# Patient Record
Sex: MALE | Race: WHITE | NOT HISPANIC OR LATINO | Employment: OTHER | URBAN - METROPOLITAN AREA
[De-identification: names, ages, dates, MRNs, and addresses within clinical notes are randomized per-mention and may not be internally consistent; named-entity substitution may affect disease eponyms.]

---

## 2017-11-04 ENCOUNTER — HOSPITAL ENCOUNTER (EMERGENCY)
Facility: HOSPITAL | Age: 47
End: 2017-11-05
Attending: EMERGENCY MEDICINE | Admitting: EMERGENCY MEDICINE
Payer: MEDICARE

## 2017-11-04 DIAGNOSIS — F32.A DEPRESSION, UNSPECIFIED DEPRESSION TYPE: Primary | ICD-10-CM

## 2017-11-04 LAB
ALBUMIN SERPL BCP-MCNC: 4.2 G/DL (ref 3.5–5)
ALP SERPL-CCNC: 229 U/L (ref 46–116)
ALT SERPL W P-5'-P-CCNC: 72 U/L (ref 12–78)
AMPHETAMINES SERPL QL SCN: NEGATIVE
ANION GAP SERPL CALCULATED.3IONS-SCNC: 11 MMOL/L (ref 4–13)
AST SERPL W P-5'-P-CCNC: 50 U/L (ref 5–45)
BARBITURATES UR QL: NEGATIVE
BASOPHILS # BLD AUTO: 0.05 THOUSANDS/ΜL (ref 0–0.1)
BASOPHILS NFR BLD AUTO: 1 % (ref 0–1)
BENZODIAZ UR QL: NEGATIVE
BILIRUB SERPL-MCNC: 0.6 MG/DL (ref 0.2–1)
BUN SERPL-MCNC: 15 MG/DL (ref 5–25)
CALCIUM SERPL-MCNC: 9 MG/DL (ref 8.3–10.1)
CHLORIDE SERPL-SCNC: 99 MMOL/L (ref 100–108)
CO2 SERPL-SCNC: 30 MMOL/L (ref 21–32)
COCAINE UR QL: NEGATIVE
CREAT SERPL-MCNC: 1.38 MG/DL (ref 0.6–1.3)
EOSINOPHIL # BLD AUTO: 0.5 THOUSAND/ΜL (ref 0–0.61)
EOSINOPHIL NFR BLD AUTO: 7 % (ref 0–6)
ERYTHROCYTE [DISTWIDTH] IN BLOOD BY AUTOMATED COUNT: 14.7 % (ref 11.6–15.1)
ETHANOL EXG-MCNC: 0 MG/DL
GFR SERPL CREATININE-BSD FRML MDRD: 60 ML/MIN/1.73SQ M
GLUCOSE SERPL-MCNC: 132 MG/DL (ref 65–140)
HCT VFR BLD AUTO: 46.5 % (ref 36.5–49.3)
HGB BLD-MCNC: 15.3 G/DL (ref 12–17)
LYMPHOCYTES # BLD AUTO: 2.89 THOUSANDS/ΜL (ref 0.6–4.47)
LYMPHOCYTES NFR BLD AUTO: 43 % (ref 14–44)
MCH RBC QN AUTO: 26.2 PG (ref 26.8–34.3)
MCHC RBC AUTO-ENTMCNC: 32.9 G/DL (ref 31.4–37.4)
MCV RBC AUTO: 80 FL (ref 82–98)
METHADONE UR QL: NEGATIVE
MONOCYTES # BLD AUTO: 0.55 THOUSAND/ΜL (ref 0.17–1.22)
MONOCYTES NFR BLD AUTO: 8 % (ref 4–12)
NEUTROPHILS # BLD AUTO: 2.79 THOUSANDS/ΜL (ref 1.85–7.62)
NEUTS SEG NFR BLD AUTO: 41 % (ref 43–75)
OPIATES UR QL SCN: NEGATIVE
PCP UR QL: NEGATIVE
PLATELET # BLD AUTO: 264 THOUSANDS/UL (ref 149–390)
PMV BLD AUTO: 9.8 FL (ref 8.9–12.7)
POTASSIUM SERPL-SCNC: 3.5 MMOL/L (ref 3.5–5.3)
PROT SERPL-MCNC: 8.7 G/DL (ref 6.4–8.2)
RBC # BLD AUTO: 5.83 MILLION/UL (ref 3.88–5.62)
SODIUM SERPL-SCNC: 140 MMOL/L (ref 136–145)
THC UR QL: NEGATIVE
WBC # BLD AUTO: 6.78 THOUSAND/UL (ref 4.31–10.16)

## 2017-11-04 PROCEDURE — 80053 COMPREHEN METABOLIC PANEL: CPT | Performed by: EMERGENCY MEDICINE

## 2017-11-04 PROCEDURE — 85025 COMPLETE CBC W/AUTO DIFF WBC: CPT | Performed by: EMERGENCY MEDICINE

## 2017-11-04 PROCEDURE — 36415 COLL VENOUS BLD VENIPUNCTURE: CPT | Performed by: EMERGENCY MEDICINE

## 2017-11-04 PROCEDURE — 82075 ASSAY OF BREATH ETHANOL: CPT | Performed by: EMERGENCY MEDICINE

## 2017-11-04 PROCEDURE — 80307 DRUG TEST PRSMV CHEM ANLYZR: CPT | Performed by: EMERGENCY MEDICINE

## 2017-11-04 RX ORDER — CLONAZEPAM 0.5 MG/1
2 TABLET ORAL ONCE
Status: COMPLETED | OUTPATIENT
Start: 2017-11-04 | End: 2017-11-04

## 2017-11-04 RX ADMIN — CLONAZEPAM 2 MG: 0.5 TABLET ORAL at 22:36

## 2017-11-05 VITALS
DIASTOLIC BLOOD PRESSURE: 71 MMHG | HEART RATE: 68 BPM | SYSTOLIC BLOOD PRESSURE: 107 MMHG | RESPIRATION RATE: 18 BRPM | BODY MASS INDEX: 27.2 KG/M2 | WEIGHT: 190 LBS | TEMPERATURE: 96.3 F | OXYGEN SATURATION: 98 % | HEIGHT: 70 IN

## 2017-11-05 PROCEDURE — 99285 EMERGENCY DEPT VISIT HI MDM: CPT

## 2017-11-05 RX ORDER — CLONAZEPAM 0.5 MG/1
2 TABLET ORAL ONCE
Status: COMPLETED | OUTPATIENT
Start: 2017-11-05 | End: 2017-11-05

## 2017-11-05 RX ORDER — FENTANYL 75 UG/H
75 PATCH TRANSDERMAL ONCE
Status: DISCONTINUED | OUTPATIENT
Start: 2017-11-05 | End: 2017-11-05 | Stop reason: HOSPADM

## 2017-11-05 RX ORDER — HYDROXYZINE HYDROCHLORIDE 25 MG/1
100 TABLET, FILM COATED ORAL ONCE
Status: COMPLETED | OUTPATIENT
Start: 2017-11-05 | End: 2017-11-05

## 2017-11-05 RX ORDER — CLONAZEPAM 0.5 MG/1
2 TABLET ORAL
Status: DISCONTINUED | OUTPATIENT
Start: 2017-11-05 | End: 2017-11-05 | Stop reason: HOSPADM

## 2017-11-05 RX ORDER — CLONAZEPAM 0.5 MG/1
1 TABLET ORAL
Status: DISCONTINUED | OUTPATIENT
Start: 2017-11-05 | End: 2017-11-05 | Stop reason: HOSPADM

## 2017-11-05 RX ORDER — ZOLPIDEM TARTRATE 5 MG/1
5 TABLET ORAL
Status: DISCONTINUED | OUTPATIENT
Start: 2017-11-05 | End: 2017-11-05 | Stop reason: HOSPADM

## 2017-11-05 RX ORDER — BUPROPION HYDROCHLORIDE 150 MG/1
150 TABLET, EXTENDED RELEASE ORAL ONCE
Status: COMPLETED | OUTPATIENT
Start: 2017-11-05 | End: 2017-11-05

## 2017-11-05 RX ORDER — BUPROPION HYDROCHLORIDE 150 MG/1
150 TABLET ORAL DAILY
Status: DISCONTINUED | OUTPATIENT
Start: 2017-11-06 | End: 2017-11-05 | Stop reason: HOSPADM

## 2017-11-05 RX ORDER — ASPIRIN 81 MG/1
81 TABLET, CHEWABLE ORAL ONCE
Status: COMPLETED | OUTPATIENT
Start: 2017-11-05 | End: 2017-11-05

## 2017-11-05 RX ADMIN — METOPROLOL TARTRATE 25 MG: 25 TABLET ORAL at 08:38

## 2017-11-05 RX ADMIN — BUPROPION HYDROCHLORIDE 150 MG: 150 TABLET, FILM COATED, EXTENDED RELEASE ORAL at 07:56

## 2017-11-05 RX ADMIN — HYDROXYZINE HYDROCHLORIDE 100 MG: 25 TABLET ORAL at 07:56

## 2017-11-05 RX ADMIN — CLONAZEPAM 2 MG: 0.5 TABLET ORAL at 07:44

## 2017-11-05 RX ADMIN — GABAPENTIN 800 MG: 300 CAPSULE ORAL at 07:43

## 2017-11-05 RX ADMIN — ASPIRIN 81 MG 81 MG: 81 TABLET ORAL at 07:45

## 2017-11-05 RX ADMIN — FENTANYL 75 MCG: 75 PATCH, EXTENDED RELEASE TRANSDERMAL at 03:52

## 2017-11-05 NOTE — ED NOTES
Pt reports he prefers 2W, but is willing to transfer to [facility] if there is another bed open there       Jamil Troy, MARCUS  11/04/17 1360

## 2017-11-05 NOTE — ED NOTES
Spoke to crisis worker Ramona Barakat who reports no current updates, will continue bed search today        Erick Sexton RN  11/05/17 7572

## 2017-11-05 NOTE — EMTALA/ACUTE CARE TRANSFER
[facility] [address]  [facility] [address] [id number]  Dept: [address] [place] [address] CONSENT    NAME Sheldon Mack                                         1970                              MRN [id number]    I have been informed of my rights regarding examination, treatment, and transfer   by Dr Loren Hooks MD    Benefits:      Risks:        Consent for Transfer:  I acknowledge that my medical condition has been evaluated and explained to me by the emergency department physician or other qualified medical person and/or my attending physician, who has recommended that I be transferred to the service of  Accepting Physician: DR Ryan Interiano at [facility] Name, [address] : [address] [place], [place]  The above potential benefits of such transfer, the potential risks associated with such transfer, and the probable risks of not being transferred have been explained to me, and I fully understand them  The doctor has explained that, in my case, the benefits of transfer outweigh the risks  I agree to be transferred  I authorize the performance of emergency medical procedures and treatments upon me in both transit and upon arrival at the receiving facility  Additionally, I authorize the release of any and all medical records to the receiving facility and request they be transported with me, if possible  I understand that the safest mode of transportation during a medical emergency is an ambulance and that the Hospital advocates the use of this mode of transport  Risks of traveling to the receiving facility by car, including absence of medical control, life sustaining equipment, such as oxygen, and medical personnel has been explained to me and I fully understand them  (ELIUD CORRECT BOX BELOW)  [  ]  I consent to the stated transfer and to be transported by ambulance/helicopter    [  ]  I consent to the stated transfer, but refuse transportation by ambulance and accept full responsibility for my transportation by car  I understand the risks of non-ambulance transfers and I exonerate the Hospital and its staff from any deterioration in my condition that results from this refusal     X___________________________________________    DATE  17  TIME________  Signature of patient or legally responsible individual signing on patient behalf           RELATIONSHIP TO PATIENT_________________________          Provider Certification    NAME Trell Mack                                         1970                              MRN [id number]    A medical screening exam was performed on the above named patient  Based on the examination:    Condition Necessitating Transfer The encounter diagnosis was Depression, unspecified depression type  Patient Condition:      Reason for Transfer:      Transfer Requirements: [place], [place]   · Space available and qualified personnel available for treatment as acknowledged by Flex Latif ([phone]  · Agreed to accept transfer and to provide appropriate medical treatment as acknowledged by       DR ALESSANDRA SCOTT  · Appropriate medical records of the examination and treatment of the patient are provided at the time of transfer   [address], [address] _______  · Transfer will be performed by qualified personnel from [facility]  and appropriate transfer equipment as required, including the use of necessary and appropriate life support measures      Provider Certification: I have examined the patient and explained the following risks and benefits of being transferred/refusing transfer to the patient/family:         Based on these reasonable risks and benefits to the patient and/or the unborn child(ruddy), and based upon the information available at the time of the patients examination, I certify that the medical benefits reasonably to be expected from the provision of appropriate medical treatments at another medical facility outweigh the increasing risks, if any, to the individuals medical condition, and in the case of labor to the unborn child, from effecting the transfer      X____________________________________________ DATE 11/05/17        TIME_______      ORIGINAL - SEND TO MEDICAL RECORDS   COPY - SEND WITH PATIENT DURING TRANSFER

## 2017-11-05 NOTE — ED NOTES
CW called [facility] and spoke with Wes (dispatch) who was able to schedule a  time for 11/05/2017@ 1400  CW notified Ally Weiss (admissions) with [address]

## 2017-11-05 NOTE — ED NOTES
Pt's Lopressor held at this time per Dr Andreea Chapman relating to pt's HR 58 and /76       Gonzalo Benavides RN  11/05/17 0727

## 2017-11-05 NOTE — ED PROVIDER NOTES
History  Chief Complaint   Patient presents with    Psychiatric Evaluation     Patient presents to ED for a psychiatric eval  Patient states he had a psychotic break 6 years ago, and he feels it coming on, states about every year and a half he gets this way  has SI w/o plan       This is a 50-year-old male complaining of depression with thoughts of hurting himself and his family but no specific plan  Symptoms started over the last few hours  He is hearing voices but he is unsure of what they are saying  He told his wife that he had these thoughts  Prior to Admission Medications   Prescriptions Last Dose Informant Patient Reported? Taking? QUEtiapine (SEROquel) 200 mg tablet   No No   Sig: Take 1 5 tablets (300 mg total) by mouth daily at bedtime Indications: Depression  aspirin (ECOTRIN LOW STRENGTH) 81 mg EC tablet   No No   Sig: Take 1 tablet (81 mg total) by mouth daily Indications: sedentary lifestyle  buPROPion (WELLBUTRIN SR) 150 mg 12 hr tablet   No No   Sig: Take 1 tablet (150 mg total) by mouth 2 (two) times a day Indications: Major Depressive Disorder  clonazePAM (KlonoPIN) 2 MG disintegrating tablet   No No   Sig: Take 1 tablet (2 mg total) by mouth 3 (three) times a day as needed for seizures  clonazePAM (KlonoPIN) 2 mg tablet   No No   Sig: Indications: anxiety  Take 1 tab(2mg) in morning, half tab(1mg) at noon, 1 tab(2mg) at night by mouth for anxiety   fentaNYL (DURAGESIC) 75 mcg/hr   No No   Sig: Indications: Chronic Pain  Place one patch on skin every 72 hours for pain   gabapentin (NEURONTIN) 800 mg tablet   No No   Sig: Take 1 tablet (800 mg total) by mouth 3 (three) times a day Indications: Anxiety  hydrOXYzine pamoate (VISTARIL) 50 mg capsule   No No   Sig: Indications: Anxiety Neurosis   Take 2 tablets (100mg) every 8 hours as needed for anxiety   metoprolol tartrate (LOPRESSOR) 25 mg tablet   No No   Sig: Take 1 tablet (25 mg total) by mouth 2 (two) times a day Indications: High Blood Pressure  zolpidem (AMBIEN) 5 mg tablet   No No   Sig: Take 1 tablet (5 mg total) by mouth daily at bedtime as needed for sleep Indications: Trouble Sleeping  Facility-Administered Medications: None       Past Medical History:   Diagnosis Date    Alcoholism ([facility] [address] )     Anxiety     Back pain     Cardiac disease     Chronic pain disorder     Depression     Hypertension     Psychiatric disorder     Psychiatric illness     Psychosis        History reviewed  No pertinent surgical history  Family History   Problem Relation Age of Onset    No Known Problems Mother     No Known Problems Father     No Known Problems Sister     No Known Problems Brother     No Known Problems Maternal Aunt     No Known Problems Paternal Aunt     No Known Problems Maternal Uncle     No Known Problems Paternal Uncle     No Known Problems Maternal Grandfather     No Known Problems Maternal Grandmother     No Known Problems Paternal Grandfather     No Known Problems Paternal Grandmother     No Known Problems Cousin     ADD / ADHD Neg Hx     Alcohol abuse Neg Hx     Anxiety disorder Neg Hx     Bipolar disorder Neg Hx     Dementia Neg Hx     Depression Neg Hx     Drug abuse Neg Hx     OCD Neg Hx     Paranoid behavior Neg Hx     Schizophrenia Neg Hx     Seizures Neg Hx     Self-Injurious Behavior  Neg Hx     Suicide Attempts Neg Hx      I have reviewed and agree with the history as documented  Social History   Substance Use Topics    Smoking status: Never Smoker    Smokeless tobacco: Never Used    Alcohol use No        Review of Systems   All other systems reviewed and are negative        Physical Exam  ED Triage Vitals [11/04/17 2139]   Temperature Pulse Respirations Blood Pressure SpO2   (!) 96 3 °F (35 7 °C) (!) 106 20 (!) 149/104 98 %      Temp Source Heart Rate Source Patient Position - Orthostatic VS BP Location FiO2 (%)   Temporal Monitor Lying Right arm --      Pain 11/5/17 0838)   buPROPion [facility] SR) 12 hr tablet 150 mg (150 mg Oral Given 11/5/17 5890)   aspirin chewable tablet 81 mg (81 mg Oral Given 11/5/17 5689)   hydrOXYzine HCL (ATARAX) tablet 100 mg (100 mg Oral Given 11/5/17 9882)       Diagnostic Studies  Results Reviewed     Procedure Component Value Units Date/Time    Comprehensive metabolic panel [[id number]]  (Abnormal) Collected:  11/04/17 2156    Lab Status:  Final result Specimen:  Blood from Arm, Right Updated:  11/04/17 2219     Sodium 140 mmol/L      Potassium 3 5 mmol/L      Chloride 99 (L) mmol/L      CO2 30 mmol/L      Anion Gap 11 mmol/L      BUN 15 mg/dL      Creatinine 1 38 (H) mg/dL      Glucose 132 mg/dL      Calcium 9 0 mg/dL      AST 50 (H) U/L      ALT 72 U/L      Alkaline Phosphatase 229 (H) U/L      Total Protein 8 7 (H) g/dL      Albumin 4 2 g/dL      Total Bilirubin 0 60 mg/dL      eGFR 60 ml/min/1 73sq m     Narrative:         National Kidney Disease Education Program recommendations are as follows:  GFR calculation is accurate only with a steady state creatinine  Chronic Kidney disease less than 60 ml/min/1 73 sq  meters  Kidney failure less than 15 ml/min/1 73 sq  meters  Rapid drug screen, urine [[id number]]  (Normal) Collected:  11/04/17 2156    Lab Status:  Final result Specimen:  Urine from Urine, Clean Catch Updated:  11/04/17 2215     Amph/Meth UR Negative     Barbiturate Ur Negative     Benzodiazepine Urine Negative     Cocaine Urine Negative     Methadone Urine Negative     Opiate Urine Negative     PCP Ur Negative     THC Urine Negative    Narrative:         FOR MEDICAL PURPOSES ONLY  IF CONFIRMATION NEEDED PLEASE CONTACT THE LAB WITHIN 5 DAYS      Drug Screen Cutoff Levels:  AMPHETAMINE/METHAMPHETAMINES  1000 ng/mL  BARBITURATES     200 ng/mL  BENZODIAZEPINES     200 ng/mL  COCAINE      300 ng/mL  METHADONE      300 ng/mL  OPIATES      300 ng/mL  PHENCYCLIDINE     25 ng/mL  THC       50 ng/mL    CBC and differential [[id number]]  (Abnormal) Collected:  11/04/17 2156    Lab Status:  Final result Specimen:  Blood from Arm, Right Updated:  11/04/17 2204     WBC 6 78 Thousand/uL      RBC 5 83 (H) Million/uL      Hemoglobin 15 3 g/dL      Hematocrit 46 5 %      MCV 80 (L) fL      MCH 26 2 (L) pg      MCHC 32 9 g/dL      RDW 14 7 %      MPV 9 8 fL      Platelets 258 Thousands/uL      Neutrophils Relative 41 (L) %      Lymphocytes Relative 43 %      Monocytes Relative 8 %      Eosinophils Relative 7 (H) %      Basophils Relative 1 %      Neutrophils Absolute 2 79 Thousands/µL      Lymphocytes Absolute 2 89 Thousands/µL      Monocytes Absolute 0 55 Thousand/µL      Eosinophils Absolute 0 50 Thousand/µL      Basophils Absolute 0 05 Thousands/µL     POCT alcohol breath test [[id number]]  (Normal) Resulted:  11/04/17 2153    Lab Status:  Final result Updated:  11/04/17 2154     EXTBreath Alcohol 0 000                 No orders to display              Procedures  Procedures       Phone Contacts  ED Phone Contact    ED Course  ED Course                                MDM  Number of Diagnoses or Management Options  Depression, unspecified depression type: new and requires workup     Amount and/or Complexity of Data Reviewed  Clinical lab tests: ordered and reviewed    Patient Progress  Patient progress: stable    CritCare Time    Disposition  Final diagnoses:   Depression, unspecified depression type     Time reflects when diagnosis was documented in both MDM as applicable and the Disposition within this note     Time User Action Codes Description Comment    11/5/2017  8:53 AM Immanuel CORDOBA Add [F32 9] Depression, unspecified depression type     11/5/2017  8:53 AM Immanuel CORDOBA Modify [F32 9] Depression, unspecified depression type     11/5/2017  8:53 AM Immanuel CORDOBA Modify [F32 9] Depression, unspecified depression type       ED Disposition     ED Disposition Condition Comment    Transfer to Another [address] [place] [address] [facility] should be transferred out to [facility]  MD Documentation    Flowsheet Row Most Recent Value   Accepting Physician  DR Julián Cerrato Name, [address] [place], [place]    (Name & Tel number)  Carolyn Manley (CRISIS NWHOEZ)[phone]   Transported by (Company and Unit #)  [employer]   Sending MD WANG      RN Documentation    [address] Shayla Angel Name, [address] [place], [place]    (Name & Tel number)  MICHELLE (CRISIS PNSIJD)[phone]   Transport Mode  Ambulance [SLETS]   Transported by [employer] and Unit #)  SLETS   Level of Care  Basic life support [SLETS]   Copies of Medical Records Sent  History and Physical, Orders, Progress note, Transfer form, Nursing note, Labs   Patient Belongings Disposition  Sent with patient      Follow-up Information    None       Discharge Medication List as of 11/5/2017  2:29 PM      CONTINUE these medications which have NOT CHANGED    Details   aspirin (ECOTRIN LOW STRENGTH) 81 mg EC tablet Take 1 tablet (81 mg total) by mouth daily Indications: sedentary lifestyle , Starting 3/24/2016, Until Discontinued, No Print      buPROPion (WELLBUTRIN SR) 150 mg 12 hr tablet Take 1 tablet (150 mg total) by mouth 2 (two) times a day Indications: Major Depressive Disorder , Starting 3/24/2016, Until Discontinued, Print      clonazePAM (KlonoPIN) 2 MG disintegrating tablet Take 1 tablet (2 mg total) by mouth 3 (three) times a day as needed for seizures  , Starting 4/1/2016, Until Discontinued, Print      clonazePAM (KlonoPIN) 2 mg tablet Indications: anxiety  Take 1 tab(2mg) in morning, half tab(1mg) at noon, 1 tab(2mg) at night by mouth for anxiety, Print      fentaNYL (DURAGESIC) 75 mcg/hr Indications: Chronic Pain   Place one patch on skin every 72 hours for pain, Print      gabapentin (NEURONTIN) 800 mg tablet Take 1 tablet (800 mg total) by mouth 3 (three) times a day Indications: Anxiety  , Starting 3/24/2016, Until Discontinued, Print      hydrOXYzine pamoate (VISTARIL) 50 mg capsule Indications: Anxiety Neurosis  Take 2 tablets (100mg) every 8 hours as needed for anxiety, Print      metoprolol tartrate (LOPRESSOR) 25 mg tablet Take 1 tablet (25 mg total) by mouth 2 (two) times a day Indications: High Blood Pressure , Starting 3/24/2016, Until Discontinued, No Print      QUEtiapine (SEROquel) 200 mg tablet Take 1 5 tablets (300 mg total) by mouth daily at bedtime Indications: Depression  , Starting 3/24/2016, Until Discontinued, Print      zolpidem (AMBIEN) 5 mg tablet Take 1 tablet (5 mg total) by mouth daily at bedtime as needed for sleep Indications: [address] , Starting 3/24/2016, Until Discontinued, Print           No discharge procedures on file      ED Provider  Electronically Signed by           Bhanu Milan MD  11/05/17 4425

## 2017-11-05 NOTE — ED NOTES
Called [facility] attempting to give report, spoke to  who states no one is available at this time therefore she will page crisis worker and have them call this nurse back        Erick Sexton RN  11/05/17 8417

## 2017-11-05 NOTE — ED NOTES
CW received a call from Milton Mcleod (admissions) with [facility] who informed me that pt has been accepted  Pt will be transferred and admitted to [facility] under the care of Dr Lisandra Schulte  Transportation has been arranged      [facility]

## 2017-11-05 NOTE — ED NOTES
[facility] made aware of pt's need for psychiatric consult        Adam Anderson RN  11/05/17 2120

## 2017-11-05 NOTE — ED NOTES
Pt states he would like to stay away from the [facility] area if possible  PC placed to [facility], message left to call back;  [facility] called, no beds; [facility] no beds; [facility] called, stated they have a bed and will review in the AM  Chart faxed at this time
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
## 2017-11-05 NOTE — ED NOTES
Spoke to Rikki King from crisis   Gave an estimate arrival time of 136 Santhosh Jalloh RN  11/04/17 8434

## 2017-11-05 NOTE — ED NOTES
Pt given warm blankets, reduced stimuli by turning down lights after medication administration       Karen Azevedo RN  11/04/17 9911

## 2017-11-05 NOTE — ED NOTES
Called about status of patient tray  [employer] said that they just saw that the order changed and that they would have to print a new ticket now  Patient original tray was order at approximant 07:30am  They are sending a tray up shortly        Ora Sierra  11/05/17 3774

## 2017-12-30 ENCOUNTER — HOSPITAL ENCOUNTER (EMERGENCY)
Facility: HOSPITAL | Age: 47
End: 2017-12-31
Attending: EMERGENCY MEDICINE | Admitting: EMERGENCY MEDICINE
Payer: MEDICARE

## 2017-12-30 DIAGNOSIS — R44.3 HALLUCINATIONS: ICD-10-CM

## 2017-12-30 DIAGNOSIS — R45.851 SUICIDAL IDEATION: Primary | ICD-10-CM

## 2017-12-30 LAB
ALBUMIN SERPL BCP-MCNC: 4.5 G/DL (ref 3.5–5)
ALP SERPL-CCNC: 79 U/L (ref 46–116)
ALT SERPL W P-5'-P-CCNC: 18 U/L (ref 12–78)
AMORPH URATE CRY URNS QL MICRO: ABNORMAL /HPF
AMPHETAMINES SERPL QL SCN: NEGATIVE
ANION GAP SERPL CALCULATED.3IONS-SCNC: 13 MMOL/L (ref 4–13)
APAP SERPL-MCNC: <2 UG/ML (ref 10–30)
APTT PPP: 32 SECONDS (ref 23–35)
AST SERPL W P-5'-P-CCNC: 31 U/L (ref 5–45)
BACTERIA UR QL AUTO: ABNORMAL /HPF
BARBITURATES UR QL: NEGATIVE
BASOPHILS # BLD AUTO: 0.01 THOUSANDS/ΜL (ref 0–0.1)
BASOPHILS NFR BLD AUTO: 0 % (ref 0–1)
BENZODIAZ UR QL: NEGATIVE
BILIRUB SERPL-MCNC: 0.8 MG/DL (ref 0.2–1)
BILIRUB UR QL STRIP: ABNORMAL
BUN SERPL-MCNC: 19 MG/DL (ref 5–25)
CALCIUM SERPL-MCNC: 9.3 MG/DL (ref 8.3–10.1)
CHLORIDE SERPL-SCNC: 100 MMOL/L (ref 100–108)
CLARITY UR: CLEAR
CO2 SERPL-SCNC: 23 MMOL/L (ref 21–32)
COCAINE UR QL: NEGATIVE
COLOR UR: YELLOW
CREAT SERPL-MCNC: 0.98 MG/DL (ref 0.6–1.3)
EOSINOPHIL # BLD AUTO: 0.01 THOUSAND/ΜL (ref 0–0.61)
EOSINOPHIL NFR BLD AUTO: 0 % (ref 0–6)
ERYTHROCYTE [DISTWIDTH] IN BLOOD BY AUTOMATED COUNT: 14.1 % (ref 11.6–15.1)
ETHANOL EXG-MCNC: 0 MG/DL
ETHANOL SERPL-MCNC: <3 MG/DL (ref 0–3)
GFR SERPL CREATININE-BSD FRML MDRD: 91 ML/MIN/1.73SQ M
GLUCOSE SERPL-MCNC: 121 MG/DL (ref 65–140)
GLUCOSE UR STRIP-MCNC: NEGATIVE MG/DL
HCT VFR BLD AUTO: 42.5 % (ref 36.5–49.3)
HGB BLD-MCNC: 14.6 G/DL (ref 12–17)
HGB UR QL STRIP.AUTO: NEGATIVE
INR PPP: 1.06 (ref 0.86–1.16)
KETONES UR STRIP-MCNC: ABNORMAL MG/DL
LEUKOCYTE ESTERASE UR QL STRIP: NEGATIVE
LYMPHOCYTES # BLD AUTO: 1.44 THOUSANDS/ΜL (ref 0.6–4.47)
LYMPHOCYTES NFR BLD AUTO: 19 % (ref 14–44)
MCH RBC QN AUTO: 26.9 PG (ref 26.8–34.3)
MCHC RBC AUTO-ENTMCNC: 34.4 G/DL (ref 31.4–37.4)
MCV RBC AUTO: 78 FL (ref 82–98)
METHADONE UR QL: NEGATIVE
MONOCYTES # BLD AUTO: 0.41 THOUSAND/ΜL (ref 0.17–1.22)
MONOCYTES NFR BLD AUTO: 6 % (ref 4–12)
MUCOUS THREADS UR QL AUTO: ABNORMAL
NEUTROPHILS # BLD AUTO: 5.54 THOUSANDS/ΜL (ref 1.85–7.62)
NEUTS SEG NFR BLD AUTO: 75 % (ref 43–75)
NITRITE UR QL STRIP: NEGATIVE
NON-SQ EPI CELLS URNS QL MICRO: ABNORMAL /HPF
OPIATES UR QL SCN: NEGATIVE
PCP UR QL: NEGATIVE
PH UR STRIP.AUTO: 5.5 [PH] (ref 4.5–8)
PLATELET # BLD AUTO: 255 THOUSANDS/UL (ref 149–390)
PMV BLD AUTO: 10.1 FL (ref 8.9–12.7)
POTASSIUM SERPL-SCNC: 5.3 MMOL/L (ref 3.5–5.3)
PROT SERPL-MCNC: 7.9 G/DL (ref 6.4–8.2)
PROT UR STRIP-MCNC: ABNORMAL MG/DL
PROTHROMBIN TIME: 13.6 SECONDS (ref 12.1–14.4)
RBC # BLD AUTO: 5.42 MILLION/UL (ref 3.88–5.62)
RBC #/AREA URNS AUTO: ABNORMAL /HPF
SALICYLATES SERPL-MCNC: <3 MG/DL (ref 3–20)
SODIUM SERPL-SCNC: 136 MMOL/L (ref 136–145)
SP GR UR STRIP.AUTO: >=1.03 (ref 1–1.03)
THC UR QL: NEGATIVE
TROPONIN I SERPL-MCNC: <0.02 NG/ML
TSH SERPL DL<=0.05 MIU/L-ACNC: 0.31 UIU/ML (ref 0.36–3.74)
UROBILINOGEN UR QL STRIP.AUTO: 0.2 E.U./DL
WBC # BLD AUTO: 7.41 THOUSAND/UL (ref 4.31–10.16)
WBC #/AREA URNS AUTO: ABNORMAL /HPF

## 2017-12-30 PROCEDURE — 85025 COMPLETE CBC W/AUTO DIFF WBC: CPT | Performed by: EMERGENCY MEDICINE

## 2017-12-30 PROCEDURE — 81001 URINALYSIS AUTO W/SCOPE: CPT | Performed by: EMERGENCY MEDICINE

## 2017-12-30 PROCEDURE — 80307 DRUG TEST PRSMV CHEM ANLYZR: CPT | Performed by: EMERGENCY MEDICINE

## 2017-12-30 PROCEDURE — 96374 THER/PROPH/DIAG INJ IV PUSH: CPT

## 2017-12-30 PROCEDURE — 84484 ASSAY OF TROPONIN QUANT: CPT | Performed by: EMERGENCY MEDICINE

## 2017-12-30 PROCEDURE — 82075 ASSAY OF BREATH ETHANOL: CPT | Performed by: EMERGENCY MEDICINE

## 2017-12-30 PROCEDURE — 80329 ANALGESICS NON-OPIOID 1 OR 2: CPT | Performed by: EMERGENCY MEDICINE

## 2017-12-30 PROCEDURE — 85610 PROTHROMBIN TIME: CPT | Performed by: EMERGENCY MEDICINE

## 2017-12-30 PROCEDURE — 93005 ELECTROCARDIOGRAM TRACING: CPT

## 2017-12-30 PROCEDURE — 80320 DRUG SCREEN QUANTALCOHOLS: CPT | Performed by: EMERGENCY MEDICINE

## 2017-12-30 PROCEDURE — 85730 THROMBOPLASTIN TIME PARTIAL: CPT | Performed by: EMERGENCY MEDICINE

## 2017-12-30 PROCEDURE — 96361 HYDRATE IV INFUSION ADD-ON: CPT

## 2017-12-30 PROCEDURE — 36415 COLL VENOUS BLD VENIPUNCTURE: CPT | Performed by: EMERGENCY MEDICINE

## 2017-12-30 PROCEDURE — 84443 ASSAY THYROID STIM HORMONE: CPT | Performed by: EMERGENCY MEDICINE

## 2017-12-30 PROCEDURE — 80053 COMPREHEN METABOLIC PANEL: CPT | Performed by: EMERGENCY MEDICINE

## 2017-12-30 RX ORDER — LORAZEPAM 2 MG/ML
1 INJECTION INTRAMUSCULAR ONCE
Status: COMPLETED | OUTPATIENT
Start: 2017-12-30 | End: 2017-12-30

## 2017-12-30 RX ORDER — LORAZEPAM 1 MG/1
1 TABLET ORAL ONCE
Status: COMPLETED | OUTPATIENT
Start: 2017-12-30 | End: 2017-12-30

## 2017-12-30 RX ADMIN — SODIUM CHLORIDE 1000 ML: 0.9 INJECTION, SOLUTION INTRAVENOUS at 17:48

## 2017-12-30 RX ADMIN — LORAZEPAM 1 MG: 2 INJECTION, SOLUTION INTRAMUSCULAR; INTRAVENOUS at 17:52

## 2017-12-30 RX ADMIN — LORAZEPAM 1 MG: 1 TABLET ORAL at 20:28

## 2017-12-30 NOTE — ED PROVIDER NOTES
History  Chief Complaint   Patient presents with    Psychiatric Evaluation     Pt c/o visual and auditory hallucinations and SI  Denies HI  This is a 69-year-old male presents here with his wife with concerns for visual and auditory hallucinations of people chasing and spying on him as well as suicidal thoughts without a specific plan  Symptoms have been for 1 week  He was discharged from a psychiatric facility for bipolar about a month ago  He admits to decreased appetite and dysuria recently  Prior to Admission Medications   Prescriptions Last Dose Informant Patient Reported? Taking? QUEtiapine (SEROquel) 200 mg tablet   No Yes   Sig: Take 1 5 tablets (300 mg total) by mouth daily at bedtime Indications: Depression  aspirin (ECOTRIN LOW STRENGTH) 81 mg EC tablet   No Yes   Sig: Take 1 tablet (81 mg total) by mouth daily Indications: sedentary lifestyle  buPROPion (WELLBUTRIN SR) 150 mg 12 hr tablet   No Yes   Sig: Take 1 tablet (150 mg total) by mouth 2 (two) times a day Indications: Major Depressive Disorder  clonazePAM (KlonoPIN) 2 MG disintegrating tablet   No Yes   Sig: Take 1 tablet (2 mg total) by mouth 3 (three) times a day as needed for seizures  clonazePAM (KlonoPIN) 2 mg tablet   No Yes   Sig: Indications: anxiety  Take 1 tab(2mg) in morning, half tab(1mg) at noon, 1 tab(2mg) at night by mouth for anxiety   fentaNYL (DURAGESIC) 75 mcg/hr   No Yes   Sig: Indications: Chronic Pain  Place one patch on skin every 72 hours for pain   gabapentin (NEURONTIN) 800 mg tablet   No Yes   Sig: Take 1 tablet (800 mg total) by mouth 3 (three) times a day Indications: Anxiety  hydrOXYzine pamoate (VISTARIL) 50 mg capsule   No Yes   Sig: Indications: Anxiety Neurosis  Take 2 tablets (100mg) every 8 hours as needed for anxiety   zolpidem (AMBIEN) 5 mg tablet   No Yes   Sig: Take 1 tablet (5 mg total) by mouth daily at bedtime as needed for sleep Indications: Trouble Sleeping  Facility-Administered Medications: None       Past Medical History:   Diagnosis Date    Alcoholism ([address] )     Anxiety     Back pain     Cardiac disease     Chronic pain disorder     Depression     Hypertension     MI (myocardial infarction)     Psychiatric disorder     Psychiatric illness     Psychosis     Renal disorder        Past Surgical History:   Procedure Laterality Date    BACK SURGERY         Family History   Problem Relation Age of Onset    No Known Problems Mother     No Known Problems Father     No Known Problems Sister     No Known Problems Brother     No Known Problems Maternal Aunt     No Known Problems Paternal Aunt     No Known Problems Maternal Uncle     No Known Problems Paternal Uncle     No Known Problems Maternal Grandfather     No Known Problems Maternal Grandmother     No Known Problems Paternal Grandfather     No Known Problems Paternal Grandmother     No Known Problems Cousin     ADD / ADHD Neg Hx     Alcohol abuse Neg Hx     Anxiety disorder Neg Hx     Bipolar disorder Neg Hx     Dementia Neg Hx     Depression Neg Hx     Drug abuse Neg Hx     OCD Neg Hx     Paranoid behavior Neg Hx     Schizophrenia Neg Hx     Seizures Neg Hx     Self-Injurious Behavior  Neg Hx     Suicide Attempts Neg Hx      I have reviewed and agree with the history as documented  Social History   Substance Use Topics    Smoking status: Former Smoker     Types: Cigars    Smokeless tobacco: Never Used    Alcohol use No        Review of Systems   All other systems reviewed and are negative        Physical Exam  ED Triage Vitals [12/30/17 1710]   Temperature Pulse Respirations Blood Pressure SpO2   97 5 °F (36 4 °C) (!) 113 18 (!) 153/106 96 %      Temp Source Heart Rate Source Patient Position - Orthostatic VS BP Location FiO2 (%)   Temporal Monitor Sitting Right arm --      Pain Score       5           Orthostatic Vital Signs  Vitals:    12/30/17 1710 12/30/17 1800   BP: (!) 153/106 128/89   Pulse: (!) 113 92   Patient Position - Orthostatic VS: Sitting Lying       Physical Exam   Constitutional: He is oriented to person, place, and time  He appears well-developed and well-nourished  HENT:   Mouth/Throat: Oropharynx is clear and moist    Eyes: Conjunctivae and EOM are normal  Pupils are equal, round, and reactive to light  Neck: Normal range of motion  Neck supple  No spinous process tenderness present  Cardiovascular: Regular rhythm, normal heart sounds and intact distal pulses  Tachycardia present  Pulmonary/Chest: Effort normal and breath sounds normal  No respiratory distress  He has no wheezes  Abdominal: Soft  Bowel sounds are normal  He exhibits no distension  There is no tenderness  Musculoskeletal: Normal range of motion  Neurological: He is alert and oriented to person, place, and time  He has normal strength  No sensory deficit  GCS eye subscore is 4  GCS verbal subscore is 5  GCS motor subscore is 6  Skin: Skin is warm and dry  No rash noted  Psychiatric: His speech is normal and behavior is normal  His mood appears anxious  He expresses suicidal ideation  He expresses no suicidal plans  Nursing note and vitals reviewed        ED Medications  Medications   sodium chloride 0 9 % bolus 1,000 mL (1,000 mL Intravenous New Bag 12/30/17 1748)   LORazepam (ATIVAN) 2 mg/mL injection 1 mg (1 mg Intravenous Given 12/30/17 1752)       Diagnostic Studies  Results Reviewed     Procedure Component Value Units Date/Time    Troponin I [[id number]]  (Normal) Collected:  12/30/17 1830    Lab Status:  Final result Specimen:  Blood from Hand, Left Updated:  12/30/17 1910     Troponin I <0 02 ng/mL     Narrative:         [employer] Chemistry analyzer 99% cutoff is > 0 04 ng/mL in network labs    o cTnI 99% cutoff is useful only when applied to patients in the clinical setting of myocardial ischemia  o cTnI 99% cutoff should be interpreted in the context of clinical history, ECG findings and possibly cardiac imaging to establish correct diagnosis  o cTnI 99% cutoff may be suggestive but clearly not indicative of a coronary event without the clinical setting of myocardial ischemia  TSH [[id number]]  (Abnormal) Collected:  12/30/17 1748    Lab Status:  Final result Specimen:  Blood from Hand, Right Updated:  12/30/17 1848     TSH 3RD GENERATON 0 311 (L) uIU/mL     Narrative:         Patients undergoing fluorescein dye angiography may retain small amounts of fluorescein in the body for 48-72 hours post procedure  Samples containing fluorescein can produce falsely depressed TSH values  If the patient had this procedure,a specimen should be resubmitted post fluorescein clearance  Rapid drug screen, urine [[id number]]  (Normal) Collected:  12/30/17 1751    Lab Status:  Final result Specimen:  Urine from Urine, Clean Catch Updated:  12/30/17 1839     Amph/Meth UR Negative     Barbiturate Ur Negative     Benzodiazepine Urine Negative     Cocaine Urine Negative     Methadone Urine Negative     Opiate Urine Negative     PCP Ur Negative     THC Urine Negative    Narrative:         FOR MEDICAL PURPOSES ONLY  IF CONFIRMATION NEEDED PLEASE CONTACT THE LAB WITHIN 5 DAYS      Drug Screen Cutoff Levels:  AMPHETAMINE/METHAMPHETAMINES  1000 ng/mL  BARBITURATES     200 ng/mL  BENZODIAZEPINES     200 ng/mL  COCAINE      300 ng/mL  METHADONE      300 ng/mL  OPIATES      300 ng/mL  PHENCYCLIDINE     25 ng/mL  THC       50 ng/mL    Urine Microscopic [[id number]]  (Abnormal) Collected:  12/30/17 1752    Lab Status:  Final result Specimen:  Urine from Urine, Clean Catch Updated:  12/30/17 1834     RBC, UA 0-1 (A) /hpf      WBC, UA 2-4 (A) /hpf      Epithelial Cells Occasional /hpf      Bacteria, UA Occasional /hpf      AMORPH URATES Occasional /hpf      MUCOUS THREADS Occasional    Comprehensive metabolic panel [[id number]] Collected:  12/30/17 1748    Lab Status:  Final result Specimen:  Blood from Hand, Right Updated:  12/30/17 1833     Sodium 136 mmol/L      Potassium 5 3 mmol/L      Chloride 100 mmol/L      CO2 23 mmol/L      Anion Gap 13 mmol/L      BUN 19 mg/dL      Creatinine 0 98 mg/dL      Glucose 121 mg/dL      Calcium 9 3 mg/dL      AST 31 U/L      ALT 18 U/L      Alkaline Phosphatase 79 U/L      Total Protein 7 9 g/dL      Albumin 4 5 g/dL      Total Bilirubin 0 80 mg/dL      eGFR 91 ml/min/1 73sq m     Narrative:         National Kidney Disease Education Program recommendations are as follows:  GFR calculation is accurate only with a steady state creatinine  Chronic Kidney disease less than 60 ml/min/1 73 sq  meters  Kidney failure less than 15 ml/min/1 73 sq  meters  Ethanol [[id number]]  (Normal) Collected:  12/30/17 1748    Lab Status:  Final result Specimen:  Blood from Hand, Right Updated:  12/30/17 1833     Ethanol Lvl <3 mg/dL     Salicylate level [[id number]]  (Abnormal) Collected:  12/30/17 1748    Lab Status:  Final result Specimen:  Blood from Hand, Right Updated:  48/71/04 1695     Salicylate Lvl <3 (L) mg/dL     Acetaminophen level [[id number]]  (Abnormal) Collected:  12/30/17 1748    Lab Status:  Final result Specimen:  Blood from Hand, Right Updated:  12/30/17 1833     Acetaminophen Level <2 (L) ug/mL     Protime-INR [[id number]]  (Normal) Collected:  12/30/17 1748    Lab Status:  Final result Specimen:  Blood from Hand, Right Updated:  12/30/17 1825     Protime 13 6 seconds      INR 1 06    APTT [[id number]]  (Normal) Collected:  12/30/17 1748    Lab Status:  Final result Specimen:  Blood from Hand, Right Updated:  12/30/17 1825     PTT 32 seconds     Narrative:          Therapeutic Heparin Range = 60-90 seconds    POCT alcohol breath test [[id number]]  (Normal) Resulted:  12/30/17 1822    Lab Status:  Final result Updated:  12/30/17 1822     EXTBreath Alcohol 0 000    UA w Reflex to Microscopic w Reflex to Culture [[id number]]  (Abnormal) Collected:  12/30/17 1752    Lab Status:  Final result Specimen: Urine from Urine, Clean Catch Updated:  12/30/17 1814     Color, UA Yellow     Clarity, UA Clear     Specific Gravity, UA >=1 030     pH, UA 5 5     Leukocytes, UA Negative     Nitrite, UA Negative     Protein, UA Trace (A) mg/dl      Glucose, UA Negative mg/dl      Ketones, UA >=80 (3+) (A) mg/dl      Urobilinogen, UA 0 2 E U /dl      Bilirubin, UA Interference- unable to analyze (A)     Blood, UA Negative    CBC and differential [[id number]]  (Abnormal) Collected:  12/30/17 1748    Lab Status:  Final result Specimen:  Blood from Hand, Right Updated:  12/30/17 1809     WBC 7 41 Thousand/uL      RBC 5 42 Million/uL      Hemoglobin 14 6 g/dL      Hematocrit 42 5 %      MCV 78 (L) fL      MCH 26 9 pg      MCHC 34 4 g/dL      RDW 14 1 %      MPV 10 1 fL      Platelets 386 Thousands/uL      Neutrophils Relative 75 %      Lymphocytes Relative 19 %      Monocytes Relative 6 %      Eosinophils Relative 0 %      Basophils Relative 0 %      Neutrophils Absolute 5 54 Thousands/µL      Lymphocytes Absolute 1 44 Thousands/µL      Monocytes Absolute 0 41 Thousand/µL      Eosinophils Absolute 0 01 Thousand/µL      Basophils Absolute 0 01 Thousands/µL                  No orders to display              Procedures  ECG 12 Lead Documentation  Date/Time: 12/30/2017 6:28 PM  Performed by: Skip So  Authorized by: Skip So     Indications / Diagnosis:  Suicidal tachycardia  ECG reviewed by me, the ED Provider: yes    Patient location:  ED  Previous ECG:     Previous ECG:  Compared to current    Comparison ECG info:  12/2015    Similarity:  No change  Interpretation:     Interpretation: abnormal    Rate:     ECG rate:  95    ECG rate assessment: normal    Rhythm:     Rhythm: sinus rhythm    Ectopy:     Ectopy: none    QRS:     QRS axis:  Normal    QRS intervals:  Normal  Conduction:     Conduction: normal    ST segments:     ST segments:  Non-specific    Depression:  V4, V5, V6, II, III and aVF  T waves:     T waves: normal ECG 12 Lead Documentation  Date/Time: 12/30/2017 6:35 PM  Performed by: Kaci Other  Authorized by: Kaci Other     Indications / Diagnosis:  Abnl ekg  ECG reviewed by me, the ED Provider: yes    Patient location:  ED  Previous ECG:     Previous ECG:  Compared to current    Comparison ECG info: Today    Similarity:  Changes noted  Interpretation:     Interpretation: non-specific    Rate:     ECG rate:  89    ECG rate assessment: normal    Rhythm:     Rhythm: sinus rhythm    Ectopy:     Ectopy: none    QRS:     QRS axis:  Normal    QRS intervals:  Normal  Conduction:     Conduction: normal    ST segments:     ST segments:  Normal  T waves:     T waves: inverted      Inverted:  III and aVF           Phone Contacts  ED Phone Contact    ED Course  ED Course      signed out to Dr Elysia Walters, crisis eval if trop negative                          MDM  Number of Diagnoses or Management Options  Hallucinations: new and requires workup  Suicidal ideation: new and requires workup     Amount and/or Complexity of Data Reviewed  Clinical lab tests: ordered and reviewed  Discuss the patient with other providers: yes    Patient Progress  Patient progress: improved    CritCare Time    Disposition  Final diagnoses:   Suicidal ideation - acute   Hallucinations     Time reflects when diagnosis was documented in both MDM as applicable and the Disposition within this note     Time User Action Codes Description Comment    12/30/2017  7:06 PM Teddy Menendez Add [Z68 064] Suicidal ideation     12/30/2017  7:06 PM Teddy Menendez Modify [Z73 672] Suicidal ideation acute    12/30/2017  7:07 PM Teddy Menendez Add [R44 3] Hallucinations       ED Disposition     None      Follow-up Information    None       Patient's Medications   Discharge Prescriptions    No medications on file     No discharge procedures on file      ED Provider  Electronically Signed by           Dania Cohn DO  12/30/17 3211

## 2017-12-31 VITALS
HEIGHT: 70 IN | DIASTOLIC BLOOD PRESSURE: 88 MMHG | SYSTOLIC BLOOD PRESSURE: 128 MMHG | OXYGEN SATURATION: 95 % | BODY MASS INDEX: 28.63 KG/M2 | HEART RATE: 88 BPM | TEMPERATURE: 97.5 F | RESPIRATION RATE: 13 BRPM | WEIGHT: 200 LBS

## 2017-12-31 LAB
ATRIAL RATE: 89 BPM
ATRIAL RATE: 95 BPM
P AXIS: 57 DEGREES
P AXIS: 65 DEGREES
PR INTERVAL: 158 MS
PR INTERVAL: 164 MS
QRS AXIS: 53 DEGREES
QRS AXIS: 59 DEGREES
QRSD INTERVAL: 86 MS
QRSD INTERVAL: 86 MS
QT INTERVAL: 358 MS
QT INTERVAL: 364 MS
QTC INTERVAL: 442 MS
QTC INTERVAL: 449 MS
T WAVE AXIS: -49 DEGREES
T WAVE AXIS: -65 DEGREES
VENTRICULAR RATE: 89 BPM
VENTRICULAR RATE: 95 BPM

## 2017-12-31 PROCEDURE — 99285 EMERGENCY DEPT VISIT HI MDM: CPT

## 2017-12-31 RX ORDER — CLONAZEPAM 0.5 MG/1
2 TABLET ORAL ONCE
Status: COMPLETED | OUTPATIENT
Start: 2017-12-31 | End: 2017-12-31

## 2017-12-31 RX ADMIN — CLONAZEPAM 2 MG: 0.5 TABLET ORAL at 01:31

## 2017-12-31 NOTE — ED NOTES
Pt reports increased depression with suicidal thoughts  Pt denies a specific plan  He reports that he has visual hallucinations of "what looks like a dog" and hears "chattering" voices that he is not able to make out  He reports increased sleeping and isolation from his family  His family member present stated that he has not been eating much and has lost "probably 75lbs in the past 6 months"  Pt has not been taking his medications as prescribed due to his over sleeping   Pt signed a 61 51 81

## 2017-12-31 NOTE — ED CARE HANDOFF
Emergency Department Sign Out Note        Sign out and transfer of care from EP  See Separate Emergency Department note  The patient, Dee Kim, was evaluated by the previous provider for   SI and hallucinations  Workup Completed:   medically cleared  Medicated  ED Course / Workup Pending (followup):    Crisis evaluation for placement                          ED Course as of Dec 31 1951   Sat Dec 30, 2017   2022  Patient is sitting up in bed  He states he is anxious and would like more Ativan  He is cooperative  We await crisis evaluation  Procedures  MDM  Number of Diagnoses or Management Options  Hallucinations:   Suicidal ideation:      Amount and/or Complexity of Data Reviewed  Clinical lab tests: reviewed  Discuss the patient with other providers: yes      CritCare Time      Disposition  Final diagnoses:   Suicidal ideation - acute   Hallucinations     Time reflects when diagnosis was documented in both MDM as applicable and the Disposition within this note     Time User Action Codes Description Comment    12/30/2017  7:06 PM Jennifer Palafox Add [P70 305] Suicidal ideation     12/30/2017  7:06 PM Rito, [address] Suicidal ideation acute    12/30/2017  7:07 PM Jennifer Palafox Add [R44 3] Hallucinations       ED Disposition     ED Disposition Condition Comment    Transfer to Another [facility] should be transferred out to DEVORAH DOWNS Documentation    [address] Most Recent Value   Patient Condition  The patient has been stabilized such that within reasonable medical probability, no material deterioration of the patient condition or the condition of the unborn child(ruddy) is likely to result from the transfer   Reason for Transfer  Third party payor request   Risks of Transfer  Potential for delay in receiving treatment   Accepting Physician  Carol Ann Sarabia Name, [address] [facility]    (Name & Tel number)  Tracey Willingham Transported by [employer] and Unit #)  [employer]   Sending MD Quyen Lyon   Provider Certification  General risk, such as traffic hazards, adverse weather conditions, rough terrain or turbulence, possible failure of equipment (including vehicle or aircraft), or consequences of actions of persons outside the control of the transport personnel      RN Documentation    [address] Shayla Angel Name, [address] [facility]    (Name & Tel number)  Shama Talamantes by [employer] and Unit #)  LETITIA      Follow-up Information    None       Discharge Medication List as of 12/31/2017  3:14 AM      CONTINUE these medications which have NOT CHANGED    Details   aspirin (ECOTRIN LOW STRENGTH) 81 mg EC tablet Take 1 tablet (81 mg total) by mouth daily Indications: sedentary lifestyle , Starting 3/24/2016, Until Discontinued, No Print      buPROPion (WELLBUTRIN SR) 150 mg 12 hr tablet Take 1 tablet (150 mg total) by mouth 2 (two) times a day Indications: Major Depressive Disorder , Starting 3/24/2016, Until Discontinued, Print      clonazePAM (KlonoPIN) 2 MG disintegrating tablet Take 1 tablet (2 mg total) by mouth 3 (three) times a day as needed for seizures  , Starting 4/1/2016, Until Discontinued, Print      clonazePAM (KlonoPIN) 2 mg tablet Indications: anxiety  Take 1 tab(2mg) in morning, half tab(1mg) at noon, 1 tab(2mg) at night by mouth for anxiety, Print      fentaNYL (DURAGESIC) 75 mcg/hr Indications: Chronic Pain  Place one patch on skin every 72 hours for pain, Print      gabapentin (NEURONTIN) 800 mg tablet Take 1 tablet (800 mg total) by mouth 3 (three) times a day Indications: Anxiety  , Starting 3/24/2016, Until Discontinued, Print      hydrOXYzine pamoate (VISTARIL) 50 mg capsule Indications: Anxiety Neurosis   Take 2 tablets (100mg) every 8 hours as needed for anxiety, Print      QUEtiapine (SEROquel) 200 mg tablet Take 1 5 tablets (300 mg total) by mouth daily at bedtime Indications: Depression  , Starting 3/24/2016, Until Discontinued, Print      zolpidem (AMBIEN) 5 mg tablet Take 1 tablet (5 mg total) by mouth daily at bedtime as needed for sleep Indications: [address] , Starting 3/24/2016, Until Discontinued, Print           No discharge procedures on file         ED Provider  Electronically Signed by

## 2017-12-31 NOTE — EMTALA/ACUTE CARE TRANSFER
[facility] [address]  [address] [place] [id number]  Dept: [address] [place] [address] CONSENT    NAME Chrystal Mack                                         1970                              MRN [id number]    I have been informed of my rights regarding examination, treatment, and transfer   by Dr Jessica Hyde DO    Benefits:      Risks: Potential for delay in receiving treatment      Consent for Transfer:  I acknowledge that my medical condition has been evaluated and explained to me by the emergency department physician or other qualified medical person and/or my attending physician, who has recommended that I be transferred to the service of  Accepting Physician: Sebastian Fleischer at [address] Name, [address] : Geneva Lott  The above potential benefits of such transfer, the potential risks associated with such transfer, and the probable risks of not being transferred have been explained to me, and I fully understand them  The doctor has explained that, in my case, the benefits of transfer outweigh the risks  I agree to be transferred  I authorize the performance of emergency medical procedures and treatments upon me in both transit and upon arrival at the receiving facility  Additionally, I authorize the release of any and all medical records to the receiving facility and request they be transported with me, if possible  I understand that the safest mode of transportation during a medical emergency is an ambulance and that the Hospital advocates the use of this mode of transport  Risks of traveling to the receiving facility by car, including absence of medical control, life sustaining equipment, such as oxygen, and medical personnel has been explained to me and I fully understand them  (ELIUD CORRECT BOX BELOW)  [X]  I consent to the stated transfer and to be transported by ambulance/helicopter    [  ]  I consent to the stated transfer, but refuse transportation by ambulance and accept full responsibility for my transportation by car  I understand the risks of non-ambulance transfers and I exonerate the Hospital and its staff from any deterioration in my condition that results from this refusal     X___________________________________________    DATE  17  TIME________  Signature of patient or legally responsible individual signing on patient behalf           RELATIONSHIP TO PATIENT_________________________          Provider Certification    NAME Connor Mack                                         1970                              MRN [id number]    A medical screening exam was performed on the above named patient  Based on the examination:    Condition Necessitating Transfer The primary encounter diagnosis was Suicidal ideation  A diagnosis of Hallucinations was also pertinent to this visit  Patient Condition: The patient has been stabilized such that within reasonable medical probability, no material deterioration of the patient condition or the condition of the unborn child(ruddy) is likely to result from the transfer    Reason for Transfer: Third party payor request    Transfer Requirements: [facility]   · Space available and qualified personnel available for treatment as acknowledged by Gama Del Angel  · Agreed to accept transfer and to provide appropriate medical treatment as acknowledged by       Neelam Knapp  · Appropriate medical records of the examination and treatment of the patient are provided at the time of transfer   [address] [facility], [address] _______  · Transfer will be performed by qualified personnel from [facility]  and appropriate transfer equipment as required, including the use of necessary and appropriate life support measures      Provider Certification: I have examined the patient and explained the following risks and benefits of being transferred/refusing transfer to the patient/family:  General risk, such as traffic hazards, adverse weather conditions, rough terrain or turbulence, possible failure of equipment (including vehicle or aircraft), or consequences of actions of persons outside the control of the transport personnel      Based on these reasonable risks and benefits to the patient and/or the unborn child(ruddy), and based upon the information available at the time of the patients examination, I certify that the medical benefits reasonably to be expected from the provision of appropriate medical treatments at another medical facility outweigh the increasing risks, if any, to the individuals medical condition, and in the case of labor to the unborn child, from effecting the transfer      X____________________________________________ DATE 12/31/17        TIME_______      ORIGINAL - SEND TO MEDICAL RECORDS   COPY - SEND WITH PATIENT DURING TRANSFER

## 2018-02-06 ENCOUNTER — HOSPITAL ENCOUNTER (EMERGENCY)
Facility: HOSPITAL | Age: 48
Discharge: HOME | End: 2018-02-06
Attending: EMERGENCY MEDICINE | Admitting: EMERGENCY MEDICINE
Payer: COMMERCIAL

## 2018-02-06 VITALS
OXYGEN SATURATION: 99 % | SYSTOLIC BLOOD PRESSURE: 132 MMHG | BODY MASS INDEX: 28.7 KG/M2 | RESPIRATION RATE: 18 BRPM | HEART RATE: 110 BPM | TEMPERATURE: 97.9 F | DIASTOLIC BLOOD PRESSURE: 85 MMHG | WEIGHT: 200 LBS

## 2018-02-06 DIAGNOSIS — L03.211 FACIAL CELLULITIS: Primary | ICD-10-CM

## 2018-02-06 PROCEDURE — 90715 TDAP VACCINE 7 YRS/> IM: CPT | Performed by: EMERGENCY MEDICINE

## 2018-02-06 PROCEDURE — 99282 EMERGENCY DEPT VISIT SF MDM: CPT

## 2018-02-06 PROCEDURE — 90471 IMMUNIZATION ADMIN: CPT

## 2018-02-06 RX ORDER — CEPHALEXIN 500 MG/1
500 CAPSULE ORAL 3 TIMES DAILY
Qty: 21 CAPSULE | Refills: 0 | Status: SHIPPED | OUTPATIENT
Start: 2018-02-06 | End: 2018-02-13

## 2018-02-06 RX ORDER — CEPHALEXIN 250 MG/1
500 CAPSULE ORAL ONCE
Status: COMPLETED | OUTPATIENT
Start: 2018-02-06 | End: 2018-02-06

## 2018-02-06 RX ORDER — DIPHENHYDRAMINE HCL 25 MG
25 TABLET ORAL ONCE
Status: COMPLETED | OUTPATIENT
Start: 2018-02-06 | End: 2018-02-06

## 2018-02-06 RX ADMIN — DIPHENHYDRAMINE HCL 25 MG: 25 TABLET ORAL at 21:24

## 2018-02-06 RX ADMIN — TETANUS TOXOID, REDUCED DIPHTHERIA TOXOID AND ACELLULAR PERTUSSIS VACCINE, ADSORBED 0.5 ML: 5; 2.5; 8; 8; 2.5 SUSPENSION INTRAMUSCULAR at 21:24

## 2018-02-06 RX ADMIN — CEPHALEXIN 500 MG: 250 CAPSULE ORAL at 21:24

## 2018-02-07 NOTE — ED PROVIDER NOTES
History  Chief Complaint   Patient presents with    Rash     Red rash on bridge of nose and small area on forehead; some swelling associated with it  Denies itching; states it is painful  This is a 54-year-old male who presents with erythema warmth and pain to the glabellar area and bridge of his nose that started this evening he denies any fevers or chills last tetanus is unknown he is not sure if he got bit by something        History provided by:  Patient and relative  Rash   Location:  Face  Facial rash location:  Face  Quality: painful, redness and swelling    Pain details:     Quality:  Aching    Severity:  Mild    Timing:  Constant    Progression:  Worsening  Severity:  Moderate  Timing:  Constant  Progression:  Worsening  Chronicity:  New  Context comment:  Unclear possible insect bite  Relieved by:  Nothing  Associated symptoms: no fever        Prior to Admission Medications   Prescriptions Last Dose Informant Patient Reported? Taking? QUEtiapine (SEROquel) 200 mg tablet   No No   Sig: Take 1 5 tablets (300 mg total) by mouth daily at bedtime Indications: Depression  aspirin (ECOTRIN LOW STRENGTH) 81 mg EC tablet   No No   Sig: Take 1 tablet (81 mg total) by mouth daily Indications: sedentary lifestyle  buPROPion (WELLBUTRIN SR) 150 mg 12 hr tablet   No No   Sig: Take 1 tablet (150 mg total) by mouth 2 (two) times a day Indications: Major Depressive Disorder  clonazePAM (KlonoPIN) 2 MG disintegrating tablet   No No   Sig: Take 1 tablet (2 mg total) by mouth 3 (three) times a day as needed for seizures  clonazePAM (KlonoPIN) 2 mg tablet   No No   Sig: Indications: anxiety  Take 1 tab(2mg) in morning, half tab(1mg) at noon, 1 tab(2mg) at night by mouth for anxiety   fentaNYL (DURAGESIC) 75 mcg/hr   No No   Sig: Indications: Chronic Pain   Place one patch on skin every 72 hours for pain   gabapentin (NEURONTIN) 800 mg tablet   No No   Sig: Take 1 tablet (800 mg total) by mouth 3 (three) times a day Indications: Anxiety  hydrOXYzine pamoate (VISTARIL) 50 mg capsule   No No   Sig: Indications: Anxiety Neurosis  Take 2 tablets (100mg) every 8 hours as needed for anxiety   zolpidem (AMBIEN) 5 mg tablet   No No   Sig: Take 1 tablet (5 mg total) by mouth daily at bedtime as needed for sleep Indications: Trouble Sleeping  Facility-Administered Medications: None       Past Medical History:   Diagnosis Date    Alcoholism ([facility] [address] )     Anxiety     Back pain     Cardiac disease     Chronic pain disorder     Depression     Hypertension     MI (myocardial infarction)     Psychiatric disorder     Psychiatric illness     Psychosis     Renal disorder        Past Surgical History:   Procedure Laterality Date    BACK SURGERY         Family History   Problem Relation Age of Onset    No Known Problems Mother     No Known Problems Father     No Known Problems Sister     No Known Problems Brother     No Known Problems Maternal Aunt     No Known Problems Paternal Aunt     No Known Problems Maternal Uncle     No Known Problems Paternal Uncle     No Known Problems Maternal Grandfather     No Known Problems Maternal Grandmother     No Known Problems Paternal Grandfather     No Known Problems Paternal Grandmother     No Known Problems Cousin     ADD / ADHD Neg Hx     Alcohol abuse Neg Hx     Anxiety disorder Neg Hx     Bipolar disorder Neg Hx     Dementia Neg Hx     Depression Neg Hx     Drug abuse Neg Hx     OCD Neg Hx     Paranoid behavior Neg Hx     Schizophrenia Neg Hx     Seizures Neg Hx     Self-Injury Neg Hx     Suicide Attempts Neg Hx      I have reviewed and agree with the history as documented  Social History   Substance Use Topics    Smoking status: Former Smoker     Types: Cigars    Smokeless tobacco: Never Used    Alcohol use No      Comment: history 10 years ago heavy drinking        Review of Systems   Constitutional: Negative for fever  Skin: Positive for rash  All other systems reviewed and are negative  Physical Exam  ED Triage Vitals   Temperature Pulse Respirations Blood Pressure SpO2   02/06/18 2055 02/06/18 2100 02/06/18 2055 02/06/18 2055 02/06/18 2055   97 9 °F (36 6 °C) (!) 115 16 134/88 98 %      Temp Source Heart Rate Source Patient Position - Orthostatic VS BP Location FiO2 (%)   02/06/18 2055 -- -- -- --   Temporal          Pain Score       02/06/18 2055       7           Orthostatic Vital Signs  Vitals:    02/06/18 2055 02/06/18 2100   BP: 134/88    Pulse:  (!) 115       Physical Exam   Constitutional: He is oriented to person, place, and time  He appears well-developed and well-nourished  No distress  HENT:   Head: Normocephalic and atraumatic  Right Ear: External ear normal    Left Ear: External ear normal    Nose: Nose normal    Mouth/Throat: Oropharynx is clear and moist    Eyes: EOM are normal  Pupils are equal, round, and reactive to light  Neck: Normal range of motion  Neck supple  No tracheal deviation present  Cardiovascular: Regular rhythm and intact distal pulses  Exam reveals no gallop and no friction rub  Pulmonary/Chest: Effort normal and breath sounds normal  No respiratory distress  He has no wheezes  Abdominal: Soft  Bowel sounds are normal  He exhibits no distension  There is no tenderness  Musculoskeletal: Normal range of motion  He exhibits no edema, tenderness or deformity  Neurological: He is alert and oriented to person, place, and time  No cranial nerve deficit  Skin: Skin is warm and dry  Rash noted  3 cm area erythema mild swelling and warmth to the glabellar area and nasal bridge without any vesicles or pustular areas   Psychiatric: He has a normal mood and affect   His behavior is normal  Thought content normal        ED Medications  Medications   tetanus-diphtheria-acellular pertussis (BOOSTRIX) IM injection 0 5 mL (not administered)   diphenhydrAMINE (BENADRYL) tablet 25 mg (not administered) cephalexin (KEFLEX) capsule 500 mg (not administered)       Diagnostic Studies  Results Reviewed     None                 No orders to display              Procedures  Procedures       Phone Contacts  ED Phone Contact    ED Course  ED Course                                MDM  Number of Diagnoses or Management Options  Diagnosis management comments:  Facial erythema warmth and swelling most consistent with localized cellulitis no obvious insect bite area no abscess or vesicle formation    CritCare Time    Disposition  Final diagnoses:   Facial cellulitis     Time reflects when diagnosis was documented in both MDM as applicable and the Disposition within this note     Time User Action Codes Description Comment    2/6/2018  9:20 PM Allencristopher Armas Add [[id number]] Facial cellulitis       ED Disposition     ED Disposition Condition Comment    Discharge  Sylvester Shculte Sohellen discharge to home/self care  Condition at discharge: Stable        Follow-up Information     Follow up With Specialties Details Why [facility], DO Internal Medicine In 2 days  for recheck [address] [place] [address]  [phone]          Patient's Medications   Discharge Prescriptions    CEPHALEXIN (KEFLEX) 500 MG CAPSULE    Take 1 capsule (500 mg total) by mouth 3 (three) times a day for 7 days       Start Date: 2/6/2018  End Date: 2/13/2018       Order Dose: 500 mg       Quantity: 21 capsule    Refills: 0     No discharge procedures on file      ED Provider  Electronically Signed by           Edy Aguilar DO  02/06/18 4050

## 2018-02-07 NOTE — DISCHARGE INSTRUCTIONS
Cellulitis   WHAT YOU NEED TO KNOW:   Cellulitis is a skin infection caused by bacteria  Cellulitis may go away on its own or you may need treatment  Your healthcare provider may draw a [place] around the outside edges of your cellulitis  If your cellulitis spreads, your healthcare provider will see it outside of the [place]  DISCHARGE INSTRUCTIONS:   Call 911 if:   · You have sudden trouble breathing or chest pain  Return to the emergency department if:   · Your wound gets larger and more painful  · You feel a crackling under your skin when you touch it  · You have purple dots or bumps on your skin, or you see bleeding under your skin  · You have new swelling and pain in your legs  · The red, warm, swollen area gets larger  · You see red streaks coming from the infected area  Contact your healthcare provider if:   · You have a fever  · Your fever or pain does not go away or gets worse  · The area does not get smaller after 2 days of antibiotics  · Your skin is flaking or peeling off  · You have questions or concerns about your condition or care  Medicines:   · Antibiotics  help treat the bacterial infection  · NSAIDs , such as ibuprofen, help decrease swelling, pain, and fever  NSAIDs can cause stomach bleeding or kidney problems in certain people  If you take blood thinner medicine, always ask if NSAIDs are safe for you  Always read the medicine label and follow directions  Do not give these medicines to children under 10months of age without direction from your child's healthcare provider  · Acetaminophen  decreases pain and fever  It is available without a doctor's order  Ask how much to take and how often to take it  Follow directions  Read the labels of all other medicines you are using to see if they also contain acetaminophen, or ask your doctor or pharmacist  Acetaminophen can cause liver damage if not taken correctly   Do not use more than 4 grams (4,000 milligrams) total of acetaminophen in one day  · Take your medicine as directed  Contact your healthcare provider if you think your medicine is not helping or if you have side effects  Tell him or her if you are allergic to any medicine  Keep a list of the medicines, vitamins, and herbs you take  Include the amounts, and when and why you take them  Bring the list or the pill bottles to follow-up visits  Carry your medicine list with you in case of an emergency  Self-care:   · Elevate the area above the level of your heart  as often as you can  This will help decrease swelling and pain  Prop the area on pillows or blankets to keep it elevated comfortably  · Clean the area daily until the wound scabs over  Gently wash the area with soap and water  Pat dry  Use dressings as directed  · Place cool or warm, wet cloths on the area as directed  Use clean cloths and clean water  Leave it on the area until the cloth is room temperature  Pat the area dry with a clean, dry cloth  The cloths may help decrease pain  Prevent cellulitis:   · Do not scratch bug bites or areas of injury  You increase your risk for cellulitis by scratching these areas  · Do not share personal items, such as towels, clothing, and razors  · Clean exercise equipment  with germ-killing  before and after you use it  · Wash your hands often  Use soap and water  Wash your hands after you use the bathroom, change a child's diapers, or sneeze  Wash your hands before you prepare or eat food  Use lotion to prevent dry, cracked skin  · Wear pressure stockings as directed  You may be told to wear the stockings if you have peripheral edema  The stockings improve blood flow and decrease swelling  · Treat athlete's foot  This can help prevent the spread of a bacterial skin infection  Follow up with your healthcare provider within 3 days, or as directed:   Your healthcare provider will check if your cellulitis is getting better  You may need different medicine  Write down your questions so you remember to ask them during your visits  © 2017 [facility] Information is for End User's use only and may not be sold, redistributed or otherwise used for commercial purposes  All illustrations and images included in CareNotes® are the copyrighted property of [employer]  or [employer]harmesh [employer]  The above information is an  only  It is not intended as medical advice for individual conditions or treatments  Talk to your doctor, nurse or pharmacist before following any medical regimen to see if it is safe and effective for you

## 2018-08-06 ENCOUNTER — HOSPITAL ENCOUNTER (INPATIENT)
Facility: HOSPITAL | Age: 48
LOS: 9 days | Discharge: HOME | DRG: 885 | End: 2018-08-15
Attending: EMERGENCY MEDICINE | Admitting: PSYCHIATRY & NEUROLOGY
Payer: COMMERCIAL

## 2018-08-06 DIAGNOSIS — G89.29 CHRONIC BACK PAIN: ICD-10-CM

## 2018-08-06 DIAGNOSIS — M54.9 CHRONIC BACK PAIN: ICD-10-CM

## 2018-08-06 DIAGNOSIS — K21.9 GERD (GASTROESOPHAGEAL REFLUX DISEASE): ICD-10-CM

## 2018-08-06 DIAGNOSIS — F41.1 GENERALIZED ANXIETY DISORDER: ICD-10-CM

## 2018-08-06 DIAGNOSIS — F31.62 BIPOLAR DISORDER, CURRENT EPISODE MIXED, MODERATE (HCC): ICD-10-CM

## 2018-08-06 DIAGNOSIS — G47.00 INSOMNIA: ICD-10-CM

## 2018-08-06 DIAGNOSIS — L98.9 SKIN LESION OF LEFT LEG: ICD-10-CM

## 2018-08-06 DIAGNOSIS — N39.0 UTI (URINARY TRACT INFECTION): ICD-10-CM

## 2018-08-06 DIAGNOSIS — R45.851 PLANNING TO COMMIT SUICIDE: Primary | ICD-10-CM

## 2018-08-06 LAB
ALBUMIN SERPL BCP-MCNC: 4 G/DL (ref 3.5–5)
ALP SERPL-CCNC: 227 U/L (ref 46–116)
ALT SERPL W P-5'-P-CCNC: 38 U/L (ref 12–78)
AMPHETAMINES SERPL QL SCN: NEGATIVE
ANION GAP SERPL CALCULATED.3IONS-SCNC: 11 MMOL/L (ref 4–13)
APTT PPP: 22 SECONDS (ref 24–36)
AST SERPL W P-5'-P-CCNC: 38 U/L (ref 5–45)
BARBITURATES UR QL: NEGATIVE
BASOPHILS # BLD AUTO: 0.03 THOUSANDS/ΜL (ref 0–0.1)
BASOPHILS NFR BLD AUTO: 1 % (ref 0–1)
BENZODIAZ UR QL: POSITIVE
BILIRUB SERPL-MCNC: 0.8 MG/DL (ref 0.2–1)
BUN SERPL-MCNC: 14 MG/DL (ref 5–25)
CALCIUM SERPL-MCNC: 9.3 MG/DL (ref 8.3–10.1)
CHLORIDE SERPL-SCNC: 99 MMOL/L (ref 100–108)
CO2 SERPL-SCNC: 24 MMOL/L (ref 21–32)
COCAINE UR QL: NEGATIVE
CREAT SERPL-MCNC: 1.2 MG/DL (ref 0.6–1.3)
EOSINOPHIL # BLD AUTO: 0.11 THOUSAND/ΜL (ref 0–0.61)
EOSINOPHIL NFR BLD AUTO: 2 % (ref 0–6)
ERYTHROCYTE [DISTWIDTH] IN BLOOD BY AUTOMATED COUNT: 13.2 % (ref 11.6–15.1)
ETHANOL EXG-MCNC: 0 MG/DL
GFR SERPL CREATININE-BSD FRML MDRD: 71 ML/MIN/1.73SQ M
GLUCOSE SERPL-MCNC: 132 MG/DL (ref 65–140)
HCT VFR BLD AUTO: 41.9 % (ref 36.5–49.3)
HGB BLD-MCNC: 13.7 G/DL (ref 12–17)
IMM GRANULOCYTES # BLD AUTO: 0.01 THOUSAND/UL (ref 0–0.2)
IMM GRANULOCYTES NFR BLD AUTO: 0 % (ref 0–2)
INR PPP: 1.04 (ref 0.86–1.17)
LYMPHOCYTES # BLD AUTO: 1.67 THOUSANDS/ΜL (ref 0.6–4.47)
LYMPHOCYTES NFR BLD AUTO: 36 % (ref 14–44)
MCH RBC QN AUTO: 27 PG (ref 26.8–34.3)
MCHC RBC AUTO-ENTMCNC: 32.7 G/DL (ref 31.4–37.4)
MCV RBC AUTO: 83 FL (ref 82–98)
METHADONE UR QL: NEGATIVE
MONOCYTES # BLD AUTO: 0.33 THOUSAND/ΜL (ref 0.17–1.22)
MONOCYTES NFR BLD AUTO: 7 % (ref 4–12)
NEUTROPHILS # BLD AUTO: 2.48 THOUSANDS/ΜL (ref 1.85–7.62)
NEUTS SEG NFR BLD AUTO: 54 % (ref 43–75)
NRBC BLD AUTO-RTO: 0 /100 WBCS
OPIATES UR QL SCN: NEGATIVE
PCP UR QL: NEGATIVE
PLATELET # BLD AUTO: 202 THOUSANDS/UL (ref 149–390)
PMV BLD AUTO: 10.9 FL (ref 8.9–12.7)
POTASSIUM SERPL-SCNC: 3.7 MMOL/L (ref 3.5–5.3)
PROT SERPL-MCNC: 8 G/DL (ref 6.4–8.2)
PROTHROMBIN TIME: 13 SECONDS (ref 11.8–14.2)
RBC # BLD AUTO: 5.07 MILLION/UL (ref 3.88–5.62)
SODIUM SERPL-SCNC: 134 MMOL/L (ref 136–145)
THC UR QL: NEGATIVE
WBC # BLD AUTO: 4.63 THOUSAND/UL (ref 4.31–10.16)

## 2018-08-06 PROCEDURE — 80053 COMPREHEN METABOLIC PANEL: CPT | Performed by: EMERGENCY MEDICINE

## 2018-08-06 PROCEDURE — 85025 COMPLETE CBC W/AUTO DIFF WBC: CPT | Performed by: EMERGENCY MEDICINE

## 2018-08-06 PROCEDURE — 82075 ASSAY OF BREATH ETHANOL: CPT | Performed by: EMERGENCY MEDICINE

## 2018-08-06 PROCEDURE — 80307 DRUG TEST PRSMV CHEM ANLYZR: CPT | Performed by: EMERGENCY MEDICINE

## 2018-08-06 PROCEDURE — 99285 EMERGENCY DEPT VISIT HI MDM: CPT

## 2018-08-06 PROCEDURE — 85610 PROTHROMBIN TIME: CPT | Performed by: EMERGENCY MEDICINE

## 2018-08-06 PROCEDURE — 85730 THROMBOPLASTIN TIME PARTIAL: CPT | Performed by: EMERGENCY MEDICINE

## 2018-08-06 PROCEDURE — 36415 COLL VENOUS BLD VENIPUNCTURE: CPT | Performed by: EMERGENCY MEDICINE

## 2018-08-06 RX ORDER — LORAZEPAM 1 MG/1
1 TABLET ORAL ONCE
Status: COMPLETED | OUTPATIENT
Start: 2018-08-06 | End: 2018-08-06

## 2018-08-06 RX ORDER — RISPERIDONE 1 MG/1
1 TABLET, ORALLY DISINTEGRATING ORAL EVERY 8 HOURS PRN
Status: DISCONTINUED | OUTPATIENT
Start: 2018-08-06 | End: 2018-08-11

## 2018-08-06 RX ORDER — FAMOTIDINE 20 MG/1
20 TABLET, FILM COATED ORAL ONCE
Status: COMPLETED | OUTPATIENT
Start: 2018-08-06 | End: 2018-08-06

## 2018-08-06 RX ORDER — PANTOPRAZOLE SODIUM 40 MG/1
40 TABLET, DELAYED RELEASE ORAL
Status: DISCONTINUED | OUTPATIENT
Start: 2018-08-07 | End: 2018-08-09 | Stop reason: SDUPTHER

## 2018-08-06 RX ORDER — ZOLPIDEM TARTRATE 5 MG/1
10 TABLET ORAL
Status: DISCONTINUED | OUTPATIENT
Start: 2018-08-06 | End: 2018-08-15 | Stop reason: HOSPADM

## 2018-08-06 RX ORDER — CLONAZEPAM 0.5 MG/1
1 TABLET ORAL EVERY 8 HOURS PRN
Status: DISCONTINUED | OUTPATIENT
Start: 2018-08-06 | End: 2018-08-10

## 2018-08-06 RX ORDER — ACETAMINOPHEN 325 MG/1
650 TABLET ORAL EVERY 4 HOURS PRN
Status: DISCONTINUED | OUTPATIENT
Start: 2018-08-06 | End: 2018-08-11

## 2018-08-06 RX ORDER — LORAZEPAM 2 MG/ML
2 INJECTION INTRAMUSCULAR EVERY 8 HOURS PRN
Status: DISCONTINUED | OUTPATIENT
Start: 2018-08-06 | End: 2018-08-15 | Stop reason: HOSPADM

## 2018-08-06 RX ORDER — FENTANYL 75 UG/H
75 PATCH TRANSDERMAL ONCE
Status: DISCONTINUED | OUTPATIENT
Start: 2018-08-06 | End: 2018-08-09

## 2018-08-06 RX ORDER — CLONAZEPAM 0.5 MG/1
2 TABLET ORAL ONCE
Status: DISCONTINUED | OUTPATIENT
Start: 2018-08-06 | End: 2018-08-06

## 2018-08-06 RX ORDER — HALOPERIDOL 5 MG/ML
5 INJECTION INTRAMUSCULAR EVERY 8 HOURS PRN
Status: DISCONTINUED | OUTPATIENT
Start: 2018-08-06 | End: 2018-08-12

## 2018-08-06 RX ORDER — BENZTROPINE MESYLATE 1 MG/ML
1 INJECTION INTRAMUSCULAR; INTRAVENOUS EVERY 6 HOURS PRN
Status: DISCONTINUED | OUTPATIENT
Start: 2018-08-06 | End: 2018-08-15 | Stop reason: HOSPADM

## 2018-08-06 RX ORDER — CLONAZEPAM 0.5 MG/1
2 TABLET ORAL ONCE
Status: COMPLETED | OUTPATIENT
Start: 2018-08-06 | End: 2018-08-06

## 2018-08-06 RX ORDER — HYDROXYZINE PAMOATE 100 MG/1
150 CAPSULE ORAL 3 TIMES DAILY PRN
COMMUNITY
End: 2018-08-15 | Stop reason: HOSPADM

## 2018-08-06 RX ORDER — ACETAMINOPHEN 325 MG/1
975 TABLET ORAL EVERY 6 HOURS PRN
Status: DISCONTINUED | OUTPATIENT
Start: 2018-08-06 | End: 2018-08-11

## 2018-08-06 RX ORDER — OMEPRAZOLE 20 MG/1
CAPSULE, DELAYED RELEASE ORAL DAILY
COMMUNITY
End: 2020-11-04

## 2018-08-06 RX ORDER — QUETIAPINE FUMARATE 200 MG/1
200 TABLET, FILM COATED ORAL
Status: DISCONTINUED | OUTPATIENT
Start: 2018-08-06 | End: 2018-08-07

## 2018-08-06 RX ORDER — MAGNESIUM HYDROXIDE/ALUMINUM HYDROXICE/SIMETHICONE 120; 1200; 1200 MG/30ML; MG/30ML; MG/30ML
30 SUSPENSION ORAL EVERY 4 HOURS PRN
Status: DISCONTINUED | OUTPATIENT
Start: 2018-08-06 | End: 2018-08-15 | Stop reason: HOSPADM

## 2018-08-06 RX ORDER — BENZTROPINE MESYLATE 1 MG/1
1 TABLET ORAL EVERY 6 HOURS PRN
Status: DISCONTINUED | OUTPATIENT
Start: 2018-08-06 | End: 2018-08-15 | Stop reason: HOSPADM

## 2018-08-06 RX ORDER — HYDROXYZINE HYDROCHLORIDE 25 MG/1
25 TABLET, FILM COATED ORAL EVERY 6 HOURS PRN
Status: DISCONTINUED | OUTPATIENT
Start: 2018-08-06 | End: 2018-08-10

## 2018-08-06 RX ORDER — HALOPERIDOL 5 MG
5 TABLET ORAL EVERY 8 HOURS PRN
Status: DISCONTINUED | OUTPATIENT
Start: 2018-08-06 | End: 2018-08-12

## 2018-08-06 RX ORDER — CLONAZEPAM 0.5 MG/1
1 TABLET ORAL 3 TIMES DAILY
Status: DISCONTINUED | OUTPATIENT
Start: 2018-08-06 | End: 2018-08-06

## 2018-08-06 RX ORDER — TRAZODONE HYDROCHLORIDE 50 MG/1
50 TABLET ORAL
Status: DISCONTINUED | OUTPATIENT
Start: 2018-08-06 | End: 2018-08-12

## 2018-08-06 RX ORDER — OLANZAPINE 10 MG/1
5 INJECTION, POWDER, LYOPHILIZED, FOR SOLUTION INTRAMUSCULAR EVERY 8 HOURS PRN
Status: DISCONTINUED | OUTPATIENT
Start: 2018-08-06 | End: 2018-08-15 | Stop reason: HOSPADM

## 2018-08-06 RX ORDER — ACETAMINOPHEN 325 MG/1
650 TABLET ORAL EVERY 6 HOURS PRN
Status: DISCONTINUED | OUTPATIENT
Start: 2018-08-06 | End: 2018-08-15 | Stop reason: HOSPADM

## 2018-08-06 RX ORDER — OLANZAPINE 5 MG/1
5 TABLET ORAL EVERY 8 HOURS PRN
Status: DISCONTINUED | OUTPATIENT
Start: 2018-08-06 | End: 2018-08-15 | Stop reason: HOSPADM

## 2018-08-06 RX ADMIN — LORAZEPAM 1 MG: 1 TABLET ORAL at 07:23

## 2018-08-06 RX ADMIN — ZOLPIDEM TARTRATE 10 MG: 5 TABLET ORAL at 21:55

## 2018-08-06 RX ADMIN — ACETAMINOPHEN 650 MG: 325 TABLET, FILM COATED ORAL at 13:03

## 2018-08-06 RX ADMIN — QUETIAPINE FUMARATE 200 MG: 200 TABLET ORAL at 21:53

## 2018-08-06 RX ADMIN — FAMOTIDINE 20 MG: 20 TABLET, FILM COATED ORAL at 09:24

## 2018-08-06 RX ADMIN — CLONAZEPAM 2 MG: 0.5 TABLET ORAL at 08:24

## 2018-08-06 RX ADMIN — CLONAZEPAM 1 MG: 0.5 TABLET ORAL at 18:07

## 2018-08-06 RX ADMIN — ACETAMINOPHEN 975 MG: 325 TABLET ORAL at 18:06

## 2018-08-06 RX ADMIN — FENTANYL 75 MCG: 75 PATCH, EXTENDED RELEASE TRANSDERMAL at 07:31

## 2018-08-06 NOTE — ED NOTES
Fentanyl patch 75 mcg noted on left arm    Patient advised Dr Jeff Michael that patch needs to be changed     Gifty Stallworth, RN  08/06/18 [address], RN  08/06/18 [id number]

## 2018-08-06 NOTE — ED NOTES
Spoke with Soraya Rocha, hospital Supervisor, aware Fentanyl patch is ordered and not in [facility], RN  08/06/18 3630

## 2018-08-06 NOTE — PLAN OF CARE
Problem: DISCHARGE PLANNING  Goal: Discharge to home or other facility with appropriate resources  INTERVENTIONS:  - Identify barriers to discharge w/patient and caregiver  - Arrange for needed discharge resources and transportation as appropriate  - Identify discharge learning needs (meds, wound care, etc )  - Arrange for interpretive services to assist at discharge as needed  - Refer to Case Management Department for coordinating discharge planning if the patient needs post-hospital services based on physician/advanced practitioner order or complex needs related to functional status, cognitive ability, or social support system  Outcome: Progressing      Problem: Ineffective Coping  Goal: Cooperates with admission process  Interventions:   - Complete admission process  Outcome: Completed Date Met: 08/06/18    Goal: Identifies ineffective coping skills  Outcome: Progressing    Goal: Identifies healthy coping skills  Outcome: Progressing    Goal: Demonstrates healthy coping skills  Outcome: Progressing    Goal: Participates in unit activities  Interventions:  - Provide therapeutic environment   - Provide required programming   - Redirect inappropriate behaviors   Outcome: Progressing    Goal: Patient/Family participate in treatment and DC plans  Interventions:  - Provide therapeutic environment  Outcome: Progressing    Goal: Patient/Family verbalizes awareness of resources  Outcome: Progressing    Goal: Understands least restrictive measures  Interventions:  - Utilize least restrictive behavior  Outcome: Progressing    Goal: Free from restraint events  - Utilize least restrictive measures   - Provide behavioral interventions   - Redirect inappropriate behaviors   Outcome: Progressing      Problem: Depression  Goal: Treatment Goal: Demonstrate behavioral control of depressive symptoms, verbalize feelings of improved mood/affect, and adopt new coping skills prior to discharge  Outcome: Progressing    Goal: Verbalize thoughts and feelings  Interventions:  - Assess and re-assess patient's level of risk   - Engage patient in 1:1 interactions, daily, for a minimum of 15 minutes   - Encourage patient to express feelings, fears, frustrations, hopes   Outcome: Progressing    Goal: Refrain from harming self  Interventions:  - Monitor patient closely, per order   - Supervise medication ingestion, monitor effects and side effects   Outcome: Progressing    Goal: Refrain from isolation  Interventions:  - Develop a trusting relationship   - Encourage socialization   Outcome: Progressing    Goal: Refrain from self-neglect  Outcome: Progressing    Goal: Attend and participate in unit activities, including therapeutic, recreational, and educational groups  Interventions:  - Provide therapeutic and educational activities daily, encourage attendance and participation, and document same in the medical record   Outcome: Progressing    Goal: Complete daily ADLs, including personal hygiene independently, as able  Interventions:  - Observe, teach, and assist patient with ADLS  -  Monitor and promote a balance of rest/activity, with adequate nutrition and elimination   Outcome: Progressing      Problem: Anxiety  Goal: Anxiety is at manageable level  Interventions:  - Assess and monitor patient's anxiety level  - Monitor for signs and symptoms of anxiety both physical and emotional (heart palpitations, chest pain, shortness of breath, headaches, nausea, feeling jumpy, restlessness, irritable, apprehensive)  - Collaborate with interdisciplinary team and initiate plan and interventions as ordered    - [place] patient to unit/surroundings  - Explain treatment plan  - Encourage participation in care  - Encourage verbalization of concerns/fears  - Identify coping mechanisms  - Assist in developing anxiety-reducing skills  - Administer/offer alternative therapies  - Limit or eliminate stimulants  Outcome: Progressing

## 2018-08-06 NOTE — ED NOTES
Resting on bedside recliner, AAOx3, no distress  Resp unlabored  Awaiting placement on Psych Unit for [address] voluntary for SI  Pleasant, declines lunch tray, declines PO fluids at present        Alba Baez RN  08/06/18 6321

## 2018-08-06 NOTE — ED PROVIDER NOTES
History  Chief Complaint   Patient presents with    Psychiatric Evaluation     Patient c/o of anxiety, stress, having bad thougths based on bills  Reports it is time to be admitted  Reports thoughts cross his mind of jumping out of car and having HI but no plan  Patient complains of suicidal thoughts of jumping out of a car  He also has thoughts of hurting people but no specific plan  He is anxious and depressed about relationship with wife  He would like to be in a psychiatric facility as he was in the past   He has mood disorder and has been taking his meds as prescribed  On ROS, patient admits to chronic back pain and requests his fentanyl patch be changed  He also has 2 new spots on the left lower left  Patient admits to hearing whispering sounds and seeing a rabbit-type animal jump in front of his face at him occasionally  Prior to Admission Medications   Prescriptions Last Dose Informant Patient Reported? Taking? QUEtiapine (SEROquel) 200 mg tablet  Self No Yes   Sig: Take 1 5 tablets (300 mg total) by mouth daily at bedtime Indications: Depression  Patient taking differently: Take 600 mg by mouth daily at bedtime     clonazePAM (KlonoPIN) 2 MG disintegrating tablet  Self No Yes   Sig: Take 1 tablet (2 mg total) by mouth 3 (three) times a day as needed for seizures  Patient taking differently: Take 1 mg by mouth daily     fentaNYL (DURAGESIC) 75 mcg/hr  Self No Yes   Sig: Indications: Chronic Pain  Place one patch on skin every 72 hours for pain   hydrOXYzine (VISTARIL) 100 MG capsule  Self Yes Yes   Sig: Take 150 mg by mouth 3 (three) times a day as needed for itching   omeprazole (PriLOSEC) 20 mg delayed release capsule   Yes Yes   Sig: Take by mouth daily Dose unknown   zolpidem (AMBIEN) 5 mg tablet  Self No Yes   Sig: Take 1 tablet (5 mg total) by mouth daily at bedtime as needed for sleep Indications: Trouble Sleeping     Patient taking differently: Take 10 mg by mouth daily at bedtime as needed for sleep        Facility-Administered Medications: None       Past Medical History:   Diagnosis Date    Alcoholism ([facility] )     Anxiety     Back pain     Cardiac disease     Chronic pain disorder     Depression     Hypertension     MI (myocardial infarction) ([facility] )     Psychiatric disorder     Psychiatric illness     Psychosis     Renal disorder        Past Surgical History:   Procedure Laterality Date    BACK SURGERY      report thoracic surgery       Family History   Problem Relation Age of Onset    No Known Problems Mother     No Known Problems Father     No Known Problems Sister     No Known Problems Brother     No Known Problems Maternal Aunt     No Known Problems Paternal Aunt     No Known Problems Maternal Uncle     No Known Problems Paternal Uncle     No Known Problems Maternal Grandfather     No Known Problems Maternal Grandmother     No Known Problems Paternal Grandfather     No Known Problems Paternal Grandmother     No Known Problems Cousin     ADD / ADHD Neg Hx     Alcohol abuse Neg Hx     Anxiety disorder Neg Hx     Bipolar disorder Neg Hx     Dementia Neg Hx     Depression Neg Hx     Drug abuse Neg Hx     OCD Neg Hx     Paranoid behavior Neg Hx     Schizophrenia Neg Hx     Seizures Neg Hx     Self-Injury Neg Hx     Suicide Attempts Neg Hx      I have reviewed and agree with the history as documented  Social History   Substance Use Topics    Smoking status: Never Smoker    Smokeless tobacco: Never Used      Comment: patient is a non - smoker    Alcohol use No      Comment: history 10 years ago heavy drinking        Review of Systems   All other systems reviewed and are negative  Physical Exam  Physical Exam   Constitutional: He appears well-developed and well-nourished  HENT:   Mouth/Throat: Oropharynx is clear and moist    Eyes: Conjunctivae and EOM are normal  Pupils are equal, round, and reactive to light     Neck: Normal range of motion  Neck supple  No spinous process tenderness present  Cardiovascular: Normal rate, regular rhythm, normal heart sounds and intact distal pulses  Pulmonary/Chest: Effort normal and breath sounds normal  No respiratory distress  He has no wheezes  Abdominal: Soft  Bowel sounds are normal  He exhibits no distension  There is no tenderness  Musculoskeletal: Normal range of motion  Neurological: He is alert  He has normal strength  No sensory deficit  GCS eye subscore is 4  GCS verbal subscore is 5  GCS motor subscore is 6  Skin: Skin is warm and dry  Rash noted  2 small irregular shaped lesions left lower leg red-brownish macules non-tender   Psychiatric: His speech is normal and behavior is normal  His mood appears anxious  He exhibits a depressed mood  He expresses homicidal and suicidal ideation  He expresses suicidal plans  He expresses no homicidal plans  Nursing note and vitals reviewed        Vital Signs  ED Triage Vitals   Temperature Pulse Respirations Blood Pressure SpO2   08/06/18 0654 08/06/18 0654 08/06/18 0654 08/06/18 0654 08/06/18 0654   (!) 97 °F (36 1 °C) (!) 116 18 108/89 100 %      Temp Source Heart Rate Source Patient Position - Orthostatic VS BP Location FiO2 (%)   08/06/18 0654 08/06/18 0654 08/06/18 1124 08/06/18 0654 --   Temporal Monitor Sitting Left arm       Pain Score       08/06/18 0654       No Pain           Vitals:    08/06/18 1124 08/06/18 1251 08/06/18 1525 08/06/18 2104   BP: 121/84 131/97 103/63 98/62   Pulse: 100 (!) 107 85 70   Patient Position - Orthostatic VS: Sitting Standing Lying Lying       Visual Acuity      ED Medications  Medications   fentaNYL (DURAGESIC) 75 mcg/hr TD 72 hr patch 75 mcg (75 mcg Transdermal Medication Applied 8/6/18 0731)   pantoprazole (PROTONIX) EC tablet 40 mg (40 mg Oral Given 8/7/18 0610)   QUEtiapine (SEROquel) tablet 200 mg (200 mg Oral Given 8/6/18 2153)   zolpidem (AMBIEN) tablet 10 mg (10 mg Oral Given 8/6/18 2155) hydrOXYzine HCL (ATARAX) tablet 25 mg (not administered)   LORazepam (ATIVAN) 2 mg/mL injection 2 mg (not administered)   traZODone (DESYREL) tablet 50 mg (not administered)   risperiDONE (RisperDAL M-TABS) dispersible tablet 1 mg (not administered)   haloperidol (HALDOL) tablet 5 mg (not administered)   haloperidol lactate (HALDOL) injection 5 mg (not administered)   OLANZapine (ZyPREXA) tablet 5 mg (not administered)   OLANZapine (ZyPREXA) IM injection 5 mg (not administered)   magnesium hydroxide (MILK OF MAGNESIA) 400 mg/5 mL oral suspension 30 mL (not administered)   aluminum-magnesium hydroxide-simethicone (MYLANTA) 200-200-20 mg/5 mL oral suspension 30 mL (30 mL Oral Given 8/7/18 0222)   benztropine (COGENTIN) tablet 1 mg (not administered)   benztropine (COGENTIN) injection 1 mg (not administered)   acetaminophen (TYLENOL) tablet 650 mg (650 mg Oral Not Given 8/6/18 1807)   acetaminophen (TYLENOL) tablet 650 mg (not administered)   acetaminophen (TYLENOL) tablet 975 mg (975 mg Oral Given 8/6/18 1806)   clonazePAM (KlonoPIN) tablet 1 mg (1 mg Oral Given 8/6/18 1807)   LORazepam (ATIVAN) tablet 1 mg (1 mg Oral Given 8/6/18 0723)   clonazePAM (KlonoPIN) tablet 2 mg (2 mg Oral Given 8/6/18 0824)   famotidine (PEPCID) tablet 20 mg (20 mg Oral Given 8/6/18 0924)       Diagnostic Studies  Results Reviewed     Procedure Component Value Units Date/Time    Lipid panel [[id number]]     Lab Status:  No result Specimen:  Blood     TSH, 3rd generation with Free T4 reflex [[id number]]     Lab Status:  No result Specimen:  Blood     Hemoglobin A1C [[id number]]     Lab Status:  No result Specimen:  Blood     RPR [[id number]]     Lab Status:  No result Specimen:  Blood     UA w Reflex to Microscopic w Reflex to Culture [[id number]]     Lab Status:  No result Specimen:  Urine from Urine, Clean Catch     Rapid drug screen, urine [[id number]]  (Abnormal) Collected:  08/06/18 0700    Lab Status:  Final result Specimen:  Urine from Urine, Clean Catch Updated:  08/06/18 8361     Amph/Meth UR Negative     Barbiturate Ur Negative     Benzodiazepine Urine Positive (A)     Cocaine Urine Negative     Methadone Urine Negative     Opiate Urine Negative     PCP Ur Negative     THC Urine Negative    Narrative:         Presumptive report  If requested, specimen will be sent to reference lab for confirmation  FOR MEDICAL PURPOSES ONLY  IF CONFIRMATION NEEDED PLEASE CONTACT THE LAB WITHIN 5 DAYS  Drug Screen Cutoff Levels:  AMPHETAMINE/METHAMPHETAMINES  1000 ng/mL  BARBITURATES     200 ng/mL  BENZODIAZEPINES     200 ng/mL  COCAINE      300 ng/mL  METHADONE      300 ng/mL  OPIATES      300 ng/mL  PHENCYCLIDINE     25 ng/mL  THC       50 ng/mL    Protime-INR [[id number]]  (Normal) Collected:  08/06/18 0716    Lab Status:  Final result Specimen:  Blood from Arm, Left Updated:  08/06/18 0819     Protime 13 0 seconds      INR 1 04    APTT [[id number]]  (Abnormal) Collected:  08/06/18 0716    Lab Status:  Final result Specimen:  Blood from Arm, Left Updated:  08/06/18 0819     PTT 22 (L) seconds     Comprehensive metabolic panel [[id number]]  (Abnormal) Collected:  08/06/18 0717    Lab Status:  Final result Specimen:  Blood from Arm, Left Updated:  08/06/18 0816     Sodium 134 (L) mmol/L      Potassium 3 7 mmol/L      Chloride 99 (L) mmol/L      CO2 24 mmol/L      Anion Gap 11 mmol/L      BUN 14 mg/dL      Creatinine 1 20 mg/dL      Glucose 132 mg/dL      Calcium 9 3 mg/dL      AST 38 U/L      ALT 38 U/L      Alkaline Phosphatase 227 (H) U/L      Total Protein 8 0 g/dL      Albumin 4 0 g/dL      Total Bilirubin 0 80 mg/dL      eGFR 71 ml/min/1 73sq m     Narrative:         National Kidney Disease Education Program recommendations are as follows:  GFR calculation is accurate only with a steady state creatinine  Chronic Kidney disease less than 60 ml/min/1 73 sq  meters  Kidney failure less than 15 ml/min/1 73 sq  meters      CBC and differential [[id number]] Collected: 08/06/18 0717    Lab Status:  Final result Specimen:  Blood from Arm, Left Updated:  08/06/18 0756     WBC 4 63 Thousand/uL      RBC 5 07 Million/uL      Hemoglobin 13 7 g/dL      Hematocrit 41 9 %      MCV 83 fL      MCH 27 0 pg      MCHC 32 7 g/dL      RDW 13 2 %      MPV 10 9 fL      Platelets 811 Thousands/uL      nRBC 0 /100 WBCs      Neutrophils Relative 54 %      Immat GRANS % 0 %      Lymphocytes Relative 36 %      Monocytes Relative 7 %      Eosinophils Relative 2 %      Basophils Relative 1 %      Neutrophils Absolute 2 48 Thousands/µL      Immature Grans Absolute 0 01 Thousand/uL      Lymphocytes Absolute 1 67 Thousands/µL      Monocytes Absolute 0 33 Thousand/µL      Eosinophils Absolute 0 11 Thousand/µL      Basophils Absolute 0 03 Thousands/µL     POCT alcohol breath test [[id number]]  (Normal) Resulted:  08/06/18 0719    Lab Status:  Final result Updated:  08/06/18 0719     EXTBreath Alcohol 0 000                 No orders to display              Procedures  Procedures       Phone Contacts  ED Phone Contact    ED Course       signed out to Dr Leonora Bhatia  probable psychiatric placement when medically cleared                      MDM  Number of Diagnoses or Management Options  Planning to commit suicide: new and requires workup  Skin lesion of left leg: new and does not require workup     Amount and/or Complexity of Data Reviewed  Clinical lab tests: ordered and reviewed  Discuss the patient with other providers: yes    Patient Progress  Patient progress: improved    CritCare Time    Disposition  Final diagnoses:   Planning to commit suicide   Skin lesion of left leg - 2     Time reflects when diagnosis was documented in both MDM as applicable and the Disposition within this note     Time User Action Codes Description Comment    8/6/2018  7:08 AM Rito, [address] to commit suicide     8/6/2018  7:09 AM Abraham Cleveland Add [L98 9] Skin lesion of left leg     8/6/2018  7:09 AM Abraham Cleveland Modify [L98 9] Skin lesion of left leg 2      ED Disposition     ED Disposition Condition Comment    Transfer to Behavioral Health   Accepted on [address] [place]      MD Documentation      Most Recent Value   Accepting Physician  Dr Kristi Ferguson Name, [place], [place]   Sending MD  Dr Kailash Chambers, Regi Caldera      RN Documentation      Most [address] Jalloh Name, [address] [address]      Follow-up Information    None         Current Discharge Medication List      CONTINUE these medications which have NOT CHANGED    Details   clonazePAM (KlonoPIN) 2 MG disintegrating tablet Take 1 tablet (2 mg total) by mouth 3 (three) times a day as needed for seizures  Qty: 10 tablet, Refills: 0      fentaNYL (DURAGESIC) 75 mcg/hr Indications: Chronic Pain  Place one patch on skin every 72 hours for pain  Qty: 5 patch, Refills: 0    Associated Diagnoses: Chronic back pain      hydrOXYzine (VISTARIL) 100 MG capsule Take 150 mg by mouth 3 (three) times a day as needed for itching      omeprazole (PriLOSEC) 20 mg delayed release capsule Take by mouth daily Dose unknown      QUEtiapine (SEROquel) 200 mg tablet Take 1 5 tablets (300 mg total) by mouth daily at bedtime Indications: Depression  Qty: 45 tablet, Refills: 0    Associated Diagnoses: Depression, unspecified depression type      zolpidem (AMBIEN) 5 mg tablet Take 1 tablet (5 mg total) by mouth daily at bedtime as needed for sleep Indications: Trouble Sleeping  Qty: 8 tablet, Refills: 0    Associated Diagnoses: Insomnia           No discharge procedures on file      ED Provider  Electronically Signed by           Courtney Walsh DO  08/07/18 5910

## 2018-08-06 NOTE — ED NOTES
Patient remains cooperative, aware he is awaiting bed availability on [facility]  [facility]  08/06/18 5646

## 2018-08-06 NOTE — ED NOTES
Crisis Worker called [address] and spoke to Bolt and gave clinical for precert and auth  Care advocate assigned is  Panchito Hernandez at [phone]    Insurance Authorization: precert/auth  Phone call placed to [place]  Phone number: [phone]  Spoke to Bolt  3 days approved  Level of care: [facility]  Review on 8/8/2018  Authorization # [id number]

## 2018-08-06 NOTE — ED CARE HANDOFF
Emergency Department Sign Out Note        Sign out and transfer of care from  Dr Jason Dillon  See Separate Emergency Department note  The patient, Keya Reyes, was evaluated by the previous provider for  Psychiatric evaluation  Workup Completed:   cleared medically    ED Course / Workup Pending (followup):   accepted on [address] [place]                             Procedures  MDM  CritCare Time      Disposition  Final diagnoses:   Planning to commit suicide   Skin lesion of left leg - 2     Time reflects when diagnosis was documented in both MDM as applicable and the Disposition within this note     Time User Action Codes Description Comment    8/6/2018  7:08 AM Roland Eddi Add [U02 938] Planning to commit suicide     8/6/2018  7:09 AM Roland Eddi Add [L98 9] Skin lesion of left leg     8/6/2018  7:09 AM Rolandivon Montague Modify [L98 9] Skin lesion of left leg 2      ED Disposition     ED Disposition Condition Comment    Transfer to [address] [place] [address]   Accepted on [address] [place]      MD Documentation      Most Recent Value   Accepting Physician  Dr Wale Aaron Name, [place], [place]   Sending MD  Dr Jessenia Winn, Vibha Segura      RN Documentation      Most [facility] Name, [address] [address]      Follow-up Information    None       Patient's Medications   Discharge Prescriptions    No medications on file     No discharge procedures on file         ED Provider  Electronically Signed by     Wilfrido Rodriguez DO  08/06/18 9378

## 2018-08-06 NOTE — TREATMENT PLAN
RN to talk with patient twice this shift to educate about prescribed medications and any concerns about the unit  Patient is also encouraged to demonstrate healthy and effective coping skills by verbalizing feelings and thoughts to staff  Will continue to monitor

## 2018-08-06 NOTE — PROGRESS NOTES
41-year-old male 12 presents in [facility] ED @[address]  with concerns of visual and auditory hallucinations of people chasing and spying on him as well as suicidal thoughts of jumping out of a car without a specific plan and homicidal thoughts of harming someone but no specific person  He states he has a hx of 6 inpatient visits in total currently denies SI,HI and A/VH  Patient said to both nurses he feels he has bed bugs but that's something that not confirmed just what he said  He washed and his clothes were washed as well  Patient also has a fentanyl patch 75 mcg on his right arm applied today  He's a regular diet, allergic to lexapro and penicillian needs CBC, CMP to be completed  Will continue to monitor  Consent (Spinal Accessory)/Introductory Paragraph: The rationale for Mohs was explained to the patient and consent was obtained. The risks, benefits and alternatives to therapy were discussed in detail. Specifically, the risks of damage to the spinal accessory nerve, infection, scarring, bleeding, prolonged wound healing, incomplete removal, allergy to anesthesia, and recurrence were addressed. Prior to the procedure, the treatment site was clearly identified and confirmed by the patient. All components of Universal Protocol/PAUSE Rule completed.

## 2018-08-06 NOTE — ED NOTES
Crisis Worker (CW) did intake and safety assessment remotely from [facility] after patient (Pt) verbally consented  Patient admits to auditory and visual hallucinations  Pt stated that he is increase in anxiety  Pt stated that he is suicidal with plan to jump out of car  Pt stated that he is having difficulties in his marriage, his son needs money in college, and health not "going good "  Pt stated that he has red dots on his leg and arm  Pt is seeking IP[facility] treatment  CW faxed [phone]) emergency department to be completed

## 2018-08-06 NOTE — ED NOTES
Chief Complaint note at 0133 incorrect and update at 6519  Unable to delete note at [phone]         Ethan Nielsen RN  08/06/18 1757

## 2018-08-06 NOTE — ED NOTES
Per Alicia Diego from crisis, patient is to go to [facility] but Jane Muñiz from [place] is to call him when the patient may go to the floor       Maria M Xiong RN  08/06/18 2885

## 2018-08-06 NOTE — ED NOTES
CW received call from [facility]o on [facility] that pt has been accepted by Dr Josr Nettles asked CW to tell ED that she will call once patient can come up to unit  CW called and spoke to ED Rn, Sharron Golden and told her Pt is accepted and she will received a call once patient can come upstairs  She stated stated that she will let patient know

## 2018-08-06 NOTE — ED NOTES
Spoke with pharmacy, aware Klonopin not showing in accudose    To re-enter order     Willian Quezada RN  08/06/18 8325

## 2018-08-06 NOTE — ED NOTES
Dr Tien Tinsley aware patient reports taking omeprazole every morning but does not know dose  Patient reports that he sometimes takes zantac but usually takes omeprazole       Germaine Smith RN  08/06/18 9937

## 2018-08-06 NOTE — ED NOTES
Consent for voluntary treatment completed by patient and provider, faxed to Jamarcus Jalloh RN  08/06/18 3310

## 2018-08-06 NOTE — ED NOTES
CW faxed [phone] to [address] for review for [facility] bed

## 2018-08-06 NOTE — ED NOTES
Courtesy call to [facility] Unit, pt is on way to Unit with  Tech and , with all belongings       Alba Baez RN  08/06/18 3647

## 2018-08-07 ENCOUNTER — APPOINTMENT (INPATIENT)
Dept: CT IMAGING | Facility: HOSPITAL | Age: 48
DRG: 885 | End: 2018-08-07
Payer: COMMERCIAL

## 2018-08-07 PROBLEM — F41.1 GENERALIZED ANXIETY DISORDER: Status: ACTIVE | Noted: 2018-08-07

## 2018-08-07 LAB
BACTERIA UR QL AUTO: ABNORMAL /HPF
BILIRUB UR QL STRIP: ABNORMAL
CHOLEST SERPL-MCNC: 220 MG/DL (ref 50–200)
CLARITY UR: CLEAR
COLOR UR: ABNORMAL
EST. AVERAGE GLUCOSE BLD GHB EST-MCNC: 100 MG/DL
GLUCOSE UR STRIP-MCNC: NEGATIVE MG/DL
HBA1C MFR BLD: 5.1 % (ref 4.2–6.3)
HDLC SERPL-MCNC: 47 MG/DL (ref 40–60)
HGB UR QL STRIP.AUTO: NEGATIVE
KETONES UR STRIP-MCNC: NEGATIVE MG/DL
LDLC SERPL CALC-MCNC: 150 MG/DL (ref 0–100)
LEUKOCYTE ESTERASE UR QL STRIP: NEGATIVE
MUCOUS THREADS UR QL AUTO: ABNORMAL
NITRITE UR QL STRIP: NEGATIVE
NON-SQ EPI CELLS URNS QL MICRO: ABNORMAL /HPF
NONHDLC SERPL-MCNC: 173 MG/DL
PH UR STRIP.AUTO: 7 [PH] (ref 4.5–8)
PROT UR STRIP-MCNC: ABNORMAL MG/DL
RBC #/AREA URNS AUTO: ABNORMAL /HPF
RPR SER QL: NORMAL
SP GR UR STRIP.AUTO: >=1.03 (ref 1–1.03)
TRIGL SERPL-MCNC: 113 MG/DL
TSH SERPL DL<=0.05 MIU/L-ACNC: 1.79 UIU/ML (ref 0.36–3.74)
UROBILINOGEN UR QL STRIP.AUTO: 0.2 E.U./DL
WBC #/AREA URNS AUTO: ABNORMAL /HPF

## 2018-08-07 PROCEDURE — 86592 SYPHILIS TEST NON-TREP QUAL: CPT | Performed by: PHYSICIAN ASSISTANT

## 2018-08-07 PROCEDURE — 81001 URINALYSIS AUTO W/SCOPE: CPT | Performed by: PHYSICIAN ASSISTANT

## 2018-08-07 PROCEDURE — 99223 1ST HOSP IP/OBS HIGH 75: CPT | Performed by: PSYCHIATRY & NEUROLOGY

## 2018-08-07 PROCEDURE — 84443 ASSAY THYROID STIM HORMONE: CPT | Performed by: PHYSICIAN ASSISTANT

## 2018-08-07 PROCEDURE — 70450 CT HEAD/BRAIN W/O DYE: CPT

## 2018-08-07 PROCEDURE — 83036 HEMOGLOBIN GLYCOSYLATED A1C: CPT | Performed by: PHYSICIAN ASSISTANT

## 2018-08-07 PROCEDURE — 99232 SBSQ HOSP IP/OBS MODERATE 35: CPT | Performed by: PHYSICIAN ASSISTANT

## 2018-08-07 PROCEDURE — 80061 LIPID PANEL: CPT | Performed by: PHYSICIAN ASSISTANT

## 2018-08-07 RX ORDER — DULOXETIN HYDROCHLORIDE 30 MG/1
30 CAPSULE, DELAYED RELEASE ORAL DAILY
Status: DISCONTINUED | OUTPATIENT
Start: 2018-08-07 | End: 2018-08-15 | Stop reason: HOSPADM

## 2018-08-07 RX ORDER — FENTANYL 75 UG/H
75 PATCH TRANSDERMAL
Status: DISCONTINUED | OUTPATIENT
Start: 2018-08-07 | End: 2018-08-15 | Stop reason: HOSPADM

## 2018-08-07 RX ORDER — GABAPENTIN 300 MG/1
600 CAPSULE ORAL 2 TIMES DAILY
Status: DISCONTINUED | OUTPATIENT
Start: 2018-08-07 | End: 2018-08-11

## 2018-08-07 RX ORDER — ONDANSETRON 4 MG/1
4 TABLET, ORALLY DISINTEGRATING ORAL EVERY 6 HOURS PRN
Status: DISCONTINUED | OUTPATIENT
Start: 2018-08-07 | End: 2018-08-15 | Stop reason: HOSPADM

## 2018-08-07 RX ADMIN — ONDANSETRON 4 MG: 4 TABLET, ORALLY DISINTEGRATING ORAL at 11:29

## 2018-08-07 RX ADMIN — FENTANYL 75 MCG: 75 PATCH, EXTENDED RELEASE TRANSDERMAL at 12:36

## 2018-08-07 RX ADMIN — QUETIAPINE FUMARATE 700 MG: 300 TABLET ORAL at 21:02

## 2018-08-07 RX ADMIN — GABAPENTIN 600 MG: 300 CAPSULE ORAL at 12:36

## 2018-08-07 RX ADMIN — DULOXETINE 30 MG: 30 CAPSULE, DELAYED RELEASE ORAL at 12:36

## 2018-08-07 RX ADMIN — CLONAZEPAM 1 MG: 0.5 TABLET ORAL at 21:04

## 2018-08-07 RX ADMIN — ACETAMINOPHEN 975 MG: 325 TABLET ORAL at 21:05

## 2018-08-07 RX ADMIN — ALUMINUM HYDROXIDE, MAGNESIUM HYDROXIDE, AND SIMETHICONE 30 ML: 200; 200; 20 SUSPENSION ORAL at 02:22

## 2018-08-07 RX ADMIN — ACETAMINOPHEN 975 MG: 325 TABLET ORAL at 11:31

## 2018-08-07 RX ADMIN — CLONAZEPAM 1 MG: 0.5 TABLET ORAL at 11:28

## 2018-08-07 RX ADMIN — ONDANSETRON 4 MG: 4 TABLET, ORALLY DISINTEGRATING ORAL at 17:44

## 2018-08-07 RX ADMIN — GABAPENTIN 600 MG: 300 CAPSULE ORAL at 17:44

## 2018-08-07 RX ADMIN — PANTOPRAZOLE SODIUM 40 MG: 40 TABLET, DELAYED RELEASE ORAL at 06:10

## 2018-08-07 NOTE — DISCHARGE INSTR - OTHER ORDERS
[facility]  24/7: [phone]  [place]  [address]    [facility] has 3 crisis centers:   [employer] at [facility] at [facility] at [facility] services are accessible by phone and through three [facility] hospital Emergency Rooms to individuals, families, and law enforcement  Psychiatric and treatment planning is available, providing for evaluation and disposition and the immediate start of medication and treatment  Substance abuse crisis issues are also addressed  Access to psychiatric consultation is limited to a few hours each weekday  These services are available 24/7 at [facility] AT [facility] at [facility] ([place] and [address], [address]; [phone]) and [employer] at [facility] [address], [facility], [address]; [phone]  The most active crisis unit is [facility] AT [facility] at [address], [address] [facility]; [phone])  Suicide Prevention Lifeline  (722) 459-TALK or (500) [place] 8805 (348) 617-GQPR    Crisis Text Line is free, 24/7 support for those in crisis  Text HOME to 098831 from anywhere in the [place] to text with a trained Crisis Counselor  Peer Recovery Warmline through [facility] AT [facility], hours of operation are 1:00 PM to 5:00 PM, Monday through Friday, except major holidays  [address] [phone] ·   [address] [place], [place], [address]  Provides free, anonymous and confidential telephone helpline services to help with severe mental health issues to financial concerns, family issues or simply a desire to be heard

## 2018-08-07 NOTE — TREATMENT PLAN
TREATMENT PLAN REVIEW - [address] [facility] R [place] 51 y o  1970 male MRN: [id number]    [address] [place] [address] Room / Bed: Mackenzie Pena [facility] [id number] Encounter: [id number]          Admit Date/Time:  8/6/2018  6:52 AM    Treatment Team: Attending Provider: David Hernandez; Patient Care Technician: Nafisa Iglesias; Registered Nurse: Venancio Rangel, MARCUS; Patient Care Technician: Reena Marcus; Registered Nurse: Manish Avila RN; Patient Care Technician: Marya Mark; Patient Care Assistant: David Jones; Registered Nurse: Juan Miguel Case, RN; Occupational Therapy Assistant: MATEO Metcalf;  Medications RN: Huong Freitas RN; : Crystal Escudero    Diagnosis: Principal Problem:    Bipolar disorder, current episode mixed, moderate ([facility] )  Active Problems:    Generalized anxiety disorder    Patient Strengths/Assets: cooperative, compliant with medication, good past treatment response, patient is on a voluntary commitment    Patient Barriers/Limitations: low self esteem    Short Term Goals: decrease in depressive symptoms, decrease in anxiety symptoms, decrease in suicidal thoughts    Long Term Goals: improvement in depression, improvement in anxiety, stabilization of mood, free of suicidal thoughts, acceptance of need for psychiatric medications, acceptance of need for psychiatric treatment, acceptance of need for psychiatric follow up after discharge    Progress Towards Goals: starting psychiatric medications as prescribed    Recommended Treatment: medication management, patient medication education, group therapy, milieu therapy, continued Behavioral Health psychiatric evaluation/assessment process    Treatment Frequency: daily medication monitoring, group and milieu therapy daily, monitoring through interdisciplinary rounds, monitoring through weekly patient care conferences    Expected Discharge Date: 5-7 days    Discharge Plan: referral for outpatient medication management with a psychiatrist, referral for outpatient psychotherapy    Treatment Plan Created/Updated By: Anay Romero

## 2018-08-07 NOTE — CASE MANAGEMENT
Pt is a 51 yr old, ,  male admitted on a 201 after presenting to ED with SI with a plan and HI- no plan/target/intent  Pt lives with his wife and 2 adult sons and is able to return after discharge  Pt sees DELANO Hunter at [facility] for medication management in the community  JOANA signed, notification made  Pt has a PCP, Dr Duran Lezama, who he does not want contacted  Pt has been in [facility], and [facility] by   Pt's wife states she is surprised that pt signed JOANA's for his wife and [facility], as pt is usually too paranoid/guarded to sign JOANA's  Wife states pt has had poor sleep and poor appetite recently and has lost a good amount of weight and his PCP has recently requested labs, however, pt has declined  Wife reports he has been increasingly paranoid lately and delusional- believing he has money that people are withholding from him and following/watching him  Pt has been attempting to purchase cars and opening accounts believing that he has billions of dollars and that he won a prize with this money and that he will be working with [employer] and [address] [place], [place] [address]  Wife states that patient is paranoid/delusional at baseline but he has gotten worse recently  She also reports he has been actively responding to internal stimuli on a consistent basis  Wife also reports that pt does not do well on Seroquel and Neurontin- she states pt initially becomes aggressive/agitated and then crashes and sleeps for weeks and does not do well on either

## 2018-08-07 NOTE — PLAN OF CARE
Problem: Ineffective Coping  Goal: Participates in unit activities  Interventions:  - Provide therapeutic environment   - Provide required programming   - Redirect inappropriate behaviors    Outcome: Not Progressing  Pt seclusive to his room  He declined to attend therapeutic groups

## 2018-08-07 NOTE — PROGRESS NOTES
Prn note: pt given klonopin prn for c/o increased anxiety at 1128 and zofran prn for c/o nausea at 1129 by previous shift  prns effective

## 2018-08-07 NOTE — CONSULTS
Consultation - Shaylee Mack 51 y o  male MRN: [id number]    Unit/Bed#: [facility] 258-02 Encounter: [id number]      Assessment/Plan     Assessment/Plan:  1  Depression with psychosis   -Medically cleared for inpatient psychiatric evaluation and treatment    2  CAD with hx MI  -without angina symptoms  -Asa 81mg    History of Present Illness   HPI: Cassie Mays is a 51y o  year old male who presents with auditory and visual hallucinations with thoughts of people chasing after him  He admits to homicidal thoughts with no specific person in mind  He reports a history of "silent heart attack" about a year ago however has never followed up with a cardiologist  He states he only takes a baby aspirin when he remembers  He also reports chronic low back pain which is treated with Fentanyl patch  He denies cp or shortness of breath that stops him from activity, cp or pressure with walking up stairs  He denies recent illness, open wounds, or other pain  In[facility]  Consult performed by: Arthur De Los Santos ordered by: Yennifer Sheldon          Review of Systems   Constitutional: Negative for activity change, appetite change, diaphoresis, fatigue and fever  HENT: Negative  Eyes: Negative  Respiratory: Negative  Cardiovascular: Negative  Negative for chest pain, palpitations and leg swelling  Gastrointestinal: Negative  Genitourinary: Negative  Musculoskeletal: Negative  Neurological: Negative  Psychiatric/Behavioral: Positive for agitation, confusion, decreased concentration, dysphoric mood and hallucinations (auditory and visual)  The patient is nervous/anxious          Historical Information   Past Medical History:   Diagnosis Date    [facility]ism ([facility] )     Anxiety     Back pain     Cardiac disease     Chronic pain disorder     Depression     Hypertension     MI (myocardial infarction) ([facility] )     Psychiatric disorder     Psychiatric illness     Psychosis     Renal disorder      Past Surgical History:   Procedure Laterality Date    BACK SURGERY      report thoracic surgery     Social History   History   Alcohol Use No     Comment: history 10 years ago heavy drinking     History   Drug Use No     History   Smoking Status    Never Smoker   Smokeless Tobacco    Never Used     Comment: patient is a non - smoker     Family History: non-contributory    Meds/Allergies   PTA meds:   Prior to Admission Medications   Prescriptions Last Dose Informant Patient Reported? Taking? QUEtiapine (SEROquel) 200 mg tablet  Self No Yes   Sig: Take 1 5 tablets (300 mg total) by mouth daily at bedtime Indications: Depression  Patient taking differently: Take 600 mg by mouth daily at bedtime     clonazePAM (KlonoPIN) 2 MG disintegrating tablet  Self No Yes   Sig: Take 1 tablet (2 mg total) by mouth 3 (three) times a day as needed for seizures  Patient taking differently: Take 1 mg by mouth daily     fentaNYL (DURAGESIC) 75 mcg/hr  Self No Yes   Sig: Indications: Chronic Pain  Place one patch on skin every 72 hours for pain   hydrOXYzine (VISTARIL) 100 MG capsule  Self Yes Yes   Sig: Take 150 mg by mouth 3 (three) times a day as needed for itching   omeprazole (PriLOSEC) 20 mg delayed release capsule   Yes Yes   Sig: Take by mouth daily Dose unknown   zolpidem (AMBIEN) 5 mg tablet  Self No Yes   Sig: Take 1 tablet (5 mg total) by mouth daily at bedtime as needed for sleep Indications: Trouble Sleeping  Patient taking differently: Take 10 mg by mouth daily at bedtime as needed for sleep        Facility-Administered Medications: None     Allergies   Allergen Reactions    Lexapro [Escitalopram Oxalate]     Penicillins        Objective   Vitals: Blood pressure 99/58, pulse 71, temperature (!) 97 3 °F (36 3 °C), temperature source Tympanic, resp  rate 16, height 5' 6" (1 676 m), weight 78 1 kg (172 lb 1 6 oz), SpO2 99 %    No intake or output data in the 24 hours ending 08/07/18 0733  Invasive Devices     Peripheral Intravenous Line            Peripheral IV 12/30/17 Right Hand 219 days                Physical Exam   Constitutional: He is oriented to person, place, and time  He appears well-developed and well-nourished  No distress  HENT:   Head: Normocephalic and atraumatic  Eyes: EOM are normal  Pupils are equal, round, and reactive to light  Neck: Normal range of motion  Cardiovascular: Normal rate and regular rhythm  Exam reveals no gallop and no friction rub  No murmur heard  No le edema   Pulmonary/Chest: Effort normal  He has no wheezes  He has no rales  Abdominal: Soft  He exhibits no distension  There is no tenderness  Neurological: He is alert and oriented to person, place, and time  Psychiatric: His speech is normal and behavior is normal  Thought content normal  His mood appears anxious  He exhibits a depressed mood  Lab Results: I have personally reviewed pertinent reports

## 2018-08-07 NOTE — PROGRESS NOTES
Pt recently returned from CT, tolerated procedure well  Seclusive to room  Reports nausea and headache  Pt appears pale, fatigued, disheveled  Pt reports some dizziness when sitting up  Encouraged increased water intake, sit on edge of bed, call for assistance when getting up if dizzy  Fall Risk bracelet applied to wrist, star on door  Pt reports pain, head and back, totaling an 8/10  Encouraged to attend group and get out of bed  Pt ate portion of meals  PRN given for nausea  Pt did ask for ensure at meals  Reports weight loss of 130lbs within 1 5 years  States, "I ate crackers and Gatorade only " Pt contracts for safety  Asking if dental procedures are able to be performed here, states he was told he would need teeth removed but would need to take antibiotics prior to procedure   Relayed pt request to tx team
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
## 2018-08-07 NOTE — PROGRESS NOTES
@0559 Patient requested Ambien for sleep  Patient reassessed @3288 he appeared to be asleep  Will continue to monitor
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
## 2018-08-07 NOTE — PROGRESS NOTES
Pt asleep, easily aroused  Pt has been in bed, seclusive, pleasant during interactions  Reports anxiety 7/10, depression 6/10  Pt denies SI/HI  Report AH-whispers, no clear messages  Contracts for safety  Reports headache and nausea during most of day and night  Offered gingerale, pt declined  Music, reading, coloring identified as effective coping skills  Pt did not attend groups today, but plans to attend tomorrow when feeling better  This writer encourages him to attend all meals and groups as scheduled  Pt was unsteady on feet when walking to [facility] room @ 2200  Pt had just woken up, escorted by this writer to [facility]

## 2018-08-08 PROCEDURE — 99231 SBSQ HOSP IP/OBS SF/LOW 25: CPT | Performed by: PSYCHIATRY & NEUROLOGY

## 2018-08-08 RX ADMIN — ALUMINUM HYDROXIDE, MAGNESIUM HYDROXIDE, AND SIMETHICONE 30 ML: 200; 200; 20 SUSPENSION ORAL at 17:18

## 2018-08-08 RX ADMIN — DULOXETINE 30 MG: 30 CAPSULE, DELAYED RELEASE ORAL at 08:59

## 2018-08-08 RX ADMIN — PANTOPRAZOLE SODIUM 40 MG: 40 TABLET, DELAYED RELEASE ORAL at 06:45

## 2018-08-08 RX ADMIN — CLONAZEPAM 1 MG: 0.5 TABLET ORAL at 23:18

## 2018-08-08 RX ADMIN — ZOLPIDEM TARTRATE 10 MG: 5 TABLET ORAL at 21:38

## 2018-08-08 RX ADMIN — ACETAMINOPHEN 975 MG: 325 TABLET ORAL at 22:11

## 2018-08-08 RX ADMIN — GABAPENTIN 600 MG: 300 CAPSULE ORAL at 08:59

## 2018-08-08 NOTE — PROGRESS NOTES
Pt pleasant and cooperative in conversation, however, not very interested in talking  Denies all sx at this time  Seclusive to room, frequently napping  Encouraging pt to get OOB and attend activities  Support and encouragement provided

## 2018-08-08 NOTE — PROGRESS NOTES
Progress Note - Linda Iyer R Sovocool 51 y o  male MRN: [id number]  Unit/Bed#: [facility] 258-02 Encounter: [id number]    Assessment/Plan   Principal Problem:    Bipolar disorder, current episode mixed, moderate (HCC)  Active Problems:    Generalized anxiety disorder    Subjective:  Patient is compliant with medication and reports early morning sedation from recent increase in Seroquel dosage from 600 to 700 mg at HS  Patient agreed with plan of reducing dosage to 650 mg at H S  Patient remained seclusive to self and continues to endorse depression with passive death wishes  Patient is consenting for safety on the unit  Agreed with slow titration of medication during this hospitalization      Current Medications:    Current Facility-Administered Medications:  acetaminophen 650 mg Oral Q6H PRN Lisset Harolan, PA-C   acetaminophen 650 mg Oral Q4H PRN Lisset Hylan, PA-C   acetaminophen 975 mg Oral Q6H PRN Lisset Harolan, PA-C   aluminum-magnesium hydroxide-simethicone 30 mL Oral Q4H PRN Lisset Harolan, PA-C   benztropine 1 mg Intramuscular Q6H PRN Lisset Hylan, PA-C   benztropine 1 mg Oral Q6H PRN Lisset Hylan, PA-C   clonazePAM 1 mg Oral Q8H PRN Lisset Harolan, PA-C   DULoxetine 30 mg Oral Daily [facility]   fentaNYL 75 mcg Transdermal Q72H [facility]   gabapentin 600 mg Oral BID [facility]   haloperidol 5 mg Oral Q8H PRN Lisset Hylan, PA-C   haloperidol lactate 5 mg Intramuscular Q8H PRN Lisset Hylan, PA-C   hydrOXYzine HCL 25 mg Oral Q6H PRN Lisset Hylan, PA-C   LORazepam 2 mg Intramuscular Q8H PRN Lisset Hylan, PA-C   magnesium hydroxide 30 mL Oral Daily PRN Lisset Hylan, PA-C   OLANZapine 5 mg Intramuscular Q8H PRN Lisset Hylan, PA-C   OLANZapine 5 mg Oral Q8H PRN Lisset Hylan, PA-C   ondansetron 4 mg Oral Q6H PRN [facility]   pantoprazole 40 mg Oral Early Morning Lisset Harolan, PA-C   QUEtiapine 650 mg Oral HS Cristopher Diop   risperiDONE 1 mg Oral Q8H PRN Emery Cerna PA-C   traZODone 50 mg Oral HS PRN Emery Cerna PA-C   zolpidem 10 mg Oral HS PRN Emery Cerna PA-C       Behavioral Health Medications: all current active meds have been reviewed  Vital signs in last 24 hours:  Temp:  [97 1 °F (36 2 °C)] 97 1 °F (36 2 °C)  HR:  [72-79] 79  Resp:  [18] 18  BP: ()/(57-80) 97/57    Laboratory results:    I have personally reviewed all pertinent laboratory/tests results    Labs in last 72 hours:   Recent Labs      08/06/18   0717  08/07/18   0832   WBC  4 63   --    RBC  5 07   --    HGB  13 7   --    HCT  41 9   --    PLT  202   --    RDW  13 2   --    NEUTROABS  2 48   --    NA  134*   --    K  3 7   --    CL  99*   --    CO2  24   --    BUN  14   --    CREATININE  1 20   --    GLUCOSE  132   --    CALCIUM  9 3   --    AST  38   --    ALT  38   --    ALKPHOS  227*   --    PROT  8 0   --    BILITOT  0 80   --    CHOL   --   220*   HDL   --   47   TRIG   --   113   LDLCALC   --   150*   [id number]   --   1 789   RPR   --   Non-Reactive     Admission Labs:   Admission on 08/06/2018   Component Date Value    WBC 08/06/2018 4 63     RBC 08/06/2018 5 07     Hemoglobin 08/06/2018 13 7     Hematocrit 08/06/2018 41 9     MCV 08/06/2018 83     MCH 08/06/2018 27 0     MCHC 08/06/2018 32 7     RDW 08/06/2018 13 2     MPV 08/06/2018 10 9     Platelets 75/05/2363 202     nRBC 08/06/2018 0     Neutrophils Relative 08/06/2018 54     Immat GRANS % 08/06/2018 0     Lymphocytes Relative 08/06/2018 36     Monocytes Relative 08/06/2018 7     Eosinophils Relative 08/06/2018 2     Basophils Relative 08/06/2018 1     Neutrophils Absolute 08/06/2018 2 48     Immature Grans Absolute 08/06/2018 0 01     Lymphocytes Absolute 08/06/2018 1 67     Monocytes Absolute 08/06/2018 0 33     Eosinophils Absolute 08/06/2018 0 11     Basophils Absolute 08/06/2018 0 03     Protime 08/06/2018 13 0     INR 08/06/2018 1 04     PTT 08/06/2018 22*    Sodium 08/06/2018 134*    Potassium 08/06/2018 3 7     Chloride 08/06/2018 99*    CO2 08/06/2018 24     Anion Gap 08/06/2018 11     BUN 08/06/2018 14     Creatinine 08/06/2018 1 20     Glucose 08/06/2018 132     Calcium 08/06/2018 9 3     AST 08/06/2018 38     ALT 08/06/2018 38     Alkaline Phosphatase 08/06/2018 227*    Total Protein 08/06/2018 8 0     Albumin 08/06/2018 4 0     Total Bilirubin 08/06/2018 0 80     eGFR 08/06/2018 71     Amph/Meth UR 08/06/2018 Negative     Barbiturate Ur 08/06/2018 Negative     Benzodiazepine Urine 08/06/2018 Positive*    Cocaine Urine 08/06/2018 Negative     Methadone Urine 08/06/2018 Negative     Opiate Urine 08/06/2018 Negative     PCP Ur 08/06/2018 Negative     THC Urine 08/06/2018 Negative     EXTBreath Alcohol 08/06/2018 0 000     Color, UA 08/07/2018 Orange     Clarity, UA 08/07/2018 Clear     Specific Gravity, UA 08/07/2018 >=1 030     pH, UA 08/07/2018 7 0     Leukocytes, UA 08/07/2018 Negative     Nitrite, UA 08/07/2018 Negative     Protein, UA 08/07/2018 Trace*    Glucose, UA 08/07/2018 Negative     Ketones, UA 08/07/2018 Negative     Urobilinogen, UA 08/07/2018 0 2     Bilirubin, UA 08/07/2018 Interference- unable to analyze*    Blood, UA 08/07/2018 Negative     Cholesterol 08/07/2018 220*    Triglycerides 08/07/2018 113     HDL, Direct 08/07/2018 47     LDL Calculated 08/07/2018 150*    Non-HDL-Chol (CHOL-HDL) 08/07/2018 173     TSH 3RD GENERATON 08/07/2018 1 789     Hemoglobin A1C 08/07/2018 5 1     EAG 08/07/2018 100     RPR 08/07/2018 Non-Reactive     RBC, UA 08/07/2018 None Seen     WBC, UA 08/07/2018 0-1*    Epithelial Cells 08/07/2018 Occasional     Bacteria, UA 08/07/2018 Occasional     MUCOUS THREADS 08/07/2018 Innumerable*       Psychiatric Review of Systems:  Behavior over the last 24 hours:  unchanged  Sleep: normal  Appetite: normal  Medication side effects: Yes sedation  ROS: sedation    Mental Status Evaluation:  Appearance:  casually dressed   Behavior:  guarded   Speech:  soft   Mood:  anxious and depressed   Affect:  constricted   Language naming objects   Thought Process:  circumstantial   Thought Content:  obsessions   Perceptual Disturbances: None   Risk Potential: Suicidal Ideations without plan, Homicidal Ideations none and Potential for Aggression No   Sensorium:  person and place   Cognition:  grossly intact   Consciousness:  awake    Attention: attention span appeared shorter than expected for age   Insight:  limited   Judgment: limited   Intellect fair   Gait/Station: In bed   Motor Activity: no abnormal movements     Memory: Short and long term memory  fair     Progress Toward Goals: slow progress    Recommended Treatment:   [facility] in [facility] time  Continue with group therapy, milieu therapy and occupational therapy      Continue following current medications:   Current Facility-Administered Medications:  acetaminophen 650 mg Oral Q6H PRN Jalyn Arellano, PA-C   acetaminophen 650 mg Oral Q4H PRN Jalyn Arellano, PA-C   acetaminophen 975 mg Oral Q6H PRN Jalyn Arellano, PA-C   aluminum-magnesium hydroxide-simethicone 30 mL Oral Q4H PRN HARRIETT Pugh-C   benztropine 1 mg Intramuscular Q6H PRN Jalyn Arellano, PA-C   benztropine 1 mg Oral Q6H PRN Jalyn Arellano, PA-C   clonazePAM 1 mg Oral Q8H PRN Jalyn Arellano, PA-C   DULoxetine 30 mg Oral Daily [facility]   fentaNYL 75 mcg Transdermal Q72H [facility]   gabapentin 600 mg Oral BID [facility]   haloperidol 5 mg Oral Q8H PRN Jalyn Arellano PA-C   haloperidol lactate 5 mg Intramuscular Q8H PRN Jalyn Arlelano, PA-C   hydrOXYzine HCL 25 mg Oral Q6H PRN Jalyn Arellano, PA-C   LORazepam 2 mg Intramuscular Q8H PRN Jalyn Arellano, PA-C   magnesium hydroxide 30 mL Oral Daily PRN Jalyn Arellano, LAILA OLANZapine 5 mg Intramuscular Q8H PRN HARRIETT Guo-GEORGES   OLANZapine 5 mg Oral Q8H PRN Lorraine Arreola PA-C   ondansetron 4 mg Oral Q6H PRN Cristopher Diop   pantoprazole 40 mg Oral Early Morning Lorraine Arreola, PA-GEORGES   QUEtiapine 650 mg Oral HS Cristopher Diop   risperiDONE 1 mg Oral Q8H PRN Lorraine Maxwell, PA-C   traZODone 50 mg Oral HS PRN HARRIETT Guo-GEORGES   zolpidem 10 mg Oral HS PRN Lorraine Arreola, PA-GEORGES       Risks, benefits and possible side effects of Medications:   Risks, benefits, and possible side effects of medications explained to patient and patient verbalizes understanding  This note has been constructed using a voice recognition system  There may be translation, syntax,  or grammatical errors  If you have any questions, please contact the dictating provider

## 2018-08-08 NOTE — PROGRESS NOTES
Pt remains seclusive to room  Did not eat lunch and declined dinner  Encouraged pt oob to dinner but pt stated "the doctor is changing my meds and he told me not to get out of bed much for a few days " Pt's dinner tray brought to his bedroom  Remains at bedside at this time and pt has not eaten  Encouraged fluids and given cup of water  Pt reports feeling tired  Denies SI/HI  Denies hallucinations or paranoia

## 2018-08-09 PROCEDURE — 99232 SBSQ HOSP IP/OBS MODERATE 35: CPT | Performed by: PSYCHIATRY & NEUROLOGY

## 2018-08-09 RX ORDER — OLANZAPINE 5 MG/1
5 TABLET ORAL
Status: DISCONTINUED | OUTPATIENT
Start: 2018-08-09 | End: 2018-08-10

## 2018-08-09 RX ORDER — OMEPRAZOLE 20 MG/1
20 CAPSULE, DELAYED RELEASE ORAL
Status: DISCONTINUED | OUTPATIENT
Start: 2018-08-09 | End: 2018-08-15 | Stop reason: HOSPADM

## 2018-08-09 RX ADMIN — PANTOPRAZOLE SODIUM 40 MG: 40 TABLET, DELAYED RELEASE ORAL at 06:36

## 2018-08-09 RX ADMIN — ZOLPIDEM TARTRATE 10 MG: 5 TABLET ORAL at 21:12

## 2018-08-09 RX ADMIN — ONDANSETRON 4 MG: 4 TABLET, ORALLY DISINTEGRATING ORAL at 09:35

## 2018-08-09 RX ADMIN — HYDROXYZINE HYDROCHLORIDE 25 MG: 25 TABLET, FILM COATED ORAL at 13:15

## 2018-08-09 RX ADMIN — GABAPENTIN 600 MG: 300 CAPSULE ORAL at 09:33

## 2018-08-09 RX ADMIN — OLANZAPINE 5 MG: 5 TABLET, FILM COATED ORAL at 21:12

## 2018-08-09 RX ADMIN — CLONAZEPAM 1 MG: 0.5 TABLET ORAL at 09:35

## 2018-08-09 RX ADMIN — CLONAZEPAM 1 MG: 0.5 TABLET ORAL at 17:41

## 2018-08-09 RX ADMIN — ACETAMINOPHEN 650 MG: 325 TABLET, FILM COATED ORAL at 09:33

## 2018-08-09 RX ADMIN — GABAPENTIN 600 MG: 300 CAPSULE ORAL at 17:41

## 2018-08-09 RX ADMIN — ACETAMINOPHEN 975 MG: 325 TABLET ORAL at 21:14

## 2018-08-09 RX ADMIN — DULOXETINE 30 MG: 30 CAPSULE, DELAYED RELEASE ORAL at 09:33

## 2018-08-09 RX ADMIN — RISPERIDONE 1 MG: 1 TABLET, ORALLY DISINTEGRATING ORAL at 21:12

## 2018-08-09 RX ADMIN — ALUMINUM HYDROXIDE, MAGNESIUM HYDROXIDE, AND SIMETHICONE 30 ML: 200; 200; 20 SUSPENSION ORAL at 17:46

## 2018-08-09 NOTE — PROGRESS NOTES
Gave PRN Ambien for sleep  Wouldn't take Seronic Sierra MD to reduce  Wanted Klonopin with Ambien 10mg  Writer wouldn't agree  Pt took Ambien 10mg  and was requested to return in an hour if he couldn't sleep for Klonopin  Returned for Klonopin at 23:18p  Ambien not effective for sleep, but Klonopin effective afterward  Pt at ease and sleeping at this time

## 2018-08-09 NOTE — PROGRESS NOTES
Progress Note - Linda Iyer R Sovocool 51 y o  male MRN: [id number]  Unit/Bed#: [facility] 258-02 Encounter: [id number]    Assessment/Plan   Principal Problem:    Bipolar disorder, current episode mixed, moderate (HCC)  Active Problems:    Generalized anxiety disorder      Subjective:  Patient is compliant with medication but reports excessive sedation from Seroquel  Patient reports mild improvement in mood but continues to express passive death wishes  He is consenting for safety on the unit but agreed that he is not stable to leave the hospital at this time  According to patient's wife patient do not respond well to Seroquel  We discussed this in detail and patient also reports understanding  He is in agreement with switching Seroquel to another medication for mood stabilization and psychosis management  We discussed the option of risperidone, Abilify, Geodon and Zyprexa in detail  Patient agreed with plan of switching Seroquel to Zyprexa with slow titration based on toleration and response  Patient again encouraged to stay out in milieu and participate in milieu therapy      Current Medications:    Current Facility-Administered Medications:  acetaminophen 650 mg Oral Q6H PRN Amy Burciaga, PA-C   acetaminophen 650 mg Oral Q4H PRN Amy Burciaga, PA-C   acetaminophen 975 mg Oral Q6H PRN Amy Grew, PA-C   aluminum-magnesium hydroxide-simethicone 30 mL Oral Q4H PRN Amy Burciaga, PA-C   benztropine 1 mg Intramuscular Q6H PRN Amy Grew, PA-C   benztropine 1 mg Oral Q6H PRN Amy Burciaga, PA-C   clonazePAM 1 mg Oral Q8H PRN Amy Burciaga, PA-C   DULoxetine 30 mg Oral Daily Cristopher Diop   fentaNYL 75 mcg Transdermal Q72H Cristopher Diop   gabapentin 600 mg Oral BID Cristopher Diop   haloperidol 5 mg Oral Q8H PRN Amy Burciaga, PA-C   haloperidol lactate 5 mg Intramuscular Q8H PRN Amy Burciaga, PA-C   hydrOXYzine HCL 25 mg Oral Q6H PRN Amy Grew, PA-C LORazepam 2 mg Intramuscular Q8H PRN Lisset Barker PA-C   magnesium hydroxide 30 mL Oral Daily PRN Lisset Barker PA-C   OLANZapine 5 mg Intramuscular Q8H PRN Lisset Barker, PA-GEORGES   OLANZapine 5 mg Oral Q8H PRN Lisset Barker, LAILA   OLANZapine 5 mg Oral HS [facility]   omeprazole 20 mg Oral Early Morning [facility]   ondansetron 4 mg Oral Q6H PRN [facility]   risperiDONE 1 mg Oral Q8H PRN Lisset Barker PA-C   traZODone 50 mg Oral HS PRN Lisset Barker PA-C   zolpidem 10 mg Oral HS PRN Lisset Barker PA-C       Behavioral Health Medications: all current active meds have been reviewed  Vital signs in last 24 hours:  Temp:  [96 7 °F (35 9 °C)-98 6 °F (37 °C)] 96 7 °F (35 9 °C)  HR:  [73-85] 73  Resp:  [16-18] 16  BP: (84-96)/(51-60) 96/56    Laboratory results:    I have personally reviewed all pertinent laboratory/tests results    Labs in last 72 hours:   Recent Labs      08/07/18   0832   CHOL  220*   HDL  47   TRIG  113   LDLCALC  150*   [id number]  1 789   RPR  Non-Reactive     Admission Labs:   Admission on 08/06/2018   Component Date Value    WBC 08/06/2018 4 63     RBC 08/06/2018 5 07     Hemoglobin 08/06/2018 13 7     Hematocrit 08/06/2018 41 9     MCV 08/06/2018 83     MCH 08/06/2018 27 0     MCHC 08/06/2018 32 7     RDW 08/06/2018 13 2     MPV 08/06/2018 10 9     Platelets 70/90/7593 202     nRBC 08/06/2018 0     Neutrophils Relative 08/06/2018 54     Immat GRANS % 08/06/2018 0     Lymphocytes Relative 08/06/2018 36     Monocytes Relative 08/06/2018 7     Eosinophils Relative 08/06/2018 2     Basophils Relative 08/06/2018 1     Neutrophils Absolute 08/06/2018 2 48     Immature Grans Absolute 08/06/2018 0 01     Lymphocytes Absolute 08/06/2018 1 67     Monocytes Absolute 08/06/2018 0 33     Eosinophils Absolute 08/06/2018 0 11     Basophils Absolute 08/06/2018 0 03     Protime 08/06/2018 13 0     INR 08/06/2018 1 04     PTT 08/06/2018 22*    Sodium 08/06/2018 134*    Potassium 08/06/2018 3 7     Chloride 08/06/2018 99*    CO2 08/06/2018 24     Anion Gap 08/06/2018 11     BUN 08/06/2018 14     Creatinine 08/06/2018 1 20     Glucose 08/06/2018 132     Calcium 08/06/2018 9 3     AST 08/06/2018 38     ALT 08/06/2018 38     Alkaline Phosphatase 08/06/2018 227*    Total Protein 08/06/2018 8 0     Albumin 08/06/2018 4 0     Total Bilirubin 08/06/2018 0 80     eGFR 08/06/2018 71     Amph/Meth UR 08/06/2018 Negative     Barbiturate Ur 08/06/2018 Negative     Benzodiazepine Urine 08/06/2018 Positive*    Cocaine Urine 08/06/2018 Negative     Methadone Urine 08/06/2018 Negative     Opiate Urine 08/06/2018 Negative     PCP Ur 08/06/2018 Negative     THC Urine 08/06/2018 Negative     EXTBreath Alcohol 08/06/2018 0 000     Color, UA 08/07/2018 Orange     Clarity, UA 08/07/2018 Clear     Specific Gravity, UA 08/07/2018 >=1 030     pH, UA 08/07/2018 7 0     Leukocytes, UA 08/07/2018 Negative     Nitrite, UA 08/07/2018 Negative     Protein, UA 08/07/2018 Trace*    Glucose, UA 08/07/2018 Negative     Ketones, UA 08/07/2018 Negative     Urobilinogen, UA 08/07/2018 0 2     Bilirubin, UA 08/07/2018 Interference- unable to analyze*    Blood, UA 08/07/2018 Negative     Cholesterol 08/07/2018 220*    Triglycerides 08/07/2018 113     HDL, Direct 08/07/2018 47     LDL Calculated 08/07/2018 150*    Non-HDL-Chol (CHOL-HDL) 08/07/2018 173     TSH 3RD GENERATON 08/07/2018 1 789     Hemoglobin A1C 08/07/2018 5 1     EAG 08/07/2018 100     RPR 08/07/2018 Non-Reactive     RBC, UA 08/07/2018 None Seen     WBC, UA 08/07/2018 0-1*    Epithelial Cells 08/07/2018 Occasional     Bacteria, UA 08/07/2018 Occasional     MUCOUS THREADS 08/07/2018 Innumerable*       Psychiatric Review of Systems:  Behavior over the last 24 hours:  unchanged  Sleep: normal  Appetite: normal  Medication side effects: No  ROS: no complaints    Mental Status Evaluation:  Appearance:  casually dressed   Behavior:  guarded   Speech:  soft   Mood:  anxious and depressed   Affect:  constricted   Language naming objects and repeating phrases   Thought Process:  circumstantial   Thought Content:  obsessions   Perceptual Disturbances: Auditory hallucinations without commands   Risk Potential: Suicidal Ideations without plan, Homicidal Ideations none and Potential for Aggression No   Sensorium:  person and place   Cognition:  grossly intact   Consciousness:  awake    Attention: attention span appeared shorter than expected for age   Insight:  limited   Judgment: limited   Intellect fair   Gait/Station: in bed   Motor Activity: no abnormal movements     Memory: Short and long term memory  fair     Progress Toward Goals: slow progress    Recommended Treatment:   Switch Seroquel to Zyprexa 5 mg at  for psychosis and mood stabilization  Continue with group therapy, milieu therapy and occupational therapy      Continue following current medications:   Current Facility-Administered Medications:  acetaminophen 650 mg Oral Q6H PRN Rosevelt Sudheer, PA-C   acetaminophen 650 mg Oral Q4H PRN Rosevelt Sudheer, PA-C   acetaminophen 975 mg Oral Q6H PRN Rosevelt Sudheer, PA-C   aluminum-magnesium hydroxide-simethicone 30 mL Oral Q4H PRN Rosevelt Sudheer, PA-C   benztropine 1 mg Intramuscular Q6H PRN Rosevelt Sudheer, PA-C   benztropine 1 mg Oral Q6H PRN Rosevelt Sudheer, PA-C   clonazePAM 1 mg Oral Q8H PRN Rosevelt Sudheer, PA-C   DULoxetine 30 mg Oral Daily [facility]   fentaNYL 75 mcg Transdermal Q72H [facility]   gabapentin 600 mg Oral BID [facility]   haloperidol 5 mg Oral Q8H PRN Rosevelt Sudheer, PA-C   haloperidol lactate 5 mg Intramuscular Q8H PRN Rosevelt Sudheer, PA-C   hydrOXYzine HCL 25 mg Oral Q6H PRN Rosevelt Sudheer, PA-C   LORazepam 2 mg Intramuscular Q8H PRN Rosevelt Sudheer, PA-C   magnesium hydroxide 30 mL Oral Daily PRN Rosevelt Sudheer, PA-C OLANZapine 5 mg Intramuscular Q8H PRN Rober Magyar, PA-C   OLANZapine 5 mg Oral Q8H PRN Rober yar, PA-C   OLANZapine 5 mg Oral HS Cristopher Diop   omeprazole 20 mg Oral Early Morning Cristopher Diop   ondansetron 4 mg Oral Q6H PRN Cristopher Diop   risperiDONE 1 mg Oral Q8H PRN Rober Magyar, PA-C   traZODone 50 mg Oral HS PRN Rober Magyar, PA-C   zolpidem 10 mg Oral HS PRN Rober Magyar, PA-C       Risks, benefits and possible side effects of Medications:   Risks, benefits, and possible side effects of medications explained to patient and patient verbalizes understanding  This note has been constructed using a voice recognition system  There may be translation, syntax,  or grammatical errors  If you have any questions, please contact the dictating provider

## 2018-08-09 NOTE — PROGRESS NOTES
Pt awake this morning for breakfast and medications  Appeared less drowsy and pt stated he was feeling "50% better than yesterday but still tired from seroquel " Pt returned to bed and observed sleeping at this time  Pt denies SI/HI  Denies hallucinations/paranoia  Calm during interaction

## 2018-08-10 PROCEDURE — 99232 SBSQ HOSP IP/OBS MODERATE 35: CPT | Performed by: PSYCHIATRY & NEUROLOGY

## 2018-08-10 RX ORDER — HYDROXYZINE 50 MG/1
50 TABLET, FILM COATED ORAL EVERY 6 HOURS PRN
Status: DISCONTINUED | OUTPATIENT
Start: 2018-08-10 | End: 2018-08-15 | Stop reason: HOSPADM

## 2018-08-10 RX ORDER — OLANZAPINE 10 MG/1
10 TABLET ORAL
Status: DISCONTINUED | OUTPATIENT
Start: 2018-08-10 | End: 2018-08-11

## 2018-08-10 RX ORDER — PANTOPRAZOLE SODIUM 40 MG/1
40 TABLET, DELAYED RELEASE ORAL
Status: DISCONTINUED | OUTPATIENT
Start: 2018-08-10 | End: 2018-08-14

## 2018-08-10 RX ADMIN — FENTANYL 75 MCG: 75 PATCH, EXTENDED RELEASE TRANSDERMAL at 12:49

## 2018-08-10 RX ADMIN — ACETAMINOPHEN 650 MG: 325 TABLET, FILM COATED ORAL at 20:56

## 2018-08-10 RX ADMIN — OLANZAPINE 10 MG: 10 TABLET, FILM COATED ORAL at 21:30

## 2018-08-10 RX ADMIN — HYDROXYZINE HYDROCHLORIDE 50 MG: 50 TABLET, FILM COATED ORAL at 22:27

## 2018-08-10 RX ADMIN — ACETAMINOPHEN 975 MG: 325 TABLET ORAL at 13:21

## 2018-08-10 RX ADMIN — HALOPERIDOL 5 MG: 5 TABLET ORAL at 22:22

## 2018-08-10 RX ADMIN — DULOXETINE 30 MG: 30 CAPSULE, DELAYED RELEASE ORAL at 11:38

## 2018-08-10 RX ADMIN — ALUMINUM HYDROXIDE, MAGNESIUM HYDROXIDE, AND SIMETHICONE 30 ML: 200; 200; 20 SUSPENSION ORAL at 05:40

## 2018-08-10 RX ADMIN — BENZTROPINE MESYLATE 1 MG: 1 TABLET ORAL at 22:26

## 2018-08-10 RX ADMIN — CLONAZEPAM 1 MG: 0.5 TABLET ORAL at 03:01

## 2018-08-10 RX ADMIN — GABAPENTIN 600 MG: 300 CAPSULE ORAL at 17:36

## 2018-08-10 RX ADMIN — ALUMINUM HYDROXIDE, MAGNESIUM HYDROXIDE, AND SIMETHICONE 30 ML: 200; 200; 20 SUSPENSION ORAL at 10:36

## 2018-08-10 RX ADMIN — GABAPENTIN 600 MG: 300 CAPSULE ORAL at 11:38

## 2018-08-10 RX ADMIN — ZOLPIDEM TARTRATE 10 MG: 5 TABLET ORAL at 20:57

## 2018-08-10 RX ADMIN — CLONAZEPAM 1 MG: 0.5 TABLET ORAL at 11:38

## 2018-08-10 RX ADMIN — PANTOPRAZOLE SODIUM 40 MG: 40 TABLET, DELAYED RELEASE ORAL at 10:36

## 2018-08-10 NOTE — PROGRESS NOTES
Pt given prn po klonopin 1mg for c/o anxiety and agitation per pt at 0301  Pt currently sleeping, medication appears effective

## 2018-08-10 NOTE — PROGRESS NOTES
Seclusive to room, lying in bed  Refused breakfast and focused on upper gastric pain and would not take AM medications until speaking with MD  Following visit with MD patient received protonix and requested to wait till medication was effective before complying with AM medications  Reports chronic heartburn since age 24 and mentioned hiatal hernia  Denies SI/HI, AH/VH or depression  Reports anxiety fluctuates  Lives at home with wife and two sons age 24 and 23  States they are scheduled to move to [place] when he is discharged  States he was promoted and his company is building a four story house for him and they actually bought out the entire zip code  States he has difficulty with medications either taking too many or too much  Encouraged purchasing a pill box

## 2018-08-10 NOTE — PROGRESS NOTES
Remains seclusive to room comes  out for med's , goes back to room limited interaction with peers  Always on time for med's  fentanyl patch intact , brighter affect

## 2018-08-11 LAB
BACTERIA UR QL AUTO: ABNORMAL /HPF
BILIRUB UR QL STRIP: ABNORMAL
CLARITY UR: CLEAR
COARSE GRAN CASTS URNS QL MICRO: ABNORMAL /LPF
COLOR UR: ABNORMAL
GLUCOSE UR STRIP-MCNC: ABNORMAL MG/DL
HGB UR QL STRIP.AUTO: NEGATIVE
KETONES UR STRIP-MCNC: ABNORMAL MG/DL
LEUKOCYTE ESTERASE UR QL STRIP: NEGATIVE
MUCOUS THREADS UR QL AUTO: ABNORMAL
NITRITE UR QL STRIP: POSITIVE
NON-SQ EPI CELLS URNS QL MICRO: ABNORMAL /HPF
OTHER STN SPEC: ABNORMAL
PH UR STRIP.AUTO: 7.5 [PH] (ref 4.5–8)
PROT UR STRIP-MCNC: ABNORMAL MG/DL
RBC #/AREA URNS AUTO: ABNORMAL /HPF
SP GR UR STRIP.AUTO: 1.02 (ref 1–1.03)
UROBILINOGEN UR QL STRIP.AUTO: 2 E.U./DL
WBC #/AREA URNS AUTO: ABNORMAL /HPF

## 2018-08-11 PROCEDURE — 99232 SBSQ HOSP IP/OBS MODERATE 35: CPT | Performed by: PSYCHIATRY & NEUROLOGY

## 2018-08-11 PROCEDURE — 81001 URINALYSIS AUTO W/SCOPE: CPT | Performed by: PSYCHIATRY & NEUROLOGY

## 2018-08-11 RX ORDER — GABAPENTIN 300 MG/1
300 CAPSULE ORAL 2 TIMES DAILY
Status: DISCONTINUED | OUTPATIENT
Start: 2018-08-11 | End: 2018-08-13

## 2018-08-11 RX ORDER — NITROFURANTOIN 25; 75 MG/1; MG/1
100 CAPSULE ORAL 2 TIMES DAILY WITH MEALS
Status: COMPLETED | OUTPATIENT
Start: 2018-08-12 | End: 2018-08-14

## 2018-08-11 RX ORDER — CLONAZEPAM 0.5 MG/1
0.5 TABLET ORAL 2 TIMES DAILY
Status: DISCONTINUED | OUTPATIENT
Start: 2018-08-11 | End: 2018-08-12

## 2018-08-11 RX ADMIN — CLONAZEPAM 0.5 MG: 0.5 TABLET ORAL at 17:34

## 2018-08-11 RX ADMIN — ACETAMINOPHEN 650 MG: 325 TABLET, FILM COATED ORAL at 18:23

## 2018-08-11 RX ADMIN — ZOLPIDEM TARTRATE 10 MG: 5 TABLET ORAL at 21:07

## 2018-08-11 RX ADMIN — DULOXETINE 30 MG: 30 CAPSULE, DELAYED RELEASE ORAL at 10:30

## 2018-08-11 RX ADMIN — PANTOPRAZOLE SODIUM 40 MG: 40 TABLET, DELAYED RELEASE ORAL at 06:43

## 2018-08-11 RX ADMIN — OLANZAPINE 15 MG: 5 TABLET, FILM COATED ORAL at 21:06

## 2018-08-11 RX ADMIN — CLONAZEPAM 0.5 MG: 0.5 TABLET ORAL at 10:30

## 2018-08-11 RX ADMIN — ACETAMINOPHEN 650 MG: 325 TABLET, FILM COATED ORAL at 10:32

## 2018-08-11 RX ADMIN — GABAPENTIN 300 MG: 300 CAPSULE ORAL at 17:34

## 2018-08-11 NOTE — PROGRESS NOTES
Pt   Given  Haldol 5 mg po  For  Racing  Thoughts at [address],  Atarax 50  Mg po  For  Agitation/anxiety  At 2227,  And  Cogentin  1 mg po  For  tremors  At   2226  Pt  Appears   To  Be asleep  At  Present

## 2018-08-11 NOTE — PROGRESS NOTES
Patient ate breakfast in dining room and ambulated back to room  Asked to meet with RN and reported pain during urination and produced red/orange tinged urine  States "when you stop my klonopin the next thing to happen is renal failure"  Pleasant during interaction

## 2018-08-11 NOTE — PROGRESS NOTES
Conversation focused on discontinuation of klonopin and how upset he is with medication changes  Initially RN entered room and asked how patient was and he began speaking about the klonopin and how he understands it can be addictive but he believes you can also be dependent and he rely's on klonopin and believes it should not be changed  When asked about sleep, "I only slept two or three hours because I did not have my klonopin"  Requested to have breakfast brought to room which staff refused and encouraged ambulation  Ambulated to phones without difficulty when wife called  "Says he has gone down hill every day since he has been here "  No paranoia, delusions noted  When asked about AH/VH, once again spoke about klonopin

## 2018-08-11 NOTE — PROGRESS NOTES
Progress Note - Linda [address] R [address] 51 y o  male MRN: [id number]  Unit/Bed#: [facility] 258-02 Encounter: [id number]    Assessment/Plan   Principal Problem:    Bipolar disorder, current episode mixed, moderate (HCC)  Active Problems:    Generalized anxiety disorder    Subjective:  Patient is compliant with medications  Patient reports worsening anxiety after discontinuation of clonazepam p r n  Lynita Ped Patient received multiple p r n  last night which resulted in hypotension today  Patient continues to report endorsing auditory hallucination and is endorsing grandiose delusions of owning a new house  Patient remained focused on having chronic kidney issues at this time  After detailed discussion patient agreed with plan of lowering gabapentin and initiating clonazepam as a standing dose  Patient also agreed with plan of increasing Zyprexa dose tonight for psychosis and mood stabilization  Patient later reports having burning with urination  Agreed with plan of getting urinalysis today      Current Medications:    Current Facility-Administered Medications:  acetaminophen 650 mg Oral Q6H PRN Gentry Molt, PA-C   aluminum-magnesium hydroxide-simethicone 30 mL Oral Q4H PRN Gentry Molt, PA-C   benztropine 1 mg Intramuscular Q6H PRN Gentry Molt, PA-C   benztropine 1 mg Oral Q6H PRN Gentry Molt, PA-C   clonazePAM 0 5 mg Oral BID [facility]   DULoxetine 30 mg Oral Daily [facility]   fentaNYL 75 mcg Transdermal Q72H [facility]   gabapentin 300 mg Oral BID [facility]   haloperidol 5 mg Oral Q8H PRN Gentry Molt, PA-C   haloperidol lactate 5 mg Intramuscular Q8H PRN Gentry Molt, PA-C   hydrOXYzine HCL 50 mg Oral Q6H PRN [facility]   LORazepam 2 mg Intramuscular Q8H PRN Gentry Molt, PA-C   magnesium hydroxide 30 mL Oral Daily PRN Gentry Molt, PA-C   OLANZapine 5 mg Intramuscular Q8H PRN Gentry Molt, PA-C   OLANZapine 10 mg Oral HS [facility] Jadon   OLANZapine 5 mg Oral Q8H PRN Lorraine Arreola PA-C   omeprazole 20 mg Oral Early Morning Cristopher Diop   ondansetron 4 mg Oral Q6H PRN Cristopher Diop   pantoprazole 40 mg Oral Early Morning Cristopher Diop   traZODone 50 mg Oral HS PRN Lorraine Arreola PA-C   zolpidem 10 mg Oral HS PRN Lorraine Arreola PA-C       Behavioral Health Medications: all current active meds have been reviewed  Vital signs in last 24 hours:  Temp:  [98 2 °F (36 8 °C)-100 4 °F (38 °C)] 98 2 °F (36 8 °C)  HR:  [] 82  Resp:  [17-18] 18  BP: ()/(52-69) 101/69    Laboratory results:    I have personally reviewed all pertinent laboratory/tests results  Labs in last 72 hours: No results for input(s): WBC, RBC, HGB, HCT, PLT, RDW, NEUTROABS, NA, K, CL, CO2, BUN, CREATININE, GLUCOSE, CALCIUM, AST, ALT, ALKPHOS, PROT, ALBUMIN, BILITOT, CHOL, HDL, TRIG, LDLCALC, VALPROICTOT, CARBAMAZEPIN, LITHIUM, AMMONIA, [id number], FREET4, T3FREE, PREGTESTUR, PREGSERUM, HCG, HCGQUANT, RPR in the last 72 hours      Invalid input(s):  RBC  Admission Labs:   Admission on 08/06/2018   Component Date Value    WBC 08/06/2018 4 63     RBC 08/06/2018 5 07     Hemoglobin 08/06/2018 13 7     Hematocrit 08/06/2018 41 9     MCV 08/06/2018 83     MCH 08/06/2018 27 0     MCHC 08/06/2018 32 7     RDW 08/06/2018 13 2     MPV 08/06/2018 10 9     Platelets 95/05/4714 202     nRBC 08/06/2018 0     Neutrophils Relative 08/06/2018 54     Immat GRANS % 08/06/2018 0     Lymphocytes Relative 08/06/2018 36     Monocytes Relative 08/06/2018 7     Eosinophils Relative 08/06/2018 2     Basophils Relative 08/06/2018 1     Neutrophils Absolute 08/06/2018 2 48     Immature Grans Absolute 08/06/2018 0 01     Lymphocytes Absolute 08/06/2018 1 67     Monocytes Absolute 08/06/2018 0 33     Eosinophils Absolute 08/06/2018 0 11     Basophils Absolute 08/06/2018 0 03     Protime 08/06/2018 13 0     INR 08/06/2018 1 04     PTT 08/06/2018 22*  Sodium 08/06/2018 134*    Potassium 08/06/2018 3 7     Chloride 08/06/2018 99*    CO2 08/06/2018 24     Anion Gap 08/06/2018 11     BUN 08/06/2018 14     Creatinine 08/06/2018 1 20     Glucose 08/06/2018 132     Calcium 08/06/2018 9 3     AST 08/06/2018 38     ALT 08/06/2018 38     Alkaline Phosphatase 08/06/2018 227*    Total Protein 08/06/2018 8 0     Albumin 08/06/2018 4 0     Total Bilirubin 08/06/2018 0 80     eGFR 08/06/2018 71     Amph/Meth UR 08/06/2018 Negative     Barbiturate Ur 08/06/2018 Negative     Benzodiazepine Urine 08/06/2018 Positive*    Cocaine Urine 08/06/2018 Negative     Methadone Urine 08/06/2018 Negative     Opiate Urine 08/06/2018 Negative     PCP Ur 08/06/2018 Negative     THC Urine 08/06/2018 Negative     EXTBreath Alcohol 08/06/2018 0 000     Color, UA 08/07/2018 Orange     Clarity, UA 08/07/2018 Clear     Specific Gravity, UA 08/07/2018 >=1 030     pH, UA 08/07/2018 7 0     Leukocytes, UA 08/07/2018 Negative     Nitrite, UA 08/07/2018 Negative     Protein, UA 08/07/2018 Trace*    Glucose, UA 08/07/2018 Negative     Ketones, UA 08/07/2018 Negative     Urobilinogen, UA 08/07/2018 0 2     Bilirubin, UA 08/07/2018 Interference- unable to analyze*    Blood, UA 08/07/2018 Negative     Cholesterol 08/07/2018 220*    Triglycerides 08/07/2018 113     HDL, Direct 08/07/2018 47     LDL Calculated 08/07/2018 150*    Non-HDL-Chol (CHOL-HDL) 08/07/2018 173     TSH 3RD GENERATON 08/07/2018 1 789     Hemoglobin A1C 08/07/2018 5 1     EAG 08/07/2018 100     RPR 08/07/2018 Non-Reactive     RBC, UA 08/07/2018 None Seen     WBC, UA 08/07/2018 0-1*    Epithelial Cells 08/07/2018 Occasional     Bacteria, UA 08/07/2018 Occasional     MUCOUS THREADS 08/07/2018 Innumerable*       Psychiatric Review of Systems:  Behavior over the last 24 hours:  unchanged  Sleep: insomnia  Appetite: normal  Medication side effects: No  ROS: burnign urination; reduced upper abdominal pain (GERD)    Mental Status Evaluation:  Appearance:  casually dressed   Behavior:  guarded   Speech:  soft   Mood:  anxious and depressed   Affect:  constricted   Language naming objects and repeating phrases   Thought Process:  circumstantial   Thought Content:  delusions  grandiose   Perceptual Disturbances: Auditory hallucinations without commands   Risk Potential: Suicidal Ideations without plan, Homicidal Ideations none and Potential for Aggression No   Sensorium:  person, place and time/date   Cognition:  grossly intact   Consciousness:  awake    Attention: attention span appeared shorter than expected for age   Insight:  limited   Judgment: limited   Intellect fair   Gait/Station: normal gait/station   Motor Activity: no abnormal movements     Memory: Short and long term memory  fair     Progress Toward Goals: progressing    Recommended Treatment:   Initiate clonazepam 0 5 mg b i d  for anxiety management  Reduce gabapentin to 300 mg b i d  for anxiety management  Increase olanzapine to 15 mg at HS for psychosis and mood stabilization  Discontinue and reduce multiple p r n  medications at this time  Urinalysis with reflex to microscopic examination-to rule out underlying urinary tract infection  Continue with group therapy, milieu therapy and occupational therapy      Continue following current medications:   Current Facility-Administered Medications:  acetaminophen 650 mg Oral Q6H PRN Rito Puna, PA-C   aluminum-magnesium hydroxide-simethicone 30 mL Oral Q4H PRN Rito Puna, PA-C   benztropine 1 mg Intramuscular Q6H PRN Rito Puna, PA-C   benztropine 1 mg Oral Q6H PRN Rito Puna, PA-C   clonazePAM 0 5 mg Oral BID Cristopher Diop   DULoxetine 30 mg Oral Daily Cristopher Diop   fentaNYL 75 mcg Transdermal Q72H Cristopher Diop   gabapentin 300 mg Oral BID Cristopher Diop   haloperidol 5 mg Oral Q8H PRN Rito Puna, PA-C   haloperidol lactate 5 mg Intramuscular Q8H PRN Lisset Barker, PA-C   hydrOXYzine HCL 50 mg Oral Q6H PRN [facility]   LORazepam 2 mg Intramuscular Q8H PRN Lisset Barker, PA-GEORGES   magnesium hydroxide 30 mL Oral Daily PRN Lisset Barker, PA-C   OLANZapine 5 mg Intramuscular Q8H PRN Lisset Barker, PA-C   OLANZapine 10 mg Oral HS [facility]   OLANZapine 5 mg Oral Q8H PRN HARRIETT Marcial-C   omeprazole 20 mg Oral Early Morning [facility]   ondansetron 4 mg Oral Q6H PRN [facility]   pantoprazole 40 mg Oral Early Morning [facility]   traZODone 50 mg Oral HS PRN BRYON MarcialC   zolpidem 10 mg Oral HS PRN Lisset Barker PA-C       Risks, benefits and possible side effects of Medications:   Risks, benefits, and possible side effects of medications explained to patient and patient verbalizes understanding  This note has been constructed using a voice recognition system  There may be translation, syntax,  or grammatical errors  If you have any questions, please contact the dictating provider

## 2018-08-11 NOTE — PROGRESS NOTES
Pt  Continues  To  Remain  In bed  In  Room  Pt   States   Has much  difficulty ambulating  Pt    Appears  Very   Weak  Denies   SI/HI,  AH/VH or   Depression  Anxiety fluctuates     Pt   Co-operative   And  Pleasant

## 2018-08-11 NOTE — PROGRESS NOTES
Denies SI, states mood is "good", relaxing in bed  States urine was dark/orange, waiting for UA result  No report of pain at this time  Fentanyl patch intact on right arm

## 2018-08-12 PROCEDURE — 99232 SBSQ HOSP IP/OBS MODERATE 35: CPT | Performed by: PSYCHIATRY & NEUROLOGY

## 2018-08-12 RX ORDER — CLONAZEPAM 1 MG/1
1 TABLET ORAL 2 TIMES DAILY
Status: DISCONTINUED | OUTPATIENT
Start: 2018-08-12 | End: 2018-08-15 | Stop reason: HOSPADM

## 2018-08-12 RX ADMIN — GABAPENTIN 300 MG: 300 CAPSULE ORAL at 17:23

## 2018-08-12 RX ADMIN — NITROFURANTOIN MONOHYDRATE AND NITROFURANTOIN MACROCRYSTALLINE 100 MG: 75; 25 CAPSULE ORAL at 17:23

## 2018-08-12 RX ADMIN — GABAPENTIN 300 MG: 300 CAPSULE ORAL at 09:10

## 2018-08-12 RX ADMIN — ZOLPIDEM TARTRATE 10 MG: 5 TABLET ORAL at 21:03

## 2018-08-12 RX ADMIN — ACETAMINOPHEN 650 MG: 325 TABLET, FILM COATED ORAL at 19:41

## 2018-08-12 RX ADMIN — HYDROXYZINE HYDROCHLORIDE 50 MG: 50 TABLET, FILM COATED ORAL at 12:18

## 2018-08-12 RX ADMIN — DULOXETINE 30 MG: 30 CAPSULE, DELAYED RELEASE ORAL at 09:10

## 2018-08-12 RX ADMIN — ACETAMINOPHEN 650 MG: 325 TABLET, FILM COATED ORAL at 10:17

## 2018-08-12 RX ADMIN — OLANZAPINE 15 MG: 5 TABLET, FILM COATED ORAL at 21:03

## 2018-08-12 RX ADMIN — NITROFURANTOIN MONOHYDRATE AND NITROFURANTOIN MACROCRYSTALLINE 100 MG: 75; 25 CAPSULE ORAL at 09:10

## 2018-08-12 RX ADMIN — CLONAZEPAM 0.5 MG: 0.5 TABLET ORAL at 09:10

## 2018-08-12 RX ADMIN — PANTOPRAZOLE SODIUM 40 MG: 40 TABLET, DELAYED RELEASE ORAL at 06:19

## 2018-08-12 RX ADMIN — CLONAZEPAM 1 MG: 1 TABLET ORAL at 17:23

## 2018-08-12 RX ADMIN — ALUMINUM HYDROXIDE, MAGNESIUM HYDROXIDE, AND SIMETHICONE 30 ML: 200; 200; 20 SUSPENSION ORAL at 19:49

## 2018-08-12 NOTE — PROGRESS NOTES
1318 Pt states Atarax was minimally effective for anxiety  He c/o that room checks were being done ever 5 minutes and this was disturbing to him  Pt was reminded that room checks are every 15 minutes  Will continue to monitor

## 2018-08-12 NOTE — PROGRESS NOTES
Pt relaxing in room, states mood is good  States he feels tired due to having possible UTI  No concerns at this time

## 2018-08-12 NOTE — PROGRESS NOTES
Was given ambien 10 mg po prn/sleep at 2107  Good effect obtained, as has been sleeping in bed since the onset of the shift & remains asleep presently

## 2018-08-12 NOTE — PROGRESS NOTES
Progress Note - Linda 10 R [address] 51 y o  male MRN: [id number]  Unit/Bed#: [facility] 258-02 Encounter: [id number]    Assessment/Plan   Principal Problem:    Bipolar disorder, current episode mixed, moderate (HCC)  Active Problems:    Generalized anxiety disorder    Subjective:  Patient is compliant with medications with no acute side effects  Patient continues to describe anxiety is high but reports slow improvement in depression and auditory hallucination  Agreed with discontinuation of p r n  medication and increasing clonazepam to 1 mg b i d   Patient is complaining of urinary burning and frequency  Patient is initiated on [address]  Urine culture is pending at this time  Patient did came out of his room frequently and reports attending group  Patient is encouraged to continue this behavior and monitor closely for slow improvement      Current Medications:    Current Facility-Administered Medications:  acetaminophen 650 mg Oral Q6H PRN Emery Cerna PA-C   aluminum-magnesium hydroxide-simethicone 30 mL Oral Q4H PRN BRYON ArandaC   benztropine 1 mg Intramuscular Q6H PRN Emery Cerna, PA-C   benztropine 1 mg Oral Q6H PRN HARRIETT Aranda-C   clonazePAM 0 5 mg Oral BID [facility]   DULoxetine 30 mg Oral Daily [facility]   fentaNYL 75 mcg Transdermal Q72H [facility]   gabapentin 300 mg Oral BID [facility]   haloperidol 5 mg Oral Q8H PRN Emery Cerna, PA-C   haloperidol lactate 5 mg Intramuscular Q8H PRN Emery Cerna, PA-C   hydrOXYzine HCL 50 mg Oral Q6H PRN [facility]   LORazepam 2 mg Intramuscular Q8H PRN Emrey Cerna, PA-C   magnesium hydroxide 30 mL Oral Daily PRN Emery Cerna, PA-C   nitrofurantoin 100 mg Oral BID [facility] Lorelei Nava MD   OLANZapine 5 mg Intramuscular Q8H PRN Emery Cerna, PA-C   OLANZapine 15 mg Oral HS Cristopher Jadon   OLANZapine 5 mg Oral Q8H PRN Emery Cerna, PA-C   omeprazole 20 mg Oral Early Morning Cristopher Diop   ondansetron 4 mg Oral Q6H PRN Cristopher Diop   pantoprazole 40 mg Oral Early Morning Cristopher Diop   traZODone 50 mg Oral HS PRN Sherrie Bowden PA-C   zolpidem 10 mg Oral HS PRN Sherrie Bowden PA-C       Behavioral Health Medications: all current active meds have been reviewed  Vital signs in last 24 hours:  Temp:  [97 2 °F (36 2 °C)-98 2 °F (36 8 °C)] 97 2 °F (36 2 °C)  HR:  [] 103  Resp:  [17-18] 17  BP: (105-110)/(64-69) 105/64    Laboratory results:    I have personally reviewed all pertinent laboratory/tests results  Labs in last 72 hours: No results for input(s): WBC, RBC, HGB, HCT, PLT, RDW, NEUTROABS, NA, K, CL, CO2, BUN, CREATININE, GLUCOSE, CALCIUM, AST, ALT, ALKPHOS, PROT, ALBUMIN, BILITOT, CHOL, HDL, TRIG, LDLCALC, VALPROICTOT, CARBAMAZEPIN, LITHIUM, AMMONIA, [id number], FREET4, T3FREE, PREGTESTUR, PREGSERUM, HCG, HCGQUANT, RPR in the last 72 hours      Invalid input(s):  RBC  Admission Labs:   Admission on 08/06/2018   Component Date Value    WBC 08/06/2018 4 63     RBC 08/06/2018 5 07     Hemoglobin 08/06/2018 13 7     Hematocrit 08/06/2018 41 9     MCV 08/06/2018 83     MCH 08/06/2018 27 0     MCHC 08/06/2018 32 7     RDW 08/06/2018 13 2     MPV 08/06/2018 10 9     Platelets 50/11/6452 202     nRBC 08/06/2018 0     Neutrophils Relative 08/06/2018 54     Immat GRANS % 08/06/2018 0     Lymphocytes Relative 08/06/2018 36     Monocytes Relative 08/06/2018 7     Eosinophils Relative 08/06/2018 2     Basophils Relative 08/06/2018 1     Neutrophils Absolute 08/06/2018 2 48     Immature Grans Absolute 08/06/2018 0 01     Lymphocytes Absolute 08/06/2018 1 67     Monocytes Absolute 08/06/2018 0 33     Eosinophils Absolute 08/06/2018 0 11     Basophils Absolute 08/06/2018 0 03     Protime 08/06/2018 13 0     INR 08/06/2018 1 04     PTT 08/06/2018 22*    Sodium 08/06/2018 134*    Potassium 08/06/2018 3 7     Chloride 08/06/2018 99*    CO2 08/06/2018 24     Anion Gap 08/06/2018 11     BUN 08/06/2018 14     Creatinine 08/06/2018 1 20     Glucose 08/06/2018 132     Calcium 08/06/2018 9 3     AST 08/06/2018 38     ALT 08/06/2018 38     Alkaline Phosphatase 08/06/2018 227*    Total Protein 08/06/2018 8 0     Albumin 08/06/2018 4 0     Total Bilirubin 08/06/2018 0 80     eGFR 08/06/2018 71     Amph/Meth UR 08/06/2018 Negative     Barbiturate Ur 08/06/2018 Negative     Benzodiazepine Urine 08/06/2018 Positive*    Cocaine Urine 08/06/2018 Negative     Methadone Urine 08/06/2018 Negative     Opiate Urine 08/06/2018 Negative     PCP Ur 08/06/2018 Negative     THC Urine 08/06/2018 Negative     EXTBreath Alcohol 08/06/2018 0 000     Color, UA 08/07/2018 Orange     Clarity, UA 08/07/2018 Clear     Specific Gravity, UA 08/07/2018 >=1 030     pH, UA 08/07/2018 7 0     Leukocytes, UA 08/07/2018 Negative     Nitrite, UA 08/07/2018 Negative     Protein, UA 08/07/2018 Trace*    Glucose, UA 08/07/2018 Negative     Ketones, UA 08/07/2018 Negative     Urobilinogen, UA 08/07/2018 0 2     Bilirubin, UA 08/07/2018 Interference- unable to analyze*    Blood, UA 08/07/2018 Negative     Cholesterol 08/07/2018 220*    Triglycerides 08/07/2018 113     HDL, Direct 08/07/2018 47     LDL Calculated 08/07/2018 150*    Non-HDL-Chol (CHOL-HDL) 08/07/2018 173     TSH 3RD GENERATON 08/07/2018 1 789     Hemoglobin A1C 08/07/2018 5 1     EAG 08/07/2018 100     RPR 08/07/2018 Non-Reactive     RBC, UA 08/07/2018 None Seen     WBC, UA 08/07/2018 0-1*    Epithelial Cells 08/07/2018 Occasional     Bacteria, UA 08/07/2018 Occasional     MUCOUS THREADS 08/07/2018 Innumerable*    Color, UA 08/11/2018 Orange     Clarity, UA 08/11/2018 Clear     Specific Gravity, UA 08/11/2018 1 025     pH, UA 08/11/2018 7 5     Leukocytes, UA 08/11/2018 Negative     Nitrite, UA 08/11/2018 Positive*    Protein, UA 08/11/2018 30 (1+)*    Glucose, UA 08/11/2018 100 (1/10%)*    Ketones, UA 08/11/2018 Trace*    Urobilinogen, UA 08/11/2018 2 0*    Bilirubin, UA 08/11/2018 Interference- unable to analyze*    Blood, UA 08/11/2018 Negative     RBC, UA 08/11/2018 2-4*    WBC, UA 08/11/2018 4-10*    Epithelial Cells 08/11/2018 Occasional     Bacteria, UA 08/11/2018 Occasional     COARSE GRANULAR CASTS 08/11/2018 3-5     OTHER OBSERVATIONS 08/11/2018 Glitter Cells  Transitional Epithelial Cells     MUCOUS THREADS 08/11/2018 Moderate*       Psychiatric Review of Systems:  Behavior over the last 24 hours:  Slow improvement  Sleep: insomnia- Ambien effective  Appetite: normal  Medication side effects: no  ROS: urine frequency and itching (UTI)    Mental Status Evaluation:  Appearance:  casually dressed   Behavior:  guarded   Speech:  soft   Mood:  anxious and depressed   Affect:  increased in range   Language naming objects and repeating phrases   Thought Process:  circumstantial   Thought Content:  obsessions   Perceptual Disturbances: Auditory hallucinations without commands   Risk Potential: Suicidal Ideations without plan, Homicidal Ideations none and Potential for Aggression No   Sensorium:  person and place   Cognition:  grossly intact   Consciousness:  awake    Attention: attention span appeared shorter than expected for age   Insight:  limited   Judgment: limited   Intellect fair   Gait/Station: normal gait/station   Motor Activity: no abnormal movements     Memory: Short and long term memory  fair     Progress Toward Goals: progressing    Recommended Treatment:   Increase clonazepam to 1 mg b i d  for anxiety management  Macrobid initiated for UTI management  Urine culture pending  Continue with group therapy, milieu therapy and occupational therapy      Continue following current medications:   Current Facility-Administered Medications:  acetaminophen 650 mg Oral Q6H PRN Vicky Alcantar PA-C   aluminum-magnesium hydroxide-simethicone 30 mL Oral Q4H PRN Amy Burciaga, PA-C   benztropine 1 mg Intramuscular Q6H PRN Amy Burciaga, PA-C   benztropine 1 mg Oral Q6H PRN Amy Burciaga, PA-C   clonazePAM 0 5 mg Oral BID [facility]   DULoxetine 30 mg Oral Daily [facility]   fentaNYL 75 mcg Transdermal Q72H [facility]   gabapentin 300 mg Oral BID [facility]   haloperidol 5 mg Oral Q8H PRN Amy Burciaga, PA-C   haloperidol lactate 5 mg Intramuscular Q8H PRN Amy Burciaga, PA-C   hydrOXYzine HCL 50 mg Oral Q6H PRN [facility]   LORazepam 2 mg Intramuscular Q8H PRN Amy Burciaga, PA-C   magnesium hydroxide 30 mL Oral Daily PRN Amy Burciaga, PA-C   nitrofurantoin 100 mg Oral BID [facility] Lorelei Sibley MD   OLANZapine 5 mg Intramuscular Q8H PRN Amy Burciaga, PA-C   OLANZapine 15 mg Oral HS [facility]   OLANZapine 5 mg Oral Q8H PRN Amy Burciaga PA-C   omeprazole 20 mg Oral Early Morning [facility]   ondansetron 4 mg Oral Q6H PRN [facility]   pantoprazole 40 mg Oral Early Morning [facility]   traZODone 50 mg Oral HS PRN HARRIETT Bartlett-C   zolpidem 10 mg Oral HS PRN Amy Burciaga, PA-C       Risks, benefits and possible side effects of Medications:   Risks, benefits, and possible side effects of medications explained to patient and patient verbalizes understanding  This note has been constructed using a voice recognition system  There may be translation, syntax,  or grammatical errors  If you have any questions, please contact the dictating provider

## 2018-08-12 NOTE — PROGRESS NOTES
Pt is anxious and depressed  Rates anxiety as an "8," and depression as a "4 " Pt denies S/H/I or AVH  He complained several times about his Klonopin being decreased and stated that "ever since I met with the Kelli Montero been going downhill " Pt c/o urinary burning and frequency  Pt is afebrile, continues on antibiotic  Pt has been compliant with meds and attends some groups  Will continue to monitor

## 2018-08-13 PROCEDURE — 99232 SBSQ HOSP IP/OBS MODERATE 35: CPT | Performed by: PSYCHIATRY & NEUROLOGY

## 2018-08-13 RX ORDER — GABAPENTIN 400 MG/1
400 CAPSULE ORAL 2 TIMES DAILY
Status: DISCONTINUED | OUTPATIENT
Start: 2018-08-13 | End: 2018-08-15 | Stop reason: HOSPADM

## 2018-08-13 RX ORDER — GABAPENTIN 300 MG/1
300 CAPSULE ORAL
Status: DISCONTINUED | OUTPATIENT
Start: 2018-08-13 | End: 2018-08-15 | Stop reason: HOSPADM

## 2018-08-13 RX ORDER — OLANZAPINE 10 MG/1
20 TABLET ORAL
Status: DISCONTINUED | OUTPATIENT
Start: 2018-08-13 | End: 2018-08-15 | Stop reason: HOSPADM

## 2018-08-13 RX ADMIN — CLONAZEPAM 1 MG: 1 TABLET ORAL at 17:04

## 2018-08-13 RX ADMIN — OLANZAPINE 20 MG: 10 TABLET, FILM COATED ORAL at 21:03

## 2018-08-13 RX ADMIN — HYDROXYZINE HYDROCHLORIDE 50 MG: 50 TABLET, FILM COATED ORAL at 19:58

## 2018-08-13 RX ADMIN — GABAPENTIN 400 MG: 400 CAPSULE ORAL at 17:04

## 2018-08-13 RX ADMIN — NITROFURANTOIN MONOHYDRATE AND NITROFURANTOIN MACROCRYSTALLINE 100 MG: 75; 25 CAPSULE ORAL at 17:04

## 2018-08-13 RX ADMIN — FENTANYL 75 MCG: 75 PATCH, EXTENDED RELEASE TRANSDERMAL at 12:46

## 2018-08-13 RX ADMIN — NITROFURANTOIN MONOHYDRATE AND NITROFURANTOIN MACROCRYSTALLINE 100 MG: 75; 25 CAPSULE ORAL at 09:14

## 2018-08-13 RX ADMIN — PANTOPRAZOLE SODIUM 40 MG: 40 TABLET, DELAYED RELEASE ORAL at 06:55

## 2018-08-13 RX ADMIN — GABAPENTIN 300 MG: 300 CAPSULE ORAL at 12:37

## 2018-08-13 RX ADMIN — ZOLPIDEM TARTRATE 10 MG: 5 TABLET ORAL at 21:05

## 2018-08-13 RX ADMIN — CLONAZEPAM 1 MG: 1 TABLET ORAL at 09:14

## 2018-08-13 RX ADMIN — GABAPENTIN 300 MG: 300 CAPSULE ORAL at 09:14

## 2018-08-13 RX ADMIN — ACETAMINOPHEN 650 MG: 325 TABLET, FILM COATED ORAL at 17:05

## 2018-08-13 RX ADMIN — DULOXETINE 30 MG: 30 CAPSULE, DELAYED RELEASE ORAL at 09:14

## 2018-08-13 RX ADMIN — HYDROXYZINE HYDROCHLORIDE 50 MG: 50 TABLET, FILM COATED ORAL at 00:40

## 2018-08-13 NOTE — PLAN OF CARE
Problem: DISCHARGE PLANNING  Goal: Discharge to home or other facility with appropriate resources  INTERVENTIONS:  - Identify barriers to discharge w/patient and caregiver  - Arrange for needed discharge resources and transportation as appropriate  - Identify discharge learning needs (meds, wound care, etc )  - Arrange for interpretive services to assist at discharge as needed  - Refer to Case Management Department for coordinating discharge planning if the patient needs post-hospital services based on physician/advanced practitioner order or complex needs related to functional status, cognitive ability, or social support system    Outcome: Progressing  Pt is scheduled for medication management at [facility] on 8/21, however, has expressed interest in locating another psychiatrist

## 2018-08-13 NOTE — CASE MANAGEMENT
CW contacted [facility] @ [phone] and confirmed that Pt was last seen on 7/27  He had an appointment scheduled for 8/1, but it was cancelled  CW was able to schedule Pt for 8/21 at 11:30 AM with DELANO Hunter  CW confirmed fax number [phone] to send discharge information

## 2018-08-13 NOTE — PROGRESS NOTES
Pt came to med room requesting tylenol and atarax  Explained to pt that RN would give tylenol but pt needed to employ coping skills before atarax could be given  Pt not agreeable to this, but RN encouraged coping skills for now and would give atarax later  Pt did not appear anxious or distressed in any way  Is frequent to the med window with new shift nurses coming on  Gave PRN tylenol with effective relief, then pt requested Maalox for heartburn  Gave PRN Maalox with effective relief of heartburn  Pt did not return for atarax but instead came up at med time for night meds with Ambien

## 2018-08-13 NOTE — CASE MANAGEMENT
CW met with Pt who was resting in bed  Pt reported he felt he may be ready for discharge tomorrow, and CW reviewed it was tentative by the end of the week  Pt reported he may like to try a new psychiatrist, as he felt he was not getting anything useful from his current provider  CW agreed to see who was in network with his insurance, and inquired about transportation to his appointments  Pt said that sometimes he drives or [facility] [employer] would [facility]  Pt reported he has been doing what he needs to do, except attend groups  Pt reported feeling better & hopeful for discharge soon

## 2018-08-13 NOTE — PROGRESS NOTES
Progress Note - Linda [address] 51 y o  male MRN: [id number]  Unit/Bed#: [facility] 258-02 Encounter: [id number]    Assessment/Plan   Principal Problem:    Bipolar disorder, current episode mixed, moderate (HCC)  Active Problems:    Generalized anxiety disorder    Subjective:  Patient is compliant with medications with no acute side effects  Patient continues to express anxiety and how reduction in clonazepam results in UTI  I discussed how there is no correlation between them  Discussed that he is initiated on Macrobid and needs to finish antibiotic course for more effectiveness  Patient later agreed with plan of increasing the dose of gabapentin for anxiety management  Patient continues to remained euphoric with rapid speech with constant need for redirection  After detailed discussion he agreed with plan of increasing olanzapine to 20 mg at HS for mood stabilization and help with insomnia  Patient again educated on importance of not taking additional p r n  medication and giving stable dose of medication time for response  He is consenting for safety on the unit and was seen more out in milieu today      Current Medications:    Current Facility-Administered Medications:  acetaminophen 650 mg Oral Q6H PRN Yojana Martha, PA-C   aluminum-magnesium hydroxide-simethicone 30 mL Oral Q4H PRN Yojana Martha, PA-C   benztropine 1 mg Intramuscular Q6H PRN Yojana Martha, PA-C   benztropine 1 mg Oral Q6H PRN Yojana Martha, PA-C   clonazePAM 1 mg Oral BID Cristopher Diop   DULoxetine 30 mg Oral Daily Cristopher Diop   fentaNYL 75 mcg Transdermal Q72H Cristopher Diop   gabapentin 300 mg Oral After Lunch Cristopher Diop   gabapentin 400 mg Oral BID Cristopher Diop   hydrOXYzine HCL 50 mg Oral Q6H PRN Cristopher Diop   LORazepam 2 mg Intramuscular Q8H PRN Yojana Martha, PA-C   magnesium hydroxide 30 mL Oral Daily PRN Yojana Martha, PA-C   nitrofurantoin 100 mg Oral BID With Meals Lorelei Nagel MD   OLANZapine 5 mg Intramuscular Q8H PRN Phil Ventura PA-C   OLANZapine 20 mg Oral HS Cristopher Diop   OLANZapine 5 mg Oral Q8H PRN Phil Ventura PA-C   omeprazole 20 mg Oral Early Morning Cristopher Diop   ondansetron 4 mg Oral Q6H PRN Cristopher Diop   pantoprazole 40 mg Oral Early Morning Cristopher Diop   zolpidem 10 mg Oral HS PRN Phil Ventura PA-C       Behavioral Health Medications: all current active meds have been reviewed  Vital signs in last 24 hours:  Temp:  [96 6 °F (35 9 °C)-98 8 °F (37 1 °C)] 96 6 °F (35 9 °C)  HR:  [76-83] 83  Resp:  [18] 18  BP: (123-131)/(82-83) 123/82    Laboratory results:    I have personally reviewed all pertinent laboratory/tests results  Labs in last 72 hours: No results for input(s): WBC, RBC, HGB, HCT, PLT, RDW, NEUTROABS, NA, K, CL, CO2, BUN, CREATININE, GLUCOSE, CALCIUM, AST, ALT, ALKPHOS, PROT, ALBUMIN, BILITOT, CHOL, HDL, TRIG, LDLCALC, VALPROICTOT, CARBAMAZEPIN, LITHIUM, AMMONIA, [id number], FREET4, T3FREE, PREGTESTUR, PREGSERUM, HCG, HCGQUANT, RPR in the last 72 hours      Invalid input(s):  RBC  Admission Labs:   Admission on 08/06/2018   Component Date Value    WBC 08/06/2018 4 63     RBC 08/06/2018 5 07     Hemoglobin 08/06/2018 13 7     Hematocrit 08/06/2018 41 9     MCV 08/06/2018 83     MCH 08/06/2018 27 0     MCHC 08/06/2018 32 7     RDW 08/06/2018 13 2     MPV 08/06/2018 10 9     Platelets 02/81/7835 202     nRBC 08/06/2018 0     Neutrophils Relative 08/06/2018 54     Immat GRANS % 08/06/2018 0     Lymphocytes Relative 08/06/2018 36     Monocytes Relative 08/06/2018 7     Eosinophils Relative 08/06/2018 2     Basophils Relative 08/06/2018 1     Neutrophils Absolute 08/06/2018 2 48     Immature Grans Absolute 08/06/2018 0 01     Lymphocytes Absolute 08/06/2018 1 67     Monocytes Absolute 08/06/2018 0 33     Eosinophils Absolute 08/06/2018 0 11     Basophils Absolute 08/06/2018 0 03     Protime 08/06/2018 13 0     INR 08/06/2018 1 04     PTT 08/06/2018 22*    Sodium 08/06/2018 134*    Potassium 08/06/2018 3 7     Chloride 08/06/2018 99*    CO2 08/06/2018 24     Anion Gap 08/06/2018 11     BUN 08/06/2018 14     Creatinine 08/06/2018 1 20     Glucose 08/06/2018 132     Calcium 08/06/2018 9 3     AST 08/06/2018 38     ALT 08/06/2018 38     Alkaline Phosphatase 08/06/2018 227*    Total Protein 08/06/2018 8 0     Albumin 08/06/2018 4 0     Total Bilirubin 08/06/2018 0 80     eGFR 08/06/2018 71     Amph/Meth UR 08/06/2018 Negative     Barbiturate Ur 08/06/2018 Negative     Benzodiazepine Urine 08/06/2018 Positive*    Cocaine Urine 08/06/2018 Negative     Methadone Urine 08/06/2018 Negative     Opiate Urine 08/06/2018 Negative     PCP Ur 08/06/2018 Negative     THC Urine 08/06/2018 Negative     EXTBreath Alcohol 08/06/2018 0 000     Color, UA 08/07/2018 Orange     Clarity, UA 08/07/2018 Clear     Specific Gravity, UA 08/07/2018 >=1 030     pH, UA 08/07/2018 7 0     Leukocytes, UA 08/07/2018 Negative     Nitrite, UA 08/07/2018 Negative     Protein, UA 08/07/2018 Trace*    Glucose, UA 08/07/2018 Negative     Ketones, UA 08/07/2018 Negative     Urobilinogen, UA 08/07/2018 0 2     Bilirubin, UA 08/07/2018 Interference- unable to analyze*    Blood, UA 08/07/2018 Negative     Cholesterol 08/07/2018 220*    Triglycerides 08/07/2018 113     HDL, Direct 08/07/2018 47     LDL Calculated 08/07/2018 150*    Non-HDL-Chol (CHOL-HDL) 08/07/2018 173     TSH 3RD GENERATON 08/07/2018 1 789     Hemoglobin A1C 08/07/2018 5 1     EAG 08/07/2018 100     RPR 08/07/2018 Non-Reactive     RBC, UA 08/07/2018 None Seen     WBC, UA 08/07/2018 0-1*    Epithelial Cells 08/07/2018 Occasional     Bacteria, UA 08/07/2018 Occasional     MUCOUS THREADS 08/07/2018 Innumerable*    Color, UA 08/11/2018 Orange     Clarity, UA 08/11/2018 Clear     Specific Gravity, UA 08/11/2018 1 025     pH, UA 08/11/2018 7 5     Leukocytes, UA 08/11/2018 Negative     Nitrite, UA 08/11/2018 Positive*    Protein, UA 08/11/2018 30 (1+)*    Glucose, UA 08/11/2018 100 (1/10%)*    Ketones, UA 08/11/2018 Trace*    Urobilinogen, UA 08/11/2018 2 0*    Bilirubin, UA 08/11/2018 Interference- unable to analyze*    Blood, UA 08/11/2018 Negative     RBC, UA 08/11/2018 2-4*    WBC, UA 08/11/2018 4-10*    Epithelial Cells 08/11/2018 Occasional     Bacteria, UA 08/11/2018 Occasional     COARSE GRANULAR CASTS 08/11/2018 3-5     OTHER OBSERVATIONS 08/11/2018 Glitter Cells  Transitional Epithelial Cells     MUCOUS THREADS 08/11/2018 Moderate*       Psychiatric Review of Systems:  Behavior over the last 24 hours:  Slow improvement  Sleep: insomnia  Appetite: normal  Medication side effects: No  ROS: no complaints    Mental Status Evaluation:  Appearance:  casually dressed   Behavior:  guarded   Speech:  loud and soft   Mood:  angry, anxious and depressed   Affect:  increased in range   Language naming objects and repeating phrases   Thought Process:  circumstantial   Thought Content:  delusions  grandiose   Perceptual Disturbances: None   Risk Potential: Suicidal Ideations without plan, Homicidal Ideations none and Potential for Aggression No   Sensorium:  person and place   Cognition:  grossly intact   Consciousness:  awake    Attention: attention span appeared shorter than expected for age   Insight:  limited   Judgment: limited   Intellect fair   Gait/Station: normal gait/station   Motor Activity: no abnormal movements     Memory: Short and long term memory  fair     Progress Toward Goals: progressing    Recommended Treatment:   Increase olanzapine to 20 mg at HS for psychosis, mood stabilization and persistence of insomnia  Increase gabapentin to 400 mg a m -300 mg afternoon and 400 mg after dinner for mood stabilization  Agreed with plan of not increasing the dose of clonazepam further    Continue with group therapy, milieu therapy and occupational therapy  Continue following current medications:   Current Facility-Administered Medications:  acetaminophen 650 mg Oral Q6H PRN Yojana Martha, PA-C   aluminum-magnesium hydroxide-simethicone 30 mL Oral Q4H PRN Yojana Martha, PA-C   benztropine 1 mg Intramuscular Q6H PRN Yojana Martha, PA-C   benztropine 1 mg Oral Q6H PRN Yojana Martha, PA-C   clonazePAM 1 mg Oral BID [facility]   DULoxetine 30 mg Oral Daily [facility]   fentaNYL 75 mcg Transdermal Q72H [facility]   gabapentin 300 mg Oral After Lunch [facility]   gabapentin 400 mg Oral BID [facility]   hydrOXYzine HCL 50 mg Oral Q6H PRN [facility]   LORazepam 2 mg Intramuscular Q8H PRN Yojana Martha, PA-C   magnesium hydroxide 30 mL Oral Daily PRN Yojana Martha, PA-C   nitrofurantoin 100 mg Oral BID With Meals Lorelei Myers MD   OLANZapine 5 mg Intramuscular Q8H PRN Yojana Martha, PA-C   OLANZapine 20 mg Oral HS [facility]   OLANZapine 5 mg Oral Q8H PRN Yojana Martha, PA-C   omeprazole 20 mg Oral Early Morning [facility]   ondansetron 4 mg Oral Q6H PRN [facility]   pantoprazole 40 mg Oral Early Morning [facility]   zolpidem 10 mg Oral HS PRN Yojana Martha, PA-C       Risks, benefits and possible side effects of Medications:   Risks, benefits, and possible side effects of medications explained to patient and patient verbalizes understanding  This note has been constructed using a voice recognition system  There may be translation, syntax,  or grammatical errors  If you have any questions, please contact the dictating provider

## 2018-08-14 PROCEDURE — 99232 SBSQ HOSP IP/OBS MODERATE 35: CPT | Performed by: PSYCHIATRY & NEUROLOGY

## 2018-08-14 RX ADMIN — GABAPENTIN 300 MG: 300 CAPSULE ORAL at 13:58

## 2018-08-14 RX ADMIN — PANTOPRAZOLE SODIUM 40 MG: 40 TABLET, DELAYED RELEASE ORAL at 06:22

## 2018-08-14 RX ADMIN — NITROFURANTOIN MONOHYDRATE AND NITROFURANTOIN MACROCRYSTALLINE 100 MG: 75; 25 CAPSULE ORAL at 17:18

## 2018-08-14 RX ADMIN — ACETAMINOPHEN 650 MG: 325 TABLET, FILM COATED ORAL at 00:32

## 2018-08-14 RX ADMIN — ACETAMINOPHEN 650 MG: 325 TABLET, FILM COATED ORAL at 13:58

## 2018-08-14 RX ADMIN — NITROFURANTOIN MONOHYDRATE AND NITROFURANTOIN MACROCRYSTALLINE 100 MG: 75; 25 CAPSULE ORAL at 09:31

## 2018-08-14 RX ADMIN — GABAPENTIN 400 MG: 400 CAPSULE ORAL at 17:18

## 2018-08-14 RX ADMIN — HYDROXYZINE HYDROCHLORIDE 50 MG: 50 TABLET, FILM COATED ORAL at 13:58

## 2018-08-14 RX ADMIN — ZOLPIDEM TARTRATE 10 MG: 5 TABLET ORAL at 21:01

## 2018-08-14 RX ADMIN — ALUMINUM HYDROXIDE, MAGNESIUM HYDROXIDE, AND SIMETHICONE 30 ML: 200; 200; 20 SUSPENSION ORAL at 22:06

## 2018-08-14 RX ADMIN — DULOXETINE 30 MG: 30 CAPSULE, DELAYED RELEASE ORAL at 09:31

## 2018-08-14 RX ADMIN — CLONAZEPAM 1 MG: 1 TABLET ORAL at 09:31

## 2018-08-14 RX ADMIN — CLONAZEPAM 1 MG: 1 TABLET ORAL at 17:18

## 2018-08-14 RX ADMIN — ACETAMINOPHEN 650 MG: 325 TABLET, FILM COATED ORAL at 21:03

## 2018-08-14 RX ADMIN — GABAPENTIN 400 MG: 400 CAPSULE ORAL at 09:31

## 2018-08-14 RX ADMIN — OLANZAPINE 20 MG: 10 TABLET, FILM COATED ORAL at 21:00

## 2018-08-14 NOTE — CASE MANAGEMENT
CW received a return call from Pt's wife, Kailash Youssef, who reported that she last spoke with him on Saturday, when she dropped some items off for him  She said that he seemed a little shaky, and was still talking about his [address]  He also said he was having some medical issues  CW reported that Pt was scheduled for discharge tomorrow and inquired about transportation  Kailash Mikael said that she could not provide transportation until after 6 PM   She then said she thought she had someone that may be able to help and she would call them & get back to CW within half an hour  CW said that she would also encourage Pt to contact Kailash Youssef, so she could provide feedback if she felt he was at baseline

## 2018-08-14 NOTE — PROGRESS NOTES
Asked to speak with this writer again  Continues to report his unhappiness with his treatment  Mentioned he can hear everything from the nurses station and that is the reason for closing his door  Continues paranoia and believes all the staff are speaking about him and plans to report the staff

## 2018-08-14 NOTE — PROGRESS NOTES
Patient resting in bed wearing an eye mask  Staff asked patient to hand over eye mask which patient refused  Loud and irritable  Stated every time he has come to this hospital he has received an eye mask  His healthy has deterated while on this unit due to his door being opened every five minutes and not allowing him to rest  This is his worst hospital stay  Explained to patient he is unable to have items with strings per unit rules but he demanded to see eye masks on the list in writing  Patient informed this writer she would be written up  Strings cut from mask and mask returned

## 2018-08-14 NOTE — CASE MANAGEMENT
CW met with Pt who was resting in bed  CW asked if Pt would be up to calling his wife, and he said that he spoke to her about 10 minutes ago  CW & Pt discussed planned discharge for tomorrow  CW reviewed Pt is scheduled at [facility] on 8/21 at 11:30 AM   Pt said that he thinks he is going to schedule himself an appointment at a private practice, "as it is about time that I did this"  CW reviewed the options she found for in-network providers & reported information on [facility] was placed in his discharge folder  Pt reported he had been hopeful to go home today, but was agreeable with tomorrow & questioned what time he would leave  CW reported she was waiting to hear back from his wife, as she was working to arrange transportation        CW received voicemail from Pt's wife, stating she would  Pt tomorrow on her lunch break around 1:30 PM

## 2018-08-14 NOTE — PROGRESS NOTES
OOB for meals and medications  Refusing to attend nursing education group  Reports improved mood  Spoke about neglecting himself for the past two years and slowly feeling an improvement  Hopeful for discharge on Wednesday

## 2018-08-14 NOTE — PROGRESS NOTES
Pt irritable the first half of night shift  C/o he cannot sleep due to roommate walking into room and "sitting at the end of his bed, shaking his leg  It distracts me and I cant sleep now " pt requested the quiet room and was offered however then pt asked for Ipad out of his locker  Explained he would not be able to use iPad overnight  Pt states "I am allowed  The Doctor gave me special privileges because I work for [employer] and pissed razor blades  There is a lot more but you get the point " Explained we encourage all pt to sleep at night so he would be unable to use iPad overnight  Pt became irritable stating "well then I guess it's a good thing I am leaving tomorrow because this is just ridiculous " Pt returned to room for short period before requesting quiet room again  Closed door  Pt was explained that door have to remain open in which pt states "Not much of a quiet room then " pt laid down for approximately 30 minutes and then approached desk asking if staff were talking about him even though staff were not talking at this time  Pt states "I hear everything but I guess maybe I heard the wrong things " pt returned to quiet room at approximately 0400 and appears to be sleeping  since this time

## 2018-08-14 NOTE — CASE MANAGEMENT
CW contacted Pt's wife, Mayco Del Toro, @ [phone], however, reached voicemail  CW left a message, seeking a return call  CW use the internet to search for providers in-network with [employer]  Within a 10 mile radius was [employer], [employer], JOSEPH Miranda

## 2018-08-14 NOTE — PLAN OF CARE
Problem: DISCHARGE PLANNING  Goal: Discharge to home or other facility with appropriate resources  INTERVENTIONS:  - Identify barriers to discharge w/patient and caregiver  - Arrange for needed discharge resources and transportation as appropriate  - Identify discharge learning needs (meds, wound care, etc )  - Arrange for interpretive services to assist at discharge as needed  - Refer to Case Management Department for coordinating discharge planning if the patient needs post-hospital services based on physician/advanced practitioner order or complex needs related to functional status, cognitive ability, or social support system    Outcome: Progressing  Pt is scheduled to follow-up at [facility], however, has stated he may schedule himself an appointment with a new psychiatrist   He is verbalizing readiness for discharge tomorrow

## 2018-08-14 NOTE — PLAN OF CARE
Problem: DISCHARGE PLANNING  Goal: Discharge to home or other facility with appropriate resources  INTERVENTIONS:  - Identify barriers to discharge w/patient and caregiver  - Arrange for needed discharge resources and transportation as appropriate  - Identify discharge learning needs (meds, wound care, etc )  - Arrange for interpretive services to assist at discharge as needed  - Refer to Case Management Department for coordinating discharge planning if the patient needs post-hospital services based on physician/advanced practitioner order or complex needs related to functional status, cognitive ability, or social support system    Outcome: Progressing      Problem: Ineffective Coping  Goal: Identifies ineffective coping skills  Outcome: Progressing    Goal: Identifies healthy coping skills  Outcome: Progressing    Goal: Demonstrates healthy coping skills  Outcome: Progressing    Goal: Participates in unit activities  Interventions:  - Provide therapeutic environment   - Provide required programming   - Redirect inappropriate behaviors    Outcome: Progressing    Goal: Patient/Family participate in treatment and DC plans  Interventions:  - Provide therapeutic environment   Outcome: Progressing    Goal: Patient/Family verbalizes awareness of resources  Outcome: Progressing      Problem: Depression  Goal: Treatment Goal: Demonstrate behavioral control of depressive symptoms, verbalize feelings of improved mood/affect, and adopt new coping skills prior to discharge  Outcome: Progressing    Goal: Verbalize thoughts and feelings  Interventions:  - Assess and re-assess patient's level of risk   - Engage patient in 1:1 interactions, daily, for a minimum of 15 minutes   - Encourage patient to express feelings, fears, frustrations, hopes    Outcome: Progressing    Goal: Refrain from harming self  Interventions:  - Monitor patient closely, per order   - Supervise medication ingestion, monitor effects and side effects Outcome: Progressing    Goal: Refrain from isolation  Interventions:  - Develop a trusting relationship   - Encourage socialization    Outcome: Progressing    Goal: Refrain from self-neglect  Outcome: Progressing    Goal: Attend and participate in unit activities, including therapeutic, recreational, and educational groups  Interventions:  - Provide therapeutic and educational activities daily, encourage attendance and participation, and document same in the medical record    Outcome: Progressing    Goal: Complete daily ADLs, including personal hygiene independently, as able  Interventions:  - Observe, teach, and assist patient with ADLS  -  Monitor and promote a balance of rest/activity, with adequate nutrition and elimination    Outcome: Progressing      Problem: Anxiety  Goal: Anxiety is at manageable level  Interventions:  - Assess and monitor patient's anxiety level  - Monitor for signs and symptoms of anxiety both physical and emotional (heart palpitations, chest pain, shortness of breath, headaches, nausea, feeling jumpy, restlessness, irritable, apprehensive)  - Collaborate with interdisciplinary team and initiate plan and interventions as ordered  - [place] patient to unit/surroundings  - Explain treatment plan  - Encourage participation in care  - Encourage verbalization of concerns/fears  - Identify coping mechanisms  - Assist in developing anxiety-reducing skills  - Administer/offer alternative therapies  - Limit or eliminate stimulants   Outcome: Progressing      Problem: SAFETY ADULT  Goal: Patient will remain free of falls  INTERVENTIONS:  - Assess patient frequently for physical needs  -  Identify cognitive and physical deficits and behaviors that affect risk of falls    -  [place] fall precautions as indicated by assessment   - Educate patient/family on patient safety including physical limitations  - Instruct patient to call for assistance with activity based on assessment  - Modify environment to reduce risk of injury  - Consider OT/PT consult to assist with strengthening/mobility   Outcome: Progressing

## 2018-08-14 NOTE — PROGRESS NOTES
Progress Note - Linda Iyer R Sovocool 51 y o  male MRN: [id number]  Unit/Bed#: [facility] 258-02 Encounter: [id number]    Assessment/Plan   Principal Problem:    Bipolar disorder, current episode mixed, moderate (HCC)  Active Problems:    Generalized anxiety disorder      Subjective:  Patient reports feeling overall improved and is hopeful for discharge  Does have obsessive thoughts and negative thinking over reduced Klonopin dosing  Reports anxiety is very high, but does not appear to be in distress  States depression is less with resolution of suicidal thoughts  Focused on chronic pain and poor beds in hospital causing poor sleep  Requires PRN Ambien  States mood will get worse if he has to stay longer  Does not appear to show signs of roderick  Was not labile or irritable with examiner  Reports psychosis is resolved and denied delusional material   Does say he has some restlessness at times, but is not open to medications for side effects  Is medication compliant  Not drowsy with recent Zyprexa dosage increase  Tolerating medications well without serious side effects        Current Medications:  Current Facility-Administered Medications   Medication Dose Route Frequency    acetaminophen (TYLENOL) tablet 650 mg  650 mg Oral Q6H PRN    aluminum-magnesium hydroxide-simethicone (MYLANTA) 200-200-20 mg/5 mL oral suspension 30 mL  30 mL Oral Q4H PRN    benztropine (COGENTIN) injection 1 mg  1 mg Intramuscular Q6H PRN    benztropine (COGENTIN) tablet 1 mg  1 mg Oral Q6H PRN    clonazePAM (KlonoPIN) tablet 1 mg  1 mg Oral BID    DULoxetine (CYMBALTA) delayed release capsule 30 mg  30 mg Oral Daily    fentaNYL (DURAGESIC) 75 mcg/hr TD 72 hr patch 75 mcg  75 mcg Transdermal Q72H    gabapentin (NEURONTIN) capsule 300 mg  300 mg Oral After Lunch    gabapentin (NEURONTIN) capsule 400 mg  400 mg Oral BID    hydrOXYzine HCL (ATARAX) tablet 50 mg  50 mg Oral Q6H PRN    LORazepam (ATIVAN) 2 mg/mL injection 2 mg  2 mg Intramuscular Q8H PRN    magnesium hydroxide (MILK OF MAGNESIA) 400 mg/5 mL oral suspension 30 mL  30 mL Oral Daily PRN    nitrofurantoin (MACROBID) extended-release capsule 100 mg  100 mg Oral BID With Meals    OLANZapine (ZyPREXA) IM injection 5 mg  5 mg Intramuscular Q8H PRN    OLANZapine (ZyPREXA) tablet 20 mg  20 mg Oral HS    OLANZapine (ZyPREXA) tablet 5 mg  5 mg Oral Q8H PRN    omeprazole (PriLOSEC) delayed release capsule 20 mg  20 mg Oral Early Morning    ondansetron (ZOFRAN-ODT) dispersible tablet 4 mg  4 mg Oral Q6H PRN    pantoprazole (PROTONIX) EC tablet 40 mg  40 mg Oral Early Morning    zolpidem (AMBIEN) tablet 10 mg  10 mg Oral HS PRN       Behavioral Health Medications: all current active meds have been reviewed and continue current psychiatric medications  Vitals:  Vitals:    08/14/18 0929   BP: 120/61   Pulse: (!) 121   Resp:    Temp:    SpO2:        Laboratory results:    I have personally reviewed all pertinent laboratory/tests results    Most Recent Labs:   Lab Results   Component Value Date    WBC 4 63 08/06/2018    RBC 5 07 08/06/2018    HGB 13 7 08/06/2018    HCT 41 9 08/06/2018     08/06/2018    RDW 13 2 08/06/2018    NEUTROABS 2 48 08/06/2018     (L) 08/06/2018    K 3 7 08/06/2018    CL 99 (L) 08/06/2018    CO2 24 08/06/2018    BUN 14 08/06/2018    CREATININE 1 20 08/06/2018    GLUCOSE 132 08/06/2018    CALCIUM 9 3 08/06/2018    AST 38 08/06/2018    ALT 38 08/06/2018    ALKPHOS 227 (H) 08/06/2018    PROT 8 0 08/06/2018    BILITOT 0 80 08/06/2018    CHOL 220 (H) 08/07/2018    HDL 47 08/07/2018    TRIG 113 08/07/2018    LDLCALC 150 (H) 08/07/2018    AMMONIA 32 07/18/2015    [id number] 1 789 08/07/2018    RPR Non-Reactive 08/07/2018       Psychiatric Review of Systems:  Behavior over the last 24 hours:  improved  Sleep: poor  Appetite: normal  Medication side effects: slight restlessness  ROS: chronic pain    Mental Status Evaluation:  Appearance:  disheveled   Behavior:  guarded   Speech:  soft   Mood:  less depressed and anxious   Affect:  mood-congruent   Language appropriate   Thought Process:  circumstantial   Thought Content:  obsessions   Perceptual Disturbances: None   Risk Potential: Denied SI/HI  Potential for aggression: no   Sensorium:  person, place and time/date   Cognition:  grossly intact   Consciousness:  awake    Recent and Remote Memory intact   Attention: attention span appeared shorter than expected for age   Insight:  partial   Judgment: fair   Gait/Station: slow   Motor Activity: no abnormal movements     Progress Toward Goals: slow progression    Recommended Treatment: Continue with group therapy, milieu therapy and occupational therapy  1   Continue current medications  2  Disposition planning with tentative discharge tomorrow    Risks, benefits and possible side effects of Medications:   Risks, benefits, and possible side effects of medications explained to patient and patient verbalizes understanding        Lindy Day PA-C

## 2018-08-15 ENCOUNTER — APPOINTMENT (EMERGENCY)
Dept: RADIOLOGY | Facility: HOSPITAL | Age: 48
DRG: 444 | End: 2018-08-15
Payer: COMMERCIAL

## 2018-08-15 ENCOUNTER — HOSPITAL ENCOUNTER (INPATIENT)
Facility: HOSPITAL | Age: 48
LOS: 8 days | Discharge: HOME | DRG: 444 | End: 2018-08-23
Attending: EMERGENCY MEDICINE | Admitting: FAMILY MEDICINE
Payer: COMMERCIAL

## 2018-08-15 VITALS
TEMPERATURE: 98 F | DIASTOLIC BLOOD PRESSURE: 77 MMHG | OXYGEN SATURATION: 99 % | RESPIRATION RATE: 15 BRPM | SYSTOLIC BLOOD PRESSURE: 136 MMHG | HEART RATE: 98 BPM | WEIGHT: 172.1 LBS | HEIGHT: 66 IN | BODY MASS INDEX: 27.66 KG/M2

## 2018-08-15 DIAGNOSIS — F11.20 OPIOID DEPENDENCE (HCC): ICD-10-CM

## 2018-08-15 DIAGNOSIS — R74.8 ELEVATED LIVER ENZYMES: Primary | ICD-10-CM

## 2018-08-15 DIAGNOSIS — R17 SERUM TOTAL BILIRUBIN ELEVATED: ICD-10-CM

## 2018-08-15 DIAGNOSIS — F31.62 BIPOLAR DISORDER, CURRENT EPISODE MIXED, MODERATE (HCC): ICD-10-CM

## 2018-08-15 DIAGNOSIS — R74.8 ELEVATED ALKALINE PHOSPHATASE LEVEL: ICD-10-CM

## 2018-08-15 DIAGNOSIS — G89.29 CHRONIC PAIN: ICD-10-CM

## 2018-08-15 DIAGNOSIS — R10.9 ABDOMINAL PAIN: ICD-10-CM

## 2018-08-15 DIAGNOSIS — G93.40 ENCEPHALOPATHY: ICD-10-CM

## 2018-08-15 PROBLEM — R11.10 VOMITING: Status: ACTIVE | Noted: 2018-08-15

## 2018-08-15 PROBLEM — G89.4 CHRONIC PAIN DISORDER: Status: ACTIVE | Noted: 2018-08-15

## 2018-08-15 PROBLEM — K21.9 GERD (GASTROESOPHAGEAL REFLUX DISEASE): Status: ACTIVE | Noted: 2018-08-15

## 2018-08-15 PROBLEM — R10.11 RIGHT UPPER QUADRANT ABDOMINAL PAIN: Status: ACTIVE | Noted: 2018-08-15

## 2018-08-15 PROBLEM — R10.84 GENERALIZED ABDOMINAL PAIN: Status: ACTIVE | Noted: 2018-08-15

## 2018-08-15 PROBLEM — R39.9 LOWER URINARY TRACT SYMPTOMS: Status: ACTIVE | Noted: 2018-08-15

## 2018-08-15 LAB
ALBUMIN SERPL BCP-MCNC: 4.1 G/DL (ref 3.5–5)
ALP SERPL-CCNC: 556 U/L (ref 46–116)
ALT SERPL W P-5'-P-CCNC: 518 U/L (ref 12–78)
ANION GAP SERPL CALCULATED.3IONS-SCNC: 9 MMOL/L (ref 4–13)
AST SERPL W P-5'-P-CCNC: 575 U/L (ref 5–45)
BASOPHILS # BLD AUTO: 0.02 THOUSANDS/ΜL (ref 0–0.1)
BASOPHILS NFR BLD AUTO: 0 % (ref 0–1)
BILIRUB DIRECT SERPL-MCNC: 5.06 MG/DL (ref 0–0.2)
BILIRUB SERPL-MCNC: 6.19 MG/DL (ref 0.2–1)
BILIRUB UR QL STRIP: ABNORMAL
BUN SERPL-MCNC: 10 MG/DL (ref 5–25)
CALCIUM SERPL-MCNC: 9.3 MG/DL (ref 8.3–10.1)
CHLORIDE SERPL-SCNC: 100 MMOL/L (ref 100–108)
CLARITY UR: CLEAR
CLARITY, POC: CLEAR
CO2 SERPL-SCNC: 27 MMOL/L (ref 21–32)
COLOR UR: YELLOW
COLOR, POC: YELLOW
CREAT SERPL-MCNC: 1.08 MG/DL (ref 0.6–1.3)
EOSINOPHIL # BLD AUTO: 0.01 THOUSAND/ΜL (ref 0–0.61)
EOSINOPHIL NFR BLD AUTO: 0 % (ref 0–6)
ERYTHROCYTE [DISTWIDTH] IN BLOOD BY AUTOMATED COUNT: 13.5 % (ref 11.6–15.1)
GFR SERPL CREATININE-BSD FRML MDRD: 80 ML/MIN/1.73SQ M
GLUCOSE SERPL-MCNC: 134 MG/DL (ref 65–140)
GLUCOSE UR STRIP-MCNC: NEGATIVE MG/DL
HCT VFR BLD AUTO: 38.6 % (ref 36.5–49.3)
HGB BLD-MCNC: 12.8 G/DL (ref 12–17)
HGB UR QL STRIP.AUTO: NEGATIVE
IMM GRANULOCYTES # BLD AUTO: 0.03 THOUSAND/UL (ref 0–0.2)
IMM GRANULOCYTES NFR BLD AUTO: 0 % (ref 0–2)
KETONES UR STRIP-MCNC: NEGATIVE MG/DL
LEUKOCYTE ESTERASE UR QL STRIP: NEGATIVE
LIPASE SERPL-CCNC: 148 U/L (ref 73–393)
LYMPHOCYTES # BLD AUTO: 1.14 THOUSANDS/ΜL (ref 0.6–4.47)
LYMPHOCYTES NFR BLD AUTO: 15 % (ref 14–44)
MCH RBC QN AUTO: 26.8 PG (ref 26.8–34.3)
MCHC RBC AUTO-ENTMCNC: 33.2 G/DL (ref 31.4–37.4)
MCV RBC AUTO: 81 FL (ref 82–98)
MONOCYTES # BLD AUTO: 0.81 THOUSAND/ΜL (ref 0.17–1.22)
MONOCYTES NFR BLD AUTO: 11 % (ref 4–12)
NEUTROPHILS # BLD AUTO: 5.69 THOUSANDS/ΜL (ref 1.85–7.62)
NEUTS SEG NFR BLD AUTO: 74 % (ref 43–75)
NITRITE UR QL STRIP: NEGATIVE
NRBC BLD AUTO-RTO: 0 /100 WBCS
PH UR STRIP.AUTO: 8.5 [PH] (ref 4.5–8)
PLATELET # BLD AUTO: 249 THOUSANDS/UL (ref 149–390)
PMV BLD AUTO: 9.9 FL (ref 8.9–12.7)
POTASSIUM SERPL-SCNC: 3.5 MMOL/L (ref 3.5–5.3)
PROT SERPL-MCNC: 8 G/DL (ref 6.4–8.2)
PROT UR STRIP-MCNC: NEGATIVE MG/DL
RBC # BLD AUTO: 4.78 MILLION/UL (ref 3.88–5.62)
SODIUM SERPL-SCNC: 136 MMOL/L (ref 136–145)
SP GR UR STRIP.AUTO: 1.01 (ref 1–1.03)
TROPONIN I SERPL-MCNC: <0.02 NG/ML
UROBILINOGEN UR QL STRIP.AUTO: 0.2 E.U./DL
WBC # BLD AUTO: 7.7 THOUSAND/UL (ref 4.31–10.16)

## 2018-08-15 PROCEDURE — 99239 HOSP IP/OBS DSCHRG MGMT >30: CPT | Performed by: PSYCHIATRY & NEUROLOGY

## 2018-08-15 PROCEDURE — 80053 COMPREHEN METABOLIC PANEL: CPT | Performed by: EMERGENCY MEDICINE

## 2018-08-15 PROCEDURE — 85025 COMPLETE CBC W/AUTO DIFF WBC: CPT | Performed by: EMERGENCY MEDICINE

## 2018-08-15 PROCEDURE — 82248 BILIRUBIN DIRECT: CPT

## 2018-08-15 PROCEDURE — 81003 URINALYSIS AUTO W/O SCOPE: CPT

## 2018-08-15 PROCEDURE — 83690 ASSAY OF LIPASE: CPT | Performed by: EMERGENCY MEDICINE

## 2018-08-15 PROCEDURE — 93005 ELECTROCARDIOGRAM TRACING: CPT

## 2018-08-15 PROCEDURE — 99222 1ST HOSP IP/OBS MODERATE 55: CPT | Performed by: NURSE PRACTITIONER

## 2018-08-15 PROCEDURE — 96360 HYDRATION IV INFUSION INIT: CPT

## 2018-08-15 PROCEDURE — 96361 HYDRATE IV INFUSION ADD-ON: CPT

## 2018-08-15 PROCEDURE — 74177 CT ABD & PELVIS W/CONTRAST: CPT

## 2018-08-15 PROCEDURE — 71045 X-RAY EXAM CHEST 1 VIEW: CPT

## 2018-08-15 PROCEDURE — 36415 COLL VENOUS BLD VENIPUNCTURE: CPT | Performed by: EMERGENCY MEDICINE

## 2018-08-15 PROCEDURE — 99285 EMERGENCY DEPT VISIT HI MDM: CPT

## 2018-08-15 PROCEDURE — 84484 ASSAY OF TROPONIN QUANT: CPT | Performed by: EMERGENCY MEDICINE

## 2018-08-15 RX ORDER — ZOLPIDEM TARTRATE 10 MG/1
10 TABLET ORAL
Qty: 7 TABLET | Refills: 0 | Status: SHIPPED | OUTPATIENT
Start: 2018-08-15 | End: 2018-09-13 | Stop reason: ALTCHOICE

## 2018-08-15 RX ORDER — GABAPENTIN 400 MG/1
400 CAPSULE ORAL 2 TIMES DAILY
Qty: 14 CAPSULE | Refills: 0 | Status: SHIPPED | OUTPATIENT
Start: 2018-08-15 | End: 2018-08-23 | Stop reason: HOSPADM

## 2018-08-15 RX ORDER — FENTANYL 75 UG/H
PATCH TRANSDERMAL
Qty: 1 PATCH | Refills: 0 | Status: SHIPPED | OUTPATIENT
Start: 2018-08-15 | End: 2018-09-13 | Stop reason: ALTCHOICE

## 2018-08-15 RX ORDER — NITROFURANTOIN 25; 75 MG/1; MG/1
100 CAPSULE ORAL 2 TIMES DAILY
Status: DISCONTINUED | OUTPATIENT
Start: 2018-08-16 | End: 2018-08-16

## 2018-08-15 RX ORDER — PANTOPRAZOLE SODIUM 20 MG/1
20 TABLET, DELAYED RELEASE ORAL
Status: DISCONTINUED | OUTPATIENT
Start: 2018-08-16 | End: 2018-08-23 | Stop reason: HOSPADM

## 2018-08-15 RX ORDER — OLANZAPINE 20 MG/1
20 TABLET ORAL
Qty: 7 TABLET | Refills: 0 | Status: SHIPPED | OUTPATIENT
Start: 2018-08-15 | End: 2018-09-13 | Stop reason: ALTCHOICE

## 2018-08-15 RX ORDER — CLONAZEPAM 1 MG/1
1 TABLET ORAL 2 TIMES DAILY
Status: DISCONTINUED | OUTPATIENT
Start: 2018-08-16 | End: 2018-08-19

## 2018-08-15 RX ORDER — OLANZAPINE 10 MG/1
20 TABLET ORAL
Status: DISCONTINUED | OUTPATIENT
Start: 2018-08-15 | End: 2018-08-23 | Stop reason: HOSPADM

## 2018-08-15 RX ORDER — NITROFURANTOIN 25; 75 MG/1; MG/1
100 CAPSULE ORAL 2 TIMES DAILY
Qty: 8 CAPSULE | Refills: 0 | Status: SHIPPED | OUTPATIENT
Start: 2018-08-15 | End: 2018-08-23 | Stop reason: HOSPADM

## 2018-08-15 RX ORDER — SUCRALFATE 1 G/1
1 TABLET ORAL 4 TIMES DAILY PRN
Qty: 20 TABLET | Refills: 0 | Status: SHIPPED | OUTPATIENT
Start: 2018-08-15 | End: 2018-09-13 | Stop reason: ALTCHOICE

## 2018-08-15 RX ORDER — HYDROXYZINE PAMOATE 50 MG/1
50 CAPSULE ORAL 3 TIMES DAILY PRN
COMMUNITY
End: 2018-09-13 | Stop reason: ALTCHOICE

## 2018-08-15 RX ORDER — SODIUM CHLORIDE 9 MG/ML
75 INJECTION, SOLUTION INTRAVENOUS CONTINUOUS
Status: DISCONTINUED | OUTPATIENT
Start: 2018-08-15 | End: 2018-08-18

## 2018-08-15 RX ORDER — GABAPENTIN 300 MG/1
300 CAPSULE ORAL
Qty: 7 CAPSULE | Refills: 0 | Status: SHIPPED | OUTPATIENT
Start: 2018-08-15 | End: 2018-08-23 | Stop reason: HOSPADM

## 2018-08-15 RX ORDER — FENTANYL 75 UG/H
1 PATCH TRANSDERMAL
Status: DISCONTINUED | OUTPATIENT
Start: 2018-08-18 | End: 2018-08-21

## 2018-08-15 RX ORDER — CLONAZEPAM 1 MG/1
1 TABLET ORAL 2 TIMES DAILY
Qty: 14 TABLET | Refills: 0 | Status: SHIPPED | OUTPATIENT
Start: 2018-08-15 | End: 2018-08-23 | Stop reason: HOSPADM

## 2018-08-15 RX ORDER — DULOXETIN HYDROCHLORIDE 30 MG/1
30 CAPSULE, DELAYED RELEASE ORAL DAILY
Qty: 7 CAPSULE | Refills: 0 | Status: SHIPPED | OUTPATIENT
Start: 2018-08-16 | End: 2018-08-23 | Stop reason: HOSPADM

## 2018-08-15 RX ORDER — ZOLPIDEM TARTRATE 5 MG/1
10 TABLET ORAL
Status: DISCONTINUED | OUTPATIENT
Start: 2018-08-15 | End: 2018-08-23 | Stop reason: HOSPADM

## 2018-08-15 RX ORDER — GABAPENTIN 300 MG/1
300 CAPSULE ORAL
Status: DISCONTINUED | OUTPATIENT
Start: 2018-08-16 | End: 2018-08-21

## 2018-08-15 RX ORDER — SUCRALFATE 1 G/1
1 TABLET ORAL 4 TIMES DAILY PRN
Status: DISCONTINUED | OUTPATIENT
Start: 2018-08-15 | End: 2018-08-23 | Stop reason: HOSPADM

## 2018-08-15 RX ORDER — ONDANSETRON 2 MG/ML
4 INJECTION INTRAMUSCULAR; INTRAVENOUS EVERY 6 HOURS PRN
Status: DISCONTINUED | OUTPATIENT
Start: 2018-08-15 | End: 2018-08-23 | Stop reason: HOSPADM

## 2018-08-15 RX ORDER — GABAPENTIN 400 MG/1
400 CAPSULE ORAL 2 TIMES DAILY
Status: DISCONTINUED | OUTPATIENT
Start: 2018-08-16 | End: 2018-08-21

## 2018-08-15 RX ADMIN — SODIUM CHLORIDE 1000 ML: 0.9 INJECTION, SOLUTION INTRAVENOUS at 16:45

## 2018-08-15 RX ADMIN — ALUMINUM HYDROXIDE, MAGNESIUM HYDROXIDE, AND SIMETHICONE 30 ML: 200; 200; 20 SUSPENSION ORAL at 05:18

## 2018-08-15 RX ADMIN — SODIUM CHLORIDE 75 ML/HR: 0.9 INJECTION, SOLUTION INTRAVENOUS at 23:45

## 2018-08-15 RX ADMIN — OMEPRAZOLE 20 MG: 20 CAPSULE, DELAYED RELEASE ORAL at 07:54

## 2018-08-15 RX ADMIN — DULOXETINE 30 MG: 30 CAPSULE, DELAYED RELEASE ORAL at 09:00

## 2018-08-15 RX ADMIN — GABAPENTIN 400 MG: 400 CAPSULE ORAL at 09:00

## 2018-08-15 RX ADMIN — OLANZAPINE 20 MG: 10 TABLET, FILM COATED ORAL at 23:37

## 2018-08-15 RX ADMIN — CLONAZEPAM 1 MG: 1 TABLET ORAL at 09:00

## 2018-08-15 RX ADMIN — ZOLPIDEM TARTRATE 10 MG: 5 TABLET ORAL at 23:37

## 2018-08-15 RX ADMIN — IOHEXOL 100 ML: 350 INJECTION, SOLUTION INTRAVENOUS at 18:10

## 2018-08-15 RX ADMIN — HYDROXYZINE HYDROCHLORIDE 50 MG: 50 TABLET, FILM COATED ORAL at 04:19

## 2018-08-15 NOTE — DISCHARGE INSTR - APPOINTMENTS
Tuesday, August 21, 2018 at 11:30 AM: medication management at [facility]  If you are unable to keep this appointment, please call [phone]

## 2018-08-15 NOTE — CASE MANAGEMENT
CW met with Pt, who was resting in bed  Pt verbalized readiness for discharge, and CW reported she had received a message from Pt's wife that their  would be providing transportation around 11 AM today for Pt  Pt nodded in agreement, but then said, "I'll have to see what happens, I asked to see a medical doctor  A few days ago I pissed red blood   it could be from thoracic surgery that I had before"  Pt also said it could be because of his UTI, but that he should probably talk to a doctor  CW asked if Pt would like an appointment scheduled with his PCP, CW could see if they had any appointments this afternoon? Pt declined & said that if he needed to see his PCP, he would be able to call himself later today  CW reviewed that Pt is scheduled for medication management on 8/21 at [facility]  Pt had no questions/concerns regarding his discharge

## 2018-08-15 NOTE — PROGRESS NOTES
Requested & received ambien 10 mg po prn at 2103  Eventual effect, as observed asleep in bed by 2245

## 2018-08-15 NOTE — PROGRESS NOTES
Has had a restless night  Demanding at times  Requested & received atarax 50 mg po prn increasing anxiety at 0419  Good effect obtained, as observed  Asleep in bed within the hr

## 2018-08-15 NOTE — PROGRESS NOTES
Awake again, asking brenda be "transferred to the [facility]," as" there's something terribly wrong with my stomach " Explained that transfers need to be ordered by MD Starr 30 mls given  Effect pending
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
## 2018-08-15 NOTE — DISCHARGE INSTR - LAB
Contact Information: If you have any questions, concerns, pended studies, tests and/or procedures, or emergencies regarding your inpatient behavioral health visit  Please contact [place] [facility] [phone] and ask to speak to a , nurse or physician  A contact is available 24 hours/ 7 days a week at this number  Summary of Procedures Performed During your Stay:  Below is a list of major procedures performed during your hospital stay and a summary of results:  - No major procedures performed  Pending Studies     None        If studies are pending at discharge, follow up with your PCP and/or referring provider
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
## 2018-08-15 NOTE — DISCHARGE INSTRUCTIONS
Bipolar Disorder   WHAT YOU NEED TO KNOW:   Bipolar disorder is a long-term chemical imbalance that causes rapid changes in mood and behavior  High moods are called roderick  Low moods are called depression  Sometimes you will feel manic and sometimes you will feel depressed  You can have alternating episodes of roderick and depression  This is called a mixed bipolar state  DISCHARGE INSTRUCTIONS:   Call 911 if:   · You think about hurting yourself or someone else  Contact your healthcare provider or psychiatrist if:   · You are having trouble managing your bipolar disorder  · You cannot sleep, or are sleeping all the time  · You cannot eat, or are eating more than usual     · You feel dizzy or your stomach is upset  · You cannot make it to your next meeting  · You have questions or concerns about your condition or care  Medicines:   · Medicines  may be given to help keep your mood stable, or to help you sleep  Changes in medicine are often needed as your bipolar disorder changes  · Take your medicine as directed  Contact your healthcare provider if you think your medicine is not helping or if you have side effects  Tell him or her if you are allergic to any medicine  Keep a list of the medicines, vitamins, and herbs you take  Include the amounts, and when and why you take them  Bring the list or the pill bottles to follow-up visits  Carry your medicine list with you in case of an emergency  Follow up with your healthcare provider or psychiatrist as directed:  Write down your questions so you remember to ask them during your visits  Manage bipolar disorder:  Watch for triggers of bipolar disorder symptoms, such as stress  Learn new ways to relax, such as deep breathing, to manage your stress  Tell someone if you feel a manic or depressive period might be coming on  Ask a friend or family member to help watch you for bipolar symptoms   Work to develop skills that will help you manage bipolar disorder  You may need to make lifestyle changes  Ask your healthcare provider or psychiatrist for resources  For support and more information:   · [address] [facility]), [address] [facility], [address] [facility], [address]  [place] [address]   Phone: [phone]  Phone: [phone]  Web Address: Cristel tn  · Depression and [address] ([employer])  [address] [place] [address], [facility] , [address]  Phone: [phone]  Web Address: [employer] no  org   © 2017 [address] Information is for End User's use only and may not be sold, redistributed or otherwise used for commercial purposes  All illustrations and images included in CareNotes® are the copyrighted property of [employer]  or Dharmesh Sterling  The above information is an  only  It is not intended as medical advice for individual conditions or treatments  Talk to your doctor, nurse or pharmacist before following any medical regimen to see if it is safe and effective for you  Generalized Anxiety Disorder   WHAT YOU NEED TO KNOW:   Generalized anxiety disorder (TELLO) is a condition that causes you to feel worried or nervous for at least 6 months  The anxiety may be much more severe than the event causing it  You may not be able to do your daily activities because of the anxiety  DISCHARGE INSTRUCTIONS:   Call 911 for any of the following:   · You feel like hurting yourself or someone else  · You have chest pain, tightness, or heaviness that may spread to your shoulders, arms, jaw, neck, or back  Seek care immediately if:   · You feel like fainting or are lightheaded or too dizzy to stand up  Contact your healthcare provider if:   · You have new symptoms since your last visit  · Your anxiety keeps you from doing your daily activities, such as self-care, family, or work      · You have problems that you think may be caused by the medicine you are taking  · Your symptoms are getting worse  · You have questions or concerns about your condition or care  Medicines:   · Medicines  may be given to relieve anxiety and depression and help you feel calm and relaxed  · Take your medicine as directed  Contact your healthcare provider if you think your medicine is not helping or if you have side effects  Tell him or her if you are allergic to any medicine  Keep a list of the medicines, vitamins, and herbs you take  Include the amounts, and when and why you take them  Bring the list or the pill bottles to follow-up visits  Carry your medicine list with you in case of an emergency  Follow up with your healthcare provider as directed:  Write down your questions so you remember to ask them during your visits  Monitor your condition:  Keep a record of the situations that cause your symptoms  Bring the record with you when you see your healthcare provider  Include the following:  · What were you doing when the symptoms started? · Had you eaten anything unusual, or taken a new medicine or herbal supplement? · Were you stressed or upset during the time leading up to the attack? · How often do you have symptoms? How long do they last?    · What were your thoughts and feelings during these situations? · What symptoms did you have? · Did anything help ease or stop the symptoms, such as a relaxation technique? Self-care:   · Limit or do not drink caffeine  Caffeine can increase your heartbeat and make your anxiety symptoms worse  · Limit or do not drink alcohol  Ask your healthcare provider if alcohol is safe for you  You may not be able to drink alcohol if you take certain anxiety or depression medicines  Alcohol can also increase depression  Limit alcohol to 1 drink per day if you are a woman  Limit alcohol to 2 drinks per day if you are a man   A drink of alcohol is 12 ounces of beer, 5 ounces of wine, or 1½ ounces of liquor  · Manage stress  Stress may increase symptoms of TELLO  Relaxation therapy teaches you how to feel less physical and emotional stress  Deep breathing, muscle relaxation, and music are some forms of relaxation therapy  · Avoid hyperventilating  Some people may hyperventilate during an anxiety attack and not even notice it  Hyperventilation means that your breaths are too fast and shallow  Breathing this way can cause numbness or tingling in your hands and lips  During an anxiety attack, focus on taking very slow, deep breaths  Your healthcare provider may show you how to breathe in and out of a paper bag when you hyperventilate  Never use a plastic bag  © 2017 [address] is for End User's use only and may not be sold, redistributed or otherwise used for commercial purposes  All illustrations and images included in CareNotes® are the copyrighted property of [employer] Inc  or Dharmesh Sterling  The above information is an  only  It is not intended as medical advice for individual conditions or treatments  Talk to your doctor, nurse or pharmacist before following any medical regimen to see if it is safe and effective for you

## 2018-08-15 NOTE — NURSING NOTE
Discharge instructions reviewed with pt  Pt verbalized understanding and does not offer any questions/concerns  Pt escorted off unit via MHT  Pt's  is driving  Pt discharged in NAD

## 2018-08-16 ENCOUNTER — APPOINTMENT (INPATIENT)
Dept: RADIOLOGY | Facility: HOSPITAL | Age: 48
DRG: 444 | End: 2018-08-16
Payer: COMMERCIAL

## 2018-08-16 PROBLEM — R74.8 ELEVATED LIVER ENZYMES: Status: ACTIVE | Noted: 2018-08-16

## 2018-08-16 PROBLEM — E87.6 HYPOKALEMIA: Status: ACTIVE | Noted: 2018-08-16

## 2018-08-16 LAB
ALBUMIN SERPL BCP-MCNC: 3.4 G/DL (ref 3.5–5)
ALP SERPL-CCNC: 524 U/L (ref 46–116)
ALT SERPL W P-5'-P-CCNC: 490 U/L (ref 12–78)
ANION GAP SERPL CALCULATED.3IONS-SCNC: 6 MMOL/L (ref 4–13)
AST SERPL W P-5'-P-CCNC: 384 U/L (ref 5–45)
ATRIAL RATE: 108 BPM
BASOPHILS # BLD AUTO: 0.03 THOUSANDS/ΜL (ref 0–0.1)
BASOPHILS NFR BLD AUTO: 1 % (ref 0–1)
BILIRUB SERPL-MCNC: 9.44 MG/DL (ref 0.2–1)
BUN SERPL-MCNC: 8 MG/DL (ref 5–25)
CALCIUM SERPL-MCNC: 8.9 MG/DL (ref 8.3–10.1)
CHLORIDE SERPL-SCNC: 103 MMOL/L (ref 100–108)
CO2 SERPL-SCNC: 29 MMOL/L (ref 21–32)
CREAT SERPL-MCNC: 0.89 MG/DL (ref 0.6–1.3)
EOSINOPHIL # BLD AUTO: 0.12 THOUSAND/ΜL (ref 0–0.61)
EOSINOPHIL NFR BLD AUTO: 3 % (ref 0–6)
ERYTHROCYTE [DISTWIDTH] IN BLOOD BY AUTOMATED COUNT: 13.5 % (ref 11.6–15.1)
FERRITIN SERPL-MCNC: 745 NG/ML (ref 8–388)
GFR SERPL CREATININE-BSD FRML MDRD: 100 ML/MIN/1.73SQ M
GLUCOSE SERPL-MCNC: 95 MG/DL (ref 65–140)
HAV IGM SER QL: NORMAL
HBV CORE IGM SER QL: NORMAL
HBV SURFACE AG SER QL: NORMAL
HCT VFR BLD AUTO: 36.8 % (ref 36.5–49.3)
HCV AB SER QL: NORMAL
HGB BLD-MCNC: 12.2 G/DL (ref 12–17)
IMM GRANULOCYTES # BLD AUTO: 0.02 THOUSAND/UL (ref 0–0.2)
IMM GRANULOCYTES NFR BLD AUTO: 1 % (ref 0–2)
IRON SATN MFR SERPL: 61 %
IRON SERPL-MCNC: 146 UG/DL (ref 65–175)
LYMPHOCYTES # BLD AUTO: 1.67 THOUSANDS/ΜL (ref 0.6–4.47)
LYMPHOCYTES NFR BLD AUTO: 38 % (ref 14–44)
MCH RBC QN AUTO: 27.1 PG (ref 26.8–34.3)
MCHC RBC AUTO-ENTMCNC: 33.2 G/DL (ref 31.4–37.4)
MCV RBC AUTO: 82 FL (ref 82–98)
MONOCYTES # BLD AUTO: 0.56 THOUSAND/ΜL (ref 0.17–1.22)
MONOCYTES NFR BLD AUTO: 13 % (ref 4–12)
NEUTROPHILS # BLD AUTO: 2.01 THOUSANDS/ΜL (ref 1.85–7.62)
NEUTS SEG NFR BLD AUTO: 44 % (ref 43–75)
NRBC BLD AUTO-RTO: 0 /100 WBCS
P AXIS: 69 DEGREES
PLATELET # BLD AUTO: 203 THOUSANDS/UL (ref 149–390)
PMV BLD AUTO: 10.5 FL (ref 8.9–12.7)
POTASSIUM SERPL-SCNC: 3.1 MMOL/L (ref 3.5–5.3)
PR INTERVAL: 140 MS
PROT SERPL-MCNC: 7.3 G/DL (ref 6.4–8.2)
QRS AXIS: 76 DEGREES
QRSD INTERVAL: 80 MS
QT INTERVAL: 400 MS
QTC INTERVAL: 536 MS
RBC # BLD AUTO: 4.5 MILLION/UL (ref 3.88–5.62)
SODIUM SERPL-SCNC: 138 MMOL/L (ref 136–145)
T WAVE AXIS: 36 DEGREES
TIBC SERPL-MCNC: 240 UG/DL (ref 250–450)
VENTRICULAR RATE: 108 BPM
WBC # BLD AUTO: 4.41 THOUSAND/UL (ref 4.31–10.16)

## 2018-08-16 PROCEDURE — 80053 COMPREHEN METABOLIC PANEL: CPT | Performed by: NURSE PRACTITIONER

## 2018-08-16 PROCEDURE — 86663 EPSTEIN-BARR ANTIBODY: CPT | Performed by: PHYSICIAN ASSISTANT

## 2018-08-16 PROCEDURE — 86645 CMV ANTIBODY IGM: CPT | Performed by: PHYSICIAN ASSISTANT

## 2018-08-16 PROCEDURE — 99222 1ST HOSP IP/OBS MODERATE 55: CPT | Performed by: INTERNAL MEDICINE

## 2018-08-16 PROCEDURE — 86038 ANTINUCLEAR ANTIBODIES: CPT | Performed by: PHYSICIAN ASSISTANT

## 2018-08-16 PROCEDURE — 86235 NUCLEAR ANTIGEN ANTIBODY: CPT | Performed by: PHYSICIAN ASSISTANT

## 2018-08-16 PROCEDURE — 76705 ECHO EXAM OF ABDOMEN: CPT

## 2018-08-16 PROCEDURE — 83550 IRON BINDING TEST: CPT | Performed by: PHYSICIAN ASSISTANT

## 2018-08-16 PROCEDURE — 82103 ALPHA-1-ANTITRYPSIN TOTAL: CPT | Performed by: PHYSICIAN ASSISTANT

## 2018-08-16 PROCEDURE — 87529 HSV DNA AMP PROBE: CPT | Performed by: PHYSICIAN ASSISTANT

## 2018-08-16 PROCEDURE — 80074 ACUTE HEPATITIS PANEL: CPT | Performed by: NURSE PRACTITIONER

## 2018-08-16 PROCEDURE — 82390 ASSAY OF CERULOPLASMIN: CPT | Performed by: PHYSICIAN ASSISTANT

## 2018-08-16 PROCEDURE — 82728 ASSAY OF FERRITIN: CPT | Performed by: PHYSICIAN ASSISTANT

## 2018-08-16 PROCEDURE — 93010 ELECTROCARDIOGRAM REPORT: CPT | Performed by: INTERNAL MEDICINE

## 2018-08-16 PROCEDURE — 83540 ASSAY OF IRON: CPT | Performed by: PHYSICIAN ASSISTANT

## 2018-08-16 PROCEDURE — 99254 IP/OBS CNSLTJ NEW/EST MOD 60: CPT | Performed by: PSYCHIATRY & NEUROLOGY

## 2018-08-16 PROCEDURE — 85025 COMPLETE CBC W/AUTO DIFF WBC: CPT | Performed by: NURSE PRACTITIONER

## 2018-08-16 PROCEDURE — 86665 EPSTEIN-BARR CAPSID VCA: CPT | Performed by: PHYSICIAN ASSISTANT

## 2018-08-16 PROCEDURE — 86664 EPSTEIN-BARR NUCLEAR ANTIGEN: CPT | Performed by: PHYSICIAN ASSISTANT

## 2018-08-16 PROCEDURE — 99233 SBSQ HOSP IP/OBS HIGH 50: CPT | Performed by: PHYSICIAN ASSISTANT

## 2018-08-16 PROCEDURE — 86644 CMV ANTIBODY: CPT | Performed by: PHYSICIAN ASSISTANT

## 2018-08-16 RX ORDER — POTASSIUM CHLORIDE 20 MEQ/1
40 TABLET, EXTENDED RELEASE ORAL ONCE
Status: DISCONTINUED | OUTPATIENT
Start: 2018-08-16 | End: 2018-08-22

## 2018-08-16 RX ORDER — IBUPROFEN 400 MG/1
400 TABLET ORAL EVERY 6 HOURS PRN
Status: DISCONTINUED | OUTPATIENT
Start: 2018-08-16 | End: 2018-08-23 | Stop reason: HOSPADM

## 2018-08-16 RX ADMIN — GABAPENTIN 400 MG: 400 CAPSULE ORAL at 18:09

## 2018-08-16 RX ADMIN — ZOLPIDEM TARTRATE 10 MG: 5 TABLET ORAL at 21:50

## 2018-08-16 RX ADMIN — CLONAZEPAM 1 MG: 1 TABLET ORAL at 18:08

## 2018-08-16 RX ADMIN — OLANZAPINE 20 MG: 10 TABLET, FILM COATED ORAL at 21:46

## 2018-08-16 RX ADMIN — SUCRALFATE 1 G: 1 TABLET ORAL at 22:29

## 2018-08-16 RX ADMIN — CLONAZEPAM 1 MG: 1 TABLET ORAL at 09:35

## 2018-08-16 RX ADMIN — GABAPENTIN 300 MG: 300 CAPSULE ORAL at 14:54

## 2018-08-16 RX ADMIN — GABAPENTIN 400 MG: 400 CAPSULE ORAL at 09:35
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
## 2018-08-16 NOTE — ASSESSMENT & PLAN NOTE
· Recently diagnosed with UTI and started on Macrobid (treated x 3 days)  · D/C Macrobid given elevated LFTs  · Repeat UA benign  · Reports hematuria, unclear accuracy   F/U with urologist

## 2018-08-16 NOTE — ASSESSMENT & PLAN NOTE
· Just discharged from inpatient psych unit and presented to the hospital a few hours after discharge  · Psych following- discussed plan of care with them  · Cymbalta and Zyprexa are new medications  · Cymbalta on hold due to elevated LFTs- may need to hold Zyprexa pending workup- will re-assess tomorrow

## 2018-08-16 NOTE — ASSESSMENT & PLAN NOTE
Patient  Admitted with generalized abdominal pain, and reported self-induced vomiting  Liver enzymes elevated   and alk-phos 556  Appreciate GI for further input  Will start IV fluids  Will hold Cymbalta for now until seen by psych as side effect can be elevated LFTs seems that patient was recently started on this  Prior labs at [facility] none noted to be stable no elevated LFTs

## 2018-08-16 NOTE — ASSESSMENT & PLAN NOTE
· Abdominal pain and transaminitis  · Possibly due to new psych meds  · Also recently started on Macrobid for UTI- will d/c this as well  · F/U RUQ ultrasound, autoimmune/viral labs  · Discussed with psych and GI  For now holding Cymbalta only and continuing Zyprexa as AST/ALT did trend down overnight  Will reassess labs/imaging tomorrow and determine at that time whether Zyprexa should be discontinued as well  · GI on board  · Start clear liquid diet   Advance if tolerated

## 2018-08-16 NOTE — ASSESSMENT & PLAN NOTE
Pt just discharged from [facility] unit today due to an admission on 8/6/18 for suicidal ideations, with plans to jump out of moving car  During this admission pt was started on some new medications   - Increased Cymbalta to 30 mg qd for depression/anxiety/pain management  -[facility] : Seroquel to 700 mg HS / then started on Zyprexa and titrated slowly to 20 mg HS    - Continued on Klonopin 1 mg BID for anxiety management  - Decreased  Gabapentin from  600 mg TID for anxiety/pain mgmt , to 400 mg QD am , 300mg qd 2pm and 400mg QD in evening due to hypotensive   affect  - he did require ambien 10mg for HS prn for insomnia  - ct head was performed there for visual hallucinations: revealed: No acute intracranial abnormality

## 2018-08-16 NOTE — ASSESSMENT & PLAN NOTE
Ct abdomen and pelvis w/contrast: Bilateral lower lobe lung groundglass and streaky density could represent atelectasis and/or pneumonia  Liver noted to be unremarkable  L5/S1 fusion hardware   Grade 1 anterolisthesis of L5 on S1   - pt is tender in the right upper quadrant reports he has had pain for anywhere from 1 - 4 days, and has made himself vomit around 8 times   - cmp reveals elevated lfts' ast 575, alt 518, abd alk phos of 556  - pts medications have been adjusted in the psych unit at [facility] suspect could be a component of med affect   - Will start IV fluid hydration repeat labs in a m

## 2018-08-16 NOTE — SOCIAL WORK
CM met with patient and explained cm role  Pt alert and oriented  Pt reports he lives in ranch style home with his wife and 2 sons  Pt denies DME, VNA, and rehab  Pt reports being independent PTA, reports good support at home and in the community, he drives and will transport home with family for d/c  Pt could not recall the name of the pharmacy he uses, and said he will ask his Gweneth Sanchez and call cm  No POA  Pt denies hx/admission for drugs/etoh, reports Bipolar Disorder, and reports last psych admission 8/2018 (last week at [facility])  Pt reports he does not see a psychiatrist in the community, however he goes to [facility], and his PCP Dr Evy Rooney, for his medication management  CM reviewed d/c planning process including the following: identifying help at home, patient preference for d/c planning needs, Discharge Lounge, [facility] Meds to Bed program, availability of treatment team to discuss questions or concerns patient and/or family may have regarding understanding medications and recognizing signs and symptoms once discharged  CM also encouraged patient to follow up with all recommended appointments after discharge  Patient advised of importance for patient and family to participate in managing patients medical well being

## 2018-08-16 NOTE — ASSESSMENT & PLAN NOTE
Unclear, self-reported however pt states that when he got home he had to make himself vomit he describes it as "fecal like" and states it also had some gatorade mixed in it   - pt also reports that he has had 20 bowel movements (not diarrhea) over the last five days, which did have bright red blood in them   - H & H is however very stable at this time   - will repeat in the am   - order occult blood   - appreciate gi

## 2018-08-16 NOTE — ED ATTENDING ATTESTATION
Kareen Padilla MD, saw and evaluated the patient  I have discussed the patient with the resident/non-physician practitioner and agree with the resident's/non-physician practitioner's findings, Plan of Care, and MDM as documented in the resident's/non-physician practitioner's note, except where noted  All available labs and Radiology studies were reviewed  At this point I agree with the current assessment done in the Emergency Department  I have conducted an independent evaluation of this patient a history and physical is as follows:    67-year-old male presents complaining of abdominal pain  History limited by psychiatric history with flight of ideas  Evidently the patient has had abdominal pain for somewhere between 1 and 4 days  He is tender in the right upper quadrant  He states that he made himself vomit 8 times over the past day  Denies fever or chills  Denies chest pain or shortness of breath  Denies alcohol use for the past 10 years, does admit to prior history of alcohol abuse  On physical exam the patient is alert and oriented, in no acute distress  He has flight of ideas and makes frequent statements about being the  of various companies  Tremor is present in the upper extremities without asterixis  There is no focal neuro deficit  Heart regular rate and rhythm, no murmurs rubs or gallops  Lungs clear to auscultation bilaterally  The abdomen is soft, distended, tender in the right upper quadrant without rebound or guarding  The patient has scleral icterus  He has jaundice  A/P:  67-year-old man with abdominal pain and jaundice, found to have elevated transaminases and bilirubin with direct hyperbilirubinemia  CT abdomen and pelvis negative  Will admit for further evaluation        Critical Care Time  CritCare Time    Procedures

## 2018-08-16 NOTE — ASSESSMENT & PLAN NOTE
Pt does report significant burning in esophageal area  - states he takes prilosec/ xantac at home   - he also reports food getting caught on its way down  - pt reports he has lost a lot of weight in the last 2 years in fact he states " I have lost 140ibs in the last 2 yrs"

## 2018-08-16 NOTE — ASSESSMENT & PLAN NOTE
Patient reports frequency, burning and bloody urine at times  Recently on prior admissions started on nitrofurantoin  b i d  for another 4 day  Urine culture

## 2018-08-16 NOTE — ASSESSMENT & PLAN NOTE
Pt just discharged from [facility] unit today due to an admission on 8/6/18 for suicidal ideations, with plans to jump out of moving car  During this admission pt was started on some new medications   - Increased Cymbalta to 30 mg qd for depression/anxiety/pain management  -[facility] [place] [facility] : Seroquel to 700 mg HS / then started on Zyprexa and titrated slowly to 20 mg HS    - Continued on Klonopin 1 mg BID for anxiety management  - Decreased  Gabapentin from  600 mg TID for anxiety/pain mgmt , to 400 mg QD am , 300mg qd 2pm and 400mg QD in evening due to hypotensive   affect  - he did require ambien 10mg for HS prn for insomnia  - ct head was performed there for visual hallucinations: revealed: No acute intracranial abnormality

## 2018-08-16 NOTE — PLAN OF CARE
CONFUSION/THOUGHT DISTURBANCE     Thought disturbances (confusion, delirium, depression, dementia or psychosis) are managed to maintain or return to baseline mental status and functional level [address] Discharge to home or other facility with appropriate resources Progressing        GASTROINTESTINAL - ADULT     Minimal or absence of nausea and/or vomiting Progressing     Maintains or returns to baseline bowel function Progressing     Maintains adequate nutritional intake Progressing        HEMATOLOGIC - ADULT     Maintains hematologic stability Progressing        Knowledge Deficit     Patient/family/caregiver demonstrates understanding of disease process, treatment plan, medications, and discharge instructions Progressing        Potential for Falls     Patient will remain free of falls Progressing        SAFETY ADULT     Maintain or return to baseline ADL function Progressing     Maintain or return mobility status to optimal level Progressing
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
## 2018-08-16 NOTE — ASSESSMENT & PLAN NOTE
See above notations   Will need to have psych see pt he is very scattered in his thoughts and unclear to me exactly if his history is authentic   Poor historian
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
## 2018-08-16 NOTE — PLAN OF CARE
CONFUSION/THOUGHT DISTURBANCE     Thought disturbances (confusion, delirium, depression, dementia or psychosis) are managed to maintain or return to baseline mental status and functional level [address] Discharge to home or other facility with appropriate resources Progressing        DISCHARGE PLANNING - CARE MANAGEMENT     Discharge to post-acute care or home with appropriate resources Progressing        GASTROINTESTINAL - ADULT     Minimal or absence of nausea and/or vomiting Progressing     Maintains or returns to baseline bowel function Progressing     Maintains adequate nutritional intake Progressing        HEMATOLOGIC - ADULT     Maintains hematologic stability Progressing        Knowledge Deficit     Patient/family/caregiver demonstrates understanding of disease process, treatment plan, medications, and discharge instructions Progressing        Potential for Falls     Patient will remain free of falls Progressing        SAFETY ADULT     Maintain or return to baseline ADL function Progressing     Maintain or return mobility status to optimal level Progressing

## 2018-08-17 LAB
A1AT SERPL-MCNC: 189 MG/DL (ref 90–200)
ALBUMIN SERPL BCP-MCNC: 3.7 G/DL (ref 3.5–5)
ALP SERPL-CCNC: 568 U/L (ref 46–116)
ALT SERPL W P-5'-P-CCNC: 486 U/L (ref 12–78)
ANION GAP SERPL CALCULATED.3IONS-SCNC: 10 MMOL/L (ref 4–13)
AST SERPL W P-5'-P-CCNC: 248 U/L (ref 5–45)
BASOPHILS # BLD AUTO: 0.05 THOUSANDS/ΜL (ref 0–0.1)
BASOPHILS NFR BLD AUTO: 1 % (ref 0–1)
BILIRUB SERPL-MCNC: 4.19 MG/DL (ref 0.2–1)
BUN SERPL-MCNC: 7 MG/DL (ref 5–25)
CALCIUM SERPL-MCNC: 9.4 MG/DL (ref 8.3–10.1)
CERULOPLASMIN SERPL-MCNC: 27.1 MG/DL (ref 16–31)
CHLORIDE SERPL-SCNC: 102 MMOL/L (ref 100–108)
CMV IGG SERPL IA-ACNC: <0.6 U/ML (ref 0–0.59)
CMV IGM SERPL IA-ACNC: <30 AU/ML (ref 0–29.9)
CO2 SERPL-SCNC: 26 MMOL/L (ref 21–32)
CREAT SERPL-MCNC: 0.92 MG/DL (ref 0.6–1.3)
EBV EA IGG SER-ACNC: <9 U/ML (ref 0–8.9)
EBV NA IGG SER IA-ACNC: 274 U/ML (ref 0–17.9)
EBV PATRN SPEC IB-IMP: ABNORMAL
EBV VCA IGG SER IA-ACNC: 264 U/ML (ref 0–17.9)
EBV VCA IGM SER IA-ACNC: <36 U/ML (ref 0–35.9)
EOSINOPHIL # BLD AUTO: 0.12 THOUSAND/ΜL (ref 0–0.61)
EOSINOPHIL NFR BLD AUTO: 2 % (ref 0–6)
ERYTHROCYTE [DISTWIDTH] IN BLOOD BY AUTOMATED COUNT: 13.8 % (ref 11.6–15.1)
GFR SERPL CREATININE-BSD FRML MDRD: 97 ML/MIN/1.73SQ M
GLUCOSE SERPL-MCNC: 108 MG/DL (ref 65–140)
HCT VFR BLD AUTO: 38.9 % (ref 36.5–49.3)
HGB BLD-MCNC: 12.4 G/DL (ref 12–17)
IMM GRANULOCYTES # BLD AUTO: 0.04 THOUSAND/UL (ref 0–0.2)
IMM GRANULOCYTES NFR BLD AUTO: 1 % (ref 0–2)
INR PPP: 1.11 (ref 0.86–1.17)
LYMPHOCYTES # BLD AUTO: 1.64 THOUSANDS/ΜL (ref 0.6–4.47)
LYMPHOCYTES NFR BLD AUTO: 29 % (ref 14–44)
MCH RBC QN AUTO: 26.6 PG (ref 26.8–34.3)
MCHC RBC AUTO-ENTMCNC: 31.9 G/DL (ref 31.4–37.4)
MCV RBC AUTO: 84 FL (ref 82–98)
MONOCYTES # BLD AUTO: 0.5 THOUSAND/ΜL (ref 0.17–1.22)
MONOCYTES NFR BLD AUTO: 9 % (ref 4–12)
NEUTROPHILS # BLD AUTO: 3.34 THOUSANDS/ΜL (ref 1.85–7.62)
NEUTS SEG NFR BLD AUTO: 58 % (ref 43–75)
NRBC BLD AUTO-RTO: 0 /100 WBCS
PLATELET # BLD AUTO: 253 THOUSANDS/UL (ref 149–390)
PMV BLD AUTO: 10.2 FL (ref 8.9–12.7)
POTASSIUM SERPL-SCNC: 2.9 MMOL/L (ref 3.5–5.3)
PROT SERPL-MCNC: 7.6 G/DL (ref 6.4–8.2)
PROTHROMBIN TIME: 14.4 SECONDS (ref 11.8–14.2)
RBC # BLD AUTO: 4.66 MILLION/UL (ref 3.88–5.62)
RYE IGE QN: POSITIVE
SODIUM SERPL-SCNC: 138 MMOL/L (ref 136–145)
WBC # BLD AUTO: 5.69 THOUSAND/UL (ref 4.31–10.16)

## 2018-08-17 PROCEDURE — 85610 PROTHROMBIN TIME: CPT | Performed by: PHYSICIAN ASSISTANT

## 2018-08-17 PROCEDURE — 80053 COMPREHEN METABOLIC PANEL: CPT | Performed by: PHYSICIAN ASSISTANT

## 2018-08-17 PROCEDURE — 99232 SBSQ HOSP IP/OBS MODERATE 35: CPT | Performed by: PSYCHIATRY & NEUROLOGY

## 2018-08-17 PROCEDURE — 99232 SBSQ HOSP IP/OBS MODERATE 35: CPT | Performed by: INTERNAL MEDICINE

## 2018-08-17 PROCEDURE — 85025 COMPLETE CBC W/AUTO DIFF WBC: CPT | Performed by: PHYSICIAN ASSISTANT

## 2018-08-17 PROCEDURE — 99232 SBSQ HOSP IP/OBS MODERATE 35: CPT | Performed by: NURSE PRACTITIONER

## 2018-08-17 RX ORDER — POTASSIUM CHLORIDE 20 MEQ/1
40 TABLET, EXTENDED RELEASE ORAL ONCE
Status: COMPLETED | OUTPATIENT
Start: 2018-08-17 | End: 2018-08-17

## 2018-08-17 RX ADMIN — PANTOPRAZOLE SODIUM 20 MG: 20 TABLET, DELAYED RELEASE ORAL at 05:25

## 2018-08-17 RX ADMIN — POTASSIUM CHLORIDE 40 MEQ: 1500 TABLET, EXTENDED RELEASE ORAL at 18:07

## 2018-08-17 RX ADMIN — POTASSIUM CHLORIDE 40 MEQ: 1500 TABLET, EXTENDED RELEASE ORAL at 12:50

## 2018-08-17 RX ADMIN — CLONAZEPAM 1 MG: 1 TABLET ORAL at 18:08

## 2018-08-17 RX ADMIN — CLONAZEPAM 1 MG: 1 TABLET ORAL at 08:25

## 2018-08-17 RX ADMIN — OLANZAPINE 20 MG: 10 TABLET, FILM COATED ORAL at 21:53

## 2018-08-17 RX ADMIN — GABAPENTIN 400 MG: 400 CAPSULE ORAL at 18:15

## 2018-08-17 NOTE — ASSESSMENT & PLAN NOTE
Ct abdomen and pelvis w/contrast: Bilateral lower lobe lung groundglass and streaky density could represent atelectasis and/or pneumonia  Liver noted to be unremarkable  L5/S1 fusion hardware   Grade 1 anterolisthesis of L5 on S1   - pt is tender in the right upper quadrant reports he has had pain for anywhere from 1 - 4 days, and has made himself vomit around 8 times   - cmp reveals elevated lfts' ast 575, alt 518, abd alk phos of 556 POA  - ULTRASOUND NOTED: There are multiple small gallstones in the gallbladder, without evidence of acute cholecystitis    Common bile duct is top normal in size at 6 mm, without definite choledocholithiasis noted  Please correlate clinically and consider MRCP  Appreciate gi plan for mri ordered still pending   - pts medications have been adjusted in the psych unit at [facility] [place] suspect could be a component of med affect   - Will start IV fluid hydration repeat labs in a m   (ast 248, alt 486, alk phos 568)

## 2018-08-17 NOTE — ASSESSMENT & PLAN NOTE
Unclear, self-reported however pt states that when he got home he had to make himself vomit he describes it as "fecal like" and states it also had some gatorade mixed in it   - pt also reports that he has had 20 bowel movements (not diarrhea) over the last five days, which did have bright red blood in them   - H & H is however very stable at this time   - will repeat in the am 12 4/38 9  - order occult blood   - appreciate gi

## 2018-08-17 NOTE — ASSESSMENT & PLAN NOTE
Pt just discharged from [facility] unit today due to an admission on 8/6/18 for suicidal ideations, with plans to jump out of moving car  During this admission pt was started on some new medications   - Increased Cymbalta to 30 mg qd for depression/anxiety/pain management  -[place] [facility] : Seroquel to 700 mg HS / then started on Zyprexa and titrated slowly to 20 mg HS    - Continued on Klonopin 1 mg BID for anxiety management  - Decreased  Gabapentin from  600 mg TID for anxiety/pain mgmt , to 400 mg QD am , 300mg qd 2pm and 400mg QD in evening due to hypotensive   affect  - he did require ambien 10mg for HS prn for insomnia  - ct head was performed there for visual hallucinations: revealed: No acute intracranial abnormality

## 2018-08-17 NOTE — ASSESSMENT & PLAN NOTE
· Just discharged from inpatient psych unit and presented to the hospital a few hours after discharge  · Psych following- discussed plan of care with them  · Cymbalta and Zyprexa are new medications  · Cymbalta on hold due to elevated LFTs- may need to hold Zyprexa pending workup- however us reveals gallstones plan for mri per gi
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
## 2018-08-17 NOTE — ASSESSMENT & PLAN NOTE
· Abdominal pain and transaminitis  · Possibly due to new psych meds  · Also recently started on Macrobid for UTI- will d/c this as well  · F/U RUQ ultrasound, autoimmune/viral labs  · Discussed with psych and GI  For now holding Cymbalta only and continuing Zyprexa as AST/ALT did trend down overnight  Will reassess labs/imaging tomorrow and determine at that time whether Zyprexa should be discontinued as well  · GI on board  · clear liquid diet   Advance if tolerated however plan for mri pending

## 2018-08-17 NOTE — PROGRESS NOTES
Progress Note - Sophie Celeste 1970, 51 y o  male MRN: [id number]    Unit/Bed#: CW3 345-01 Encounter: [id number]    Primary Care Provider: Fox Meadows DO   Date and time admitted to hospital: 8/15/2018  3:04 PM        Generalized abdominal pain   Assessment & Plan    Ct abdomen and pelvis w/contrast: Bilateral lower lobe lung groundglass and streaky density could represent atelectasis and/or pneumonia  Liver noted to be unremarkable  L5/S1 fusion hardware   Grade 1 anterolisthesis of L5 on S1   - pt is tender in the right upper quadrant reports he has had pain for anywhere from 1 - 4 days, and has made himself vomit around 8 times   - cmp reveals elevated lfts' ast 575, alt 518, abd alk phos of 556 POA  - ULTRASOUND NOTED: There are multiple small gallstones in the gallbladder, without evidence of acute cholecystitis    Common bile duct is top normal in size at 6 mm, without definite choledocholithiasis noted  Please correlate clinically and consider MRCP  Appreciate gi plan for mri ordered still pending   - pts medications have been adjusted in the psych unit at [facility] suspect could be a component of med affect   - Will start IV fluid hydration repeat labs in a m  (ast 248, alt 486, alk phos 568)         GERD (gastroesophageal reflux disease)   Assessment & Plan    Pt does report significant burning in esophageal area  - states he takes prilosec/ xantac at home   - he also reports food getting caught on its way down  - pt reports he has lost a lot of weight in the last 2 years in fact he states " I have lost 140ibs in the last 2 yrs"            Vomiting   Assessment & Plan    Unclear, self-reported however pt states that when he got home he had to make himself vomit he describes it as "fecal like" and states it also had some gatorade mixed in it   - pt also reports that he has had 20 bowel movements (not diarrhea) over the last five days, which did have bright red blood in them   - H & H is however very stable at this time   - will repeat in the am 12 4/38 9  - order occult blood   - appreciate gi           Generalized anxiety disorder   Assessment & Plan    Pt just discharged from [facility] psych unit today due to an admission on 8/6/18 for suicidal ideations, with plans to jump out of moving car  During this admission pt was started on some new medications   - Increased Cymbalta to 30 mg qd for depression/anxiety/pain management  -[place] : Seroquel to 700 mg HS / then started on Zyprexa and titrated slowly to 20 mg HS    - Continued on Klonopin 1 mg BID for anxiety management  - Decreased  Gabapentin from  600 mg TID for anxiety/pain mgmt , to 400 mg QD am , 300mg qd 2pm and 400mg QD in evening due to hypotensive   affect  - he did require ambien 10mg for HS prn for insomnia  - ct head was performed there for visual hallucinations: revealed: No acute intracranial abnormality  Bipolar disorder, current episode mixed, moderate ([facility] [address] )   Assessment & Plan    · Just discharged from inpatient psych unit and presented to the hospital a few hours after discharge  · Psych following- discussed plan of care with them  · Cymbalta and Zyprexa are new medications  · Cymbalta on hold due to elevated LFTs- may need to hold Zyprexa pending workup- however us reveals gallstones plan for mri per gi         * Elevated liver enzymes   Assessment & Plan    · Abdominal pain and transaminitis  · Possibly due to new psych meds  · Also recently started on Macrobid for UTI- will d/c this as well  · F/U RUQ ultrasound, autoimmune/viral labs  · Discussed with psych and GI  For now holding Cymbalta only and continuing Zyprexa as AST/ALT did trend down overnight  Will reassess labs/imaging tomorrow and determine at that time whether Zyprexa should be discontinued as well  · GI on board  · clear liquid diet   Advance if tolerated however plan for mri pending         Chronic pain disorder   Assessment & Plan    · On Fentanyl patch        Hypokalemia   Assessment & Plan    · Likely due to GI loss  · Replete K        Lower urinary tract symptoms   Assessment & Plan    · Recently diagnosed with UTI and started on Macrobid (treated x 3 days)  · D/C Macrobid given elevated LFTs  · Repeat UA benign  · Reports hematuria, unclear accuracy  F/U with urologist              VTE Pharmacologic Prophylaxis:   Pharmacologic: ambulatory  Mechanical VTE Prophylaxis in Place: Yes    Patient Centered Rounds: I have performed bedside rounds with nursing staff today  Discussions with Specialists or Other Care Team Provider: nursing     Education and Discussions with Family / Patient: patient     Time Spent for Care: 45 minutes  More than 50% of total time spent on counseling and coordination of care as described above  Current Length of Stay: 2 day(s)    Current Patient Status: Inpatient   Certification Statement: The patient will continue to require additional inpatient hospital stay due to pending mrcp per gi and improved lfts     Discharge Plan: home at discharge    Code Status: Level 1 - Full Code      Subjective:   Pt is very confused discussed at length with dr Nestor Jerez, unclear as to why pt is psychotic  Pt has some abdominal pain is thinking his wife is here  Objective:     Vitals:   Temp (24hrs), Av 9 °F (36 6 °C), Min:97 2 °F (36 2 °C), Max:98 5 °F (36 9 °C)    HR:  [86-93] 93  Resp:  [17-18] 18  BP: (123-138)/(74-93) 123/74  SpO2:  [95 %-98 %] 95 %  Body mass index is 25 82 kg/m²  Input and Output Summary (last 24 hours): Intake/Output Summary (Last 24 hours) at 18 1138  Last data filed at 18 0647   Gross per 24 hour   Intake              675 ml   Output              700 ml   Net              -25 ml       Physical Exam:     Physical Exam   Constitutional: He is oriented to person, place, and time  No distress  Mildly cachectic    HENT:   Head: Normocephalic and atraumatic     Mouth/Throat: No oropharyngeal exudate  Eyes: Conjunctivae are normal  Right eye exhibits no discharge  Left eye exhibits no discharge  No scleral icterus  Neck: No JVD present  No tracheal deviation present  No thyromegaly present  Cardiovascular: Normal rate and regular rhythm  Exam reveals no gallop and no friction rub  No murmur heard  Pulmonary/Chest: No stridor  No respiratory distress  He has no wheezes  He has no rales  He exhibits no tenderness  Abdominal: He exhibits no distension and no mass  There is tenderness (reports abdominal discomfort )  There is no rebound and no guarding  Musculoskeletal: He exhibits no edema, tenderness or deformity  Lymphadenopathy:     He has no cervical adenopathy  Neurological: He is oriented to person, place, and time  Skin: No rash noted  He is not diaphoretic  No erythema  No pallor  Psychiatric: He has a normal mood and affect  Additional Data:     Labs:      Results from last 7 days  Lab Units 08/17/18  0522   WBC Thousand/uL 5 69   HEMOGLOBIN g/dL 12 4   HEMATOCRIT % 38 9   PLATELETS Thousands/uL 253   NEUTROS PCT % 58   LYMPHS PCT % 29   MONOS PCT % 9   EOS PCT % 2       Results from last 7 days  Lab Units 08/17/18  0522   SODIUM mmol/L 138   POTASSIUM mmol/L 2 9*   CHLORIDE mmol/L 102   CO2 mmol/L 26   BUN mg/dL 7   CREATININE mg/dL 0 92   CALCIUM mg/dL 9 4   TOTAL PROTEIN g/dL 7 6   BILIRUBIN TOTAL mg/dL 4 19*   ALK PHOS U/L 568*   ALT U/L 486*   AST U/L 248*   GLUCOSE RANDOM mg/dL 108       Results from last 7 days  Lab Units 08/17/18  0518   INR  1 11                 * I Have Reviewed All Lab Data Listed Above  * Additional Pertinent Lab Tests Reviewed:  All Labs Within Last 24 Hours Reviewed    Imaging:    Imaging Reports Reviewed Today Include: reviewed     Recent Cultures (last 7 days):           Last 24 Hours Medication List:     Current Facility-Administered Medications:  clonazePAM 1 mg Oral BID DELANO Buenrostro    [START ON 8/18/2018] fentaNYL 1 patch Transdermal Q72H DELANO Patel    gabapentin 300 mg Oral After [employer], DELANO    gabapentin 400 mg Oral BID DELANO Patel    ibuprofen 400 mg Oral Q6H PRN Katherine Staley PA-C    OLANZapine 20 mg Oral HS DELANO Patel    ondansetron 4 mg Intravenous Q6H PRN DELANO Patel    pantoprazole 20 mg Oral Early Morning DELANO Patel    potassium chloride 40 mEq Oral Once Katherine Staley PA-C    potassium chloride 40 mEq Oral Once Jesus Ann, DELANO    potassium chloride 40 mEq Oral Once DELANO Patel    sodium chloride 75 mL/hr Intravenous Continuous DELANO Patel Last Rate: 75 mL/hr (08/15/18 5315)   sucralfate 1 g Oral 4x Daily PRN DELANO Patel    zolpidem 10 mg Oral HS PRN DELANO Patel         Today, Patient Was Seen By: DELANO Patel    ** Please Note: Dictation voice to text software may have been used in the creation of this document   **

## 2018-08-17 NOTE — PROGRESS NOTES
Found patients Fentanyl patch on bedside table  When I asked him why he removed it patient said "You can ask my doctor that "  Fentanyl patch was flushed down the toilet, witnessed by Jeannie Portillo RN

## 2018-08-17 NOTE — PROGRESS NOTES
Progress Note - Jeansong Iyer R Sovocool 51 y o  male MRN: [id number]  Unit/Bed#: CW3 345-01 Encounter: [id number]        I came to see the patient for continuation of care patient continued to be confused he still thinking that his wife is here crying because she thinks that he is dying ,  he is hearing people laughing outside in another patient's room and  ' he states that this people that are laughing were laid off by him but they have a pouful  person who brought them back to the hospital"  He also states that the nurses tampered with his fentanyl patch adding to the fentanyl patch Suboxone and oxycodone  He also continued to say that he  work for [employer] but the patient had been in disability for many years  I contacted the patient's wife with his permission and she states that he has episodes of being grandiose and paranoid, that people  want to take his medications, he also have episode about the Suboxone situation but patient never had been in Suboxone in the past   She states that 1 of the issues that he had  is that he  had problems with a compliance and when he is released home he does not take the medication as prescribed, sometimes he forgets and take extra doses the next   She also states that his medication had been changed multiple times  He has been in clonazepam for over 17 years  He also had been in multiple antipsychotics in the past, and when he was in Seroquel he he was more aggressive  She is concerned that he can stop taking his medication while  in the hospital, but according to the nurse  the patient had been taking his medication as given  The only issue had been the fentanyl patch that he believes that had been tempted with Suboxone  I contacted Dr Forrest Licea  from [facility] who stated that the patient was no psychotic when he was released from the hospital on August 15, 2018    He also states that the patient started responding when he was switched to Zyprexa and his mood was  stabilized with this medication  Behavior over the last 24 hours:  unchanged  Sleep: insomnia  Appetite: normal  Medication side effects: No  ROS: Abdominal pain, back pain    Mental Status Evaluation:  Appearance:  age appropriate and disheveled   Behavior:  cooperative    Speech:  normal pitch and normal volume   Mood:  euthymic   Affect:  mood-congruent   Language: naming objects and repeating phrases   Thought Process:  disorganized   Associations: intact associations   Thought Content:  Paranoid thoughts about the medication   Perceptual Disturbances: He hear whispers   Risk Potential: He denies any suicidal thoughts plans or intent   Sensorium:  person, place and time/date   Memory:  recent and remote memory grossly intact   Cognition:  grossly intact   Consciousness:  alert and awake    Attention: attention span and concentration were age appropriate   Intellect: normal   Fund of Knowledge: awareness of current events: Fair, past history: Fair and vocabulary: Fair   Insight:  fair   Judgment: fair   Muscle Strength and Tone: Within normal limits   Gait/Station: normal gait/station and normal balance   Motor Activity: no abnormal movements         Assessment/Plan  Jessica Shearer is a 51 y o  male with bipolar disorder, chronic pain, panic attack and anxiety presented to the hospital few hours after he was discharged from [facility] 2 days ago with increased liver function test that believe his secondary to medications and possible stones found in the ultrasound   Patient had been paranoid especially secondary to his medication Fentanyl but according to the wife this happened  in the past when he had this symptom secondary to this medication  At This moment patient is not suicidal homicidal ideation plan or intent, he does not have command hallucination  He is very cooperative, he is taking his medication as ordered     Diagnosis:  Bipolar disorder current episode mixed moderate without psychotic features  Generalized anxiety disorder  Recommended Treatment:     Continue medical management  We will continue Zyprexa 20 mg p o  HS , I discussed this with a primary team and a GI doctor  Will continue clonazepam and gabapentin as ordered  Patient need to be re-evaluated prior to discharge when medically cleared  Case discussed with primary team and gastroenterologist  Medications:   current meds:   Current Facility-Administered Medications   Medication Dose Route Frequency    clonazePAM (KlonoPIN) tablet 1 mg  1 mg Oral BID    [START ON 8/18/2018] fentaNYL (DURAGESIC) 75 mcg/hr TD 72 hr patch 1 patch  1 patch Transdermal Q72H    gabapentin (NEURONTIN) capsule 300 mg  300 mg Oral After Lunch    gabapentin (NEURONTIN) capsule 400 mg  400 mg Oral BID    ibuprofen (MOTRIN) tablet 400 mg  400 mg Oral Q6H PRN    OLANZapine (ZyPREXA) tablet 20 mg  20 mg Oral HS    ondansetron (ZOFRAN) injection 4 mg  4 mg Intravenous Q6H PRN    pantoprazole (PROTONIX) EC tablet 20 mg  20 mg Oral Early Morning    potassium chloride (K-DUR,KLOR-CON) CR tablet 40 mEq  40 mEq Oral Once    potassium chloride (K-DUR,KLOR-CON) CR tablet 40 mEq  40 mEq Oral Once    sodium chloride 0 9 % infusion  75 mL/hr Intravenous Continuous    sucralfate (CARAFATE) tablet 1 g  1 g Oral 4x Daily PRN    zolpidem (AMBIEN) tablet 10 mg  10 mg Oral HS PRN         Risks, benefits and possible side effects of Medications:     Risks, benefits, and possible side effects of medications explained to patient and patient verbalizes understanding  Labs: I have personally reviewed all pertinent laboratory results       Lab Results   Component Value Date     08/17/2018    K 2 9 (L) 08/17/2018     08/17/2018    CO2 26 08/17/2018    ANIONGAP 10 08/17/2018    BUN 7 08/17/2018    CREATININE 0 92 08/17/2018    GLUCOSE 108 08/17/2018    CALCIUM 9 4 08/17/2018     (H) 08/17/2018     (H) 08/17/2018    ALKPHOS 568 (H) 08/17/2018    PROT 7 6 08/17/2018    BILITOT 4 19 (H) 08/17/2018    EGFR 97 08/17/2018     Lab Results   Component Value Date    WBC 5 69 08/17/2018    HGB 12 4 08/17/2018    HCT 38 9 08/17/2018    MCV 84 08/17/2018     08/17/2018         Janis Heath MD

## 2018-08-18 LAB
ACTIN IGG SERPL-ACNC: 14 UNITS (ref 0–19)
ALBUMIN SERPL BCP-MCNC: 3.6 G/DL (ref 3.5–5)
ALP SERPL-CCNC: 446 U/L (ref 46–116)
ALT SERPL W P-5'-P-CCNC: 378 U/L (ref 12–78)
ANION GAP SERPL CALCULATED.3IONS-SCNC: 10 MMOL/L (ref 4–13)
AST SERPL W P-5'-P-CCNC: 150 U/L (ref 5–45)
BILIRUB SERPL-MCNC: 2.51 MG/DL (ref 0.2–1)
BUN SERPL-MCNC: 11 MG/DL (ref 5–25)
CALCIUM SERPL-MCNC: 9.2 MG/DL (ref 8.3–10.1)
CHLORIDE SERPL-SCNC: 105 MMOL/L (ref 100–108)
CO2 SERPL-SCNC: 24 MMOL/L (ref 21–32)
CREAT SERPL-MCNC: 0.95 MG/DL (ref 0.6–1.3)
GFR SERPL CREATININE-BSD FRML MDRD: 94 ML/MIN/1.73SQ M
GLUCOSE SERPL-MCNC: 100 MG/DL (ref 65–140)
INR PPP: 1.05 (ref 0.86–1.17)
POTASSIUM SERPL-SCNC: 3.6 MMOL/L (ref 3.5–5.3)
PROT SERPL-MCNC: 7.5 G/DL (ref 6.4–8.2)
PROTHROMBIN TIME: 13.8 SECONDS (ref 11.8–14.2)
SODIUM SERPL-SCNC: 139 MMOL/L (ref 136–145)

## 2018-08-18 PROCEDURE — 85610 PROTHROMBIN TIME: CPT | Performed by: PHYSICIAN ASSISTANT

## 2018-08-18 PROCEDURE — 80053 COMPREHEN METABOLIC PANEL: CPT | Performed by: NURSE PRACTITIONER

## 2018-08-18 PROCEDURE — 99232 SBSQ HOSP IP/OBS MODERATE 35: CPT | Performed by: NURSE PRACTITIONER

## 2018-08-18 RX ORDER — SODIUM CHLORIDE 9 MG/ML
100 INJECTION, SOLUTION INTRAVENOUS CONTINUOUS
Status: DISCONTINUED | OUTPATIENT
Start: 2018-08-18 | End: 2018-08-21

## 2018-08-18 RX ADMIN — PANTOPRAZOLE SODIUM 20 MG: 20 TABLET, DELAYED RELEASE ORAL at 05:24

## 2018-08-18 RX ADMIN — OLANZAPINE 20 MG: 10 TABLET, FILM COATED ORAL at 21:08

## 2018-08-18 NOTE — ASSESSMENT & PLAN NOTE
· Abdominal pain and transaminitis  · Possibly due to new psych meds  · Also recently started on Macrobid for UTI- will d/c this as well   · F/U RUQ ultrasound, autoimmune/viral labs  · Discussed with psych and GI  For now holding Cymbalta only and continuing Zyprexa as AST/ALT  Is trending down  Will reassess labs/imaging tomorrow and determine at that time whether Zyprexa should be discontinued as well  · GI on board  · clear liquid diet   Advance if tolerated however plan for mri pending

## 2018-08-18 NOTE — SOCIAL WORK
CM spoke to medical team  Psych will continue to follow   When pt is medically stable, the pt may d/c to Psych

## 2018-08-18 NOTE — ASSESSMENT & PLAN NOTE
· Recently diagnosed with UTI and started on Macrobid (treated x 3 days)  · D/C Macrobid given elevated LFTs  · Repeat UA benign  · Reports hematuria, unclear accuracy     · F/U with urologist

## 2018-08-18 NOTE — ASSESSMENT & PLAN NOTE
· Just discharged from inpatient psych unit and presented to the hospital a few hours after discharge  · Psych following- discussed plan of care with Dr Uma Méndez on Friday , concern for pts psychosis not appreciated   · Cymbalta and Zyprexa are new medications  · Cymbalta on hold due to elevated LFTs- may need to hold Zyprexa pending workup- however us reveals gallstones plan for mri per gi still not performed   · Pts mental status today seemed a little better he is labile

## 2018-08-18 NOTE — ASSESSMENT & PLAN NOTE
Ct abdomen and pelvis w/contrast: Bilateral lower lobe lung groundglass and streaky density could represent atelectasis and/or pneumonia  Liver noted to be unremarkable  L5/S1 fusion hardware   Grade 1 anterolisthesis of L5 on S1   - pt is tender in the right upper quadrant reports he has had pain for anywhere from 1 - 4 days, and has made himself vomit around 8 times   - cmp reveals elevated lfts' ast 575, alt 518, abd alk phos of 556 POA  - ULTRASOUND NOTED: There are multiple small gallstones in the gallbladder, without evidence of acute cholecystitis    Common bile duct is top normal in size at 6 mm, without definite choledocholithiasis noted      MRCP pending as of Friday   Appreciate gi    - pts medications have been adjusted in the psych unit at [facility] suspect could be a component of med affect   - stopped iv fluids(ast 150, alt 378, 446 alk phos )

## 2018-08-18 NOTE — PROGRESS NOTES
Progress Note - Keya Reyes 1970, 51 y o  male MRN: [id number]    Unit/Bed#: 3 345-01 Encounter: [id number]    Primary Care Provider: Meg Metzger DO   Date and time admitted to hospital: 8/15/2018  3:04 PM        Generalized abdominal pain   Assessment & Plan    Ct abdomen and pelvis w/contrast: Bilateral lower lobe lung groundglass and streaky density could represent atelectasis and/or pneumonia  Liver noted to be unremarkable  L5/S1 fusion hardware   Grade 1 anterolisthesis of L5 on S1   - pt is tender in the right upper quadrant reports he has had pain for anywhere from 1 - 4 days, and has made himself vomit around 8 times   - cmp reveals elevated lfts' ast 575, alt 518, abd alk phos of 556 POA  - ULTRASOUND NOTED: There are multiple small gallstones in the gallbladder, without evidence of acute cholecystitis    Common bile duct is top normal in size at 6 mm, without definite choledocholithiasis noted  MRCP pending as of Friday   Appreciate gi    - pts medications have been adjusted in [facility] suspect could be a component of med affect   - stopped iv fluids(ast 150, alt 378, 446 alk phos )         GERD (gastroesophageal reflux disease)   Assessment & Plan    Pt does report significant burning in esophageal area  - states he takes prilosec/ xantac at home   - he also reports food getting caught on its way down  - pt reports he has lost a lot of weight in the last 2 years in fact he states " I have lost 140ibs in the last 2 yrs"            Vomiting   Assessment & Plan    Unclear, self-reported however pt states that when he got home he had to make himself vomit he describes it as "fecal like" and states it also had some gatorade mixed in it   - pt also reports that he has had 20 bowel movements (not diarrhea) over the last five days, which did have bright red blood in them   - H & H is however very stable at this time   - will repeat in the am 12 4/38 9  - order occult blood   - appreciate gi           Generalized anxiety disorder   Assessment & Plan    Pt just discharged from [address] [place] [address] psych unit today due to an admission on 8/6/18 for suicidal ideations, with plans to jump out of moving car  During this admission pt was started on some new medications   - Increased Cymbalta to 30 mg qd for depression/anxiety/pain management  -[place] [facility] : Seroquel to 700 mg HS / then started on Zyprexa and titrated slowly to 20 mg HS    - Continued on Klonopin 1 mg BID for anxiety management  - Decreased  Gabapentin from  600 mg TID for anxiety/pain mgmt , to 400 mg QD am , 300mg qd 2pm and 400mg QD in evening due to hypotensive   affect  - he did require ambien 10mg for HS prn for insomnia  - ct head was performed there for visual hallucinations: revealed: No acute intracranial abnormality  Bipolar disorder, current episode mixed, moderate ([facility] [address] )   Assessment & Plan    · Just discharged from inpatient psych unit and presented to the hospital a few hours after discharge  · Psych following- discussed plan of care with Dr Dakota Miller on Friday , concern for pts psychosis not appreciated   · Cymbalta and Zyprexa are new medications  · Cymbalta on hold due to elevated LFTs- may need to hold Zyprexa pending workup- however us reveals gallstones plan for mri per gi still not performed   · Pts mental status today seemed a little better he is labile         * Elevated liver enzymes   Assessment & Plan    · Abdominal pain and transaminitis  · Possibly due to new psych meds  · Also recently started on Macrobid for UTI- will d/c this as well   · F/U RUQ ultrasound, autoimmune/viral labs  · Discussed with psych and GI  For now holding Cymbalta only and continuing Zyprexa as AST/ALT  Is trending down  Will reassess labs/imaging tomorrow and determine at that time whether Zyprexa should be discontinued as well  · GI on board  · clear liquid diet   Advance if tolerated however plan for mri pending         Chronic pain disorder   Assessment & Plan    · On Fentanyl patch        Hypokalemia   Assessment & Plan    · Likely due to GI loss  · Replete K  · Potassium 3 6        Lower urinary tract symptoms   Assessment & Plan    · Recently diagnosed with UTI and started on Macrobid (treated x 3 days)  · D/C Macrobid given elevated LFTs  · Repeat UA benign  · Reports hematuria, unclear accuracy  · F/U with urologist            VTE Pharmacologic Prophylaxis:   Pharmacologic: pt is ambulatory   Mechanical VTE Prophylaxis in Place: Yes    Patient Centered Rounds: I have performed bedside rounds with nursing staff today  Discussions with Specialists or Other Care Team Provider: nursing     Education and Discussions with Family / Patient: patient     Time Spent for Care: 45 minutes  More than 50% of total time spent on counseling and coordination of care as described above  Current Length of Stay: 3 day(s)    Current Patient Status: Inpatient   Certification Statement: The patient will continue to require additional inpatient hospital stay due to pending mrcp per gi not yet done and then ? final plan possibly psych    Discharge Plan: pending pt final mental status ? Psych will discuss with psych on Monday     Code Status: Level 1 - Full Code      Subjective:   Pt is laying in bed very pale and some jaundice  Pt seems a little less manic/psychotic today  No n/v/d     Objective:     Vitals:   Temp (24hrs), Av °F (36 7 °C), Min:97 3 °F (36 3 °C), Max:98 5 °F (36 9 °C)    HR:  [] 116  Resp:  [17-19] 18  BP: (104-125)/(69-91) 125/91  SpO2:  [96 %-99 %] 99 %  Body mass index is 25 82 kg/m²  Input and Output Summary (last 24 hours): Intake/Output Summary (Last 24 hours) at 18  Last data filed at 18 1316   Gross per 24 hour   Intake             1245 ml   Output              200 ml   Net             1045 ml       Physical Exam:     Physical Exam   Constitutional: No distress  HENT:   Head: Normocephalic and atraumatic  Mouth/Throat: No oropharyngeal exudate  Eyes: Conjunctivae are normal  Right eye exhibits no discharge  Left eye exhibits no discharge  Scleral icterus is present  Neck: No JVD present  No tracheal deviation present  No thyromegaly present  Cardiovascular: Normal rate  Exam reveals no gallop and no friction rub  No murmur heard  Pulmonary/Chest: Effort normal  No stridor  No respiratory distress  He has no wheezes  He has no rales  He exhibits no tenderness  Abdominal: Soft  He exhibits no distension and no mass  There is no tenderness  There is no rebound and no guarding  Musculoskeletal: He exhibits no edema, tenderness or deformity  Lymphadenopathy:     He has no cervical adenopathy  Neurological: He is alert  2 - 3 slightly delusional   Skin: He is not diaphoretic  There is pallor  Jaundiced    Psychiatric:   Mood slightly improved today          Additional Data:     Labs:      Results from last 7 days  Lab Units 08/17/18  0522   WBC Thousand/uL 5 69   HEMOGLOBIN g/dL 12 4   HEMATOCRIT % 38 9   PLATELETS Thousands/uL 253   NEUTROS PCT % 58   LYMPHS PCT % 29   MONOS PCT % 9   EOS PCT % 2       Results from last 7 days  Lab Units 08/18/18  0451   SODIUM mmol/L 139   POTASSIUM mmol/L 3 6   CHLORIDE mmol/L 105   CO2 mmol/L 24   BUN mg/dL 11   CREATININE mg/dL 0 95   CALCIUM mg/dL 9 2   TOTAL PROTEIN g/dL 7 5   BILIRUBIN TOTAL mg/dL 2 51*   ALK PHOS U/L 446*   ALT U/L 378*   AST U/L 150*   GLUCOSE RANDOM mg/dL 100       Results from last 7 days  Lab Units 08/18/18  0451   INR  1 05                 * I Have Reviewed All Lab Data Listed Above  * Additional Pertinent Lab Tests Reviewed:  All Labs Within Last 24 Hours Reviewed    Imaging:    Imaging Reports Reviewed Today Include: reviewed       Recent Cultures (last 7 days):           Last 24 Hours Medication List:     Current Facility-Administered Medications:  clonazePAM 1 mg Oral BID Cristina Carlos DELANO Andrade    fentaNYL 1 patch Transdermal Q72H DELANO Gay    gabapentin 300 mg Oral After [facility], DELANO    gabapentin 400 mg Oral BID DELANO Gay    ibuprofen 400 mg Oral Q6H PRN Aisha Buchanan PA-C    OLANZapine 20 mg Oral HS DELANO Gay    ondansetron 4 mg Intravenous Q6H PRN DELANO Gay    pantoprazole 20 mg Oral Early Morning DELANO Gay    potassium chloride 40 mEq Oral Once Aisha Buchanan PA-C    sodium chloride 75 mL/hr Intravenous Continuous DELANO Gay Last Rate: 75 mL/hr (08/15/18 3773)   sucralfate 1 g Oral 4x Daily PRN DELANO Gay    zolpidem 10 mg Oral HS PRN DELANO Gay         Today, Patient Was Seen By: DELANO Gay    ** Please Note: Dictation voice to text software may have been used in the creation of this document   **

## 2018-08-18 NOTE — ASSESSMENT & PLAN NOTE
Pt just discharged from [facility] unit today due to an admission on 8/6/18 for suicidal ideations, with plans to jump out of moving car  During this admission pt was started on some new medications   - Increased Cymbalta to 30 mg qd for depression/anxiety/pain management  -[place] [facility] : Seroquel to 700 mg HS / then started on Zyprexa and titrated slowly to 20 mg HS    - Continued on Klonopin 1 mg BID for anxiety management  - Decreased  Gabapentin from  600 mg TID for anxiety/pain mgmt , to 400 mg QD am , 300mg qd 2pm and 400mg QD in evening due to hypotensive   affect  - he did require ambien 10mg for HS prn for insomnia  - ct head was performed there for visual hallucinations: revealed: No acute intracranial abnormality

## 2018-08-19 LAB
AMMONIA PLAS-SCNC: 24 UMOL/L (ref 11–35)
AMPHETAMINES SERPL QL SCN: NEGATIVE
BARBITURATES UR QL: NEGATIVE
BENZODIAZ UR QL: POSITIVE
CK MB SERPL-MCNC: 20.6 NG/ML (ref 0–5)
CK MB SERPL-MCNC: <1 % (ref 0–2.5)
CK SERPL-CCNC: 2234 U/L (ref 39–308)
COCAINE UR QL: NEGATIVE
HSV1 DNA SPEC QL NAA+PROBE: NEGATIVE
HSV2 DNA SPEC QL NAA+PROBE: NEGATIVE
METHADONE UR QL: NEGATIVE
OPIATES UR QL SCN: NEGATIVE
PCP UR QL: NEGATIVE
THC UR QL: NEGATIVE

## 2018-08-19 PROCEDURE — 82140 ASSAY OF AMMONIA: CPT | Performed by: NURSE PRACTITIONER

## 2018-08-19 PROCEDURE — 99232 SBSQ HOSP IP/OBS MODERATE 35: CPT | Performed by: INTERNAL MEDICINE

## 2018-08-19 PROCEDURE — 80307 DRUG TEST PRSMV CHEM ANLYZR: CPT | Performed by: NURSE PRACTITIONER

## 2018-08-19 PROCEDURE — 82553 CREATINE MB FRACTION: CPT | Performed by: INTERNAL MEDICINE

## 2018-08-19 PROCEDURE — 82550 ASSAY OF CK (CPK): CPT | Performed by: INTERNAL MEDICINE

## 2018-08-19 PROCEDURE — 99232 SBSQ HOSP IP/OBS MODERATE 35: CPT | Performed by: PSYCHIATRY & NEUROLOGY

## 2018-08-19 RX ORDER — LORAZEPAM 2 MG/ML
INJECTION INTRAMUSCULAR
Status: DISPENSED
Start: 2018-08-19 | End: 2018-08-19

## 2018-08-19 RX ORDER — LORAZEPAM 2 MG/ML
1 INJECTION INTRAMUSCULAR EVERY 6 HOURS PRN
Status: DISCONTINUED | OUTPATIENT
Start: 2018-08-19 | End: 2018-08-23 | Stop reason: HOSPADM

## 2018-08-19 RX ORDER — CLONAZEPAM 1 MG/1
1 TABLET ORAL ONCE
Status: COMPLETED | OUTPATIENT
Start: 2018-08-19 | End: 2018-08-19

## 2018-08-19 RX ORDER — LORAZEPAM 2 MG/ML
1 INJECTION INTRAMUSCULAR ONCE
Status: DISCONTINUED | OUTPATIENT
Start: 2018-08-19 | End: 2018-08-19

## 2018-08-19 RX ORDER — LORAZEPAM 2 MG/ML
1 INJECTION INTRAMUSCULAR ONCE
Status: COMPLETED | OUTPATIENT
Start: 2018-08-19 | End: 2018-08-19

## 2018-08-19 RX ORDER — CLONAZEPAM 1 MG/1
2 TABLET ORAL 2 TIMES DAILY
Status: DISCONTINUED | OUTPATIENT
Start: 2018-08-19 | End: 2018-08-21

## 2018-08-19 RX ADMIN — GABAPENTIN 400 MG: 400 CAPSULE ORAL at 18:21

## 2018-08-19 RX ADMIN — GABAPENTIN 400 MG: 400 CAPSULE ORAL at 08:24

## 2018-08-19 RX ADMIN — PANTOPRAZOLE SODIUM 20 MG: 20 TABLET, DELAYED RELEASE ORAL at 06:44

## 2018-08-19 RX ADMIN — FENTANYL 1 PATCH: 75 PATCH TRANSDERMAL at 00:28

## 2018-08-19 RX ADMIN — CLONAZEPAM 1 MG: 1 TABLET ORAL at 08:24

## 2018-08-19 RX ADMIN — SODIUM CHLORIDE 100 ML/HR: 0.9 INJECTION, SOLUTION INTRAVENOUS at 00:29

## 2018-08-19 RX ADMIN — CLONAZEPAM 2 MG: 1 TABLET ORAL at 18:21

## 2018-08-19 RX ADMIN — CLONAZEPAM 1 MG: 1 TABLET ORAL at 09:33

## 2018-08-19 RX ADMIN — LORAZEPAM 1 MG: 2 INJECTION INTRAMUSCULAR; INTRAVENOUS at 02:00

## 2018-08-19 NOTE — PROGRESS NOTES
Progress Note - Juan Jose Blue 1970, 51 y o  male MRN: [id number]    Unit/Bed#: CW3 337-01 Encounter: [id number]    Primary Care Provider: Berta Glass DO   Date and time admitted to hospital: 8/15/2018  3:04 PM        * Elevated liver enzymes   Assessment & Plan    · Abdominal pain and transaminitis  Could there be a component of biliary colic with elevated LFTs intermittent abdominal pain nausea symptoms with associated vomiting and profound anorexia with associated weight loss? Follow up on MRI/MRCP  If we are unable to obtain MRi/MRCP imaging should be proceed to ERCP? · Suspect elevated LFTs may be multifactorial secondary to medication affects plus possible biliary component  Will need to rule out choledocholithiasis  Note ultrasound findings  · Follow up on CPK level to rule out underlying rhabdomyolysis,   If elevated will restart fluids  · Possibly due to new psych meds  · Also recently started on Macrobid for UTI- will d/c this as well   · F/U RUQ ultrasound, autoimmune/viral labs  ·  For now holding Cymbalta only and continuing Zyprexa as AST/ALT  Is trending down  Monitor labs closely  · GI on board  · clear liquid diet  Advance if tolerated however plan for mri pending         Hypokalemia   Assessment & Plan    · Likely due to GI loss  · Replete K  · Potassium 3 6        Lower urinary tract symptoms   Assessment & Plan    · Recently diagnosed with UTI and started on Macrobid (treated x 3 days)  · D/C Macrobid given elevated LFTs  · Repeat UA benign  · Reports hematuria, unclear accuracy  · F/U with urologist        GERD (gastroesophageal reflux disease)   Assessment & Plan    Pt does report significant burning in esophageal area  - states he takes prilosec/ xantac at home   - he also reports food getting caught on its way down  - pt reports he has lost a lot of weight in the last 2 years in fact he states " I have lost 140ibs in the last 2 yrs"            Chronic pain disorder   Assessment & Plan    · On Fentanyl patch        Generalized abdominal pain   Assessment & Plan    Ct abdomen and pelvis w/contrast: Bilateral lower lobe lung groundglass and streaky density could represent atelectasis and/or pneumonia  Liver noted to be unremarkable  L5/S1 fusion hardware   Grade 1 anterolisthesis of L5 on S1   - pt is tender in the right upper quadrant reports he has had pain for anywhere from 1 - 4 days, and has made himself vomit around 8 times   - cmp reveals elevated lfts' ast 575, alt 518, abd alk phos of 556 POA  - ULTRASOUND NOTED: There are multiple small gallstones in the gallbladder, without evidence of acute cholecystitis    Common bile duct is top normal in size at 6 mm, without definite choledocholithiasis noted  MRCP pending as of Friday   Appreciate gi    - pts medications have [facility] suspect could be a component of med affect           Generalized anxiety disorder   Assessment & Plan    Pt just discharged from [facility] psych unit today due to an admission on 8/6/18 for suicidal ideations, with plans to jump out of moving car  During this admission pt was started on some new medications   - Increased Cymbalta to 30 mg qd for depression/anxiety/pain management  -[place] [facility] : Seroquel to 700 mg HS / then started on Zyprexa and titrated slowly to 20 mg HS    - Continued on Klonopin 1 mg BID for anxiety management  - Decreased  Gabapentin from  600 mg TID for anxiety/pain mgmt , to 400 mg QD am , 300mg qd 2pm and 400mg QD in evening due to hypotensive   affect  - he did require ambien 10mg for HS prn for insomnia  - ct head was performed there for visual hallucinations: revealed: No acute intracranial abnormality          Bipolar disorder, current episode mixed, moderate ([address] )   Assessment & Plan    · Just discharged from inpatient psych unit and presented to the hospital a few hours after discharge  · Psych following- discussed plan of care with Dr Luna Noyola on Friday , concern for pts psychosis not appreciated   · Cymbalta and Zyprexa are new medications  · Cymbalta on hold due to elevated LFTs- may need to hold Zyprexa pending workup- however us reveals gallstones plan for mri per gi still not performed   · Pts mental status today seemed a little better he is labile             Mechanical VTE Prophylaxis in Place: No    Patient Centered Rounds: I have performed bedside rounds with nursing staff today  Time Spent for Care: 15 minutes  More than 50% of total time spent on counseling and coordination of care as described above  Current Length of Stay: 4 day(s)    Current Patient Status: Inpatient   Certification Statement: The patient will continue to require additional inpatient hospital stay due to Need to monitor symptoms        Code Status: Level 1 - Full Code      Subjective:   nad    Objective:     Vitals:   Temp (24hrs), Av 9 °F (36 6 °C), Min:97 3 °F (36 3 °C), Max:98 5 °F (36 9 °C)    HR:  [] 101  Resp:  [18] 18  BP: (110-129)/(68-91) 123/87  SpO2:  [98 %-99 %] 99 %  Body mass index is 25 82 kg/m²  Input and Output Summary (last 24 hours): Intake/Output Summary (Last 24 hours) at 18 1235  Last data filed at 18 0830   Gross per 24 hour   Intake              980 ml   Output                0 ml   Net              980 ml       Physical Exam:     Physical Exam   Constitutional: He is oriented to person, place, and time  He appears well-developed and well-nourished  HENT:   Head: Normocephalic and atraumatic  Neck: Normal range of motion  Neck supple  No thyromegaly present  Cardiovascular: Normal rate and regular rhythm  Pulmonary/Chest: Effort normal and breath sounds normal  No respiratory distress  He has no wheezes  Abdominal: Soft  Bowel sounds are normal  He exhibits no distension  Musculoskeletal: Normal range of motion  He exhibits no edema     Neurological: He is alert and oriented to person, place, and time  Additional Data:     Labs:      Results from last 7 days  Lab Units 08/17/18  0522   WBC Thousand/uL 5 69   HEMOGLOBIN g/dL 12 4   HEMATOCRIT % 38 9   PLATELETS Thousands/uL 253   NEUTROS PCT % 58   LYMPHS PCT % 29   MONOS PCT % 9   EOS PCT % 2       Results from last 7 days  Lab Units 08/18/18  0451   SODIUM mmol/L 139   POTASSIUM mmol/L 3 6   CHLORIDE mmol/L 105   CO2 mmol/L 24   BUN mg/dL 11   CREATININE mg/dL 0 95   CALCIUM mg/dL 9 2   TOTAL PROTEIN g/dL 7 5   BILIRUBIN TOTAL mg/dL 2 51*   ALK PHOS U/L 446*   ALT U/L 378*   AST U/L 150*   GLUCOSE RANDOM mg/dL 100       Results from last 7 days  Lab Units 08/18/18  0451   INR  1 05       * I Have Reviewed All Lab Data Listed Above  * Additional Pertinent Lab Tests Reviewed: All Labs Within Last 24 Hours Reviewed      Recent Cultures (last 7 days):           Last 24 Hours Medication List:     Current Facility-Administered Medications:  clonazePAM 2 mg Oral BID Jeffery Chiu DO    fentaNYL 1 patch Transdermal Q72H DELANO Cope    gabapentin 300 mg Oral After [employer], DELANO    gabapentin 400 mg Oral BID DELANO Cope    ibuprofen 400 mg Oral Q6H PRN Jyoti Goncalves PA-C    LORazepam        LORazepam 1 mg Intravenous Q6H PRN Jeffery Chiu DO    OLANZapine 20 mg Oral HS DELANO Cope    ondansetron 4 mg Intravenous Q6H PRN DELANO Cope    pantoprazole 20 mg Oral Early Morning DELANO Cope    potassium chloride 40 mEq Oral Once Jyoti Goncalves PA-C    sodium chloride 100 mL/hr Intravenous Continuous DELANO Cope Last Rate: 100 mL/hr (08/19/18 0029)   sucralfate 1 g Oral 4x Daily PRN DELANO Cope    zolpidem 10 mg Oral HS PRN DELANO Cope         Today, Patient Was Seen By: Grace Lieberman DO    ** Please Note: Dictation voice to text software may have been used in the creation of this document   **

## 2018-08-19 NOTE — ASSESSMENT & PLAN NOTE
· Just discharged from inpatient psych unit and presented to the hospital a few hours after discharge  · Psych following- discussed plan of care with Dr Sameer Holman on Friday , concern for pts psychosis not appreciated   · Cymbalta and Zyprexa are new medications  · Cymbalta on hold due to elevated LFTs- may need to hold Zyprexa pending workup- however us reveals gallstones plan for mri per gi still not performed   · Pts mental status today seemed a little better he is labile

## 2018-08-19 NOTE — CONSULTS
Psychiatric Evaluation - Behavioral Health       Assessment/Plan  Principal Problem:  F31 9 Bipolar disorder mixed moderate  F41 1   Generalized anxiety disorder  F05   Delirium not otherwise specified due to general medical condition  PLAN:   1)   Continue treating patient for underlying medical condition  2)   Continue patient on one-to-one observation  3)  As evident from previous note by Dr Kelecih Sosa patient is already signed a 201 once medically stable he can be re-evaluated for admission to inpatient psych  4)   Currently he is presenting as delirious this writer is not sure if this is his baseline  From examination it seems that patient currently is presenting  The delirium  Chief Complaint: "  I want to talk to the nurse so she can take this bout off my waist  "    History of Present Illness:   this is a 76-year-old  male with a previous history of bipolar disorder  And anxiety disorder who was previously seen by Dr Kelechi Sosa and also Dr Bruno Doshi during this hospitalization  Patient presented the 12 with recent worsening of depression, anxiety, increased irritability with poor sleep decline appetite guilt hopelessness worthlessness and suicidal ideation  As he stayed in the hospital his mental status worsened he became more delirious and combative patient is now on one on one observation he has increased liver enzymes  Psych is been following up and making recommendations as far as medication changes are concern  Dr Bruno Doshi has recommended to re-evaluate him for inpatient psych once medically stable this writer had a discussion with patient's nurse today patient is not considered medically stable yet  Patient is presenting with delirium confusion is unable to give much of the history however he was able to tell this writer that he lives in [facility] with his family    He is currently not responding to any unseen stimuli however is becoming combative and agitated to the point that he needed two point restraint restraint around his waist and at time four point restraints  PAST PSYCH HISTORY:     patient has previous history of her bipolar depression and anxiety hospitalized to inpatient psychiatric hospital before there is no previous history of suicide attempts  Previous medications include Wellbutrin, clonazepam, gabapentin, quetiapine and Ambien  Substance Abuse History:     not significant      Family Psychiatric History:     No family history of mental illness reported    Social History:  Social History     Social History    Marital status: /Civil Union     Spouse name: N/A    Number of children: N/A    Years of education: N/A     Occupational History    Not on file  Social History Main Topics    Smoking status: Never Smoker    Smokeless tobacco: Never Used      Comment: patient is a non - smoker    Alcohol use No      Comment: history 10 years ago heavy drinking    Drug use: No    Sexual activity: Not on file     Other Topics Concern    Not on file     Social History Narrative    No narrative on file       Traumatic History:   Abuse: None  Other Traumatic Events: None  Past Medical History:   Diagnosis Date    [facility]ism ([email or web address] 75 )     Anxiety     Back pain     Bipolar disorder, current episode mixed, moderate (HCC)     Cardiac disease     Chronic pain disorder     Depression     Hypertension     MI (myocardial infarction) ([email or web address] 75 )     Psychiatric disorder     Psychiatric illness     Psychosis     Renal disorder        Medical Review Of Systems:  All 12 point review of system is normal except for what is mention in medical hisotry      Scheduled Meds:  Current Facility-Administered Medications:  clonazePAM 2 mg Oral BID Jeffery Chiu, DO    fentaNYL 1 patch Transdermal Q72H Bipin Whitman, CRNP    gabapentin 300 mg Oral After [employer], CRNP    gabapentin 400 mg Oral BID Bipin Whitman, CRNP    ibuprofen 400 mg Oral Q6H PRN Anastacio Brown PA-C    LORazepam 1 mg Intravenous Q6H PRN Jeffery Chiu DO    OLANZapine 20 mg Oral HS Mariamaerasto Mijarese, CROMAR    ondansetron 4 mg Intravenous Q6H PRN Mariama Maryanae, CRNP    pantoprazole 20 mg Oral Early Morning Mariama Maryanae, CRNP    potassium chloride 40 mEq Oral Once Anastacio Brown PA-C    sodium chloride 100 mL/hr Intravenous Continuous Mairamaee Krabbe, CRNP Last Rate: 100 mL/hr (08/19/18 0029)   sucralfate 1 g Oral 4x Daily PRN Mariama Krabbe, CRNP    zolpidem 10 mg Oral HS PRN Mariama Krabbe, CRNP      Continuous Infusions:  sodium chloride 100 mL/hr Last Rate: 100 mL/hr (08/19/18 0029)     PRN Meds: ibuprofen    LORazepam    ondansetron    sucralfate    zolpidem     Allergies   Allergen Reactions    Lexapro [Escitalopram Oxalate]     Penicillins        Vitals:    08/19/18 1501   BP: 118/86   Pulse: 105   Resp: 20   Temp: 99 °F (37 2 °C)   SpO2: 98%           Mental Status Evaluation:  Appearance:    Patient appeared disheveled his pants for [facility] through he had this strained around his waist and his hands were in restraint staff reported that he is becoming combative in the tried taking of restraint but he is becoming more confused and combative from time to time  Behavior:    Patient was cooperative with this writer however he remained in restraint and appeared confused  Speech:    Soft mechanical monotonous  Is was spontaneous and he was remaining on the topic that he initiated he wanted to be out of strain he attempted to answer some of the questions but used very small sentences  Mood:    Reported mood is depressed   Affect  flat constricted   Language:  did not assess   Thought Process:   scattered   Thought Content:   confused   Perceptual Disturbances:  not appear to be responding to any unseen  To stimuli also denying hallucinations  Risk Potential:   Was suicidal at the time it but admission currently becoming combative attempting to leave     Sensorium: Oriented to person only   Cognition:   cognition having hard time recalling things  Consciousness:    Alert and awake  Attention:   Poor attention   Intellect:  unable to  assess   Fund of Knowledge:  unable to assess   Insight:   poor insight   Judgment:    Judgment   Muscle Strength and Tone: Normal    Gait/Station:  gait was not assessed    Motor Activity:  increased psychomotor activity     Lab Results: I have personally reviewed pertinent lab results  NOTE:  Total of 25 minutes were spent in talking to patient completing this medical record reviewing medical chart medical decision making    Michael Chowdhury MD

## 2018-08-19 NOTE — ASSESSMENT & PLAN NOTE
Pt just discharged from [facility] unit today due to an admission on 8/6/18 for suicidal ideations, with plans to jump out of moving car  During this admission pt was started on some new medications   - Increased Cymbalta to 30 mg qd for depression/anxiety/pain management  -[place] [facility] : Seroquel to 700 mg HS / then started on Zyprexa and titrated slowly to 20 mg HS    - Continued on Klonopin 1 mg BID for anxiety management  - Decreased  Gabapentin from  600 mg TID for anxiety/pain mgmt , to 400 mg QD am , 300mg qd 2pm and 400mg QD in evening due to hypotensive   affect  - he did require ambien 10mg for HS prn for insomnia  - ct head was performed there for visual hallucinations: revealed: No acute intracranial abnormality

## 2018-08-19 NOTE — ASSESSMENT & PLAN NOTE
· Abdominal pain and transaminitis  Could there be a component of biliary colic with elevated LFTs intermittent abdominal pain nausea symptoms with associated vomiting and profound anorexia with associated weight loss? Follow up on MRI/MRCP  If we are unable to obtain MRi/MRCP imaging should be proceed to ERCP? · Suspect elevated LFTs may be multifactorial secondary to medication affects plus possible biliary component  Will need to rule out choledocholithiasis  Note ultrasound findings  · Follow up on CPK level to rule out underlying rhabdomyolysis,   If elevated will restart fluids  · Possibly due to new psych meds  · Also recently started on Macrobid for UTI- will d/c this as well   · F/U RUQ ultrasound, autoimmune/viral labs  ·  For now holding Cymbalta only and continuing Zyprexa as AST/ALT  Is trending down  Monitor labs closely  · GI on board  · clear liquid diet   Advance if tolerated however plan for mri pending

## 2018-08-19 NOTE — ASSESSMENT & PLAN NOTE
Ct abdomen and pelvis w/contrast: Bilateral lower lobe lung groundglass and streaky density could represent atelectasis and/or pneumonia  Liver noted to be unremarkable  L5/S1 fusion hardware   Grade 1 anterolisthesis of L5 on S1   - pt is tender in the right upper quadrant reports he has had pain for anywhere from 1 - 4 days, and has made himself vomit around 8 times   - cmp reveals elevated lfts' ast 575, alt 518, abd alk phos of 556 POA  - ULTRASOUND NOTED: There are multiple small gallstones in the gallbladder, without evidence of acute cholecystitis    Common bile duct is top normal in size at 6 mm, without definite choledocholithiasis noted      MRCP pending as of Friday   Appreciate gi    - pts medications have been adjusted in the psych unit at [facility] 29 suspect could be a component of med affect

## 2018-08-19 NOTE — PROGRESS NOTES
Called by nursing  Security alert called  Pt has bitten through iv site and managed to get out of restraints  I am ok now to hold iv fluids at this time  Iv site is leaking pt combative  Will order 1mg IM ativan for now  Called and spoke to crises unable to provide me with any assistance or recommendations  I have placed reconsult for psych to see will recommend to day team please call to have them see pt sooner than later  Pt is clearly decompensating from a psychological stand point  Do not feel that pts current elevated lfts are playing a role in psychological decline

## 2018-08-19 NOTE — PROGRESS NOTES
Called to the floor pt is laying on the floor not wanting to stand up  He is reciting the bible  Pt does not remember me  Security paged to assist pt to the chair and closer room to nursing station  Pt walks then starts to drag his feet  He is carried to the chair and placed in beds with waist restraint and bl wrist restraints at this time  Wife called for clarity on pts condition  Pt wife states pt has been on klonopin for about 20 years, for panic attacks every time they try to take him off of this, she states he goes a " little crazy"   She states she cant find the bottle but believes that he has hidden his bottle of klonopin for clarity on dosing  She states that he has been this way for at least a year and it has gotten worse over the last 8 months  She reports that he went to [facility] unit  She did not feel that he improved at all while he was there she states she she was even surprised he was released home  He arrived home she did not see him and their  was there called her told her he was not feeling well needed to come to the hospital    - at this time have placed reconsult mearly to have psych see pt again as feel there is a need for some medication adjustments  Pt is very psychotic at this time  He was laying on the floor in the middle of the hallway  He believes he can hear voices  Refusing to stand up  Reluctant to give more medications as I dont want to create unclear picture for the psych physician  Do not believe that pts current medical issue has anything to do with his behavior  Pts wife does state pt has not been eating if he does he throws it back up or he states he cant swallow it  She states he has lost a lot of weight 140ibs

## 2018-08-20 ENCOUNTER — APPOINTMENT (INPATIENT)
Dept: RADIOLOGY | Facility: HOSPITAL | Age: 48
DRG: 444 | End: 2018-08-20
Payer: COMMERCIAL

## 2018-08-20 LAB
ALBUMIN SERPL BCP-MCNC: 3.8 G/DL (ref 3.5–5)
ALP SERPL-CCNC: 356 U/L (ref 46–116)
ALT SERPL W P-5'-P-CCNC: 255 U/L (ref 12–78)
AMMONIA PLAS-SCNC: 24 UMOL/L (ref 11–35)
ANION GAP SERPL CALCULATED.3IONS-SCNC: 7 MMOL/L (ref 4–13)
AST SERPL W P-5'-P-CCNC: 125 U/L (ref 5–45)
BASOPHILS # BLD AUTO: 0.04 THOUSANDS/ΜL (ref 0–0.1)
BASOPHILS NFR BLD AUTO: 0 % (ref 0–1)
BILIRUB SERPL-MCNC: 2.02 MG/DL (ref 0.2–1)
BUN SERPL-MCNC: 16 MG/DL (ref 5–25)
CALCIUM SERPL-MCNC: 9.4 MG/DL (ref 8.3–10.1)
CHLORIDE SERPL-SCNC: 103 MMOL/L (ref 100–108)
CO2 SERPL-SCNC: 25 MMOL/L (ref 21–32)
CREAT SERPL-MCNC: 1.1 MG/DL (ref 0.6–1.3)
EOSINOPHIL # BLD AUTO: 0.04 THOUSAND/ΜL (ref 0–0.61)
EOSINOPHIL NFR BLD AUTO: 0 % (ref 0–6)
ERYTHROCYTE [DISTWIDTH] IN BLOOD BY AUTOMATED COUNT: 13.6 % (ref 11.6–15.1)
GFR SERPL CREATININE-BSD FRML MDRD: 78 ML/MIN/1.73SQ M
GLUCOSE SERPL-MCNC: 106 MG/DL (ref 65–140)
HCT VFR BLD AUTO: 42 % (ref 36.5–49.3)
HGB BLD-MCNC: 12.9 G/DL (ref 12–17)
IMM GRANULOCYTES # BLD AUTO: 0.04 THOUSAND/UL (ref 0–0.2)
IMM GRANULOCYTES NFR BLD AUTO: 0 % (ref 0–2)
LYMPHOCYTES # BLD AUTO: 2.06 THOUSANDS/ΜL (ref 0.6–4.47)
LYMPHOCYTES NFR BLD AUTO: 22 % (ref 14–44)
MCH RBC QN AUTO: 26.9 PG (ref 26.8–34.3)
MCHC RBC AUTO-ENTMCNC: 30.7 G/DL (ref 31.4–37.4)
MCV RBC AUTO: 88 FL (ref 82–98)
MONOCYTES # BLD AUTO: 0.71 THOUSAND/ΜL (ref 0.17–1.22)
MONOCYTES NFR BLD AUTO: 8 % (ref 4–12)
NEUTROPHILS # BLD AUTO: 6.46 THOUSANDS/ΜL (ref 1.85–7.62)
NEUTS SEG NFR BLD AUTO: 70 % (ref 43–75)
NRBC BLD AUTO-RTO: 0 /100 WBCS
PLATELET # BLD AUTO: 267 THOUSANDS/UL (ref 149–390)
PMV BLD AUTO: 10.3 FL (ref 8.9–12.7)
POTASSIUM SERPL-SCNC: 3.6 MMOL/L (ref 3.5–5.3)
PROT SERPL-MCNC: 7.9 G/DL (ref 6.4–8.2)
RBC # BLD AUTO: 4.8 MILLION/UL (ref 3.88–5.62)
SODIUM SERPL-SCNC: 135 MMOL/L (ref 136–145)
WBC # BLD AUTO: 9.35 THOUSAND/UL (ref 4.31–10.16)

## 2018-08-20 PROCEDURE — 99232 SBSQ HOSP IP/OBS MODERATE 35: CPT | Performed by: INTERNAL MEDICINE

## 2018-08-20 PROCEDURE — 70450 CT HEAD/BRAIN W/O DYE: CPT

## 2018-08-20 PROCEDURE — 82140 ASSAY OF AMMONIA: CPT | Performed by: INTERNAL MEDICINE

## 2018-08-20 PROCEDURE — 80053 COMPREHEN METABOLIC PANEL: CPT | Performed by: INTERNAL MEDICINE

## 2018-08-20 PROCEDURE — 85025 COMPLETE CBC W/AUTO DIFF WBC: CPT | Performed by: INTERNAL MEDICINE

## 2018-08-20 RX ADMIN — CLONAZEPAM 2 MG: 1 TABLET ORAL at 17:15

## 2018-08-20 RX ADMIN — OLANZAPINE 20 MG: 10 TABLET, FILM COATED ORAL at 22:29

## 2018-08-20 NOTE — ASSESSMENT & PLAN NOTE
Pt just discharged from [facility] unit today due to an admission on 8/6/18 for suicidal ideations, with plans to jump out of moving car  During this admission pt was started on some new medications   - Increased Cymbalta to 30 mg qd for depression/anxiety/pain management  -[facility] [facility] : Seroquel to 700 mg HS / then started on Zyprexa and titrated slowly to 20 mg HS    - Continued on Klonopin 1 mg BID for anxiety management  - Decreased  Gabapentin from  600 mg TID for anxiety/pain mgmt , to 400 mg QD am , 300mg qd 2pm and 400mg QD in evening due to hypotensive   affect  - he did require ambien 10mg for HS prn for insomnia  - ct head was performed there for visual hallucinations: revealed: No acute intracranial abnormality

## 2018-08-20 NOTE — ASSESSMENT & PLAN NOTE
Ct abdomen and pelvis w/contrast: Bilateral lower lobe lung groundglass and streaky density could represent atelectasis and/or pneumonia  Liver noted to be unremarkable  L5/S1 fusion hardware   Grade 1 anterolisthesis of L5 on S1   - pt is tender in the right upper quadrant reports he has had pain for anywhere from 1 - 4 days, and has made himself vomit around 8 times   - cmp reveals elevated lfts' ast 575, alt 518, abd alk phos of 556 POA  - ULTRASOUND NOTED: There are multiple small gallstones in the gallbladder, without evidence of acute cholecystitis    Common bile duct is top normal in size at 6 mm, without definite choledocholithiasis noted      MRCP pending as of Friday   Appreciate gi    - pts medications have been adjusted in the psych unit at [facility] 29 suspect could be a component of med affect

## 2018-08-20 NOTE — PROGRESS NOTES
I came to see the patient for continuation of care but patient is very confused,  He is not answering any questions  He has not eating or drinking any food today  Patient is a one-to-one observation because he was agitated and apparently paranoia yesterday  Today he is not making sense  I discussed this case with Dr Wendy Hoffman  Who is  Seen  the patient with me and patient at this moment appearing delirium  He will order delirium workup  I will follow up the patient     Orlando Linares MD

## 2018-08-20 NOTE — PROGRESS NOTES
Progress Note - Chip Back 1970, 51 y o  male MRN: [id number]    Unit/Bed#: CW3 337-01 Encounter: [id number]    Primary Care Provider: Porfirio Smiley DO   Date and time admitted to hospital: 8/15/2018  3:04 PM        * Elevated liver enzymes   Assessment & Plan    · Abdominal pain and transaminitis  Could there be a component of biliary colic with elevated LFTs intermittent abdominal pain nausea symptoms with associated vomiting and profound anorexia with associated weight loss? Follow up on MRI/MRCP  If we are unable to obtain MRi/MRCP imaging should be proceed to ERCP? · Suspect elevated LFTs may be multifactorial secondary to medication affects plus possible biliary component  Will need to rule out choledocholithiasis  Note ultrasound findings  · Follow up on CPK level to rule out underlying rhabdomyolysis,   If elevated will restart fluids  · Possibly due to new psych meds  · Also recently started on Macrobid for UTI- will d/c this as well   · F/U RUQ ultrasound, autoimmune/viral labs  ·  For now holding Cymbalta only and continuing Zyprexa as AST/ALT  Is trending down  Monitor labs closely  · GI on board  · clear liquid diet  Advance if tolerated however plan for mri pending         Hypokalemia   Assessment & Plan    · Likely due to GI loss  · Replete K  · Potassium 3 6        Lower urinary tract symptoms   Assessment & Plan    · Recently diagnosed with UTI and started on Macrobid (treated x 3 days)  · D/C Macrobid given elevated LFTs  · Repeat UA benign  · Reports hematuria, unclear accuracy     · F/U with urologist        Vomiting   Assessment & Plan    Unclear, self-reported however pt states that when he got home he had to make himself vomit he describes it as "fecal like" and states it also had some gatorade mixed in it   - pt also reports that he has had 20 bowel movements (not diarrhea) over the last five days, which did have bright red blood in them   - H & H is however very stable at this time   - will repeat in the am 12 4/38 9  - order occult blood   - appreciate gi           Generalized abdominal pain   Assessment & Plan    Ct abdomen and pelvis w/contrast: Bilateral lower lobe lung groundglass and streaky density could represent atelectasis and/or pneumonia  Liver noted to be unremarkable  L5/S1 fusion hardware   Grade 1 anterolisthesis of L5 on S1   - pt is tender in the right upper quadrant reports he has had pain for anywhere from 1 - 4 days, and has made himself vomit around 8 times   - cmp reveals elevated lfts' ast 575, alt 518, abd alk phos of 556 POA  - ULTRASOUND NOTED: There are multiple small gallstones in the gallbladder, without evidence of acute cholecystitis    Common bile duct is top normal in size at 6 mm, without definite choledocholithiasis noted  MRCP pending as of Friday   Appreciate gi    - pts medications have been adjusted in the psych unit at [facility] suspect could be a component of med affect           Generalized anxiety disorder   Assessment & Plan    Pt just discharged from [facility] psych unit today due to an admission on 8/6/18 for suicidal ideations, with plans to jump out of moving car  During this admission pt was started on some new medications   - Increased Cymbalta to 30 mg qd for depression/anxiety/pain management  -[facility] [place] [facility] : Seroquel to 700 mg HS / then started on Zyprexa and titrated slowly to 20 mg HS    - Continued on Klonopin 1 mg BID for anxiety management  - Decreased  Gabapentin from  600 mg TID for anxiety/pain mgmt , to 400 mg QD am , 300mg qd 2pm and 400mg QD in evening due to hypotensive   affect  - he did require ambien 10mg for HS prn for insomnia  - ct head was performed there for visual hallucinations: revealed: No acute intracranial abnormality  VTE Pharmacologic Prophylaxis:   Pharmacologic: Hold pending workup for GI liver function    Mechanical VTE Prophylaxis in Place: No    Patient Centered Rounds: I have performed bedside rounds with nursing staff today  Time Spent for Care: 15 minutes  More than 50% of total time spent on counseling and coordination of care as described above  Current Length of Stay: 5 day(s)    Current Patient Status: Inpatient   Certification Statement: The patient will continue to require additional inpatient hospital stay due to Need to monitor symptoms    Discharge Plan:  Not ready for discharge at  Continue to trend LFTs  Appears to be trending downward  Will discuss further workup imaging with Gastroenterology  Given the fact the patient likely not tolerate MRI imaging may need to consider ERC P  Will discuss with GI  Code Status: Level 1 - Full Code      Subjective:   Patient still very agitated and confused  Objective:     Vitals:   Temp (24hrs), Av 1 °F (36 7 °C), Min:97 4 °F (36 3 °C), Max:99 °F (37 2 °C)    HR:  [] 105  Resp:  [18-22] 18  BP: (118-137)/(69-87) 135/69  SpO2:  [95 %-99 %] 98 %  Body mass index is 25 82 kg/m²  Input and Output Summary (last 24 hours): Intake/Output Summary (Last 24 hours) at 18 1007  Last data filed at 18 2100   Gross per 24 hour   Intake              600 ml   Output              750 ml   Net             -150 ml       Physical Exam:     Physical Exam   Constitutional: He is oriented to person, place, and time  He appears well-developed and well-nourished  HENT:   Head: Normocephalic and atraumatic  Eyes: Conjunctivae are normal  Pupils are equal, round, and reactive to light  Neck: Normal range of motion  Neck supple  No tracheal deviation present  No thyromegaly present  Cardiovascular: Normal rate and regular rhythm  No murmur heard  Pulmonary/Chest: Effort normal and breath sounds normal  No respiratory distress  Abdominal: Soft  Bowel sounds are normal  He exhibits no distension  There is no tenderness  Musculoskeletal: Normal range of motion  He exhibits no edema  Neurological: He is alert and oriented to person, place, and time  Skin: Skin is warm and dry  No erythema  Additional Data:     Labs:      Results from last 7 days  Lab Units 08/20/18  0506   WBC Thousand/uL 9 35   HEMOGLOBIN g/dL 12 9   HEMATOCRIT % 42 0   PLATELETS Thousands/uL 267   NEUTROS PCT % 70   LYMPHS PCT % 22   MONOS PCT % 8   EOS PCT % 0       Results from last 7 days  Lab Units 08/20/18  0506   SODIUM mmol/L 135*   POTASSIUM mmol/L 3 6   CHLORIDE mmol/L 103   CO2 mmol/L 25   BUN mg/dL 16   CREATININE mg/dL 1 10   CALCIUM mg/dL 9 4   TOTAL PROTEIN g/dL 7 9   BILIRUBIN TOTAL mg/dL 2 02*   ALK PHOS U/L 356*   ALT U/L 255*   AST U/L 125*   GLUCOSE RANDOM mg/dL 106       Results from last 7 days  Lab Units 08/18/18  0451   INR  1 05       * I Have Reviewed All Lab Data Listed Above  * Additional Pertinent Lab Tests Reviewed:  All Labs Within Last 24 Hours Reviewed      Recent Cultures (last 7 days):           Last 24 Hours Medication List:     Current Facility-Administered Medications:  clonazePAM 2 mg Oral BID Jeffery Chiu DO    fentaNYL 1 patch Transdermal Q72H DELANO Castillo    gabapentin 300 mg Oral After [employer], DELANO    gabapentin 400 mg Oral BID DELANO Castillo    ibuprofen 400 mg Oral Q6H PRN Wade Zavaleta PA-C    LORazepam 1 mg Intravenous Q6H PRN Jeffery Chiu DO    OLANZapine 20 mg Oral HS DELANO Castillo    ondansetron 4 mg Intravenous Q6H PRN DELANO Castillo    pantoprazole 20 mg Oral Early Morning DELANO Castillo    potassium chloride 40 mEq Oral Once Wade Zavaleta PA-C    sodium chloride 100 mL/hr Intravenous Continuous DELANO Castillo Last Rate: 100 mL/hr (08/19/18 0029)   sucralfate 1 g Oral 4x Daily PRN DELANO Castillo    zolpidem 10 mg Oral HS PRN DELANO Castillo         Today, Patient Was Seen By: Blanca Jaramillo DO    ** Please Note: Dictation voice to text software may have been used in the creation of this document   **

## 2018-08-20 NOTE — CASE MANAGEMENT
Continued Stay Review    Date:     Vital Signs: Temp (24hrs), Av 9 °F (36 6 °C), Min:97 3 °F (36 3 °C), Max:98 5 °F (36 9 °C)     HR:  [] 101  Resp:  [18] 18  BP: (110-129)/(68-91) 123/87  SpO2:  [98 %-99 %] 99 %  Body mass index is 25 82 kg/m²  Medications:   Scheduled Meds:   Current Facility-Administered Medications:  clonazePAM 2 mg Oral BID   fentaNYL 1 patch Transdermal Q72H   gabapentin 300 mg Oral After Lunch   gabapentin 400 mg Oral BID   OLANZapine 20 mg Oral HS   pantoprazole 20 mg Oral Early Morning   potassium chloride 40 mEq Oral Once   sucralfate 1 g Oral 4x Daily PRN     Continuous Infusions:   sodium chloride 100 mL/hr Last Rate: 100 mL/hr (18 0029)     PRN Meds: ibuprofen    LORazepam    ondansetron    sucralfate    zolpidem    Abnormal Labs/Diagnostic Results:    18 1322    CK-MB Index 0 0 - 2 5 % <1 0    CK-MB FRACTION 0 0 - 5 0 ng/mL 20 6        18 1322    Total CK 39 - 308 U/L 2,234         Age/Sex: 51 y o  male     Assessment/Plan:   * Elevated liver enzymes   Assessment & Plan     · Abdominal pain and transaminitis  Could there be a component of biliary colic with elevated LFTs intermittent abdominal pain nausea symptoms with associated vomiting and profound anorexia with associated weight loss? Follow up on MRI/MRCP  If we are unable to obtain MRi/MRCP imaging should be proceed to ERCP? · Suspect elevated LFTs may be multifactorial secondary to medication affects plus possible biliary component  Will need to rule out choledocholithiasis  Note ultrasound findings  · Follow up on CPK level to rule out underlying rhabdomyolysis,   If elevated will restart fluids  · Possibly due to new psych meds  · Also recently started on Macrobid for UTI- will d/c this as well   · F/U RUQ ultrasound, autoimmune/viral labs  ·  For now holding Cymbalta only and continuing Zyprexa as AST/ALT  Is trending down  Monitor labs closely    · GI on board  · clear liquid diet  Advance if tolerated however plan for mri pending           Hypokalemia   Assessment & Plan     · Likely due to GI loss  · Replete K  · Potassium 3 6          Lower urinary tract symptoms   Assessment & Plan     · Recently diagnosed with UTI and started on Macrobid (treated x 3 days)  · D/C Macrobid given elevated LFTs  · Repeat UA benign  · Reports hematuria, unclear accuracy  · F/U with urologist          GERD (gastroesophageal reflux disease)   Assessment & Plan     Pt does report significant burning in esophageal area  - states he takes prilosec/ xantac at home   - he also reports food getting caught on its way down  - pt reports he has lost a lot of weight in the last 2 years in fact he states " I have lost 140ibs in the last 2 yrs"             Chronic pain disorder   Assessment & Plan     · On Fentanyl patch          Generalized abdominal pain   Assessment & Plan     Ct abdomen and pelvis w/contrast: Bilateral lower lobe lung groundglass and streaky density could represent atelectasis and/or pneumonia  Liver noted to be unremarkable  L5/S1 fusion hardware   Grade 1 anterolisthesis of L5 on S1   - pt is tender in the right upper quadrant reports he has had pain for anywhere from 1 - 4 days, and has made himself vomit around 8 times   - cmp reveals elevated lfts' ast 575, alt 518, abd alk phos of 556 POA  - ULTRASOUND NOTED: There are multiple small gallstones in the gallbladder, without evidence of acute cholecystitis    Common bile duct is top normal in size at 6 mm, without definite choledocholithiasis noted     MRCP pending as of Friday   Appreciate gi    - pts medications have been adjusted in the psych unit at [facility] suspect could be a component of med affect              Generalized anxiety disorder   Assessment & Plan     Pt just discharged from [facility] psych unit today due to an admission on 8/6/18 for suicidal ideations, with plans to jump out of moving car     During this admission pt was started on some new medications   - Increased Cymbalta to 30 mg qd for depression/anxiety/pain management  -discontinued [place] : Seroquel to 700 mg HS / then started on Zyprexa and titrated slowly to 20 mg HS    - Continued on Klonopin 1 mg BID for anxiety management  - Decreased  Gabapentin from  600 mg TID for anxiety/pain mgmt , to 400 mg QD am , 300mg qd 2pm and 400mg QD in evening due to hypotensive   affect  - he did require ambien 10mg for HS prn for insomnia  - ct head was performed there for visual hallucinations: revealed: No acute intracranial abnormality           Bipolar disorder, current episode mixed, moderate (HCC)   Assessment & Plan     · Just discharged from inpatient psych unit and presented to the hospital a few hours after discharge  · Psych following- discussed plan of care with Dr Jef Moncada on Friday , concern for pts psychosis not appreciated   · Cymbalta and Zyprexa are new medications  · Cymbalta on hold due to elevated LFTs- may need to hold Zyprexa pending workup- however us reveals gallstones plan for mri per gi still not performed   · Pts mental status today seemed a little better he is labile               Mechanical VTE Prophylaxis in Place: No     Current Length of Stay: 4 day(s)     Current Patient Status: Inpatient   Certification Statement: The patient will continue to require additional inpatient hospital stay due to Need to monitor symptoms    -------------------------------------------------------------------------------------------------------------------    8/19 Psych Eval:  Assessment/Plan  Principal Problem:  F31 9 Bipolar disorder mixed moderate  F41 1   Generalized anxiety disorder  F05   Delirium not otherwise specified due to general medical condition      PLAN:   1)   Continue treating patient for underlying medical condition  2)   Continue patient on one-to-one observation     3)  As evident from previous note by Dr Josr Arriaga patient is already signed a 201 once medically stable he can be re-evaluated for admission to inpatient psych  4)   Currently he is presenting as delirious this writer is not sure if this is his baseline    From examination it seems that patient currently is presenting  The delirium         Discharge Plan: May d/c to Psych when medically cleared

## 2018-08-20 NOTE — PLAN OF CARE
Problem: Potential for Falls  Goal: Patient will remain free of falls  INTERVENTIONS:  - Assess patient frequently for physical needs  -  Identify cognitive and physical deficits and behaviors that affect risk of falls    -  [place] fall precautions as indicated by assessment   - Educate patient/family on patient safety including physical limitations  - Instruct patient to call for assistance with activity based on assessment  - Modify environment to reduce risk of injury  - Consider OT/PT consult to assist with strengthening/mobility   Outcome: Progressing      Problem: GASTROINTESTINAL - ADULT  Goal: Minimal or absence of nausea and/or vomiting  INTERVENTIONS:  - Administer IV fluids as ordered to ensure adequate hydration  - Maintain NPO status until nausea and vomiting are resolved  - Nasogastric tube as ordered  - Administer ordered antiemetic medications as needed  - Provide nonpharmacologic comfort measures as appropriate  - Advance diet as tolerated, if ordered  - Nutrition services referral to assist patient with adequate nutrition and appropriate food choices   Outcome: Progressing    Goal: Maintains or returns to baseline bowel function  INTERVENTIONS:  - Assess bowel function  - Encourage oral fluids to ensure adequate hydration  - Administer IV fluids as ordered to ensure adequate hydration  - Administer ordered medications as needed  - Encourage mobilization and activity  - Nutrition services referral to assist patient with appropriate food choices   Outcome: Progressing    Goal: Maintains adequate nutritional intake  INTERVENTIONS:  - Monitor percentage of each meal consumed  - Identify factors contributing to decreased intake, treat as appropriate  - Assist with meals as needed  - Monitor I&O, WT and lab values  - Obtain nutrition services referral as needed   Outcome: Progressing      Problem: HEMATOLOGIC - ADULT  Goal: Maintains hematologic stability  INTERVENTIONS  - Assess for signs and symptoms of bleeding or hemorrhage  - Monitor labs  - Administer supportive blood products/factors as ordered and appropriate   Outcome: Progressing      Problem: CONFUSION/THOUGHT DISTURBANCE  Goal: Thought disturbances (confusion, delirium, depression, dementia or psychosis) are managed to maintain or return to baseline mental status and functional level  INTERVENTIONS:  - Assess for possible contributors to  thought disturbance, including but not limited to medications, infection, impaired vision or hearing, underlying metabolic abnormalities, dehydration, respiratory compromise,  psychiatric diagnoses and notify attending PHYSICAN/AP  - Monitor and intervene to maintain adequate nutrition, hydration, elimination, sleep and activity  - Decrease environmental stimuli, including noise as appropriate  - Provide frequent contacts to provide refocusing, direction and reassurance as needed  Approach patient calmly with eye contact and at their level    - [place] high risk fall precautions, aspiration precautions and other safety measures, as indicated  - If delirium suspected, notify physician/AP of change in condition and request immediate in-person evaluation  - Pursue consults as appropriate including Geriatric (campus dependent), OT for cognitive evaluation/activity planning, psychiatric, pastoral care, etc    Outcome: Progressing      Problem: SAFETY ADULT  Goal: Maintain or return to baseline ADL function  INTERVENTIONS:  -  Assess patient's ability to carry out ADLs; assess patient's baseline for ADL function and identify physical deficits which impact ability to perform ADLs (bathing, care of mouth/teeth, toileting, grooming, dressing, etc )  - Assess/evaluate cause of self-care deficits   - Assess range of motion  - Assess patient's mobility; develop plan if impaired  - Assess patient's need for assistive devices and provide as appropriate  - Encourage maximum independence but intervene and supervise when necessary  ¯ Involve family in performance of ADLs  ¯ Assess for home care needs following discharge   ¯ Request OT consult to assist with ADL evaluation and planning for discharge  ¯ Provide patient education as appropriate   Outcome: Progressing    Goal: Maintain or return mobility status to optimal level  INTERVENTIONS:  - Assess patient's baseline mobility status (ambulation, transfers, stairs, etc )    - Identify cognitive and physical deficits and behaviors that affect mobility  - Identify mobility aids required to assist with transfers and/or ambulation (gait belt, sit-to-stand, lift, walker, cane, etc )  - [place] fall precautions as indicated by assessment  - Record patient progress and toleration of activity level on Mobility SBAR; progress patient to next Phase/Stage  - Instruct patient to call for assistance with activity based on assessment  - Request Rehabilitation consult to assist with strengthening/weightbearing, etc    Outcome: Progressing      Problem: DISCHARGE PLANNING  Goal: Discharge to home or other facility with appropriate resources  INTERVENTIONS:  - Identify barriers to discharge w/patient and caregiver  - Arrange for needed discharge resources and transportation as appropriate  - Identify discharge learning needs (meds, wound care, etc )  - Arrange for interpretive services to assist at discharge as needed  - Refer to Case Management Department for coordinating discharge planning if the patient needs post-hospital services based on physician/advanced practitioner order or complex needs related to functional status, cognitive ability, or social support system   Outcome: Progressing      Problem: Knowledge Deficit  Goal: Patient/family/caregiver demonstrates understanding of disease process, treatment plan, medications, and discharge instructions  Complete learning assessment and assess knowledge base    Interventions:  - Provide teaching at level of understanding  - Provide teaching via preferred learning methods   Outcome: Progressing      Problem: DISCHARGE PLANNING - CARE MANAGEMENT  Goal: Discharge to post-acute care or home with appropriate resources  INTERVENTIONS:  - Conduct assessment to determine patient/family and health care team treatment goals, and need for post-acute services based on payer coverage, community resources, and patient preferences, and barriers to discharge  - Address psychosocial, clinical, and financial barriers to discharge as identified in assessment in conjunction with the patient/family and health care team  - Arrange appropriate level of post-acute services according to patient's   needs and preference and payer coverage in collaboration with the physician and health care team  - Communicate with and update the patient/family, physician, and health care team regarding progress on the discharge plan  - Arrange appropriate transportation to post-acute venues  - Discharge to home when medically cleared   Outcome: Progressing      Problem: Nutrition/Hydration-ADULT  Goal: Nutrient/Hydration intake appropriate for improving, restoring or maintaining nutritional needs  Monitor and assess patient's nutrition/hydration status for malnutrition (ex- brittle hair, bruises, dry skin, pale skin and conjunctiva, muscle wasting, smooth red tongue, and disorientation)  Collaborate with interdisciplinary team and initiate plan and interventions as ordered  Monitor patient's weight and dietary intake as ordered or per policy  Utilize nutrition screening tool and intervene per policy  Determine patient's food preferences and provide high-protein, high-caloric foods as appropriate       INTERVENTIONS:  - Monitor oral intake, urinary output, labs, and treatment plans  - Assess nutrition and hydration status and recommend course of action  - Evaluate amount of meals eaten  - Assist patient with eating if necessary   - Allow adequate time for meals  - Recommend/ encourage appropriate diets, oral nutritional supplements, and vitamin/mineral supplements  - Order, calculate, and assess calorie counts as needed  - Recommend, monitor, and adjust tube feedings and TPN/PPN based on assessed needs  - Assess need for intravenous fluids  - Provide specific nutrition/hydration education as appropriate  - Include patient/family/caregiver in decisions related to nutrition  Outcome: Progressing

## 2018-08-20 NOTE — PROGRESS NOTES
Progress Note - Sheldon Solid [employer]oc[employer] 51 y o  male MRN: [id number]    Unit/Bed#: CW3 337-01 Encounter: [id number]    Subjective:     Patient seen and examined at bedside  He is minimally verbal at present time, only answering certain questions  He denies abdominal pain, nausea, and vomiting  Objective:     Vitals: Blood pressure 148/96, pulse 98, temperature 97 9 °F (36 6 °C), temperature source Axillary, resp  rate 18, height 5' 6" (1 676 m), weight 72 6 kg (160 lb), SpO2 97 %  ,Body mass index is 25 82 kg/m²  Intake/Output Summary (Last 24 hours) at 08/20/18 1307  Last data filed at 08/19/18 2100   Gross per 24 hour   Intake              480 ml   Output              750 ml   Net             -270 ml       Physical Exam:     General Appearance: Alert, appears stated age and cooperative  Lungs: Clear to auscultation bilaterally, no rales or rhonchi, no labored breathing/accessory muscle use  Heart: Regular rate and rhythm, S1, S2 normal, no murmur, click, rub or gallop  Abdomen: Non-distended  Normal bowel sounds  Soft  Mild right upper quadrant tenderness to palpation  No rebound or guarding    Extremities: No cyanosis, edema    Invasive Devices     Peripheral Intravenous Line            Peripheral IV 08/19/18 Right Forearm 1 day                Lab Results:    Results from last 7 days  Lab Units 08/20/18  0506   WBC Thousand/uL 9 35   HEMOGLOBIN g/dL 12 9   HEMATOCRIT % 42 0   PLATELETS Thousands/uL 267   NEUTROS PCT % 70   LYMPHS PCT % 22   MONOS PCT % 8   EOS PCT % 0       Results from last 7 days  Lab Units 08/20/18  0506   SODIUM mmol/L 135*   POTASSIUM mmol/L 3 6   CHLORIDE mmol/L 103   CO2 mmol/L 25   BUN mg/dL 16   CREATININE mg/dL 1 10   CALCIUM mg/dL 9 4   TOTAL PROTEIN g/dL 7 9   BILIRUBIN TOTAL mg/dL 2 02*   ALK PHOS U/L 356*   ALT U/L 255*   AST U/L 125*   GLUCOSE RANDOM mg/dL 106       Results from last 7 days  Lab Units 08/18/18  0451   INR  1 05       Results from last 7 days  Lab Units 08/15/18  1645   LIPASE u/L 148       Imaging Studies: I have personally reviewed pertinent imaging studies  Xr Chest 1 View Portable    Result Date: 8/15/2018  Impression: No acute cardiopulmonary disease  Workstation performed: [id number]     Us Right Upper Quadrant    Result Date: 8/16/2018  Impression: There are multiple small gallstones in the gallbladder, without evidence of acute cholecystitis  Common bile duct is top normal in size at 6 mm, without definite choledocholithiasis noted  Please correlate clinically and consider MRCP  Workstation performed: [id number]     Ct Abdomen Pelvis With Contrast    Result Date: 8/15/2018  Impression: Bilateral lower lobe lung groundglass and streaky density could represent atelectasis and/or pneumonia  L5/S1 fusion hardware  Grade 1 anterolisthesis of L5 on S1  Workstation performed: [id number]       Assessment and Plan:    Discussed plan with Dr Sheryle Lily with [facility]    1) Elevated liver enzymes: Likely secondary to drug-induced liver injury from psychiatric medications  Cannot rule out passed gallstone  LFTs are fortunately decreasing with , , alkaline phosphatase 356, and total bilirubin 2 02 down from 9 4 a few days ago  Cymbalta and Macrobid were discontinued  RUQ US revealed multiple stones in the gallbladder without acute cholecystitis, CBD top normal of 6 mm without definite choledocholithiasis  MRI/MRCP pending however there is concern that patient will not tolerate the MRI due to delirum  Work-up negative thus far including hepatitis panel, CMV, alpha 1 antitrypsin, ceruloplasmin, and iron panel  GIAN positive however non-specific     -Monitor LFTs  -Follow-up remaining viral and autoimmune serologies  -Continue to hold Cymbalta and Macrobid    -Cancel MRI/MRCP for now since LFTs are improving  -If liver enzymes trend upward again, would recommend EUS +/- ERCP     2) Hematochezia: Remote history of rectal bleeding at home   Hemoglobin remains stable     -Outpatient follow-up for colonoscopy

## 2018-08-21 PROBLEM — G93.40 ENCEPHALOPATHY: Status: ACTIVE | Noted: 2018-08-21

## 2018-08-21 LAB
ALBUMIN SERPL BCP-MCNC: 3.7 G/DL (ref 3.5–5)
ALP SERPL-CCNC: 284 U/L (ref 46–116)
ALT SERPL W P-5'-P-CCNC: 178 U/L (ref 12–78)
ARTERIAL PATENCY WRIST A: YES
AST SERPL W P-5'-P-CCNC: 81 U/L (ref 5–45)
BASE EXCESS BLDA CALC-SCNC: 2 MMOL/L
BILIRUB DIRECT SERPL-MCNC: 1.04 MG/DL (ref 0–0.2)
BILIRUB SERPL-MCNC: 1.55 MG/DL (ref 0.2–1)
HCO3 BLDA-SCNC: 25.3 MMOL/L (ref 22–28)
NON VENT ROOM AIR: 21 %
O2 CT BLDA-SCNC: 18.5 ML/DL (ref 16–23)
OXYHGB MFR BLDA: 97 % (ref 94–97)
PCO2 BLDA: 35.5 MM HG (ref 36–44)
PH BLDA: 7.47 [PH] (ref 7.35–7.45)
PO2 BLDA: 93.8 MM HG (ref 75–129)
PROT SERPL-MCNC: 7.5 G/DL (ref 6.4–8.2)
SPECIMEN SOURCE: ABNORMAL

## 2018-08-21 PROCEDURE — 82805 BLOOD GASES W/O2 SATURATION: CPT | Performed by: PHYSICIAN ASSISTANT

## 2018-08-21 PROCEDURE — 99232 SBSQ HOSP IP/OBS MODERATE 35: CPT | Performed by: PSYCHIATRY & NEUROLOGY

## 2018-08-21 PROCEDURE — 99232 SBSQ HOSP IP/OBS MODERATE 35: CPT | Performed by: PHYSICIAN ASSISTANT

## 2018-08-21 PROCEDURE — 36600 WITHDRAWAL OF ARTERIAL BLOOD: CPT

## 2018-08-21 PROCEDURE — 80076 HEPATIC FUNCTION PANEL: CPT | Performed by: PHYSICIAN ASSISTANT

## 2018-08-21 PROCEDURE — 99223 1ST HOSP IP/OBS HIGH 75: CPT | Performed by: PSYCHIATRY & NEUROLOGY

## 2018-08-21 RX ORDER — GABAPENTIN 300 MG/1
300 CAPSULE ORAL 2 TIMES DAILY
Status: DISCONTINUED | OUTPATIENT
Start: 2018-08-21 | End: 2018-08-23 | Stop reason: HOSPADM

## 2018-08-21 RX ORDER — POLYETHYLENE GLYCOL 3350 17 G/17G
17 POWDER, FOR SOLUTION ORAL DAILY PRN
Status: DISCONTINUED | OUTPATIENT
Start: 2018-08-21 | End: 2018-08-23 | Stop reason: HOSPADM

## 2018-08-21 RX ORDER — CLONAZEPAM 1 MG/1
1 TABLET ORAL 2 TIMES DAILY
Status: DISCONTINUED | OUTPATIENT
Start: 2018-08-21 | End: 2018-08-23 | Stop reason: HOSPADM

## 2018-08-21 RX ORDER — SENNOSIDES 8.6 MG
1 TABLET ORAL
Status: DISCONTINUED | OUTPATIENT
Start: 2018-08-21 | End: 2018-08-23 | Stop reason: HOSPADM

## 2018-08-21 RX ADMIN — GABAPENTIN 400 MG: 400 CAPSULE ORAL at 08:01

## 2018-08-21 RX ADMIN — GABAPENTIN 300 MG: 300 CAPSULE ORAL at 17:15

## 2018-08-21 RX ADMIN — PANTOPRAZOLE SODIUM 20 MG: 20 TABLET, DELAYED RELEASE ORAL at 05:41

## 2018-08-21 RX ADMIN — LORAZEPAM 1 MG: 2 INJECTION INTRAMUSCULAR; INTRAVENOUS at 04:08

## 2018-08-21 RX ADMIN — CLONAZEPAM 1 MG: 1 TABLET ORAL at 17:15

## 2018-08-21 RX ADMIN — IBUPROFEN 400 MG: 400 TABLET ORAL at 00:50

## 2018-08-21 RX ADMIN — CLONAZEPAM 2 MG: 1 TABLET ORAL at 08:00

## 2018-08-21 RX ADMIN — IBUPROFEN 400 MG: 400 TABLET ORAL at 22:44

## 2018-08-21 NOTE — PROGRESS NOTES
Sara Dubin with slim aware of increased lethargy at present will open eyes to name, vs stable at present 98% room air sat hr 70 bp 109/82 and resp rate 22, aware pt with similar episode yesterday per nursing report, will be down to assess pt

## 2018-08-21 NOTE — CONSULTS
Consultation - Neurology   Jai Mack 51 y o  male MRN: [id number]  Unit/Bed#: CW3 337-01 Encounter: [id number]      Assessment/Plan   1  Unresponsive Episodes  -Unclear etiology  Unlikely neurologic  Exam nonfocal  No clear metabolic, infectious, or medication induced cause  -CT head negative for acute intracranial abnormality  -Will order routine EEG  -Attending to see and advise  -Continue supportive care per primary team    History of Present Illness     Reason for Consult / Principal Problem: Encephalopathy    HPI: Mary Telles is a 51 y o   male with past medical history of   Anxiety and depression, bipolar disorder who initially presented to the ED on 08/15 with complaints of abdominal pain  Patient was found to have elevated liver enzymes, evaluation by GI, felt to be secondary to recently started Cymbalta and Zyprexa  Cymbalta discontinued  Per Psychiatry  He was noted on 08/18 the patient was lying on the floor refusing to get up and was residing the Bible  Early the next morning, it was noted that patient had been through his IV site and got out of his restraints  Per chart review, patient reportedly had an episode yesterday where he is minimally responsive  Reports that patient was not answering verbal stimuli / answering questioning, but did returned to baseline quickly without intervention  CT head negative for acute intracranial abnormality at that time  Today, another episode of minimally responsiveness was reported where patient only responded to sternal rub, but was otherwise lethargic and not speaking or following commands  The episode again resolved without intervention within minutes  Today, patient is alert and oriented to person, place, time  He is currently without complaints  Denies CP, SOB, headache, dizziness, vision changes, N/V, abdominal pain, weakness or numbness      Of note, patient was recently admitted to [facility] unit from 8/6-8/15 for suicidal ideation  It was noted patient had increasing auditory and visual hallucinations at that time  He is also noted to have significant depressive symptoms at that time  Inpatient consult to Neurology  Consult performed by: Ayse Gonzalez  Consult ordered by: Bell Alejo          Review of Systems  12 point ROS performed, as stated above, all others negative      Historical Information   Past Medical History:   Diagnosis Date    Alcoholism ([facility] )     Anxiety     Back pain     Bipolar disorder, current episode mixed, moderate (HCC)     Cardiac disease     Chronic pain disorder     Depression     Hypertension     MI (myocardial infarction) ([facility] )     Psychiatric disorder     Psychiatric illness     Psychosis     Renal disorder      Past Surgical History:   Procedure Laterality Date    BACK SURGERY      report thoracic surgery     Social History   History   Alcohol Use No     Comment: history 10 years ago heavy drinking     History   Drug Use No     History   Smoking Status    Never Smoker   Smokeless Tobacco    Never Used     Comment: patient is a non - smoker     Family History:   Family History   Problem Relation Age of Onset    No Known Problems Mother     No Known Problems Father     No Known Problems Sister     No Known Problems Brother     No Known Problems Maternal Aunt     No Known Problems Paternal Aunt     No Known Problems Maternal Uncle     No Known Problems Paternal Uncle     No Known Problems Maternal Grandfather     No Known Problems Maternal Grandmother     No Known Problems Paternal Grandfather     No Known Problems Paternal Grandmother     No Known Problems Cousin     ADD / ADHD Neg Hx     Alcohol abuse Neg Hx     Anxiety disorder Neg Hx     Bipolar disorder Neg Hx     Dementia Neg Hx     Depression Neg Hx     Drug abuse Neg Hx     OCD Neg Hx     Paranoid behavior Neg Hx     Schizophrenia Neg Hx     Seizures Neg Hx     Self-Injury Neg Hx     Suicide Attempts Neg Hx        Review of previous medical records was completed  Meds/Allergies   all current active meds have been reviewed and current meds:   Current Facility-Administered Medications   Medication Dose Route Frequency    clonazePAM (KlonoPIN) tablet 1 mg  1 mg Oral BID    gabapentin (NEURONTIN) capsule 300 mg  300 mg Oral BID    ibuprofen (MOTRIN) tablet 400 mg  400 mg Oral Q6H PRN    LORazepam (ATIVAN) 2 mg/mL injection 1 mg  1 mg Intravenous Q6H PRN    OLANZapine (ZyPREXA) tablet 20 mg  20 mg Oral HS    ondansetron (ZOFRAN) injection 4 mg  4 mg Intravenous Q6H PRN    pantoprazole (PROTONIX) EC tablet 20 mg  20 mg Oral Early Morning    polyethylene glycol (MIRALAX) packet 17 g  17 g Oral Daily PRN    potassium chloride (K-DUR,KLOR-CON) CR tablet 40 mEq  40 mEq Oral Once    senna (SENOKOT) tablet 8 6 mg  1 tablet Oral HS    sucralfate (CARAFATE) tablet 1 g  1 g Oral 4x Daily PRN    zolpidem (AMBIEN) tablet 10 mg  10 mg Oral HS PRN       Allergies   Allergen Reactions    Lexapro [Escitalopram Oxalate]     Penicillins        Objective   Vitals:Blood pressure 129/80, pulse 82, temperature (!) 96 5 °F (35 8 °C), temperature source Axillary, resp  rate 20, height 5' 6" (1 676 m), weight 72 6 kg (160 lb), SpO2 96 %  ,Body mass index is 25 82 kg/m²  Intake/Output Summary (Last 24 hours) at 08/21/18 1603  Last data filed at 08/21/18 1425   Gross per 24 hour   Intake              380 ml   Output              425 ml   Net              -45 ml       Invasive Devices: Invasive Devices     Peripheral Intravenous Line            Peripheral IV 08/19/18 Right Forearm 2 days                Physical Exam   Constitutional: No distress  HENT:   Head: Normocephalic and atraumatic  Neck: Normal range of motion  Neck supple  Cardiovascular: Normal rate and regular rhythm  Pulmonary/Chest: Effort normal and breath sounds normal    Abdominal: Soft   Bowel sounds are normal    Neurological: He has normal strength  He has a normal Finger-Nose-Finger Test    Reflex Scores:       Bicep reflexes are 2+ on the right side and 2+ on the left side  Brachioradialis reflexes are 2+ on the right side and 2+ on the left side  Patellar reflexes are 3+ on the right side and 3+ on the left side  Achilles reflexes are 1+ on the right side and 1+ on the left side  Skin: Skin is warm and dry  He is not diaphoretic  Psychiatric: He has a normal mood and affect  His behavior is normal  Judgment and thought content normal      Neurologic Exam     Mental Status   Patient alert and oriented to person, place, time  He is able to state current president  Speech is fluent without evidence of dysarthria or difficulty word finding  Cranial Nerves   Cranial nerves II through XII intact  Motor Exam   Right arm pronator drift: absent  Left arm pronator drift: absent    Strength   Strength 5/5 throughout  Sensory Exam   Sensation intact to light touch and temperature throughout     Gait, Coordination, and Reflexes     Coordination   Finger to nose coordination: normal    Reflexes   Right brachioradialis: 2+  Left brachioradialis: 2+  Right biceps: 2+  Left biceps: 2+  Right patellar: 3+  Left patellar: 3+  Right achilles: 1+  Left achilles: 1+  Right plantar: equivocal  Right ankle clonus: absent  Left ankle clonus: absent      Lab Results: I have personally reviewed pertinent reports       Recent Results (from the past 24 hour(s))   Hepatic function panel    Collection Time: 08/21/18 10:08 AM   Result Value Ref Range    Total Bilirubin 1 55 (H) 0 20 - 1 00 mg/dL    Bilirubin, Direct 1 04 (H) 0 00 - 0 20 mg/dL    Alkaline Phosphatase 284 (H) 46 - 116 U/L    AST 81 (H) 5 - 45 U/L     (H) 12 - 78 U/L    Total Protein 7 5 6 4 - 8 2 g/dL    Albumin 3 7 3 5 - 5 0 g/dL   Blood gas, arterial    Collection Time: 08/21/18  2:21 PM   Result Value Ref Range    pH, Arterial 7 471 (H) 7 350 - 7 450    pCO2, Arterial 35 5 (L) 36 0 - 44 0 mm Hg    pO2, Arterial 93 8 75 0 - 129 0 mm Hg    HCO3, Arterial 25 3 22 0 - 28 0 mmol/L    Base Excess, Arterial 2 0 mmol/L    O2 Content, Arterial 18 5 16 0 - 23 0 mL/dL    O2 HGB,Arterial  97 0 94 0 - 97 0 %    SOURCE Radial, Right     LIDIA TEST Yes     ROOM AIR FIO2 21 %     Imaging Studies: I have personally reviewed pertinent reports  and I have personally reviewed pertinent films in PACS  EKG, Pathology, and Other Studies: I have personally reviewed pertinent reports      VTE Prophylaxis: Ambulate

## 2018-08-21 NOTE — PROGRESS NOTES
Progress Note - Adelaida Rowland 1970, 51 y o  male MRN: [id number]    Unit/Bed#: CW3 337-01 Encounter: [id number]    Primary Care Provider: Sharon Rios DO   Date and time admitted to hospital: 8/15/2018  3:04 PM      * Elevated liver enzymes   Assessment & Plan    · Abdominal pain and transaminitis  · Suspect elevated LFTs may be multifactorial secondary to medication affects plus possible biliary component  · Possibly due to new psych meds POA  · Also recently started on Macrobid for UTI- will d/c this as well   · F/U RUQ ultrasound, autoimmune/viral labs  · For now holding Cymbalta only and continuing Zyprexa as AST/ALT  Is trending down  Monitor labs closely  · GI on board and appreciate recs  Suspect DILI given patient's LFTs are improving with d/c medications  Asymptomatic cholelithiasis  MRCP not indicated at this time  Recommend to continue to hold Cymbalta        Encephalopathy   Assessment & Plan    · Patient with waxing and waning mental status  Patient with hx of bipolar, associated with delirium   · Reportedly yesterday afternoon patient had an episode where he was minimally responsive  CT head was negative and the patient returned to his baseline status on his own without intervention  Again, today (8/21) patient had similar episode where he was minimally responsive (i e  Would respond to sternal rub) but was otherwise lethargic and not speaking/following commands  Within several minutes, however, patient started to wake up and answering questions appropriately  · Will decrease dose of Gabapentin  · Will obtain ABG  · Appreciate psych input  · Neurology consult        Generalized abdominal pain   Assessment & Plan    Ct abdomen and pelvis w/contrast: Bilateral lower lobe lung groundglass and streaky density could represent atelectasis and/or pneumonia    Liver noted to be unremarkable  L5/S1 fusion hardware   Grade 1 anterolisthesis of L5 on S1   - LFTs trending down, GI following, suspect DILI given removal of medications and improvement in LFTs  - ULTRASOUND NOTED: There are multiple small gallstones in the gallbladder, without evidence of acute cholecystitis    Common bile duct is top normal in size at 6 mm, without definite choledocholithiasis noted  -MRCP cancelled presently as LFTs improving and patient likely will not tolerate due to delirium  -Appreciate gi  Input, continue to hold Macrobid, Cymbalta (previously treated for UTI with Macrobid, repeat UA negative)  -GI recommending if liver enzymes trend up again, would recommend EUS +/- ERCP  -Hepatitis panel, CMV, alpha 1q antitrypsin, ceruloplasmin, iron panel negative  -GIAN positive, non-specific  -F/u final viral and autoimmune studies          Lower urinary tract symptoms   Assessment & Plan    · Recently diagnosed with UTI and started on Macrobid (treated x 3 days)  · D/C Macrobid given elevated LFTs  · Repeat UA benign  · Reports hematuria, unclear accuracy  · F/U with urologist        GERD (gastroesophageal reflux disease)   Assessment & Plan    -Continue PPI          Vomiting   Assessment & Plan    · H&H stable  · GI following, appreciate input, recommend outpatient colonoscopy          Chronic pain disorder   Assessment & Plan    · On Fentanyl patch         Generalized anxiety disorder   Assessment & Plan    Pt just discharged from [facility] psych unit due to an admission on 8/6/18 for suicidal ideations, with plans to jump out of moving car     During this admission pt was started on some new medications   - Increased Cymbalta to 30 mg qd for depression/anxiety/pain management  -[place] [facility] [place] Seroquel to 700 mg HS / then started on Zyprexa and titrated slowly to 20 mg HS    - Continued on Klonopin 1 mg BID for anxiety management  - Decreased Gabapentin further today due to waxing/waning mental status   - he did require ambien 10mg for HS prn for insomnia  - ct head was performed there for visual hallucinations: revealed: No acute intracranial abnormality  Bipolar disorder, current episode mixed, moderate ([address] )   Assessment & Plan    · Discharged from inpatient psych unit and presented to the hospital a few hours after discharge  · Psych following  · Cymbalta and Zyprexa are new medications  · Cymbalta on hold due to elevated LFTs- may need to hold Zyprexa pending workup- however us reveals gallstones plan for mri per gi still not performed   · Pts mental status continues to wax and wane, initially only responsive to sternal rub upon my evaluation but within several minutes patient awake and answering questions appropriately          VTE Pharmacologic Prophylaxis:   Pharmacologic: low risk  Mechanical VTE Prophylaxis in Place: No    Patient Centered Rounds: I have performed bedside rounds with nursing staff today  Discussions with Specialists or Other Care Team Provider: RN, psych, neurology    Education and Discussions with Family / Patient: patient    Time Spent for Care: 30 minutes  More than 50% of total time spent on counseling and coordination of care as described above  Current Length of Stay: 6 day(s)    Current Patient Status: Inpatient   Certification Statement: The patient will continue to require additional inpatient hospital stay due to monitor sx, delirium, neurology consult    Discharge Plan: when stable    Code Status: Level 1 - Full Code      Subjective:   Patient is minimally responsive on my examination  He does not follow commands or answer questions  Opens eyes to sternal rub then immediately closes them again  Objective:     Vitals:   Temp (24hrs), Av °F (36 7 °C), Min:97 6 °F (36 4 °C), Max:98 3 °F (36 8 °C)    HR:  [70-94] 70  Resp:  [18-22] 22  BP: (109-127)/(70-93) 109/82  SpO2:  [96 %-98 %] 98 %  Body mass index is 25 82 kg/m²  Input and Output Summary (last 24 hours):        Intake/Output Summary (Last 24 hours) at 18 2982  Last data filed at 08/21/18 1425   Gross per 24 hour   Intake              380 ml   Output              425 ml   Net              -45 ml       Physical Exam:     Physical Exam   Constitutional: He appears well-developed and well-nourished  No distress  Lethargic upon initial exam, not following commands, opened eyes to his name/sternal rub  After several minutes his mental status improved spontaneously and pt more alert   HENT:   Head: Normocephalic and atraumatic  Mouth/Throat: Oropharynx is clear and moist    Eyes: EOM are normal  Pupils are equal, round, and reactive to light  No scleral icterus  Neck: Normal range of motion  Neck supple  Cardiovascular: Normal rate, regular rhythm and normal heart sounds  No murmur heard  Pulmonary/Chest: Effort normal and breath sounds normal  No respiratory distress  He has no wheezes  He has no rales  Abdominal: Soft  Bowel sounds are normal  He exhibits no distension  Neurological: No cranial nerve deficit  Initially lethargic, sleeping, arousable to sternal rub/name but would fall back to sleep not answering questions  After several minutes pt more alert, oriented x 4 answering appropriately   Skin: Skin is warm and dry  No rash noted  He is not diaphoretic  No erythema  Psychiatric: He is withdrawn  He exhibits a depressed mood  Vitals reviewed        Additional Data:     Labs:      Results from last 7 days  Lab Units 08/20/18  0506   WBC Thousand/uL 9 35   HEMOGLOBIN g/dL 12 9   HEMATOCRIT % 42 0   PLATELETS Thousands/uL 267   NEUTROS PCT % 70   LYMPHS PCT % 22   MONOS PCT % 8   EOS PCT % 0       Results from last 7 days  Lab Units 08/21/18  1008 08/20/18  0506   SODIUM mmol/L  --  135*   POTASSIUM mmol/L  --  3 6   CHLORIDE mmol/L  --  103   CO2 mmol/L  --  25   BUN mg/dL  --  16   CREATININE mg/dL  --  1 10   CALCIUM mg/dL  --  9 4   TOTAL PROTEIN g/dL 7 5 7 9   BILIRUBIN TOTAL mg/dL 1 55* 2 02*   ALK PHOS U/L 284* 356*   ALT U/L 178* 255*   AST U/L 81* 125* GLUCOSE RANDOM mg/dL  --  106       Results from last 7 days  Lab Units 08/18/18  0451   INR  1 05                 * I Have Reviewed All Lab Data Listed Above  * Additional Pertinent Lab Tests Reviewed: All Labs Within Last 24 Hours Reviewed    Imaging:    Imaging Reports Reviewed Today Include: CT head  Imaging Personally Reviewed by Myself Includes:  CT head    Recent Cultures (last 7 days):           Last 24 Hours Medication List:     Current Facility-Administered Medications:  clonazePAM 2 mg Oral BID Hetul Chiu, DO    fentaNYL 1 patch Transdermal Q72H DELANO Willis    gabapentin 300 mg Oral BID Lexi Parada PA-C    ibuprofen 400 mg Oral Q6H PRN Yissel Pierre PA-C    LORazepam 1 mg Intravenous Q6H PRN Hetul Chiu, DO    OLANZapine 20 mg Oral HS DELANO Willis    ondansetron 4 mg Intravenous Q6H PRN DELANO Willis    pantoprazole 20 mg Oral Early Morning DELANO Willis    potassium chloride 40 mEq Oral Once Yissel Pierre PA-C    sodium chloride 100 mL/hr Intravenous Continuous DELANO Willis Last Rate: 100 mL/hr (08/19/18 0029)   sucralfate 1 g Oral 4x Daily PRN DELANO Willis    zolpidem 10 mg Oral HS PRN DELANO Willis         Today, Patient Was Seen By: Nacho Sandoval PA-C    ** Please Note: Dictation voice to text software may have been used in the creation of this document   **

## 2018-08-21 NOTE — ASSESSMENT & PLAN NOTE
Pt just discharged from [facility] unit due to an admission on 8/6/18 for suicidal ideations, with plans to jump out of moving car  During this admission pt was started on some new medications   - Increased Cymbalta to 30 mg qd for depression/anxiety/pain management  -discontinued [place] [facility] : Seroquel to 700 mg HS / then started on Zyprexa and titrated slowly to 20 mg HS    - Continued on Klonopin 1 mg BID for anxiety management  - Decreased Gabapentin further today due to waxing/waning mental status   - he did require ambien 10mg for HS prn for insomnia  - ct head was performed there for visual hallucinations: revealed: No acute intracranial abnormality

## 2018-08-21 NOTE — ASSESSMENT & PLAN NOTE
· Discharged from inpatient psych unit and presented to the hospital a few hours after discharge  · Psych following  · Cymbalta and Zyprexa are new medications  · Cymbalta on hold due to elevated LFTs- may need to hold Zyprexa pending workup- however us reveals gallstones plan for mri per gi still not performed   · Pts mental status continues to wax and wane, initially only responsive to sternal rub upon my evaluation but within several minutes patient awake and answering questions appropriately

## 2018-08-21 NOTE — PROGRESS NOTES
Progress Note - Linda Iyer R [place] 51 y o  male MRN: [id number]  Unit/Bed#: CW3 337-01 Encounter: [id number]      I came to see the patient for continuation of care  He now is awake this morning I was not able to wake him up  He states that I cannot do surgery for him  He insisted that he came to the hospital yesterday but he has been in the hospital for six day  Patent continues to whisper and continues to have his eyes closed  He states that he hears the voice of GOD to do acupuncture  He states he has not been sleeping for many days and he needs some rest             Behavior over the last 24 hours:  regressed  Sleep: insomnia  Appetite: poor  Medication side effects: No  ROS: no complaints    Mental Status Evaluation:  Appearance:  older than stated age and thin & gaunt looking   Behavior:  psychomotor retardation   Speech:  whispering    Mood:  depressed   Affect:  constricted   Language: unable to assess   Thought Process:  disorganized   Associations: perseverative   Thought Content:  normal   Perceptual Disturbances: hears the voice of GOD to have acupuncture   Risk Potential: denies suicical thoughts, plan or intent   Sensorium:  person and place   Memory:  recent and remote memory grossly intact   Cognition:  fair   Consciousness:  alert and sedated    Attention: attention span appeared shorter than expected for age   Intellect: normal   Fund of Knowledge: awareness of current events: unable to assess   Insight:  limited   Judgment: limited   Muscle Strength and Tone: within normal limits   Gait/Station: unable to assess patient in bed   Motor Activity: no abnormal movements         Assessment/Plan  Ilana Cheney is a 51 y o  male who presented to the ED with abdominal pain after being discharged from the psychiatric unit  He has transaminitits  Patient mentation has decreased in the last few days  Sometimes very difficult to arouse   At this moment I was able to talk to him but is still somnolent  He states he hears voices of GOD, is disorganized  The case has been discussed with primary team and some medications will be adjusted  Diagnosis:  Bipolar disorder current episode mixed moderated with psychotic features   Generalized anxiety disorder    Recommended Treatment:   Continue medical management  Decrease clonazepam 1mg PO BID  Decrease gabapentin 300 mg twice a day PO  Continue Zyprexa 20 mg PO HS   Case discussed with primary team   Patient will be seen by neurology  I will follow up  Medications:   current meds:   Current Facility-Administered Medications   Medication Dose Route Frequency    clonazePAM (KlonoPIN) tablet 1 mg  1 mg Oral BID    fentaNYL (DURAGESIC) 75 mcg/hr TD 72 hr patch 1 patch  1 patch Transdermal Q72H    gabapentin (NEURONTIN) capsule 300 mg  300 mg Oral BID    ibuprofen (MOTRIN) tablet 400 mg  400 mg Oral Q6H PRN    LORazepam (ATIVAN) 2 mg/mL injection 1 mg  1 mg Intravenous Q6H PRN    OLANZapine (ZyPREXA) tablet 20 mg  20 mg Oral HS    ondansetron (ZOFRAN) injection 4 mg  4 mg Intravenous Q6H PRN    pantoprazole (PROTONIX) EC tablet 20 mg  20 mg Oral Early Morning    potassium chloride (K-DUR,KLOR-CON) CR tablet 40 mEq  40 mEq Oral Once    sucralfate (CARAFATE) tablet 1 g  1 g Oral 4x Daily PRN    zolpidem (AMBIEN) tablet 10 mg  10 mg Oral HS PRN         Risks, benefits and possible side effects of Medications:     Risks, benefits, and possible side effects of medications explained to patient and patient verbalizes understanding  Labs: I have personally reviewed all pertinent laboratory results       Lab Results   Component Value Date     (L) 08/20/2018    K 3 6 08/20/2018     08/20/2018    CO2 25 08/20/2018    ANIONGAP 7 08/20/2018    BUN 16 08/20/2018    CREATININE 1 10 08/20/2018    GLUCOSE 106 08/20/2018    CALCIUM 9 4 08/20/2018    AST 81 (H) 08/21/2018     (H) 08/21/2018    ALKPHOS 284 (H) 08/21/2018    PROT 7 5 08/21/2018    BILITOT 1 55 (H) 08/21/2018    EGFR 78 08/20/2018       Lab Results   Component Value Date    WBC 9 35 08/20/2018    HGB 12 9 08/20/2018    HCT 42 0 08/20/2018    MCV 88 08/20/2018     08/20/2018           Lawanda Garg MD

## 2018-08-21 NOTE — ASSESSMENT & PLAN NOTE
· Patient with waxing and waning mental status  Patient with hx of bipolar, associated with delirium   · Reportedly yesterday afternoon patient had an episode where he was minimally responsive  CT head was negative and the patient returned to his baseline status on his own without intervention  Again, today (8/21) patient had similar episode where he was minimally responsive (i e  Would respond to sternal rub) but was otherwise lethargic and not speaking/following commands  Within several minutes, however, patient started to wake up and answering questions appropriately     · Will decrease dose of Gabapentin, Klonopin  · Will obtain ABG  · Appreciate psych input  · Neurology consult

## 2018-08-21 NOTE — ASSESSMENT & PLAN NOTE
Ct abdomen and pelvis w/contrast: Bilateral lower lobe lung groundglass and streaky density could represent atelectasis and/or pneumonia  Liver noted to be unremarkable  L5/S1 fusion hardware   Grade 1 anterolisthesis of L5 on S1   - LFTs trending down, GI following, suspect DILI given removal of medications and improvement in LFTs  - ULTRASOUND NOTED: There are multiple small gallstones in the gallbladder, without evidence of acute cholecystitis    Common bile duct is top normal in size at 6 mm, without definite choledocholithiasis noted      -MRCP cancelled presently as LFTs improving and patient likely will not tolerate due to delirium  -Appreciate gi  Input, continue to hold Macrobid, Cymbalta (previously treated for UTI with Macrobid, repeat UA negative)  -GI recommending if liver enzymes trend up again, would recommend EUS +/- ERCP  -Hepatitis panel, CMV, alpha 1q antitrypsin, ceruloplasmin, iron panel negative  -GIAN positive, non-specific  -F/u final viral and autoimmune studies

## 2018-08-21 NOTE — ASSESSMENT & PLAN NOTE
· Abdominal pain and transaminitis  · Suspect elevated LFTs may be multifactorial secondary to medication affects plus possible biliary component  · Possibly due to new psych meds POA  · Also recently started on Macrobid for UTI- will d/c this as well   · F/U RUQ ultrasound, autoimmune/viral labs  · For now holding Cymbalta only and continuing Zyprexa as AST/ALT  Is trending down  Monitor labs closely  · GI on board and appreciate recs  Suspect DILI given patient's LFTs are improving with d/c medications  Asymptomatic cholelithiasis  MRCP not indicated at this time    Recommend to continue to hold Cymbalta

## 2018-08-22 ENCOUNTER — APPOINTMENT (INPATIENT)
Dept: NEUROLOGY | Facility: AMBULATORY SURGERY CENTER | Age: 48
DRG: 444 | End: 2018-08-22
Payer: COMMERCIAL

## 2018-08-22 PROBLEM — R41.89 UNRESPONSIVE EPISODE: Status: ACTIVE | Noted: 2018-08-21

## 2018-08-22 PROBLEM — R40.4 UNRESPONSIVE EPISODE: Status: ACTIVE | Noted: 2018-08-21

## 2018-08-22 LAB
ALBUMIN SERPL BCP-MCNC: 3.6 G/DL (ref 3.5–5)
ALP SERPL-CCNC: 254 U/L (ref 46–116)
ALT SERPL W P-5'-P-CCNC: 139 U/L (ref 12–78)
ANION GAP SERPL CALCULATED.3IONS-SCNC: 11 MMOL/L (ref 4–13)
AST SERPL W P-5'-P-CCNC: 51 U/L (ref 5–45)
BASOPHILS # BLD AUTO: 0.03 THOUSANDS/ΜL (ref 0–0.1)
BASOPHILS NFR BLD AUTO: 0 % (ref 0–1)
BILIRUB SERPL-MCNC: 1.23 MG/DL (ref 0.2–1)
BUN SERPL-MCNC: 14 MG/DL (ref 5–25)
CALCIUM SERPL-MCNC: 8.8 MG/DL (ref 8.3–10.1)
CHLORIDE SERPL-SCNC: 101 MMOL/L (ref 100–108)
CO2 SERPL-SCNC: 26 MMOL/L (ref 21–32)
CREAT SERPL-MCNC: 1.02 MG/DL (ref 0.6–1.3)
EOSINOPHIL # BLD AUTO: 0.09 THOUSAND/ΜL (ref 0–0.61)
EOSINOPHIL NFR BLD AUTO: 1 % (ref 0–6)
ERYTHROCYTE [DISTWIDTH] IN BLOOD BY AUTOMATED COUNT: 13.2 % (ref 11.6–15.1)
GFR SERPL CREATININE-BSD FRML MDRD: 86 ML/MIN/1.73SQ M
GLUCOSE SERPL-MCNC: 136 MG/DL (ref 65–140)
HCT VFR BLD AUTO: 38.6 % (ref 36.5–49.3)
HGB BLD-MCNC: 12.4 G/DL (ref 12–17)
IMM GRANULOCYTES # BLD AUTO: 0.04 THOUSAND/UL (ref 0–0.2)
IMM GRANULOCYTES NFR BLD AUTO: 1 % (ref 0–2)
LYMPHOCYTES # BLD AUTO: 2.27 THOUSANDS/ΜL (ref 0.6–4.47)
LYMPHOCYTES NFR BLD AUTO: 30 % (ref 14–44)
MCH RBC QN AUTO: 27.1 PG (ref 26.8–34.3)
MCHC RBC AUTO-ENTMCNC: 32.1 G/DL (ref 31.4–37.4)
MCV RBC AUTO: 84 FL (ref 82–98)
MONOCYTES # BLD AUTO: 0.64 THOUSAND/ΜL (ref 0.17–1.22)
MONOCYTES NFR BLD AUTO: 8 % (ref 4–12)
NEUTROPHILS # BLD AUTO: 4.62 THOUSANDS/ΜL (ref 1.85–7.62)
NEUTS SEG NFR BLD AUTO: 60 % (ref 43–75)
NRBC BLD AUTO-RTO: 0 /100 WBCS
PLATELET # BLD AUTO: 286 THOUSANDS/UL (ref 149–390)
PMV BLD AUTO: 10.6 FL (ref 8.9–12.7)
POTASSIUM SERPL-SCNC: 3.4 MMOL/L (ref 3.5–5.3)
PROT SERPL-MCNC: 7.3 G/DL (ref 6.4–8.2)
RBC # BLD AUTO: 4.58 MILLION/UL (ref 3.88–5.62)
SODIUM SERPL-SCNC: 138 MMOL/L (ref 136–145)
WBC # BLD AUTO: 7.69 THOUSAND/UL (ref 4.31–10.16)

## 2018-08-22 PROCEDURE — 99231 SBSQ HOSP IP/OBS SF/LOW 25: CPT | Performed by: PHYSICIAN ASSISTANT

## 2018-08-22 PROCEDURE — 99232 SBSQ HOSP IP/OBS MODERATE 35: CPT | Performed by: PSYCHIATRY & NEUROLOGY

## 2018-08-22 PROCEDURE — 95816 EEG AWAKE AND DROWSY: CPT

## 2018-08-22 PROCEDURE — 80053 COMPREHEN METABOLIC PANEL: CPT | Performed by: PHYSICIAN ASSISTANT

## 2018-08-22 PROCEDURE — 85025 COMPLETE CBC W/AUTO DIFF WBC: CPT | Performed by: PHYSICIAN ASSISTANT

## 2018-08-22 PROCEDURE — 95816 EEG AWAKE AND DROWSY: CPT | Performed by: PSYCHIATRY & NEUROLOGY

## 2018-08-22 RX ORDER — POTASSIUM CHLORIDE 20 MEQ/1
40 TABLET, EXTENDED RELEASE ORAL ONCE
Status: COMPLETED | OUTPATIENT
Start: 2018-08-22 | End: 2018-08-22

## 2018-08-22 RX ORDER — CALCIUM CARBONATE 200(500)MG
1000 TABLET,CHEWABLE ORAL DAILY PRN
Status: DISCONTINUED | OUTPATIENT
Start: 2018-08-22 | End: 2018-08-23 | Stop reason: HOSPADM

## 2018-08-22 RX ADMIN — IBUPROFEN 400 MG: 400 TABLET ORAL at 16:50

## 2018-08-22 RX ADMIN — GABAPENTIN 300 MG: 300 CAPSULE ORAL at 18:03

## 2018-08-22 RX ADMIN — GABAPENTIN 300 MG: 300 CAPSULE ORAL at 08:31

## 2018-08-22 RX ADMIN — SUCRALFATE 1 G: 1 TABLET ORAL at 13:23

## 2018-08-22 RX ADMIN — IBUPROFEN 400 MG: 400 TABLET ORAL at 08:31

## 2018-08-22 RX ADMIN — POTASSIUM CHLORIDE 40 MEQ: 1500 TABLET, EXTENDED RELEASE ORAL at 08:31

## 2018-08-22 RX ADMIN — CLONAZEPAM 1 MG: 1 TABLET ORAL at 08:31

## 2018-08-22 RX ADMIN — ZOLPIDEM TARTRATE 10 MG: 5 TABLET ORAL at 22:31

## 2018-08-22 RX ADMIN — Medication 1000 MG: at 11:06

## 2018-08-22 RX ADMIN — PANTOPRAZOLE SODIUM 20 MG: 20 TABLET, DELAYED RELEASE ORAL at 05:14

## 2018-08-22 RX ADMIN — OLANZAPINE 20 MG: 10 TABLET, FILM COATED ORAL at 21:13

## 2018-08-22 RX ADMIN — CLONAZEPAM 1 MG: 1 TABLET ORAL at 18:03

## 2018-08-22 NOTE — PROGRESS NOTES
Progress Note - Linda Iyer R Sovocool 51 y o  male MRN: [id number]  Unit/Bed#: CW3 337-01 Encounter: [id number]        I came to see the patient for continuation of care, today patient is  Awake, talking, walking around, he states that he feels better and continued to states that he still had withdrawal symptoms from stopping all his pain medication, he does not want to have any pain medication  He states that Zyprexa is helping him  He still talkative, but does not have any hallucinations today and he is not paranoid  Behavior over the last 24 hours:  improved  Sleep: normal  Appetite:  Improved  Medication side effects: No  ROS: no complaints    Mental Status Evaluation:  Appearance:  age appropriate   Behavior:  normal   Speech:  normal pitch and normal volume   Mood:  euthymic   Affect:  mood-congruent   Language: naming objects and repeating phrases   Thought Process:  goal directed   Associations: intact associations   Thought Content:  normal   Perceptual Disturbances: None   Risk Potential: He denies any suicidal thoughts plans or intent   Sensorium:  person, place, time/date and situation   Memory:  recent and remote memory grossly intact   Cognition:  grossly intact   Consciousness:  alert and awake    Attention: attention span and concentration were age appropriate   Intellect: within normal limits   Fund of Knowledge: awareness of current events: Fair, past history: Fair and vocabulary: Fair   Insight:  fair   Judgment: good   Muscle Strength and Tone: Within normal limits   Gait/Station: normal gait/station and normal balance   Motor Activity: no abnormal movements         Assessment/Plan  Jaylin Clark is a 51 y o  male with bipolar disorder, panic disorder and anxiety presented to the hospital with abdominal pain after release from the psychiatric unit, patient had transaminitis of unknown etiology, patient is doing better from the medical point of view    He was very confused and agitated for the last few days, he was not eating or drinking and after few days of rest today he is doing better, his mood is more stable, at this moment he does not have psychotic symptoms  Diagnosis:  Bipolar disorder current episode mixed moderate without psychotic features  Generalized anxiety disorder    Recommended Treatment:     Continue medical management  Continue current psychotropic medication  Discussed with primary team  I will follow up      Medications:   current meds:   Current Facility-Administered Medications   Medication Dose Route Frequency    calcium carbonate (TUMS) chewable tablet 1,000 mg  1,000 mg Oral Daily PRN    clonazePAM (KlonoPIN) tablet 1 mg  1 mg Oral BID    gabapentin (NEURONTIN) capsule 300 mg  300 mg Oral BID    ibuprofen (MOTRIN) tablet 400 mg  400 mg Oral Q6H PRN    LORazepam (ATIVAN) 2 mg/mL injection 1 mg  1 mg Intravenous Q6H PRN    OLANZapine (ZyPREXA) tablet 20 mg  20 mg Oral HS    ondansetron (ZOFRAN) injection 4 mg  4 mg Intravenous Q6H PRN    pantoprazole (PROTONIX) EC tablet 20 mg  20 mg Oral Early Morning    polyethylene glycol (MIRALAX) packet 17 g  17 g Oral Daily PRN    senna (SENOKOT) tablet 8 6 mg  1 tablet Oral HS    sucralfate (CARAFATE) tablet 1 g  1 g Oral 4x Daily PRN    zolpidem (AMBIEN) tablet 10 mg  10 mg Oral HS PRN         Risks, benefits and possible side effects of Medications:     Risks, benefits, and possible side effects of medications explained to patient and patient verbalizes understanding  Labs: I have personally reviewed all pertinent laboratory results     Lab Results   Component Value Date     08/22/2018    K 3 4 (L) 08/22/2018     08/22/2018    CO2 26 08/22/2018    ANIONGAP 11 08/22/2018    BUN 14 08/22/2018    CREATININE 1 02 08/22/2018    GLUCOSE 136 08/22/2018    CALCIUM 8 8 08/22/2018    AST 51 (H) 08/22/2018     (H) 08/22/2018    ALKPHOS 254 (H) 08/22/2018    PROT 7 3 08/22/2018 BILITOT 1 23 (H) 08/22/2018    EGFR 86 08/22/2018     Lab Results   Component Value Date    WBC 7 69 08/22/2018    HGB 12 4 08/22/2018    HCT 38 6 08/22/2018    MCV 84 08/22/2018     08/22/2018         Joey Araujo MD

## 2018-08-22 NOTE — ASSESSMENT & PLAN NOTE
Pt just discharged from [facility] unit due to an admission on 8/6/18 for suicidal ideations, with plans to jump out of moving car  During this admission pt was started on some new medications   - Increased Cymbalta to 30 mg qd for depression/anxiety/pain management  -discontinued [facility] : Seroquel to 700 mg HS / then started on Zyprexa and titrated slowly to 20 mg HS    - Continued on Klonopin 1 mg BID for anxiety management  - Decreased Gabapentin further today due to waxing/waning mental status   - he did require ambien 10mg for HS prn for insomnia  - ct head was performed there for visual hallucinations: revealed: No acute intracranial abnormality

## 2018-08-22 NOTE — ASSESSMENT & PLAN NOTE
· Abdominal pain and transaminitis  · Suspect elevated LFTs may be multifactorial secondary to medication affects plus possible biliary component  · Possibly due to new psych meds POA  · Also recently started on Macrobid for UTI- will d/c this as well   · RUQ US shows multiple small gallstones in gallbladder, without evidence of acute cholecystitis  · For now holding Cymbalta only and continuing Zyprexa as AST/ALT  Is trending down  Monitor labs closely  · LFTs today 8/22 - AST 81-->51, --> 139, Alk Phos 284 --> 254, T bili 1 55-->1 23  · Hepatitis panel, CMV, alapha 1 antitrypsin, ceruloplasmin, iron panel negative  · GIAN positive though non-specific  · GI on board and appreciate recs  Suspect DILI given patient's LFTs are improving with d/c medications  Asymptomatic cholelithiasis  MRCP not indicated at this time  Recommend to continue to hold Cymbalta  If LFTs start trending up again would recommend EUS +/- ERCP  Recommend outpt colonoscopy due to previous history of rectal bleeding at home

## 2018-08-22 NOTE — ASSESSMENT & PLAN NOTE
· Patient with waxing and waning mental status  Patient with hx of bipolar, associated with delirium   · Reportedly afternoon of 8/20 patient had an episode where he was minimally responsive  CT head was negative and the patient returned to his baseline status on his own without intervention  Again, yesterday (8/21) patient had similar episode where he was minimally responsive (i e  Would respond to sternal rub) but was otherwise lethargic and not speaking/following commands  Within several minutes, however, patient started to wake up and answering questions appropriately  · On today's exam patient is alert, awake, cooperative, oriented, and following commands  Denies hallucinations  · Will decrease dose of Gabapentin, Klonopin  · ABG grossly normal  · Neurology consulted, appreciate recs  Unclear etiology  Routine EEG pending  If negative, no further neurologic workup recommended    · Appreciate psych input

## 2018-08-22 NOTE — ASSESSMENT & PLAN NOTE
· Discharged from inpatient psych unit and presented to the hospital a few hours after discharge  · Psych following and appreciate input  · Cymbalta and Zyprexa are new medications  · Cymbalta on hold due to elevated LFTs  · Pt continues to wax and wane with his psychosis    Today he is alert and cooperative

## 2018-08-22 NOTE — ASSESSMENT & PLAN NOTE
Ct abdomen and pelvis w/contrast: Bilateral lower lobe lung groundglass and streaky density could represent atelectasis and/or pneumonia  Liver noted to be unremarkable  L5/S1 fusion hardware   Grade 1 anterolisthesis of L5 on S1   - LFTs trending down, GI following, suspect DILI given removal of medications and improvement in LFTs  - ULTRASOUND NOTED: There are multiple small gallstones in the gallbladder, without evidence of acute cholecystitis    Common bile duct is top normal in size at 6 mm, without definite choledocholithiasis noted      -MRCP cancelled presently as LFTs improving and patient likely will not tolerate due to delirium  -Appreciate GI input, continue to hold Macrobid, Cymbalta (previously treated for UTI with Macrobid, repeat UA negative)  -GI recommending if liver enzymes trend up again, would recommend EUS +/- ERCP  -Hepatitis panel, CMV, alpha 1 antitrypsin, ceruloplasmin, iron panel negative  -GIAN positive, non-specific   -F/u final viral and autoimmune studies

## 2018-08-22 NOTE — CASE MANAGEMENT
Continued Stay Review    Date: 08/22/2018    Vital Signs: /77 (BP Location: Right arm)   Pulse 96   Temp 98 8 °F (37 1 °C) (Oral)   Resp 20   Ht 5' 6" (1 676 m)   Wt 72 6 kg (160 lb)   SpO2 98%   BMI 25 82 kg/m²     Medications:   Scheduled Meds:   Current Facility-Administered Medications:  clonazePAM 1 mg Oral BID   gabapentin 300 mg Oral BID   OLANZapine 20 mg Oral HS   pantoprazole 20 mg Oral Early Morning   senna 1 tablet Oral HS     PRN Meds:   calcium carbonate    ibuprofen    LORazepam    ondansetron    polyethylene glycol    sucralfate    zolpidem    Abnormal Labs/Diagnostic Results:     Lab Units 08/22/18  0540   POTASSIUM mmol/L 3 4*   BILIRUBIN TOTAL mg/dL 1 23*   ALK PHOS U/L 254*   ALT U/L 139*   AST U/L 51*         Age/Sex: 51 y o  male     Assessment/Plan:     * Elevated liver enzymes   Assessment & Plan     · Abdominal pain and transaminitis  · Suspect elevated LFTs may be multifactorial secondary to medication affects plus possible biliary component  · Possibly due to new psych meds POA  · Also recently started on Macrobid for UTI- will d/c this as well   · RUQ US shows multiple small gallstones in gallbladder, without evidence of acute cholecystitis  · For now holding Cymbalta only and continuing Zyprexa as AST/ALT  Is trending down  Monitor labs closely  · LFTs today 8/22 - AST 81-->51, --> 139, Alk Phos 284 --> 254, T bili 1 55-->1 23  · Hepatitis panel, CMV, alapha 1 antitrypsin, ceruloplasmin, iron panel negative  · GIAN positive though non-specific  · GI on board and appreciate recs  Suspect DILI given patient's LFTs are improving with d/c medications  Asymptomatic cholelithiasis  MRCP not indicated at this time  Recommend to continue to hold Cymbalta  If LFTs start trending up again would recommend EUS +/- ERCP    Recommend outpt colonoscopy due to previous history of rectal bleeding at home           Unresponsive episode   Assessment & Plan     · Patient with waxing and waning mental status  Patient with hx of bipolar, associated with delirium   · Reportedly afternoon of 8/20 patient had an episode where he was minimally responsive  CT head was negative and the patient returned to his baseline status on his own without intervention  Again, yesterday (8/21) patient had similar episode where he was minimally responsive (i e  Would respond to sternal rub) but was otherwise lethargic and not speaking/following commands  Within several minutes, however, patient started to wake up and answering questions appropriately  · On today's exam patient is alert, awake, cooperative, oriented, and following commands  Denies hallucinations  · Will decrease dose of Gabapentin, Klonopin  · ABG grossly normal  · Neurology consulted, appreciate recs  Unclear etiology  Routine EEG pending  If negative, no further neurologic workup recommended  · Appreciate psych input          Generalized abdominal pain   Assessment & Plan     Ct abdomen and pelvis w/contrast: Bilateral lower lobe lung groundglass and streaky density could represent atelectasis and/or pneumonia    Liver noted to be unremarkable  L5/S1 fusion hardware   Grade 1 anterolisthesis of L5 on S1   - LFTs trending down, GI following, suspect DILI given removal of medications and improvement in LFTs  - ULTRASOUND NOTED: There are multiple small gallstones in the gallbladder, without evidence of acute cholecystitis    Common bile duct is top normal in size at 6 mm, without definite choledocholithiasis noted     -MRCP cancelled presently as LFTs improving and patient likely will not tolerate due to delirium  -Appreciate GI input, continue to hold Macrobid, Cymbalta (previously treated for UTI with Macrobid, repeat UA negative)  -GI recommending if liver enzymes trend up again, would recommend EUS +/- ERCP  -Hepatitis panel, CMV, alpha 1 antitrypsin, ceruloplasmin, iron panel negative  -GIAN positive, non-specific   -F/u final viral and autoimmune studies             Hypokalemia   Assessment & Plan     · Will replete  · Recheck in AM          GERD (gastroesophageal reflux disease)   Assessment & Plan     -Continue PPI             Chronic pain disorder   Assessment & Plan     · Fentanyl patch discontinued for now as patient was reportedly removing this  · Monitor           Generalized anxiety disorder   Assessment & Plan     Pt just discharged from [facility] psych unit due to an admission on 8/6/18 for suicidal ideations, with plans to jump out of moving car  During this admission pt was started on some new medications   - Increased Cymbalta to 30 mg qd for depression/anxiety/pain management  -discontinued [place] [facility] : Seroquel to 700 mg HS / then started on Zyprexa and titrated slowly to 20 mg HS    - Continued on Klonopin 1 mg BID for anxiety management  - Decreased Gabapentin further today due to waxing/waning mental status   - he did require ambien 10mg for HS prn for insomnia  - ct head was performed there for visual hallucinations: revealed: No acute intracranial abnormality           Bipolar disorder, current episode mixed, moderate (HCC)   Assessment & Plan     · Discharged from inpatient psych unit and presented to the hospital a few hours after discharge  · Psych following and appreciate input  · Cymbalta and Zyprexa are new medications  · Cymbalta on hold due to elevated LFTs  · Pt continues to wax and wane with his psychosis    Today he is alert and cooperative                VTE Pharmacologic Prophylaxis:   Pharmacologic: Enoxaparin (Lovenox)  Mechanical VTE Prophylaxis in Place: No     Current Length of Stay: 7 day(s)     Current Patient Status: Inpatient   Certification Statement: The patient will continue to require additional inpatient hospital stay due to monitor LFTs, psychiatric symptoms, further evaluation     Discharge Plan: when stable and cleared by psych, GI

## 2018-08-22 NOTE — PROGRESS NOTES
Progress Note - Karen Philippe 1970, 51 y o  male MRN: [id number]    Unit/Bed#: CW3 337-01 Encounter: [id number]    Primary Care Provider: Jill Stone DO   Date and time admitted to hospital: 8/15/2018  3:04 PM      * Elevated liver enzymes   Assessment & Plan    · Abdominal pain and transaminitis  · Suspect elevated LFTs may be multifactorial secondary to medication affects plus possible biliary component  · Possibly due to new psych meds POA  · Also recently started on Macrobid for UTI- will d/c this as well   · RUQ US shows multiple small gallstones in gallbladder, without evidence of acute cholecystitis  · For now holding Cymbalta only and continuing Zyprexa as AST/ALT  Is trending down  Monitor labs closely  · LFTs today 8/22 - AST 81-->51, --> 139, Alk Phos 284 --> 254, T bili 1 55-->1 23  · Hepatitis panel, CMV, alapha 1 antitrypsin, ceruloplasmin, iron panel negative  · GIAN positive though non-specific  · GI on board and appreciate recs  Suspect DILI given patient's LFTs are improving with d/c medications  Asymptomatic cholelithiasis  MRCP not indicated at this time  Recommend to continue to hold Cymbalta  If LFTs start trending up again would recommend EUS +/- ERCP  Recommend outpt colonoscopy due to previous history of rectal bleeding at home  Unresponsive episode   Assessment & Plan    · Patient with waxing and waning mental status  Patient with hx of bipolar, associated with delirium   · Reportedly afternoon of 8/20 patient had an episode where he was minimally responsive  CT head was negative and the patient returned to his baseline status on his own without intervention  Again, yesterday (8/21) patient had similar episode where he was minimally responsive (i e  Would respond to sternal rub) but was otherwise lethargic and not speaking/following commands  Within several minutes, however, patient started to wake up and answering questions appropriately  · On today's exam patient is alert, awake, cooperative, oriented, and following commands  Denies hallucinations  · Will decrease dose of Gabapentin, Klonopin  · ABG grossly normal  · Neurology consulted, appreciate recs  Unclear etiology  Routine EEG pending  If negative, no further neurologic workup recommended  · Appreciate psych input        Generalized abdominal pain   Assessment & Plan    Ct abdomen and pelvis w/contrast: Bilateral lower lobe lung groundglass and streaky density could represent atelectasis and/or pneumonia  Liver noted to be unremarkable  L5/S1 fusion hardware   Grade 1 anterolisthesis of L5 on S1   - LFTs trending down, GI following, suspect DILI given removal of medications and improvement in LFTs  - ULTRASOUND NOTED: There are multiple small gallstones in the gallbladder, without evidence of acute cholecystitis    Common bile duct is top normal in size at 6 mm, without definite choledocholithiasis noted  -MRCP cancelled presently as LFTs improving and patient likely will not tolerate due to delirium  -Appreciate GI input, continue to hold Macrobid, Cymbalta (previously treated for UTI with Macrobid, repeat UA negative)  -GI recommending if liver enzymes trend up again, would recommend EUS +/- ERCP  -Hepatitis panel, CMV, alpha 1 antitrypsin, ceruloplasmin, iron panel negative  -GIAN positive, non-specific   -F/u final viral and autoimmune studies          Hypokalemia   Assessment & Plan    · Will replete  · Recheck in AM        GERD (gastroesophageal reflux disease)   Assessment & Plan    -Continue PPI          Chronic pain disorder   Assessment & Plan    · Fentanyl patch discontinued for now as patient was reportedly removing this  · Monitor  Generalized anxiety disorder   Assessment & Plan    Pt just discharged from [facility] psych unit due to an admission on 8/6/18 for suicidal ideations, with plans to jump out of moving car     During this admission pt was started on some new medications   - Increased Cymbalta to 30 mg qd for depression/anxiety/pain management  -discontinued [place] : Seroquel to 700 mg HS / then started on Zyprexa and titrated slowly to 20 mg HS    - Continued on Klonopin 1 mg BID for anxiety management  - Decreased Gabapentin further today due to waxing/waning mental status   - he did require ambien 10mg for HS prn for insomnia  - ct head was performed there for visual hallucinations: revealed: No acute intracranial abnormality  Bipolar disorder, current episode mixed, moderate ([facility] [address] )   Assessment & Plan    · Discharged from inpatient psych unit and presented to the hospital a few hours after discharge  · Psych following and appreciate input  · Cymbalta and Zyprexa are new medications  · Cymbalta on hold due to elevated LFTs  · Pt continues to wax and wane with his psychosis  Today he is alert and cooperative            VTE Pharmacologic Prophylaxis:   Pharmacologic: Enoxaparin (Lovenox)  Mechanical VTE Prophylaxis in Place: No    Patient Centered Rounds: I have performed bedside rounds with nursing staff today  Discussions with Specialists or Other Care Team Provider:     Education and Discussions with Family / Patient: patient    Time Spent for Care: 30 minutes  More than 50% of total time spent on counseling and coordination of care as described above  Current Length of Stay: 7 day(s)    Current Patient Status: Inpatient   Certification Statement: The patient will continue to require additional inpatient hospital stay due to monitor LFTs, psychiatric symptoms, further evaluation    Discharge Plan: when stable and cleared by psych, GI    Code Status: Level 1 - Full Code      Subjective:   Patient is awake and alert today  Reports he slept great last night  Reports to generalized pain all over, no one spot is worse than the other  Otherwise he feels okay  States that god gets him through his troubles in life    Denies hallucinations today, fevers, N/V, cp, sob  Objective:     Vitals:   Temp (24hrs), Av 7 °F (36 5 °C), Min:96 5 °F (35 8 °C), Max:98 2 °F (36 8 °C)    HR:  [70-99] 92  Resp:  [18-22] 20  BP: (109-129)/(76-93) 122/84  SpO2:  [96 %-98 %] 97 %  Body mass index is 25 82 kg/m²  Input and Output Summary (last 24 hours): Intake/Output Summary (Last 24 hours) at 18 0848  Last data filed at 18 7773   Gross per 24 hour   Intake              300 ml   Output              250 ml   Net               50 ml       Physical Exam:     Physical Exam   Constitutional: He is oriented to person, place, and time  He appears well-developed and well-nourished  No distress  HENT:   Head: Normocephalic and atraumatic  Mouth/Throat: Oropharynx is clear and moist    Eyes: EOM are normal  Pupils are equal, round, and reactive to light  No scleral icterus  Neck: Normal range of motion  Neck supple  No thyromegaly present  Cardiovascular: Normal rate, regular rhythm and normal heart sounds  No murmur heard  Pulmonary/Chest: Effort normal and breath sounds normal  No respiratory distress  He has no wheezes  He has no rales  He exhibits no tenderness  Abdominal: Soft  Bowel sounds are normal  He exhibits no distension  There is no tenderness  Musculoskeletal: Normal range of motion  He exhibits no edema or tenderness  Neurological: He is alert and oriented to person, place, and time  No cranial nerve deficit  Skin: Skin is warm and dry  No rash noted  He is not diaphoretic  No erythema  Psychiatric: He has a normal mood and affect  His behavior is normal    Vitals reviewed        Additional Data:     Labs:      Results from last 7 days  Lab Units 18  0540   WBC Thousand/uL 7 69   HEMOGLOBIN g/dL 12 4   HEMATOCRIT % 38 6   PLATELETS Thousands/uL 286   NEUTROS PCT % 60   LYMPHS PCT % 30   MONOS PCT % 8   EOS PCT % 1       Results from last 7 days  Lab Units 18  0540   SODIUM mmol/L 138   POTASSIUM mmol/L 3 4* CHLORIDE mmol/L 101   CO2 mmol/L 26   BUN mg/dL 14   CREATININE mg/dL 1 02   CALCIUM mg/dL 8 8   TOTAL PROTEIN g/dL 7 3   BILIRUBIN TOTAL mg/dL 1 23*   ALK PHOS U/L 254*   ALT U/L 139*   AST U/L 51*   GLUCOSE RANDOM mg/dL 136       Results from last 7 days  Lab Units 08/18/18  0451   INR  1 05                 * I Have Reviewed All Lab Data Listed Above  * Additional Pertinent Lab Tests Reviewed: All Labs Within Last 24 Hours Reviewed    Imaging:    Imaging Reports Reviewed Today Include: None  Imaging Personally Reviewed by Myself Includes:  None    Recent Cultures (last 7 days):           Last 24 Hours Medication List:     Current Facility-Administered Medications:  clonazePAM 1 mg Oral BID Lexi Parada PA-C   gabapentin 300 mg Oral BID Lexi Parada PA-C   ibuprofen 400 mg Oral Q6H PRN Batsheva PresserLAILA   LORazepam 1 mg Intravenous Q6H PRN Hetul Chiu, DO   OLANZapine 20 mg Oral HS Buckner Duane, DELANO   ondansetron 4 mg Intravenous Q6H PRN Buckner Duane, DELANO   pantoprazole 20 mg Oral Early Morning Buckner Duane, MIGUELANGELNP   polyethylene glycol 17 g Oral Daily PRN Lexi LAILA Parada   senna 1 tablet Oral HS Lexibrianna Parada PA-C   sucralfate 1 g Oral 4x Daily PRN Buckner Duane, DELANO   zolpidem 10 mg Oral HS PRN Buckner Duane, CRNP        Today, Patient Was Seen By: Tasneem Manley PA-C    ** Please Note: Dictation voice to text software may have been used in the creation of this document   **

## 2018-08-23 VITALS
TEMPERATURE: 97.9 F | OXYGEN SATURATION: 97 % | HEIGHT: 66 IN | BODY MASS INDEX: 25.71 KG/M2 | RESPIRATION RATE: 18 BRPM | DIASTOLIC BLOOD PRESSURE: 85 MMHG | SYSTOLIC BLOOD PRESSURE: 114 MMHG | HEART RATE: 100 BPM | WEIGHT: 160 LBS

## 2018-08-23 LAB
ALBUMIN SERPL BCP-MCNC: 3.4 G/DL (ref 3.5–5)
ALP SERPL-CCNC: 223 U/L (ref 46–116)
ALT SERPL W P-5'-P-CCNC: 109 U/L (ref 12–78)
ANION GAP SERPL CALCULATED.3IONS-SCNC: 9 MMOL/L (ref 4–13)
AST SERPL W P-5'-P-CCNC: 42 U/L (ref 5–45)
BILIRUB SERPL-MCNC: 1.24 MG/DL (ref 0.2–1)
BUN SERPL-MCNC: 13 MG/DL (ref 5–25)
CALCIUM SERPL-MCNC: 8.9 MG/DL (ref 8.3–10.1)
CHLORIDE SERPL-SCNC: 103 MMOL/L (ref 100–108)
CO2 SERPL-SCNC: 23 MMOL/L (ref 21–32)
CREAT SERPL-MCNC: 1.01 MG/DL (ref 0.6–1.3)
GFR SERPL CREATININE-BSD FRML MDRD: 87 ML/MIN/1.73SQ M
GLUCOSE SERPL-MCNC: 134 MG/DL (ref 65–140)
POTASSIUM SERPL-SCNC: 3.5 MMOL/L (ref 3.5–5.3)
PROT SERPL-MCNC: 7.4 G/DL (ref 6.4–8.2)
SODIUM SERPL-SCNC: 135 MMOL/L (ref 136–145)

## 2018-08-23 PROCEDURE — 80053 COMPREHEN METABOLIC PANEL: CPT | Performed by: PHYSICIAN ASSISTANT

## 2018-08-23 PROCEDURE — 99232 SBSQ HOSP IP/OBS MODERATE 35: CPT | Performed by: PSYCHIATRY & NEUROLOGY

## 2018-08-23 PROCEDURE — 99239 HOSP IP/OBS DSCHRG MGMT >30: CPT | Performed by: PHYSICIAN ASSISTANT

## 2018-08-23 RX ORDER — GABAPENTIN 300 MG/1
300 CAPSULE ORAL 2 TIMES DAILY
Qty: 20 CAPSULE | Refills: 0 | Status: SHIPPED | OUTPATIENT
Start: 2018-08-24 | End: 2020-11-18 | Stop reason: HOSPADM

## 2018-08-23 RX ORDER — CLONAZEPAM 1 MG/1
1 TABLET ORAL 2 TIMES DAILY
Qty: 20 TABLET | Refills: 0 | Status: SHIPPED | OUTPATIENT
Start: 2018-08-24 | End: 2018-09-13 | Stop reason: ALTCHOICE

## 2018-08-23 RX ADMIN — GABAPENTIN 300 MG: 300 CAPSULE ORAL at 08:13

## 2018-08-23 RX ADMIN — LORAZEPAM 1 MG: 2 INJECTION INTRAMUSCULAR; INTRAVENOUS at 01:19

## 2018-08-23 RX ADMIN — CLONAZEPAM 1 MG: 1 TABLET ORAL at 08:13

## 2018-08-23 RX ADMIN — IBUPROFEN 400 MG: 400 TABLET ORAL at 10:27

## 2018-08-23 RX ADMIN — CLONAZEPAM 1 MG: 1 TABLET ORAL at 17:23

## 2018-08-23 RX ADMIN — PANTOPRAZOLE SODIUM 20 MG: 20 TABLET, DELAYED RELEASE ORAL at 08:13

## 2018-08-23 RX ADMIN — GABAPENTIN 300 MG: 300 CAPSULE ORAL at 17:23

## 2018-08-23 NOTE — PROGRESS NOTES
Progress Note - Linda REY Sovocool 51 y o  male MRN: [id number]  Unit/Bed#: CW3 337-01 Encounter: [id number]      Patient was seen for continuation of care  Patient is feeling well today  Patient denies suicidal ideation and psychotic symptoms  He has been eating and drinking,  he is walking around, not issues overnight  Behavior over the last 24 hours:  improved  Sleep: normal  Appetite: normal  Medication side effects: No  ROS: no complaints    Mental Status Evaluation:  Appearance:  older than stated age   Behavior:  cooperative   Speech:  normal pitch and normal volume   Mood:  euthymic   Affect:  mood-congruent   Language: naming objects and repeating phrases   Thought Process:  normal   Associations: intact associations   Thought Content:  delusions  states GOD whispers "good things" to him   Perceptual Disturbances: None   Risk Potential: denies suicidal and homicidal ideations   Sensorium:  person, place and time/date   Memory:  recent and remote memory grossly intact   Cognition:  grossly intact   Consciousness:  alert and awake    Attention: attention span and concentration were age appropriate   Intellect: within normal limits   Fund of Knowledge: awareness of current events: Fair, past history: Fair and vocabulary: Fair   Insight:  fair   Judgment: fair   Muscle Strength and Tone: Within normal limits   Gait/Station: normal gait/station and normal balance   Motor Activity: no abnormal movements         Assessment/Plan  Aubrey Fajardo is a 51 y o  male with bipolar disorder, panic disorder and anxiety presented to the hospital with abdominal pain patient has transaminitis and had been having changes in mental status  Patient have improved in yesterday  today he is  Awake, he states that he feels better and he is want to go home  He states that the Zyprexa is helping him  Patient have a psychiatrist outside for outpatient care    At this moment patient is baseline  Diagnosis:  Bipolar disorder current episode mixed moderate without psychotic features  Generalized anxiety disorder  Recommended Treatment:   Continue medical management  Zyprexa 20 mg p o  HS  Gabapentin 300 mg twice a day p o  Clonazepam 1 mg twice a day p o  He will follow up with Dr Karla Oliveira at [facility]    Discussed with primary team  Primary thing stent that she has episode of confusion last night and the plan to keep him for 1 more day  I will follow up tomorrow     Medications: all current active meds have been reviewed      Risks, benefits and possible side effects of Medications:     Risks, benefits, and possible side effects of medications explained to patient and patient verbalizes understanding  Labs: I have personally reviewed all pertinent laboratory results       Lab Results   Component Value Date     (L) 08/23/2018    K 3 5 08/23/2018     08/23/2018    CO2 23 08/23/2018    ANIONGAP 9 08/23/2018    BUN 13 08/23/2018    CREATININE 1 01 08/23/2018    GLUCOSE 134 08/23/2018    CALCIUM 8 9 08/23/2018    AST 42 08/23/2018     (H) 08/23/2018    ALKPHOS 223 (H) 08/23/2018    PROT 7 4 08/23/2018    BILITOT 1 24 (H) 08/23/2018    EGFR 87 08/23/2018         Lattie Hatchet, MD

## 2018-08-23 NOTE — DISCHARGE INSTRUCTIONS
Abdominal Pain, Ambulatory Care   GENERAL INFORMATION:   Abdominal pain  can be dull, achy, or sharp  You may have pain in one area of your abdomen, or in your entire abdomen  Your pain may be caused by constipation, food sensitivity or poisoning, infection, or a blockage  Abdominal pain can also be caused by a hernia, appendicitis, or an ulcer  The cause of your abdominal pain may be unknown  Seek immediate care for the following symptoms:   · New chest pain or shortness of breath    · Pulsing pain in your upper abdomen or lower back that suddenly becomes constant    · Pain in the right lower abdominal area that worsens with movement    · Fever over 100 4°F (38°C) or shaking chills    · Vomiting and you cannot keep food or fluids down    · Pain does not improve or gets worse over the next 8 to 12 hours    · Blood in your vomit or bowel movements, or they look black and tarry    · Skin or the whites of your eyes turn yellow    · Large amount of vaginal bleeding that is not your monthly period  Treatment for abdominal pain  may include medicine to calm your stomach, prevent vomiting, or decrease pain  Follow up with your healthcare provider as directed:  Write down your questions so you remember to ask them during your visits  CARE AGREEMENT:   You have the right to help plan your care  Learn about your health condition and how it may be treated  Discuss treatment options with your caregivers to decide what care you want to receive  You always have the right to refuse treatment  The above information is an  only  It is not intended as medical advice for individual conditions or treatments  Talk to your doctor, nurse or pharmacist before following any medical regimen to see if it is safe and effective for you  © 2014 [address] is for End User's use only and may not be sold, redistributed or otherwise used for commercial purposes   All illustrations and images included in [facility] are the copyrighted property of A D A M , Inc  or Dharmesh Sterling  Abdominal Pain, Ambulatory Care   Antoinette CHRIS & Yimi S: Abdominal Pain  In: Conor Nolasco RM, [facility], et al, eds  [address] Emergency Medicine Consult, 4th ed  [address] [facility] [facility] [place], [place], 2011  Teresa Baugh, Tate HENDERSON, & Sukh F: Approach to Abdominal Pain  In: Iris Jasso, Sheree Tyson Mulling  The [address] [facility],[address], [facility], 2nd ed  [address] , [place], [place], 2006 © 2014 [address] is for End User's use only and may not be sold, redistributed or otherwise used for commercial purposes  All illustrations and images included in CareNotes® are the copyrighted property of A D A M , Inc  or Dharmesh Sterling  The above information is an  only  It is not intended as medical advice for individual conditions or treatments  Talk to your doctor, nurse or pharmacist before following any medical regimen to see if it is safe and effective for you  Anxiety, Ambulatory Care   GENERAL INFORMATION:   Anxiety  is a condition that causes you to feel excessive worry, uneasiness, or fear  Family or work stress, smoking, caffeine, and alcohol can increase your risk for anxiety  Certain medicines or health conditions can also increase your risk  Anxiety may begin gradually and can become a long-term condition if it is not managed or treated    Common symptoms include the following:   · Fatigue or muscle tightness     · Shaking, restlessness, or irritability     · Problems focusing     · Trouble sleeping     · Feeling jumpy, easily startled, or dizzy     · Rapid heartbeat or shortness of breath  Seek immediate care for the following symptoms:   · Chest pain, tightness, or heaviness that may spread to your shoulders, arms, jaw, neck, or back    · Feeling like hurting yourself or someone else    · Dizziness or feeling lightheaded or faint  Treatment for anxiety  may include medicines to help you feel calm and relaxed, and decrease your symptoms  Healthcare providers will treat any medical conditions that may be causing your symptoms  Manage anxiety:   · Go to counseling as directed  Cognitive behavioral therapy can help you understand and change how you react to events that trigger your symptoms  · Find ways to manage your symptoms  Activities such as exercise, meditation, or listening to music can help you relax  · Practice deep breathing  Breathing can change how your body reacts to stress  Focus on taking slow, deep breaths several times a day, or during an anxiety attack  Breathe in through your nose, and out through your mouth  · Avoid caffeine  Caffeine can make your symptoms worse  Avoid foods or drinks that are meant to increase your energy level  · Limit or avoid alcohol  Ask your healthcare provider if alcohol is safe for you  You may not be able to drink alcohol if you take certain anxiety or depression medicines  Limit alcohol to 1 drink per day if you are a woman  Limit alcohol to 2 drinks per day if you are a man  A drink of alcohol is 12 ounces of beer, 5 ounces of wine, or 1½ ounces of liquor  Follow up with your healthcare provider as directed:  Write down your questions so you remember to ask them during your visits  CARE AGREEMENT:   You have the right to help plan your care  Learn about your health condition and how it may be treated  Discuss treatment options with your caregivers to decide what care you want to receive  You always have the right to refuse treatment  The above information is an  only  It is not intended as medical advice for individual conditions or treatments  Talk to your doctor, nurse or pharmacist before following any medical regimen to see if it is safe and effective for you    © 2014 [address] is for End User's use only and may not be sold, redistributed or otherwise used for commercial purposes  All illustrations and images included in CareNotes® are the copyrighted property of A D A M , Inc  or Dharmesh Sterling  Bipolar Disorder   AMBULATORY CARE:   Bipolar disorder  is a long-term chemical imbalance that causes rapid changes in mood and behavior  High moods are called roderick  Low moods are called depression  Sometimes you will feel manic and sometimes you will feel depressed  You can have alternating episodes of roderick and depression  This is called a mixed bipolar state  Call 911 if:   · You think about hurting yourself or someone else  Contact your healthcare provider or psychiatrist if:   · You are having trouble managing your bipolar disorder  · You cannot sleep, or are sleeping all the time  · You cannot eat, or are eating more than usual     · You feel dizzy or your stomach is upset  · You cannot make it to your next meeting  · You have questions or concerns about your condition or care    Common signs and symptoms of roderick:   · Being easily distracted or agitated, or focusing all your attention on a goal    · Insomnia (trouble sleeping) or not needing as much sleep as usual    · Inflated self-esteem or belief in abilities    · Racing thoughts that may not make sense or be understood by others    · Speech that is faster than usual, or you talk more than usual    · Increased thoughts about sex    · Happy and care free, with a sudden change to anger or irritability    · Hallucinations that cause you to see and hear things that are not really there  Common signs and symptoms of depression:   · Anger, worry, anxiousness, or irritability    · Lack of energy    · Sadness or emptiness    · Crying for long periods    · Low self-esteem or sense of worthlessness    · Negative thoughts or not caring about anything    · Too much or too little sleep  Treatment  for bipolar disorder may include medicines to control your mood swings  You may need to see a therapist or psychiatrist regularly for counseling  You may need to go into the hospital for tests and treatment  Follow up with your healthcare provider or psychiatrist as directed:  Write down your questions so you remember to ask them during your visits  Manage bipolar disorder:  Watch for triggers of bipolar disorder symptoms, such as stress  Learn new ways to relax, such as deep breathing, to manage your stress  Tell someone if you feel a manic or depressive period might be coming on  Ask a friend or family member to help watch you for bipolar symptoms  Work to develop skills that will help you manage bipolar disorder  You may need to make lifestyle changes  Ask your healthcare provider or psychiatrist for resources  © 2017 [address] [facility] Information is for End User's use only and may not be sold, redistributed or otherwise used for commercial purposes  All illustrations and images included in CareNotes® are the copyrighted property of [employer] M , Inc  or Dharmesh Sterling  The above information is an  only  It is not intended as medical advice for individual conditions or treatments  Talk to your doctor, nurse or pharmacist before following any medical regimen to see if it is safe and effective for you

## 2018-08-24 ENCOUNTER — HOSPITAL ENCOUNTER (EMERGENCY)
Facility: HOSPITAL | Age: 48
Discharge: HOME | End: 2018-08-24
Attending: EMERGENCY MEDICINE | Admitting: EMERGENCY MEDICINE
Payer: COMMERCIAL

## 2018-08-24 VITALS
BODY MASS INDEX: 25.11 KG/M2 | HEIGHT: 67 IN | HEART RATE: 89 BPM | SYSTOLIC BLOOD PRESSURE: 113 MMHG | RESPIRATION RATE: 19 BRPM | OXYGEN SATURATION: 100 % | WEIGHT: 160 LBS | DIASTOLIC BLOOD PRESSURE: 75 MMHG

## 2018-08-24 DIAGNOSIS — R53.83 FATIGUE: Primary | ICD-10-CM

## 2018-08-24 LAB
ALBUMIN SERPL BCP-MCNC: 4 G/DL (ref 3.5–5)
ALP SERPL-CCNC: 231 U/L (ref 46–116)
ALT SERPL W P-5'-P-CCNC: 108 U/L (ref 12–78)
AMMONIA PLAS-SCNC: 21 UMOL/L (ref 11–35)
ANION GAP SERPL CALCULATED.3IONS-SCNC: 11 MMOL/L (ref 4–13)
APAP SERPL-MCNC: <3 UG/ML (ref 10–30)
APTT PPP: 27 SECONDS (ref 24–36)
AST SERPL W P-5'-P-CCNC: 33 U/L (ref 5–45)
BASOPHILS # BLD AUTO: 0.02 THOUSANDS/ΜL (ref 0–0.1)
BASOPHILS NFR BLD AUTO: 0 % (ref 0–1)
BILIRUB DIRECT SERPL-MCNC: 0.83 MG/DL (ref 0–0.2)
BILIRUB SERPL-MCNC: 1.2 MG/DL (ref 0.2–1)
BUN SERPL-MCNC: 12 MG/DL (ref 5–25)
CALCIUM SERPL-MCNC: 9.4 MG/DL (ref 8.3–10.1)
CHLORIDE SERPL-SCNC: 103 MMOL/L (ref 100–108)
CO2 SERPL-SCNC: 26 MMOL/L (ref 21–32)
CREAT SERPL-MCNC: 1.04 MG/DL (ref 0.6–1.3)
EOSINOPHIL # BLD AUTO: 0.05 THOUSAND/ΜL (ref 0–0.61)
EOSINOPHIL NFR BLD AUTO: 1 % (ref 0–6)
ERYTHROCYTE [DISTWIDTH] IN BLOOD BY AUTOMATED COUNT: 13.3 % (ref 11.6–15.1)
ETHANOL SERPL-MCNC: <3 MG/DL (ref 0–3)
GFR SERPL CREATININE-BSD FRML MDRD: 84 ML/MIN/1.73SQ M
GLUCOSE SERPL-MCNC: 99 MG/DL (ref 65–140)
HCT VFR BLD AUTO: 40.8 % (ref 36.5–49.3)
HGB BLD-MCNC: 13.2 G/DL (ref 12–17)
IMM GRANULOCYTES # BLD AUTO: 0.04 THOUSAND/UL (ref 0–0.2)
IMM GRANULOCYTES NFR BLD AUTO: 1 % (ref 0–2)
INR PPP: 1.01 (ref 0.86–1.17)
LYMPHOCYTES # BLD AUTO: 1.4 THOUSANDS/ΜL (ref 0.6–4.47)
LYMPHOCYTES NFR BLD AUTO: 19 % (ref 14–44)
MCH RBC QN AUTO: 27.4 PG (ref 26.8–34.3)
MCHC RBC AUTO-ENTMCNC: 32.4 G/DL (ref 31.4–37.4)
MCV RBC AUTO: 85 FL (ref 82–98)
MONOCYTES # BLD AUTO: 0.61 THOUSAND/ΜL (ref 0.17–1.22)
MONOCYTES NFR BLD AUTO: 8 % (ref 4–12)
NEUTROPHILS # BLD AUTO: 5.35 THOUSANDS/ΜL (ref 1.85–7.62)
NEUTS SEG NFR BLD AUTO: 71 % (ref 43–75)
NRBC BLD AUTO-RTO: 0 /100 WBCS
PLATELET # BLD AUTO: 260 THOUSANDS/UL (ref 149–390)
PMV BLD AUTO: 10.8 FL (ref 8.9–12.7)
POTASSIUM SERPL-SCNC: 3.6 MMOL/L (ref 3.5–5.3)
PROT SERPL-MCNC: 7.7 G/DL (ref 6.4–8.2)
PROTHROMBIN TIME: 12.7 SECONDS (ref 11.8–14.2)
RBC # BLD AUTO: 4.81 MILLION/UL (ref 3.88–5.62)
SALICYLATES SERPL-MCNC: <3 MG/DL (ref 3–20)
SODIUM SERPL-SCNC: 140 MMOL/L (ref 136–145)
WBC # BLD AUTO: 7.47 THOUSAND/UL (ref 4.31–10.16)

## 2018-08-24 PROCEDURE — 85610 PROTHROMBIN TIME: CPT | Performed by: EMERGENCY MEDICINE

## 2018-08-24 PROCEDURE — 82140 ASSAY OF AMMONIA: CPT | Performed by: EMERGENCY MEDICINE

## 2018-08-24 PROCEDURE — 80076 HEPATIC FUNCTION PANEL: CPT | Performed by: EMERGENCY MEDICINE

## 2018-08-24 PROCEDURE — 85025 COMPLETE CBC W/AUTO DIFF WBC: CPT | Performed by: EMERGENCY MEDICINE

## 2018-08-24 PROCEDURE — 80329 ANALGESICS NON-OPIOID 1 OR 2: CPT | Performed by: EMERGENCY MEDICINE

## 2018-08-24 PROCEDURE — 80048 BASIC METABOLIC PNL TOTAL CA: CPT | Performed by: EMERGENCY MEDICINE

## 2018-08-24 PROCEDURE — 36415 COLL VENOUS BLD VENIPUNCTURE: CPT | Performed by: EMERGENCY MEDICINE

## 2018-08-24 PROCEDURE — 80320 DRUG SCREEN QUANTALCOHOLS: CPT | Performed by: EMERGENCY MEDICINE

## 2018-08-24 PROCEDURE — 85730 THROMBOPLASTIN TIME PARTIAL: CPT | Performed by: EMERGENCY MEDICINE

## 2018-08-24 PROCEDURE — 99285 EMERGENCY DEPT VISIT HI MDM: CPT

## 2018-08-24 NOTE — ASSESSMENT & PLAN NOTE
· Patient with waxing and waning mental status  Patient with hx of bipolar, associated with delirium   · Reportedly afternoon of 8/20 patient had an episode where he was minimally responsive  CT head was negative and the patient returned to his baseline status on his own without intervention  Again, yesterday (8/21) patient had similar episode where he was minimally responsive (i e  Would respond to sternal rub) but was otherwise lethargic and not speaking/following commands  Within several minutes, however, patient started to wake up and answering questions appropriately  · On today's exam patient is alert, awake, cooperative, oriented, and following commands  Denies hallucinations  · Will decrease dose of Gabapentin, Klonopin  · ABG grossly normal  · Neurology consulted, appreciate recs  Unclear etiology  Routine EEG negative  No further neurologic workup recommended  · Appreciate psych input - no further changes at this time, he is to follow closely with his psychiatrist outpatient

## 2018-08-24 NOTE — ASSESSMENT & PLAN NOTE
· Abdominal pain and transaminitis  · Suspect elevated LFTs may be multifactorial secondary to medication affects plus possible biliary component  · Possibly due to new psych meds POA  · Also recently started on Macrobid for UTI- will d/c this as well   · RUQ US shows multiple small gallstones in gallbladder, without evidence of acute cholecystitis  · For now holding Cymbalta only and continuing Zyprexa as AST/ALT  Is trending down  Monitor labs closely  · LFTs today 8/23 - AST 51-->42, --> 109, Alk Phos 254 --> 223, T bili 1 24  · Hepatitis panel, CMV, alapha 1 antitrypsin, ceruloplasmin, iron panel negative  · GIAN positive though non-specific  · GI on board and appreciate recs  Suspect DILI given patient's LFTs are improving with d/c medications  Asymptomatic cholelithiasis  MRCP not indicated at this time  Recommend to continue to hold Cymbalta  If LFTs start trending up again would recommend EUS +/- ERCP  Recommend outpt colonoscopy due to previous history of rectal bleeding at home  · Can follow-up with GI outpatient

## 2018-08-24 NOTE — DISCHARGE SUMMARY
Discharge- Richard Morris [place]ocool 1970, 51 y o  male MRN: [id number]    Unit/Bed#: CW3 337-01 Encounter: [id number]    Primary Care Provider: Jolynn Reyes DO   Date and time admitted to hospital: 8/15/2018  3:04 PM      * Elevated liver enzymes   Assessment & Plan    · Abdominal pain and transaminitis  · Suspect elevated LFTs may be multifactorial secondary to medication affects plus possible biliary component  · Possibly due to new psych meds POA  · Also recently started on Macrobid for UTI- will d/c this as well   · RUQ US shows multiple small gallstones in gallbladder, without evidence of acute cholecystitis  · For now holding Cymbalta only and continuing Zyprexa as AST/ALT  Is trending down  Monitor labs closely  · LFTs today 8/23 - AST 51-->42, --> 109, Alk Phos 254 --> 223, T bili 1 24  · Hepatitis panel, CMV, alapha 1 antitrypsin, ceruloplasmin, iron panel negative  · GIAN positive though non-specific  · GI on board and appreciate recs  Suspect DILI given patient's LFTs are improving with d/c medications  Asymptomatic cholelithiasis  MRCP not indicated at this time  Recommend to continue to hold Cymbalta  If LFTs start trending up again would recommend EUS +/- ERCP  Recommend outpt colonoscopy due to previous history of rectal bleeding at home  · Can follow-up with GI outpatient  Unresponsive episode   Assessment & Plan    · Patient with waxing and waning mental status  Patient with hx of bipolar, associated with delirium   · Reportedly afternoon of 8/20 patient had an episode where he was minimally responsive  CT head was negative and the patient returned to his baseline status on his own without intervention  Again, yesterday (8/21) patient had similar episode where he was minimally responsive (i e  Would respond to sternal rub) but was otherwise lethargic and not speaking/following commands    Within several minutes, however, patient started to wake up and answering questions appropriately  · On today's exam patient is alert, awake, cooperative, oriented, and following commands  Denies hallucinations  · Will decrease dose of Gabapentin, Klonopin  · ABG grossly normal  · Neurology consulted, appreciate recs  Unclear etiology  Routine EEG negative  No further neurologic workup recommended  · Appreciate psych input - no further changes at this time, he is to follow closely with his psychiatrist outpatient  Generalized abdominal pain   Assessment & Plan    Ct abdomen and pelvis w/contrast: Bilateral lower lobe lung groundglass and streaky density could represent atelectasis and/or pneumonia  Liver noted to be unremarkable  L5/S1 fusion hardware   Grade 1 anterolisthesis of L5 on S1   - LFTs trending down, GI following, suspect DILI given removal of medications and improvement in LFTs  - ULTRASOUND NOTED: There are multiple small gallstones in the gallbladder, without evidence of acute cholecystitis    Common bile duct is top normal in size at 6 mm, without definite choledocholithiasis noted  -MRCP cancelled presently as LFTs improving and patient likely will not tolerate due to delirium  -Appreciate GI input, continue to hold Macrobid, Cymbalta (previously treated for UTI with Macrobid, repeat UA negative)  -GI recommending if liver enzymes trend up again, would recommend EUS +/- ERCP  -Hepatitis panel, CMV, alpha 1 antitrypsin, ceruloplasmin, iron panel negative  -GIAN positive, non-specific   -F/u final viral and autoimmune studies  -Abdominal pain resolved, no further c/o        Hypokalemia   Assessment & Plan    · resolved        Lower urinary tract symptoms   Assessment & Plan    · Recently diagnosed with UTI and started on Macrobid (treated x 3 days)  · D/C Macrobid given elevated LFTs  · Repeat UA benign  · Reports hematuria, unclear accuracy     · F/U with urologist        GERD (gastroesophageal reflux disease)   Assessment & Plan    -Continue PPI Vomiting   Assessment & Plan    · H&H stable  · GI following, appreciate input, recommend outpatient colonoscopy          Chronic pain disorder   Assessment & Plan    · Fentanyl patch         Generalized anxiety disorder   Assessment & Plan    Pt just discharged from [facility] psych unit due to an admission on 8/6/18 for suicidal ideations, with plans to jump out of moving car  During this admission pt was started on some new medications   - Increased Cymbalta to 30 mg qd for depression/anxiety/pain management  -discontinued at [facility] : Seroquel to 700 mg HS / then started on Zyprexa and titrated slowly to 20 mg HS    - Continued on Klonopin 1 mg BID for anxiety management  - Decreased Gabapentin further due to waxing/waning mental status   - Patient's mental status improved with decrease in Klonopin, gabapentin  - he did require ambien 10mg for HS prn for insomnia  - ct head was performed there for visual hallucinations: revealed: No acute intracranial abnormality   - No further IP psych recs, close follow-up with psychiatry outpatient  Bipolar disorder, current episode mixed, moderate ([facility] [address] )   Assessment & Plan    · Discharged from inpatient psych unit and presented to the hospital a few hours after discharge  · Psych following and appreciate input  · Cymbalta and Zyprexa are new medications  · Cymbalta on hold due to elevated LFTs - continue to hold  · Pt continues to wax and wane with his psychosis  The last two days he has been alert, oriented, and cooperative  He continues to have hallucinations  · No further adjustments to medication regimen indicated at this time  He is to have close follow-up with psychiatrist outpatient              Discharging Physician / Practitioner: Sven Castillo PA-C  PCP: Orvil Barthel, DO  Admission Date:   Admission Orders     Ordered        08/15/18 1930  Inpatient Admission (expected length of stay for this patient is greater than two midnights)  Once Discharge Date: 08/23/18    Resolved Problems  Date Reviewed: 8/23/2018    None          Consultations During Hospital Stay:  · Neurology  · [address]  · Gastroenterology    Procedures Performed:     · None    Significant Findings / Test Results:     · Elevated LFTs - have been steadily trending down following cessation of Cymbalta, Macrobid  · Hepatitis panel, CMV, alpha 1 antitrypsin, ceruloplasmin, iron panel negative  · GIAN positive  · Routine EEG negative  · CT head negative  · RUQ US showed multiple small gallstones in gallbladder, without evidence of acute cholecystitis  · Repeat UA unremarkable    Incidental Findings:   · None     Test Results Pending at Discharge (will require follow up): · None     Outpatient Tests Requested:  · None    Complications:  None    Reason for Admission: abdominal pain    Hospital Course:     Sophie Celeste is a 51 y o  male patient who originally presented to the hospital on 8/15/2018 due to abdominal pain  On day of admission his abdominal pain started that morning  He was discharged from [facility] psychiatric unit the morning he presented to [facility]  He was admitted there for SI  He reported to diffuse abdominal pain, however it was noted he was a very poor historian  He reported to discomfort that would move from his stomach into epigastric area and cause severe burning  He reported that to relieve the pain, he self induced vomiting which produced "fecal like" material   He reported to having more than 20 normal consistency BMs with bright red blood over 5 days  He was found to have elevated LFTs on admission  He was seen by GI  Concern was for medication induced transaminitis due to his multiple psychiatric medications  He was recently started on Cymbalta and Zyprexa, as well as Macrobid for UTI (repeat UA negative - so Macrobid was held)  Cymbalta was held as well    RUQ US showed multiple small gallstones in gallbladder without evidence of acute cholecystitis  His LFTs improved with holding these medications, so decision was made not to pursue any further work-up at this time  Reported history of bright red bowel movements with stable hemoglobin could be 2/2 hemorrhoids and he can f/u with GI outpatient for o/p colonoscopy  AI work-up/viral serologies unremarkable (see above for details)  Abdominal pain resolved  He was seen by behavioral health, who agreed with holding Cymbalta  Since his LFTs improved it was decided not to discontinue any other medications  The patient had several episodes of unresponsiveness where he was lethargic and would open his eye to vigorous sternal rub but would otherwise fall back to sleep  These episodes resolved without any intervention  CT head was negative  Neurology evaluated patient, no s/sx on exam for neurologic etiology and routine EEG negative  On the last two days of his stay, his mental status stabilized without further episodes of unresponsiveness  He continues with psychosis and hallucinations, but no further changes were recommended for his medications at this time  He is stable for discharge  He should follow-up with GI, his PCP, and psychiatrist outpatient  Please see above list of diagnoses and related plan for additional information  Condition at Discharge: good     Discharge Day Visit / Exam:     Subjective:  Patient states he did not sleep well last night because the bed was uncomfortable  Otherwise states he gets his support from God  He wants to go home  No other complaints and denies abdominal pain    Vitals: Blood Pressure: 114/85 (08/23/18 1500)  Pulse: 100 (08/23/18 1500)  Temperature: 97 9 °F (36 6 °C) (08/23/18 1500)  Temp Source: Oral (08/23/18 1500)  Respirations: 18 (08/23/18 1500)  Height: 5' 6" (167 6 cm) (08/15/18 1515)  Weight - Scale: 72 6 kg (160 lb) (08/15/18 1515)  SpO2: 97 % (08/23/18 1500)  Exam:   Physical Exam   Constitutional: He is oriented to person, place, and time  He appears well-developed and well-nourished  No distress  HENT:   Head: Normocephalic and atraumatic  Mouth/Throat: Oropharynx is clear and moist    Eyes: EOM are normal  Pupils are equal, round, and reactive to light  No scleral icterus  Neck: Normal range of motion  Neck supple  Cardiovascular: Normal rate, regular rhythm and normal heart sounds  No murmur heard  Pulmonary/Chest: Effort normal and breath sounds normal  No respiratory distress  He has no wheezes  He has no rales  He exhibits no tenderness  Abdominal: Soft  Bowel sounds are normal  He exhibits no distension  There is no tenderness  Musculoskeletal: Normal range of motion  He exhibits no edema or tenderness  Neurological: He is alert and oriented to person, place, and time  No cranial nerve deficit  Skin: Skin is warm and dry  No rash noted  He is not diaphoretic  No erythema  No pallor  Psychiatric: He has a normal mood and affect  His behavior is normal  Judgment and thought content normal    Vitals reviewed  Discussion with Family: wife    Discharge instructions/Information to patient and family:   See after visit summary for information provided to patient and family  Provisions for Follow-Up Care:  See after visit summary for information related to follow-up care and any pertinent home health orders  Disposition:     Home    For Discharges to [facility] SNF:   · Not Applicable to this Patient - Not Applicable to this Patient    Planned Readmission: no     Discharge Statement:  I spent 35 minutes discharging the patient  This time was spent on the day of discharge  I had direct contact with the patient on the day of discharge  Greater than 50% of the total time was spent examining patient, answering all patient questions, arranging and discussing plan of care with patient as well as directly providing post-discharge instructions    Additional time then spent on discharge activities  Discharge Medications:  See after visit summary for reconciled discharge medications provided to patient and family        ** Please Note: This note has been constructed using a voice recognition system **

## 2018-08-24 NOTE — ASSESSMENT & PLAN NOTE
· Discharged from inpatient psych unit and presented to the hospital a few hours after discharge  · Psych following and appreciate input  · Cymbalta and Zyprexa are new medications  · Cymbalta on hold due to elevated LFTs - continue to hold  · Pt continues to wax and wane with his psychosis  The last two days he has been alert, oriented, and cooperative  He continues to have hallucinations  · No further adjustments to medication regimen indicated at this time  He is to have close follow-up with psychiatrist outpatient

## 2018-08-24 NOTE — ED NOTES
Pt brought to ER via EMS d/t pt sleeping in parking lot of [employer]  Upon arrival pt states he is just really tired and weak since he began detoxing  Pt denies SI/HI  States " I prayed for help and help came" Pt is very thin and pale        Mylene Murrieta RN  08/24/18 1949

## 2018-08-24 NOTE — ED NOTES
Spoke to pt's friend Elsy Saleh  Someone will be here to pick pt up        Nita Ritchie, MARCUS  08/24/18 0943

## 2018-08-24 NOTE — ED PROVIDER NOTES
History  Chief Complaint   Patient presents with    Weakness - Generalized     pt presents to ED via EMS  Report from EMS states pt was found in parking lot of [employer] trying to sleep  Pt states he is weak and tired  52y M reportedly found sleeping in [employer] parking lot this am   Was discharged from [facility] last night after being admitted for abnormal LFTs and abd pain  Per discharge summary felt to be due to psych meds that were started early August related to an inpatient psych visit at [facility]  Had meds readjusted and LFTs trended down  Did have some labs still pending for workup, but was to f/u w/ GI as an outpt for that  Pt states he had breakfast and started to walk home, but was so tired he laid down to take a nap  Someone called police/ambulance and pt brought in for evaluation  Only c/o at this time is fatigue  Has mild ha, denies f/c/s, no cp/abd pain, no n/v/d, no melena, no urinary complaints  History provided by:  Patient and EMS personnel   used: No    Fatigue   Severity:  Moderate  Onset quality:  Gradual  Timing:  Constant  Progression:  Worsening  Chronicity:  Chronic  Context: change in medication    Context: not dehydration, not recent infection, not stress and not urinary tract infection    Relieved by:  Nothing  Worsened by:  Nothing  Ineffective treatments:  None tried  Associated symptoms: headaches    Associated symptoms: no abdominal pain, no anorexia, no ataxia, no chest pain, no difficulty walking, no dysuria, no fever, no frequency, no nausea, no shortness of breath, no syncope and no vomiting        Prior to Admission Medications   Prescriptions Last Dose Informant Patient Reported? Taking?    OLANZapine (ZyPREXA) 20 MG tablet   No No   Sig: Take 1 tablet (20 mg total) by mouth daily at bedtime   clonazePAM (KlonoPIN) 1 mg tablet   No No   Sig: Take 1 tablet (1 mg total) by mouth 2 (two) times a day for 10 days   fentaNYL (DURAGESIC) 75 mcg/hr   No No   Sig: Place one patch on skin every 72 hours for pain   Next patch due 8/16/18   gabapentin (NEURONTIN) 300 mg capsule   No No   Sig: Take 1 capsule (300 mg total) by mouth 2 (two) times a day   hydrOXYzine pamoate (VISTARIL) 50 mg capsule   Yes No   Sig: Take 50 mg by mouth 3 (three) times a day as needed for itching or anxiety   omeprazole (PriLOSEC) 20 mg delayed release capsule   Yes No   Sig: Take by mouth daily Dose unknown   sucralfate (CARAFATE) 1 g tablet   No No   Sig: Take 1 tablet (1 g total) by mouth 4 (four) times a day as needed (GERD)   zolpidem (AMBIEN) 10 mg tablet   No No   Sig: Take 1 tablet (10 mg total) by mouth daily at bedtime as needed for sleep      Facility-Administered Medications: None       Past Medical History:   Diagnosis Date    Alcoholism ([place] [address] )     Anxiety     Back pain     Bipolar disorder, current episode mixed, moderate (HCC)     Cardiac disease     Chronic pain disorder     Depression     Hypertension     MI (myocardial infarction) ([place] [address] )     Psychiatric disorder     Psychiatric illness     Psychosis     Renal disorder        Past Surgical History:   Procedure Laterality Date    BACK SURGERY      report thoracic surgery       Family History   Problem Relation Age of Onset    No Known Problems Mother     No Known Problems Father     No Known Problems Sister     No Known Problems Brother     No Known Problems Maternal Aunt     No Known Problems Paternal Aunt     No Known Problems Maternal Uncle     No Known Problems Paternal Uncle     No Known Problems Maternal Grandfather     No Known Problems Maternal Grandmother     No Known Problems Paternal Grandfather     No Known Problems Paternal Grandmother     No Known Problems Cousin     ADD / ADHD Neg Hx     Alcohol abuse Neg Hx     Anxiety disorder Neg Hx     Bipolar disorder Neg Hx     Dementia Neg Hx     Depression Neg Hx     Drug abuse Neg Hx     OCD Neg Hx     Paranoid behavior Neg Hx     Schizophrenia Neg Hx     Seizures Neg Hx     Self-Injury Neg Hx     Suicide Attempts Neg Hx      I have reviewed and agree with the history as documented  Social History   Substance Use Topics    Smoking status: Never Smoker    Smokeless tobacco: Never Used      Comment: patient is a non - smoker    Alcohol use No      Comment: history 10 years ago heavy drinking        Review of Systems   Constitutional: Positive for fatigue  Negative for fever  Respiratory: Negative for shortness of breath  Cardiovascular: Negative for chest pain and syncope  Gastrointestinal: Negative for abdominal pain, anorexia, nausea and vomiting  Genitourinary: Negative for dysuria and frequency  Neurological: Positive for headaches  All other systems reviewed and are negative  Physical Exam  Physical Exam   Constitutional: He appears well-developed and well-nourished  HENT:   Nose: Nose normal    Mouth/Throat: Oropharynx is clear and moist    Eyes: Conjunctivae are normal    Neck: Neck supple  Cardiovascular: Normal rate and regular rhythm  Pulmonary/Chest: Effort normal and breath sounds normal    Abdominal: Soft  He exhibits no distension  There is no tenderness  Musculoskeletal: He exhibits no deformity  Neurological: He is alert  Skin: There is pallor  Psychiatric: He has a normal mood and affect  Nursing note and vitals reviewed        Vital Signs  ED Triage Vitals [08/24/18 0930]   Temp Pulse Respirations Blood Pressure SpO2   -- 98 18 115/74 98 %      Temp src Heart Rate Source Patient Position - Orthostatic VS BP Location FiO2 (%)   -- Monitor Lying Right arm --      Pain Score       --           Vitals:    08/24/18 1015 08/24/18 1130 08/24/18 1145 08/24/18 1200   BP: 118/84 130/84 119/79 113/75   Pulse: 89 88 89 89   Patient Position - Orthostatic VS:           Visual Acuity      ED Medications  Medications - No data to display    Diagnostic Studies  Results Reviewed     Procedure Component Value Units Date/Time    Ethanol [[id number]]  (Normal) Collected:  08/24/18 1057    Lab Status:  Final result Specimen:  Blood Updated:  08/24/18 1125     Ethanol Lvl <3 mg/dL     Salicylate level [[id number]]  (Abnormal) Collected:  08/24/18 1057    Lab Status:  Final result Specimen:  Blood Updated:  90/70/86 5327     Salicylate Lvl <3 (L) mg/dL     Acetaminophen level [[id number]]  (Abnormal) Collected:  08/24/18 1057    Lab Status:  Final result Specimen:  Blood Updated:  08/24/18 1124     Acetaminophen Level <3 (L) ug/mL     Basic metabolic panel [[id number]] Collected:  08/24/18 1057    Lab Status:  Final result Specimen:  Blood Updated:  08/24/18 1123     Sodium 140 mmol/L      Potassium 3 6 mmol/L      Chloride 103 mmol/L      CO2 26 mmol/L      Anion Gap 11 mmol/L      BUN 12 mg/dL      Creatinine 1 04 mg/dL      Glucose 99 mg/dL      Calcium 9 4 mg/dL      eGFR 84 ml/min/1 73sq m     Narrative:         National Kidney Disease Education Program recommendations are as follows:  GFR calculation is accurate only with a steady state creatinine  Chronic Kidney disease less than 60 ml/min/1 73 sq  meters  Kidney failure less than 15 ml/min/1 73 sq  meters      Hepatic function panel [[id number]]  (Abnormal) Collected:  08/24/18 1057    Lab Status:  Final result Specimen:  Blood Updated:  08/24/18 1123     Total Bilirubin 1 20 (H) mg/dL      Bilirubin, Direct 0 83 (H) mg/dL      Alkaline Phosphatase 231 (H) U/L      AST 33 U/L       (H) U/L      Total Protein 7 7 g/dL      Albumin 4 0 g/dL     Ammonia [[id number]]  (Normal) Collected:  08/24/18 1058    Lab Status:  Final result Specimen:  Blood Updated:  08/24/18 1120     Ammonia 21 umol/L     Protime-INR [[id number]]  (Normal) Collected:  08/24/18 1057    Lab Status:  Final result Specimen:  Blood Updated:  08/24/18 1113     Protime 12 7 seconds      INR 1 01    APTT [[id number]]  (Normal) Collected:  08/24/18 1057    Lab Status:  Final result Specimen:  Blood Updated:  08/24/18 1113     PTT 27 seconds     CBC and differential [[id number]] Collected:  08/24/18 1057    Lab Status:  Final result Specimen:  Blood Updated:  08/24/18 1102     WBC 7 47 Thousand/uL      RBC 4 81 Million/uL      Hemoglobin 13 2 g/dL      Hematocrit 40 8 %      MCV 85 fL      MCH 27 4 pg      MCHC 32 4 g/dL      RDW 13 3 %      MPV 10 8 fL      Platelets 063 Thousands/uL      nRBC 0 /100 WBCs      Neutrophils Relative 71 %      Immat GRANS % 1 %      Lymphocytes Relative 19 %      Monocytes Relative 8 %      Eosinophils Relative 1 %      Basophils Relative 0 %      Neutrophils Absolute 5 35 Thousands/µL      Immature Grans Absolute 0 04 Thousand/uL      Lymphocytes Absolute 1 40 Thousands/µL      Monocytes Absolute 0 61 Thousand/µL      Eosinophils Absolute 0 05 Thousand/µL      Basophils Absolute 0 02 Thousands/µL                  No orders to display              Procedures  Procedures       Phone Contacts  ED Phone Contact    ED Course           labs stable  MDM  Number of Diagnoses or Management Options  Fatigue: new and requires workup     Amount and/or Complexity of Data Reviewed  Clinical lab tests: ordered and reviewed  Decide to obtain previous medical records or to obtain history from someone other than the patient: yes  Obtain history from someone other than the patient: yes      CritCare Time    Disposition  Final diagnoses:   Fatigue     Time reflects when diagnosis was documented in both MDM as applicable and the Disposition within this note     Time User Action Codes Description Comment    8/24/2018 12:22 PM Jose Shepard [[id number]] Fatigue       ED Disposition     ED Disposition Condition Comment    Discharge  Andrei Bearcokamille discharge to home/self care      Condition at discharge: Good        Follow-up Information     Follow up With Specialties Details Why [address] [facility], DO Internal Medicine In 2 days If symptoms worsen [facility] [address] [address]  [phone]            Discharge Medication List as of 8/24/2018 12:23 PM      CONTINUE these medications which have NOT CHANGED    Details   fentaNYL (DURAGESIC) 75 mcg/hr Place one patch on skin every 72 hours for pain  Next patch due 8/16/18, Print      hydrOXYzine pamoate (VISTARIL) 50 mg capsule Take 50 mg by mouth 3 (three) times a day as needed for itching or anxiety, Historical Med      OLANZapine (ZyPREXA) 20 MG tablet Take 1 tablet (20 mg total) by mouth daily at bedtime, Starting Wed 8/15/2018, Print      omeprazole (PriLOSEC) 20 mg delayed release capsule Take by mouth daily Dose unknown, Historical Med      sucralfate (CARAFATE) 1 g tablet Take 1 tablet (1 g total) by mouth 4 (four) times a day as needed (GERD), Starting Wed 8/15/2018, Print      zolpidem (AMBIEN) 10 mg tablet Take 1 tablet (10 mg total) by mouth daily at bedtime as needed for sleep, Starting Wed 8/15/2018, Print      clonazePAM (KlonoPIN) 1 mg tablet Take 1 tablet (1 mg total) by mouth 2 (two) times a day for 10 days, Starting Fri 8/24/2018, Until Mon 9/3/2018, Print      gabapentin (NEURONTIN) 300 mg capsule Take 1 capsule (300 mg total) by mouth 2 (two) times a day, Starting Fri 8/24/2018, Print           No discharge procedures on file      ED Provider  Electronically Signed by           Diana Molina DO  08/24/18 6165

## 2018-08-24 NOTE — ASSESSMENT & PLAN NOTE
Pt just discharged from [facility] unit due to an admission on 8/6/18 for suicidal ideations, with plans to jump out of moving car  During this admission pt was started on some new medications   - Increased Cymbalta to 30 mg qd for depression/anxiety/pain management  -discontinued [place] : Seroquel to 700 mg HS / then started on Zyprexa and titrated slowly to 20 mg HS    - Continued on Klonopin 1 mg BID for anxiety management  - Decreased Gabapentin further due to waxing/waning mental status   - Patient's mental status improved with decrease in Klonopin, gabapentin  - he did require ambien 10mg for HS prn for insomnia  - ct head was performed there for visual hallucinations: revealed: No acute intracranial abnormality   - No further IP psych recs, close follow-up with psychiatry outpatient

## 2018-08-24 NOTE — ASSESSMENT & PLAN NOTE
Ct abdomen and pelvis w/contrast: Bilateral lower lobe lung groundglass and streaky density could represent atelectasis and/or pneumonia  Liver noted to be unremarkable  L5/S1 fusion hardware   Grade 1 anterolisthesis of L5 on S1   - LFTs trending down, GI following, suspect DILI given removal of medications and improvement in LFTs  - ULTRASOUND NOTED: There are multiple small gallstones in the gallbladder, without evidence of acute cholecystitis    Common bile duct is top normal in size at 6 mm, without definite choledocholithiasis noted      -MRCP cancelled presently as LFTs improving and patient likely will not tolerate due to delirium  -Appreciate GI input, continue to hold Macrobid, Cymbalta (previously treated for UTI with Macrobid, repeat UA negative)  -GI recommending if liver enzymes trend up again, would recommend EUS +/- ERCP  -Hepatitis panel, CMV, alpha 1 antitrypsin, ceruloplasmin, iron panel negative  -GIAN positive, non-specific   -F/u final viral and autoimmune studies  -Abdominal pain resolved, no further c/o

## 2018-08-24 NOTE — ED NOTES
Pt is resting in bed, respirations is visible  Will continue to monitor       Lidia Sweeney RN  08/24/18 1753

## 2018-08-24 NOTE — ED NOTES
Pt states he does not want blood work  "I would really prefer not to get stuck again, I got stuck up until 5pm last night "  Told pt I would speak to provider  Provider notified       Reji Tierney RN  08/24/18 5893

## 2018-08-24 NOTE — DISCHARGE INSTRUCTIONS
Fatigue   WHAT YOU NEED TO KNOW:   Fatigue is mental and physical exhaustion that does not get better with rest  Fatigue may make daily activities difficult or cause extreme sleepiness  It is normal to feel tired sometimes, but long-term fatigue may be a sign of serious illness  DISCHARGE INSTRUCTIONS:   Return to the emergency department if:   · You have chest pain  · You have difficulty breathing  Contact your healthcare provider if:   · You have a cough that gets worse, or does not go away  · You see blood in your urine or bowel movement  · You have numbness or tingling around your mouth or in an arm or leg  · You faint, feel dizzy, or have vision changes  · You have swelling in your lymph nodes  · You are a woman and have vaginal bleeding that is not normal for you, or is not expected  · You lose weight without trying, or you have trouble eating  · You feel weak or have muscle pain  · You have pain or swelling in your joints  · You have questions or concerns about your condition or care  Follow up with your healthcare provider as directed: You may need more tests  Your healthcare provider may also refer you to a specialist  Write down your questions so you remember to ask them during your visits  Manage fatigue:   · Keep a fatigue diary  Include anything that makes you feel more tired or less tired  Bring the diary with you to follow-up visits with your provider  · Exercise as directed  Exercise can help you feel more alert  Exercise can also help you manage stress or relieve depression  Try to get at least 30 minutes of exercise most days of the week  · Keep a regular sleep schedule  Go to bed and wake up at the same times every day  Limit naps to 1 hour each day  A nap can improve fatigue, but a long nap may make it harder to go to sleep at night  · Plan and limit your activities    Limit the number of activities such as shopping and cleaning you do each day  If possible, try to spread out your trips throughout the week  Plan ahead so you are not rushing to get something done  Only do activities that you have the energy to complete  Take breaks between activities  Ask for help if you need it  Another person may be able to drive you or help with daily activities  · Eat a variety of healthy foods  Healthy foods include fruits, vegetables, whole-grain breads, low-fat dairy products, beans, lean meats, and fish  Good nutrition can help manage fatigue  · Limit caffeine and alcohol  These can make it difficult to fall or stay asleep  Women should limit alcohol to 1 drink a day  Men should limit alcohol to 2 drinks a day  A drink of alcohol is 12 ounces of beer, 5 ounces of wine, or 1½ ounces of liquor  Ask our healthcare provider how much caffeine is safe for you  · Do not smoke  Nicotine and other chemicals in cigarettes and cigars can cause lung damage and increase fatigue  Ask your healthcare provider for information if you currently smoke and need help to quit  E-cigarettes or smokeless tobacco still contain nicotine  Talk to your healthcare provider before you use these products  © 2017 [employer] [facility] Information is for End User's use only and may not be sold, redistributed or otherwise used for commercial purposes  All illustrations and images included in CareNotes® are the copyrighted property of [employer] , [employer]  or Dharmesh Sterling  The above information is an  only  It is not intended as medical advice for individual conditions or treatments  Talk to your doctor, nurse or pharmacist before following any medical regimen to see if it is safe and effective for you

## 2018-08-29 ENCOUNTER — TELEPHONE (OUTPATIENT)
Dept: GASTROENTEROLOGY | Facility: CLINIC | Age: 48
End: 2018-08-29

## 2018-08-29 DIAGNOSIS — R74.8 ELEVATED LIVER ENZYMES: Primary | ICD-10-CM

## 2018-09-13 ENCOUNTER — HOSPITAL ENCOUNTER (INPATIENT)
Facility: HOSPITAL | Age: 48
LOS: 1 days | Discharge: HOME | DRG: 309 | End: 2018-09-14
Attending: EMERGENCY MEDICINE | Admitting: INTERNAL MEDICINE
Payer: COMMERCIAL

## 2018-09-13 DIAGNOSIS — F22 DELUSIONAL DISORDER (HCC): ICD-10-CM

## 2018-09-13 DIAGNOSIS — R94.31 PROLONGED Q-T INTERVAL ON ECG: Primary | ICD-10-CM

## 2018-09-13 DIAGNOSIS — I10 ESSENTIAL HYPERTENSION: ICD-10-CM

## 2018-09-13 DIAGNOSIS — G93.40 ENCEPHALOPATHY: ICD-10-CM

## 2018-09-13 DIAGNOSIS — F31.62 BIPOLAR DISORDER, CURRENT EPISODE MIXED, MODERATE (HCC): ICD-10-CM

## 2018-09-13 DIAGNOSIS — E87.6 HYPOKALEMIA: ICD-10-CM

## 2018-09-13 PROBLEM — R41.89 UNRESPONSIVE EPISODE: Status: RESOLVED | Noted: 2018-08-21 | Resolved: 2018-09-13

## 2018-09-13 PROBLEM — R41.82 ALTERED MENTAL STATUS: Status: RESOLVED | Noted: 2018-09-13 | Resolved: 2018-09-13

## 2018-09-13 PROBLEM — R10.84 GENERALIZED ABDOMINAL PAIN: Status: RESOLVED | Noted: 2018-08-15 | Resolved: 2018-09-13

## 2018-09-13 PROBLEM — F19.20 DRUG ABUSE AND DEPENDENCE (HCC): Status: ACTIVE | Noted: 2018-09-13

## 2018-09-13 PROBLEM — R41.82 ALTERED MENTAL STATUS: Status: ACTIVE | Noted: 2018-09-13

## 2018-09-13 PROBLEM — R39.9 LOWER URINARY TRACT SYMPTOMS: Status: RESOLVED | Noted: 2018-08-15 | Resolved: 2018-09-13

## 2018-09-13 PROBLEM — R40.4 UNRESPONSIVE EPISODE: Status: RESOLVED | Noted: 2018-08-21 | Resolved: 2018-09-13

## 2018-09-13 PROBLEM — R90.89 ABNORMAL BRAIN MRI: Status: ACTIVE | Noted: 2018-09-13

## 2018-09-13 PROBLEM — R74.8 ELEVATED LIVER ENZYMES: Status: RESOLVED | Noted: 2018-08-16 | Resolved: 2018-09-13

## 2018-09-13 LAB
ALBUMIN SERPL BCP-MCNC: 4.4 G/DL (ref 3.5–5)
ALP SERPL-CCNC: 134 U/L (ref 46–116)
ALT SERPL W P-5'-P-CCNC: 31 U/L (ref 12–78)
AMMONIA PLAS-SCNC: 10 UMOL/L (ref 11–35)
AMPHETAMINES SERPL QL SCN: NEGATIVE
ANION GAP SERPL CALCULATED.3IONS-SCNC: 15 MMOL/L (ref 4–13)
APAP SERPL-MCNC: <2 UG/ML (ref 10–30)
APTT PPP: 28 SECONDS (ref 24–36)
AST SERPL W P-5'-P-CCNC: 16 U/L (ref 5–45)
BARBITURATES UR QL: POSITIVE
BASOPHILS # BLD AUTO: 0.04 THOUSANDS/ΜL (ref 0–0.1)
BASOPHILS NFR BLD AUTO: 0 % (ref 0–1)
BENZODIAZ UR QL: NEGATIVE
BILIRUB SERPL-MCNC: 0.7 MG/DL (ref 0.2–1)
BUN SERPL-MCNC: 7 MG/DL (ref 5–25)
CALCIUM SERPL-MCNC: 9.8 MG/DL (ref 8.3–10.1)
CHLORIDE SERPL-SCNC: 100 MMOL/L (ref 100–108)
CLARITY, POC: NORMAL
CO2 SERPL-SCNC: 26 MMOL/L (ref 21–32)
COCAINE UR QL: NEGATIVE
COLOR, POC: YELLOW
CREAT SERPL-MCNC: 1.18 MG/DL (ref 0.6–1.3)
EOSINOPHIL # BLD AUTO: 0.06 THOUSAND/ΜL (ref 0–0.61)
EOSINOPHIL NFR BLD AUTO: 1 % (ref 0–6)
ERYTHROCYTE [DISTWIDTH] IN BLOOD BY AUTOMATED COUNT: 13.5 % (ref 11.6–15.1)
ETHANOL SERPL-MCNC: <3 MG/DL (ref 0–3)
EXT BILIRUBIN, UA: NEGATIVE
EXT BLOOD URINE: NEGATIVE
EXT GLUCOSE, UA: NEGATIVE
EXT KETONES: NEGATIVE
EXT NITRITE, UA: NEGATIVE
EXT PH, UA: 7
EXT PROTEIN, UA: NORMAL
EXT SPECIFIC GRAVITY, UA: 1.01
EXT UROBILINOGEN: 0.2
GFR SERPL CREATININE-BSD FRML MDRD: 72 ML/MIN/1.73SQ M
GLUCOSE SERPL-MCNC: 121 MG/DL (ref 65–140)
HCT VFR BLD AUTO: 48.8 % (ref 36.5–49.3)
HGB BLD-MCNC: 16.6 G/DL (ref 12–17)
IMM GRANULOCYTES # BLD AUTO: 0.04 THOUSAND/UL (ref 0–0.2)
IMM GRANULOCYTES NFR BLD AUTO: 0 % (ref 0–2)
INR PPP: 1 (ref 0.86–1.17)
LYMPHOCYTES # BLD AUTO: 2.68 THOUSANDS/ΜL (ref 0.6–4.47)
LYMPHOCYTES NFR BLD AUTO: 28 % (ref 14–44)
MAGNESIUM SERPL-MCNC: 1.8 MG/DL (ref 1.6–2.6)
MCH RBC QN AUTO: 28.5 PG (ref 26.8–34.3)
MCHC RBC AUTO-ENTMCNC: 34 G/DL (ref 31.4–37.4)
MCV RBC AUTO: 84 FL (ref 82–98)
METHADONE UR QL: NEGATIVE
MONOCYTES # BLD AUTO: 0.81 THOUSAND/ΜL (ref 0.17–1.22)
MONOCYTES NFR BLD AUTO: 8 % (ref 4–12)
NEUTROPHILS # BLD AUTO: 6.13 THOUSANDS/ΜL (ref 1.85–7.62)
NEUTS SEG NFR BLD AUTO: 63 % (ref 43–75)
NRBC BLD AUTO-RTO: 0 /100 WBCS
OPIATES UR QL SCN: NEGATIVE
PCP UR QL: NEGATIVE
PLATELET # BLD AUTO: 284 THOUSANDS/UL (ref 149–390)
PMV BLD AUTO: 9.9 FL (ref 8.9–12.7)
POTASSIUM SERPL-SCNC: 2.9 MMOL/L (ref 3.5–5.3)
PROT SERPL-MCNC: 8.7 G/DL (ref 6.4–8.2)
PROTHROMBIN TIME: 12.6 SECONDS (ref 11.8–14.2)
RBC # BLD AUTO: 5.83 MILLION/UL (ref 3.88–5.62)
SALICYLATES SERPL-MCNC: 3.8 MG/DL (ref 3–20)
SODIUM SERPL-SCNC: 141 MMOL/L (ref 136–145)
THC UR QL: NEGATIVE
TROPONIN I SERPL-MCNC: <0.02 NG/ML
WBC # BLD AUTO: 9.76 THOUSAND/UL (ref 4.31–10.16)
WBC # BLD EST: 1 10*3/UL

## 2018-09-13 PROCEDURE — 84484 ASSAY OF TROPONIN QUANT: CPT | Performed by: EMERGENCY MEDICINE

## 2018-09-13 PROCEDURE — 96361 HYDRATE IV INFUSION ADD-ON: CPT

## 2018-09-13 PROCEDURE — 85610 PROTHROMBIN TIME: CPT | Performed by: EMERGENCY MEDICINE

## 2018-09-13 PROCEDURE — 96375 TX/PRO/DX INJ NEW DRUG ADDON: CPT

## 2018-09-13 PROCEDURE — 85025 COMPLETE CBC W/AUTO DIFF WBC: CPT | Performed by: EMERGENCY MEDICINE

## 2018-09-13 PROCEDURE — 93005 ELECTROCARDIOGRAM TRACING: CPT

## 2018-09-13 PROCEDURE — 99285 EMERGENCY DEPT VISIT HI MDM: CPT

## 2018-09-13 PROCEDURE — 81002 URINALYSIS NONAUTO W/O SCOPE: CPT | Performed by: EMERGENCY MEDICINE

## 2018-09-13 PROCEDURE — 80329 ANALGESICS NON-OPIOID 1 OR 2: CPT | Performed by: EMERGENCY MEDICINE

## 2018-09-13 PROCEDURE — 83735 ASSAY OF MAGNESIUM: CPT | Performed by: NURSE PRACTITIONER

## 2018-09-13 PROCEDURE — 82140 ASSAY OF AMMONIA: CPT | Performed by: EMERGENCY MEDICINE

## 2018-09-13 PROCEDURE — 80307 DRUG TEST PRSMV CHEM ANLYZR: CPT | Performed by: EMERGENCY MEDICINE

## 2018-09-13 PROCEDURE — 96365 THER/PROPH/DIAG IV INF INIT: CPT

## 2018-09-13 PROCEDURE — 99223 1ST HOSP IP/OBS HIGH 75: CPT | Performed by: NURSE PRACTITIONER

## 2018-09-13 PROCEDURE — 80053 COMPREHEN METABOLIC PANEL: CPT | Performed by: EMERGENCY MEDICINE

## 2018-09-13 PROCEDURE — 80320 DRUG SCREEN QUANTALCOHOLS: CPT | Performed by: EMERGENCY MEDICINE

## 2018-09-13 PROCEDURE — 85730 THROMBOPLASTIN TIME PARTIAL: CPT | Performed by: EMERGENCY MEDICINE

## 2018-09-13 PROCEDURE — 36415 COLL VENOUS BLD VENIPUNCTURE: CPT | Performed by: EMERGENCY MEDICINE

## 2018-09-13 RX ORDER — HALOPERIDOL 5 MG
5 TABLET ORAL 2 TIMES DAILY
COMMUNITY
End: 2018-09-14 | Stop reason: HOSPADM

## 2018-09-13 RX ORDER — ONDANSETRON 2 MG/ML
4 INJECTION INTRAMUSCULAR; INTRAVENOUS ONCE
Status: COMPLETED | OUTPATIENT
Start: 2018-09-13 | End: 2018-09-13

## 2018-09-13 RX ORDER — HALOPERIDOL 10 MG/1
10 TABLET ORAL 2 TIMES DAILY
COMMUNITY
End: 2018-09-14 | Stop reason: HOSPADM

## 2018-09-13 RX ORDER — BENZTROPINE MESYLATE 1 MG/1
1 TABLET ORAL 2 TIMES DAILY
Status: DISCONTINUED | OUTPATIENT
Start: 2018-09-14 | End: 2018-09-14 | Stop reason: HOSPADM

## 2018-09-13 RX ORDER — CLONAZEPAM 1 MG/1
1 TABLET ORAL 2 TIMES DAILY PRN
Status: DISCONTINUED | OUTPATIENT
Start: 2018-09-13 | End: 2018-09-14 | Stop reason: HOSPADM

## 2018-09-13 RX ORDER — GABAPENTIN 400 MG/1
400 CAPSULE ORAL 2 TIMES DAILY
Status: DISCONTINUED | OUTPATIENT
Start: 2018-09-14 | End: 2018-09-14 | Stop reason: HOSPADM

## 2018-09-13 RX ORDER — HALOPERIDOL 5 MG
5 TABLET ORAL 2 TIMES DAILY
Status: DISCONTINUED | OUTPATIENT
Start: 2018-09-14 | End: 2018-09-14

## 2018-09-13 RX ORDER — SODIUM CHLORIDE 9 MG/ML
75 INJECTION, SOLUTION INTRAVENOUS CONTINUOUS
Status: DISCONTINUED | OUTPATIENT
Start: 2018-09-13 | End: 2018-09-14

## 2018-09-13 RX ORDER — POTASSIUM CHLORIDE 20 MEQ/1
40 TABLET, EXTENDED RELEASE ORAL ONCE
Status: COMPLETED | OUTPATIENT
Start: 2018-09-13 | End: 2018-09-13

## 2018-09-13 RX ORDER — LANOLIN ALCOHOL/MO/W.PET/CERES
3 CREAM (GRAM) TOPICAL
Status: DISCONTINUED | OUTPATIENT
Start: 2018-09-13 | End: 2018-09-14 | Stop reason: HOSPADM

## 2018-09-13 RX ORDER — ACETAMINOPHEN 325 MG/1
650 TABLET ORAL EVERY 6 HOURS PRN
Status: DISCONTINUED | OUTPATIENT
Start: 2018-09-13 | End: 2018-09-14 | Stop reason: HOSPADM

## 2018-09-13 RX ORDER — MAGNESIUM SULFATE HEPTAHYDRATE 40 MG/ML
2 INJECTION, SOLUTION INTRAVENOUS ONCE
Status: COMPLETED | OUTPATIENT
Start: 2018-09-13 | End: 2018-09-13

## 2018-09-13 RX ORDER — FAMOTIDINE 20 MG/1
20 TABLET, FILM COATED ORAL 2 TIMES DAILY
Status: DISCONTINUED | OUTPATIENT
Start: 2018-09-14 | End: 2018-09-14 | Stop reason: HOSPADM

## 2018-09-13 RX ORDER — RANITIDINE 150 MG/1
150 TABLET ORAL 2 TIMES DAILY
COMMUNITY
End: 2020-10-19

## 2018-09-13 RX ORDER — PANTOPRAZOLE SODIUM 40 MG/1
40 TABLET, DELAYED RELEASE ORAL
Status: DISCONTINUED | OUTPATIENT
Start: 2018-09-14 | End: 2018-09-14 | Stop reason: HOSPADM

## 2018-09-13 RX ORDER — ONDANSETRON 2 MG/ML
4 INJECTION INTRAMUSCULAR; INTRAVENOUS EVERY 6 HOURS PRN
Status: DISCONTINUED | OUTPATIENT
Start: 2018-09-13 | End: 2018-09-14

## 2018-09-13 RX ORDER — BENZTROPINE MESYLATE 1 MG/1
1 TABLET ORAL 2 TIMES DAILY
COMMUNITY
End: 2020-10-19

## 2018-09-13 RX ORDER — LANOLIN ALCOHOL/MO/W.PET/CERES
3 CREAM (GRAM) TOPICAL
COMMUNITY
End: 2020-10-19

## 2018-09-13 RX ORDER — ZOLPIDEM TARTRATE 5 MG/1
10 TABLET ORAL
Status: DISCONTINUED | OUTPATIENT
Start: 2018-09-13 | End: 2018-09-14 | Stop reason: HOSPADM

## 2018-09-13 RX ADMIN — ONDANSETRON 4 MG: 2 INJECTION, SOLUTION INTRAMUSCULAR; INTRAVENOUS at 19:53

## 2018-09-13 RX ADMIN — MAGNESIUM SULFATE HEPTAHYDRATE 2 G: 40 INJECTION, SOLUTION INTRAVENOUS at 20:42

## 2018-09-13 RX ADMIN — POTASSIUM CHLORIDE 40 MEQ: 1500 TABLET, EXTENDED RELEASE ORAL at 21:25

## 2018-09-13 RX ADMIN — SODIUM CHLORIDE 1000 ML: 0.9 INJECTION, SOLUTION INTRAVENOUS at 19:52

## 2018-09-13 RX ADMIN — SODIUM CHLORIDE 75 ML/HR: 0.9 INJECTION, SOLUTION INTRAVENOUS at 22:56

## 2018-09-13 NOTE — ED PROVIDER NOTES
History  Chief Complaint   Patient presents with    Withdrawal - Drug     Presents with C/O withdrawal symptoms since drug rehab for Fentanyl and Klonopin at [address] 3 weeks ago  C/O shaking, nausea, muscle discomfort, requests meds for nausea, "muscle relaxant, and "Ativan"  51year old male presents for evaluation of nausea, vomiting and generalized tremulousness for the past 3 weeks  Patient reports roughly 2 episodes of nonbloody, nonbilious emesisPatient's wife states that he had been admitted to the behavioral health unit at [facility] until 1 week ago  She states that he had begun having these symptoms while at [facility], but that since returning home, they have significantly worsened  Patient states that he is withdrawing from Klonopin and Fentanyl which he last took 2 weeks ago  He is delusional stating that he owns two of the [employer]  His wife states that this behavior has been present for more than a year  Patient denies suicidal or homicidal ideation  Patient's wife states that he often seems to interact with people that are not there and is concerned that he has not been sleeping well  Patient had been admitted medically from 8/15-8/23/18 after an unresponsive episode  During that visit, he was found to have elevated LFTs thought to be secondary to medications  His medications were adjusted during that admission  Patient had also been found to have gallstones, but no signs of cholecystitis of choledocolithiasis on an ultrasound of the RUQ on 8/16  History provided by:  Patient, spouse and medical records  Psychiatric Evaluation   Presenting symptoms: delusional    Patient accompanied by:  Family member  Degree of incapacity (severity):  Severe  Onset quality:  Gradual  Duration:  3 weeks  Timing:  Constant  Progression:  Worsening  Chronicity:  New  Treatment compliance:   All of the time  Relieved by:  Nothing  Worsened by:  Lack of sleep  Ineffective treatments:  Antipsychotics and benzodiazepines  Associated symptoms: appetite change    Associated symptoms: no abdominal pain, no chest pain, no fatigue and no headaches    Risk factors: hx of mental illness and recent psychiatric admission        Prior to Admission Medications   Prescriptions Last Dose Informant Patient Reported?  Taking?   benztropine (COGENTIN) 1 mg tablet   Yes Yes   Sig: Take 1 mg by mouth 2 (two) times a day   gabapentin (NEURONTIN) 300 mg capsule   No No   Sig: Take 1 capsule (300 mg total) by mouth 2 (two) times a day   Patient taking differently: Take 400 mg by mouth 2 (two) times a day     haloperidol (HALDOL) 10 mg tablet   Yes Yes   Sig: Take 10 mg by mouth 2 (two) times a day   haloperidol (HALDOL) 5 mg tablet   Yes Yes   Sig: Take 5 mg by mouth 2 (two) times a day   melatonin 3 mg   Yes Yes   Sig: Take 3 mg by mouth daily at bedtime   omeprazole (PriLOSEC) 20 mg delayed release capsule   Yes No   Sig: Take by mouth daily Dose unknown   ranitidine (ZANTAC) 150 mg tablet   Yes Yes   Sig: Take 150 mg by mouth 2 (two) times a day      Facility-Administered Medications: None       Past Medical History:   Diagnosis Date    Alcoholism ([facility] )     Anxiety     Back pain     Bipolar disorder, current episode mixed, moderate (HCC)     Cardiac disease     Chronic pain disorder     Depression     GERD (gastroesophageal reflux disease)     Hypertension     MI (myocardial infarction) ([facility] )     Psychiatric disorder     Psychiatric illness     Psychosis     Renal disorder        Past Surgical History:   Procedure Laterality Date    BACK SURGERY      report thoracic surgery       Family History   Problem Relation Age of Onset    No Known Problems Mother     No Known Problems Father     No Known Problems Sister     No Known Problems Brother     No Known Problems Maternal Aunt     No Known Problems Paternal Aunt     No Known Problems Maternal Uncle     No Known Problems Paternal Uncle     No Known Problems Maternal Grandfather     No Known Problems Maternal Grandmother     No Known Problems Paternal Grandfather     No Known Problems Paternal Grandmother     No Known Problems Cousin     ADD / ADHD Neg Hx     Alcohol abuse Neg Hx     Anxiety disorder Neg Hx     Bipolar disorder Neg Hx     Dementia Neg Hx     Depression Neg Hx     Drug abuse Neg Hx     OCD Neg Hx     Paranoid behavior Neg Hx     Schizophrenia Neg Hx     Seizures Neg Hx     Self-Injury Neg Hx     Suicide Attempts Neg Hx      I have reviewed and agree with the history as documented  Social History   Substance Use Topics    Smoking status: Never Smoker    Smokeless tobacco: Never Used      Comment: patient is a non - smoker    Alcohol use No      Comment: history 10 years ago heavy drinking        Review of Systems   Constitutional: Positive for appetite change  Negative for diaphoresis, fatigue and fever  HENT: Negative for congestion, rhinorrhea and sore throat  Respiratory: Negative for cough, chest tightness and shortness of breath  Cardiovascular: Negative for chest pain, palpitations and leg swelling  Gastrointestinal: Positive for nausea and vomiting  Negative for abdominal pain, constipation and diarrhea  Genitourinary: Negative for difficulty urinating, dysuria, frequency and hematuria  Musculoskeletal: Negative for myalgias, neck pain and neck stiffness  Skin: Negative for pallor  Neurological: Positive for tremors  Negative for syncope, weakness and headaches  Psychiatric/Behavioral: Positive for sleep disturbance  All other systems reviewed and are negative  Physical Exam  Physical Exam   Constitutional: He appears well-developed and well-nourished  Non-toxic appearance  No distress  HENT:   Head: Normocephalic and atraumatic  Eyes: EOM are normal  Pupils are equal, round, and reactive to light  Neck: Normal range of motion   No tracheal deviation present  No thyromegaly present  Cardiovascular: Normal rate, regular rhythm, normal heart sounds and intact distal pulses  Pulmonary/Chest: Effort normal and breath sounds normal    Abdominal: Soft  Bowel sounds are normal  He exhibits no distension  There is no tenderness  Musculoskeletal: He exhibits no edema  Lymphadenopathy:     He has no cervical adenopathy  Neurological: He is alert  Skin: Skin is warm and dry  He is not diaphoretic  Psychiatric: His affect is blunt  Thought content is delusional    Nursing note and vitals reviewed        Vital Signs  ED Triage Vitals [09/13/18 1930]   Temperature Pulse Respirations Blood Pressure SpO2   97 8 °F (36 6 °C) (!) 120 20 (!) 155/103 97 %      Temp Source Heart Rate Source Patient Position - Orthostatic VS BP Location FiO2 (%)   Temporal Monitor Sitting Right arm --      Pain Score       6           Vitals:    09/13/18 2044 09/13/18 2045 09/13/18 2100 09/13/18 2115   BP: 137/93  136/97    Pulse:  92 93 90   Patient Position - Orthostatic VS: Lying  Lying        Visual Acuity      ED Medications  Medications   magnesium sulfate 2 g/50 mL IVPB (premix) 2 g (2 g Intravenous New Bag 9/13/18 2042)   sodium chloride 0 9 % bolus 1,000 mL (0 mL Intravenous Stopped 9/13/18 2041)   ondansetron (ZOFRAN) injection 4 mg (4 mg Intravenous Given 9/13/18 1953)   potassium chloride (K-DUR,KLOR-CON) CR tablet 40 mEq (40 mEq Oral Given 9/13/18 2125)       Diagnostic Studies  Results Reviewed     Procedure Component Value Units Date/Time    Acetaminophen level [[id number]]  (Abnormal) Collected:  09/13/18 1948    Lab Status:  Final result Specimen:  Blood from Line, Venous Updated:  09/13/18 2042     Acetaminophen Level <2 (L) ug/mL     Ammonia [[id number]]  (Abnormal) Collected:  09/13/18 2010    Lab Status:  Final result Specimen:  Blood from Line, Venous Updated:  09/13/18 2041     Ammonia 10 (L) umol/L     Ethanol [[id number]]  (Normal) Collected:  09/13/18 1948 Lab Status:  Final result Specimen:  Blood from Line, Venous Updated:  09/13/18 2041     Ethanol Lvl <3 mg/dL     Comprehensive metabolic panel [[id number]]  (Abnormal) Collected:  09/13/18 1948    Lab Status:  Final result Specimen:  Blood from Line, Venous Updated:  09/13/18 2031     Sodium 141 mmol/L      Potassium 2 9 (L) mmol/L      Chloride 100 mmol/L      CO2 26 mmol/L      ANION GAP 15 (H) mmol/L      BUN 7 mg/dL      Creatinine 1 18 mg/dL      Glucose 121 mg/dL      Calcium 9 8 mg/dL      AST 16 U/L      ALT 31 U/L      Alkaline Phosphatase 134 (H) U/L      Total Protein 8 7 (H) g/dL      Albumin 4 4 g/dL      Total Bilirubin 0 70 mg/dL      eGFR 72 ml/min/1 73sq m     Narrative:         National Kidney Disease Education Program recommendations are as follows:  GFR calculation is accurate only with a steady state creatinine  Chronic Kidney disease less than 60 ml/min/1 73 sq  meters  Kidney failure less than 15 ml/min/1 73 sq  meters      Troponin I [[id number]]  (Normal) Collected:  09/13/18 1948    Lab Status:  Final result Specimen:  Blood from Line, Venous Updated:  09/13/18 2031     Troponin I <0 76 ng/mL     Salicylate level [[id number]]  (Normal) Collected:  09/13/18 1948    Lab Status:  Final result Specimen:  Blood from Line, Venous Updated:  19/66/42 8708     Salicylate Lvl 3 8 mg/dL     Protime-INR [[id number]]  (Normal) Collected:  09/13/18 1948    Lab Status:  Final result Specimen:  Blood from Line, Venous Updated:  09/13/18 2024     Protime 12 6 seconds      INR 1 00    APTT [[id number]]  (Normal) Collected:  09/13/18 1948    Lab Status:  Final result Specimen:  Blood from Line, Venous Updated:  09/13/18 2024     PTT 28 seconds     Rapid drug screen, urine [[id number]]  (Abnormal) Collected:  09/13/18 1845    Lab Status:  Final result Specimen:  Urine from Urine, Clean Catch Updated:  09/13/18 2021     Amph/Meth UR Negative     Barbiturate Ur Positive (A)     Benzodiazepine Urine Negative     Cocaine Urine Negative     Methadone Urine Negative     Opiate Urine Negative     PCP Ur Negative     THC Urine Negative    Narrative:         Presumptive report  If requested, specimen will be sent to reference lab for confirmation  FOR MEDICAL PURPOSES ONLY  IF CONFIRMATION NEEDED PLEASE CONTACT THE LAB WITHIN 5 DAYS      Drug Screen Cutoff Levels:  AMPHETAMINE/METHAMPHETAMINES  1000 ng/mL  BARBITURATES     200 ng/mL  BENZODIAZEPINES     200 ng/mL  COCAINE      300 ng/mL  METHADONE      300 ng/mL  OPIATES      300 ng/mL  PHENCYCLIDINE     25 ng/mL  THC       50 ng/mL    CBC and differential [[id number]]  (Abnormal) Collected:  09/13/18 1948    Lab Status:  Final result Specimen:  Blood from Line, Venous Updated:  09/13/18 2012     WBC 9 76 Thousand/uL      RBC 5 83 (H) Million/uL      Hemoglobin 16 6 g/dL      Hematocrit 48 8 %      MCV 84 fL      MCH 28 5 pg      MCHC 34 0 g/dL      RDW 13 5 %      MPV 9 9 fL      Platelets 100 Thousands/uL      nRBC 0 /100 WBCs      Neutrophils Relative 63 %      Immat GRANS % 0 %      Lymphocytes Relative 28 %      Monocytes Relative 8 %      Eosinophils Relative 1 %      Basophils Relative 0 %      Neutrophils Absolute 6 13 Thousands/µL      Immature Grans Absolute 0 04 Thousand/uL      Lymphocytes Absolute 2 68 Thousands/µL      Monocytes Absolute 0 81 Thousand/µL      Eosinophils Absolute 0 06 Thousand/µL      Basophils Absolute 0 04 Thousands/µL     POCT urinalysis dipstick [[id number]]  (Normal) Resulted:  09/13/18 1950    Lab Status:  Final result Specimen:  Urine Updated:  09/13/18 2000     Color, UA yellow     Clarity, UA turbid     EXT Glucose, UA (Ref: Negative) Negative     EXT Bilirubin, UA (Ref: Negative) Negative     Ketones, UA (Ref: Negative) Negative     EXT Spec Grav, UA (Ref:1 003-1 030) 1 015     Blood, UA (Ref: Negative) Negative     EXT pH, UA (Ref: 4 5-8 0) 7     EXT Protein, UA (Ref: Negative) Trace     Urobilinogen, UA (Ref: 0 2- 1 0) 0 2      Leukocytes, UA (Ref: Negative) 1     Nitrite, UA (Ref: Negative) Negative                 No orders to display              Procedures  ECG 12 Lead Documentation  Date/Time: 9/13/2018 8:03 PM  Performed by: Claribel Aponte  Authorized by: Claribel Aponte     Indications / Diagnosis:  Nausea, tremulousness  Patient location:  ED  Previous ECG:     Previous ECG:  Compared to current    Comparison ECG info:  8/15/2018 sinus tachycardia with prolonged QTc and st depressions in inferior leads    Similarity:  No change  Interpretation:     Interpretation: abnormal    Rate:     ECG rate:  107    ECG rate assessment: tachycardic    Rhythm:     Rhythm: sinus tachycardia    Ectopy:     Ectopy: none    QRS:     QRS axis:  Normal    QRS intervals:  Normal  Conduction:     Conduction: normal    ST segments:     ST segments:  Non-specific    Elevation:  II, aVF, III, V5 and V6  T waves:     T waves: inverted      Inverted:  V2  Other findings:     Other findings: prolonged qTc interval               Phone Contacts  ED Phone Contact    ED Course                               MDM  Number of Diagnoses or Management Options  Delusional disorder ([facility] ): established and worsening  Hypokalemia: new and requires workup  Prolonged Q-T interval on ECG: new and requires workup  Diagnosis management comments: 51year old male presents for evaluation of nausea, vomiting and tremulousness  Patient tachycardic on exam  Zofran given for nausea  EKG shows prolonged QTc greater than 600  No further antiemetics  Hold all QTc prolongating medications  2 g magnesium given  Hypokalemia on labs  40 mEq Kdur given  Patient admitted for cardiac monitoring for high risk of arrhythmia and for adjustment in medications         Amount and/or Complexity of Data Reviewed  Clinical lab tests: ordered and reviewed    Patient Progress  Patient progress: stable    CritCare Time    Disposition  Final diagnoses:   Prolonged Q-T interval on ECG   Hypokalemia   Delusional disorder [facility])     Time reflects when diagnosis was documented in both MDM as applicable and the Disposition within this note     Time User Action Codes Description Comment    9/13/2018  8:03 PM Javier RUIZ Add [R94 31] Prolonged Q-T interval on ECG     9/13/2018  9:42 PM Rahat Shepard [E87 6] Hypokalemia     9/13/2018  9:42 PM Rahat Shepard [F22] Delusional disorder [facility])       ED Disposition     ED Disposition Condition Comment    Admit  Case was discussed with BALDOMERO and the patient's admission status was agreed to be Admission Status: inpatient status to the service of Dr Anu Mittal   Follow-up Information    None         Patient's Medications   Discharge Prescriptions    No medications on file     No discharge procedures on file      ED Provider  Electronically Signed by           Mili Jordan MD  09/13/18 5807

## 2018-09-14 ENCOUNTER — APPOINTMENT (INPATIENT)
Dept: MRI IMAGING | Facility: HOSPITAL | Age: 48
DRG: 309 | End: 2018-09-14
Payer: COMMERCIAL

## 2018-09-14 VITALS
HEART RATE: 80 BPM | BODY MASS INDEX: 25.95 KG/M2 | OXYGEN SATURATION: 97 % | TEMPERATURE: 97.7 F | RESPIRATION RATE: 19 BRPM | SYSTOLIC BLOOD PRESSURE: 119 MMHG | WEIGHT: 165.34 LBS | DIASTOLIC BLOOD PRESSURE: 75 MMHG | HEIGHT: 67 IN

## 2018-09-14 PROBLEM — G93.41 ACUTE METABOLIC ENCEPHALOPATHY: Status: ACTIVE | Noted: 2018-09-14

## 2018-09-14 LAB
ANION GAP SERPL CALCULATED.3IONS-SCNC: 6 MMOL/L (ref 4–13)
ATRIAL RATE: 107 BPM
ATRIAL RATE: 108 BPM
ATRIAL RATE: 74 BPM
ATRIAL RATE: 79 BPM
BASOPHILS # BLD AUTO: 0.03 THOUSANDS/ΜL (ref 0–0.1)
BASOPHILS NFR BLD AUTO: 1 % (ref 0–1)
BUN SERPL-MCNC: 7 MG/DL (ref 5–25)
CALCIUM SERPL-MCNC: 8.6 MG/DL (ref 8.3–10.1)
CHLORIDE SERPL-SCNC: 105 MMOL/L (ref 100–108)
CO2 SERPL-SCNC: 30 MMOL/L (ref 21–32)
CREAT SERPL-MCNC: 1.01 MG/DL (ref 0.6–1.3)
EOSINOPHIL # BLD AUTO: 0.06 THOUSAND/ΜL (ref 0–0.61)
EOSINOPHIL NFR BLD AUTO: 1 % (ref 0–6)
ERYTHROCYTE [DISTWIDTH] IN BLOOD BY AUTOMATED COUNT: 13.6 % (ref 11.6–15.1)
GFR SERPL CREATININE-BSD FRML MDRD: 87 ML/MIN/1.73SQ M
GLUCOSE SERPL-MCNC: 111 MG/DL (ref 65–140)
HCT VFR BLD AUTO: 40.6 % (ref 36.5–49.3)
HGB BLD-MCNC: 13.8 G/DL (ref 12–17)
IMM GRANULOCYTES # BLD AUTO: 0.02 THOUSAND/UL (ref 0–0.2)
IMM GRANULOCYTES NFR BLD AUTO: 0 % (ref 0–2)
LYMPHOCYTES # BLD AUTO: 1.81 THOUSANDS/ΜL (ref 0.6–4.47)
LYMPHOCYTES NFR BLD AUTO: 30 % (ref 14–44)
MAGNESIUM SERPL-MCNC: 2.2 MG/DL (ref 1.6–2.6)
MCH RBC QN AUTO: 29.1 PG (ref 26.8–34.3)
MCHC RBC AUTO-ENTMCNC: 34 G/DL (ref 31.4–37.4)
MCV RBC AUTO: 86 FL (ref 82–98)
MONOCYTES # BLD AUTO: 0.55 THOUSAND/ΜL (ref 0.17–1.22)
MONOCYTES NFR BLD AUTO: 9 % (ref 4–12)
NEUTROPHILS # BLD AUTO: 3.54 THOUSANDS/ΜL (ref 1.85–7.62)
NEUTS SEG NFR BLD AUTO: 59 % (ref 43–75)
NRBC BLD AUTO-RTO: 0 /100 WBCS
P AXIS: 53 DEGREES
P AXIS: 55 DEGREES
P AXIS: 63 DEGREES
PLATELET # BLD AUTO: 193 THOUSANDS/UL (ref 149–390)
PMV BLD AUTO: 10.5 FL (ref 8.9–12.7)
POTASSIUM SERPL-SCNC: 3.5 MMOL/L (ref 3.5–5.3)
PR INTERVAL: 132 MS
PR INTERVAL: 144 MS
PR INTERVAL: 146 MS
QRS AXIS: 49 DEGREES
QRS AXIS: 53 DEGREES
QRS AXIS: 56 DEGREES
QRS AXIS: 59 DEGREES
QRSD INTERVAL: 78 MS
QRSD INTERVAL: 80 MS
QRSD INTERVAL: 80 MS
QRSD INTERVAL: 86 MS
QT INTERVAL: 380 MS
QT INTERVAL: 386 MS
QT INTERVAL: 394 MS
QT INTERVAL: 490 MS
QTC INTERVAL: 428 MS
QTC INTERVAL: 435 MS
QTC INTERVAL: 527 MS
QTC INTERVAL: 654 MS
RBC # BLD AUTO: 4.75 MILLION/UL (ref 3.88–5.62)
SODIUM SERPL-SCNC: 141 MMOL/L (ref 136–145)
T WAVE AXIS: 109 DEGREES
T WAVE AXIS: 29 DEGREES
T WAVE AXIS: 34 DEGREES
T WAVE AXIS: 62 DEGREES
TROPONIN I SERPL-MCNC: <0.02 NG/ML
TROPONIN I SERPL-MCNC: <0.02 NG/ML
VENTRICULAR RATE: 107 BPM
VENTRICULAR RATE: 108 BPM
VENTRICULAR RATE: 74 BPM
VENTRICULAR RATE: 79 BPM
WBC # BLD AUTO: 6.01 THOUSAND/UL (ref 4.31–10.16)

## 2018-09-14 PROCEDURE — 93005 ELECTROCARDIOGRAM TRACING: CPT

## 2018-09-14 PROCEDURE — 99222 1ST HOSP IP/OBS MODERATE 55: CPT | Performed by: PSYCHIATRY & NEUROLOGY

## 2018-09-14 PROCEDURE — A9585 GADOBUTROL INJECTION: HCPCS | Performed by: NURSE PRACTITIONER

## 2018-09-14 PROCEDURE — 85025 COMPLETE CBC W/AUTO DIFF WBC: CPT | Performed by: NURSE PRACTITIONER

## 2018-09-14 PROCEDURE — 99239 HOSP IP/OBS DSCHRG MGMT >30: CPT | Performed by: INTERNAL MEDICINE

## 2018-09-14 PROCEDURE — 93010 ELECTROCARDIOGRAM REPORT: CPT | Performed by: INTERNAL MEDICINE

## 2018-09-14 PROCEDURE — 80048 BASIC METABOLIC PNL TOTAL CA: CPT | Performed by: NURSE PRACTITIONER

## 2018-09-14 PROCEDURE — 83735 ASSAY OF MAGNESIUM: CPT | Performed by: NURSE PRACTITIONER

## 2018-09-14 PROCEDURE — 70553 MRI BRAIN STEM W/O & W/DYE: CPT

## 2018-09-14 PROCEDURE — 99232 SBSQ HOSP IP/OBS MODERATE 35: CPT | Performed by: INTERNAL MEDICINE

## 2018-09-14 PROCEDURE — 84484 ASSAY OF TROPONIN QUANT: CPT | Performed by: NURSE PRACTITIONER

## 2018-09-14 PROCEDURE — 99253 IP/OBS CNSLTJ NEW/EST LOW 45: CPT | Performed by: PSYCHIATRY & NEUROLOGY

## 2018-09-14 RX ORDER — CLONAZEPAM 0.5 MG/1
0.5 TABLET ORAL 2 TIMES DAILY
Qty: 6 TABLET | Refills: 0 | Status: SHIPPED | OUTPATIENT
Start: 2018-09-14 | End: 2018-09-17

## 2018-09-14 RX ORDER — CALCIUM CARBONATE 200(500)MG
500 TABLET,CHEWABLE ORAL DAILY PRN
Status: DISCONTINUED | OUTPATIENT
Start: 2018-09-14 | End: 2018-09-14 | Stop reason: HOSPADM

## 2018-09-14 RX ORDER — CLONIDINE HYDROCHLORIDE 0.1 MG/1
0.1 TABLET ORAL EVERY 8 HOURS PRN
Status: DISCONTINUED | OUTPATIENT
Start: 2018-09-14 | End: 2018-09-14 | Stop reason: HOSPADM

## 2018-09-14 RX ORDER — FENTANYL 75 UG/H
75 PATCH TRANSDERMAL
Status: DISCONTINUED | OUTPATIENT
Start: 2018-09-14 | End: 2018-09-14

## 2018-09-14 RX ORDER — OLANZAPINE 5 MG/1
10 TABLET ORAL
Status: DISCONTINUED | OUTPATIENT
Start: 2018-09-14 | End: 2018-09-14 | Stop reason: HOSPADM

## 2018-09-14 RX ORDER — OLANZAPINE 15 MG/1
15 TABLET ORAL
Qty: 7 TABLET | Refills: 0 | Status: SHIPPED | OUTPATIENT
Start: 2018-09-14 | End: 2020-11-04

## 2018-09-14 RX ORDER — CLONAZEPAM 0.5 MG/1
0.5 TABLET ORAL 2 TIMES DAILY
Status: DISCONTINUED | OUTPATIENT
Start: 2018-09-14 | End: 2018-09-14 | Stop reason: HOSPADM

## 2018-09-14 RX ORDER — OXYCODONE HYDROCHLORIDE 5 MG/1
5 TABLET ORAL EVERY 6 HOURS PRN
Status: DISCONTINUED | OUTPATIENT
Start: 2018-09-14 | End: 2018-09-14 | Stop reason: HOSPADM

## 2018-09-14 RX ORDER — FENTANYL 25 UG/H
25 PATCH TRANSDERMAL
Status: DISCONTINUED | OUTPATIENT
Start: 2018-09-17 | End: 2018-09-14

## 2018-09-14 RX ADMIN — ACETAMINOPHEN 650 MG: 325 TABLET, FILM COATED ORAL at 16:48

## 2018-09-14 RX ADMIN — GADOBUTROL 7 ML: 604.72 INJECTION INTRAVENOUS at 11:07

## 2018-09-14 RX ADMIN — GABAPENTIN 400 MG: 400 CAPSULE ORAL at 09:30

## 2018-09-14 RX ADMIN — FAMOTIDINE 20 MG: 20 TABLET ORAL at 09:30

## 2018-09-14 RX ADMIN — OXYCODONE HYDROCHLORIDE 5 MG: 5 TABLET ORAL at 06:24

## 2018-09-14 RX ADMIN — HALOPERIDOL 5 MG: 5 TABLET ORAL at 09:33

## 2018-09-14 RX ADMIN — PANTOPRAZOLE SODIUM 40 MG: 40 TABLET, DELAYED RELEASE ORAL at 05:15

## 2018-09-14 RX ADMIN — ACETAMINOPHEN 650 MG: 325 TABLET, FILM COATED ORAL at 09:30

## 2018-09-14 RX ADMIN — CLONAZEPAM 1 MG: 1 TABLET ORAL at 00:11

## 2018-09-14 RX ADMIN — ZOLPIDEM TARTRATE 10 MG: 5 TABLET ORAL at 00:11

## 2018-09-14 RX ADMIN — BENZTROPINE MESYLATE 1 MG: 1 TABLET ORAL at 09:30

## 2018-09-14 RX ADMIN — OXYCODONE HYDROCHLORIDE 5 MG: 5 TABLET ORAL at 16:49

## 2018-09-14 RX ADMIN — CLONAZEPAM 1 MG: 1 TABLET ORAL at 09:30

## 2018-09-14 RX ADMIN — ACETAMINOPHEN 650 MG: 325 TABLET, FILM COATED ORAL at 00:12

## 2018-09-14 NOTE — ED NOTES
Pt requesting anxiety medication, discussed with Dr Allen Fruits       Chente Lora, MARCUS  09/13/18 2130       Chente Lora, MARCUS  09/13/18 2132

## 2018-09-14 NOTE — ASSESSMENT & PLAN NOTE
Patient's encephalopathy as evidenced by hearing voices and talking to people thinking that he owns to [facility] is most likely due to withdrawal from benzodiazepines and fentanyl   his nausea and vomiting was also most likely due to that  Patient had breakfast and lunch without any nausea vomiting or abdominal pain he denies any visual or auditory hallucinations at present  Will continue gabapentin for probable narcotic withdrawal, will discuss with Dr Nidhi Hazel whether Catapres is indicated  and whether we should resume Klonopin  I am reluctant to resume fentanyl patch

## 2018-09-14 NOTE — ASSESSMENT & PLAN NOTE
Patient taken off of Klonopin and Fentanyl patch at [facility]  Pt states that he was not taking meds as prescribed

## 2018-09-14 NOTE — ASSESSMENT & PLAN NOTE
Wife states that pt talks to people that are not there and believes he is the owner of [address] [facility] facilities  Will monitor neurochecks Q4H  Inpatient consult to neurology
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
## 2018-09-14 NOTE — ASSESSMENT & PLAN NOTE
Will give 1 more dose of potassium chloride to patient to raise potassium further and will monitor levels

## 2018-09-14 NOTE — SOCIAL WORK
Met with patient to discuss role of Care Management  Patient resides with his wife and 2 sons (21 and 23) in a ranch style home  He is independent of ADL's and uses no assistive device  He has never been followed by [facility] in the past   He has recent [facility] stays at [facility] and on [facility]W  He plans to return home and is followed by Brian G[facility]mez at [facility]  He will continue to see her and was encouraged to seek additional psych counseling there  Remain available

## 2018-09-14 NOTE — ASSESSMENT & PLAN NOTE
Patient was admitted with prolonged QTC which was most likely due to hypokalemia and haloperidol  Patient's help pedal was discontinued and his hypokalemia was corrected with p o  Potassium  Patient is QTC interval returned to normal on day of discharge      His lungs were clear to auscultation bilaterally heart was regular rate and rhythm on physical examination discharge

## 2018-09-14 NOTE — CASE MANAGEMENT
Initial Clinical Review    Admission: Date/Time/Statement: 9/13/18 @ 2143     Orders Placed This Encounter   Procedures    Inpatient Admission (expected length of stay for this patient is greater than two midnights)     Standing Status:   Standing     Number of Occurrences:   1     Order Specific Question:   Admitting Physician     Answer:   Nura Daniels [[id number]]     Order Specific Question:   Level of Care     Answer:   Med Surg [[id number]]     Order Specific Question:   Estimated length of stay     Answer:   More than 2 Midnights     Order Specific Question:   Certification     Answer:   I certify that inpatient services are medically necessary for this patient for a duration of greater than two midnights  See H&P and MD Progress Notes for additional information about the patient's course of treatment  ED: Date/Time/Mode of Arrival:   ED Arrival Information     Expected Arrival Acuity Means of Arrival Escorted By Service Admission Type    - 9/13/2018 19:15 Urgent [address] Urgent    Arrival Complaint    DRUG REHAB          Chief Complaint:   Chief Complaint   Patient presents with    Withdrawal - Drug     Presents with C/O withdrawal symptoms since drug rehab for Fentanyl and Klonopin at [address] 3 weeks ago  C/O shaking, nausea, muscle discomfort, requests meds for nausea, "muscle relaxant, and "Ativan"  History of Illness: 51year old male presents for evaluation of nausea, vomiting and generalized tremulousness for the past 3 weeks   Patient reports roughly 2 episodes of nonbloody, nonbilious emesis     ED Vital Signs:   ED Triage Vitals [09/13/18 1930]   Temperature Pulse Respirations Blood Pressure SpO2   97 8 °F (36 6 °C) (!) 120 20 (!) 155/103 97 %      Temp Source Heart Rate Source Patient Position - Orthostatic VS BP Location FiO2 (%)   Temporal Monitor Sitting Right arm --      Pain Score       6        Wt Readings from Last 1 Encounters:   09/14/18 75 kg (165 lb 5 5 oz)       Vital Signs (abnormal):   Date and Time Temp Pulse SpO2 Resp BP   09/13/18 2200 -- 100 99 %  25 128/95   09/13/18 2145 --  113 98 %  25 --   09/13/18 2130 -- 91 98 %  24  148/101   09/13/18 2100 -- 93 98 % 14 136/97   09/13/18 2045 -- 92 98 % 18 --   09/13/18 2044 -- -- -- -- 137/93   09/13/18 2000 --  106 -- 19 --       Abnormal Labs/Diagnostic Test Results: K 2 9, Alk Phos 134, Total Protein 8 7, Urine Drug Screen Positive for Barbiturate    EKG: Sinus Tachycardia, Prolonged qTc interval    ED Treatment:   Medication Administration from 09/13/2018 1915 to 09/13/2018 2231       Date/Time Order Dose Route Action Action by     09/13/2018 1952 sodium chloride 0 9 % bolus 1,000 mL 1,000 mL Intravenous New Bag Ellyn Jones RN     09/13/2018 1953 ondansetron (ZOFRAN) injection 4 mg 4 mg Intravenous Given Ellyn Jones RN     09/13/2018 2042 magnesium sulfate 2 g/50 mL IVPB (premix) 2 g 2 g Intravenous New Bag Ellyn Jones RN     09/13/2018 2125 potassium chloride (K-DUR,KLOR-CON) CR tablet 40 mEq 40 mEq Oral Given Ellyn Jones RN          Past Medical/Surgical History:   Diagnosis    Alcoholism ([place] [address] )    Anxiety    Back pain    Bipolar disorder, current episode mixed, moderate ([facility] )    Cardiac disease    Chronic pain disorder    Depression    GERD (gastroesophageal reflux disease)    Hypertension    MI (myocardial infarction) ([place] [address] )    Psychiatric disorder    Psychiatric illness    Psychosis    Renal disorder       Admitting Diagnosis: Hypokalemia [E87 6]  Essential hypertension [I10]  Prolonged Q-T interval on ECG [R94 31]  Encephalopathy [G93 40]  Drug abuse and dependence ([facility] ) [F19 20]  Bipolar disorder, current episode mixed, moderate (HCC) [F31 62]  Delusional disorder ([facility] ) [F22]    Age/Sex: 51 y o  male    Assessment/Plan:     * Prolonged Q-T interval on ECG   Assessment & Plan     QTC interval in the  on EKG  This is new from EKG on 12/30/2017    May be medication related as patient was recently started on Haldol 15 mg p o  b i d     Will decrease Haldol to 5 mg p o  b i d  in order to taper off of medication  Continue to monitor on telemetry  Inpatient consult Cardiology           Hypokalemia   Assessment & Plan     Potassium 2 9 in the ED  Repleted with 40 mEq of p o  potassium  Will recheck BMP in a m  and replete as needed           Encephalopathy   Assessment & Plan     Wife states that pt talks to people that are not there and believes he is the owner of [address] [place] [address] [employer]  Will monitor neurochecks Q4H  Inpatient consult to neurology            Vomiting   Assessment & Plan     Patient complains of nausea and vomiting x2 days  He had 2 episodes of non bloody emesis  Patient is afebrile  White count within normal limits  Will continue to monitor  Administer Tigan 200mg IM Q6H PRN          Chronic pain disorder   Assessment & Plan     Fentanyl patch recently discontinued  Will continue Neurontin 400 mg p o  b i d             Bipolar disorder, current episode mixed, moderate (HCC)   Assessment & Plan     Will decrease Haldol from 15 mg twice a day to 5 mg twice a day in order to taper off as patient had QT interval of 654  Will place patient back on Zyprexa 20 mg at bedtime  Inpatient consult to Psychiatry           Drug abuse and dependence [facility])   Assessment & Plan     Patient taken off of Klonopin and Fentanyl patch at [facility]  Pt states that he was not taking meds as prescribed            Abnormal brain MRI   Assessment & Plan     Pt had brain MRI March 31, 2012 which showed 3 nonspecific foci of high signal and right middle cerebellar peduncle lesion  Radiologist recommended follow-up MRI with contrast in 6 weeks  Patient has not had follow-up MRI since    Will obtain MRI with contrast   Appreciate Neurology input              VTE Prophylaxis: Patient is low risk for VTE  / sequential compression device   Code Status: Full code  POLST: There is no POLST form on file for this patient (pre-hospital)  Discussion with family:  Spoke with wife Elizabeth Molina     Anticipated Length of Stay:  Patient will be admitted on an Inpatient basis with an anticipated length of stay of  > 2 midnights     Justification for Hospital Stay:  Close cardiac monitor      Admission Orders:  Inpatient/Tele  Continuous Cardiac Monitoring  Serial Cardiac Enzymes q3h x 3  Consult Cardiology r/e prolonged Q-T interval  Consult Neuro r/e encephalopathy  Consult Psych r/e bipolar disorder   Bilateral Sequential Compression Device  MRI of Brain  Neuro Checks q4h  NSS @ 75    Scheduled Meds:   Current Facility-Administered Medications:  benztropine 1 mg Oral BID   famotidine 20 mg Oral BID   [START ON 9/17/2018] fentaNYL 25 mcg Transdermal Q72H   gabapentin 400 mg Oral BID   haloperidol 5 mg Oral BID   melatonin 3 mg Oral HS   pantoprazole 40 mg Oral Early Morning     Klonopin 1 mg po x 2 thus far  Percocet po x 1 thus far

## 2018-09-14 NOTE — ASSESSMENT & PLAN NOTE
Patient's prolonged QTC interval on on EKG is most likely due to hypokalemia and haloperidol  Potassium is higher at 3 5  Patient's Haldol was discontinued    Patient was switched to Zyprexa

## 2018-09-14 NOTE — ASSESSMENT & PLAN NOTE
Patient complains of nausea and vomiting x2 days  He had 2 episodes of non bloody emesis  Patient is afebrile  White count within normal limits  Will continue to monitor  Administer Tigan 200mg IM Q6H PRN

## 2018-09-14 NOTE — ASSESSMENT & PLAN NOTE
Patient was seen by Psychiatry who felt that patient does not need inpatient psychiatric treatment at this time  I discussed the case with Dr María Joe on the phone in detail on day of discharge!   I tried to reach out to patient's wife as well left message on her answering machine to call me back

## 2018-09-14 NOTE — ASSESSMENT & PLAN NOTE
Pt had brain MRI March 31, 2012 which showed 3 nonspecific foci of high signal and right middle cerebellar peduncle lesion  Radiologist recommended follow-up MRI with contrast in 6 weeks  Patient has not had follow-up MRI since  Will obtain MRI with contrast   Appreciate Neurology input

## 2018-09-14 NOTE — H&P
H&P- Luann Gaucher Sovocool 1970, 51 y o  male MRN: [id number]    Unit/Bed#: [address] [place] [address] Encounter: [id number]    Primary Care Provider: Gab Chadwick DO   Date and time admitted to hospital: 9/13/2018  7:23 PM        * Prolonged Q-T interval on ECG   Assessment & Plan    QTC interval in the  on EKG  This is new from EKG on 12/30/2017  May be medication related as patient was recently started on Haldol 15 mg p o  b i d     Will decrease Haldol to 5 mg p o  b i d  in order to taper off of medication  Continue to monitor on telemetry  Inpatient consult Cardiology  Hypokalemia   Assessment & Plan    Potassium 2 9 in the ED  Repleted with 40 mEq of p o  potassium  Will recheck BMP in a m  and replete as needed  Encephalopathy   Assessment & Plan    Wife states that pt talks to people that are not there and believes he is the owner of [address] [place] [address] facilities  Will monitor neurochecks Q4H  Inpatient consult to neurology  Vomiting   Assessment & Plan    Patient complains of nausea and vomiting x2 days  He had 2 episodes of non bloody emesis  Patient is afebrile  White count within normal limits  Will continue to monitor  Administer Tigan 200mg IM Q6H PRN  Chronic pain disorder   Assessment & Plan    Fentanyl patch recently discontinued  Will continue Neurontin 400 mg p o  b i d  Bipolar disorder, current episode mixed, moderate (HCC)   Assessment & Plan    Will decrease Haldol from 15 mg twice a day to 5 mg twice a day in order to taper off as patient had QT interval of 654  Will place patient back on Zyprexa 20 mg at bedtime  Inpatient consult to Psychiatry  Drug abuse and dependence [facility])   Assessment & Plan    Patient taken off of Klonopin and Fentanyl patch at [facility]  Pt states that he was not taking meds as prescribed           Abnormal brain MRI   Assessment & Plan    Pt had brain MRI March 31, 2012 which showed 3 nonspecific foci of high signal and right middle cerebellar peduncle lesion  Radiologist recommended follow-up MRI with contrast in 6 weeks  Patient has not had follow-up MRI since  Will obtain MRI with contrast   Appreciate Neurology input  VTE Prophylaxis: Patient is low risk for VTE  / sequential compression device   Code Status:  Full code  POLST: There is no POLST form on file for this patient (pre-hospital)  Discussion with family:  Spoke with wife Guevara Jin    Anticipated Length of Stay:  Patient will be admitted on an Inpatient basis with an anticipated length of stay of  > 2 midnights  Justification for Hospital Stay:  Close cardiac monitor    Total Time for Visit, including Counseling / Coordination of Care: 30 minutes  Greater than 50% of this total time spent on direct patient counseling and coordination of care  Chief Complaint:   Nausea and vomiting    History of Present Illness:    Kranthi Townsend is a 51 y o  male with history of bipolar disorder, chronic pain disorder, anxiety, and depression who presents with complaints of nausea, vomiting, chills, diaphoresis, tingling in feet, and tremors  Patient states that approximately 3-4 days ago he developed generalized tremors and then approximately 2 days ago he became nauseous and vomited 2 times  Vomitus was nonbloody nonbilious  Patient was recently discharged from [facility] for which he states he went voluntarily to stop taking fentanyl and Klonopin  Medications were changed and patient was placed on Haldol 15 mg p o  b i d   Patient has been on Klonopin for 28 years for anxiety and fentanyl for 15 years for lower back pain after surgery  He does not want to stop taking the medications but is requesting help to take them as prescribed  Fentanyl and Klonopin were last taken 2 weeks ago  According to wife patient has been hallucinating and talking to people that are not there, not eating well, or sleeping well    He is also delusional and believes that he owns [address] facilities  When asked patient denies visual or auditory hallucinations  In the ED patient had alk-phos of 134, urine drug screen was positive for barbiturates, potassium 2 9  On the EKG shows sinus tachycardia with prolonged QTc interval of 654  QT has been progressively lengthening 1st noted on EKG August 15, 2018  MRI of brain from 05/31/2012 shows 3 nonspecific foci of high signal with right middle cerebellar peduncle lesion  Radiology recommended a follow-up with a contrast MRI of brain in 6 weeks  Patient has not had follow-up MRI since  Review of Systems:    Review of Systems   Constitutional: Positive for activity change, appetite change, chills and diaphoresis  Negative for fever  Respiratory: Negative for apnea, cough and shortness of breath  Cardiovascular: Negative for chest pain, palpitations and leg swelling  Gastrointestinal: Positive for nausea and vomiting  Negative for abdominal distention, abdominal pain, constipation and diarrhea  Genitourinary: Negative for dysuria  Musculoskeletal: Positive for back pain (Chronic lower back pain)  Neurological: Positive for tremors  Negative for dizziness, seizures, facial asymmetry, weakness, light-headedness, numbness and headaches  Tingling in both feet   Psychiatric/Behavioral: Positive for sleep disturbance  Negative for agitation and confusion  The patient is nervous/anxious  All other systems reviewed and are negative        Past Medical and Surgical History:     Past Medical History:   Diagnosis Date    [facility]ism ([facility] )     Anxiety     Back pain     Bipolar disorder, current episode mixed, moderate (HCC)     Cardiac disease     Chronic pain disorder     Depression     GERD (gastroesophageal reflux disease)     Hypertension     MI (myocardial infarction) ([facility] )     Psychiatric disorder     Psychiatric illness     Psychosis     Renal disorder        Past Surgical History:   Procedure Laterality Date    BACK SURGERY      report thoracic surgery       Meds/Allergies:    Prior to Admission medications    Medication Sig Start Date End Date Taking? Authorizing Provider   benztropine (COGENTIN) 1 mg tablet Take 1 mg by mouth 2 (two) times a day   Yes Historical Provider, MD   haloperidol (HALDOL) 10 mg tablet Take 10 mg by mouth 2 (two) times a day   Yes Historical Provider, MD   haloperidol (HALDOL) 5 mg tablet Take 5 mg by mouth 2 (two) times a day   Yes Historical Provider, MD   melatonin 3 mg Take 3 mg by mouth daily at bedtime   Yes Historical Provider, MD   ranitidine (ZANTAC) 150 mg tablet Take 150 mg by mouth 2 (two) times a day   Yes Historical Provider, MD   gabapentin (NEURONTIN) 300 mg capsule Take 1 capsule (300 mg total) by mouth 2 (two) times a day  Patient taking differently: Take by mouth 2 (two) times a day   8/24/18   Lexi Parada PA-C   omeprazole (PriLOSEC) 20 mg delayed release capsule Take by mouth daily Dose unknown    Historical Provider, MD     I have reviewed home medications with patient personally  Allergies:    Allergies   Allergen Reactions    Lexapro [Escitalopram Oxalate] Hives    Penicillins        Social History:     Marital Status: /Civil Union   Occupation: Disabled  Patient Pre-hospital Living Situation: Lives with wife  Patient Pre-hospital Level of Mobility: Independent  Patient Pre-hospital Diet Restrictions: Soft  Substance Use History:   History   Alcohol Use No     Comment: history 10 years ago heavy drinking     History   Smoking Status    Never Smoker   Smokeless Tobacco    Never Used     Comment: patient is a non - smoker     History   Drug Use No       Family History:    non-contributory    Physical Exam:     Vitals:   Blood Pressure: (!) 147/105 (09/13/18 2234)  Pulse: 88 (09/13/18 2234)  Temperature: 98 °F (36 7 °C) (09/13/18 2234)  Temp Source: Oral (09/13/18 2234)  Respirations: (!) 26 (09/13/18 2234)  Height: 5' 7" (170 2 cm) (09/13/18 1930)  Weight - Scale: 76 6 kg (168 lb 14 oz) (09/13/18 2234)  SpO2: 99 % (09/13/18 2234)    Physical Exam   Constitutional: He is oriented to person, place, and time  He appears well-developed and well-nourished  No distress  HENT:   Head: Normocephalic and atraumatic  Eyes: EOM are normal  Pupils are equal, round, and reactive to light  Neck: Normal range of motion  Neck supple  Cardiovascular: Regular rhythm, normal heart sounds and intact distal pulses  Tachycardia present  Exam reveals no gallop and no friction rub  No murmur heard  QTC prolonged   Pulmonary/Chest: Effort normal and breath sounds normal  No respiratory distress  He has no wheezes  He has no rales  Abdominal: Soft  Bowel sounds are normal  He exhibits no distension  There is no tenderness  Musculoskeletal: Normal range of motion  He exhibits no edema  Neurological: He is alert and oriented to person, place, and time  Skin: Skin is warm and dry  There is pallor  Psychiatric: His speech is normal and behavior is normal  Judgment and thought content normal  His mood appears anxious  Cognition and memory are normal    Nursing note and vitals reviewed  Additional Data:     Lab Results: I have personally reviewed pertinent reports          Results from last 7 days  Lab Units 09/13/18 1948   WBC Thousand/uL 9 76   HEMOGLOBIN g/dL 16 6   HEMATOCRIT % 48 8   PLATELETS Thousands/uL 284   NEUTROS PCT % 63   LYMPHS PCT % 28   MONOS PCT % 8   EOS PCT % 1       Results from last 7 days  Lab Units 09/13/18 1948   SODIUM mmol/L 141   POTASSIUM mmol/L 2 9*   CHLORIDE mmol/L 100   CO2 mmol/L 26   BUN mg/dL 7   CREATININE mg/dL 1 18   CALCIUM mg/dL 9 8   ALK PHOS U/L 134*   ALT U/L 31   AST U/L 16       Results from last 7 days  Lab Units 09/13/18 1948   INR  1 00               Imaging: No images noted from this admission    MRI inpatient order    (Results Pending)       EKG, Pathology, and Other Studies Reviewed on Admission:   · EKG:  Sinus tachycardia with QTC interval 654    Allscripts / Epic Records Reviewed: Yes     ** Please Note: This note has been constructed using a voice recognition system   **

## 2018-09-14 NOTE — ED NOTES
Admitted to [facility] with ER Tech and all belongings       Hina Sr, MARCUS  09/13/18 6879

## 2018-09-14 NOTE — PROGRESS NOTES
Progress Note - Fabrice Tran 1970, 51 y o  male MRN: [id number]    Unit/Bed#: [address] [place] [address] Encounter: [id number]    Primary Care Provider: Lexi Barr DO   Date and time admitted to hospital: 9/13/2018  7:23 PM        * Prolonged Q-T interval on ECG   Assessment & Plan    Patient's prolonged QTC interval on on EKG is most likely due to hypokalemia and haloperidol  Potassium is higher at 3 5  Patient's Haldol was discontinued  Patient was switched to Zyprexa        Hypokalemia   Assessment & Plan    Will give 1 more dose of potassium chloride to patient to raise potassium further and will monitor levels        Bipolar disorder, current episode mixed, moderate (HCC)   Assessment & Plan    Psychiatry change Zyprexa dose to 10 mg daily and help read was discontinued  I will discuss with Dr Natanael Anderson whether patient should be on Klonopin        Acute metabolic encephalopathy   Assessment & Plan    Patient's encephalopathy as evidenced by hearing voices and talking to people thinking that he owns to [facility] is most likely due to withdrawal from benzodiazepines and fentanyl   his nausea and vomiting was also most likely due to that  Patient had breakfast and lunch without any nausea vomiting or abdominal pain he denies any visual or auditory hallucinations at present  Will continue gabapentin for probable narcotic withdrawal, will discuss with Dr Natanael Anderson whether Catapres is indicated  and whether we should resume Klonopin  I am reluctant to resume fentanyl patch              VTE Prophylaxis: in place    Patient Centered Rounds: I rounded with patient's nurse    Current Length of Stay: 1 day(s)    Current Patient Status: Inpatient    Certification Statement: Pt requires additional inpatient hospital stay due to: see assessment and plan        Subjective:   Patient denies any headache, change in vision, chest pain, shortness of breath, palpitations, nausea, abdominal pain, diarrhea, dysuria, hallucinations visual or auditory, anxiety  He had breakfast and lunch and he tolerated everything without any vomiting    All other ROS are negative    Objective:     Vitals:   Temp (24hrs), Av 9 °F (36 6 °C), Min:97 8 °F (36 6 °C), Max:98 °F (36 7 °C)    HR:  [] 85  Resp:  [14-26] 18  BP: (127-155)/() 127/79  SpO2:  [97 %-99 %] 97 %  Body mass index is 25 9 kg/m²  Input and Output Summary (last 24 hours): Intake/Output Summary (Last 24 hours) at 18 1318  Last data filed at 18 2041   Gross per 24 hour   Intake             1000 ml   Output                0 ml   Net             1000 ml       Physical Exam:     Physical Exam   Constitutional: He is oriented to person, place, and time  He appears well-developed  No distress  HENT:   Head: Normocephalic  Mouth/Throat: Oropharynx is clear and moist    Eyes: Conjunctivae are normal    Neck: Neck supple  Cardiovascular: Normal rate and regular rhythm  Pulmonary/Chest: Effort normal  No respiratory distress  He has no wheezes  He has no rales  Abdominal: Soft  Bowel sounds are normal  He exhibits no distension  There is no tenderness  Musculoskeletal: He exhibits no tenderness  Lymphadenopathy:     He has no cervical adenopathy  Neurological: He is alert and oriented to person, place, and time  No cranial nerve deficit  Patient has minimal resting tremor of the outstretched hands   Skin: Skin is warm and dry  No rash noted  Psychiatric: He has a normal mood and affect  Vitals reviewed  I personally reviewed labs and imaging reports for today        Last 24 Hours Medication List:     Current Facility-Administered Medications:  acetaminophen 650 mg Oral Q6H PRN Leigh Ann Brenna, CRNP   benztropine 1 mg Oral BID Raheem Patel, CRNP   calcium carbonate 500 mg Oral Daily PRN Raheem Patel, CRNP   clonazePAM 1 mg Oral BID PRN Leigh Ann Brenna, CRNP   famotidine 20 mg Oral BID Leigh Ann Brenna, CRNP   gabapentin 400 mg Oral BID DELANO Douglass   melatonin 3 mg Oral HS DELANO Dawkins   OLANZapine 10 mg Oral HS Cristopher Diop   oxyCODONE 5 mg Oral Q6H PRN DELANO Lin   pantoprazole 40 mg Oral Early Morning DELANO Dawkins   trimethobenzamide 200 mg Intramuscular Q6H PRN Rai Santiago, DELANO   zolpidem 10 mg Oral HS PRN DELANO Douglass          Today, Patient Was Seen By: Livier Andres MD    ** Please Note: Dictation voice to text software may have been used in the creation of this document   **

## 2018-09-14 NOTE — ASSESSMENT & PLAN NOTE
Potassium 2 9 in the ED  Repleted with 40 mEq of p o  potassium  Will recheck BMP in a m  and replete as needed

## 2018-09-14 NOTE — ASSESSMENT & PLAN NOTE
Will decrease Haldol from 15 mg twice a day to 5 mg twice a day in order to taper off as patient had QT interval of 654  Will place patient back on Zyprexa 20 mg at bedtime  Inpatient consult to Psychiatry

## 2018-09-14 NOTE — ASSESSMENT & PLAN NOTE
Psychiatry change Zyprexa dose to 10 mg daily and help read was discontinued    I will discuss with Dr Josue Ramsey whether patient should be on Klonopin

## 2018-09-14 NOTE — CONSULTS
Consultation - Neurology   Charles Mack 51 y o  male MRN: [id number]  Unit/Bed#: [address] [place] [address] [id number] Encounter: [id number]      Assessment/Plan   Assessment/Plan:  58-year-old man with confusion/altered mental status secondary to underlying psychiatric disorder  Currently patient has no clinical signs of delirium or encephalopathy  - no clinical signs to suggest underlying acute neurologic pathology  - no focal complaints or deficits on exam  - may defer any additional neurologic imaging at this time  - no clinical signs of CNS infection/meningoencephalitis, no indication for LP  - no history of seizures or recent description of any seizure-like activity, no indication for EEG  - no additional neurologic recommendations at this time  - neurology will sign off  - no clear need for outpatient neurologic follow-up  - please call with questions or concerns  History of Present Illness     Reason for Consult / Principal Problem:  Altered mental status  Hx and PE limited by:  Not applicable  (as per H&P verbatim) HPI: Priti Arteaga is a 51 y o  right handed male with a past medical history of bipolar disorder, alcohol abuse, anxiety/depression, hypertension, CKD, MI  who presents with with complaints of nausea, vomiting, chills, diaphoresis, tingling in feet, and tremors  Patient states that approximately 3-4 days ago he developed generalized tremors and then approximately 2 days ago he became nauseous and vomited 2 times  Vomitus was nonbloody nonbilious  Patient was recently discharged from [facility] for which he states he went voluntarily to stop taking fentanyl and Klonopin  Medications were changed and patient was placed on Haldol 15 mg p o  b i d   Patient has been on Klonopin for 28 years for anxiety and fentanyl for 15 years for lower back pain after surgery  He does not want to stop taking the medications but is requesting help to take them as prescribed    Fentanyl and Klonopin were last taken 2 weeks ago  According to wife patient has been hallucinating and talking to people that are not there, not eating well, or sleeping well  He is also delusional and believes that he owns [address] facilities  When asked patient denies visual or auditory hallucinations  In the ED patient had alk-phos of 134, urine drug screen was positive for barbiturates, potassium 2 9  On the EKG shows sinus tachycardia with prolonged QTc interval of 654  QT has been progressively lengthening 1st noted on EKG August 15, 2018  MRI of brain from 05/31/2012 shows 3 nonspecific foci of high signal with right middle cerebellar peduncle lesion  Radiology recommended a follow-up with a contrast MRI of brain in 6 weeks  Patient has not had follow-up MRI since  Inpatient consult to Neurology  Consult performed by: Lucretia Ferraro  Consult ordered by: Jaden Lucio          Review of Systems   Constitutional: Negative  HENT: Negative for hearing loss  Eyes: Negative for photophobia and visual disturbance  Respiratory: Negative for wheezing  Cardiovascular: Negative for chest pain and palpitations  Genitourinary: Negative for dysuria and urgency  Neurological: Negative for dizziness, weakness, light-headedness, numbness and headaches  All other systems reviewed and are negative        Historical Information   Past Medical History:   Diagnosis Date    [facility]     Anxiety     Back pain     Bipolar disorder, current episode mixed, moderate (HCC)     Cardiac disease     Chronic pain disorder     Depression     GERD (gastroesophageal reflux disease)     Hypertension     [facility]     Psychiatric disorder     Psychiatric illness     Psychosis     Renal disorder      Past Surgical History:   Procedure Laterality Date    BACK SURGERY      report thoracic surgery     Social History   History   Alcohol Use No     Comment: history 10 years ago heavy drinking     History   Drug Use No     History   Smoking Status    Never Smoker   Smokeless Tobacco    Never Used     Comment: patient is a non - smoker     Family History:   Family History   Problem Relation Age of Onset    No Known Problems Mother     No Known Problems Father     No Known Problems Sister     No Known Problems Brother     No Known Problems Maternal Aunt     No Known Problems Paternal Aunt     No Known Problems Maternal Uncle     No Known Problems Paternal Uncle     No Known Problems Maternal Grandfather     No Known Problems Maternal Grandmother     No Known Problems Paternal Grandfather     No Known Problems Paternal Grandmother     No Known Problems Cousin     ADD / ADHD Neg Hx     Alcohol abuse Neg Hx     Anxiety disorder Neg Hx     Bipolar disorder Neg Hx     Dementia Neg Hx     Depression Neg Hx     Drug abuse Neg Hx     OCD Neg Hx     Paranoid behavior Neg Hx     Schizophrenia Neg Hx     Seizures Neg Hx     Self-Injury Neg Hx     Suicide Attempts Neg Hx          Meds/Allergies   current meds:   Current Facility-Administered Medications   Medication Dose Route Frequency    acetaminophen (TYLENOL) tablet 650 mg  650 mg Oral Q6H PRN    benztropine (COGENTIN) tablet 1 mg  1 mg Oral BID    calcium carbonate (TUMS) chewable tablet 500 mg  500 mg Oral Daily PRN    clonazePAM (KlonoPIN) tablet 1 mg  1 mg Oral BID PRN    famotidine (PEPCID) tablet 20 mg  20 mg Oral BID    gabapentin (NEURONTIN) capsule 400 mg  400 mg Oral BID    melatonin tablet 3 mg  3 mg Oral HS    OLANZapine (ZyPREXA) tablet 10 mg  10 mg Oral HS    oxyCODONE (ROXICODONE) IR tablet 5 mg  5 mg Oral Q6H PRN    pantoprazole (PROTONIX) EC tablet 40 mg  40 mg Oral Early Morning    trimethobenzamide (TIGAN) IM injection 200 mg  200 mg Intramuscular Q6H PRN    zolpidem (AMBIEN) tablet 10 mg  10 mg Oral HS PRN       Allergies   Allergen Reactions    Lexapro [Escitalopram Oxalate] Hives    Penicillins        Objective   Vitals:Blood pressure 127/79, pulse 85, temperature 97 9 °F (36 6 °C), temperature source Oral, resp  rate 18, height 5' 7" (1 702 m), weight 75 kg (165 lb 5 5 oz), SpO2 97 %  ,Body mass index is 25 9 kg/m²  Intake/Output Summary (Last 24 hours) at 09/14/18 1407  Last data filed at 09/13/18 2041   Gross per 24 hour   Intake             1000 ml   Output                0 ml   Net             1000 ml       Invasive Devices: Invasive Devices     Peripheral Intravenous Line            Peripheral IV 09/13/18 Right Antecubital less than 1 day                Physical Exam   Constitutional: Vital signs are normal  He appears well-developed and well-nourished  Afebrile  HENT:   Head: Normocephalic and atraumatic  Cardiovascular: Normal rate, regular rhythm and normal heart sounds  No murmur heard  Pulmonary/Chest: Effort normal and breath sounds normal      Neurologic Exam     Mental Status   - Oriented to person, place, and date  - level of consciousness alert  - follows commands appropriately  - Attention normal  - Fund of knowledge appropriate    - No evidence of Aphasia  Cranial Nerves   -Visual Fields full, PERRLA, EOMI  -Fundoscopic exam attempted, unable to visualize disks  -Face is symmetric with respect to motor and sensory   -Audition intact and symmetric    -Tongue, palate, uvula midline   -Shoulder shrug intact and symmetric  Motor Exam - 5/5 strength in all four extremities  - normal bulk throughout  - normal tone throughout     Sensory Exam   Sensation intact to touch, pin, temp, vib, in all four extremities  Gait, Coordination, and Reflexes - DTR's 2+ in the bilateral upper extremities and patella, absent at the ankles bilaterally  - Toes down-going bilaterally  - No Gross ataxia per finger to nose    - gait defered  Lab Results: I have personally reviewed pertinent reports      Imaging Studies: I have personally reviewed pertinent films in PACS  EKG, Pathology, and Other Studies: I have personally reviewed pertinent reports      VTE Prophylaxis: Sequential compression device (Venodyne)

## 2018-09-14 NOTE — ASSESSMENT & PLAN NOTE
QTC interval in the  on EKG  This is new from EKG on 12/30/2017  May be medication related as patient was recently started on Haldol 15 mg p o  b i d     Will decrease Haldol to 5 mg p o  b i d  in order to taper off of medication  Continue to monitor on telemetry  Inpatient consult Cardiology

## 2018-09-14 NOTE — ASSESSMENT & PLAN NOTE
Patient's encephalopathy as evidenced by hearing voices and talking to people thinking that he owns to [facility] was most likely due to withdrawal from benzodiazepines and fentanyl   his nausea and vomiting was also most likely due to that  Patient was seen in consultation by Psychiatry who resumed clonazepam 0 5 mg p o  b i d , discontinued help pedal due to QTC prolongation and started patient on Zyprexa  Patient should be taking Zyprexa 15 mg at bedtime  I did give him 7 days supply this medication and following psychiatry's recommendations I urged patient to follow up with his psychiatrist within 1 week after discharge! Patient had breakfast and lunch without any nausea vomiting or abdominal pain on day of discharge and he no longer had any visual or auditory hallucinations    His mild resting tremor of the outstretched hands also resolved after institution of clonazepam     Since patient's encephalopathy resolved, hypokalemia and QTC prolongation resolved rapidly and unexpectedly he was stable for discharge today

## 2018-09-17 NOTE — PLAN OF CARE

## 2020-09-05 ENCOUNTER — APPOINTMENT (EMERGENCY)
Dept: RADIOLOGY | Facility: HOSPITAL | Age: 50
DRG: 964 | End: 2020-09-05
Payer: COMMERCIAL

## 2020-09-05 ENCOUNTER — APPOINTMENT (INPATIENT)
Dept: RADIOLOGY | Facility: HOSPITAL | Age: 50
DRG: 964 | End: 2020-09-05
Payer: COMMERCIAL

## 2020-09-05 ENCOUNTER — HOSPITAL ENCOUNTER (INPATIENT)
Facility: HOSPITAL | Age: 50
LOS: 7 days | DRG: 964 | End: 2020-09-12
Attending: SURGERY | Admitting: SURGERY
Payer: COMMERCIAL

## 2020-09-05 DIAGNOSIS — S42.102A CLOSED FRACTURE OF LEFT SCAPULA, UNSPECIFIED PART OF SCAPULA, INITIAL ENCOUNTER: Primary | ICD-10-CM

## 2020-09-05 DIAGNOSIS — S06.5X9A SUBDURAL HEMATOMA (HCC): ICD-10-CM

## 2020-09-05 DIAGNOSIS — F41.9 ANXIETY: ICD-10-CM

## 2020-09-05 DIAGNOSIS — S22.42XA CLOSED FRACTURE OF MULTIPLE RIBS OF LEFT SIDE: ICD-10-CM
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
## 2020-09-05 LAB
ABO GROUP BLD: NORMAL
ALBUMIN SERPL BCP-MCNC: 4 G/DL (ref 3.5–5)
ALP SERPL-CCNC: 88 U/L (ref 46–116)
ALT SERPL W P-5'-P-CCNC: 34 U/L (ref 12–78)
ANION GAP SERPL CALCULATED.3IONS-SCNC: 4 MMOL/L (ref 4–13)
ANION GAP SERPL CALCULATED.3IONS-SCNC: 5 MMOL/L (ref 4–13)
AST SERPL W P-5'-P-CCNC: 50 U/L (ref 5–45)
BASE EXCESS BLDA CALC-SCNC: -2 MMOL/L (ref -2–3)
BASOPHILS # BLD AUTO: 0.03 THOUSANDS/ΜL (ref 0–0.1)
BASOPHILS # BLD AUTO: 0.05 THOUSANDS/ΜL (ref 0–0.1)
BASOPHILS NFR BLD AUTO: 0 % (ref 0–1)
BASOPHILS NFR BLD AUTO: 1 % (ref 0–1)
BILIRUB SERPL-MCNC: 0.44 MG/DL (ref 0.2–1)
BLD GP AB SCN SERPL QL: NEGATIVE
BUN SERPL-MCNC: 20 MG/DL (ref 5–25)
BUN SERPL-MCNC: 21 MG/DL (ref 5–25)
CA-I BLD-SCNC: 1.2 MMOL/L (ref 1.12–1.32)
CALCIUM SERPL-MCNC: 8.3 MG/DL (ref 8.3–10.1)
CALCIUM SERPL-MCNC: 8.4 MG/DL (ref 8.3–10.1)
CHLORIDE SERPL-SCNC: 106 MMOL/L (ref 100–108)
CHLORIDE SERPL-SCNC: 107 MMOL/L (ref 100–108)
CK MB SERPL-MCNC: 8.8 NG/ML (ref 0–5)
CK MB SERPL-MCNC: <1 % (ref 0–2.5)
CK SERPL-CCNC: 1028 U/L (ref 39–308)
CO2 SERPL-SCNC: 26 MMOL/L (ref 21–32)
CO2 SERPL-SCNC: 28 MMOL/L (ref 21–32)
CREAT SERPL-MCNC: 1.61 MG/DL (ref 0.6–1.3)
CREAT SERPL-MCNC: 1.74 MG/DL (ref 0.6–1.3)
EOSINOPHIL # BLD AUTO: 0.01 THOUSAND/ΜL (ref 0–0.61)
EOSINOPHIL # BLD AUTO: 0.13 THOUSAND/ΜL (ref 0–0.61)
EOSINOPHIL NFR BLD AUTO: 0 % (ref 0–6)
EOSINOPHIL NFR BLD AUTO: 1 % (ref 0–6)
ERYTHROCYTE [DISTWIDTH] IN BLOOD BY AUTOMATED COUNT: 13.3 % (ref 11.6–15.1)
ERYTHROCYTE [DISTWIDTH] IN BLOOD BY AUTOMATED COUNT: 13.4 % (ref 11.6–15.1)
GFR SERPL CREATININE-BSD FRML MDRD: 44 ML/MIN/1.73SQ M
GFR SERPL CREATININE-BSD FRML MDRD: 49 ML/MIN/1.73SQ M
GLUCOSE SERPL-MCNC: 137 MG/DL (ref 65–140)
GLUCOSE SERPL-MCNC: 139 MG/DL (ref 65–140)
GLUCOSE SERPL-MCNC: 145 MG/DL (ref 65–140)
HCO3 BLDA-SCNC: 24.3 MMOL/L (ref 24–30)
HCT VFR BLD AUTO: 40.6 % (ref 36.5–49.3)
HCT VFR BLD AUTO: 42 % (ref 36.5–49.3)
HCT VFR BLD CALC: 42 % (ref 36.5–49.3)
HGB BLD-MCNC: 13.9 G/DL (ref 12–17)
HGB BLD-MCNC: 14.3 G/DL (ref 12–17)
HGB BLDA-MCNC: 14.3 G/DL (ref 12–17)
IMM GRANULOCYTES # BLD AUTO: 0.11 THOUSAND/UL (ref 0–0.2)
IMM GRANULOCYTES # BLD AUTO: 0.13 THOUSAND/UL (ref 0–0.2)
IMM GRANULOCYTES NFR BLD AUTO: 1 % (ref 0–2)
IMM GRANULOCYTES NFR BLD AUTO: 1 % (ref 0–2)
INR PPP: 0.96 (ref 0.84–1.19)
LYMPHOCYTES # BLD AUTO: 0.7 THOUSANDS/ΜL (ref 0.6–4.47)
LYMPHOCYTES # BLD AUTO: 1.2 THOUSANDS/ΜL (ref 0.6–4.47)
LYMPHOCYTES NFR BLD AUTO: 12 % (ref 14–44)
LYMPHOCYTES NFR BLD AUTO: 5 % (ref 14–44)
MCH RBC QN AUTO: 29.7 PG (ref 26.8–34.3)
MCH RBC QN AUTO: 30 PG (ref 26.8–34.3)
MCHC RBC AUTO-ENTMCNC: 34 G/DL (ref 31.4–37.4)
MCHC RBC AUTO-ENTMCNC: 34.2 G/DL (ref 31.4–37.4)
MCV RBC AUTO: 87 FL (ref 82–98)
MCV RBC AUTO: 88 FL (ref 82–98)
MONOCYTES # BLD AUTO: 0.67 THOUSAND/ΜL (ref 0.17–1.22)
MONOCYTES # BLD AUTO: 0.96 THOUSAND/ΜL (ref 0.17–1.22)
MONOCYTES NFR BLD AUTO: 7 % (ref 4–12)
MONOCYTES NFR BLD AUTO: 7 % (ref 4–12)
NEUTROPHILS # BLD AUTO: 12.09 THOUSANDS/ΜL (ref 1.85–7.62)
NEUTROPHILS # BLD AUTO: 8.18 THOUSANDS/ΜL (ref 1.85–7.62)
NEUTS SEG NFR BLD AUTO: 78 % (ref 43–75)
NEUTS SEG NFR BLD AUTO: 87 % (ref 43–75)
NRBC BLD AUTO-RTO: 0 /100 WBCS
NRBC BLD AUTO-RTO: 0 /100 WBCS
PCO2 BLD: 26 MMOL/L (ref 21–32)
PCO2 BLD: 44.4 MM HG (ref 42–50)
PH BLD: 7.35 [PH] (ref 7.3–7.4)
PLATELET # BLD AUTO: 228 THOUSANDS/UL (ref 149–390)
PLATELET # BLD AUTO: 251 THOUSANDS/UL (ref 149–390)
PMV BLD AUTO: 10.5 FL (ref 8.9–12.7)
PMV BLD AUTO: 10.8 FL (ref 8.9–12.7)
PO2 BLD: 51 MM HG (ref 35–45)
POTASSIUM BLD-SCNC: 4 MMOL/L (ref 3.5–5.3)
POTASSIUM SERPL-SCNC: 4.1 MMOL/L (ref 3.5–5.3)
POTASSIUM SERPL-SCNC: 5.2 MMOL/L (ref 3.5–5.3)
PROT SERPL-MCNC: 7.5 G/DL (ref 6.4–8.2)
PROTHROMBIN TIME: 12.7 SECONDS (ref 11.6–14.5)
RBC # BLD AUTO: 4.64 MILLION/UL (ref 3.88–5.62)
RBC # BLD AUTO: 4.82 MILLION/UL (ref 3.88–5.62)
RH BLD: POSITIVE
SAO2 % BLD FROM PO2: 84 % (ref 60–85)
SODIUM BLD-SCNC: 141 MMOL/L (ref 136–145)
SODIUM SERPL-SCNC: 138 MMOL/L (ref 136–145)
SODIUM SERPL-SCNC: 138 MMOL/L (ref 136–145)
SPECIMEN EXPIRATION DATE: NORMAL
SPECIMEN SOURCE: ABNORMAL
TROPONIN I SERPL-MCNC: <0.02 NG/ML
WBC # BLD AUTO: 10.36 THOUSAND/UL (ref 4.31–10.16)
WBC # BLD AUTO: 13.9 THOUSAND/UL (ref 4.31–10.16)

## 2020-09-05 PROCEDURE — 82553 CREATINE MB FRACTION: CPT | Performed by: NURSE PRACTITIONER

## 2020-09-05 PROCEDURE — G0390 TRAUMA RESPONS W/HOSP CRITI: HCPCS

## 2020-09-05 PROCEDURE — NC001 PR NO CHARGE: Performed by: EMERGENCY MEDICINE

## 2020-09-05 PROCEDURE — 84295 ASSAY OF SERUM SODIUM: CPT

## 2020-09-05 PROCEDURE — 73060 X-RAY EXAM OF HUMERUS: CPT

## 2020-09-05 PROCEDURE — 90471 IMMUNIZATION ADMIN: CPT

## 2020-09-05 PROCEDURE — 73130 X-RAY EXAM OF HAND: CPT

## 2020-09-05 PROCEDURE — 36415 COLL VENOUS BLD VENIPUNCTURE: CPT | Performed by: SURGERY

## 2020-09-05 PROCEDURE — 99291 CRITICAL CARE FIRST HOUR: CPT

## 2020-09-05 PROCEDURE — 86901 BLOOD TYPING SEROLOGIC RH(D): CPT | Performed by: SURGERY

## 2020-09-05 PROCEDURE — 85025 COMPLETE CBC W/AUTO DIFF WBC: CPT | Performed by: NURSE PRACTITIONER

## 2020-09-05 PROCEDURE — 99291 CRITICAL CARE FIRST HOUR: CPT | Performed by: SURGERY

## 2020-09-05 PROCEDURE — 86900 BLOOD TYPING SEROLOGIC ABO: CPT | Performed by: SURGERY

## 2020-09-05 PROCEDURE — 90715 TDAP VACCINE 7 YRS/> IM: CPT | Performed by: SURGERY

## 2020-09-05 PROCEDURE — G1004 CDSM NDSC: HCPCS

## 2020-09-05 PROCEDURE — 94664 DEMO&/EVAL PT USE INHALER: CPT

## 2020-09-05 PROCEDURE — 84132 ASSAY OF SERUM POTASSIUM: CPT

## 2020-09-05 PROCEDURE — 82803 BLOOD GASES ANY COMBINATION: CPT

## 2020-09-05 PROCEDURE — 70450 CT HEAD/BRAIN W/O DYE: CPT

## 2020-09-05 PROCEDURE — 82550 ASSAY OF CK (CPK): CPT | Performed by: NURSE PRACTITIONER

## 2020-09-05 PROCEDURE — 74177 CT ABD & PELVIS W/CONTRAST: CPT

## 2020-09-05 PROCEDURE — 80053 COMPREHEN METABOLIC PANEL: CPT | Performed by: SURGERY

## 2020-09-05 PROCEDURE — 82947 ASSAY GLUCOSE BLOOD QUANT: CPT

## 2020-09-05 PROCEDURE — 72125 CT NECK SPINE W/O DYE: CPT

## 2020-09-05 PROCEDURE — 85014 HEMATOCRIT: CPT

## 2020-09-05 PROCEDURE — 73030 X-RAY EXAM OF SHOULDER: CPT

## 2020-09-05 PROCEDURE — 86850 RBC ANTIBODY SCREEN: CPT | Performed by: SURGERY

## 2020-09-05 PROCEDURE — 71260 CT THORAX DX C+: CPT

## 2020-09-05 PROCEDURE — 85610 PROTHROMBIN TIME: CPT | Performed by: SURGERY

## 2020-09-05 PROCEDURE — 94668 MNPJ CHEST WALL SBSQ: CPT

## 2020-09-05 PROCEDURE — 80048 BASIC METABOLIC PNL TOTAL CA: CPT | Performed by: NURSE PRACTITIONER

## 2020-09-05 PROCEDURE — 85025 COMPLETE CBC W/AUTO DIFF WBC: CPT | Performed by: SURGERY

## 2020-09-05 PROCEDURE — 71046 X-RAY EXAM CHEST 2 VIEWS: CPT

## 2020-09-05 PROCEDURE — 73110 X-RAY EXAM OF WRIST: CPT

## 2020-09-05 PROCEDURE — 84484 ASSAY OF TROPONIN QUANT: CPT | Performed by: NURSE PRACTITIONER

## 2020-09-05 PROCEDURE — NC001 PR NO CHARGE: Performed by: NURSE PRACTITIONER

## 2020-09-05 PROCEDURE — 82330 ASSAY OF CALCIUM: CPT

## 2020-09-05 RX ORDER — METHOCARBAMOL 750 MG/1
750 TABLET, FILM COATED ORAL EVERY 6 HOURS SCHEDULED
Status: DISCONTINUED | OUTPATIENT
Start: 2020-09-05 | End: 2020-09-12 | Stop reason: HOSPADM

## 2020-09-05 RX ORDER — LANOLIN ALCOHOL/MO/W.PET/CERES
6 CREAM (GRAM) TOPICAL
Status: DISCONTINUED | OUTPATIENT
Start: 2020-09-05 | End: 2020-09-12 | Stop reason: HOSPADM

## 2020-09-05 RX ORDER — PANTOPRAZOLE SODIUM 20 MG/1
20 TABLET, DELAYED RELEASE ORAL
Status: DISCONTINUED | OUTPATIENT
Start: 2020-09-06 | End: 2020-09-06

## 2020-09-05 RX ORDER — CLONAZEPAM 1 MG/1
0.5 TABLET ORAL 2 TIMES DAILY
Status: DISCONTINUED | OUTPATIENT
Start: 2020-09-05 | End: 2020-09-06

## 2020-09-05 RX ORDER — GABAPENTIN 100 MG/1
100 CAPSULE ORAL 3 TIMES DAILY
Status: DISCONTINUED | OUTPATIENT
Start: 2020-09-05 | End: 2020-09-06

## 2020-09-05 RX ORDER — OLANZAPINE 10 MG/1
10 TABLET, ORALLY DISINTEGRATING ORAL
Status: DISCONTINUED | OUTPATIENT
Start: 2020-09-05 | End: 2020-09-06

## 2020-09-05 RX ORDER — OXYCODONE HYDROCHLORIDE 5 MG/1
5 TABLET ORAL EVERY 4 HOURS PRN
Status: DISCONTINUED | OUTPATIENT
Start: 2020-09-05 | End: 2020-09-08

## 2020-09-05 RX ORDER — ACETAMINOPHEN 325 MG/1
975 TABLET ORAL EVERY 8 HOURS SCHEDULED
Status: DISCONTINUED | OUTPATIENT
Start: 2020-09-05 | End: 2020-09-12 | Stop reason: HOSPADM

## 2020-09-05 RX ORDER — ONDANSETRON 2 MG/ML
4 INJECTION INTRAMUSCULAR; INTRAVENOUS EVERY 6 HOURS PRN
Status: DISCONTINUED | OUTPATIENT
Start: 2020-09-05 | End: 2020-09-12 | Stop reason: HOSPADM

## 2020-09-05 RX ORDER — OXYCODONE HYDROCHLORIDE 10 MG/1
10 TABLET ORAL EVERY 4 HOURS PRN
Status: DISCONTINUED | OUTPATIENT
Start: 2020-09-05 | End: 2020-09-08

## 2020-09-05 RX ORDER — HYDROMORPHONE HCL/PF 1 MG/ML
0.5 SYRINGE (ML) INJECTION
Status: DISCONTINUED | OUTPATIENT
Start: 2020-09-05 | End: 2020-09-08

## 2020-09-05 RX ORDER — LIDOCAINE 50 MG/G
2 PATCH TOPICAL DAILY
Status: DISCONTINUED | OUTPATIENT
Start: 2020-09-06 | End: 2020-09-12 | Stop reason: HOSPADM

## 2020-09-05 RX ORDER — SODIUM CHLORIDE, SODIUM GLUCONATE, SODIUM ACETATE, POTASSIUM CHLORIDE, MAGNESIUM CHLORIDE, SODIUM PHOSPHATE, DIBASIC, AND POTASSIUM PHOSPHATE .53; .5; .37; .037; .03; .012; .00082 G/100ML; G/100ML; G/100ML; G/100ML; G/100ML; G/100ML; G/100ML
50 INJECTION, SOLUTION INTRAVENOUS CONTINUOUS
Status: DISCONTINUED | OUTPATIENT
Start: 2020-09-05 | End: 2020-09-08

## 2020-09-05 RX ORDER — POLYETHYLENE GLYCOL 3350 17 G/17G
17 POWDER, FOR SOLUTION ORAL DAILY PRN
Status: DISCONTINUED | OUTPATIENT
Start: 2020-09-05 | End: 2020-09-12 | Stop reason: HOSPADM

## 2020-09-05 RX ADMIN — CLONAZEPAM 0.5 MG: 1 TABLET ORAL at 21:50

## 2020-09-05 RX ADMIN — SODIUM CHLORIDE, SODIUM GLUCONATE, SODIUM ACETATE, POTASSIUM CHLORIDE AND MAGNESIUM CHLORIDE 125 ML/HR: 526; 502; 368; 37; 30 INJECTION, SOLUTION INTRAVENOUS at 18:05

## 2020-09-05 RX ADMIN — METHOCARBAMOL TABLETS 750 MG: 750 TABLET, COATED ORAL at 18:09

## 2020-09-05 RX ADMIN — GABAPENTIN 100 MG: 100 CAPSULE ORAL at 18:05

## 2020-09-05 RX ADMIN — IOHEXOL 100 ML: 350 INJECTION, SOLUTION INTRAVENOUS at 15:45

## 2020-09-05 RX ADMIN — Medication 6 MG: at 21:50

## 2020-09-05 RX ADMIN — GABAPENTIN 100 MG: 100 CAPSULE ORAL at 21:50

## 2020-09-05 RX ADMIN — TETANUS TOXOID, REDUCED DIPHTHERIA TOXOID AND ACELLULAR PERTUSSIS VACCINE, ADSORBED 0.5 ML: 5; 2.5; 8; 8; 2.5 SUSPENSION INTRAMUSCULAR at 15:37

## 2020-09-05 RX ADMIN — OLANZAPINE 10 MG: 10 TABLET, ORALLY DISINTEGRATING ORAL at 21:50

## 2020-09-05 RX ADMIN — ACETAMINOPHEN 975 MG: 325 TABLET, FILM COATED ORAL at 21:50

## 2020-09-05 RX ADMIN — LEVETIRACETAM 500 MG: 100 INJECTION, SOLUTION INTRAVENOUS at 18:04
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
## 2020-09-05 NOTE — PLAN OF CARE
Problem: Prexisting or High Potential for Compromised Skin Integrity  Goal: Skin integrity is maintained or improved  Description: INTERVENTIONS:  - Identify patients at risk for skin breakdown  - Assess and monitor skin integrity  - Assess and monitor nutrition and hydration status  - Monitor labs   - Assess for incontinence   - Turn and reposition patient  - Assist with mobility/ambulation  - Relieve pressure over bony prominences  - Avoid friction and shearing  - Provide appropriate hygiene as needed including keeping skin clean and dry  - Evaluate need for skin moisturizer/barrier cream  - Collaborate with interdisciplinary team   - Patient/family teaching  - Consider wound care consult   Outcome: Progressing     Problem: Potential for Falls  Goal: Patient will remain free of falls  Description: INTERVENTIONS:  - Assess patient frequently for physical needs  -  Identify cognitive and physical deficits and behaviors that affect risk of falls    -  [place] fall precautions as indicated by assessment   - Educate patient/family on patient safety including physical limitations  - Instruct patient to call for assistance with activity based on assessment  - Modify environment to reduce risk of injury  - Consider OT/PT consult to assist with strengthening/mobility  Outcome: Progressing     Problem: PAIN - ADULT  Goal: Verbalizes/displays adequate comfort level or baseline comfort level  Description: Interventions:  - Encourage patient to monitor pain and request assistance  - Assess pain using appropriate pain scale  - Administer analgesics based on type and severity of pain and evaluate response  - Implement non-pharmacological measures as appropriate and evaluate response  - Consider cultural and social influences on pain and pain management  - Notify physician/advanced practitioner if interventions unsuccessful or patient reports new pain  Outcome: Progressing     Problem: INFECTION - ADULT  Goal: Absence or prevention of progression during hospitalization  Description: INTERVENTIONS:  - Assess and monitor for signs and symptoms of infection  - Monitor lab/diagnostic results  - Monitor all insertion sites, i e  indwelling lines, tubes, and drains  - Monitor endotracheal if appropriate and nasal secretions for changes in amount and color  - [place] appropriate cooling/warming therapies per order  - Administer medications as ordered  - Instruct and encourage patient and family to use good hand hygiene technique  - Identify and instruct in appropriate isolation precautions for identified infection/condition  Outcome: Progressing  Goal: Absence of fever/infection during neutropenic period  Description: INTERVENTIONS:  - Monitor WBC    Outcome: Progressing     Problem: SAFETY ADULT  Goal: Patient will remain free of falls  Description: INTERVENTIONS:  - Assess patient frequently for physical needs  -  Identify cognitive and physical deficits and behaviors that affect risk of falls    -  [place] fall precautions as indicated by assessment   - Educate patient/family on patient safety including physical limitations  - Instruct patient to call for assistance with activity based on assessment  - Modify environment to reduce risk of injury  - Consider OT/PT consult to assist with strengthening/mobility  Outcome: Progressing  Goal: Maintain or return to baseline ADL function  Description: INTERVENTIONS:  -  Assess patient's ability to carry out ADLs; assess patient's baseline for ADL function and identify physical deficits which impact ability to perform ADLs (bathing, care of mouth/teeth, toileting, grooming, dressing, etc )  - Assess/evaluate cause of self-care deficits   - Assess range of motion  - Assess patient's mobility; develop plan if impaired  - Assess patient's need for assistive devices and provide as appropriate  - Encourage maximum independence but intervene and supervise when necessary  - Involve family in performance of ADLs  - Assess for home care needs following discharge   - Consider OT consult to assist with ADL evaluation and planning for discharge  - Provide patient education as appropriate  Outcome: Progressing  Goal: Maintain or return mobility status to optimal level  Description: INTERVENTIONS:  - Assess patient's baseline mobility status (ambulation, transfers, stairs, etc )    - Identify cognitive and physical deficits and behaviors that affect mobility  - Identify mobility aids required to assist with transfers and/or ambulation (gait belt, sit-to-stand, lift, walker, cane, etc )  - [place] fall precautions as indicated by assessment  - Record patient progress and toleration of activity level on Mobility SBAR; progress patient to next Phase/Stage  - Instruct patient to call for assistance with activity based on assessment  - Consider rehabilitation consult to assist with strengthening/weightbearing, etc   Outcome: Progressing     Problem: DISCHARGE PLANNING  Goal: Discharge to home or other facility with appropriate resources  Description: INTERVENTIONS:  - Identify barriers to discharge w/patient and caregiver  - Arrange for needed discharge resources and transportation as appropriate  - Identify discharge learning needs (meds, wound care, etc )  - Arrange for interpretive services to assist at discharge as needed  - Refer to Case Management Department for coordinating discharge planning if the patient needs post-hospital services based on physician/advanced practitioner order or complex needs related to functional status, cognitive ability, or social support system  Outcome: Progressing     Problem: Knowledge Deficit  Goal: Patient/family/caregiver demonstrates understanding of disease process, treatment plan, medications, and discharge instructions  Description: Complete learning assessment and assess knowledge base    Interventions:  - Provide teaching at level of understanding  - Provide teaching via preferred learning methods  Outcome: Progressing     Problem: Nutrition/Hydration-ADULT  Goal: Nutrient/Hydration intake appropriate for improving, restoring or maintaining nutritional needs  Description: Monitor and assess patient's nutrition/hydration status for malnutrition  Collaborate with interdisciplinary team and initiate plan and interventions as ordered  Monitor patient's weight and dietary intake as ordered or per policy  Utilize nutrition screening tool and intervene as necessary  Determine patient's food preferences and provide high-protein, high-caloric foods as appropriate       INTERVENTIONS:  - Monitor oral intake, urinary output, labs, and treatment plans  - Assess nutrition and hydration status and recommend course of action  - Evaluate amount of meals eaten  - Assist patient with eating if necessary   - Allow adequate time for meals  - Recommend/ encourage appropriate diets, oral nutritional supplements, and vitamin/mineral supplements  - Order, calculate, and assess calorie counts as needed  - Recommend, monitor, and adjust tube feedings and TPN/PPN based on assessed needs  - Assess need for intravenous fluids  - Provide specific nutrition/hydration education as appropriate  - Include patient/family/caregiver in decisions related to nutrition  Outcome: Progressing
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
## 2020-09-05 NOTE — PROGRESS NOTES
Cervical Collar Clearance: The patient had a CT scan of the cervical spine demonstrating no acute injury  On exam, the patient had no midline point tenderness or paresthesias/numbness/weakness in the extremities  The patient had full range of motion (was then able to flex, extend, and rotate head laterally) without pain  There were no distracting injuries and the patient was not intoxicated  The patient's cervical spine was cleared radiologically and clinically  Cervical collar removed at this time       DELANO Fabian  9/5/2020 7:12 PM

## 2020-09-06 ENCOUNTER — APPOINTMENT (INPATIENT)
Dept: RADIOLOGY | Facility: HOSPITAL | Age: 50
DRG: 964 | End: 2020-09-06
Payer: COMMERCIAL

## 2020-09-06 PROBLEM — S52.92XA FRACTURE OF LEFT RADIUS: Status: ACTIVE | Noted: 2020-09-06

## 2020-09-06 PROBLEM — S42.152A CLOSED FRACTURE OF GLENOID CAVITY AND NECK OF LEFT SCAPULA: Status: ACTIVE | Noted: 2020-09-06

## 2020-09-06 PROBLEM — F41.9 ANXIETY: Status: ACTIVE | Noted: 2020-09-06

## 2020-09-06 PROBLEM — S06.5XAA SDH (SUBDURAL HEMATOMA): Status: ACTIVE | Noted: 2020-09-06

## 2020-09-06 PROBLEM — I60.9 SAH (SUBARACHNOID HEMORRHAGE) (HCC): Status: ACTIVE | Noted: 2020-09-06

## 2020-09-06 PROBLEM — S22.42XA CLOSED FRACTURE OF MULTIPLE RIBS OF LEFT SIDE: Status: ACTIVE | Noted: 2020-09-06

## 2020-09-06 PROBLEM — S42.142A CLOSED FRACTURE OF GLENOID CAVITY AND NECK OF LEFT SCAPULA: Status: ACTIVE | Noted: 2020-09-06

## 2020-09-06 PROBLEM — S06.5X9A SDH (SUBDURAL HEMATOMA) (HCC): Status: ACTIVE | Noted: 2020-09-06

## 2020-09-06 LAB
ANION GAP SERPL CALCULATED.3IONS-SCNC: 5 MMOL/L (ref 4–13)
ANION GAP SERPL CALCULATED.3IONS-SCNC: 7 MMOL/L (ref 4–13)
BASOPHILS # BLD AUTO: 0.03 THOUSANDS/ΜL (ref 0–0.1)
BASOPHILS NFR BLD AUTO: 0 % (ref 0–1)
BUN SERPL-MCNC: 11 MG/DL (ref 5–25)
BUN SERPL-MCNC: 16 MG/DL (ref 5–25)
CALCIUM SERPL-MCNC: 8.1 MG/DL (ref 8.3–10.1)
CALCIUM SERPL-MCNC: 8.2 MG/DL (ref 8.3–10.1)
CHLORIDE SERPL-SCNC: 102 MMOL/L (ref 100–108)
CHLORIDE SERPL-SCNC: 105 MMOL/L (ref 100–108)
CK MB SERPL-MCNC: 6 NG/ML (ref 0–5)
CK MB SERPL-MCNC: <1 % (ref 0–2.5)
CK SERPL-CCNC: 1246 U/L (ref 39–308)
CO2 SERPL-SCNC: 26 MMOL/L (ref 21–32)
CO2 SERPL-SCNC: 29 MMOL/L (ref 21–32)
CREAT SERPL-MCNC: 1.25 MG/DL (ref 0.6–1.3)
CREAT SERPL-MCNC: 1.47 MG/DL (ref 0.6–1.3)
EOSINOPHIL # BLD AUTO: 0.04 THOUSAND/ΜL (ref 0–0.61)
EOSINOPHIL NFR BLD AUTO: 0 % (ref 0–6)
ERYTHROCYTE [DISTWIDTH] IN BLOOD BY AUTOMATED COUNT: 13.3 % (ref 11.6–15.1)
ERYTHROCYTE [DISTWIDTH] IN BLOOD BY AUTOMATED COUNT: 13.4 % (ref 11.6–15.1)
GFR SERPL CREATININE-BSD FRML MDRD: 54 ML/MIN/1.73SQ M
GFR SERPL CREATININE-BSD FRML MDRD: 66 ML/MIN/1.73SQ M
GLUCOSE SERPL-MCNC: 137 MG/DL (ref 65–140)
GLUCOSE SERPL-MCNC: 174 MG/DL (ref 65–140)
HCT VFR BLD AUTO: 34.6 % (ref 36.5–49.3)
HCT VFR BLD AUTO: 34.8 % (ref 36.5–49.3)
HCT VFR BLD AUTO: 35.1 % (ref 36.5–49.3)
HGB BLD-MCNC: 11.5 G/DL (ref 12–17)
HGB BLD-MCNC: 11.7 G/DL (ref 12–17)
HGB BLD-MCNC: 11.7 G/DL (ref 12–17)
IMM GRANULOCYTES # BLD AUTO: 0.08 THOUSAND/UL (ref 0–0.2)
IMM GRANULOCYTES NFR BLD AUTO: 1 % (ref 0–2)
LYMPHOCYTES # BLD AUTO: 0.98 THOUSANDS/ΜL (ref 0.6–4.47)
LYMPHOCYTES NFR BLD AUTO: 9 % (ref 14–44)
MCH RBC QN AUTO: 29.5 PG (ref 26.8–34.3)
MCH RBC QN AUTO: 29.6 PG (ref 26.8–34.3)
MCHC RBC AUTO-ENTMCNC: 33.3 G/DL (ref 31.4–37.4)
MCHC RBC AUTO-ENTMCNC: 33.6 G/DL (ref 31.4–37.4)
MCV RBC AUTO: 88 FL (ref 82–98)
MCV RBC AUTO: 89 FL (ref 82–98)
MONOCYTES # BLD AUTO: 1.07 THOUSAND/ΜL (ref 0.17–1.22)
MONOCYTES NFR BLD AUTO: 10 % (ref 4–12)
NEUTROPHILS # BLD AUTO: 8.27 THOUSANDS/ΜL (ref 1.85–7.62)
NEUTS SEG NFR BLD AUTO: 80 % (ref 43–75)
NRBC BLD AUTO-RTO: 0 /100 WBCS
PLATELET # BLD AUTO: 202 THOUSANDS/UL (ref 149–390)
PLATELET # BLD AUTO: 205 THOUSANDS/UL (ref 149–390)
PMV BLD AUTO: 10.5 FL (ref 8.9–12.7)
PMV BLD AUTO: 10.8 FL (ref 8.9–12.7)
POTASSIUM SERPL-SCNC: 3.8 MMOL/L (ref 3.5–5.3)
POTASSIUM SERPL-SCNC: 4.1 MMOL/L (ref 3.5–5.3)
RBC # BLD AUTO: 3.95 MILLION/UL (ref 3.88–5.62)
RBC # BLD AUTO: 3.96 MILLION/UL (ref 3.88–5.62)
SODIUM SERPL-SCNC: 136 MMOL/L (ref 136–145)
SODIUM SERPL-SCNC: 138 MMOL/L (ref 136–145)
WBC # BLD AUTO: 10.47 THOUSAND/UL (ref 4.31–10.16)
WBC # BLD AUTO: 10.66 THOUSAND/UL (ref 4.31–10.16)

## 2020-09-06 PROCEDURE — 70450 CT HEAD/BRAIN W/O DYE: CPT

## 2020-09-06 PROCEDURE — 71045 X-RAY EXAM CHEST 1 VIEW: CPT

## 2020-09-06 PROCEDURE — 94760 N-INVAS EAR/PLS OXIMETRY 1: CPT

## 2020-09-06 PROCEDURE — 80048 BASIC METABOLIC PNL TOTAL CA: CPT | Performed by: NURSE PRACTITIONER

## 2020-09-06 PROCEDURE — 82550 ASSAY OF CK (CPK): CPT | Performed by: NURSE PRACTITIONER

## 2020-09-06 PROCEDURE — 25600 CLTX DST RDL FX/EPHYS SEP WO: CPT | Performed by: ORTHOPAEDIC SURGERY

## 2020-09-06 PROCEDURE — 94664 DEMO&/EVAL PT USE INHALER: CPT

## 2020-09-06 PROCEDURE — 23570 CLTX SCAPULAR FX W/O MNPJ: CPT | Performed by: ORTHOPAEDIC SURGERY

## 2020-09-06 PROCEDURE — 99221 1ST HOSP IP/OBS SF/LOW 40: CPT | Performed by: NEUROLOGICAL SURGERY

## 2020-09-06 PROCEDURE — NC001 PR NO CHARGE: Performed by: ORTHOPAEDIC SURGERY

## 2020-09-06 PROCEDURE — 82553 CREATINE MB FRACTION: CPT | Performed by: NURSE PRACTITIONER

## 2020-09-06 PROCEDURE — 85025 COMPLETE CBC W/AUTO DIFF WBC: CPT | Performed by: SURGERY

## 2020-09-06 PROCEDURE — 85018 HEMOGLOBIN: CPT | Performed by: NURSE PRACTITIONER

## 2020-09-06 PROCEDURE — 80048 BASIC METABOLIC PNL TOTAL CA: CPT | Performed by: SURGERY

## 2020-09-06 PROCEDURE — G1004 CDSM NDSC: HCPCS

## 2020-09-06 PROCEDURE — 85014 HEMATOCRIT: CPT | Performed by: NURSE PRACTITIONER

## 2020-09-06 PROCEDURE — 85027 COMPLETE CBC AUTOMATED: CPT | Performed by: NURSE PRACTITIONER

## 2020-09-06 PROCEDURE — 99233 SBSQ HOSP IP/OBS HIGH 50: CPT | Performed by: EMERGENCY MEDICINE

## 2020-09-06 PROCEDURE — NC001 PR NO CHARGE: Performed by: NURSE PRACTITIONER

## 2020-09-06 PROCEDURE — 94668 MNPJ CHEST WALL SBSQ: CPT

## 2020-09-06 PROCEDURE — 99223 1ST HOSP IP/OBS HIGH 75: CPT | Performed by: ORTHOPAEDIC SURGERY

## 2020-09-06 RX ORDER — GABAPENTIN 300 MG/1
300 CAPSULE ORAL 3 TIMES DAILY
Status: DISCONTINUED | OUTPATIENT
Start: 2020-09-06 | End: 2020-09-12 | Stop reason: HOSPADM

## 2020-09-06 RX ORDER — HEPARIN SODIUM 5000 [USP'U]/ML
7500 INJECTION, SOLUTION INTRAVENOUS; SUBCUTANEOUS EVERY 8 HOURS SCHEDULED
Status: DISCONTINUED | OUTPATIENT
Start: 2020-09-06 | End: 2020-09-07

## 2020-09-06 RX ORDER — CLONAZEPAM 1 MG/1
1 TABLET ORAL 3 TIMES DAILY
Status: DISCONTINUED | OUTPATIENT
Start: 2020-09-06 | End: 2020-09-12 | Stop reason: HOSPADM

## 2020-09-06 RX ORDER — HEPARIN SODIUM 5000 [USP'U]/ML
5000 INJECTION, SOLUTION INTRAVENOUS; SUBCUTANEOUS EVERY 8 HOURS SCHEDULED
Status: DISCONTINUED | OUTPATIENT
Start: 2020-09-06 | End: 2020-09-06

## 2020-09-06 RX ORDER — PANTOPRAZOLE SODIUM 40 MG/1
40 TABLET, DELAYED RELEASE ORAL
Status: DISCONTINUED | OUTPATIENT
Start: 2020-09-07 | End: 2020-09-12 | Stop reason: HOSPADM

## 2020-09-06 RX ORDER — QUETIAPINE 300 MG/1
300 TABLET, FILM COATED, EXTENDED RELEASE ORAL
Status: DISCONTINUED | OUTPATIENT
Start: 2020-09-06 | End: 2020-09-12 | Stop reason: HOSPADM

## 2020-09-06 RX ADMIN — GABAPENTIN 300 MG: 300 CAPSULE ORAL at 18:04

## 2020-09-06 RX ADMIN — LEVETIRACETAM 500 MG: 100 INJECTION, SOLUTION INTRAVENOUS at 08:23

## 2020-09-06 RX ADMIN — METHOCARBAMOL TABLETS 750 MG: 750 TABLET, COATED ORAL at 23:54

## 2020-09-06 RX ADMIN — PANTOPRAZOLE SODIUM 20 MG: 20 TABLET, DELAYED RELEASE ORAL at 06:13

## 2020-09-06 RX ADMIN — CLONAZEPAM 1 MG: 1 TABLET ORAL at 13:21

## 2020-09-06 RX ADMIN — HEPARIN SODIUM 7500 UNITS: 5000 INJECTION INTRAVENOUS; SUBCUTANEOUS at 13:20

## 2020-09-06 RX ADMIN — METHOCARBAMOL TABLETS 750 MG: 750 TABLET, COATED ORAL at 06:13

## 2020-09-06 RX ADMIN — ACETAMINOPHEN 975 MG: 325 TABLET, FILM COATED ORAL at 13:20

## 2020-09-06 RX ADMIN — ACETAMINOPHEN 975 MG: 325 TABLET, FILM COATED ORAL at 21:29

## 2020-09-06 RX ADMIN — HEPARIN SODIUM 7500 UNITS: 5000 INJECTION INTRAVENOUS; SUBCUTANEOUS at 21:29

## 2020-09-06 RX ADMIN — LIDOCAINE 5% 2 PATCH: 700 PATCH TOPICAL at 08:29

## 2020-09-06 RX ADMIN — LEVETIRACETAM 500 MG: 100 INJECTION, SOLUTION INTRAVENOUS at 19:47

## 2020-09-06 RX ADMIN — CLONAZEPAM 1 MG: 1 TABLET ORAL at 21:29

## 2020-09-06 RX ADMIN — SODIUM CHLORIDE, SODIUM GLUCONATE, SODIUM ACETATE, POTASSIUM CHLORIDE AND MAGNESIUM CHLORIDE 50 ML/HR: 526; 502; 368; 37; 30 INJECTION, SOLUTION INTRAVENOUS at 07:38

## 2020-09-06 RX ADMIN — GABAPENTIN 100 MG: 100 CAPSULE ORAL at 08:23

## 2020-09-06 RX ADMIN — CLONAZEPAM 0.5 MG: 1 TABLET ORAL at 08:22

## 2020-09-06 RX ADMIN — OXYCODONE HYDROCHLORIDE 5 MG: 5 TABLET ORAL at 18:54

## 2020-09-06 RX ADMIN — METHOCARBAMOL TABLETS 750 MG: 750 TABLET, COATED ORAL at 18:05

## 2020-09-06 RX ADMIN — Medication 6 MG: at 21:29

## 2020-09-06 RX ADMIN — OXYCODONE HYDROCHLORIDE 5 MG: 5 TABLET ORAL at 08:30

## 2020-09-06 RX ADMIN — ACETAMINOPHEN 975 MG: 325 TABLET, FILM COATED ORAL at 06:13

## 2020-09-06 RX ADMIN — QUETIAPINE FUMARATE 300 MG: 300 TABLET, EXTENDED RELEASE ORAL at 21:29

## 2020-09-06 RX ADMIN — GABAPENTIN 300 MG: 300 CAPSULE ORAL at 21:29

## 2020-09-06 RX ADMIN — METHOCARBAMOL TABLETS 750 MG: 750 TABLET, COATED ORAL at 11:13
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
## 2020-09-06 NOTE — ASSESSMENT & PLAN NOTE
- pt reports anxiety/bipolar maintained on medications    medications confirmed with [place] pharmacy: protonix 40 qd, klonopin 1 mg TID, seroquel 300 q HS, ambien 10 q HS, gabapentin 800 mg TID   -will consult Psychiatry as patient states he has been "self weaning" his medications and may need adjustment in setting of recent death of his son
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
## 2020-09-06 NOTE — ASSESSMENT & PLAN NOTE
- rib fracture protocol  - IS > 1500 on room air  - PT/OT   - PO analgesia with IV dilaudid for breakthrough
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
## 2020-09-06 NOTE — PLAN OF CARE
Problem: Prexisting or High Potential for Compromised Skin Integrity  Goal: Skin integrity is maintained or improved  Description: INTERVENTIONS:  - Identify patients at risk for skin breakdown  - Assess and monitor skin integrity  - Assess and monitor nutrition and hydration status  - Monitor labs   - Assess for incontinence   - Turn and reposition patient  - Assist with mobility/ambulation  - Relieve pressure over bony prominences  - Avoid friction and shearing  - Provide appropriate hygiene as needed including keeping skin clean and dry  - Evaluate need for skin moisturizer/barrier cream  - Collaborate with interdisciplinary team   - Patient/family teaching  - Consider wound care consult   Outcome: Progressing     Problem: Potential for Falls  Goal: Patient will remain free of falls  Description: INTERVENTIONS:  - Assess patient frequently for physical needs  -  Identify cognitive and physical deficits and behaviors that affect risk of falls    -  [place] fall precautions as indicated by assessment   - Educate patient/family on patient safety including physical limitations  - Instruct patient to call for assistance with activity based on assessment  - Modify environment to reduce risk of injury  - Consider OT/PT consult to assist with strengthening/mobility  Outcome: Progressing     Problem: PAIN - ADULT  Goal: Verbalizes/displays adequate comfort level or baseline comfort level  Description: Interventions:  - Encourage patient to monitor pain and request assistance  - Assess pain using appropriate pain scale  - Administer analgesics based on type and severity of pain and evaluate response  - Implement non-pharmacological measures as appropriate and evaluate response  - Consider cultural and social influences on pain and pain management  - Notify physician/advanced practitioner if interventions unsuccessful or patient reports new pain  Outcome: Progressing     Problem: INFECTION - ADULT  Goal: Absence or prevention of progression during hospitalization  Description: INTERVENTIONS:  - Assess and monitor for signs and symptoms of infection  - Monitor lab/diagnostic results  - Monitor all insertion sites, i e  indwelling lines, tubes, and drains  - Monitor endotracheal if appropriate and nasal secretions for changes in amount and color  - [place] appropriate cooling/warming therapies per order  - Administer medications as ordered  - Instruct and encourage patient and family to use good hand hygiene technique  - Identify and instruct in appropriate isolation precautions for identified infection/condition  Outcome: Progressing  Goal: Absence of fever/infection during neutropenic period  Description: INTERVENTIONS:  - Monitor WBC    Outcome: Progressing     Problem: SAFETY ADULT  Goal: Patient will remain free of falls  Description: INTERVENTIONS:  - Assess patient frequently for physical needs  -  Identify cognitive and physical deficits and behaviors that affect risk of falls    -  [place] fall precautions as indicated by assessment   - Educate patient/family on patient safety including physical limitations  - Instruct patient to call for assistance with activity based on assessment  - Modify environment to reduce risk of injury  - Consider OT/PT consult to assist with strengthening/mobility  Outcome: Progressing  Goal: Maintain or return to baseline ADL function  Description: INTERVENTIONS:  -  Assess patient's ability to carry out ADLs; assess patient's baseline for ADL function and identify physical deficits which impact ability to perform ADLs (bathing, care of mouth/teeth, toileting, grooming, dressing, etc )  - Assess/evaluate cause of self-care deficits   - Assess range of motion  - Assess patient's mobility; develop plan if impaired  - Assess patient's need for assistive devices and provide as appropriate  - Encourage maximum independence but intervene and supervise when necessary  - Involve family in performance of ADLs  - Assess for home care needs following discharge   - Consider OT consult to assist with ADL evaluation and planning for discharge  - Provide patient education as appropriate  Outcome: Progressing  Goal: Maintain or return mobility status to optimal level  Description: INTERVENTIONS:  - Assess patient's baseline mobility status (ambulation, transfers, stairs, etc )    - Identify cognitive and physical deficits and behaviors that affect mobility  - Identify mobility aids required to assist with transfers and/or ambulation (gait belt, sit-to-stand, lift, walker, cane, etc )  - [place] fall precautions as indicated by assessment  - Record patient progress and toleration of activity level on Mobility SBAR; progress patient to next Phase/Stage  - Instruct patient to call for assistance with activity based on assessment  - Consider rehabilitation consult to assist with strengthening/weightbearing, etc   Outcome: Progressing     Problem: DISCHARGE PLANNING  Goal: Discharge to home or other facility with appropriate resources  Description: INTERVENTIONS:  - Identify barriers to discharge w/patient and caregiver  - Arrange for needed discharge resources and transportation as appropriate  - Identify discharge learning needs (meds, wound care, etc )  - Arrange for interpretive services to assist at discharge as needed  - Refer to Case Management Department for coordinating discharge planning if the patient needs post-hospital services based on physician/advanced practitioner order or complex needs related to functional status, cognitive ability, or social support system  Outcome: Progressing     Problem: Knowledge Deficit  Goal: Patient/family/caregiver demonstrates understanding of disease process, treatment plan, medications, and discharge instructions  Description: Complete learning assessment and assess knowledge base    Interventions:  - Provide teaching at level of understanding  - Provide teaching via preferred learning methods  Outcome: Progressing     Problem: Nutrition/Hydration-ADULT  Goal: Nutrient/Hydration intake appropriate for improving, restoring or maintaining nutritional needs  Description: Monitor and assess patient's nutrition/hydration status for malnutrition  Collaborate with interdisciplinary team and initiate plan and interventions as ordered  Monitor patient's weight and dietary intake as ordered or per policy  Utilize nutrition screening tool and intervene as necessary  Determine patient's food preferences and provide high-protein, high-caloric foods as appropriate       INTERVENTIONS:  - Monitor oral intake, urinary output, labs, and treatment plans  - Assess nutrition and hydration status and recommend course of action  - Evaluate amount of meals eaten  - Assist patient with eating if necessary   - Allow adequate time for meals  - Recommend/ encourage appropriate diets, oral nutritional supplements, and vitamin/mineral supplements  - Order, calculate, and assess calorie counts as needed  - Recommend, monitor, and adjust tube feedings and TPN/PPN based on assessed needs  - Assess need for intravenous fluids  - Provide specific nutrition/hydration education as appropriate  - Include patient/family/caregiver in decisions related to nutrition  Outcome: Progressing
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
## 2020-09-06 NOTE — PROGRESS NOTES
Subjective: No acute events overnight  No acute distress  Objective:  A 10 point ROS was performed; negative except as noted above  Lab Results   Component Value Date/Time    WBC 10 66 (H) 09/06/2020 06:13 AM    HGB 11 7 (L) 09/06/2020 06:13 AM       Vitals:    09/06/20 0600   BP: 107/79   Pulse: 82   Resp: 14   Temp:    SpO2: 94%     Left upper extremity  Sling and splint in place  NV intact distally   TTP over shoulder    Assessment: 45 y o  male with Left glenoid fracture and cortical fracture of left distal radius    Plan:  NWB LUE in sling; dc splint and transition to removal wrist brace today  Pain control  Ok to remove sling for hygiene  PT/OT  Dispo:  Follow up as OP in 2 weeks

## 2020-09-06 NOTE — ASSESSMENT & PLAN NOTE
- -stable/resolved on most recent CT head  - neurosurgery consult appreciated   - GCS 14 (confused to date) stable, non-lateralizing exam  - Keppra for 7 days prophylaxis

## 2020-09-06 NOTE — ASSESSMENT & PLAN NOTE
- pt reports anxiety/bipolar maintained on medications    medications confirmed with [facility] pharmacy:

## 2020-09-06 NOTE — NURSING NOTE
Report called to p6 anfd pt transferred  Clothes with pt  Security tag with pt for locked uop belongings

## 2020-09-06 NOTE — DISCHARGE INSTRUCTIONS
Discharge Instructions - Orthopedics  Dafne Vallejo 45 y o  male MRN: [id number]  Unit/Bed#: ICU 14-01    Weight Bearing Status:                                           Non weight bearing left upper extremity in a sling and removable wrist brace    Appt Instructions: If you do not have your appointment, please call the clinic at [phone] to follow up in 2 weeks with Dr Estela Madera  Otherwise followup as scheduled     Contact the office sooner if you experience any increased numbness/tingling in the extremities  Neurosurgery discharge instructions following traumatic head bleed:      Do not take any blood thinning medications (ie  No Advil  No motrin  No ibuprofen  No Aleve  No Aspirin  No fishoil  No heparin  No antiplatelet / no anticoagulation medication)   Refrain from activity that increases chance of trauma to head or falls  Recommend you take fall precaution   No strenuous activity or sports   Return to hospital Emergency Room if you experience worsening / new headache, nausea/vomiting, speech/vision change, seizure, confusion / mental status change, weakness, or other neurological changes  Follow-up as scheduled with a repeat CT head without contrast to be completed 2-3 days prior to visit  Prescription has been entered electronically  Please call  to schedule  Seek medical attn if you develop worsening chest pain or shortness of breath, dizziness/lightheadness, fevers/chills or sweats  No heavy lifting, pushing or pulling >10 pounds  No strenuous physical activity  No work or driving while taking narcotic pain medications and until cleared by trauma

## 2020-09-07 ENCOUNTER — APPOINTMENT (INPATIENT)
Dept: RADIOLOGY | Facility: HOSPITAL | Age: 50
DRG: 964 | End: 2020-09-07
Payer: COMMERCIAL

## 2020-09-07 LAB
ALBUMIN SERPL BCP-MCNC: 3.5 G/DL (ref 3.5–5)
ALP SERPL-CCNC: 83 U/L (ref 46–116)
ALT SERPL W P-5'-P-CCNC: 27 U/L (ref 12–78)
ANION GAP SERPL CALCULATED.3IONS-SCNC: 5 MMOL/L (ref 4–13)
AST SERPL W P-5'-P-CCNC: 41 U/L (ref 5–45)
BASOPHILS # BLD AUTO: 0.02 THOUSANDS/ΜL (ref 0–0.1)
BASOPHILS NFR BLD AUTO: 0 % (ref 0–1)
BILIRUB DIRECT SERPL-MCNC: 0.15 MG/DL (ref 0–0.2)
BILIRUB SERPL-MCNC: 0.63 MG/DL (ref 0.2–1)
BUN SERPL-MCNC: 10 MG/DL (ref 5–25)
CALCIUM SERPL-MCNC: 8.2 MG/DL (ref 8.3–10.1)
CHLORIDE SERPL-SCNC: 104 MMOL/L (ref 100–108)
CK MB SERPL-MCNC: 1.5 NG/ML (ref 0–5)
CK MB SERPL-MCNC: <1 % (ref 0–2.5)
CK SERPL-CCNC: 1538 U/L (ref 39–308)
CO2 SERPL-SCNC: 29 MMOL/L (ref 21–32)
CREAT SERPL-MCNC: 1.21 MG/DL (ref 0.6–1.3)
EOSINOPHIL # BLD AUTO: 0.09 THOUSAND/ΜL (ref 0–0.61)
EOSINOPHIL NFR BLD AUTO: 1 % (ref 0–6)
ERYTHROCYTE [DISTWIDTH] IN BLOOD BY AUTOMATED COUNT: 13.2 % (ref 11.6–15.1)
GFR SERPL CREATININE-BSD FRML MDRD: 69 ML/MIN/1.73SQ M
GLUCOSE SERPL-MCNC: 145 MG/DL (ref 65–140)
HCT VFR BLD AUTO: 32 % (ref 36.5–49.3)
HGB BLD-MCNC: 10.9 G/DL (ref 12–17)
IMM GRANULOCYTES # BLD AUTO: 0.04 THOUSAND/UL (ref 0–0.2)
IMM GRANULOCYTES NFR BLD AUTO: 1 % (ref 0–2)
LYMPHOCYTES # BLD AUTO: 1.08 THOUSANDS/ΜL (ref 0.6–4.47)
LYMPHOCYTES NFR BLD AUTO: 14 % (ref 14–44)
MCH RBC QN AUTO: 29.6 PG (ref 26.8–34.3)
MCHC RBC AUTO-ENTMCNC: 34.1 G/DL (ref 31.4–37.4)
MCV RBC AUTO: 87 FL (ref 82–98)
MONOCYTES # BLD AUTO: 0.82 THOUSAND/ΜL (ref 0.17–1.22)
MONOCYTES NFR BLD AUTO: 10 % (ref 4–12)
NEUTROPHILS # BLD AUTO: 5.88 THOUSANDS/ΜL (ref 1.85–7.62)
NEUTS SEG NFR BLD AUTO: 74 % (ref 43–75)
NRBC BLD AUTO-RTO: 0 /100 WBCS
PLATELET # BLD AUTO: 166 THOUSANDS/UL (ref 149–390)
PMV BLD AUTO: 10.5 FL (ref 8.9–12.7)
POTASSIUM SERPL-SCNC: 3.6 MMOL/L (ref 3.5–5.3)
PROT SERPL-MCNC: 7 G/DL (ref 6.4–8.2)
RBC # BLD AUTO: 3.68 MILLION/UL (ref 3.88–5.62)
SODIUM SERPL-SCNC: 138 MMOL/L (ref 136–145)
WBC # BLD AUTO: 7.93 THOUSAND/UL (ref 4.31–10.16)

## 2020-09-07 PROCEDURE — 80048 BASIC METABOLIC PNL TOTAL CA: CPT | Performed by: NURSE PRACTITIONER

## 2020-09-07 PROCEDURE — G1004 CDSM NDSC: HCPCS

## 2020-09-07 PROCEDURE — 70450 CT HEAD/BRAIN W/O DYE: CPT

## 2020-09-07 PROCEDURE — 82553 CREATINE MB FRACTION: CPT | Performed by: NURSE PRACTITIONER

## 2020-09-07 PROCEDURE — 99233 SBSQ HOSP IP/OBS HIGH 50: CPT | Performed by: EMERGENCY MEDICINE

## 2020-09-07 PROCEDURE — 85025 COMPLETE CBC W/AUTO DIFF WBC: CPT | Performed by: NURSE PRACTITIONER

## 2020-09-07 PROCEDURE — 82550 ASSAY OF CK (CPK): CPT | Performed by: NURSE PRACTITIONER

## 2020-09-07 PROCEDURE — 80076 HEPATIC FUNCTION PANEL: CPT | Performed by: NURSE PRACTITIONER

## 2020-09-07 RX ORDER — HEPARIN SODIUM 5000 [USP'U]/ML
5000 INJECTION, SOLUTION INTRAVENOUS; SUBCUTANEOUS EVERY 8 HOURS SCHEDULED
Status: DISCONTINUED | OUTPATIENT
Start: 2020-09-07 | End: 2020-09-08

## 2020-09-07 RX ORDER — POTASSIUM CHLORIDE 20 MEQ/1
40 TABLET, EXTENDED RELEASE ORAL ONCE
Status: COMPLETED | OUTPATIENT
Start: 2020-09-07 | End: 2020-09-07

## 2020-09-07 RX ADMIN — GABAPENTIN 300 MG: 300 CAPSULE ORAL at 16:25

## 2020-09-07 RX ADMIN — GABAPENTIN 300 MG: 300 CAPSULE ORAL at 08:55

## 2020-09-07 RX ADMIN — QUETIAPINE FUMARATE 300 MG: 300 TABLET, EXTENDED RELEASE ORAL at 22:30

## 2020-09-07 RX ADMIN — CLONAZEPAM 1 MG: 1 TABLET ORAL at 08:54

## 2020-09-07 RX ADMIN — ACETAMINOPHEN 975 MG: 325 TABLET, FILM COATED ORAL at 21:01

## 2020-09-07 RX ADMIN — LIDOCAINE 5% 2 PATCH: 700 PATCH TOPICAL at 10:32

## 2020-09-07 RX ADMIN — POTASSIUM CHLORIDE 40 MEQ: 1500 TABLET, EXTENDED RELEASE ORAL at 13:14

## 2020-09-07 RX ADMIN — CLONAZEPAM 1 MG: 1 TABLET ORAL at 16:26

## 2020-09-07 RX ADMIN — HEPARIN SODIUM 5000 UNITS: 5000 INJECTION INTRAVENOUS; SUBCUTANEOUS at 13:42

## 2020-09-07 RX ADMIN — METHOCARBAMOL TABLETS 750 MG: 750 TABLET, COATED ORAL at 23:47

## 2020-09-07 RX ADMIN — LEVETIRACETAM 500 MG: 100 INJECTION, SOLUTION INTRAVENOUS at 08:41

## 2020-09-07 RX ADMIN — LEVETIRACETAM 500 MG: 100 INJECTION, SOLUTION INTRAVENOUS at 18:05

## 2020-09-07 RX ADMIN — ACETAMINOPHEN 975 MG: 325 TABLET, FILM COATED ORAL at 13:23

## 2020-09-07 RX ADMIN — HEPARIN SODIUM 5000 UNITS: 5000 INJECTION INTRAVENOUS; SUBCUTANEOUS at 21:01

## 2020-09-07 RX ADMIN — SODIUM CHLORIDE, SODIUM GLUCONATE, SODIUM ACETATE, POTASSIUM CHLORIDE AND MAGNESIUM CHLORIDE 50 ML/HR: 526; 502; 368; 37; 30 INJECTION, SOLUTION INTRAVENOUS at 10:36

## 2020-09-07 RX ADMIN — METHOCARBAMOL TABLETS 750 MG: 750 TABLET, COATED ORAL at 13:07

## 2020-09-07 RX ADMIN — GABAPENTIN 300 MG: 300 CAPSULE ORAL at 20:59

## 2020-09-07 RX ADMIN — CLONAZEPAM 1 MG: 1 TABLET ORAL at 20:59
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
## 2020-09-07 NOTE — PROGRESS NOTES
Pastoral Care Progress Note    2020  Patient: Peggy Quiroga : 1970  Admission Date & Time: 2020 1527  MRN: [id number] [facility]: [id number]                     Chaplaincy Interventions Utilized:   Empowerment: Clarified, confirmed, or reviewed information from treatment team  and Encouraged focus on present    Exploration: Explored hope and Explored emotional needs & resources    Collaboration: Advocated for patient/family and Consulted with interdisciplinary team    Relationship Building: Cultivated a relationship of care and support and Listened empathically    Ritual: Provided prayer    Prayed for Pt & son who  in [facility]        Chaplaincy Outcomes Achieved:  Distress reduced      Spiritual Coping Strategies Utilized:   Spiritual comfort

## 2020-09-07 NOTE — MALNUTRITION/BMI
This medical record reflects one or more clinical indicators suggestive of  morbid obesity  BMI Findings:  BMI Classifications: Morbid Obesity 40-44 9     Body mass index is 42 95 kg/m²  See Nutrition note dated 9/7/20 for additional details  Completed nutrition assessment is viewable in the nutrition documentation

## 2020-09-07 NOTE — PROGRESS NOTES
Acceptance Note - Surgical Critical Care   [place] 45 [place]  [place] MRN: [id number]  [facility] En[facility]: [id number]        ----------------------------------------------------------------------------------------  HPI/24hr events: Patient was found to be altered with decreased GCS from baseline  Underwent repeat STAT CT head, which showed stable findings  ---------------------------------------------------------------------------------------  SUBJECTIVE  C/o lower back pain, but denies any other complaints  Oriented only to name upon exam this morning      Review of Systems  Review of systems was reviewed and negative unless stated above in HPI/24-hour events   ---------------------------------------------------------------------------------------  Assessment and Plan:    Neuro:    Diagnosis: SDH, SAH  o Plan:   - Repeat CT head performed this morning was stable  - Neurosurgery following  - Q1hr neuro checks  - Keppra x7 days for seizure PPx  - [facility]   Delirium Precautions  o Plan: CAM ICU, regulate sleep-wake cycle, avoid deliriogenic medications      CV:    No active issues   o Plan:   - Continue to monitor on telemetry  - Maintain MAP >65      Pulm:   Diagnosis: L rib fractures with flail segments, chest wall hematoma, small pneumomediastinum likely r/t L PTX  o Plan:   - Repeat CXR stable, with residual trace PTX  - Remains stable on RA, pulling >1500 mL on IS  - PT/OT  - Continue multimodal analgesic regimen  - Troponins and ECG unremarkable  - No evidence of blunt cardiac injury      GI:    No active issues    GI PPx  o Plan: protonix 40 PO daily (home Rx)      :    Diagnosis: CATHRYN  o Plan:   - Creatinine improvin 25 from 1 47, 1 75  - Continue gentle IVF  - Continue to monitor renal function and UOP      F/E/N:    F: isolyte @ 50   E: monitor and replete electrolytes PRN   N: dysphagia 3, thin liquids       Heme/Onc:  Diagnosis: acute blood loss anemia  o Plan:   - Hemoglobin 10 9 from 11 7  - Recheck hemoglobin at noon  - No evidence of active bleeding  - Hgb had been stable on SQH   DVT PPx  o Plan: SCDs  - Pharmacologic DVT PPx held pending repeat neurosurgery evaluation      Endo:    No active issues   o Plan:   - Continue to monitor BS with goal 140-180  - -145 over past 24 hours      ID:    No active issues       MSK/Skin:    Diagnosis: T1 transverse process Fx  o Plan:   - Analgesia  - PT/OT   Diagnosis: L glenoid Fx, cortical Fx of L distal radius  o Plan:   - Ortho following  - LUE sling  - NWB LUE  - PT/OT      Psych:    Diagnosis: anxiety, bipolar disorder  o Plan:   - Continue klonopin, zyprexa  - Holding cogentin as patient stated he is no longer taking      Disposition: Transfer to Critical Care   Code Status: Level 1 - Full Code  ---------------------------------------------------------------------------------------  ICU CORE MEASURES    Prophylaxis   VTE Pharmacologic Prophylaxis: Pharmacologic VTE Prophylaxis contraindicated due to SAH/SDH  VTE Mechanical Prophylaxis: sequential compression device  Stress Ulcer Prophylaxis: Pantoprazole PO    ABCDE Protocol (if indicated)  Plan to perform spontaneous awakening trial today? Not applicable  Plan to perform spontaneous breathing trial today? Not applicable  Obvious barriers to extubation? Not applicable  CAM-ICU: Negative    Invasive Devices Review  Invasive Devices     Peripheral Intravenous Line            Peripheral IV 09/07/20 Right Hand less than 1 day              Can any invasive devices be discontinued today?  No  ---------------------------------------------------------------------------------------  OBJECTIVE    Vitals   Vitals:    09/07/20 0015 09/07/20 0018 09/07/20 0333 09/07/20 0516   BP: 132/83 132/83 129/83 139/91   Pulse: 105 104 102 96   Resp: 20  18 16   Temp: 99 °F (37 2 °C) 99 °F (37 2 °C) 99 5 °F (37 5 °C)    TempSrc: SpO2: (!) 89% 91% 91% 92%   Weight:       Height:         Temp (24hrs), Av 8 °F (37 1 °C), Min:97 °F (36 1 °C), Max:100 °F (37 8 °C)  Current: Temperature: 99 5 °F (37 5 °C)  HR: 98  BP: 120/83  RR: 20  SpO2: 96% on RA    Physical Exam  Gen:  NAD  HENT: MMM, EOMI, scattered abrasions  CV:  RRR, WWP  Lungs: Normal WOB on RA  Abd:  Soft, NT/ND  : Deferred  Ext:  No C/C/E   LUE splint in place, L wrist splint in place  Skin:  Warm/dry, numerous scattered abrasions  Neuro: A&Ox1 (person), follows commands appropriately, strength 5/5 in all 4 extemities    Respiratory:  SpO2: SpO2: 92 %, SpO2 Device: O2 Device: None (Room air)       Invasive/non-invasive ventilation settings   Respiratory    Lab Data (Last 4 hours)    None         O2/Vent Data (Last 4 hours)    None                Laboratory and Diagnostics:  Results from last 7 days   Lab Units 20  0450 20  1118 20  0613 20  1539 20  1537   WBC Thousand/uL 7 93 10 47*  --  10 66* 13 90* 10 36*  --    HEMOGLOBIN g/dL 10 9* 11 7* 11 5* 11 7* 13 9 14 3  --    I STAT HEMOGLOBIN g/dl  --   --   --   --   --   --  14 3   HEMATOCRIT % 32 0* 34 8* 34 6* 35 1* 40 6 42 0  --    HEMATOCRIT, ISTAT %  --   --   --   --   --   --  42   PLATELETS Thousands/uL 166 205  --  202 228 251  --    NEUTROS PCT % 74 80*  --   --  87* 78*  --    MONOS PCT % 10 10  --   --  7 7  --      Results from last 7 days   Lab Units 20  0613 20  1539 20  1537   SODIUM mmol/L 136 138 138 138  --    POTASSIUM mmol/L 3 8 4 1 4 1 5 2  --    CHLORIDE mmol/L 102 105 107 106  --    CO2 mmol/L 29 26 26 28  --    CO2, I-STAT mmol/L  --   --   --   --  26   ANION GAP mmol/L 5 7 5 4  --    BUN mg/dL 11 16 20 21  --    CREATININE mg/dL 1 25 1 47* 1 61* 1 74*  --    CALCIUM mg/dL 8 2* 8 1* 8 3 8 4  --    GLUCOSE RANDOM mg/dL 174* 137 139 137  --    ALT U/L  --   --   --  34  --    AST U/L  --   --   -- 50*  --    ALK PHOS U/L  --   --   --  88  --    ALBUMIN g/dL  --   --   --  4 0  --    TOTAL BILIRUBIN mg/dL  --   --   --  0 44  --           Results from last 7 days   Lab Units 09/05/20  1539   INR  0 96      Results from last 7 days   Lab Units 09/05/20  2040   TROPONIN I ng/mL <0 02         ABG:    VBG:          Micro        EKG: I have personally reviewed pertinent reports  Imaging: I have personally reviewed pertinent reports  Procedure: Xr Chest Portable  Result Date: 9/6/2020  Impression: Question trace residual left apical pneumothorax  No significant change in extrapleural left apical hematoma  Procedure: Xr Chest Pa & Lateral (24 Hours After Admission)  Result Date: 9/6/2020  Impression: Barely conspicuous trace left apical pneumothorax with persistent left apical extrapleural hematoma  Trace left effusion and mild left base atelectasis  Acute left rib fractures  Procedure: Xr Shoulder 2+ Vw Left  Result Date: 9/5/2020  Impression: Fracture glenoid identified  Procedure: Xr Humerus Left  Result Date: 9/5/2020  Impression: Glenoid fracture again appreciated  High density material in the antecubital fossa may represent contrast extravasation  Correlate with history  Procedure: Xr Wrist 3+ Vw Left  Result Date: 9/5/2020  Impression: No acute osseous abnormality  Procedure: Xr Hand 3+ Vw Left  Result Date: 9/5/2020  Impression: No acute osseous abnormality  Procedure: Xr Hand 3+ Vw Right  Result Date: 9/5/2020  Impression: No acute osseous abnormality  Procedure: Ct Head Wo Contrast  Result Date: 9/7/2020  Impression: Small amount of intracranial hemorrhage and other findings as described but overall there has been no significant interval change  Clinical follow-up recommended  Procedure: Ct Head Wo Contrast  Result Date: 9/6/2020  Impression: Left parieto-occipital scalp hematoma has intervally decreased in size; no calvarial fracture   Small amount of intracranial hemorrhage redemonstrated dependently along the falx and in the posterior horn of the left lateral ventricle, as before  The intracranial structures overall are unchanged in appearance  Procedure: Ct Head Wo Contrast  Result Date: 9/5/2020  Impression: Left parietal scalp hematoma  Small volume intraventricular hemorrhage in the occipital horn of left lateral ventricle unchanged  Dural falx subdural hemorrhage not well-seen on this exam      Procedure: Trauma - Ct Head Wo Contrast  Result Date: 9/5/2020  Impression: Trace amount of acute subarachnoid hemorrhage in the dependent portion of the left occipital horn  Small focus of acute subdural hemorrhage the posterior interhemispheric falx  Left parietal scalp hematoma  Procedure: Trauma - Ct Spine Cervical Wo Contrast  Result Date: 9/5/2020  Impression: No  cervical fracture or traumatic malalignment Comminuted left upper thoracic rib fractures with extrapleural hematoma  Please see separate CT chest, abdomen and pelvis study report for additional detail  Procedure: Trauma - Ct Chest Abdomen Pelvis W Contrast  Result Date: 9/5/2020  Impression: Multiple left thoracic rib fractures with a small left anterior and lateral pneumothorax and punctate focus of pneumomediastinum  Extrapleural hematoma between the left 1st and 2nd rib  No contrast pooling or extravasation appreciated at this time  Hypoventilatory changes in the dependent regions of both lower lobes  Comminuted fracture of the left scapula extending to the glenoid surface with periscapular hematoma  No abdominal visceral organ injury  Postsurgical changes at L5-S1 with intact hardware and chronic L5-S1 spondylolisthesis  Procedure: Xr Trauma Multiple  Result Date: 9/5/2020  Impression: Rib fractures as well as scapular fracture and evidence of hematoma at the left lung apex  The trachea is deviated to the right but may be related to positioning   Please see subsequent CT result Intake and Output  I/O       09/05 0701 - 09/06 0700 09/06 0701 - 09/07 0700    P  O   1080    I V  (mL/kg) 1780 4 (14 4) 400 (3 2)    IV Piggyback 100 100    Total Intake(mL/kg) 1880 4 (15 2) 1580 (12 7)    Urine (mL/kg/hr) 650 1500 (0 5)    Total Output 650 1500    Net +1230 4 +80              UOP: 1855 mL / 24 hr     Height and Weights   Height: 5' 7" (170 2 cm)  IBW: 66 1 kg  Body mass index is 42 95 kg/m²  Weight (last 2 days)     Date/Time   Weight    09/05/20 1830   124 (274 25)    09/05/20 15:35:53   118 (259 26)                Nutrition       Diet Orders   (From admission, onward)             Start     Ordered    09/06/20 0713  Diet Regular; Regular House; Dysphagia 3-Dental Soft; Thin Liquid  Diet effective now     Question Answer Comment   Diet Type Regular    Regular Regular House    Other Restriction(s): Dysphagia 3-Dental Soft    Liquid Modifier Thin Liquid    RD to adjust diet per protocol?  Yes        09/06/20 0713                  Active Medications  Scheduled Meds:  Current Facility-Administered Medications   Medication Dose Route Frequency Provider Last Rate    acetaminophen  975 mg Oral Q8H [address] DELANO Siu      clonazePAM  1 mg Oral TID DELANO Siu      gabapentin  300 mg Oral TID DELANO Amanda      heparin (porcine)  7,500 Units Subcutaneous [facility] DELANO Amanda      HYDROmorphone  0 5 mg Intravenous Q1H PRN DELANO Amanda      levETIRAcetam  500 mg Intravenous BID DELANO Amanda 500 mg (09/06/20 1947)    lidocaine  2 patch Topical Daily DELANO Siu      melatonin  6 mg Oral HS DELANO Amanda      methocarbamol  750 mg Oral Q6H [address] DELANO Amanda      multi-electrolyte  50 mL/hr Intravenous Continuous DELANO Amanda 50 mL/hr (09/06/20 0738)    ondansetron  4 mg Intravenous Q6H PRN DELANO Amanda      oxyCODONE  10 mg Oral Q4H PRN Jame Chavira, CRNP      oxyCODONE  5 mg Oral Q4H PRN Jame Chavira, DELANO      pantoprazole  40 mg Oral Early Morning DELANO Siu      polyethylene glycol  17 g Oral Daily PRN DELANO White      QUEtiapine  300 mg Oral HS DELANO Siu       Continuous Infusions:  multi-electrolyte, 50 mL/hr, Last Rate: 50 mL/hr (09/06/20 0738)      PRN Meds:   HYDROmorphone, 0 5 mg, Q1H PRN  ondansetron, 4 mg, Q6H PRN  oxyCODONE, 10 mg, Q4H PRN  oxyCODONE, 5 mg, Q4H PRN  polyethylene glycol, 17 g, Daily PRN        Allergies   No Known Allergies  ---------------------------------------------------------------------------------------  Advance Directive and Living Will:      Power of :    POLST:    ---------------------------------------------------------------------------------------      Nesha Ruggiero MD      Portions of the record may have been created with voice recognition software  Occasional wrong word or "sound a like" substitutions may have occurred due to the inherent limitations of voice recognition software    Read the chart carefully and recognize, using context, where substitutions have occurred

## 2020-09-07 NOTE — PLAN OF CARE
Problem: Prexisting or High Potential for Compromised Skin Integrity  Goal: Skin integrity is maintained or improved  Description: INTERVENTIONS:  - Identify patients at risk for skin breakdown  - Assess and monitor skin integrity  - Assess and monitor nutrition and hydration status  - Monitor labs   - Assess for incontinence   - Turn and reposition patient  - Assist with mobility/ambulation  - Relieve pressure over bony prominences  - Avoid friction and shearing  - Provide appropriate hygiene as needed including keeping skin clean and dry  - Evaluate need for skin moisturizer/barrier cream  - Collaborate with interdisciplinary team   - Patient/family teaching  - Consider wound care consult   Outcome: Progressing     Problem: Potential for Falls  Goal: Patient will remain free of falls  Description: INTERVENTIONS:  - Assess patient frequently for physical needs  -  Identify cognitive and physical deficits and behaviors that affect risk of falls    -  [place] fall precautions as indicated by assessment   - Educate patient/family on patient safety including physical limitations  - Instruct patient to call for assistance with activity based on assessment  - Modify environment to reduce risk of injury  - Consider OT/PT consult to assist with strengthening/mobility  Outcome: Progressing     Problem: PAIN - ADULT  Goal: Verbalizes/displays adequate comfort level or baseline comfort level  Description: Interventions:  - Encourage patient to monitor pain and request assistance  - Assess pain using appropriate pain scale  - Administer analgesics based on type and severity of pain and evaluate response  - Implement non-pharmacological measures as appropriate and evaluate response  - Consider cultural and social influences on pain and pain management  - Notify physician/advanced practitioner if interventions unsuccessful or patient reports new pain  Outcome: Progressing     Problem: INFECTION - ADULT  Goal: Absence or prevention of progression during hospitalization  Description: INTERVENTIONS:  - Assess and monitor for signs and symptoms of infection  - Monitor lab/diagnostic results  - Monitor all insertion sites, i e  indwelling lines, tubes, and drains  - Monitor endotracheal if appropriate and nasal secretions for changes in amount and color  - [place] appropriate cooling/warming therapies per order  - Administer medications as ordered  - Instruct and encourage patient and family to use good hand hygiene technique  - Identify and instruct in appropriate isolation precautions for identified infection/condition  Outcome: Progressing     Problem: SAFETY ADULT  Goal: Patient will remain free of falls  Description: INTERVENTIONS:  - Assess patient frequently for physical needs  -  Identify cognitive and physical deficits and behaviors that affect risk of falls    -  [place] fall precautions as indicated by assessment   - Educate patient/family on patient safety including physical limitations  - Instruct patient to call for assistance with activity based on assessment  - Modify environment to reduce risk of injury  - Consider OT/PT consult to assist with strengthening/mobility  Outcome: Progressing  Goal: Maintain or return to baseline ADL function  Description: INTERVENTIONS:  -  Assess patient's ability to carry out ADLs; assess patient's baseline for ADL function and identify physical deficits which impact ability to perform ADLs (bathing, care of mouth/teeth, toileting, grooming, dressing, etc )  - Assess/evaluate cause of self-care deficits   - Assess range of motion  - Assess patient's mobility; develop plan if impaired  - Assess patient's need for assistive devices and provide as appropriate  - Encourage maximum independence but intervene and supervise when necessary  - Involve family in performance of ADLs  - Assess for home care needs following discharge   - Consider OT consult to assist with ADL evaluation and planning for discharge  - Provide patient education as appropriate  Outcome: Progressing  Goal: Maintain or return mobility status to optimal level  Description: INTERVENTIONS:  - Assess patient's baseline mobility status (ambulation, transfers, stairs, etc )    - Identify cognitive and physical deficits and behaviors that affect mobility  - Identify mobility aids required to assist with transfers and/or ambulation (gait belt, sit-to-stand, lift, walker, cane, etc )  - [place] fall precautions as indicated by assessment  - Record patient progress and toleration of activity level on Mobility SBAR; progress patient to next Phase/Stage  - Instruct patient to call for assistance with activity based on assessment  - Consider rehabilitation consult to assist with strengthening/weightbearing, etc   Outcome: Progressing     Problem: DISCHARGE PLANNING  Goal: Discharge to home or other facility with appropriate resources  Description: INTERVENTIONS:  - Identify barriers to discharge w/patient and caregiver  - Arrange for needed discharge resources and transportation as appropriate  - Identify discharge learning needs (meds, wound care, etc )  - Arrange for interpretive services to assist at discharge as needed  - Refer to Case Management Department for coordinating discharge planning if the patient needs post-hospital services based on physician/advanced practitioner order or complex needs related to functional status, cognitive ability, or social support system  Outcome: Progressing     Problem: Knowledge Deficit  Goal: Patient/family/caregiver demonstrates understanding of disease process, treatment plan, medications, and discharge instructions  Description: Complete learning assessment and assess knowledge base    Interventions:  - Provide teaching at level of understanding  - Provide teaching via preferred learning methods  Outcome: Progressing     Problem: Nutrition/Hydration-ADULT  Goal: Nutrient/Hydration intake appropriate for improving, restoring or maintaining nutritional needs  Description: Monitor and assess patient's nutrition/hydration status for malnutrition  Collaborate with interdisciplinary team and initiate plan and interventions as ordered  Monitor patient's weight and dietary intake as ordered or per policy  Utilize nutrition screening tool and intervene as necessary  Determine patient's food preferences and provide high-protein, high-caloric foods as appropriate       INTERVENTIONS:  - Monitor oral intake, urinary output, labs, and treatment plans  - Assess nutrition and hydration status and recommend course of action  - Evaluate amount of meals eaten  - Assist patient with eating if necessary   - Allow adequate time for meals  - Recommend/ encourage appropriate diets, oral nutritional supplements, and vitamin/mineral supplements  - Order, calculate, and assess calorie counts as needed  - Recommend, monitor, and adjust tube feedings and TPN/PPN based on assessed needs  - Assess need for intravenous fluids  - Provide specific nutrition/hydration education as appropriate  - Include patient/family/caregiver in decisions related to nutrition  Outcome: Progressing     Problem: NEUROSENSORY - ADULT  Goal: Achieves stable or improved neurological status  Description: INTERVENTIONS  - Monitor and report changes in neurological status  - Monitor vital signs such as temperature, blood pressure, glucose, and any other labs ordered   - Initiate measures to prevent increased intracranial pressure  - Monitor for seizure activity and implement precautions if appropriate      Outcome: Progressing  Goal: Remains free of injury related to seizures activity  Description: INTERVENTIONS  - Maintain airway, patient safety  and administer oxygen as ordered  - Monitor patient for seizure activity, document and report duration and description of seizure to physician/advanced practitioner  - If seizure occurs,  ensure patient safety during seizure  - Reorient patient post seizure  - Seizure pads on all 4 side rails  - Instruct patient/family to notify RN of any seizure activity including if an aura is experienced  - Instruct patient/family to call for assistance with activity based on nursing assessment  - Administer anti-seizure medications if ordered    Outcome: Progressing  Goal: Achieves maximal functionality and self care  Description: INTERVENTIONS  - Monitor swallowing and airway patency with patient fatigue and changes in neurological status  - Encourage and assist patient to increase activity and self care     - Encourage visually impaired, hearing impaired and aphasic patients to use assistive/communication devices  Outcome: Progressing     Problem: CARDIOVASCULAR - ADULT  Goal: Maintains optimal cardiac output and hemodynamic stability  Description: INTERVENTIONS:  - Monitor I/O, vital signs and rhythm  - Monitor for S/S and trends of decreased cardiac output  - Administer and titrate ordered vasoactive medications to optimize hemodynamic stability  - Assess quality of pulses, skin color and temperature  - Assess for signs of decreased coronary artery perfusion  - Instruct patient to report change in severity of symptoms  Outcome: Progressing  Goal: Absence of cardiac dysrhythmias or at baseline rhythm  Description: INTERVENTIONS:  - Continuous cardiac monitoring, vital signs, obtain 12 lead EKG if ordered  - Administer antiarrhythmic and heart rate control medications as ordered  - Monitor electrolytes and administer replacement therapy as ordered  Outcome: Progressing     Problem: RESPIRATORY - ADULT  Goal: Achieves optimal ventilation and oxygenation  Description: INTERVENTIONS:  - Assess for changes in respiratory status  - Assess for changes in mentation and behavior  - Position to facilitate oxygenation and minimize respiratory effort  - Oxygen administered by appropriate delivery if ordered  - Initiate smoking cessation education as indicated  - Encourage broncho-pulmonary hygiene including cough, deep breathe, Incentive Spirometry  - Assess the need for suctioning and aspirate as needed  - Assess and instruct to report SOB or any respiratory difficulty  - Respiratory Therapy support as indicated  Outcome: Progressing     Problem: GASTROINTESTINAL - ADULT  Goal: Maintains or returns to baseline bowel function  Description: INTERVENTIONS:  - Assess bowel function  - Encourage oral fluids to ensure adequate hydration  - Administer IV fluids if ordered to ensure adequate hydration  - Administer ordered medications as needed  - Encourage mobilization and activity  - Consider nutritional services referral to assist patient with adequate nutrition and appropriate food choices  Outcome: Progressing  Goal: Maintains adequate nutritional intake  Description: INTERVENTIONS:  - Monitor percentage of each meal consumed  - Identify factors contributing to decreased intake, treat as appropriate  - Assist with meals as needed  - Monitor I&O, weight, and lab values if indicated  - Obtain nutrition services referral as needed  Outcome: Progressing     Problem: GENITOURINARY - ADULT  Goal: Maintains or returns to baseline urinary function  Description: INTERVENTIONS:  - Assess urinary function  - Encourage oral fluids to ensure adequate hydration if ordered  - Administer IV fluids as ordered to ensure adequate hydration  - Administer ordered medications as needed  - Offer frequent toileting  - Follow urinary retention protocol if ordered  Outcome: Progressing     Problem: METABOLIC, FLUID AND ELECTROLYTES - ADULT  Goal: Electrolytes maintained within normal limits  Description: INTERVENTIONS:  - Monitor labs and assess patient for signs and symptoms of electrolyte imbalances  - Administer electrolyte replacement as ordered  - Monitor response to electrolyte replacements, including repeat lab results as appropriate  - Instruct patient on fluid and nutrition as appropriate  Outcome: Progressing  Goal: Fluid balance maintained  Description: INTERVENTIONS:  - Monitor labs   - Monitor I/O and WT  - Instruct patient on fluid and nutrition as appropriate  - Assess for signs & symptoms of volume excess or deficit  Outcome: Progressing     Problem: SKIN/TISSUE INTEGRITY - ADULT  Goal: Skin integrity remains intact  Description: INTERVENTIONS  - Identify patients at risk for skin breakdown  - Assess and monitor skin integrity  - Assess and monitor nutrition and hydration status  - Monitor labs (i e  albumin)  - Assess for incontinence   - Turn and reposition patient  - Assist with mobility/ambulation  - Relieve pressure over bony prominences  - Avoid friction and shearing  - Provide appropriate hygiene as needed including keeping skin clean and dry  - Evaluate need for skin moisturizer/barrier cream  - Collaborate with interdisciplinary team (i e  Nutrition, Rehabilitation, etc )   - Patient/family teaching  Outcome: Progressing  Goal: Incision(s), wounds(s) or drain site(s) healing without S/S of infection  Description: INTERVENTIONS  - Assess and document risk factors for skin impairment   - Assess and document dressing, incision, wound bed, drain sites and surrounding tissue  - Consider nutrition services referral as needed  - Oral mucous membranes remain intact  - Provide patient/ family education  Outcome: Progressing  Goal: Oral mucous membranes remain intact  Description: INTERVENTIONS  - Assess oral mucosa and hygiene practices  - Implement preventative oral hygiene regimen  - Implement oral medicated treatments as ordered  - Initiate Nutrition services referral as needed  Outcome: Progressing     Problem: HEMATOLOGIC - ADULT  Goal: Maintains hematologic stability  Description: INTERVENTIONS  - Assess for signs and symptoms of bleeding or hemorrhage  - Monitor labs  - Administer supportive blood products/factors as ordered and appropriate  Outcome: Progressing     Problem: MUSCULOSKELETAL - ADULT  Goal: Maintain or return mobility to safest level of function  Description: INTERVENTIONS:  - Assess patient's ability to carry out ADLs; assess patient's baseline for ADL function and identify physical deficits which impact ability to perform ADLs (bathing, care of mouth/teeth, toileting, grooming, dressing, etc )  - Assess/evaluate cause of self-care deficits   - Assess range of motion  - Assess patient's mobility  - Assess patient's need for assistive devices and provide as appropriate  - Encourage maximum independence but intervene and supervise when necessary  - Involve family in performance of ADLs  - Assess for home care needs following discharge   - Consider OT consult to assist with ADL evaluation and planning for discharge  - Provide patient education as appropriate  Outcome: Progressing  Goal: Maintain proper alignment of affected body part  Description: INTERVENTIONS:  - Support, maintain and protect limb and body alignment  - Provide patient/ family with appropriate education  Outcome: Progressing
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
## 2020-09-08 ENCOUNTER — TELEPHONE (OUTPATIENT)
Dept: NEUROSURGERY | Facility: CLINIC | Age: 50
End: 2020-09-08

## 2020-09-08 LAB
ANION GAP SERPL CALCULATED.3IONS-SCNC: 4 MMOL/L (ref 4–13)
BUN SERPL-MCNC: 12 MG/DL (ref 5–25)
CALCIUM SERPL-MCNC: 8.2 MG/DL (ref 8.3–10.1)
CHLORIDE SERPL-SCNC: 106 MMOL/L (ref 100–108)
CK MB SERPL-MCNC: <1 % (ref 0–2.5)
CK MB SERPL-MCNC: <1 NG/ML (ref 0–5)
CK SERPL-CCNC: 949 U/L (ref 39–308)
CO2 SERPL-SCNC: 28 MMOL/L (ref 21–32)
CREAT SERPL-MCNC: 1.05 MG/DL (ref 0.6–1.3)
ERYTHROCYTE [DISTWIDTH] IN BLOOD BY AUTOMATED COUNT: 13.2 % (ref 11.6–15.1)
GFR SERPL CREATININE-BSD FRML MDRD: 82 ML/MIN/1.73SQ M
GLUCOSE SERPL-MCNC: 122 MG/DL (ref 65–140)
HCT VFR BLD AUTO: 31.3 % (ref 36.5–49.3)
HGB BLD-MCNC: 10.6 G/DL (ref 12–17)
INR PPP: 1.05 (ref 0.84–1.19)
MAGNESIUM SERPL-MCNC: 2.7 MG/DL (ref 1.6–2.6)
MCH RBC QN AUTO: 29.8 PG (ref 26.8–34.3)
MCHC RBC AUTO-ENTMCNC: 33.9 G/DL (ref 31.4–37.4)
MCV RBC AUTO: 88 FL (ref 82–98)
PHOSPHATE SERPL-MCNC: 1.4 MG/DL (ref 2.7–4.5)
PLATELET # BLD AUTO: 189 THOUSANDS/UL (ref 149–390)
PMV BLD AUTO: 10.1 FL (ref 8.9–12.7)
POTASSIUM SERPL-SCNC: 4 MMOL/L (ref 3.5–5.3)
PROTHROMBIN TIME: 13.7 SECONDS (ref 11.6–14.5)
RBC # BLD AUTO: 3.56 MILLION/UL (ref 3.88–5.62)
SODIUM SERPL-SCNC: 138 MMOL/L (ref 136–145)
WBC # BLD AUTO: 7.32 THOUSAND/UL (ref 4.31–10.16)

## 2020-09-08 PROCEDURE — 82550 ASSAY OF CK (CPK): CPT | Performed by: NURSE PRACTITIONER

## 2020-09-08 PROCEDURE — 97163 PT EVAL HIGH COMPLEX 45 MIN: CPT

## 2020-09-08 PROCEDURE — 97167 OT EVAL HIGH COMPLEX 60 MIN: CPT

## 2020-09-08 PROCEDURE — 82553 CREATINE MB FRACTION: CPT | Performed by: NURSE PRACTITIONER

## 2020-09-08 PROCEDURE — 99223 1ST HOSP IP/OBS HIGH 75: CPT | Performed by: ANESTHESIOLOGY

## 2020-09-08 PROCEDURE — 85027 COMPLETE CBC AUTOMATED: CPT | Performed by: SURGERY

## 2020-09-08 PROCEDURE — 85610 PROTHROMBIN TIME: CPT | Performed by: SURGERY

## 2020-09-08 PROCEDURE — 99233 SBSQ HOSP IP/OBS HIGH 50: CPT | Performed by: SURGERY

## 2020-09-08 PROCEDURE — 84100 ASSAY OF PHOSPHORUS: CPT | Performed by: SURGERY

## 2020-09-08 PROCEDURE — NC001 PR NO CHARGE: Performed by: PHYSICIAN ASSISTANT

## 2020-09-08 PROCEDURE — 99223 1ST HOSP IP/OBS HIGH 75: CPT | Performed by: PHYSICIAN ASSISTANT

## 2020-09-08 PROCEDURE — 83735 ASSAY OF MAGNESIUM: CPT | Performed by: SURGERY

## 2020-09-08 PROCEDURE — 80048 BASIC METABOLIC PNL TOTAL CA: CPT | Performed by: SURGERY

## 2020-09-08 RX ORDER — OXYCODONE HYDROCHLORIDE 5 MG/1
5 TABLET ORAL EVERY 4 HOURS PRN
Status: DISCONTINUED | OUTPATIENT
Start: 2020-09-08 | End: 2020-09-12 | Stop reason: HOSPADM

## 2020-09-08 RX ORDER — HEPARIN SODIUM 5000 [USP'U]/ML
7500 INJECTION, SOLUTION INTRAVENOUS; SUBCUTANEOUS EVERY 8 HOURS SCHEDULED
Status: DISCONTINUED | OUTPATIENT
Start: 2020-09-08 | End: 2020-09-12 | Stop reason: HOSPADM

## 2020-09-08 RX ORDER — OXYCODONE HYDROCHLORIDE 5 MG/1
2.5 TABLET ORAL EVERY 4 HOURS PRN
Status: DISCONTINUED | OUTPATIENT
Start: 2020-09-08 | End: 2020-09-12 | Stop reason: HOSPADM

## 2020-09-08 RX ORDER — HYDROMORPHONE HCL/PF 1 MG/ML
0.2 SYRINGE (ML) INJECTION EVERY 4 HOURS PRN
Status: DISCONTINUED | OUTPATIENT
Start: 2020-09-08 | End: 2020-09-11

## 2020-09-08 RX ORDER — LEVETIRACETAM 500 MG/1
500 TABLET ORAL EVERY 12 HOURS SCHEDULED
Status: DISCONTINUED | OUTPATIENT
Start: 2020-09-08 | End: 2020-09-12 | Stop reason: HOSPADM

## 2020-09-08 RX ORDER — AMOXICILLIN 250 MG
1 CAPSULE ORAL
Status: DISCONTINUED | OUTPATIENT
Start: 2020-09-08 | End: 2020-09-12 | Stop reason: HOSPADM

## 2020-09-08 RX ADMIN — OXYCODONE HYDROCHLORIDE 5 MG: 5 TABLET ORAL at 21:54

## 2020-09-08 RX ADMIN — GABAPENTIN 300 MG: 300 CAPSULE ORAL at 15:18

## 2020-09-08 RX ADMIN — ACETAMINOPHEN 975 MG: 325 TABLET, FILM COATED ORAL at 05:56

## 2020-09-08 RX ADMIN — Medication 6 MG: at 21:42

## 2020-09-08 RX ADMIN — LEVETIRACETAM 500 MG: 500 TABLET, FILM COATED ORAL at 21:42

## 2020-09-08 RX ADMIN — ACETAMINOPHEN 975 MG: 325 TABLET, FILM COATED ORAL at 13:37

## 2020-09-08 RX ADMIN — HYDROMORPHONE HYDROCHLORIDE 0.2 MG: 1 INJECTION, SOLUTION INTRAMUSCULAR; INTRAVENOUS; SUBCUTANEOUS at 15:18

## 2020-09-08 RX ADMIN — LIDOCAINE 5% 2 PATCH: 700 PATCH TOPICAL at 10:14

## 2020-09-08 RX ADMIN — METHOCARBAMOL TABLETS 750 MG: 750 TABLET, COATED ORAL at 05:55

## 2020-09-08 RX ADMIN — LEVETIRACETAM 500 MG: 500 TABLET, FILM COATED ORAL at 10:16

## 2020-09-08 RX ADMIN — GABAPENTIN 300 MG: 300 CAPSULE ORAL at 21:42

## 2020-09-08 RX ADMIN — HEPARIN SODIUM 5000 UNITS: 5000 INJECTION INTRAVENOUS; SUBCUTANEOUS at 05:56

## 2020-09-08 RX ADMIN — METHOCARBAMOL TABLETS 750 MG: 750 TABLET, COATED ORAL at 18:17

## 2020-09-08 RX ADMIN — PANTOPRAZOLE SODIUM 40 MG: 40 TABLET, DELAYED RELEASE ORAL at 05:55

## 2020-09-08 RX ADMIN — ACETAMINOPHEN 975 MG: 325 TABLET, FILM COATED ORAL at 21:42

## 2020-09-08 RX ADMIN — CLONAZEPAM 1 MG: 1 TABLET ORAL at 10:16

## 2020-09-08 RX ADMIN — GABAPENTIN 300 MG: 300 CAPSULE ORAL at 10:16

## 2020-09-08 RX ADMIN — QUETIAPINE FUMARATE 300 MG: 300 TABLET, EXTENDED RELEASE ORAL at 21:44

## 2020-09-08 RX ADMIN — CLONAZEPAM 1 MG: 1 TABLET ORAL at 21:42

## 2020-09-08 RX ADMIN — OXYCODONE HYDROCHLORIDE 5 MG: 5 TABLET ORAL at 12:18

## 2020-09-08 RX ADMIN — POTASSIUM PHOSPHATE, MONOBASIC POTASSIUM PHOSPHATE, DIBASIC 30 MMOL: 224; 236 INJECTION, SOLUTION, CONCENTRATE INTRAVENOUS at 10:45

## 2020-09-08 RX ADMIN — HEPARIN SODIUM 7500 UNITS: 5000 INJECTION INTRAVENOUS; SUBCUTANEOUS at 13:38

## 2020-09-08 RX ADMIN — DOCUSATE SODIUM AND SENNOSIDES 1 TABLET: 8.6; 5 TABLET ORAL at 21:42

## 2020-09-08 RX ADMIN — METHOCARBAMOL TABLETS 750 MG: 750 TABLET, COATED ORAL at 12:07

## 2020-09-08 RX ADMIN — HEPARIN SODIUM 7500 UNITS: 5000 INJECTION INTRAVENOUS; SUBCUTANEOUS at 21:43

## 2020-09-08 RX ADMIN — CLONAZEPAM 1 MG: 1 TABLET ORAL at 15:18
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
## 2020-09-08 NOTE — PLAN OF CARE
Problem: Prexisting or High Potential for Compromised Skin Integrity  Goal: Skin integrity is maintained or improved  Description: INTERVENTIONS:  - Identify patients at risk for skin breakdown  - Assess and monitor skin integrity  - Assess and monitor nutrition and hydration status  - Monitor labs   - Assess for incontinence   - Turn and reposition patient  - Assist with mobility/ambulation  - Relieve pressure over bony prominences  - Avoid friction and shearing  - Provide appropriate hygiene as needed including keeping skin clean and dry  - Evaluate need for skin moisturizer/barrier cream  - Collaborate with interdisciplinary team   - Patient/family teaching  - Consider wound care consult   Outcome: Progressing     Problem: Potential for Falls  Goal: Patient will remain free of falls  Description: INTERVENTIONS:  - Assess patient frequently for physical needs  -  Identify cognitive and physical deficits and behaviors that affect risk of falls    -  [place] fall precautions as indicated by assessment   - Educate patient/family on patient safety including physical limitations  - Instruct patient to call for assistance with activity based on assessment  - Modify environment to reduce risk of injury  - Consider OT/PT consult to assist with strengthening/mobility  Outcome: Progressing     Problem: PAIN - ADULT  Goal: Verbalizes/displays adequate comfort level or baseline comfort level  Description: Interventions:  - Encourage patient to monitor pain and request assistance  - Assess pain using appropriate pain scale  - Administer analgesics based on type and severity of pain and evaluate response  - Implement non-pharmacological measures as appropriate and evaluate response  - Consider cultural and social influences on pain and pain management  - Notify physician/advanced practitioner if interventions unsuccessful or patient reports new pain  Outcome: Progressing     Problem: INFECTION - ADULT  Goal: Absence or prevention of progression during hospitalization  Description: INTERVENTIONS:  - Assess and monitor for signs and symptoms of infection  - Monitor lab/diagnostic results  - Monitor all insertion sites, i e  indwelling lines, tubes, and drains  - Monitor endotracheal if appropriate and nasal secretions for changes in amount and color  - [place] appropriate cooling/warming therapies per order  - Administer medications as ordered  - Instruct and encourage patient and family to use good hand hygiene technique  - Identify and instruct in appropriate isolation precautions for identified infection/condition  Outcome: Progressing     Problem: SAFETY ADULT  Goal: Patient will remain free of falls  Description: INTERVENTIONS:  - Assess patient frequently for physical needs  -  Identify cognitive and physical deficits and behaviors that affect risk of falls    -  [place] fall precautions as indicated by assessment   - Educate patient/family on patient safety including physical limitations  - Instruct patient to call for assistance with activity based on assessment  - Modify environment to reduce risk of injury  - Consider OT/PT consult to assist with strengthening/mobility  Outcome: Progressing  Goal: Maintain or return to baseline ADL function  Description: INTERVENTIONS:  -  Assess patient's ability to carry out ADLs; assess patient's baseline for ADL function and identify physical deficits which impact ability to perform ADLs (bathing, care of mouth/teeth, toileting, grooming, dressing, etc )  - Assess/evaluate cause of self-care deficits   - Assess range of motion  - Assess patient's mobility; develop plan if impaired  - Assess patient's need for assistive devices and provide as appropriate  - Encourage maximum independence but intervene and supervise when necessary  - Involve family in performance of ADLs  - Assess for home care needs following discharge   - Consider OT consult to assist with ADL evaluation and planning for discharge  - Provide patient education as appropriate  Outcome: Progressing  Goal: Maintain or return mobility status to optimal level  Description: INTERVENTIONS:  - Assess patient's baseline mobility status (ambulation, transfers, stairs, etc )    - Identify cognitive and physical deficits and behaviors that affect mobility  - Identify mobility aids required to assist with transfers and/or ambulation (gait belt, sit-to-stand, lift, walker, cane, etc )  - [place] fall precautions as indicated by assessment  - Record patient progress and toleration of activity level on Mobility SBAR; progress patient to next Phase/Stage  - Instruct patient to call for assistance with activity based on assessment  - Consider rehabilitation consult to assist with strengthening/weightbearing, etc   Outcome: Progressing     Problem: DISCHARGE PLANNING  Goal: Discharge to home or other facility with appropriate resources  Description: INTERVENTIONS:  - Identify barriers to discharge w/patient and caregiver  - Arrange for needed discharge resources and transportation as appropriate  - Identify discharge learning needs (meds, wound care, etc )  - Arrange for interpretive services to assist at discharge as needed  - Refer to Case Management Department for coordinating discharge planning if the patient needs post-hospital services based on physician/advanced practitioner order or complex needs related to functional status, cognitive ability, or social support system  Outcome: Progressing     Problem: Knowledge Deficit  Goal: Patient/family/caregiver demonstrates understanding of disease process, treatment plan, medications, and discharge instructions  Description: Complete learning assessment and assess knowledge base    Interventions:  - Provide teaching at level of understanding  - Provide teaching via preferred learning methods  Outcome: Progressing     Problem: Nutrition/Hydration-ADULT  Goal: Nutrient/Hydration intake appropriate for improving, restoring or maintaining nutritional needs  Description: Monitor and assess patient's nutrition/hydration status for malnutrition  Collaborate with interdisciplinary team and initiate plan and interventions as ordered  Monitor patient's weight and dietary intake as ordered or per policy  Utilize nutrition screening tool and intervene as necessary  Determine patient's food preferences and provide high-protein, high-caloric foods as appropriate       INTERVENTIONS:  - Monitor oral intake, urinary output, labs, and treatment plans  - Assess nutrition and hydration status and recommend course of action  - Evaluate amount of meals eaten  - Assist patient with eating if necessary   - Allow adequate time for meals  - Recommend/ encourage appropriate diets, oral nutritional supplements, and vitamin/mineral supplements  - Order, calculate, and assess calorie counts as needed  - Recommend, monitor, and adjust tube feedings and TPN/PPN based on assessed needs  - Assess need for intravenous fluids  - Provide specific nutrition/hydration education as appropriate  - Include patient/family/caregiver in decisions related to nutrition  Outcome: Progressing     Problem: NEUROSENSORY - ADULT  Goal: Achieves stable or improved neurological status  Description: INTERVENTIONS  - Monitor and report changes in neurological status  - Monitor vital signs such as temperature, blood pressure, glucose, and any other labs ordered   - Initiate measures to prevent increased intracranial pressure  - Monitor for seizure activity and implement precautions if appropriate      Outcome: Progressing  Goal: Remains free of injury related to seizures activity  Description: INTERVENTIONS  - Maintain airway, patient safety  and administer oxygen as ordered  - Monitor patient for seizure activity, document and report duration and description of seizure to physician/advanced practitioner  - If seizure occurs,  ensure patient safety during seizure  - Reorient patient post seizure  - Seizure pads on all 4 side rails  - Instruct patient/family to notify RN of any seizure activity including if an aura is experienced  - Instruct patient/family to call for assistance with activity based on nursing assessment  - Administer anti-seizure medications if ordered    Outcome: Progressing  Goal: Achieves maximal functionality and self care  Description: INTERVENTIONS  - Monitor swallowing and airway patency with patient fatigue and changes in neurological status  - Encourage and assist patient to increase activity and self care     - Encourage visually impaired, hearing impaired and aphasic patients to use assistive/communication devices  Outcome: Progressing     Problem: CARDIOVASCULAR - ADULT  Goal: Maintains optimal cardiac output and hemodynamic stability  Description: INTERVENTIONS:  - Monitor I/O, vital signs and rhythm  - Monitor for S/S and trends of decreased cardiac output  - Administer and titrate ordered vasoactive medications to optimize hemodynamic stability  - Assess quality of pulses, skin color and temperature  - Assess for signs of decreased coronary artery perfusion  - Instruct patient to report change in severity of symptoms  Outcome: Progressing  Goal: Absence of cardiac dysrhythmias or at baseline rhythm  Description: INTERVENTIONS:  - Continuous cardiac monitoring, vital signs, obtain 12 lead EKG if ordered  - Administer antiarrhythmic and heart rate control medications as ordered  - Monitor electrolytes and administer replacement therapy as ordered  Outcome: Progressing     Problem: RESPIRATORY - ADULT  Goal: Achieves optimal ventilation and oxygenation  Description: INTERVENTIONS:  - Assess for changes in respiratory status  - Assess for changes in mentation and behavior  - Position to facilitate oxygenation and minimize respiratory effort  - Oxygen administered by appropriate delivery if ordered  - Initiate smoking cessation education as indicated  - Encourage broncho-pulmonary hygiene including cough, deep breathe, Incentive Spirometry  - Assess the need for suctioning and aspirate as needed  - Assess and instruct to report SOB or any respiratory difficulty  - Respiratory Therapy support as indicated  Outcome: Progressing     Problem: GASTROINTESTINAL - ADULT  Goal: Maintains or returns to baseline bowel function  Description: INTERVENTIONS:  - Assess bowel function  - Encourage oral fluids to ensure adequate hydration  - Administer IV fluids if ordered to ensure adequate hydration  - Administer ordered medications as needed  - Encourage mobilization and activity  - Consider nutritional services referral to assist patient with adequate nutrition and appropriate food choices  Outcome: Progressing  Goal: Maintains adequate nutritional intake  Description: INTERVENTIONS:  - Monitor percentage of each meal consumed  - Identify factors contributing to decreased intake, treat as appropriate  - Assist with meals as needed  - Monitor I&O, weight, and lab values if indicated  - Obtain nutrition services referral as needed  Outcome: Progressing     Problem: GENITOURINARY - ADULT  Goal: Maintains or returns to baseline urinary function  Description: INTERVENTIONS:  - Assess urinary function  - Encourage oral fluids to ensure adequate hydration if ordered  - Administer IV fluids as ordered to ensure adequate hydration  - Administer ordered medications as needed  - Offer frequent toileting  - Follow urinary retention protocol if ordered  Outcome: Progressing     Problem: METABOLIC, FLUID AND ELECTROLYTES - ADULT  Goal: Electrolytes maintained within normal limits  Description: INTERVENTIONS:  - Monitor labs and assess patient for signs and symptoms of electrolyte imbalances  - Administer electrolyte replacement as ordered  - Monitor response to electrolyte replacements, including repeat lab results as appropriate  - Instruct patient on fluid and nutrition as appropriate  Outcome: Progressing  Goal: Fluid balance maintained  Description: INTERVENTIONS:  - Monitor labs   - Monitor I/O and WT  - Instruct patient on fluid and nutrition as appropriate  - Assess for signs & symptoms of volume excess or deficit  Outcome: Progressing     Problem: SKIN/TISSUE INTEGRITY - ADULT  Goal: Skin integrity remains intact  Description: INTERVENTIONS  - Identify patients at risk for skin breakdown  - Assess and monitor skin integrity  - Assess and monitor nutrition and hydration status  - Monitor labs (i e  albumin)  - Assess for incontinence   - Turn and reposition patient  - Assist with mobility/ambulation  - Relieve pressure over bony prominences  - Avoid friction and shearing  - Provide appropriate hygiene as needed including keeping skin clean and dry  - Evaluate need for skin moisturizer/barrier cream  - Collaborate with interdisciplinary team (i e  Nutrition, Rehabilitation, etc )   - Patient/family teaching  Outcome: Progressing  Goal: Incision(s), wounds(s) or drain site(s) healing without S/S of infection  Description: INTERVENTIONS  - Assess and document risk factors for skin impairment   - Assess and document dressing, incision, wound bed, drain sites and surrounding tissue  - Consider nutrition services referral as needed  - Oral mucous membranes remain intact  - Provide patient/ family education  Outcome: Progressing  Goal: Oral mucous membranes remain intact  Description: INTERVENTIONS  - Assess oral mucosa and hygiene practices  - Implement preventative oral hygiene regimen  - Implement oral medicated treatments as ordered  - Initiate Nutrition services referral as needed  Outcome: Progressing     Problem: HEMATOLOGIC - ADULT  Goal: Maintains hematologic stability  Description: INTERVENTIONS  - Assess for signs and symptoms of bleeding or hemorrhage  - Monitor labs  - Administer supportive blood products/factors as ordered and appropriate  Outcome: Progressing     Problem: MUSCULOSKELETAL - ADULT  Goal: Maintain or return mobility to safest level of function  Description: INTERVENTIONS:  - Assess patient's ability to carry out ADLs; assess patient's baseline for ADL function and identify physical deficits which impact ability to perform ADLs (bathing, care of mouth/teeth, toileting, grooming, dressing, etc )  - Assess/evaluate cause of self-care deficits   - Assess range of motion  - Assess patient's mobility  - Assess patient's need for assistive devices and provide as appropriate  - Encourage maximum independence but intervene and supervise when necessary  - Involve family in performance of ADLs  - Assess for home care needs following discharge   - Consider OT consult to assist with ADL evaluation and planning for discharge  - Provide patient education as appropriate  Outcome: Progressing  Goal: Maintain proper alignment of affected body part  Description: INTERVENTIONS:  - Support, maintain and protect limb and body alignment  - Provide patient/ family with appropriate education  Outcome: Progressing
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
## 2020-09-08 NOTE — CASE MANAGEMENT
CM met with pt at bedside and introduced self/role with dcp  Pt reports he resides with his wife, Yelena Regalado, in a 2 story home with 4-5 LOLI  Bedroom/bathroom on 2nd floor  Half bathroom available on first  Pt reports independent with ADLs and ambulation PTA  Pt drives  Semi-retired  Pt denies hx of VNA, STR, or MH  Pt reports hx of drug/alcohol abuse with hx of treatment - pt reports sober 10 years  Pharmacy is [place]  Pt reports he doesn't know if he has a POA or LW  Pt reports his wife, Yelena Regalado, is his emergency contact  CM to follow for dcp  CM reviewed d/c planning process including the following: identifying help at home, patient preference for d/c planning needs, Discharge Lounge, [facility] Meds to Bed program, availability of treatment team to discuss questions or concerns patient and/or family may have regarding understanding medications and recognizing signs and symptoms once discharged  CM also encouraged patient to follow up with all recommended appointments after discharge  Patient advised of importance for patient and family to participate in managing patients medical well being
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
## 2020-09-08 NOTE — ASSESSMENT & PLAN NOTE
- rib fracture protocol  - IS > 750-1000 on 2L  - PT/OT /PMR  - Multimodal pain regimen  -APS following, appreciate recommendations
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
## 2020-09-08 NOTE — PROGRESS NOTES
Patient report has been called to Makenzie Almodovar RN from the [address] [place] [address] 6 Nursing unit  The patient will be transferred to Room 601 as a Medical-Surgical status patient
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
## 2020-09-08 NOTE — TELEPHONE ENCOUNTER
2020-PT Michael 25    09/10/2020-PT STILL IN HOSPITAL    2020-PT STILL IN HOSPITAL    2020-PT STILL IN HOSPITAL  2020 APT W/CT HEAD     ----- Message from Giancarlo Gibbs PA-C sent at 2020  9:10 AM EDT -----  Regardin week follow up  Hospital follow up  2 week CT head with PA

## 2020-09-09 LAB
ANION GAP SERPL CALCULATED.3IONS-SCNC: 6 MMOL/L (ref 4–13)
BASOPHILS # BLD AUTO: 0.04 THOUSANDS/ΜL (ref 0–0.1)
BASOPHILS NFR BLD AUTO: 1 % (ref 0–1)
BUN SERPL-MCNC: 11 MG/DL (ref 5–25)
CALCIUM SERPL-MCNC: 8.4 MG/DL (ref 8.3–10.1)
CHLORIDE SERPL-SCNC: 103 MMOL/L (ref 100–108)
CO2 SERPL-SCNC: 28 MMOL/L (ref 21–32)
CREAT SERPL-MCNC: 1.09 MG/DL (ref 0.6–1.3)
EOSINOPHIL # BLD AUTO: 0.37 THOUSAND/ΜL (ref 0–0.61)
EOSINOPHIL NFR BLD AUTO: 5 % (ref 0–6)
ERYTHROCYTE [DISTWIDTH] IN BLOOD BY AUTOMATED COUNT: 13.4 % (ref 11.6–15.1)
GFR SERPL CREATININE-BSD FRML MDRD: 78 ML/MIN/1.73SQ M
GLUCOSE SERPL-MCNC: 113 MG/DL (ref 65–140)
HCT VFR BLD AUTO: 33 % (ref 36.5–49.3)
HGB BLD-MCNC: 11.2 G/DL (ref 12–17)
IMM GRANULOCYTES # BLD AUTO: 0.08 THOUSAND/UL (ref 0–0.2)
IMM GRANULOCYTES NFR BLD AUTO: 1 % (ref 0–2)
LYMPHOCYTES # BLD AUTO: 1.8 THOUSANDS/ΜL (ref 0.6–4.47)
LYMPHOCYTES NFR BLD AUTO: 24 % (ref 14–44)
MAGNESIUM SERPL-MCNC: 2.6 MG/DL (ref 1.6–2.6)
MCH RBC QN AUTO: 30.1 PG (ref 26.8–34.3)
MCHC RBC AUTO-ENTMCNC: 33.9 G/DL (ref 31.4–37.4)
MCV RBC AUTO: 89 FL (ref 82–98)
MONOCYTES # BLD AUTO: 0.68 THOUSAND/ΜL (ref 0.17–1.22)
MONOCYTES NFR BLD AUTO: 9 % (ref 4–12)
NEUTROPHILS # BLD AUTO: 4.59 THOUSANDS/ΜL (ref 1.85–7.62)
NEUTS SEG NFR BLD AUTO: 60 % (ref 43–75)
NRBC BLD AUTO-RTO: 0 /100 WBCS
PHOSPHATE SERPL-MCNC: 2.6 MG/DL (ref 2.7–4.5)
PLATELET # BLD AUTO: 209 THOUSANDS/UL (ref 149–390)
PMV BLD AUTO: 10.5 FL (ref 8.9–12.7)
POTASSIUM SERPL-SCNC: 3.6 MMOL/L (ref 3.5–5.3)
RBC # BLD AUTO: 3.72 MILLION/UL (ref 3.88–5.62)
SODIUM SERPL-SCNC: 137 MMOL/L (ref 136–145)
WBC # BLD AUTO: 7.56 THOUSAND/UL (ref 4.31–10.16)

## 2020-09-09 PROCEDURE — 83735 ASSAY OF MAGNESIUM: CPT | Performed by: PHYSICIAN ASSISTANT

## 2020-09-09 PROCEDURE — 84100 ASSAY OF PHOSPHORUS: CPT | Performed by: PHYSICIAN ASSISTANT

## 2020-09-09 PROCEDURE — 85025 COMPLETE CBC W/AUTO DIFF WBC: CPT | Performed by: PHYSICIAN ASSISTANT

## 2020-09-09 PROCEDURE — 99232 SBSQ HOSP IP/OBS MODERATE 35: CPT | Performed by: ANESTHESIOLOGY

## 2020-09-09 PROCEDURE — 80048 BASIC METABOLIC PNL TOTAL CA: CPT | Performed by: PHYSICIAN ASSISTANT

## 2020-09-09 PROCEDURE — 99233 SBSQ HOSP IP/OBS HIGH 50: CPT | Performed by: SURGERY

## 2020-09-09 RX ADMIN — Medication 6 MG: at 21:20

## 2020-09-09 RX ADMIN — OXYCODONE HYDROCHLORIDE 5 MG: 5 TABLET ORAL at 21:19

## 2020-09-09 RX ADMIN — ACETAMINOPHEN 975 MG: 325 TABLET, FILM COATED ORAL at 16:04

## 2020-09-09 RX ADMIN — HEPARIN SODIUM 7500 UNITS: 5000 INJECTION INTRAVENOUS; SUBCUTANEOUS at 21:20

## 2020-09-09 RX ADMIN — QUETIAPINE FUMARATE 300 MG: 300 TABLET, EXTENDED RELEASE ORAL at 21:20

## 2020-09-09 RX ADMIN — LIDOCAINE 5% 2 PATCH: 700 PATCH TOPICAL at 09:24

## 2020-09-09 RX ADMIN — GABAPENTIN 300 MG: 300 CAPSULE ORAL at 09:23

## 2020-09-09 RX ADMIN — METHOCARBAMOL TABLETS 750 MG: 750 TABLET, COATED ORAL at 00:04

## 2020-09-09 RX ADMIN — METHOCARBAMOL TABLETS 750 MG: 750 TABLET, COATED ORAL at 17:27

## 2020-09-09 RX ADMIN — LEVETIRACETAM 500 MG: 500 TABLET, FILM COATED ORAL at 21:20

## 2020-09-09 RX ADMIN — CLONAZEPAM 1 MG: 1 TABLET ORAL at 21:20

## 2020-09-09 RX ADMIN — LEVETIRACETAM 500 MG: 500 TABLET, FILM COATED ORAL at 09:23

## 2020-09-09 RX ADMIN — ACETAMINOPHEN 975 MG: 325 TABLET, FILM COATED ORAL at 21:30

## 2020-09-09 RX ADMIN — HYDROMORPHONE HYDROCHLORIDE 0.2 MG: 1 INJECTION, SOLUTION INTRAMUSCULAR; INTRAVENOUS; SUBCUTANEOUS at 00:09

## 2020-09-09 RX ADMIN — DOCUSATE SODIUM AND SENNOSIDES 1 TABLET: 8.6; 5 TABLET ORAL at 21:20

## 2020-09-09 RX ADMIN — OXYCODONE HYDROCHLORIDE 5 MG: 5 TABLET ORAL at 16:04

## 2020-09-09 RX ADMIN — CLONAZEPAM 1 MG: 1 TABLET ORAL at 16:04

## 2020-09-09 RX ADMIN — ACETAMINOPHEN 975 MG: 325 TABLET, FILM COATED ORAL at 09:30

## 2020-09-09 RX ADMIN — HEPARIN SODIUM 7500 UNITS: 5000 INJECTION INTRAVENOUS; SUBCUTANEOUS at 13:25

## 2020-09-09 RX ADMIN — CLONAZEPAM 1 MG: 1 TABLET ORAL at 09:24

## 2020-09-09 RX ADMIN — GABAPENTIN 300 MG: 300 CAPSULE ORAL at 16:04

## 2020-09-09 RX ADMIN — GABAPENTIN 300 MG: 300 CAPSULE ORAL at 21:20

## 2020-09-09 NOTE — ASSESSMENT & PLAN NOTE
- rib fracture protocol  - PT/OT /PMR  - Multimodal pain regimen  - APS following, appreciate recommendations
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
## 2020-09-09 NOTE — PLAN OF CARE
Problem: Prexisting or High Potential for Compromised Skin Integrity  Goal: Skin integrity is maintained or improved  Description: INTERVENTIONS:  - Identify patients at risk for skin breakdown  - Assess and monitor skin integrity  - Assess and monitor nutrition and hydration status  - Monitor labs   - Assess for incontinence   - Turn and reposition patient  - Assist with mobility/ambulation  - Relieve pressure over bony prominences  - Avoid friction and shearing  - Provide appropriate hygiene as needed including keeping skin clean and dry  - Evaluate need for skin moisturizer/barrier cream  - Collaborate with interdisciplinary team   - Patient/family teaching  - Consider wound care consult   Outcome: Progressing     Problem: Potential for Falls  Goal: Patient will remain free of falls  Description: INTERVENTIONS:  - Assess patient frequently for physical needs  -  Identify cognitive and physical deficits and behaviors that affect risk of falls    -  [place] fall precautions as indicated by assessment   - Educate patient/family on patient safety including physical limitations  - Instruct patient to call for assistance with activity based on assessment  - Modify environment to reduce risk of injury  - Consider OT/PT consult to assist with strengthening/mobility  Outcome: Progressing     Problem: PAIN - ADULT  Goal: Verbalizes/displays adequate comfort level or baseline comfort level  Description: Interventions:  - Encourage patient to monitor pain and request assistance  - Assess pain using appropriate pain scale  - Administer analgesics based on type and severity of pain and evaluate response  - Implement non-pharmacological measures as appropriate and evaluate response  - Consider cultural and social influences on pain and pain management  - Notify physician/advanced practitioner if interventions unsuccessful or patient reports new pain  Outcome: Progressing     Problem: INFECTION - ADULT  Goal: Absence or prevention of progression during hospitalization  Description: INTERVENTIONS:  - Assess and monitor for signs and symptoms of infection  - Monitor lab/diagnostic results  - Monitor all insertion sites, i e  indwelling lines, tubes, and drains  - Monitor endotracheal if appropriate and nasal secretions for changes in amount and color  - [place] appropriate cooling/warming therapies per order  - Administer medications as ordered  - Instruct and encourage patient and family to use good hand hygiene technique  - Identify and instruct in appropriate isolation precautions for identified infection/condition  Outcome: Progressing     Problem: SAFETY ADULT  Goal: Patient will remain free of falls  Description: INTERVENTIONS:  - Assess patient frequently for physical needs  -  Identify cognitive and physical deficits and behaviors that affect risk of falls    -  [place] fall precautions as indicated by assessment   - Educate patient/family on patient safety including physical limitations  - Instruct patient to call for assistance with activity based on assessment  - Modify environment to reduce risk of injury  - Consider OT/PT consult to assist with strengthening/mobility  Outcome: Progressing  Goal: Maintain or return to baseline ADL function  Description: INTERVENTIONS:  -  Assess patient's ability to carry out ADLs; assess patient's baseline for ADL function and identify physical deficits which impact ability to perform ADLs (bathing, care of mouth/teeth, toileting, grooming, dressing, etc )  - Assess/evaluate cause of self-care deficits   - Assess range of motion  - Assess patient's mobility; develop plan if impaired  - Assess patient's need for assistive devices and provide as appropriate  - Encourage maximum independence but intervene and supervise when necessary  - Involve family in performance of ADLs  - Assess for home care needs following discharge   - Consider OT consult to assist with ADL evaluation and planning for discharge  - Provide patient education as appropriate  Outcome: Progressing  Goal: Maintain or return mobility status to optimal level  Description: INTERVENTIONS:  - Assess patient's baseline mobility status (ambulation, transfers, stairs, etc )    - Identify cognitive and physical deficits and behaviors that affect mobility  - Identify mobility aids required to assist with transfers and/or ambulation (gait belt, sit-to-stand, lift, walker, cane, etc )  - [place] fall precautions as indicated by assessment  - Record patient progress and toleration of activity level on Mobility SBAR; progress patient to next Phase/Stage  - Instruct patient to call for assistance with activity based on assessment  - Consider rehabilitation consult to assist with strengthening/weightbearing, etc   Outcome: Progressing     Problem: DISCHARGE PLANNING  Goal: Discharge to home or other facility with appropriate resources  Description: INTERVENTIONS:  - Identify barriers to discharge w/patient and caregiver  - Arrange for needed discharge resources and transportation as appropriate  - Identify discharge learning needs (meds, wound care, etc )  - Arrange for interpretive services to assist at discharge as needed  - Refer to Case Management Department for coordinating discharge planning if the patient needs post-hospital services based on physician/advanced practitioner order or complex needs related to functional status, cognitive ability, or social support system  Outcome: Progressing     Problem: Knowledge Deficit  Goal: Patient/family/caregiver demonstrates understanding of disease process, treatment plan, medications, and discharge instructions  Description: Complete learning assessment and assess knowledge base    Interventions:  - Provide teaching at level of understanding  - Provide teaching via preferred learning methods  Outcome: Progressing     Problem: Nutrition/Hydration-ADULT  Goal: Nutrient/Hydration intake appropriate for improving, restoring or maintaining nutritional needs  Description: Monitor and assess patient's nutrition/hydration status for malnutrition  Collaborate with interdisciplinary team and initiate plan and interventions as ordered  Monitor patient's weight and dietary intake as ordered or per policy  Utilize nutrition screening tool and intervene as necessary  Determine patient's food preferences and provide high-protein, high-caloric foods as appropriate       INTERVENTIONS:  - Monitor oral intake, urinary output, labs, and treatment plans  - Assess nutrition and hydration status and recommend course of action  - Evaluate amount of meals eaten  - Assist patient with eating if necessary   - Allow adequate time for meals  - Recommend/ encourage appropriate diets, oral nutritional supplements, and vitamin/mineral supplements  - Order, calculate, and assess calorie counts as needed  - Recommend, monitor, and adjust tube feedings and TPN/PPN based on assessed needs  - Assess need for intravenous fluids  - Provide specific nutrition/hydration education as appropriate  - Include patient/family/caregiver in decisions related to nutrition  Outcome: Progressing     Problem: NEUROSENSORY - ADULT  Goal: Achieves stable or improved neurological status  Description: INTERVENTIONS  - Monitor and report changes in neurological status  - Monitor vital signs such as temperature, blood pressure, glucose, and any other labs ordered   - Initiate measures to prevent increased intracranial pressure  - Monitor for seizure activity and implement precautions if appropriate      Outcome: Progressing  Goal: Remains free of injury related to seizures activity  Description: INTERVENTIONS  - Maintain airway, patient safety  and administer oxygen as ordered  - Monitor patient for seizure activity, document and report duration and description of seizure to physician/advanced practitioner  - If seizure occurs,  ensure patient safety during seizure  - Reorient patient post seizure  - Seizure pads on all 4 side rails  - Instruct patient/family to notify RN of any seizure activity including if an aura is experienced  - Instruct patient/family to call for assistance with activity based on nursing assessment  - Administer anti-seizure medications if ordered    Outcome: Progressing  Goal: Achieves maximal functionality and self care  Description: INTERVENTIONS  - Monitor swallowing and airway patency with patient fatigue and changes in neurological status  - Encourage and assist patient to increase activity and self care     - Encourage visually impaired, hearing impaired and aphasic patients to use assistive/communication devices  Outcome: Progressing     Problem: CARDIOVASCULAR - ADULT  Goal: Maintains optimal cardiac output and hemodynamic stability  Description: INTERVENTIONS:  - Monitor I/O, vital signs and rhythm  - Monitor for S/S and trends of decreased cardiac output  - Administer and titrate ordered vasoactive medications to optimize hemodynamic stability  - Assess quality of pulses, skin color and temperature  - Assess for signs of decreased coronary artery perfusion  - Instruct patient to report change in severity of symptoms  Outcome: Progressing  Goal: Absence of cardiac dysrhythmias or at baseline rhythm  Description: INTERVENTIONS:  - Continuous cardiac monitoring, vital signs, obtain 12 lead EKG if ordered  - Administer antiarrhythmic and heart rate control medications as ordered  - Monitor electrolytes and administer replacement therapy as ordered  Outcome: Progressing     Problem: RESPIRATORY - ADULT  Goal: Achieves optimal ventilation and oxygenation  Description: INTERVENTIONS:  - Assess for changes in respiratory status  - Assess for changes in mentation and behavior  - Position to facilitate oxygenation and minimize respiratory effort  - Oxygen administered by appropriate delivery if ordered  - Initiate smoking cessation education as indicated  - Encourage broncho-pulmonary hygiene including cough, deep breathe, Incentive Spirometry  - Assess the need for suctioning and aspirate as needed  - Assess and instruct to report SOB or any respiratory difficulty  - Respiratory Therapy support as indicated  Outcome: Progressing     Problem: GASTROINTESTINAL - ADULT  Goal: Maintains or returns to baseline bowel function  Description: INTERVENTIONS:  - Assess bowel function  - Encourage oral fluids to ensure adequate hydration  - Administer IV fluids if ordered to ensure adequate hydration  - Administer ordered medications as needed  - Encourage mobilization and activity  - Consider nutritional services referral to assist patient with adequate nutrition and appropriate food choices  Outcome: Progressing  Goal: Maintains adequate nutritional intake  Description: INTERVENTIONS:  - Monitor percentage of each meal consumed  - Identify factors contributing to decreased intake, treat as appropriate  - Assist with meals as needed  - Monitor I&O, weight, and lab values if indicated  - Obtain nutrition services referral as needed  Outcome: Progressing     Problem: GENITOURINARY - ADULT  Goal: Maintains or returns to baseline urinary function  Description: INTERVENTIONS:  - Assess urinary function  - Encourage oral fluids to ensure adequate hydration if ordered  - Administer IV fluids as ordered to ensure adequate hydration  - Administer ordered medications as needed  - Offer frequent toileting  - Follow urinary retention protocol if ordered  Outcome: Progressing     Problem: METABOLIC, FLUID AND ELECTROLYTES - ADULT  Goal: Electrolytes maintained within normal limits  Description: INTERVENTIONS:  - Monitor labs and assess patient for signs and symptoms of electrolyte imbalances  - Administer electrolyte replacement as ordered  - Monitor response to electrolyte replacements, including repeat lab results as appropriate  - Instruct patient on fluid and nutrition as appropriate  Outcome: Progressing  Goal: Fluid balance maintained  Description: INTERVENTIONS:  - Monitor labs   - Monitor I/O and WT  - Instruct patient on fluid and nutrition as appropriate  - Assess for signs & symptoms of volume excess or deficit  Outcome: Progressing     Problem: SKIN/TISSUE INTEGRITY - ADULT  Goal: Skin integrity remains intact  Description: INTERVENTIONS  - Identify patients at risk for skin breakdown  - Assess and monitor skin integrity  - Assess and monitor nutrition and hydration status  - Monitor labs (i e  albumin)  - Assess for incontinence   - Turn and reposition patient  - Assist with mobility/ambulation  - Relieve pressure over bony prominences  - Avoid friction and shearing  - Provide appropriate hygiene as needed including keeping skin clean and dry  - Evaluate need for skin moisturizer/barrier cream  - Collaborate with interdisciplinary team (i e  Nutrition, Rehabilitation, etc )   - Patient/family teaching  Outcome: Progressing  Goal: Incision(s), wounds(s) or drain site(s) healing without S/S of infection  Description: INTERVENTIONS  - Assess and document risk factors for skin impairment   - Assess and document dressing, incision, wound bed, drain sites and surrounding tissue  - Consider nutrition services referral as needed  - Oral mucous membranes remain intact  - Provide patient/ family education  Outcome: Progressing  Goal: Oral mucous membranes remain intact  Description: INTERVENTIONS  - Assess oral mucosa and hygiene practices  - Implement preventative oral hygiene regimen  - Implement oral medicated treatments as ordered  - Initiate Nutrition services referral as needed  Outcome: Progressing     Problem: HEMATOLOGIC - ADULT  Goal: Maintains hematologic stability  Description: INTERVENTIONS  - Assess for signs and symptoms of bleeding or hemorrhage  - Monitor labs  - Administer supportive blood products/factors as ordered and appropriate  Outcome: Progressing     Problem: MUSCULOSKELETAL - ADULT  Goal: Maintain or return mobility to safest level of function  Description: INTERVENTIONS:  - Assess patient's ability to carry out ADLs; assess patient's baseline for ADL function and identify physical deficits which impact ability to perform ADLs (bathing, care of mouth/teeth, toileting, grooming, dressing, etc )  - Assess/evaluate cause of self-care deficits   - Assess range of motion  - Assess patient's mobility  - Assess patient's need for assistive devices and provide as appropriate  - Encourage maximum independence but intervene and supervise when necessary  - Involve family in performance of ADLs  - Assess for home care needs following discharge   - Consider OT consult to assist with ADL evaluation and planning for discharge  - Provide patient education as appropriate  Outcome: Progressing  Goal: Maintain proper alignment of affected body part  Description: INTERVENTIONS:  - Support, maintain and protect limb and body alignment  - Provide patient/ family with appropriate education  Outcome: Progressing
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
## 2020-09-09 NOTE — ASSESSMENT & PLAN NOTE
- pt reports anxiety/bipolar maintained on medications  - medications confirmed with [place] pharmacy: protonix 40 qd, klonopin 1 mg TID, seroquel 300 q HS, ambien 10 q HS, gabapentin 800 mg TID   -will consult Psychiatry as patient states he has been "self weaning" his medications and may need adjustment in setting of recent death of his son

## 2020-09-09 NOTE — SOCIAL WORK
CM spoke to pt's wife to discuss d/c plan  CM educated wife on the difference between acute and SNF  Pt's wife prefers acute rehab (her choices are [facility], [facility], and [facility], but no preference)  As back ups she would like [facility] and [facility] for SNF

## 2020-09-10 PROCEDURE — 97530 THERAPEUTIC ACTIVITIES: CPT

## 2020-09-10 PROCEDURE — 99221 1ST HOSP IP/OBS SF/LOW 40: CPT | Performed by: PSYCHIATRY & NEUROLOGY

## 2020-09-10 PROCEDURE — 97116 GAIT TRAINING THERAPY: CPT

## 2020-09-10 PROCEDURE — 97535 SELF CARE MNGMENT TRAINING: CPT

## 2020-09-10 PROCEDURE — 99232 SBSQ HOSP IP/OBS MODERATE 35: CPT | Performed by: SURGERY

## 2020-09-10 PROCEDURE — 99233 SBSQ HOSP IP/OBS HIGH 50: CPT | Performed by: INTERNAL MEDICINE

## 2020-09-10 RX ADMIN — OXYCODONE HYDROCHLORIDE 5 MG: 5 TABLET ORAL at 11:37

## 2020-09-10 RX ADMIN — HEPARIN SODIUM 7500 UNITS: 5000 INJECTION INTRAVENOUS; SUBCUTANEOUS at 06:23

## 2020-09-10 RX ADMIN — METHOCARBAMOL TABLETS 750 MG: 750 TABLET, COATED ORAL at 06:23

## 2020-09-10 RX ADMIN — OXYCODONE HYDROCHLORIDE 5 MG: 5 TABLET ORAL at 22:29

## 2020-09-10 RX ADMIN — HYDROMORPHONE HYDROCHLORIDE 0.2 MG: 1 INJECTION, SOLUTION INTRAMUSCULAR; INTRAVENOUS; SUBCUTANEOUS at 19:50

## 2020-09-10 RX ADMIN — LIDOCAINE 5% 2 PATCH: 700 PATCH TOPICAL at 08:23

## 2020-09-10 RX ADMIN — GABAPENTIN 300 MG: 300 CAPSULE ORAL at 08:22

## 2020-09-10 RX ADMIN — GABAPENTIN 300 MG: 300 CAPSULE ORAL at 22:28

## 2020-09-10 RX ADMIN — ACETAMINOPHEN 975 MG: 325 TABLET, FILM COATED ORAL at 06:25

## 2020-09-10 RX ADMIN — METHOCARBAMOL TABLETS 750 MG: 750 TABLET, COATED ORAL at 18:01

## 2020-09-10 RX ADMIN — HEPARIN SODIUM 7500 UNITS: 5000 INJECTION INTRAVENOUS; SUBCUTANEOUS at 13:47

## 2020-09-10 RX ADMIN — Medication 6 MG: at 22:29

## 2020-09-10 RX ADMIN — METHOCARBAMOL TABLETS 750 MG: 750 TABLET, COATED ORAL at 00:09

## 2020-09-10 RX ADMIN — HEPARIN SODIUM 7500 UNITS: 5000 INJECTION INTRAVENOUS; SUBCUTANEOUS at 22:29

## 2020-09-10 RX ADMIN — METHOCARBAMOL TABLETS 750 MG: 750 TABLET, COATED ORAL at 11:37

## 2020-09-10 RX ADMIN — DOCUSATE SODIUM AND SENNOSIDES 1 TABLET: 8.6; 5 TABLET ORAL at 22:28

## 2020-09-10 RX ADMIN — LEVETIRACETAM 500 MG: 500 TABLET, FILM COATED ORAL at 22:28

## 2020-09-10 RX ADMIN — ACETAMINOPHEN 975 MG: 325 TABLET, FILM COATED ORAL at 13:47

## 2020-09-10 RX ADMIN — PANTOPRAZOLE SODIUM 40 MG: 40 TABLET, DELAYED RELEASE ORAL at 06:25

## 2020-09-10 RX ADMIN — OXYCODONE HYDROCHLORIDE 5 MG: 5 TABLET ORAL at 01:32

## 2020-09-10 RX ADMIN — OXYCODONE HYDROCHLORIDE 5 MG: 5 TABLET ORAL at 06:25

## 2020-09-10 RX ADMIN — CLONAZEPAM 1 MG: 1 TABLET ORAL at 22:29

## 2020-09-10 RX ADMIN — OXYCODONE HYDROCHLORIDE 5 MG: 5 TABLET ORAL at 16:54

## 2020-09-10 RX ADMIN — LEVETIRACETAM 500 MG: 500 TABLET, FILM COATED ORAL at 08:22

## 2020-09-10 RX ADMIN — GABAPENTIN 300 MG: 300 CAPSULE ORAL at 16:54

## 2020-09-10 RX ADMIN — QUETIAPINE FUMARATE 300 MG: 300 TABLET, EXTENDED RELEASE ORAL at 22:30

## 2020-09-10 RX ADMIN — CLONAZEPAM 1 MG: 1 TABLET ORAL at 08:22

## 2020-09-10 RX ADMIN — ACETAMINOPHEN 975 MG: 325 TABLET, FILM COATED ORAL at 22:29

## 2020-09-10 RX ADMIN — CLONAZEPAM 1 MG: 1 TABLET ORAL at 16:54

## 2020-09-10 NOTE — PLAN OF CARE
Problem: Prexisting or High Potential for Compromised Skin Integrity  Goal: Skin integrity is maintained or improved  Description: INTERVENTIONS:  - Identify patients at risk for skin breakdown  - Assess and monitor skin integrity  - Assess and monitor nutrition and hydration status  - Monitor labs   - Assess for incontinence   - Turn and reposition patient  - Assist with mobility/ambulation  - Relieve pressure over bony prominences  - Avoid friction and shearing  - Provide appropriate hygiene as needed including keeping skin clean and dry  - Evaluate need for skin moisturizer/barrier cream  - Collaborate with interdisciplinary team   - Patient/family teaching  - Consider wound care consult   Outcome: Progressing     Problem: Potential for Falls  Goal: Patient will remain free of falls  Description: INTERVENTIONS:  - Assess patient frequently for physical needs  -  Identify cognitive and physical deficits and behaviors that affect risk of falls    -  [place] fall precautions as indicated by assessment   - Educate patient/family on patient safety including physical limitations  - Instruct patient to call for assistance with activity based on assessment  - Modify environment to reduce risk of injury  - Consider OT/PT consult to assist with strengthening/mobility  Outcome: Progressing     Problem: PAIN - ADULT  Goal: Verbalizes/displays adequate comfort level or baseline comfort level  Description: Interventions:  - Encourage patient to monitor pain and request assistance  - Assess pain using appropriate pain scale  - Administer analgesics based on type and severity of pain and evaluate response  - Implement non-pharmacological measures as appropriate and evaluate response  - Consider cultural and social influences on pain and pain management  - Notify physician/advanced practitioner if interventions unsuccessful or patient reports new pain  Outcome: Progressing     Problem: INFECTION - ADULT  Goal: Absence or prevention of progression during hospitalization  Description: INTERVENTIONS:  - Assess and monitor for signs and symptoms of infection  - Monitor lab/diagnostic results  - Monitor all insertion sites, i e  indwelling lines, tubes, and drains  - Monitor endotracheal if appropriate and nasal secretions for changes in amount and color  - [place] appropriate cooling/warming therapies per order  - Administer medications as ordered  - Instruct and encourage patient and family to use good hand hygiene technique  - Identify and instruct in appropriate isolation precautions for identified infection/condition  Outcome: Progressing     Problem: SAFETY ADULT  Goal: Patient will remain free of falls  Description: INTERVENTIONS:  - Assess patient frequently for physical needs  -  Identify cognitive and physical deficits and behaviors that affect risk of falls    -  [place] fall precautions as indicated by assessment   - Educate patient/family on patient safety including physical limitations  - Instruct patient to call for assistance with activity based on assessment  - Modify environment to reduce risk of injury  - Consider OT/PT consult to assist with strengthening/mobility  Outcome: Progressing  Goal: Maintain or return to baseline ADL function  Description: INTERVENTIONS:  -  Assess patient's ability to carry out ADLs; assess patient's baseline for ADL function and identify physical deficits which impact ability to perform ADLs (bathing, care of mouth/teeth, toileting, grooming, dressing, etc )  - Assess/evaluate cause of self-care deficits   - Assess range of motion  - Assess patient's mobility; develop plan if impaired  - Assess patient's need for assistive devices and provide as appropriate  - Encourage maximum independence but intervene and supervise when necessary  - Involve family in performance of ADLs  - Assess for home care needs following discharge   - Consider OT consult to assist with ADL evaluation and planning for discharge  - Provide patient education as appropriate  Outcome: Progressing  Goal: Maintain or return mobility status to optimal level  Description: INTERVENTIONS:  - Assess patient's baseline mobility status (ambulation, transfers, stairs, etc )    - Identify cognitive and physical deficits and behaviors that affect mobility  - Identify mobility aids required to assist with transfers and/or ambulation (gait belt, sit-to-stand, lift, walker, cane, etc )  - [place] fall precautions as indicated by assessment  - Record patient progress and toleration of activity level on Mobility SBAR; progress patient to next Phase/Stage  - Instruct patient to call for assistance with activity based on assessment  - Consider rehabilitation consult to assist with strengthening/weightbearing, etc   Outcome: Progressing     Problem: DISCHARGE PLANNING  Goal: Discharge to home or other facility with appropriate resources  Description: INTERVENTIONS:  - Identify barriers to discharge w/patient and caregiver  - Arrange for needed discharge resources and transportation as appropriate  - Identify discharge learning needs (meds, wound care, etc )  - Arrange for interpretive services to assist at discharge as needed  - Refer to Case Management Department for coordinating discharge planning if the patient needs post-hospital services based on physician/advanced practitioner order or complex needs related to functional status, cognitive ability, or social support system  Outcome: Progressing     Problem: Knowledge Deficit  Goal: Patient/family/caregiver demonstrates understanding of disease process, treatment plan, medications, and discharge instructions  Description: Complete learning assessment and assess knowledge base    Interventions:  - Provide teaching at level of understanding  - Provide teaching via preferred learning methods  Outcome: Progressing     Problem: Nutrition/Hydration-ADULT  Goal: Nutrient/Hydration intake appropriate for improving, restoring or maintaining nutritional needs  Description: Monitor and assess patient's nutrition/hydration status for malnutrition  Collaborate with interdisciplinary team and initiate plan and interventions as ordered  Monitor patient's weight and dietary intake as ordered or per policy  Utilize nutrition screening tool and intervene as necessary  Determine patient's food preferences and provide high-protein, high-caloric foods as appropriate       INTERVENTIONS:  - Monitor oral intake, urinary output, labs, and treatment plans  - Assess nutrition and hydration status and recommend course of action  - Evaluate amount of meals eaten  - Assist patient with eating if necessary   - Allow adequate time for meals  - Recommend/ encourage appropriate diets, oral nutritional supplements, and vitamin/mineral supplements  - Order, calculate, and assess calorie counts as needed  - Recommend, monitor, and adjust tube feedings and TPN/PPN based on assessed needs  - Assess need for intravenous fluids  - Provide specific nutrition/hydration education as appropriate  - Include patient/family/caregiver in decisions related to nutrition  Outcome: Progressing     Problem: NEUROSENSORY - ADULT  Goal: Achieves stable or improved neurological status  Description: INTERVENTIONS  - Monitor and report changes in neurological status  - Monitor vital signs such as temperature, blood pressure, glucose, and any other labs ordered   - Initiate measures to prevent increased intracranial pressure  - Monitor for seizure activity and implement precautions if appropriate      Outcome: Progressing  Goal: Remains free of injury related to seizures activity  Description: INTERVENTIONS  - Maintain airway, patient safety  and administer oxygen as ordered  - Monitor patient for seizure activity, document and report duration and description of seizure to physician/advanced practitioner  - If seizure occurs,  ensure patient safety during seizure  - Reorient patient post seizure  - Seizure pads on all 4 side rails  - Instruct patient/family to notify RN of any seizure activity including if an aura is experienced  - Instruct patient/family to call for assistance with activity based on nursing assessment  - Administer anti-seizure medications if ordered    Outcome: Progressing  Goal: Achieves maximal functionality and self care  Description: INTERVENTIONS  - Monitor swallowing and airway patency with patient fatigue and changes in neurological status  - Encourage and assist patient to increase activity and self care     - Encourage visually impaired, hearing impaired and aphasic patients to use assistive/communication devices  Outcome: Progressing     Problem: CARDIOVASCULAR - ADULT  Goal: Maintains optimal cardiac output and hemodynamic stability  Description: INTERVENTIONS:  - Monitor I/O, vital signs and rhythm  - Monitor for S/S and trends of decreased cardiac output  - Administer and titrate ordered vasoactive medications to optimize hemodynamic stability  - Assess quality of pulses, skin color and temperature  - Assess for signs of decreased coronary artery perfusion  - Instruct patient to report change in severity of symptoms  Outcome: Progressing  Goal: Absence of cardiac dysrhythmias or at baseline rhythm  Description: INTERVENTIONS:  - Continuous cardiac monitoring, vital signs, obtain 12 lead EKG if ordered  - Administer antiarrhythmic and heart rate control medications as ordered  - Monitor electrolytes and administer replacement therapy as ordered  Outcome: Progressing     Problem: RESPIRATORY - ADULT  Goal: Achieves optimal ventilation and oxygenation  Description: INTERVENTIONS:  - Assess for changes in respiratory status  - Assess for changes in mentation and behavior  - Position to facilitate oxygenation and minimize respiratory effort  - Oxygen administered by appropriate delivery if ordered  - Initiate smoking cessation education as indicated  - Encourage broncho-pulmonary hygiene including cough, deep breathe, Incentive Spirometry  - Assess the need for suctioning and aspirate as needed  - Assess and instruct to report SOB or any respiratory difficulty  - Respiratory Therapy support as indicated  Outcome: Progressing     Problem: GASTROINTESTINAL - ADULT  Goal: Maintains or returns to baseline bowel function  Description: INTERVENTIONS:  - Assess bowel function  - Encourage oral fluids to ensure adequate hydration  - Administer IV fluids if ordered to ensure adequate hydration  - Administer ordered medications as needed  - Encourage mobilization and activity  - Consider nutritional services referral to assist patient with adequate nutrition and appropriate food choices  Outcome: Progressing  Goal: Maintains adequate nutritional intake  Description: INTERVENTIONS:  - Monitor percentage of each meal consumed  - Identify factors contributing to decreased intake, treat as appropriate  - Assist with meals as needed  - Monitor I&O, weight, and lab values if indicated  - Obtain nutrition services referral as needed  Outcome: Progressing     Problem: GENITOURINARY - ADULT  Goal: Maintains or returns to baseline urinary function  Description: INTERVENTIONS:  - Assess urinary function  - Encourage oral fluids to ensure adequate hydration if ordered  - Administer IV fluids as ordered to ensure adequate hydration  - Administer ordered medications as needed  - Offer frequent toileting  - Follow urinary retention protocol if ordered  Outcome: Progressing     Problem: METABOLIC, FLUID AND ELECTROLYTES - ADULT  Goal: Electrolytes maintained within normal limits  Description: INTERVENTIONS:  - Monitor labs and assess patient for signs and symptoms of electrolyte imbalances  - Administer electrolyte replacement as ordered  - Monitor response to electrolyte replacements, including repeat lab results as appropriate  - Instruct patient on fluid and nutrition as appropriate  Outcome: Progressing  Goal: Fluid balance maintained  Description: INTERVENTIONS:  - Monitor labs   - Monitor I/O and WT  - Instruct patient on fluid and nutrition as appropriate  - Assess for signs & symptoms of volume excess or deficit  Outcome: Progressing     Problem: SKIN/TISSUE INTEGRITY - ADULT  Goal: Skin integrity remains intact  Description: INTERVENTIONS  - Identify patients at risk for skin breakdown  - Assess and monitor skin integrity  - Assess and monitor nutrition and hydration status  - Monitor labs (i e  albumin)  - Assess for incontinence   - Turn and reposition patient  - Assist with mobility/ambulation  - Relieve pressure over bony prominences  - Avoid friction and shearing  - Provide appropriate hygiene as needed including keeping skin clean and dry  - Evaluate need for skin moisturizer/barrier cream  - Collaborate with interdisciplinary team (i e  Nutrition, Rehabilitation, etc )   - Patient/family teaching  Outcome: Progressing  Goal: Incision(s), wounds(s) or drain site(s) healing without S/S of infection  Description: INTERVENTIONS  - Assess and document risk factors for skin impairment   - Assess and document dressing, incision, wound bed, drain sites and surrounding tissue  - Consider nutrition services referral as needed  - Oral mucous membranes remain intact  - Provide patient/ family education  Outcome: Progressing  Goal: Oral mucous membranes remain intact  Description: INTERVENTIONS  - Assess oral mucosa and hygiene practices  - Implement preventative oral hygiene regimen  - Implement oral medicated treatments as ordered  - Initiate Nutrition services referral as needed  Outcome: Progressing     Problem: HEMATOLOGIC - ADULT  Goal: Maintains hematologic stability  Description: INTERVENTIONS  - Assess for signs and symptoms of bleeding or hemorrhage  - Monitor labs  - Administer supportive blood products/factors as ordered and appropriate  Outcome: Progressing     Problem: MUSCULOSKELETAL - ADULT  Goal: Maintain or return mobility to safest level of function  Description: INTERVENTIONS:  - Assess patient's ability to carry out ADLs; assess patient's baseline for ADL function and identify physical deficits which impact ability to perform ADLs (bathing, care of mouth/teeth, toileting, grooming, dressing, etc )  - Assess/evaluate cause of self-care deficits   - Assess range of motion  - Assess patient's mobility  - Assess patient's need for assistive devices and provide as appropriate  - Encourage maximum independence but intervene and supervise when necessary  - Involve family in performance of ADLs  - Assess for home care needs following discharge   - Consider OT consult to assist with ADL evaluation and planning for discharge  - Provide patient education as appropriate  Outcome: Progressing  Goal: Maintain proper alignment of affected body part  Description: INTERVENTIONS:  - Support, maintain and protect limb and body alignment  - Provide patient/ family with appropriate education  Outcome: Progressing

## 2020-09-10 NOTE — OCCUPATIONAL THERAPY NOTE
Occupational Therapy Treatment Note:       09/10/20 0790   Restrictions/Precautions   LUE Weight Bearing Per Order NWB   Braces or Orthoses Sling   Other Precautions Cognitive; Chair Alarm; Fall Risk   Pain Assessment   Pain Assessment Tool 0-10   Pain Score 4  (head)   ADL   Where Assessed Chair   Grooming Assistance 5  Supervision/Setup   Grooming Comments seated in bedside chair   Toileting Assistance  2  Maximal Assistance   Toileting Comments clothing management   Functional Standing Tolerance   Time p+ balance for functional mobility and transfers   Bed Mobility   Supine to Sit 3  Moderate assistance   Additional items Assist x 1  (hob slightly elevated)   Transfers   Sit to Stand 3  Moderate assistance   Additional items Assist x 2   Stand to Sit 4  Minimal assistance   Additional items Assist x 2   Functional Mobility   Functional Mobility 3  Moderate assistance   Additional items   (walked to door c hha x 2)   Cognition   Overall Cognitive Status Impaired   Arousal/Participation Alert  (flat affect)   Attention Attends with cues to redirect   Orientation Level Disoriented to time   Memory Decreased short term memory;Decreased recall of recent events;Decreased recall of precautions   Following Commands Follows one step commands without difficulty   Comments   (pt was cooperative and motivated for oob, increased x needed)   Activity Tolerance   Activity Tolerance Patient tolerated treatment well   Assessment   Assessment pt participated in pm ot session and was seen focusing on  grooming, bed mobility, functional trnasfers orientation and functional mobility trials  pt required asst x 1-2 for transfers and mobility  pt is motivated and cooperative this session  poor carryover of gracie orientation after being educated on same 5 min prior    pt was sba for grooming tasks and max asst for clothing management and sling management / adjustment    will follow focusing on goals from ie  chair alarm engaged post session Plan   Treatment Interventions ADL retraining;Functional transfer training; Endurance training;Cognitive reorientation;Equipment evaluation/education; Activityengagement   Goal Expiration Date 09/22/20   OT Treatment Day 1   OT Frequency 3-5x/wk   Recommendation   OT Discharge Recommendation Post-Acute Rehabilitation Services   OT - OK to Discharge Yes   Vianney Argueta

## 2020-09-10 NOTE — SOCIAL WORK
Pt accepted by [facility]  CM submitted for insurance auth but will likely need updated therapy notes  Therapy team aware

## 2020-09-10 NOTE — CASE MANAGEMENT
[facility] [place] [facility] are out of network with pt's insurance  [facility]NC[facility] is interested but has no beds until next week  [facility] is interested and would like to work up the case  CM informed pt's wife, who is amenable to [facility]  They will review and if they accept, will submit for auth
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
## 2020-09-10 NOTE — PLAN OF CARE
Problem: PHYSICAL THERAPY ADULT  Goal: Performs mobility at highest level of function for planned discharge setting  See evaluation for individualized goals  Description: Treatment/Interventions: Functional transfer training, LE strengthening/ROM, Therapeutic exercise, Endurance training, Gait training, Bed mobility, Equipment eval/education          See flowsheet documentation for full assessment, interventions and recommendations  Outcome: Progressing  Note: Prognosis: Good  Problem List: Decreased strength, Decreased range of motion, Decreased endurance, Impaired balance, Decreased mobility, Decreased cognition, Decreased safety awareness, Pain, Orthopedic restrictions  Assessment: Pt presents with decreased mobility, strength, balance, and activity tolerance  Pt demonstrated ability to perform bed mobility at [address] [place] [address] and STS functional transfers at [place] [address]  Pt ambulated 15ft x2 with HHA at [place] [address]  Pt demonstrates improvement in functional mobility this session being able to perform transfers and ambulation w/ less assistance and being able to ambulate further distances  Pt continues to present w/ balance deficits w/ ambulation requiring Min Ax2 for safety  Pt very pleasant and motivated to work w/ therapy  Pt continues to benefit from skilled therapy to maximize functional independence  Recommendation at this time is rehab  Pt would benefit from continued ambulation, functional transfers, strength, balance, and activity tolerance  Barriers to Discharge: Inaccessible home environment     PT Discharge Recommendation: [address]     PT - OK to Discharge: Yes(when medically cleared to rehab)    See flowsheet documentation for full assessment
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
## 2020-09-10 NOTE — ASSESSMENT & PLAN NOTE
- pt reports anxiety/bipolar maintained on medications  - medications confirmed with [facility] pharmacy: protonix 40 qd, klonopin 1 mg TID, seroquel 300 q HS, ambien 10 q HS, gabapentin 800 mg TID   -will consult Psychiatry as patient states he has been "self weaning" his medications and may need adjustment in setting of recent death of his son

## 2020-09-10 NOTE — ASSESSMENT & PLAN NOTE
- -stable/resolved on most recent CT head  - neurosurgery consult appreciated   - Keppra for 7 days prophylaxis

## 2020-09-10 NOTE — PHYSICAL THERAPY NOTE
PHYSICAL THERAPY NOTE  Patient Name: Beka Joseph  OVPFW'[id number] Date: 9/10/2020     09/10/20 1523   PT Last Visit   PT Visit Date 09/10/20   Pain Assessment   Pain Assessment Tool 0-10   Pain Score 4   Pain Location/Orientation Orientation: Left; Location: Shoulder   Hospital Pain Intervention(s) Repositioned; Ambulation/increased activity; Emotional support   Restrictions/Precautions   Weight Bearing Precautions Per Order Yes   LUE Weight Bearing Per Order NWB   Braces or Orthoses Sling   Other Precautions Cognitive; Chair Alarm; Bed Alarm;WBS;Multiple lines; Fall Risk;Pain   General   Chart Reviewed Yes   Response to Previous Treatment Patient with no complaints from previous session  Family/Caregiver Present No   Cognition   Overall Cognitive Status Impaired   Arousal/Participation Alert; Responsive; Cooperative   Attention Attends with cues to redirect   Orientation Level Oriented to person;Oriented to time;Oriented to place  (grossly to place- reports hospital but unable to name)   Memory Decreased recall of precautions   Following Commands Follows one step commands without difficulty   Comments Pt is very pleasant and cooperative to work w/ therapy  Subjective   Subjective Pt lying supine and agreeable to work w/ therapy   Bed Mobility   Supine to Sit 3  Moderate assistance   Additional items Assist x 1;HOB elevated; Increased time required;Verbal cues   Sit to Supine Unable to assess   Additional Comments Pt lying supine upon PT arrival  Pt returned seated OOB at end of session w/ all needs within reach   Transfers   Sit to Stand 4  Minimal assistance   Additional items Assist x 2; Increased time required;Verbal cues   Stand to Sit 4  Minimal assistance   Additional items Assist x 2; Increased time required;Verbal cues   Additional Comments Transfers w/ HHA   Ambulation/Elevation   Gait pattern Excessively slow; Short stride; Inconsistent didier; Improper Weight shift   Gait Assistance 4  Minimal assist   Additional items Assist x 2;Verbal cues   Assistive Device Other (Comment)  (HHA)   Distance 15ft x2   Balance   Static Sitting Fair   Dynamic Sitting Fair -   Static Standing Poor +   Dynamic Standing Poor +   Ambulatory Poor +   Endurance Deficit   Endurance Deficit Yes   Endurance Deficit Description weakness, fatigue, pain   Activity Tolerance   Activity Tolerance Patient limited by fatigue;Patient limited by pain   Medical Staff Made Aware GALINDO   Nurse Made Aware yes   Assessment   Prognosis Good   Problem List Decreased strength;Decreased range of motion;Decreased endurance; Impaired balance;Decreased mobility; Decreased cognition;Decreased safety awareness;Pain;Orthopedic restrictions   Assessment Pt presents with decreased mobility, strength, balance, and activity tolerance  Pt demonstrated ability to perform bed mobility at [address] [place] [address] and STS functional transfers at [place] [address]  Pt ambulated 15ft x2 with HHA at [place] [address]  Pt demonstrates improvement in functional mobility this session being able to perform transfers and ambulation w/ less assistance and being able to ambulate further distances  Pt continues to present w/ balance deficits w/ ambulation requiring Min Ax2 for safety  Pt very pleasant and motivated to work w/ therapy  Pt continues to benefit from skilled therapy to maximize functional independence  Recommendation at this time is rehab  Pt would benefit from continued ambulation, functional transfers, strength, balance, and activity tolerance   Goals   Patient Goals to have less pain   STG Expiration Date 09/22/20   PT Treatment Day 1   Plan   Treatment/Interventions Functional transfer training;LE strengthening/ROM; Endurance training;Patient/family training;Equipment eval/education; Bed mobility;Gait training;Spoke to nursing   Progress Progressing toward goals   PT Frequency   (3-5x/week)   Recommendation   PT Discharge Recommendation Post-Acute Rehabilitation Services   Equipment Recommended   (TBD)   PT - OK to Discharge Yes  (when medically cleared to rehab)     Nikki Vance, PT, DPT
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
## 2020-09-11 LAB — SARS-COV-2 RNA RESP QL NAA+PROBE: NEGATIVE

## 2020-09-11 PROCEDURE — 99232 SBSQ HOSP IP/OBS MODERATE 35: CPT | Performed by: SURGERY

## 2020-09-11 PROCEDURE — U0003 INFECTIOUS AGENT DETECTION BY NUCLEIC ACID (DNA OR RNA); SEVERE ACUTE RESPIRATORY SYNDROME CORONAVIRUS 2 (SARS-COV-2) (CORONAVIRUS DISEASE [COVID-19]), AMPLIFIED PROBE TECHNIQUE, MAKING USE OF HIGH THROUGHPUT TECHNOLOGIES AS DESCRIBED BY CMS-2020-01-R: HCPCS | Performed by: PHYSICIAN ASSISTANT

## 2020-09-11 RX ADMIN — OXYCODONE HYDROCHLORIDE 5 MG: 5 TABLET ORAL at 05:41

## 2020-09-11 RX ADMIN — GABAPENTIN 300 MG: 300 CAPSULE ORAL at 17:08

## 2020-09-11 RX ADMIN — Medication 6 MG: at 22:12

## 2020-09-11 RX ADMIN — ACETAMINOPHEN 975 MG: 325 TABLET, FILM COATED ORAL at 22:12

## 2020-09-11 RX ADMIN — GABAPENTIN 300 MG: 300 CAPSULE ORAL at 09:11

## 2020-09-11 RX ADMIN — HEPARIN SODIUM 7500 UNITS: 5000 INJECTION INTRAVENOUS; SUBCUTANEOUS at 13:09

## 2020-09-11 RX ADMIN — QUETIAPINE FUMARATE 300 MG: 300 TABLET, EXTENDED RELEASE ORAL at 22:12

## 2020-09-11 RX ADMIN — OXYCODONE HYDROCHLORIDE 5 MG: 5 TABLET ORAL at 19:46

## 2020-09-11 RX ADMIN — GABAPENTIN 300 MG: 300 CAPSULE ORAL at 22:12

## 2020-09-11 RX ADMIN — METHOCARBAMOL TABLETS 750 MG: 750 TABLET, COATED ORAL at 00:15

## 2020-09-11 RX ADMIN — PANTOPRAZOLE SODIUM 40 MG: 40 TABLET, DELAYED RELEASE ORAL at 05:35

## 2020-09-11 RX ADMIN — DOCUSATE SODIUM AND SENNOSIDES 1 TABLET: 8.6; 5 TABLET ORAL at 22:12

## 2020-09-11 RX ADMIN — CLONAZEPAM 1 MG: 1 TABLET ORAL at 17:08

## 2020-09-11 RX ADMIN — LEVETIRACETAM 500 MG: 500 TABLET, FILM COATED ORAL at 09:11

## 2020-09-11 RX ADMIN — HEPARIN SODIUM 7500 UNITS: 5000 INJECTION INTRAVENOUS; SUBCUTANEOUS at 05:34

## 2020-09-11 RX ADMIN — METHOCARBAMOL TABLETS 750 MG: 750 TABLET, COATED ORAL at 17:08

## 2020-09-11 RX ADMIN — ACETAMINOPHEN 975 MG: 325 TABLET, FILM COATED ORAL at 05:35

## 2020-09-11 RX ADMIN — CLONAZEPAM 1 MG: 1 TABLET ORAL at 22:12

## 2020-09-11 RX ADMIN — CLONAZEPAM 1 MG: 1 TABLET ORAL at 09:11

## 2020-09-11 RX ADMIN — METHOCARBAMOL TABLETS 750 MG: 750 TABLET, COATED ORAL at 05:35

## 2020-09-11 RX ADMIN — LEVETIRACETAM 500 MG: 500 TABLET, FILM COATED ORAL at 22:12

## 2020-09-11 RX ADMIN — HEPARIN SODIUM 7500 UNITS: 5000 INJECTION INTRAVENOUS; SUBCUTANEOUS at 22:13

## 2020-09-11 RX ADMIN — HYDROMORPHONE HYDROCHLORIDE 0.2 MG: 1 INJECTION, SOLUTION INTRAMUSCULAR; INTRAVENOUS; SUBCUTANEOUS at 00:15

## 2020-09-11 RX ADMIN — LIDOCAINE 5% 2 PATCH: 700 PATCH TOPICAL at 09:12

## 2020-09-11 NOTE — ASSESSMENT & PLAN NOTE
- pt reports anxiety/bipolar maintained on medications  - psychiatry has evaluated the patient and recommend continuing current regimen and signed off, recommending outpatient f/u     - will continue klonopin 1 mg TID, Gabapentin 300 mg TID, seroquel 300 mg qhs
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
## 2020-09-11 NOTE — CASE MANAGEMENT
Still waiting on auth from [address] [facility] wanted BRUCE to get a tentative transport tomorrow in the event that Sapna Ramos is obtained later today  CM has a 1030 via [address] [place] [address] tomorrow morning

## 2020-09-11 NOTE — SOCIAL WORK
CM options pt at the request of Yolanda Weiss, as they feel the pt will need long term care after their rehab

## 2020-09-11 NOTE — ASSESSMENT & PLAN NOTE
- stable/resolved on most recent CT head  - neurosurgery consult appreciated   - Keppra for 7 days prophylaxis (9/11 is day #7/7)  - SQH is ordered and had been cleared by neurosurgery  - outpatient follow up with neurosurgery in 2 weeks
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
## 2020-09-11 NOTE — SOCIAL WORK
Auth obtained    Pt will d/c to [facility] tomorrow @7121 via [facility]  Pt's wife notified and in agreement

## 2020-09-11 NOTE — ASSESSMENT & PLAN NOTE
- ortho following  - non-op management  - non-weight bear LUE in sling  - f/u with orthopedics as outpatient

## 2020-09-11 NOTE — PLAN OF CARE
Problem: Prexisting or High Potential for Compromised Skin Integrity  Goal: Skin integrity is maintained or improved  Description: INTERVENTIONS:  - Identify patients at risk for skin breakdown  - Assess and monitor skin integrity  - Assess and monitor nutrition and hydration status  - Monitor labs   - Assess for incontinence   - Turn and reposition patient  - Assist with mobility/ambulation  - Relieve pressure over bony prominences  - Avoid friction and shearing  - Provide appropriate hygiene as needed including keeping skin clean and dry  - Evaluate need for skin moisturizer/barrier cream  - Collaborate with interdisciplinary team   - Patient/family teaching  - Consider wound care consult   Outcome: Progressing     Problem: Potential for Falls  Goal: Patient will remain free of falls  Description: INTERVENTIONS:  - Assess patient frequently for physical needs  -  Identify cognitive and physical deficits and behaviors that affect risk of falls    -  [place] fall precautions as indicated by assessment   - Educate patient/family on patient safety including physical limitations  - Instruct patient to call for assistance with activity based on assessment  - Modify environment to reduce risk of injury  - Consider OT/PT consult to assist with strengthening/mobility  Outcome: Progressing     Problem: PAIN - ADULT  Goal: Verbalizes/displays adequate comfort level or baseline comfort level  Description: Interventions:  - Encourage patient to monitor pain and request assistance  - Assess pain using appropriate pain scale  - Administer analgesics based on type and severity of pain and evaluate response  - Implement non-pharmacological measures as appropriate and evaluate response  - Consider cultural and social influences on pain and pain management  - Notify physician/advanced practitioner if interventions unsuccessful or patient reports new pain  Outcome: Progressing     Problem: INFECTION - ADULT  Goal: Absence or prevention of progression during hospitalization  Description: INTERVENTIONS:  - Assess and monitor for signs and symptoms of infection  - Monitor lab/diagnostic results  - Monitor all insertion sites, i e  indwelling lines, tubes, and drains  - Monitor endotracheal if appropriate and nasal secretions for changes in amount and color  - [place] appropriate cooling/warming therapies per order  - Administer medications as ordered  - Instruct and encourage patient and family to use good hand hygiene technique  - Identify and instruct in appropriate isolation precautions for identified infection/condition  Outcome: Progressing     Problem: SAFETY ADULT  Goal: Patient will remain free of falls  Description: INTERVENTIONS:  - Assess patient frequently for physical needs  -  Identify cognitive and physical deficits and behaviors that affect risk of falls    -  [place] fall precautions as indicated by assessment   - Educate patient/family on patient safety including physical limitations  - Instruct patient to call for assistance with activity based on assessment  - Modify environment to reduce risk of injury  - Consider OT/PT consult to assist with strengthening/mobility  Outcome: Progressing  Goal: Maintain or return to baseline ADL function  Description: INTERVENTIONS:  -  Assess patient's ability to carry out ADLs; assess patient's baseline for ADL function and identify physical deficits which impact ability to perform ADLs (bathing, care of mouth/teeth, toileting, grooming, dressing, etc )  - Assess/evaluate cause of self-care deficits   - Assess range of motion  - Assess patient's mobility; develop plan if impaired  - Assess patient's need for assistive devices and provide as appropriate  - Encourage maximum independence but intervene and supervise when necessary  - Involve family in performance of ADLs  - Assess for home care needs following discharge   - Consider OT consult to assist with ADL evaluation and planning for discharge  - Provide patient education as appropriate  Outcome: Progressing  Goal: Maintain or return mobility status to optimal level  Description: INTERVENTIONS:  - Assess patient's baseline mobility status (ambulation, transfers, stairs, etc )    - Identify cognitive and physical deficits and behaviors that affect mobility  - Identify mobility aids required to assist with transfers and/or ambulation (gait belt, sit-to-stand, lift, walker, cane, etc )  - [place] fall precautions as indicated by assessment  - Record patient progress and toleration of activity level on Mobility SBAR; progress patient to next Phase/Stage  - Instruct patient to call for assistance with activity based on assessment  - Consider rehabilitation consult to assist with strengthening/weightbearing, etc   Outcome: Progressing     Problem: DISCHARGE PLANNING  Goal: Discharge to home or other facility with appropriate resources  Description: INTERVENTIONS:  - Identify barriers to discharge w/patient and caregiver  - Arrange for needed discharge resources and transportation as appropriate  - Identify discharge learning needs (meds, wound care, etc )  - Arrange for interpretive services to assist at discharge as needed  - Refer to Case Management Department for coordinating discharge planning if the patient needs post-hospital services based on physician/advanced practitioner order or complex needs related to functional status, cognitive ability, or social support system  Outcome: Progressing     Problem: Knowledge Deficit  Goal: Patient/family/caregiver demonstrates understanding of disease process, treatment plan, medications, and discharge instructions  Description: Complete learning assessment and assess knowledge base    Interventions:  - Provide teaching at level of understanding  - Provide teaching via preferred learning methods  Outcome: Progressing     Problem: Nutrition/Hydration-ADULT  Goal: Nutrient/Hydration intake appropriate for improving, restoring or maintaining nutritional needs  Description: Monitor and assess patient's nutrition/hydration status for malnutrition  Collaborate with interdisciplinary team and initiate plan and interventions as ordered  Monitor patient's weight and dietary intake as ordered or per policy  Utilize nutrition screening tool and intervene as necessary  Determine patient's food preferences and provide high-protein, high-caloric foods as appropriate       INTERVENTIONS:  - Monitor oral intake, urinary output, labs, and treatment plans  - Assess nutrition and hydration status and recommend course of action  - Evaluate amount of meals eaten  - Assist patient with eating if necessary   - Allow adequate time for meals  - Recommend/ encourage appropriate diets, oral nutritional supplements, and vitamin/mineral supplements  - Order, calculate, and assess calorie counts as needed  - Recommend, monitor, and adjust tube feedings and TPN/PPN based on assessed needs  - Assess need for intravenous fluids  - Provide specific nutrition/hydration education as appropriate  - Include patient/family/caregiver in decisions related to nutrition  Outcome: Progressing     Problem: NEUROSENSORY - ADULT  Goal: Achieves stable or improved neurological status  Description: INTERVENTIONS  - Monitor and report changes in neurological status  - Monitor vital signs such as temperature, blood pressure, glucose, and any other labs ordered   - Initiate measures to prevent increased intracranial pressure  - Monitor for seizure activity and implement precautions if appropriate      Outcome: Progressing  Goal: Remains free of injury related to seizures activity  Description: INTERVENTIONS  - Maintain airway, patient safety  and administer oxygen as ordered  - Monitor patient for seizure activity, document and report duration and description of seizure to physician/advanced practitioner  - If seizure occurs,  ensure patient safety during seizure  - Reorient patient post seizure  - Seizure pads on all 4 side rails  - Instruct patient/family to notify RN of any seizure activity including if an aura is experienced  - Instruct patient/family to call for assistance with activity based on nursing assessment  - Administer anti-seizure medications if ordered    Outcome: Progressing  Goal: Achieves maximal functionality and self care  Description: INTERVENTIONS  - Monitor swallowing and airway patency with patient fatigue and changes in neurological status  - Encourage and assist patient to increase activity and self care     - Encourage visually impaired, hearing impaired and aphasic patients to use assistive/communication devices  Outcome: Progressing     Problem: CARDIOVASCULAR - ADULT  Goal: Maintains optimal cardiac output and hemodynamic stability  Description: INTERVENTIONS:  - Monitor I/O, vital signs and rhythm  - Monitor for S/S and trends of decreased cardiac output  - Administer and titrate ordered vasoactive medications to optimize hemodynamic stability  - Assess quality of pulses, skin color and temperature  - Assess for signs of decreased coronary artery perfusion  - Instruct patient to report change in severity of symptoms  Outcome: Progressing  Goal: Absence of cardiac dysrhythmias or at baseline rhythm  Description: INTERVENTIONS:  - Continuous cardiac monitoring, vital signs, obtain 12 lead EKG if ordered  - Administer antiarrhythmic and heart rate control medications as ordered  - Monitor electrolytes and administer replacement therapy as ordered  Outcome: Progressing     Problem: RESPIRATORY - ADULT  Goal: Achieves optimal ventilation and oxygenation  Description: INTERVENTIONS:  - Assess for changes in respiratory status  - Assess for changes in mentation and behavior  - Position to facilitate oxygenation and minimize respiratory effort  - Oxygen administered by appropriate delivery if ordered  - Initiate smoking cessation education as indicated  - Encourage broncho-pulmonary hygiene including cough, deep breathe, Incentive Spirometry  - Assess the need for suctioning and aspirate as needed  - Assess and instruct to report SOB or any respiratory difficulty  - Respiratory Therapy support as indicated  Outcome: Progressing     Problem: GASTROINTESTINAL - ADULT  Goal: Maintains or returns to baseline bowel function  Description: INTERVENTIONS:  - Assess bowel function  - Encourage oral fluids to ensure adequate hydration  - Administer IV fluids if ordered to ensure adequate hydration  - Administer ordered medications as needed  - Encourage mobilization and activity  - Consider nutritional services referral to assist patient with adequate nutrition and appropriate food choices  Outcome: Progressing  Goal: Maintains adequate nutritional intake  Description: INTERVENTIONS:  - Monitor percentage of each meal consumed  - Identify factors contributing to decreased intake, treat as appropriate  - Assist with meals as needed  - Monitor I&O, weight, and lab values if indicated  - Obtain nutrition services referral as needed  Outcome: Progressing     Problem: GENITOURINARY - ADULT  Goal: Maintains or returns to baseline urinary function  Description: INTERVENTIONS:  - Assess urinary function  - Encourage oral fluids to ensure adequate hydration if ordered  - Administer IV fluids as ordered to ensure adequate hydration  - Administer ordered medications as needed  - Offer frequent toileting  - Follow urinary retention protocol if ordered  Outcome: Progressing     Problem: METABOLIC, FLUID AND ELECTROLYTES - ADULT  Goal: Electrolytes maintained within normal limits  Description: INTERVENTIONS:  - Monitor labs and assess patient for signs and symptoms of electrolyte imbalances  - Administer electrolyte replacement as ordered  - Monitor response to electrolyte replacements, including repeat lab results as appropriate  - Instruct patient on fluid and nutrition as appropriate  Outcome: Progressing  Goal: Fluid balance maintained  Description: INTERVENTIONS:  - Monitor labs   - Monitor I/O and WT  - Instruct patient on fluid and nutrition as appropriate  - Assess for signs & symptoms of volume excess or deficit  Outcome: Progressing     Problem: SKIN/TISSUE INTEGRITY - ADULT  Goal: Skin integrity remains intact  Description: INTERVENTIONS  - Identify patients at risk for skin breakdown  - Assess and monitor skin integrity  - Assess and monitor nutrition and hydration status  - Monitor labs (i e  albumin)  - Assess for incontinence   - Turn and reposition patient  - Assist with mobility/ambulation  - Relieve pressure over bony prominences  - Avoid friction and shearing  - Provide appropriate hygiene as needed including keeping skin clean and dry  - Evaluate need for skin moisturizer/barrier cream  - Collaborate with interdisciplinary team (i e  Nutrition, Rehabilitation, etc )   - Patient/family teaching  Outcome: Progressing  Goal: Incision(s), wounds(s) or drain site(s) healing without S/S of infection  Description: INTERVENTIONS  - Assess and document risk factors for skin impairment   - Assess and document dressing, incision, wound bed, drain sites and surrounding tissue  - Consider nutrition services referral as needed  - Oral mucous membranes remain intact  - Provide patient/ family education  Outcome: Progressing  Goal: Oral mucous membranes remain intact  Description: INTERVENTIONS  - Assess oral mucosa and hygiene practices  - Implement preventative oral hygiene regimen  - Implement oral medicated treatments as ordered  - Initiate Nutrition services referral as needed  Outcome: Progressing     Problem: HEMATOLOGIC - ADULT  Goal: Maintains hematologic stability  Description: INTERVENTIONS  - Assess for signs and symptoms of bleeding or hemorrhage  - Monitor labs  - Administer supportive blood products/factors as ordered and appropriate  Outcome: Progressing     Problem: MUSCULOSKELETAL - ADULT  Goal: Maintain or return mobility to safest level of function  Description: INTERVENTIONS:  - Assess patient's ability to carry out ADLs; assess patient's baseline for ADL function and identify physical deficits which impact ability to perform ADLs (bathing, care of mouth/teeth, toileting, grooming, dressing, etc )  - Assess/evaluate cause of self-care deficits   - Assess range of motion  - Assess patient's mobility  - Assess patient's need for assistive devices and provide as appropriate  - Encourage maximum independence but intervene and supervise when necessary  - Involve family in performance of ADLs  - Assess for home care needs following discharge   - Consider OT consult to assist with ADL evaluation and planning for discharge  - Provide patient education as appropriate  Outcome: Progressing  Goal: Maintain proper alignment of affected body part  Description: INTERVENTIONS:  - Support, maintain and protect limb and body alignment  - Provide patient/ family with appropriate education  Outcome: Progressing

## 2020-09-11 NOTE — ASSESSMENT & PLAN NOTE
- ortho following, non-op  - removable wrist brace applied  - JOSE MARIA SUGGS   - f/u with orthopedics as outpatient

## 2020-09-12 VITALS
HEIGHT: 67 IN | RESPIRATION RATE: 16 BRPM | HEART RATE: 85 BPM | TEMPERATURE: 97.7 F | SYSTOLIC BLOOD PRESSURE: 150 MMHG | DIASTOLIC BLOOD PRESSURE: 96 MMHG | WEIGHT: 274.25 LBS | OXYGEN SATURATION: 90 % | BODY MASS INDEX: 43.04 KG/M2

## 2020-09-12 PROCEDURE — 99238 HOSP IP/OBS DSCHRG MGMT 30/<: CPT | Performed by: SURGERY

## 2020-09-12 RX ORDER — AMOXICILLIN 250 MG
1 CAPSULE ORAL
Refills: 0
Start: 2020-09-12 | End: 2020-11-18 | Stop reason: HOSPADM

## 2020-09-12 RX ORDER — GABAPENTIN 300 MG/1
300 CAPSULE ORAL 3 TIMES DAILY
Refills: 0
Start: 2020-09-12 | End: 2020-10-19

## 2020-09-12 RX ORDER — METHOCARBAMOL 750 MG/1
750 TABLET, FILM COATED ORAL EVERY 6 HOURS SCHEDULED
Refills: 0
Start: 2020-09-12 | End: 2020-11-18 | Stop reason: HOSPADM

## 2020-09-12 RX ORDER — OXYCODONE HYDROCHLORIDE 5 MG/1
TABLET ORAL
Qty: 30 TABLET | Refills: 0 | Status: SHIPPED | OUTPATIENT
Start: 2020-09-12 | End: 2020-11-18 | Stop reason: HOSPADM

## 2020-09-12 RX ORDER — CLONAZEPAM 1 MG/1
1 TABLET ORAL 3 TIMES DAILY
Qty: 30 TABLET | Refills: 0 | Status: SHIPPED | OUTPATIENT
Start: 2020-09-12 | End: 2020-11-18 | Stop reason: HOSPADM

## 2020-09-12 RX ORDER — QUETIAPINE 300 MG/1
300 TABLET, FILM COATED, EXTENDED RELEASE ORAL
Qty: 10 TABLET | Refills: 0 | Status: SHIPPED | OUTPATIENT
Start: 2020-09-12 | End: 2020-11-18 | Stop reason: HOSPADM

## 2020-09-12 RX ORDER — LIDOCAINE 50 MG/G
2 PATCH TOPICAL DAILY
Refills: 0
Start: 2020-09-13 | End: 2020-10-19

## 2020-09-12 RX ORDER — PANTOPRAZOLE SODIUM 40 MG/1
40 TABLET, DELAYED RELEASE ORAL
Refills: 0
Start: 2020-09-13 | End: 2021-05-20

## 2020-09-12 RX ORDER — POLYETHYLENE GLYCOL 3350 17 G/17G
17 POWDER, FOR SOLUTION ORAL DAILY PRN
Qty: 14 EACH | Refills: 0 | Status: SHIPPED | OUTPATIENT
Start: 2020-09-12 | End: 2020-11-18 | Stop reason: HOSPADM

## 2020-09-12 RX ORDER — ACETAMINOPHEN 325 MG/1
975 TABLET ORAL EVERY 8 HOURS SCHEDULED
Qty: 30 TABLET | Refills: 0 | Status: SHIPPED | OUTPATIENT
Start: 2020-09-12 | End: 2020-11-18 | Stop reason: HOSPADM

## 2020-09-12 RX ORDER — LANOLIN ALCOHOL/MO/W.PET/CERES
6 CREAM (GRAM) TOPICAL
Qty: 10 TABLET | Refills: 0 | Status: SHIPPED | OUTPATIENT
Start: 2020-09-12 | End: 2020-11-18 | Stop reason: HOSPADM

## 2020-09-12 RX ADMIN — LIDOCAINE 5% 2 PATCH: 700 PATCH TOPICAL at 08:28

## 2020-09-12 RX ADMIN — METHOCARBAMOL TABLETS 750 MG: 750 TABLET, COATED ORAL at 05:26

## 2020-09-12 RX ADMIN — CLONAZEPAM 1 MG: 1 TABLET ORAL at 08:27

## 2020-09-12 RX ADMIN — PANTOPRAZOLE SODIUM 40 MG: 40 TABLET, DELAYED RELEASE ORAL at 05:26

## 2020-09-12 RX ADMIN — ACETAMINOPHEN 975 MG: 325 TABLET, FILM COATED ORAL at 05:26

## 2020-09-12 RX ADMIN — GABAPENTIN 300 MG: 300 CAPSULE ORAL at 08:27

## 2020-09-12 RX ADMIN — LEVETIRACETAM 500 MG: 500 TABLET, FILM COATED ORAL at 08:27

## 2020-09-12 RX ADMIN — HEPARIN SODIUM 7500 UNITS: 5000 INJECTION INTRAVENOUS; SUBCUTANEOUS at 05:26

## 2020-09-12 NOTE — ASSESSMENT & PLAN NOTE
- rib fracture protocol  - PT/OT /PMR  - Multimodal pain regimen  - IS/pulm krishan  - APS following, appreciate recommendations  - f/u with trauma in 2 weeks
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
## 2020-09-12 NOTE — PLAN OF CARE
Problem: Prexisting or High Potential for Compromised Skin Integrity  Goal: Skin integrity is maintained or improved  Description: INTERVENTIONS:  - Identify patients at risk for skin breakdown  - Assess and monitor skin integrity  - Assess and monitor nutrition and hydration status  - Monitor labs   - Assess for incontinence   - Turn and reposition patient  - Assist with mobility/ambulation  - Relieve pressure over bony prominences  - Avoid friction and shearing  - Provide appropriate hygiene as needed including keeping skin clean and dry  - Evaluate need for skin moisturizer/barrier cream  - Collaborate with interdisciplinary team   - Patient/family teaching  - Consider wound care consult   Outcome: Progressing     Problem: Potential for Falls  Goal: Patient will remain free of falls  Description: INTERVENTIONS:  - Assess patient frequently for physical needs  -  Identify cognitive and physical deficits and behaviors that affect risk of falls    -  [place] fall precautions as indicated by assessment   - Educate patient/family on patient safety including physical limitations  - Instruct patient to call for assistance with activity based on assessment  - Modify environment to reduce risk of injury  - Consider OT/PT consult to assist with strengthening/mobility  Outcome: Progressing     Problem: PAIN - ADULT  Goal: Verbalizes/displays adequate comfort level or baseline comfort level  Description: Interventions:  - Encourage patient to monitor pain and request assistance  - Assess pain using appropriate pain scale  - Administer analgesics based on type and severity of pain and evaluate response  - Implement non-pharmacological measures as appropriate and evaluate response  - Consider cultural and social influences on pain and pain management  - Notify physician/advanced practitioner if interventions unsuccessful or patient reports new pain  Outcome: Progressing     Problem: INFECTION - ADULT  Goal: Absence or prevention of progression during hospitalization  Description: INTERVENTIONS:  - Assess and monitor for signs and symptoms of infection  - Monitor lab/diagnostic results  - Monitor all insertion sites, i e  indwelling lines, tubes, and drains  - Monitor endotracheal if appropriate and nasal secretions for changes in amount and color  - [place] appropriate cooling/warming therapies per order  - Administer medications as ordered  - Instruct and encourage patient and family to use good hand hygiene technique  - Identify and instruct in appropriate isolation precautions for identified infection/condition  Outcome: Progressing     Problem: SAFETY ADULT  Goal: Patient will remain free of falls  Description: INTERVENTIONS:  - Assess patient frequently for physical needs  -  Identify cognitive and physical deficits and behaviors that affect risk of falls    -  [place] fall precautions as indicated by assessment   - Educate patient/family on patient safety including physical limitations  - Instruct patient to call for assistance with activity based on assessment  - Modify environment to reduce risk of injury  - Consider OT/PT consult to assist with strengthening/mobility  Outcome: Progressing  Goal: Maintain or return to baseline ADL function  Description: INTERVENTIONS:  -  Assess patient's ability to carry out ADLs; assess patient's baseline for ADL function and identify physical deficits which impact ability to perform ADLs (bathing, care of mouth/teeth, toileting, grooming, dressing, etc )  - Assess/evaluate cause of self-care deficits   - Assess range of motion  - Assess patient's mobility; develop plan if impaired  - Assess patient's need for assistive devices and provide as appropriate  - Encourage maximum independence but intervene and supervise when necessary  - Involve family in performance of ADLs  - Assess for home care needs following discharge   - Consider OT consult to assist with ADL evaluation and planning for discharge  - Provide patient education as appropriate  Outcome: Progressing  Goal: Maintain or return mobility status to optimal level  Description: INTERVENTIONS:  - Assess patient's baseline mobility status (ambulation, transfers, stairs, etc )    - Identify cognitive and physical deficits and behaviors that affect mobility  - Identify mobility aids required to assist with transfers and/or ambulation (gait belt, sit-to-stand, lift, walker, cane, etc )  - [place] fall precautions as indicated by assessment  - Record patient progress and toleration of activity level on Mobility SBAR; progress patient to next Phase/Stage  - Instruct patient to call for assistance with activity based on assessment  - Consider rehabilitation consult to assist with strengthening/weightbearing, etc   Outcome: Progressing     Problem: DISCHARGE PLANNING  Goal: Discharge to home or other facility with appropriate resources  Description: INTERVENTIONS:  - Identify barriers to discharge w/patient and caregiver  - Arrange for needed discharge resources and transportation as appropriate  - Identify discharge learning needs (meds, wound care, etc )  - Arrange for interpretive services to assist at discharge as needed  - Refer to Case Management Department for coordinating discharge planning if the patient needs post-hospital services based on physician/advanced practitioner order or complex needs related to functional status, cognitive ability, or social support system  Outcome: Progressing     Problem: Knowledge Deficit  Goal: Patient/family/caregiver demonstrates understanding of disease process, treatment plan, medications, and discharge instructions  Description: Complete learning assessment and assess knowledge base    Interventions:  - Provide teaching at level of understanding  - Provide teaching via preferred learning methods  Outcome: Progressing     Problem: Nutrition/Hydration-ADULT  Goal: Nutrient/Hydration intake appropriate for improving, restoring or maintaining nutritional needs  Description: Monitor and assess patient's nutrition/hydration status for malnutrition  Collaborate with interdisciplinary team and initiate plan and interventions as ordered  Monitor patient's weight and dietary intake as ordered or per policy  Utilize nutrition screening tool and intervene as necessary  Determine patient's food preferences and provide high-protein, high-caloric foods as appropriate       INTERVENTIONS:  - Monitor oral intake, urinary output, labs, and treatment plans  - Assess nutrition and hydration status and recommend course of action  - Evaluate amount of meals eaten  - Assist patient with eating if necessary   - Allow adequate time for meals  - Recommend/ encourage appropriate diets, oral nutritional supplements, and vitamin/mineral supplements  - Order, calculate, and assess calorie counts as needed  - Recommend, monitor, and adjust tube feedings and TPN/PPN based on assessed needs  - Assess need for intravenous fluids  - Provide specific nutrition/hydration education as appropriate  - Include patient/family/caregiver in decisions related to nutrition  Outcome: Progressing     Problem: NEUROSENSORY - ADULT  Goal: Achieves stable or improved neurological status  Description: INTERVENTIONS  - Monitor and report changes in neurological status  - Monitor vital signs such as temperature, blood pressure, glucose, and any other labs ordered   - Initiate measures to prevent increased intracranial pressure  - Monitor for seizure activity and implement precautions if appropriate      Outcome: Progressing  Goal: Remains free of injury related to seizures activity  Description: INTERVENTIONS  - Maintain airway, patient safety  and administer oxygen as ordered  - Monitor patient for seizure activity, document and report duration and description of seizure to physician/advanced practitioner  - If seizure occurs,  ensure patient safety during seizure  - Reorient patient post seizure  - Seizure pads on all 4 side rails  - Instruct patient/family to notify RN of any seizure activity including if an aura is experienced  - Instruct patient/family to call for assistance with activity based on nursing assessment  - Administer anti-seizure medications if ordered    Outcome: Progressing  Goal: Achieves maximal functionality and self care  Description: INTERVENTIONS  - Monitor swallowing and airway patency with patient fatigue and changes in neurological status  - Encourage and assist patient to increase activity and self care     - Encourage visually impaired, hearing impaired and aphasic patients to use assistive/communication devices  Outcome: Progressing     Problem: CARDIOVASCULAR - ADULT  Goal: Maintains optimal cardiac output and hemodynamic stability  Description: INTERVENTIONS:  - Monitor I/O, vital signs and rhythm  - Monitor for S/S and trends of decreased cardiac output  - Administer and titrate ordered vasoactive medications to optimize hemodynamic stability  - Assess quality of pulses, skin color and temperature  - Assess for signs of decreased coronary artery perfusion  - Instruct patient to report change in severity of symptoms  Outcome: Progressing  Goal: Absence of cardiac dysrhythmias or at baseline rhythm  Description: INTERVENTIONS:  - Continuous cardiac monitoring, vital signs, obtain 12 lead EKG if ordered  - Administer antiarrhythmic and heart rate control medications as ordered  - Monitor electrolytes and administer replacement therapy as ordered  Outcome: Progressing     Problem: RESPIRATORY - ADULT  Goal: Achieves optimal ventilation and oxygenation  Description: INTERVENTIONS:  - Assess for changes in respiratory status  - Assess for changes in mentation and behavior  - Position to facilitate oxygenation and minimize respiratory effort  - Oxygen administered by appropriate delivery if ordered  - Initiate smoking cessation education as indicated  - Encourage broncho-pulmonary hygiene including cough, deep breathe, Incentive Spirometry  - Assess the need for suctioning and aspirate as needed  - Assess and instruct to report SOB or any respiratory difficulty  - Respiratory Therapy support as indicated  Outcome: Progressing     Problem: GASTROINTESTINAL - ADULT  Goal: Maintains or returns to baseline bowel function  Description: INTERVENTIONS:  - Assess bowel function  - Encourage oral fluids to ensure adequate hydration  - Administer IV fluids if ordered to ensure adequate hydration  - Administer ordered medications as needed  - Encourage mobilization and activity  - Consider nutritional services referral to assist patient with adequate nutrition and appropriate food choices  Outcome: Progressing  Goal: Maintains adequate nutritional intake  Description: INTERVENTIONS:  - Monitor percentage of each meal consumed  - Identify factors contributing to decreased intake, treat as appropriate  - Assist with meals as needed  - Monitor I&O, weight, and lab values if indicated  - Obtain nutrition services referral as needed  Outcome: Progressing     Problem: GENITOURINARY - ADULT  Goal: Maintains or returns to baseline urinary function  Description: INTERVENTIONS:  - Assess urinary function  - Encourage oral fluids to ensure adequate hydration if ordered  - Administer IV fluids as ordered to ensure adequate hydration  - Administer ordered medications as needed  - Offer frequent toileting  - Follow urinary retention protocol if ordered  Outcome: Progressing     Problem: METABOLIC, FLUID AND ELECTROLYTES - ADULT  Goal: Electrolytes maintained within normal limits  Description: INTERVENTIONS:  - Monitor labs and assess patient for signs and symptoms of electrolyte imbalances  - Administer electrolyte replacement as ordered  - Monitor response to electrolyte replacements, including repeat lab results as appropriate  - Instruct patient on fluid and nutrition as appropriate  Outcome: Progressing  Goal: Fluid balance maintained  Description: INTERVENTIONS:  - Monitor labs   - Monitor I/O and WT  - Instruct patient on fluid and nutrition as appropriate  - Assess for signs & symptoms of volume excess or deficit  Outcome: Progressing     Problem: SKIN/TISSUE INTEGRITY - ADULT  Goal: Skin integrity remains intact  Description: INTERVENTIONS  - Identify patients at risk for skin breakdown  - Assess and monitor skin integrity  - Assess and monitor nutrition and hydration status  - Monitor labs (i e  albumin)  - Assess for incontinence   - Turn and reposition patient  - Assist with mobility/ambulation  - Relieve pressure over bony prominences  - Avoid friction and shearing  - Provide appropriate hygiene as needed including keeping skin clean and dry  - Evaluate need for skin moisturizer/barrier cream  - Collaborate with interdisciplinary team (i e  Nutrition, Rehabilitation, etc )   - Patient/family teaching  Outcome: Progressing  Goal: Incision(s), wounds(s) or drain site(s) healing without S/S of infection  Description: INTERVENTIONS  - Assess and document risk factors for skin impairment   - Assess and document dressing, incision, wound bed, drain sites and surrounding tissue  - Consider nutrition services referral as needed  - Oral mucous membranes remain intact  - Provide patient/ family education  Outcome: Progressing  Goal: Oral mucous membranes remain intact  Description: INTERVENTIONS  - Assess oral mucosa and hygiene practices  - Implement preventative oral hygiene regimen  - Implement oral medicated treatments as ordered  - Initiate Nutrition services referral as needed  Outcome: Progressing     Problem: HEMATOLOGIC - ADULT  Goal: Maintains hematologic stability  Description: INTERVENTIONS  - Assess for signs and symptoms of bleeding or hemorrhage  - Monitor labs  - Administer supportive blood products/factors as ordered and appropriate  Outcome: Progressing     Problem: MUSCULOSKELETAL - ADULT  Goal: Maintain or return mobility to safest level of function  Description: INTERVENTIONS:  - Assess patient's ability to carry out ADLs; assess patient's baseline for ADL function and identify physical deficits which impact ability to perform ADLs (bathing, care of mouth/teeth, toileting, grooming, dressing, etc )  - Assess/evaluate cause of self-care deficits   - Assess range of motion  - Assess patient's mobility  - Assess patient's need for assistive devices and provide as appropriate  - Encourage maximum independence but intervene and supervise when necessary  - Involve family in performance of ADLs  - Assess for home care needs following discharge   - Consider OT consult to assist with ADL evaluation and planning for discharge  - Provide patient education as appropriate  Outcome: Progressing  Goal: Maintain proper alignment of affected body part  Description: INTERVENTIONS:  - Support, maintain and protect limb and body alignment  - Provide patient/ family with appropriate education  Outcome: Progressing

## 2020-09-12 NOTE — ASSESSMENT & PLAN NOTE
- stable/resolved on most recent CT head  - neurosurgery consult appreciated   - Keppra for 7 days prophylaxis completed on 9/11  - SQH is ordered and had been cleared by neurosurgery  - outpatient follow up with neurosurgery in 2 weeks

## 2020-09-14 ENCOUNTER — TELEPHONE (OUTPATIENT)
Dept: OBGYN CLINIC | Facility: MEDICAL CENTER | Age: 50
End: 2020-09-14

## 2020-09-14 NOTE — TELEPHONE ENCOUNTER
9/14/20 MARSHA FROM [facility] [phone] CALLED, CONFIRMED APPT AND ADDRESS  ADVISED THEY NEED CT HEAD PRIOR, IF NOT SL, THEN BRING DISC TO APPT

## 2020-09-14 NOTE — TELEPHONE ENCOUNTER
Appt scheduled for 9/30 with Dr Pola Dhillon for [facility] for 3:45pm  Unable to get thru to [facility] due to busy signal   Will try later

## 2020-09-14 NOTE — TELEPHONE ENCOUNTER
Patient's chart reviewed    Consult says follow-up in 3 weeks in the office from the weekend of September 5th      There should be availability to see this patient and follow-up 3 weeks from the 5th    Thank you,    LIZETH

## 2020-09-14 NOTE — UTILIZATION REVIEW
Notification of Discharge  This is a Notification of Discharge from our facility [address]  Please be advised that this patient has been discharge from our facility  Below you will find the admission and discharge date and time including the patients disposition  PRESENTATION DATE: 9/5/2020  3:27 PM  OBS ADMISSION DATE:   IP ADMISSION DATE: 9/5/20 1643   DISCHARGE DATE: 9/12/2020 11:00 AM  DISPOSITION: Non [facility]   Admission Orders listed below:  Admission Orders (From admission, onward)     Ordered        09/05/20 1649  Inpatient Admission  Once                   Please contact the UR Department if additional information is required to close this patient's authorization/case  [id number] Utilization Review Department  Main: [phone] x carefully listen to the prompts  All voicemails are confidential   [email or web address]  Send all requests for admission clinical reviews, approved or denied determinations and any other requests to dedicated fax number below belonging to the campus where the patient is receiving treatment   List of dedicated fax numbers:  [address] DENIALS (Administrative/Medical Necessity) [phone]   [address] (Maternity/NICU/Pediatrics) [phone]   [facility] [phone]   [facility] [phone]   [facility] [phone]   [facility] [address] [place] [address] [phone]   [facility]  [phone]   [address] [facility] [address]  [facility] [address] [facility] [phone]
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
## 2020-09-16 ENCOUNTER — TELEPHONE (OUTPATIENT)
Dept: OBGYN CLINIC | Facility: MEDICAL CENTER | Age: 50
End: 2020-09-16
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
## 2020-09-16 NOTE — TELEPHONE ENCOUNTER
Patient will be seeing Dr Henny Chambers at Occup[facility] at [facility] asking for the protocol Left arm fracture and list the range of motion if Dr feels appropriate before he sees the patient      Please fax to [phone]

## 2020-09-17 NOTE — TELEPHONE ENCOUNTER
09/17/2020-[facility]ED [facility], SPOKE TO AIDA, SHE STATES PT WILL HAVE CT ON 09/22 AT [facility] AND PT WILL BRING [address] TO APT

## 2020-09-24 ENCOUNTER — OFFICE VISIT (OUTPATIENT)
Dept: NEUROSURGERY | Facility: CLINIC | Age: 50
End: 2020-09-24
Payer: COMMERCIAL

## 2020-09-24 VITALS
BODY MASS INDEX: 42.95 KG/M2 | DIASTOLIC BLOOD PRESSURE: 80 MMHG | SYSTOLIC BLOOD PRESSURE: 110 MMHG | TEMPERATURE: 97.8 F | HEIGHT: 67 IN

## 2020-09-24 DIAGNOSIS — M48.02 CERVICAL STENOSIS OF SPINAL CANAL: ICD-10-CM

## 2020-09-24 DIAGNOSIS — M54.12 RADICULOPATHY, CERVICAL: ICD-10-CM

## 2020-09-24 DIAGNOSIS — Z09 FRACTURE FOLLOW-UP: Primary | ICD-10-CM

## 2020-09-24 DIAGNOSIS — I60.9 SAH (SUBARACHNOID HEMORRHAGE) (HCC): ICD-10-CM

## 2020-09-24 DIAGNOSIS — S06.5X9A SDH (SUBDURAL HEMATOMA) (HCC): Primary | ICD-10-CM

## 2020-09-24 PROCEDURE — 99213 OFFICE O/P EST LOW 20 MIN: CPT | Performed by: PHYSICIAN ASSISTANT

## 2020-09-24 RX ORDER — CHLORHEXIDINE GLUCONATE 0.12 MG/ML
15 RINSE ORAL ONCE
Status: CANCELLED | OUTPATIENT
Start: 2020-09-24 | End: 2020-09-24

## 2020-09-24 RX ORDER — VANCOMYCIN HYDROCHLORIDE 1 G/200ML
1000 INJECTION, SOLUTION INTRAVENOUS ONCE
Status: CANCELLED | OUTPATIENT
Start: 2020-09-24 | End: 2020-09-24

## 2020-09-24 RX ORDER — LEVETIRACETAM 500 MG/1
500 TABLET ORAL EVERY 12 HOURS SCHEDULED
COMMUNITY
End: 2020-10-19

## 2020-09-24 RX ORDER — ONDANSETRON 4 MG/1
4 TABLET, FILM COATED ORAL EVERY 8 HOURS PRN
COMMUNITY
End: 2020-10-19
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
## 2020-09-24 NOTE — PROGRESS NOTES
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  reconstruction and automated exposure control  IMAGE QUALITY:  Diagnostic  FINDINGS: ALIGNMENT:  Normal alignment of the cervical spine  No subluxation  VERTEBRAL BODIES:  No fracture  DEGENERATIVE CHANGES:  No significant cervical degenerative changes are noted  PREVERTEBRAL AND PARASPINAL SOFT TISSUES:  Unremarkable  THORACIC INLET:  Comminuted left 1st 2nd through 3rd rib fractures with extrapleural hematoma  Please see the separate CT chest report for additional detail  Impression: No  cervical fracture or traumatic malalignment Comminuted left upper thoracic rib fractures with extrapleural hematoma  Please see separate CT chest, abdomen and pelvis study report for additional detail  I personally discussed this study with Charles Hood on 9/5/2020 at 4:27 PM   Workstation performed: [id number]     Xr Chest 1 View    Result Date: 9/9/2020  Narrative: TRAUMA SERIES INDICATION:  TRAUMA  COMPARISON:  None VIEWS:  XR TRAUMA MULTIPLE  FINDINGS: CHEST: Supine frontal view of the chest is obtained  Cardiomediastinal silhouette is within normal limits accounting for technique and patient positioning  Hypoventilation is noted with accentuation of the pulmonary vascular markings  No focal infiltrate is identified  No pneumothorax is seen on this supine film  Upright images are more sensitive to detect anterior pneumothoraces if relevant  There is left apical subpleural density consistent with hematoma  The trachea projects to the right of midline which could be related to positioning although hematoma is possible  There is evidence of fracture involving the transverse process of T1 as well as the 1st and 2nd rib   3rd and 4th rib fractures are also identified  Fracture of the glenoid of the scapula is also noted which is intra-articular  1 view reviewed  1 view reviewed  Impression: Rib fractures as well as scapular fracture and evidence of hematoma at the left lung apex  The trachea is deviated to the right but may be related to positioning  Please see subsequent CT result Workstation performed: [id number]     Trauma - Ct Chest Abdomen Pelvis W Contrast    Result Date: 9/5/2020  Narrative: CT CHEST, ABDOMEN AND PELVIS WITH IV CONTRAST INDICATION:   Trauma  COMPARISON:  None  TECHNIQUE: CT examination of the chest, abdomen and pelvis was performed  Axial, sagittal, and coronal 2D reformatted images were created from the source data and submitted for interpretation  Radiation dose length product (DLP) for this visit:  1872 86 mGy-cm   This examination, like all CT scans performed in the [facility], was performed utilizing techniques to minimize radiation dose exposure, including the use of iterative reconstruction and automated exposure control  IV Contrast:  100 mL of iohexol (OMNIPAQUE) Enteric Contrast: Enteric contrast was not administered  FINDINGS: CHEST LUNGS:  Small anterior and lateral pneumothorax and hypoventilatory changes in the dependent portions of both lower lobes  PLEURA:  Extrapleural hematoma at the left lung apex between the left 1st and 2nd thoracic rib fractures  HEART/GREAT VESSELS:  No pericardial effusion  Punctate focus of gas within the anterior mediastinum/epicardial space on image 27 of series 9  Normal heart size  Unremarkable appearance of the aorta and pulmonary trunk  Small hiatal hernia  MEDIASTINUM AND DESTINEY:  No mediastinal hematoma  CHEST WALL AND LOWER NECK:   Intramuscular hematoma of the left shoulder girdle associated with the comminuted left scapular fracture which demonstrates extension to the glenoid articular surface  No shoulder dislocation injury  Clavicle and sternoclavicular joints appear to be intact  Multiple left-sided thoracic rib fractures of nearly every rib in the left hemithorax  No right thoracic rib cage fracture or chest wall injury  ABDOMEN LIVER/BILIARY TREE:  Unremarkable  GALLBLADDER:  Layering hyperdensity within the gallbladder may represent gallstones or sludge (image 54 of series 9  No findings to suggest cholecystitis  Unremarkable CBD  SPLEEN:  Unremarkable  PANCREAS:  Unremarkable  ADRENAL GLANDS:  Unremarkable  KIDNEYS/URETERS:  Unremarkable  No hydronephrosis  STOMACH AND BOWEL:  Unremarkable  APPENDIX:  No findings to suggest appendicitis  ABDOMINOPELVIC CAVITY:  No ascites  No pneumoperitoneum  No lymphadenopathy  VESSELS:  Unremarkable for patient's age  PELVIS REPRODUCTIVE ORGANS:  Unremarkable for patient's age  URINARY BLADDER:  Unremarkable  ABDOMINAL WALL/INGUINAL REGIONS:  Unremarkable  OSSEOUS STRUCTURES:  L5-S1 fusion with grade 2 anterolisthesis  There is fusion across the L5-S1 disc space  Degenerative changes at L3-L4  No acute vertebral body fracture  Impression: Multiple left thoracic rib fractures with a small left anterior and lateral pneumothorax and punctate focus of pneumomediastinum  Extrapleural hematoma between the left 1st and 2nd rib  No contrast pooling or extravasation appreciated at this time  Hypoventilatory changes in the dependent regions of both lower lobes   Comminuted fracture of the left scapula extending to the glenoid surface with periscapular hematoma  No abdominal visceral organ injury  Postsurgical changes at L5-S1 with intact hardware and chronic L5-S1 spondylolisthesis  I personally discussed this study with Domingo Crystal on 9/5/2020 at 4:25 PM  Workstation performed: [id number]     Xr Trauma Multiple    Result Date: 9/5/2020  Narrative: TRAUMA SERIES INDICATION:  TRAUMA  COMPARISON:  None VIEWS:  XR TRAUMA MULTIPLE  FINDINGS: CHEST: Supine frontal view of the chest is obtained  Cardiomediastinal silhouette is within normal limits accounting for technique and patient positioning  Hypoventilation is noted with accentuation of the pulmonary vascular markings  No focal infiltrate is identified  No pneumothorax is seen on this supine film  Upright images are more sensitive to detect anterior pneumothoraces if relevant  There is left apical subpleural density consistent with hematoma  The trachea projects to the right of midline which could be related to positioning although hematoma is possible  There is evidence of fracture involving the transverse process of T1 as well as the 1st and 2nd rib   3rd and 4th rib fractures are also identified  Fracture of the glenoid of the scapula is also noted which is intra-articular  1 view reviewed  1 view reviewed  Impression: Rib fractures as well as scapular fracture and evidence of hematoma at the left lung apex  The trachea is deviated to the right but may be related to positioning  Please see subsequent CT result Workstation performed: [id number]       I have personally reviewed pertinent reports     and I have personally reviewed pertinent films in PACS

## 2020-09-24 NOTE — PATIENT INSTRUCTIONS
No evidence of further SDH/SAH    Cleared to resume baby aspirin    Follow up as needed, no further imaging required

## 2020-09-30 ENCOUNTER — HOSPITAL ENCOUNTER (OUTPATIENT)
Dept: RADIOLOGY | Facility: HOSPITAL | Age: 50
Discharge: HOME | End: 2020-09-30
Attending: ORTHOPAEDIC SURGERY
Payer: COMMERCIAL

## 2020-09-30 ENCOUNTER — OFFICE VISIT (OUTPATIENT)
Dept: OBGYN CLINIC | Facility: HOSPITAL | Age: 50
End: 2020-09-30

## 2020-09-30 VITALS
HEIGHT: 67 IN | DIASTOLIC BLOOD PRESSURE: 80 MMHG | HEART RATE: 90 BPM | SYSTOLIC BLOOD PRESSURE: 115 MMHG | BODY MASS INDEX: 42.95 KG/M2

## 2020-09-30 DIAGNOSIS — S52.502A CLOSED FRACTURE OF DISTAL ENDS OF LEFT RADIUS AND ULNA, INITIAL ENCOUNTER: ICD-10-CM

## 2020-09-30 DIAGNOSIS — S52.602A CLOSED FRACTURE OF DISTAL ENDS OF LEFT RADIUS AND ULNA, INITIAL ENCOUNTER: ICD-10-CM

## 2020-09-30 DIAGNOSIS — Z09 FRACTURE FOLLOW-UP: ICD-10-CM

## 2020-09-30 DIAGNOSIS — S42.102A CLOSED FRACTURE OF LEFT SCAPULA, UNSPECIFIED PART OF SCAPULA, INITIAL ENCOUNTER: Primary | ICD-10-CM

## 2020-09-30 PROCEDURE — 73030 X-RAY EXAM OF SHOULDER: CPT

## 2020-09-30 PROCEDURE — 99024 POSTOP FOLLOW-UP VISIT: CPT | Performed by: ORTHOPAEDIC SURGERY

## 2020-09-30 PROCEDURE — 73100 X-RAY EXAM OF WRIST: CPT
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
## 2020-10-02 ENCOUNTER — TELEPHONE (OUTPATIENT)
Dept: INTERNAL MEDICINE CLINIC | Facility: CLINIC | Age: 50
End: 2020-10-02

## 2020-10-06 ENCOUNTER — TELEPHONE (OUTPATIENT)
Dept: OBGYN CLINIC | Facility: HOSPITAL | Age: 50
End: 2020-10-06

## 2020-10-07 ENCOUNTER — TELEPHONE (OUTPATIENT)
Dept: OBGYN CLINIC | Facility: HOSPITAL | Age: 50
End: 2020-10-07

## 2020-10-09 ENCOUNTER — TRANSITIONAL CARE MANAGEMENT (OUTPATIENT)
Dept: INTERNAL MEDICINE CLINIC | Facility: CLINIC | Age: 50
End: 2020-10-09

## 2020-10-12 ENCOUNTER — TELEPHONE (OUTPATIENT)
Dept: INTERNAL MEDICINE CLINIC | Facility: CLINIC | Age: 50
End: 2020-10-12

## 2020-10-15 ENCOUNTER — TELEPHONE (OUTPATIENT)
Dept: INTERNAL MEDICINE CLINIC | Facility: CLINIC | Age: 50
End: 2020-10-15

## 2020-10-15 DIAGNOSIS — Z09 FRACTURE FOLLOW-UP: Primary | ICD-10-CM

## 2020-10-19 PROBLEM — S22.32XA LEFT RIB FRACTURE: Status: ACTIVE | Noted: 2020-09-06

## 2020-10-20 ENCOUNTER — OFFICE VISIT (OUTPATIENT)
Dept: INTERNAL MEDICINE CLINIC | Facility: CLINIC | Age: 50
End: 2020-10-20
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
## 2020-10-20 DIAGNOSIS — F31.9 BIPOLAR 1 DISORDER (HCC): ICD-10-CM

## 2020-10-20 DIAGNOSIS — S22.42XA CLOSED FRACTURE OF MULTIPLE RIBS OF LEFT SIDE, INITIAL ENCOUNTER: ICD-10-CM

## 2020-10-20 DIAGNOSIS — S42.152A CLOSED DISPLACED FRACTURE OF NECK OF LEFT SCAPULA, INITIAL ENCOUNTER: ICD-10-CM

## 2020-10-20 DIAGNOSIS — G89.4 CHRONIC PAIN DISORDER: ICD-10-CM

## 2020-10-20 PROCEDURE — 99214 OFFICE O/P EST MOD 30 MIN: CPT | Performed by: INTERNAL MEDICINE

## 2020-10-22 ENCOUNTER — TELEPHONE (OUTPATIENT)
Dept: INTERNAL MEDICINE CLINIC | Facility: CLINIC | Age: 50
End: 2020-10-22

## 2020-10-30 ENCOUNTER — TELEPHONE (OUTPATIENT)
Dept: INTERNAL MEDICINE CLINIC | Facility: CLINIC | Age: 50
End: 2020-10-30

## 2020-10-30 DIAGNOSIS — S42.152A CLOSED DISPLACED FRACTURE OF NECK OF LEFT SCAPULA, INITIAL ENCOUNTER: Primary | ICD-10-CM

## 2020-10-30 RX ORDER — LIDOCAINE 50 MG/G
1 PATCH TOPICAL DAILY
Qty: 14 PATCH | Refills: 5 | Status: SHIPPED | OUTPATIENT
Start: 2020-10-30 | End: 2020-11-18 | Stop reason: HOSPADM

## 2020-10-30 RX ORDER — LIDOCAINE HYDROCHLORIDE 20 MG/ML
JELLY TOPICAL AS NEEDED
Qty: 1 TUBE | Refills: 2 | Status: SHIPPED | OUTPATIENT
Start: 2020-10-30 | End: 2020-11-18 | Stop reason: HOSPADM

## 2020-11-04 ENCOUNTER — HOSPITAL ENCOUNTER (OUTPATIENT)
Facility: HOSPITAL | Age: 50
Setting detail: OBSERVATION
DRG: 885 | End: 2020-11-05
Attending: EMERGENCY MEDICINE | Admitting: INTERNAL MEDICINE
Payer: COMMERCIAL

## 2020-11-04 ENCOUNTER — HOSPITAL ENCOUNTER (EMERGENCY)
Facility: HOSPITAL | Age: 50
Discharge: HOME | DRG: 885 | End: 2020-11-04
Attending: EMERGENCY MEDICINE
Payer: COMMERCIAL

## 2020-11-04 VITALS
OXYGEN SATURATION: 100 % | HEART RATE: 78 BPM | WEIGHT: 265 LBS | RESPIRATION RATE: 18 BRPM | BODY MASS INDEX: 41.5 KG/M2 | SYSTOLIC BLOOD PRESSURE: 142 MMHG | TEMPERATURE: 97 F | DIASTOLIC BLOOD PRESSURE: 101 MMHG

## 2020-11-04 DIAGNOSIS — E83.51 HYPOCALCEMIA: ICD-10-CM

## 2020-11-04 DIAGNOSIS — E87.6 HYPOKALEMIA: ICD-10-CM

## 2020-11-04 DIAGNOSIS — F31.9 BIPOLAR DISORDER (HCC): Primary | ICD-10-CM

## 2020-11-04 DIAGNOSIS — Z00.8 ENCOUNTER FOR PSYCHOLOGICAL EVALUATION: Primary | ICD-10-CM

## 2020-11-04 DIAGNOSIS — R44.3 HALLUCINATIONS: ICD-10-CM

## 2020-11-04 LAB
ALBUMIN SERPL BCP-MCNC: 2 G/DL (ref 3.5–5)
ALP SERPL-CCNC: 95 U/L (ref 46–116)
ALT SERPL W P-5'-P-CCNC: 12 U/L (ref 12–78)
AMPHETAMINES SERPL QL SCN: NEGATIVE
ANION GAP SERPL CALCULATED.3IONS-SCNC: 10 MMOL/L (ref 4–13)
ANION GAP SERPL CALCULATED.3IONS-SCNC: 14 MMOL/L (ref 4–13)
AST SERPL W P-5'-P-CCNC: 17 U/L (ref 5–45)
ATRIAL RATE: 92 BPM
BARBITURATES UR QL: NEGATIVE
BASOPHILS # BLD AUTO: 0.04 THOUSANDS/ΜL (ref 0–0.1)
BASOPHILS NFR BLD AUTO: 0 % (ref 0–1)
BENZODIAZ UR QL: NEGATIVE
BILIRUB SERPL-MCNC: 0.2 MG/DL (ref 0.2–1)
BUN SERPL-MCNC: 13 MG/DL (ref 5–25)
BUN SERPL-MCNC: 23 MG/DL (ref 5–25)
CA-I BLD-SCNC: 0.79 MMOL/L (ref 1.12–1.32)
CALCIUM ALBUM COR SERPL-MCNC: 6.2 MG/DL (ref 8.3–10.1)
CALCIUM SERPL-MCNC: 4.6 MG/DL (ref 8.3–10.1)
CALCIUM SERPL-MCNC: 9.2 MG/DL (ref 8.3–10.1)
CHLORIDE SERPL-SCNC: 101 MMOL/L (ref 100–108)
CHLORIDE SERPL-SCNC: 119 MMOL/L (ref 100–108)
CO2 SERPL-SCNC: 16 MMOL/L (ref 21–32)
CO2 SERPL-SCNC: 20 MMOL/L (ref 21–32)
COCAINE UR QL: NEGATIVE
CREAT SERPL-MCNC: 0.59 MG/DL (ref 0.6–1.3)
CREAT SERPL-MCNC: 0.99 MG/DL (ref 0.6–1.3)
EOSINOPHIL # BLD AUTO: 0.08 THOUSAND/ΜL (ref 0–0.61)
EOSINOPHIL NFR BLD AUTO: 1 % (ref 0–6)
ERYTHROCYTE [DISTWIDTH] IN BLOOD BY AUTOMATED COUNT: 13 % (ref 11.6–15.1)
ETHANOL EXG-MCNC: 0 MG/DL
GFR SERPL CREATININE-BSD FRML MDRD: 117 ML/MIN/1.73SQ M
GFR SERPL CREATININE-BSD FRML MDRD: 88 ML/MIN/1.73SQ M
GLUCOSE P FAST SERPL-MCNC: 113 MG/DL (ref 65–99)
GLUCOSE SERPL-MCNC: 113 MG/DL (ref 65–140)
GLUCOSE SERPL-MCNC: 76 MG/DL (ref 65–140)
HCT VFR BLD AUTO: 47.9 % (ref 36.5–49.3)
HGB BLD-MCNC: 16.6 G/DL (ref 12–17)
IMM GRANULOCYTES # BLD AUTO: 0.05 THOUSAND/UL (ref 0–0.2)
IMM GRANULOCYTES NFR BLD AUTO: 0 % (ref 0–2)
LYMPHOCYTES # BLD AUTO: 1.47 THOUSANDS/ΜL (ref 0.6–4.47)
LYMPHOCYTES NFR BLD AUTO: 12 % (ref 14–44)
MAGNESIUM SERPL-MCNC: 1 MG/DL (ref 1.6–2.6)
MCH RBC QN AUTO: 29.9 PG (ref 26.8–34.3)
MCHC RBC AUTO-ENTMCNC: 34.7 G/DL (ref 31.4–37.4)
MCV RBC AUTO: 86 FL (ref 82–98)
METHADONE UR QL: NEGATIVE
MONOCYTES # BLD AUTO: 0.91 THOUSAND/ΜL (ref 0.17–1.22)
MONOCYTES NFR BLD AUTO: 8 % (ref 4–12)
NEUTROPHILS # BLD AUTO: 9.39 THOUSANDS/ΜL (ref 1.85–7.62)
NEUTS SEG NFR BLD AUTO: 79 % (ref 43–75)
NRBC BLD AUTO-RTO: 0 /100 WBCS
OPIATES UR QL SCN: NEGATIVE
OXYCODONE+OXYMORPHONE UR QL SCN: NEGATIVE
P AXIS: 57 DEGREES
PCP UR QL: NEGATIVE
PHOSPHATE SERPL-MCNC: 2.5 MG/DL (ref 2.7–4.5)
PLATELET # BLD AUTO: 308 THOUSANDS/UL (ref 149–390)
PMV BLD AUTO: 9.7 FL (ref 8.9–12.7)
POTASSIUM SERPL-SCNC: 2 MMOL/L (ref 3.5–5.3)
POTASSIUM SERPL-SCNC: 4.2 MMOL/L (ref 3.5–5.3)
PR INTERVAL: 150 MS
PROT SERPL-MCNC: 3.9 G/DL (ref 6.4–8.2)
QRS AXIS: 43 DEGREES
QRSD INTERVAL: 88 MS
QT INTERVAL: 360 MS
QTC INTERVAL: 445 MS
RBC # BLD AUTO: 5.55 MILLION/UL (ref 3.88–5.62)
SARS-COV-2 RNA RESP QL NAA+PROBE: NEGATIVE
SODIUM SERPL-SCNC: 135 MMOL/L (ref 136–145)
SODIUM SERPL-SCNC: 145 MMOL/L (ref 136–145)
T WAVE AXIS: 18 DEGREES
THC UR QL: NEGATIVE
TSH SERPL DL<=0.05 MIU/L-ACNC: 0.39 UIU/ML (ref 0.36–3.74)
VENTRICULAR RATE: 92 BPM
WBC # BLD AUTO: 11.94 THOUSAND/UL (ref 4.31–10.16)

## 2020-11-04 PROCEDURE — 82306 VITAMIN D 25 HYDROXY: CPT | Performed by: EMERGENCY MEDICINE

## 2020-11-04 PROCEDURE — 99282 EMERGENCY DEPT VISIT SF MDM: CPT | Performed by: EMERGENCY MEDICINE

## 2020-11-04 PROCEDURE — 99204 OFFICE O/P NEW MOD 45 MIN: CPT | Performed by: PHYSICIAN ASSISTANT

## 2020-11-04 PROCEDURE — 87635 SARS-COV-2 COVID-19 AMP PRB: CPT | Performed by: EMERGENCY MEDICINE

## 2020-11-04 PROCEDURE — 99285 EMERGENCY DEPT VISIT HI MDM: CPT | Performed by: EMERGENCY MEDICINE

## 2020-11-04 PROCEDURE — 96365 THER/PROPH/DIAG IV INF INIT: CPT

## 2020-11-04 PROCEDURE — 85025 COMPLETE CBC W/AUTO DIFF WBC: CPT | Performed by: EMERGENCY MEDICINE

## 2020-11-04 PROCEDURE — 93010 ELECTROCARDIOGRAM REPORT: CPT | Performed by: INTERNAL MEDICINE

## 2020-11-04 PROCEDURE — 80048 BASIC METABOLIC PNL TOTAL CA: CPT | Performed by: INTERNAL MEDICINE

## 2020-11-04 PROCEDURE — 99219 PR INITIAL OBSERVATION CARE/DAY 50 MINUTES: CPT | Performed by: INTERNAL MEDICINE

## 2020-11-04 PROCEDURE — 84443 ASSAY THYROID STIM HORMONE: CPT | Performed by: EMERGENCY MEDICINE

## 2020-11-04 PROCEDURE — 80053 COMPREHEN METABOLIC PANEL: CPT | Performed by: EMERGENCY MEDICINE

## 2020-11-04 PROCEDURE — 84100 ASSAY OF PHOSPHORUS: CPT | Performed by: EMERGENCY MEDICINE

## 2020-11-04 PROCEDURE — 99283 EMERGENCY DEPT VISIT LOW MDM: CPT

## 2020-11-04 PROCEDURE — 82075 ASSAY OF BREATH ETHANOL: CPT | Performed by: EMERGENCY MEDICINE

## 2020-11-04 PROCEDURE — 80307 DRUG TEST PRSMV CHEM ANLYZR: CPT | Performed by: EMERGENCY MEDICINE

## 2020-11-04 PROCEDURE — 99285 EMERGENCY DEPT VISIT HI MDM: CPT

## 2020-11-04 PROCEDURE — 36415 COLL VENOUS BLD VENIPUNCTURE: CPT | Performed by: EMERGENCY MEDICINE

## 2020-11-04 PROCEDURE — 93005 ELECTROCARDIOGRAM TRACING: CPT

## 2020-11-04 PROCEDURE — 83735 ASSAY OF MAGNESIUM: CPT | Performed by: EMERGENCY MEDICINE

## 2020-11-04 PROCEDURE — 82330 ASSAY OF CALCIUM: CPT | Performed by: EMERGENCY MEDICINE

## 2020-11-04 RX ORDER — CLONAZEPAM 1 MG/1
1 TABLET ORAL 2 TIMES DAILY
Status: DISCONTINUED | OUTPATIENT
Start: 2020-11-04 | End: 2020-11-05 | Stop reason: HOSPADM

## 2020-11-04 RX ORDER — CLONAZEPAM 1 MG/1
1 TABLET ORAL 2 TIMES DAILY
Status: DISCONTINUED | OUTPATIENT
Start: 2020-11-04 | End: 2020-11-04

## 2020-11-04 RX ORDER — POTASSIUM CHLORIDE 20 MEQ/1
40 TABLET, EXTENDED RELEASE ORAL ONCE
Status: COMPLETED | OUTPATIENT
Start: 2020-11-04 | End: 2020-11-04

## 2020-11-04 RX ORDER — MAGNESIUM SULFATE HEPTAHYDRATE 40 MG/ML
2 INJECTION, SOLUTION INTRAVENOUS ONCE
Status: COMPLETED | OUTPATIENT
Start: 2020-11-04 | End: 2020-11-04

## 2020-11-04 RX ORDER — POTASSIUM CHLORIDE 14.9 MG/ML
20 INJECTION INTRAVENOUS ONCE
Status: COMPLETED | OUTPATIENT
Start: 2020-11-04 | End: 2020-11-04

## 2020-11-04 RX ORDER — METHOCARBAMOL 500 MG/1
750 TABLET, FILM COATED ORAL EVERY 6 HOURS SCHEDULED
Status: DISCONTINUED | OUTPATIENT
Start: 2020-11-04 | End: 2020-11-05 | Stop reason: HOSPADM

## 2020-11-04 RX ORDER — LITHIUM CARBONATE 300 MG
80 TABLET ORAL DAILY
COMMUNITY
End: 2020-11-18 | Stop reason: HOSPADM

## 2020-11-04 RX ORDER — AMOXICILLIN 250 MG
1 CAPSULE ORAL
Status: DISCONTINUED | OUTPATIENT
Start: 2020-11-04 | End: 2020-11-05 | Stop reason: HOSPADM

## 2020-11-04 RX ORDER — ACETAMINOPHEN 325 MG/1
975 TABLET ORAL EVERY 8 HOURS SCHEDULED
Status: DISCONTINUED | OUTPATIENT
Start: 2020-11-04 | End: 2020-11-05 | Stop reason: HOSPADM

## 2020-11-04 RX ORDER — PANTOPRAZOLE SODIUM 40 MG/1
40 TABLET, DELAYED RELEASE ORAL
Status: DISCONTINUED | OUTPATIENT
Start: 2020-11-05 | End: 2020-11-05 | Stop reason: HOSPADM

## 2020-11-04 RX ORDER — LEVETIRACETAM 100 MG/ML
500 SOLUTION ORAL 2 TIMES DAILY
Status: DISCONTINUED | OUTPATIENT
Start: 2020-11-04 | End: 2020-11-04

## 2020-11-04 RX ORDER — LIDOCAINE 50 MG/G
1 PATCH TOPICAL DAILY
Status: DISCONTINUED | OUTPATIENT
Start: 2020-11-05 | End: 2020-11-05 | Stop reason: HOSPADM

## 2020-11-04 RX ORDER — POLYETHYLENE GLYCOL 3350 17 G/17G
17 POWDER, FOR SOLUTION ORAL DAILY PRN
Status: DISCONTINUED | OUTPATIENT
Start: 2020-11-04 | End: 2020-11-05 | Stop reason: HOSPADM

## 2020-11-04 RX ORDER — CALCIUM GLUCONATE 20 MG/ML
1 INJECTION, SOLUTION INTRAVENOUS ONCE
Status: DISCONTINUED | OUTPATIENT
Start: 2020-11-04 | End: 2020-11-05 | Stop reason: HOSPADM

## 2020-11-04 RX ORDER — CALCIUM CARBONATE 500(1250)
1 TABLET ORAL ONCE
Status: COMPLETED | OUTPATIENT
Start: 2020-11-04 | End: 2020-11-04

## 2020-11-04 RX ORDER — LANOLIN ALCOHOL/MO/W.PET/CERES
6 CREAM (GRAM) TOPICAL
Status: DISCONTINUED | OUTPATIENT
Start: 2020-11-04 | End: 2020-11-05 | Stop reason: HOSPADM

## 2020-11-04 RX ORDER — QUETIAPINE 200 MG/1
200 TABLET, FILM COATED, EXTENDED RELEASE ORAL
Status: DISCONTINUED | OUTPATIENT
Start: 2020-11-04 | End: 2020-11-04

## 2020-11-04 RX ORDER — QUETIAPINE 200 MG/1
200 TABLET, FILM COATED, EXTENDED RELEASE ORAL
Status: DISCONTINUED | OUTPATIENT
Start: 2020-11-04 | End: 2020-11-05 | Stop reason: HOSPADM

## 2020-11-04 RX ORDER — POTASSIUM CHLORIDE 20 MEQ/1
40 TABLET, EXTENDED RELEASE ORAL 2 TIMES DAILY
Status: DISCONTINUED | OUTPATIENT
Start: 2020-11-04 | End: 2020-11-05

## 2020-11-04 RX ORDER — LEVETIRACETAM 100 MG/ML
SOLUTION ORAL 2 TIMES DAILY
COMMUNITY
End: 2020-11-18 | Stop reason: HOSPADM

## 2020-11-04 RX ORDER — ZOLPIDEM TARTRATE 5 MG/1
10 TABLET ORAL
Status: DISCONTINUED | OUTPATIENT
Start: 2020-11-04 | End: 2020-11-05 | Stop reason: HOSPADM

## 2020-11-04 RX ADMIN — POTASSIUM CHLORIDE 20 MEQ: 14.9 INJECTION, SOLUTION INTRAVENOUS at 11:12

## 2020-11-04 RX ADMIN — SODIUM CHLORIDE 1000 ML: 0.9 INJECTION, SOLUTION INTRAVENOUS at 11:04

## 2020-11-04 RX ADMIN — MAGNESIUM SULFATE HEPTAHYDRATE 2 G: 40 INJECTION, SOLUTION INTRAVENOUS at 18:33

## 2020-11-04 RX ADMIN — CALCIUM 1 TABLET: 500 TABLET ORAL at 11:01

## 2020-11-04 RX ADMIN — POTASSIUM CHLORIDE 40 MEQ: 1500 TABLET, EXTENDED RELEASE ORAL at 11:01

## 2020-11-05 ENCOUNTER — HOSPITAL ENCOUNTER (INPATIENT)
Facility: HOSPITAL | Age: 50
LOS: 13 days | Discharge: HOME | DRG: 885 | End: 2020-11-18
Attending: STUDENT IN AN ORGANIZED HEALTH CARE EDUCATION/TRAINING PROGRAM | Admitting: STUDENT IN AN ORGANIZED HEALTH CARE EDUCATION/TRAINING PROGRAM
Payer: COMMERCIAL

## 2020-11-05 VITALS
RESPIRATION RATE: 18 BRPM | SYSTOLIC BLOOD PRESSURE: 112 MMHG | OXYGEN SATURATION: 97 % | DIASTOLIC BLOOD PRESSURE: 80 MMHG | HEART RATE: 76 BPM | TEMPERATURE: 98.2 F

## 2020-11-05 DIAGNOSIS — F31.62 BIPOLAR DISORDER, CURRENT EPISODE MIXED, MODERATE (HCC): Primary | ICD-10-CM

## 2020-11-05 DIAGNOSIS — F31.9 BIPOLAR DISORDER (HCC): ICD-10-CM

## 2020-11-05 DIAGNOSIS — I10 ESSENTIAL HYPERTENSION: ICD-10-CM

## 2020-11-05 DIAGNOSIS — G89.4 CHRONIC PAIN DISORDER: ICD-10-CM

## 2020-11-05 DIAGNOSIS — S22.42XA CLOSED FRACTURE OF MULTIPLE RIBS OF LEFT SIDE: ICD-10-CM

## 2020-11-05 LAB
ANION GAP SERPL CALCULATED.3IONS-SCNC: 14 MMOL/L (ref 4–13)
ANION GAP SERPL CALCULATED.3IONS-SCNC: 15 MMOL/L (ref 4–13)
ATRIAL RATE: 122 BPM
BUN SERPL-MCNC: 25 MG/DL (ref 5–25)
BUN SERPL-MCNC: 26 MG/DL (ref 5–25)
CALCIUM SERPL-MCNC: 9.6 MG/DL (ref 8.3–10.1)
CALCIUM SERPL-MCNC: 9.8 MG/DL (ref 8.3–10.1)
CHLORIDE SERPL-SCNC: 101 MMOL/L (ref 100–108)
CHLORIDE SERPL-SCNC: 101 MMOL/L (ref 100–108)
CO2 SERPL-SCNC: 21 MMOL/L (ref 21–32)
CO2 SERPL-SCNC: 22 MMOL/L (ref 21–32)
CREAT SERPL-MCNC: 1.04 MG/DL (ref 0.6–1.3)
CREAT SERPL-MCNC: 1.19 MG/DL (ref 0.6–1.3)
GFR SERPL CREATININE-BSD FRML MDRD: 70 ML/MIN/1.73SQ M
GFR SERPL CREATININE-BSD FRML MDRD: 83 ML/MIN/1.73SQ M
GLUCOSE P FAST SERPL-MCNC: 105 MG/DL (ref 65–99)
GLUCOSE SERPL-MCNC: 105 MG/DL (ref 65–140)
GLUCOSE SERPL-MCNC: 115 MG/DL (ref 65–140)
P AXIS: 64 DEGREES
POTASSIUM SERPL-SCNC: 3.7 MMOL/L (ref 3.5–5.3)
POTASSIUM SERPL-SCNC: 3.9 MMOL/L (ref 3.5–5.3)
PR INTERVAL: 134 MS
QRS AXIS: 51 DEGREES
QRSD INTERVAL: 86 MS
QT INTERVAL: 304 MS
QTC INTERVAL: 433 MS
SODIUM SERPL-SCNC: 137 MMOL/L (ref 136–145)
SODIUM SERPL-SCNC: 137 MMOL/L (ref 136–145)
T WAVE AXIS: 79 DEGREES
VENTRICULAR RATE: 122 BPM

## 2020-11-05 PROCEDURE — 93010 ELECTROCARDIOGRAM REPORT: CPT | Performed by: INTERNAL MEDICINE

## 2020-11-05 PROCEDURE — 99217 PR OBSERVATION CARE DISCHARGE MANAGEMENT: CPT | Performed by: INTERNAL MEDICINE

## 2020-11-05 PROCEDURE — 93005 ELECTROCARDIOGRAM TRACING: CPT

## 2020-11-05 PROCEDURE — 80048 BASIC METABOLIC PNL TOTAL CA: CPT | Performed by: INTERNAL MEDICINE

## 2020-11-05 RX ORDER — ACETAMINOPHEN 325 MG/1
975 TABLET ORAL EVERY 8 HOURS SCHEDULED
Status: CANCELLED | OUTPATIENT
Start: 2020-11-05

## 2020-11-05 RX ORDER — IBUPROFEN 600 MG/1
600 TABLET ORAL EVERY 6 HOURS PRN
Status: CANCELLED | OUTPATIENT
Start: 2020-11-05

## 2020-11-05 RX ORDER — OLANZAPINE 10 MG/1
10 TABLET ORAL EVERY 8 HOURS PRN
Status: DISCONTINUED | OUTPATIENT
Start: 2020-11-05 | End: 2020-11-18 | Stop reason: HOSPADM

## 2020-11-05 RX ORDER — AMOXICILLIN 250 MG
1 CAPSULE ORAL
Status: CANCELLED | OUTPATIENT
Start: 2020-11-05

## 2020-11-05 RX ORDER — MAGNESIUM HYDROXIDE/ALUMINUM HYDROXICE/SIMETHICONE 120; 1200; 1200 MG/30ML; MG/30ML; MG/30ML
30 SUSPENSION ORAL EVERY 4 HOURS PRN
Status: DISCONTINUED | OUTPATIENT
Start: 2020-11-05 | End: 2020-11-18 | Stop reason: HOSPADM

## 2020-11-05 RX ORDER — LANOLIN ALCOHOL/MO/W.PET/CERES
6 CREAM (GRAM) TOPICAL
Status: CANCELLED | OUTPATIENT
Start: 2020-11-05

## 2020-11-05 RX ORDER — OLANZAPINE 10 MG/1
10 TABLET ORAL EVERY 8 HOURS PRN
Status: CANCELLED | OUTPATIENT
Start: 2020-11-05

## 2020-11-05 RX ORDER — RISPERIDONE 1 MG/1
1 TABLET, ORALLY DISINTEGRATING ORAL
Status: CANCELLED | OUTPATIENT
Start: 2020-11-05

## 2020-11-05 RX ORDER — BENZTROPINE MESYLATE 1 MG/1
1 TABLET ORAL EVERY 6 HOURS PRN
Status: DISCONTINUED | OUTPATIENT
Start: 2020-11-05 | End: 2020-11-18 | Stop reason: HOSPADM

## 2020-11-05 RX ORDER — HYDROXYZINE HYDROCHLORIDE 25 MG/1
50 TABLET, FILM COATED ORAL EVERY 6 HOURS PRN
Status: CANCELLED | OUTPATIENT
Start: 2020-11-05

## 2020-11-05 RX ORDER — BENZTROPINE MESYLATE 1 MG/1
1 TABLET ORAL EVERY 6 HOURS PRN
Status: CANCELLED | OUTPATIENT
Start: 2020-11-05

## 2020-11-05 RX ORDER — TRAZODONE HYDROCHLORIDE 50 MG/1
50 TABLET ORAL
Status: DISCONTINUED | OUTPATIENT
Start: 2020-11-05 | End: 2020-11-18 | Stop reason: HOSPADM

## 2020-11-05 RX ORDER — PANTOPRAZOLE SODIUM 40 MG/1
40 TABLET, DELAYED RELEASE ORAL
Status: CANCELLED | OUTPATIENT
Start: 2020-11-06

## 2020-11-05 RX ORDER — ACETAMINOPHEN 325 MG/1
325 TABLET ORAL EVERY 6 HOURS PRN
Status: DISCONTINUED | OUTPATIENT
Start: 2020-11-05 | End: 2020-11-18 | Stop reason: HOSPADM

## 2020-11-05 RX ORDER — HYDROXYZINE 50 MG/1
50 TABLET, FILM COATED ORAL EVERY 6 HOURS PRN
Status: DISCONTINUED | OUTPATIENT
Start: 2020-11-05 | End: 2020-11-18 | Stop reason: HOSPADM

## 2020-11-05 RX ORDER — LORAZEPAM 2 MG/ML
2 INJECTION INTRAMUSCULAR EVERY 6 HOURS PRN
Status: DISCONTINUED | OUTPATIENT
Start: 2020-11-05 | End: 2020-11-18 | Stop reason: HOSPADM

## 2020-11-05 RX ORDER — METHOCARBAMOL 500 MG/1
750 TABLET, FILM COATED ORAL EVERY 6 HOURS PRN
Status: CANCELLED | OUTPATIENT
Start: 2020-11-05

## 2020-11-05 RX ORDER — ZOLPIDEM TARTRATE 5 MG/1
10 TABLET ORAL
Status: CANCELLED | OUTPATIENT
Start: 2020-11-05

## 2020-11-05 RX ORDER — RISPERIDONE 1 MG/1
1 TABLET, ORALLY DISINTEGRATING ORAL
Status: DISCONTINUED | OUTPATIENT
Start: 2020-11-05 | End: 2020-11-18 | Stop reason: HOSPADM

## 2020-11-05 RX ORDER — OLANZAPINE 10 MG/1
10 INJECTION, POWDER, LYOPHILIZED, FOR SOLUTION INTRAMUSCULAR EVERY 8 HOURS PRN
Status: DISCONTINUED | OUTPATIENT
Start: 2020-11-05 | End: 2020-11-18 | Stop reason: HOSPADM

## 2020-11-05 RX ORDER — HYDROXYZINE HYDROCHLORIDE 25 MG/1
25 TABLET, FILM COATED ORAL EVERY 6 HOURS PRN
Status: DISCONTINUED | OUTPATIENT
Start: 2020-11-05 | End: 2020-11-18 | Stop reason: HOSPADM

## 2020-11-05 RX ORDER — POLYETHYLENE GLYCOL 3350 17 G/17G
17 POWDER, FOR SOLUTION ORAL DAILY PRN
Status: CANCELLED | OUTPATIENT
Start: 2020-11-05

## 2020-11-05 RX ORDER — LORAZEPAM 1 MG/1
1 TABLET ORAL EVERY 6 HOURS PRN
Status: CANCELLED | OUTPATIENT
Start: 2020-11-05

## 2020-11-05 RX ORDER — QUETIAPINE 200 MG/1
200 TABLET, FILM COATED, EXTENDED RELEASE ORAL
Status: CANCELLED | OUTPATIENT
Start: 2020-11-05

## 2020-11-05 RX ORDER — BENZTROPINE MESYLATE 1 MG/ML
1 INJECTION INTRAMUSCULAR; INTRAVENOUS EVERY 6 HOURS PRN
Status: CANCELLED | OUTPATIENT
Start: 2020-11-05

## 2020-11-05 RX ORDER — IBUPROFEN 600 MG/1
600 TABLET ORAL EVERY 6 HOURS PRN
Status: DISCONTINUED | OUTPATIENT
Start: 2020-11-05 | End: 2020-11-18 | Stop reason: HOSPADM

## 2020-11-05 RX ORDER — ZOLPIDEM TARTRATE 5 MG/1
10 TABLET ORAL
Status: DISCONTINUED | OUTPATIENT
Start: 2020-11-05 | End: 2020-11-18 | Stop reason: HOSPADM

## 2020-11-05 RX ORDER — TRAZODONE HYDROCHLORIDE 50 MG/1
50 TABLET ORAL
Status: CANCELLED | OUTPATIENT
Start: 2020-11-05

## 2020-11-05 RX ORDER — BENZTROPINE MESYLATE 1 MG/ML
1 INJECTION INTRAMUSCULAR; INTRAVENOUS EVERY 6 HOURS PRN
Status: DISCONTINUED | OUTPATIENT
Start: 2020-11-05 | End: 2020-11-18 | Stop reason: HOSPADM

## 2020-11-05 RX ORDER — CLONAZEPAM 1 MG/1
1 TABLET ORAL 2 TIMES DAILY
Status: CANCELLED | OUTPATIENT
Start: 2020-11-05

## 2020-11-05 RX ORDER — LORAZEPAM 2 MG/ML
2 INJECTION INTRAMUSCULAR EVERY 6 HOURS PRN
Status: CANCELLED | OUTPATIENT
Start: 2020-11-05

## 2020-11-05 RX ORDER — OLANZAPINE 10 MG/1
10 INJECTION, POWDER, LYOPHILIZED, FOR SOLUTION INTRAMUSCULAR EVERY 8 HOURS PRN
Status: CANCELLED | OUTPATIENT
Start: 2020-11-05

## 2020-11-05 RX ORDER — LORAZEPAM 1 MG/1
1 TABLET ORAL EVERY 6 HOURS PRN
Status: DISCONTINUED | OUTPATIENT
Start: 2020-11-05 | End: 2020-11-18 | Stop reason: HOSPADM

## 2020-11-05 RX ORDER — POTASSIUM CHLORIDE 20 MEQ/1
40 TABLET, EXTENDED RELEASE ORAL 2 TIMES DAILY
Status: CANCELLED | OUTPATIENT
Start: 2020-11-05 | End: 2020-11-06

## 2020-11-05 RX ORDER — MAGNESIUM HYDROXIDE/ALUMINUM HYDROXICE/SIMETHICONE 120; 1200; 1200 MG/30ML; MG/30ML; MG/30ML
30 SUSPENSION ORAL EVERY 4 HOURS PRN
Status: CANCELLED | OUTPATIENT
Start: 2020-11-05

## 2020-11-05 RX ORDER — METHOCARBAMOL 500 MG/1
750 TABLET, FILM COATED ORAL EVERY 6 HOURS PRN
Status: DISCONTINUED | OUTPATIENT
Start: 2020-11-05 | End: 2020-11-18 | Stop reason: HOSPADM

## 2020-11-05 RX ORDER — ACETAMINOPHEN 325 MG/1
325 TABLET ORAL EVERY 6 HOURS PRN
Status: CANCELLED | OUTPATIENT
Start: 2020-11-05

## 2020-11-05 RX ORDER — ACETAMINOPHEN 325 MG/1
650 TABLET ORAL EVERY 4 HOURS PRN
Status: DISCONTINUED | OUTPATIENT
Start: 2020-11-05 | End: 2020-11-18 | Stop reason: HOSPADM

## 2020-11-05 RX ORDER — LIDOCAINE 50 MG/G
1 PATCH TOPICAL DAILY
Status: CANCELLED | OUTPATIENT
Start: 2020-11-06

## 2020-11-05 RX ORDER — HYDROXYZINE HYDROCHLORIDE 25 MG/1
25 TABLET, FILM COATED ORAL EVERY 6 HOURS PRN
Status: CANCELLED | OUTPATIENT
Start: 2020-11-05

## 2020-11-05 RX ORDER — ACETAMINOPHEN 325 MG/1
650 TABLET ORAL EVERY 4 HOURS PRN
Status: CANCELLED | OUTPATIENT
Start: 2020-11-05

## 2020-11-05 RX ADMIN — PANTOPRAZOLE SODIUM 40 MG: 40 TABLET, DELAYED RELEASE ORAL at 05:02

## 2020-11-05 RX ADMIN — CLONAZEPAM 1 MG: 1 TABLET ORAL at 09:16

## 2020-11-05 RX ADMIN — POTASSIUM CHLORIDE 40 MEQ: 1500 TABLET, EXTENDED RELEASE ORAL at 09:15

## 2020-11-05 RX ADMIN — ACETAMINOPHEN 975 MG: 325 TABLET ORAL at 05:01

## 2020-11-05 RX ADMIN — SENNOSIDES AND DOCUSATE SODIUM 1 TABLET: 8.6; 5 TABLET ORAL at 21:45

## 2020-11-05 RX ADMIN — METOPROLOL TARTRATE 25 MG: 25 TABLET, FILM COATED ORAL at 11:51

## 2020-11-05 RX ADMIN — LIDOCAINE 1 PATCH: 50 PATCH TOPICAL at 09:16

## 2020-11-05 RX ADMIN — METHOCARBAMOL 750 MG: 500 TABLET ORAL at 11:51

## 2020-11-05 RX ADMIN — METHOCARBAMOL 750 MG: 500 TABLET ORAL at 05:01

## 2020-11-05 RX ADMIN — CLONAZEPAM 1 MG: 1 TABLET ORAL at 17:56

## 2020-11-05 RX ADMIN — ENOXAPARIN SODIUM 40 MG: 40 INJECTION SUBCUTANEOUS at 09:16

## 2020-11-05 RX ADMIN — ACETAMINOPHEN 975 MG: 325 TABLET ORAL at 13:04

## 2020-11-05 RX ADMIN — ZOLPIDEM TARTRATE 10 MG: 5 TABLET, COATED ORAL at 21:45

## 2020-11-05 RX ADMIN — ACETAMINOPHEN 975 MG: 325 TABLET ORAL at 21:45

## 2020-11-05 RX ADMIN — METOPROLOL TARTRATE 25 MG: 25 TABLET, FILM COATED ORAL at 21:45

## 2020-11-05 RX ADMIN — MELATONIN 6 MG: 3 TAB ORAL at 21:45

## 2020-11-05 RX ADMIN — METHOCARBAMOL 750 MG: 500 TABLET ORAL at 17:56

## 2020-11-05 RX ADMIN — QUETIAPINE FUMARATE 200 MG: 200 TABLET, EXTENDED RELEASE ORAL at 21:48

## 2020-11-06 PROBLEM — E78.5 HLD (HYPERLIPIDEMIA): Status: ACTIVE | Noted: 2020-11-06

## 2020-11-06 PROBLEM — Z00.8 MEDICAL CLEARANCE FOR PSYCHIATRIC ADMISSION: Status: ACTIVE | Noted: 2020-11-06

## 2020-11-06 LAB
BACTERIA UR QL AUTO: NORMAL /HPF
BILIRUB UR QL STRIP: NEGATIVE
CHOLEST SERPL-MCNC: 217 MG/DL (ref 50–200)
CLARITY UR: CLEAR
COLOR UR: ABNORMAL
EST. AVERAGE GLUCOSE BLD GHB EST-MCNC: 117 MG/DL
GLUCOSE UR STRIP-MCNC: NEGATIVE MG/DL
HBA1C MFR BLD: 5.7 %
HDLC SERPL-MCNC: 36 MG/DL
HGB UR QL STRIP.AUTO: NEGATIVE
HYALINE CASTS #/AREA URNS LPF: NORMAL /LPF
KETONES UR STRIP-MCNC: NEGATIVE MG/DL
LDLC SERPL CALC-MCNC: 149 MG/DL (ref 0–100)
LEUKOCYTE ESTERASE UR QL STRIP: NEGATIVE
NITRITE UR QL STRIP: NEGATIVE
NON-SQ EPI CELLS URNS QL MICRO: NORMAL /HPF
NONHDLC SERPL-MCNC: 181 MG/DL
PH UR STRIP.AUTO: 6 [PH]
PROT UR STRIP-MCNC: ABNORMAL MG/DL
RBC #/AREA URNS AUTO: NORMAL /HPF
RPR SER QL: NORMAL
SP GR UR STRIP.AUTO: 1.03 (ref 1–1.03)
TRIGL SERPL-MCNC: 162 MG/DL
TSH SERPL DL<=0.05 MIU/L-ACNC: 1.29 UIU/ML (ref 0.36–3.74)
UROBILINOGEN UR QL STRIP.AUTO: 0.2 E.U./DL
WBC #/AREA URNS AUTO: NORMAL /HPF

## 2020-11-06 PROCEDURE — 81001 URINALYSIS AUTO W/SCOPE: CPT | Performed by: PHYSICIAN ASSISTANT

## 2020-11-06 PROCEDURE — 99222 1ST HOSP IP/OBS MODERATE 55: CPT | Performed by: STUDENT IN AN ORGANIZED HEALTH CARE EDUCATION/TRAINING PROGRAM

## 2020-11-06 PROCEDURE — 84443 ASSAY THYROID STIM HORMONE: CPT | Performed by: PHYSICIAN ASSISTANT

## 2020-11-06 PROCEDURE — 83036 HEMOGLOBIN GLYCOSYLATED A1C: CPT | Performed by: PHYSICIAN ASSISTANT

## 2020-11-06 PROCEDURE — 80061 LIPID PANEL: CPT | Performed by: PHYSICIAN ASSISTANT

## 2020-11-06 PROCEDURE — 99252 IP/OBS CONSLTJ NEW/EST SF 35: CPT | Performed by: PHYSICIAN ASSISTANT

## 2020-11-06 PROCEDURE — 86592 SYPHILIS TEST NON-TREP QUAL: CPT | Performed by: PHYSICIAN ASSISTANT

## 2020-11-06 RX ORDER — DULOXETIN HYDROCHLORIDE 20 MG/1
20 CAPSULE, DELAYED RELEASE ORAL DAILY
Status: DISCONTINUED | OUTPATIENT
Start: 2020-11-07 | End: 2020-11-10

## 2020-11-06 RX ORDER — PALIPERIDONE 3 MG/1
6 TABLET, EXTENDED RELEASE ORAL DAILY
Status: DISCONTINUED | OUTPATIENT
Start: 2020-11-06 | End: 2020-11-16

## 2020-11-06 RX ORDER — PANTOPRAZOLE SODIUM 40 MG/1
40 TABLET, DELAYED RELEASE ORAL
Status: DISCONTINUED | OUTPATIENT
Start: 2020-11-07 | End: 2020-11-18 | Stop reason: HOSPADM

## 2020-11-06 RX ADMIN — PALIPERIDONE 6 MG: 3 TABLET, EXTENDED RELEASE ORAL at 16:36

## 2020-11-06 RX ADMIN — METOPROLOL TARTRATE 25 MG: 25 TABLET, FILM COATED ORAL at 21:42

## 2020-11-06 RX ADMIN — IBUPROFEN 600 MG: 600 TABLET, FILM COATED ORAL at 21:51

## 2020-11-06 RX ADMIN — ZOLPIDEM TARTRATE 10 MG: 5 TABLET, COATED ORAL at 21:45

## 2020-11-06 RX ADMIN — METHOCARBAMOL TABLETS 750 MG: 500 TABLET, COATED ORAL at 21:47

## 2020-11-07 PROCEDURE — 99232 SBSQ HOSP IP/OBS MODERATE 35: CPT | Performed by: STUDENT IN AN ORGANIZED HEALTH CARE EDUCATION/TRAINING PROGRAM

## 2020-11-07 RX ORDER — CALCIUM CARBONATE 200(500)MG
500 TABLET,CHEWABLE ORAL DAILY PRN
Status: DISCONTINUED | OUTPATIENT
Start: 2020-11-07 | End: 2020-11-18 | Stop reason: HOSPADM

## 2020-11-07 RX ADMIN — DULOXETINE HYDROCHLORIDE 20 MG: 20 CAPSULE, DELAYED RELEASE ORAL at 09:33

## 2020-11-07 RX ADMIN — ZOLPIDEM TARTRATE 10 MG: 5 TABLET, COATED ORAL at 21:45

## 2020-11-07 RX ADMIN — IBUPROFEN 600 MG: 600 TABLET, FILM COATED ORAL at 11:17

## 2020-11-07 RX ADMIN — PALIPERIDONE 6 MG: 3 TABLET, EXTENDED RELEASE ORAL at 09:33

## 2020-11-07 RX ADMIN — METOPROLOL TARTRATE 25 MG: 25 TABLET, FILM COATED ORAL at 21:45

## 2020-11-07 RX ADMIN — HYDROXYZINE HYDROCHLORIDE 25 MG: 25 TABLET, FILM COATED ORAL at 23:25

## 2020-11-07 RX ADMIN — METHOCARBAMOL TABLETS 750 MG: 500 TABLET, COATED ORAL at 21:45

## 2020-11-07 RX ADMIN — METHOCARBAMOL TABLETS 750 MG: 500 TABLET, COATED ORAL at 09:37

## 2020-11-07 RX ADMIN — ACETAMINOPHEN 650 MG: 325 TABLET ORAL at 21:45

## 2020-11-07 RX ADMIN — PANTOPRAZOLE SODIUM 40 MG: 40 TABLET, DELAYED RELEASE ORAL at 06:21

## 2020-11-07 RX ADMIN — METOPROLOL TARTRATE 25 MG: 25 TABLET, FILM COATED ORAL at 09:33

## 2020-11-08 LAB
25(OH)D2 SERPL-MCNC: <1 NG/ML
25(OH)D3 SERPL-MCNC: 31 NG/ML
25(OH)D3+25(OH)D2 SERPL-MCNC: 31 NG/ML

## 2020-11-08 PROCEDURE — 99232 SBSQ HOSP IP/OBS MODERATE 35: CPT | Performed by: STUDENT IN AN ORGANIZED HEALTH CARE EDUCATION/TRAINING PROGRAM

## 2020-11-08 RX ADMIN — ACETAMINOPHEN 650 MG: 325 TABLET ORAL at 17:32

## 2020-11-08 RX ADMIN — METOPROLOL TARTRATE 25 MG: 25 TABLET, FILM COATED ORAL at 08:39

## 2020-11-08 RX ADMIN — PANTOPRAZOLE SODIUM 40 MG: 40 TABLET, DELAYED RELEASE ORAL at 06:54

## 2020-11-08 RX ADMIN — TRAZODONE HYDROCHLORIDE 50 MG: 50 TABLET ORAL at 00:50

## 2020-11-08 RX ADMIN — ACETAMINOPHEN 650 MG: 325 TABLET ORAL at 21:40

## 2020-11-08 RX ADMIN — PALIPERIDONE 6 MG: 3 TABLET, EXTENDED RELEASE ORAL at 08:39

## 2020-11-08 RX ADMIN — METHOCARBAMOL TABLETS 750 MG: 500 TABLET, COATED ORAL at 17:32

## 2020-11-08 RX ADMIN — CALCIUM CARBONATE (ANTACID) CHEW TAB 500 MG 500 MG: 500 CHEW TAB at 12:13

## 2020-11-08 RX ADMIN — METOPROLOL TARTRATE 25 MG: 25 TABLET, FILM COATED ORAL at 21:38

## 2020-11-08 RX ADMIN — DULOXETINE HYDROCHLORIDE 20 MG: 20 CAPSULE, DELAYED RELEASE ORAL at 08:39

## 2020-11-08 RX ADMIN — LORAZEPAM 1 MG: 1 TABLET ORAL at 11:05

## 2020-11-08 RX ADMIN — IBUPROFEN 600 MG: 600 TABLET, FILM COATED ORAL at 00:50

## 2020-11-08 RX ADMIN — ZOLPIDEM TARTRATE 10 MG: 5 TABLET, COATED ORAL at 21:40

## 2020-11-09 PROCEDURE — 99232 SBSQ HOSP IP/OBS MODERATE 35: CPT | Performed by: PHYSICIAN ASSISTANT

## 2020-11-09 RX ADMIN — METOPROLOL TARTRATE 25 MG: 25 TABLET, FILM COATED ORAL at 08:45

## 2020-11-09 RX ADMIN — METOPROLOL TARTRATE 25 MG: 25 TABLET, FILM COATED ORAL at 21:35

## 2020-11-09 RX ADMIN — PANTOPRAZOLE SODIUM 40 MG: 40 TABLET, DELAYED RELEASE ORAL at 06:44

## 2020-11-09 RX ADMIN — LORAZEPAM 1 MG: 1 TABLET ORAL at 13:07

## 2020-11-09 RX ADMIN — DULOXETINE HYDROCHLORIDE 20 MG: 20 CAPSULE, DELAYED RELEASE ORAL at 08:46

## 2020-11-09 RX ADMIN — PALIPERIDONE 6 MG: 3 TABLET, EXTENDED RELEASE ORAL at 08:46

## 2020-11-09 RX ADMIN — IBUPROFEN 600 MG: 600 TABLET, FILM COATED ORAL at 23:03

## 2020-11-09 RX ADMIN — IBUPROFEN 600 MG: 600 TABLET, FILM COATED ORAL at 15:50

## 2020-11-09 RX ADMIN — ZOLPIDEM TARTRATE 10 MG: 5 TABLET, COATED ORAL at 23:04

## 2020-11-09 RX ADMIN — METHOCARBAMOL TABLETS 750 MG: 500 TABLET, COATED ORAL at 08:47

## 2020-11-09 RX ADMIN — METHOCARBAMOL TABLETS 750 MG: 500 TABLET, COATED ORAL at 15:50

## 2020-11-09 RX ADMIN — PALIPERIDONE PALMITATE 234 MG: 234 INJECTION INTRAMUSCULAR at 13:44

## 2020-11-10 PROCEDURE — 99232 SBSQ HOSP IP/OBS MODERATE 35: CPT | Performed by: PHYSICIAN ASSISTANT

## 2020-11-10 RX ORDER — DULOXETIN HYDROCHLORIDE 30 MG/1
30 CAPSULE, DELAYED RELEASE ORAL DAILY
Status: DISCONTINUED | OUTPATIENT
Start: 2020-11-11 | End: 2020-11-18 | Stop reason: HOSPADM

## 2020-11-10 RX ADMIN — LORAZEPAM 1 MG: 1 TABLET ORAL at 16:42

## 2020-11-10 RX ADMIN — METOPROLOL TARTRATE 25 MG: 25 TABLET, FILM COATED ORAL at 21:21

## 2020-11-10 RX ADMIN — RISPERIDONE 1 MG: 1 TABLET, ORALLY DISINTEGRATING ORAL at 05:55

## 2020-11-10 RX ADMIN — DULOXETINE HYDROCHLORIDE 20 MG: 20 CAPSULE, DELAYED RELEASE ORAL at 09:49

## 2020-11-10 RX ADMIN — METOPROLOL TARTRATE 25 MG: 25 TABLET, FILM COATED ORAL at 09:49

## 2020-11-10 RX ADMIN — ZOLPIDEM TARTRATE 10 MG: 5 TABLET, COATED ORAL at 21:22

## 2020-11-10 RX ADMIN — PALIPERIDONE 6 MG: 3 TABLET, EXTENDED RELEASE ORAL at 09:49

## 2020-11-10 RX ADMIN — PANTOPRAZOLE SODIUM 40 MG: 40 TABLET, DELAYED RELEASE ORAL at 05:54

## 2020-11-10 RX ADMIN — METHOCARBAMOL TABLETS 750 MG: 500 TABLET, COATED ORAL at 21:24

## 2020-11-11 PROCEDURE — 99232 SBSQ HOSP IP/OBS MODERATE 35: CPT | Performed by: PHYSICIAN ASSISTANT

## 2020-11-11 RX ADMIN — DULOXETINE 30 MG: 30 CAPSULE, DELAYED RELEASE ORAL at 09:52

## 2020-11-11 RX ADMIN — METOPROLOL TARTRATE 25 MG: 25 TABLET, FILM COATED ORAL at 09:52

## 2020-11-11 RX ADMIN — METOPROLOL TARTRATE 25 MG: 25 TABLET, FILM COATED ORAL at 22:06

## 2020-11-11 RX ADMIN — RISPERIDONE 1 MG: 1 TABLET, ORALLY DISINTEGRATING ORAL at 19:25

## 2020-11-11 RX ADMIN — PALIPERIDONE 6 MG: 3 TABLET, EXTENDED RELEASE ORAL at 09:52

## 2020-11-11 RX ADMIN — PANTOPRAZOLE SODIUM 40 MG: 40 TABLET, DELAYED RELEASE ORAL at 06:46

## 2020-11-12 LAB
ALBUMIN SERPL BCP-MCNC: 3.9 G/DL (ref 3.5–5)
ALP SERPL-CCNC: 169 U/L (ref 46–116)
ALT SERPL W P-5'-P-CCNC: 28 U/L (ref 12–78)
AMMONIA PLAS-SCNC: 15 UMOL/L (ref 11–35)
ANION GAP SERPL CALCULATED.3IONS-SCNC: 11 MMOL/L (ref 4–13)
AST SERPL W P-5'-P-CCNC: 14 U/L (ref 5–45)
BASOPHILS # BLD AUTO: 0.04 THOUSANDS/ΜL (ref 0–0.1)
BASOPHILS NFR BLD AUTO: 1 % (ref 0–1)
BILIRUB SERPL-MCNC: 0.5 MG/DL (ref 0.2–1)
BUN SERPL-MCNC: 21 MG/DL (ref 5–25)
CALCIUM SERPL-MCNC: 8.9 MG/DL (ref 8.3–10.1)
CHLORIDE SERPL-SCNC: 102 MMOL/L (ref 100–108)
CK SERPL-CCNC: 108 U/L (ref 39–308)
CO2 SERPL-SCNC: 24 MMOL/L (ref 21–32)
CREAT SERPL-MCNC: 1.22 MG/DL (ref 0.6–1.3)
EOSINOPHIL # BLD AUTO: 0.13 THOUSAND/ΜL (ref 0–0.61)
EOSINOPHIL NFR BLD AUTO: 2 % (ref 0–6)
ERYTHROCYTE [DISTWIDTH] IN BLOOD BY AUTOMATED COUNT: 12.6 % (ref 11.6–15.1)
GFR SERPL CREATININE-BSD FRML MDRD: 68 ML/MIN/1.73SQ M
GLUCOSE SERPL-MCNC: 184 MG/DL (ref 65–140)
HCT VFR BLD AUTO: 42.5 % (ref 36.5–49.3)
HGB BLD-MCNC: 14.4 G/DL (ref 12–17)
IMM GRANULOCYTES # BLD AUTO: 0.03 THOUSAND/UL (ref 0–0.2)
IMM GRANULOCYTES NFR BLD AUTO: 0 % (ref 0–2)
LYMPHOCYTES # BLD AUTO: 1.19 THOUSANDS/ΜL (ref 0.6–4.47)
LYMPHOCYTES NFR BLD AUTO: 17 % (ref 14–44)
MCH RBC QN AUTO: 30.1 PG (ref 26.8–34.3)
MCHC RBC AUTO-ENTMCNC: 33.9 G/DL (ref 31.4–37.4)
MCV RBC AUTO: 89 FL (ref 82–98)
MONOCYTES # BLD AUTO: 0.71 THOUSAND/ΜL (ref 0.17–1.22)
MONOCYTES NFR BLD AUTO: 10 % (ref 4–12)
NEUTROPHILS # BLD AUTO: 4.87 THOUSANDS/ΜL (ref 1.85–7.62)
NEUTS SEG NFR BLD AUTO: 70 % (ref 43–75)
NRBC BLD AUTO-RTO: 0 /100 WBCS
PLATELET # BLD AUTO: 239 THOUSANDS/UL (ref 149–390)
PMV BLD AUTO: 11.3 FL (ref 8.9–12.7)
POTASSIUM SERPL-SCNC: 3.3 MMOL/L (ref 3.5–5.3)
PROT SERPL-MCNC: 7.4 G/DL (ref 6.4–8.2)
RBC # BLD AUTO: 4.79 MILLION/UL (ref 3.88–5.62)
SODIUM SERPL-SCNC: 137 MMOL/L (ref 136–145)
WBC # BLD AUTO: 6.97 THOUSAND/UL (ref 4.31–10.16)

## 2020-11-12 PROCEDURE — 80053 COMPREHEN METABOLIC PANEL: CPT | Performed by: PHYSICIAN ASSISTANT

## 2020-11-12 PROCEDURE — 99232 SBSQ HOSP IP/OBS MODERATE 35: CPT | Performed by: STUDENT IN AN ORGANIZED HEALTH CARE EDUCATION/TRAINING PROGRAM

## 2020-11-12 PROCEDURE — 85025 COMPLETE CBC W/AUTO DIFF WBC: CPT | Performed by: PHYSICIAN ASSISTANT

## 2020-11-12 PROCEDURE — 82550 ASSAY OF CK (CPK): CPT | Performed by: PHYSICIAN ASSISTANT

## 2020-11-12 PROCEDURE — 82140 ASSAY OF AMMONIA: CPT | Performed by: PHYSICIAN ASSISTANT

## 2020-11-12 RX ADMIN — METOPROLOL TARTRATE 25 MG: 25 TABLET, FILM COATED ORAL at 20:18

## 2020-11-12 RX ADMIN — RISPERIDONE 1 MG: 1 TABLET, ORALLY DISINTEGRATING ORAL at 21:56

## 2020-11-12 RX ADMIN — PALIPERIDONE 6 MG: 3 TABLET, EXTENDED RELEASE ORAL at 10:24

## 2020-11-12 RX ADMIN — DULOXETINE 30 MG: 30 CAPSULE, DELAYED RELEASE ORAL at 10:24

## 2020-11-12 RX ADMIN — PANTOPRAZOLE SODIUM 40 MG: 40 TABLET, DELAYED RELEASE ORAL at 06:39

## 2020-11-12 RX ADMIN — METOPROLOL TARTRATE 25 MG: 25 TABLET, FILM COATED ORAL at 10:24

## 2020-11-13 PROCEDURE — 99232 SBSQ HOSP IP/OBS MODERATE 35: CPT | Performed by: STUDENT IN AN ORGANIZED HEALTH CARE EDUCATION/TRAINING PROGRAM

## 2020-11-13 RX ADMIN — METOPROLOL TARTRATE 25 MG: 25 TABLET, FILM COATED ORAL at 09:31

## 2020-11-13 RX ADMIN — METOPROLOL TARTRATE 25 MG: 25 TABLET, FILM COATED ORAL at 21:46

## 2020-11-13 RX ADMIN — DULOXETINE 30 MG: 30 CAPSULE, DELAYED RELEASE ORAL at 09:29

## 2020-11-13 RX ADMIN — PALIPERIDONE 6 MG: 3 TABLET, EXTENDED RELEASE ORAL at 09:31

## 2020-11-13 RX ADMIN — PANTOPRAZOLE SODIUM 40 MG: 40 TABLET, DELAYED RELEASE ORAL at 06:15

## 2020-11-14 PROCEDURE — 99232 SBSQ HOSP IP/OBS MODERATE 35: CPT | Performed by: PSYCHIATRY & NEUROLOGY

## 2020-11-14 RX ADMIN — DULOXETINE 30 MG: 30 CAPSULE, DELAYED RELEASE ORAL at 09:35

## 2020-11-14 RX ADMIN — ZOLPIDEM TARTRATE 10 MG: 5 TABLET, COATED ORAL at 21:41

## 2020-11-14 RX ADMIN — PALIPERIDONE 6 MG: 3 TABLET, EXTENDED RELEASE ORAL at 09:36

## 2020-11-14 RX ADMIN — METOPROLOL TARTRATE 25 MG: 25 TABLET, FILM COATED ORAL at 21:39

## 2020-11-14 RX ADMIN — METOPROLOL TARTRATE 25 MG: 25 TABLET, FILM COATED ORAL at 09:35

## 2020-11-14 RX ADMIN — PANTOPRAZOLE SODIUM 40 MG: 40 TABLET, DELAYED RELEASE ORAL at 06:40

## 2020-11-14 RX ADMIN — METHOCARBAMOL TABLETS 750 MG: 500 TABLET, COATED ORAL at 21:42

## 2020-11-15 PROCEDURE — 81001 URINALYSIS AUTO W/SCOPE: CPT | Performed by: PSYCHIATRY & NEUROLOGY

## 2020-11-15 PROCEDURE — 99232 SBSQ HOSP IP/OBS MODERATE 35: CPT | Performed by: PSYCHIATRY & NEUROLOGY

## 2020-11-15 RX ADMIN — METOPROLOL TARTRATE 25 MG: 25 TABLET, FILM COATED ORAL at 09:30

## 2020-11-15 RX ADMIN — PALIPERIDONE 6 MG: 3 TABLET, EXTENDED RELEASE ORAL at 09:30

## 2020-11-15 RX ADMIN — IBUPROFEN 600 MG: 600 TABLET, FILM COATED ORAL at 01:27

## 2020-11-15 RX ADMIN — METOPROLOL TARTRATE 25 MG: 25 TABLET, FILM COATED ORAL at 22:05

## 2020-11-15 RX ADMIN — DULOXETINE 30 MG: 30 CAPSULE, DELAYED RELEASE ORAL at 09:30

## 2020-11-15 RX ADMIN — HYDROXYZINE HYDROCHLORIDE 50 MG: 50 TABLET, FILM COATED ORAL at 23:52

## 2020-11-15 RX ADMIN — PANTOPRAZOLE SODIUM 40 MG: 40 TABLET, DELAYED RELEASE ORAL at 06:15

## 2020-11-16 LAB
AMORPH URATE CRY URNS QL MICRO: ABNORMAL /HPF
BACTERIA UR QL AUTO: ABNORMAL /HPF
BILIRUB UR QL STRIP: NEGATIVE
CAOX CRY URNS QL MICRO: ABNORMAL /HPF
CLARITY UR: ABNORMAL
COLOR UR: YELLOW
GLUCOSE UR STRIP-MCNC: NEGATIVE MG/DL
HGB UR QL STRIP.AUTO: NEGATIVE
KETONES UR STRIP-MCNC: ABNORMAL MG/DL
LEUKOCYTE ESTERASE UR QL STRIP: NEGATIVE
MUCOUS THREADS UR QL AUTO: ABNORMAL
NITRITE UR QL STRIP: NEGATIVE
NON-SQ EPI CELLS URNS QL MICRO: ABNORMAL /HPF
PH UR STRIP.AUTO: 6.5 [PH]
PROT UR STRIP-MCNC: ABNORMAL MG/DL
RBC #/AREA URNS AUTO: ABNORMAL /HPF
SP GR UR STRIP.AUTO: 1.02 (ref 1–1.03)
UROBILINOGEN UR QL STRIP.AUTO: 0.2 E.U./DL
WBC #/AREA URNS AUTO: ABNORMAL /HPF

## 2020-11-16 PROCEDURE — 99232 SBSQ HOSP IP/OBS MODERATE 35: CPT | Performed by: PHYSICIAN ASSISTANT

## 2020-11-16 RX ORDER — DULOXETIN HYDROCHLORIDE 30 MG/1
30 CAPSULE, DELAYED RELEASE ORAL DAILY
Qty: 15 CAPSULE | Refills: 0 | Status: SHIPPED | OUTPATIENT
Start: 2020-11-16

## 2020-11-16 RX ADMIN — PALIPERIDONE PALMITATE 156 MG: 156 INJECTION INTRAMUSCULAR at 10:33

## 2020-11-16 RX ADMIN — DULOXETINE 30 MG: 30 CAPSULE, DELAYED RELEASE ORAL at 10:29

## 2020-11-16 RX ADMIN — METOPROLOL TARTRATE 25 MG: 25 TABLET, FILM COATED ORAL at 21:40

## 2020-11-16 RX ADMIN — PALIPERIDONE 6 MG: 3 TABLET, EXTENDED RELEASE ORAL at 10:29

## 2020-11-16 RX ADMIN — METOPROLOL TARTRATE 25 MG: 25 TABLET, FILM COATED ORAL at 10:29

## 2020-11-17 PROCEDURE — 99232 SBSQ HOSP IP/OBS MODERATE 35: CPT | Performed by: PHYSICIAN ASSISTANT

## 2020-11-17 RX ADMIN — METOPROLOL TARTRATE 25 MG: 25 TABLET, FILM COATED ORAL at 09:42

## 2020-11-17 RX ADMIN — METOPROLOL TARTRATE 25 MG: 25 TABLET, FILM COATED ORAL at 22:00

## 2020-11-17 RX ADMIN — DULOXETINE 30 MG: 30 CAPSULE, DELAYED RELEASE ORAL at 09:42

## 2020-11-17 RX ADMIN — PANTOPRAZOLE SODIUM 40 MG: 40 TABLET, DELAYED RELEASE ORAL at 06:49

## 2020-11-18 VITALS
HEART RATE: 99 BPM | TEMPERATURE: 98.6 F | HEIGHT: 67 IN | DIASTOLIC BLOOD PRESSURE: 87 MMHG | BODY MASS INDEX: 37.29 KG/M2 | WEIGHT: 237.6 LBS | RESPIRATION RATE: 16 BRPM | OXYGEN SATURATION: 96 % | SYSTOLIC BLOOD PRESSURE: 136 MMHG

## 2020-11-18 PROCEDURE — 99239 HOSP IP/OBS DSCHRG MGMT >30: CPT | Performed by: STUDENT IN AN ORGANIZED HEALTH CARE EDUCATION/TRAINING PROGRAM

## 2020-11-18 RX ORDER — METHOCARBAMOL 750 MG/1
750 TABLET, FILM COATED ORAL EVERY 6 HOURS PRN
Refills: 0
Start: 2020-11-18 | End: 2021-02-19

## 2020-11-18 RX ADMIN — TRAZODONE HYDROCHLORIDE 50 MG: 50 TABLET ORAL at 02:30

## 2020-11-18 RX ADMIN — PANTOPRAZOLE SODIUM 40 MG: 40 TABLET, DELAYED RELEASE ORAL at 06:59

## 2020-11-18 RX ADMIN — METHOCARBAMOL TABLETS 750 MG: 500 TABLET, COATED ORAL at 02:30

## 2020-11-18 RX ADMIN — DULOXETINE 30 MG: 30 CAPSULE, DELAYED RELEASE ORAL at 09:26

## 2020-11-18 RX ADMIN — METOPROLOL TARTRATE 25 MG: 25 TABLET, FILM COATED ORAL at 09:26

## 2020-11-19 ENCOUNTER — TRANSITIONAL CARE MANAGEMENT (OUTPATIENT)
Dept: INTERNAL MEDICINE CLINIC | Facility: CLINIC | Age: 50
End: 2020-11-19

## 2020-11-19 ENCOUNTER — TELEPHONE (OUTPATIENT)
Dept: INTERNAL MEDICINE CLINIC | Facility: CLINIC | Age: 50
End: 2020-11-19

## 2020-11-19 ENCOUNTER — TRANSCRIBE ORDERS (OUTPATIENT)
Dept: INTERNAL MEDICINE CLINIC | Facility: CLINIC | Age: 50
End: 2020-11-19

## 2020-11-20 ENCOUNTER — OFFICE VISIT (OUTPATIENT)
Dept: INTERNAL MEDICINE CLINIC | Facility: CLINIC | Age: 50
End: 2020-11-20
Payer: COMMERCIAL

## 2020-11-20 VITALS
TEMPERATURE: 97.3 F | HEART RATE: 90 BPM | SYSTOLIC BLOOD PRESSURE: 120 MMHG | HEIGHT: 65 IN | BODY MASS INDEX: 39.99 KG/M2 | DIASTOLIC BLOOD PRESSURE: 70 MMHG | WEIGHT: 240 LBS

## 2020-11-20 DIAGNOSIS — S06.5X9A SUBDURAL HEMATOMA (HCC): ICD-10-CM

## 2020-11-20 DIAGNOSIS — F20.9 SCHIZOPHRENIA, UNSPECIFIED TYPE (HCC): ICD-10-CM

## 2020-11-20 DIAGNOSIS — I10 ESSENTIAL HYPERTENSION: Primary | ICD-10-CM

## 2020-11-20 PROCEDURE — 99496 TRANSJ CARE MGMT HIGH F2F 7D: CPT | Performed by: INTERNAL MEDICINE

## 2020-12-23 ENCOUNTER — TELEMEDICINE (OUTPATIENT)
Dept: INTERNAL MEDICINE CLINIC | Facility: CLINIC | Age: 50
End: 2020-12-23
Payer: COMMERCIAL

## 2020-12-23 DIAGNOSIS — I10 ESSENTIAL HYPERTENSION: Primary | ICD-10-CM

## 2020-12-23 DIAGNOSIS — M48.061 SPINAL STENOSIS OF LUMBAR REGION, UNSPECIFIED WHETHER NEUROGENIC CLAUDICATION PRESENT: ICD-10-CM

## 2020-12-23 PROCEDURE — 99213 OFFICE O/P EST LOW 20 MIN: CPT | Performed by: INTERNAL MEDICINE

## 2020-12-23 PROCEDURE — 1036F TOBACCO NON-USER: CPT | Performed by: INTERNAL MEDICINE

## 2020-12-23 RX ORDER — GABAPENTIN 300 MG/1
300 CAPSULE ORAL 3 TIMES DAILY
Qty: 90 CAPSULE | Refills: 0 | Status: SHIPPED | OUTPATIENT
Start: 2020-12-23 | End: 2021-01-04

## 2021-01-04 ENCOUNTER — TELEPHONE (OUTPATIENT)
Dept: INTERNAL MEDICINE CLINIC | Facility: CLINIC | Age: 51
End: 2021-01-04

## 2021-01-04 DIAGNOSIS — M48.061 SPINAL STENOSIS OF LUMBAR REGION, UNSPECIFIED WHETHER NEUROGENIC CLAUDICATION PRESENT: Primary | ICD-10-CM

## 2021-01-04 RX ORDER — GABAPENTIN 600 MG/1
600 TABLET ORAL 3 TIMES DAILY
Qty: 90 TABLET | Refills: 3 | Status: SHIPPED | OUTPATIENT
Start: 2021-01-04 | End: 2021-03-19 | Stop reason: DRUGHIGH

## 2021-01-04 NOTE — TELEPHONE ENCOUNTER
He called to say you wanted to know if the gabapentin needed to be increase and he said it does, he takes now 300mg tid

## 2021-02-19 ENCOUNTER — OFFICE VISIT (OUTPATIENT)
Dept: INTERNAL MEDICINE CLINIC | Facility: CLINIC | Age: 51
End: 2021-02-19
Payer: COMMERCIAL

## 2021-02-19 VITALS
SYSTOLIC BLOOD PRESSURE: 120 MMHG | HEIGHT: 67 IN | TEMPERATURE: 97.8 F | BODY MASS INDEX: 41.12 KG/M2 | WEIGHT: 262 LBS | HEART RATE: 70 BPM | DIASTOLIC BLOOD PRESSURE: 70 MMHG

## 2021-02-19 DIAGNOSIS — E11.9 TYPE 2 DIABETES MELLITUS WITHOUT COMPLICATION, WITHOUT LONG-TERM CURRENT USE OF INSULIN (HCC): Primary | ICD-10-CM

## 2021-02-19 DIAGNOSIS — F20.9 SCHIZOPHRENIA, UNSPECIFIED TYPE (HCC): ICD-10-CM

## 2021-02-19 DIAGNOSIS — F31.62 BIPOLAR DISORDER, CURRENT EPISODE MIXED, MODERATE (HCC): ICD-10-CM

## 2021-02-19 DIAGNOSIS — I10 ESSENTIAL HYPERTENSION: ICD-10-CM

## 2021-02-19 DIAGNOSIS — S06.5X9A SUBDURAL HEMATOMA (HCC): ICD-10-CM

## 2021-02-19 DIAGNOSIS — F19.20 DRUG ABUSE AND DEPENDENCE (HCC): ICD-10-CM

## 2021-02-19 DIAGNOSIS — E83.51 HYPOCALCEMIA: ICD-10-CM

## 2021-02-19 PROCEDURE — 99214 OFFICE O/P EST MOD 30 MIN: CPT | Performed by: INTERNAL MEDICINE

## 2021-02-19 PROCEDURE — 3078F DIAST BP <80 MM HG: CPT | Performed by: INTERNAL MEDICINE

## 2021-02-19 PROCEDURE — 3074F SYST BP LT 130 MM HG: CPT | Performed by: INTERNAL MEDICINE

## 2021-02-19 PROCEDURE — 1036F TOBACCO NON-USER: CPT | Performed by: INTERNAL MEDICINE

## 2021-02-19 PROCEDURE — 3008F BODY MASS INDEX DOCD: CPT | Performed by: INTERNAL MEDICINE

## 2021-02-19 RX ORDER — QUETIAPINE FUMARATE 300 MG/1
300 TABLET, FILM COATED ORAL
COMMUNITY
Start: 2020-11-24 | End: 2021-02-19

## 2021-02-19 RX ORDER — PALIPERIDONE 6 MG/1
TABLET, EXTENDED RELEASE ORAL
COMMUNITY
Start: 2021-01-23 | End: 2022-05-03

## 2021-02-19 RX ORDER — ZOLPIDEM TARTRATE 12.5 MG/1
TABLET, FILM COATED, EXTENDED RELEASE ORAL
COMMUNITY
Start: 2021-01-31

## 2021-02-19 RX ORDER — CLONAZEPAM 1 MG/1
TABLET ORAL
COMMUNITY
Start: 2021-01-31

## 2021-02-19 NOTE — PROGRESS NOTES
Assessment/Plan:    No problem-specific Assessment & Plan notes found for this encounter  Diagnoses and all orders for this visit:    Type 2 diabetes mellitus without complication, without long-term current use of insulin (HCC)  -     Hemoglobin A1C; Future  -     Comprehensive metabolic panel; Future  -     Lipid panel; Future  -     Comprehensive metabolic panel  -     Lipid panel    Essential hypertension  -     CBC and differential; Future  -     CBC and differential    Bipolar disorder, current episode mixed, moderate (HCC)  -     TSH, 3rd generation with Free T4 reflex; Future  -     TSH, 3rd generation with Free T4 reflex    Drug abuse and dependence (HCC)    [facility]    Hypocalcemia    Body mass index (BMI)40 0-44 9, adult (HCC)    Subdural hematoma (HCC)    Other orders  -     clonazePAM (KlonoPIN) 1 mg tablet; Take 1 Tablet By Oral Route 2 times per day  -     paliperidone (INVEGA) 6 MG 24 hr tablet; Take 1 Tablet By Oral Route 1 at bedtime  -     Discontinue: QUEtiapine (SEROquel) 300 mg tablet; Take 300 mg by mouth daily at bedtime  -     zolpidem (AMBIEN CR) 12 5 MG CR tablet; Take 1 Tablet By Oral Route 1 at bedtime          Subjective:      Patient ID: Kimberly Maharaj is a 45 y o  male  Med changes  Through [employer]      The following portions of the patient's history were reviewed and updated as appropriate: allergies, current medications, past family history, past medical history, past social history, past surgical history, and problem list     Review of Systems   Constitutional: Negative for activity change and fatigue  HENT: Negative for ear discharge, ear pain, rhinorrhea and sore throat  Eyes: Negative for pain and visual disturbance  Respiratory: Negative for cough and shortness of breath  Cardiovascular: Negative for chest pain and leg swelling  Gastrointestinal: Negative for abdominal pain, constipation and diarrhea     Endocrine: Negative for cold intolerance and polyuria  Genitourinary: Negative for flank pain and hematuria  Musculoskeletal: Negative for back pain and joint swelling  Skin: Negative for pallor and wound  Neurological: Negative for dizziness, seizures and speech difficulty  Psychiatric/Behavioral: Negative for confusion and hallucinations  Objective:      Ht 5' 7" (1 702 m)   Wt 119 kg (262 lb)   BMI 41 04 kg/m²          Physical Exam  Vitals signs and nursing note reviewed  Constitutional:       General: He is not in acute distress  Appearance: Normal appearance  He is not ill-appearing  HENT:      Head: Normocephalic  Right Ear: External ear normal  There is no impacted cerumen  Left Ear: External ear normal  There is no impacted cerumen  Nose: No congestion or rhinorrhea  Mouth/Throat:      Pharynx: No posterior oropharyngeal erythema  Eyes:      General: No scleral icterus  Right eye: No discharge  Left eye: No discharge  Neck:      Musculoskeletal: No neck rigidity  Vascular: No carotid bruit  Cardiovascular:      Rate and Rhythm: Normal rate and regular rhythm  Heart sounds: Normal heart sounds  No murmur  No friction rub  No gallop  Pulmonary:      Breath sounds: No wheezing or rhonchi  Abdominal:      General: There is no distension  Tenderness: There is no abdominal tenderness  There is no guarding  Musculoskeletal:         General: No swelling  Right lower leg: No edema  Left lower leg: No edema  Lymphadenopathy:      Cervical: No cervical adenopathy  Skin:     Coloration: Skin is not jaundiced  Neurological:      Mental Status: He is alert  Cranial Nerves: No cranial nerve deficit  Motor: No weakness        Coordination: Coordination normal    Psychiatric:         Mood and Affect: Mood normal

## 2021-03-19 ENCOUNTER — TELEPHONE (OUTPATIENT)
Dept: INTERNAL MEDICINE CLINIC | Facility: CLINIC | Age: 51
End: 2021-03-19

## 2021-03-19 DIAGNOSIS — M48.061 SPINAL STENOSIS OF LUMBAR REGION, UNSPECIFIED WHETHER NEUROGENIC CLAUDICATION PRESENT: Primary | ICD-10-CM

## 2021-03-19 RX ORDER — GABAPENTIN 300 MG/1
600 CAPSULE ORAL 3 TIMES DAILY
Qty: 180 CAPSULE | Refills: 3 | Status: SHIPPED | OUTPATIENT
Start: 2021-03-19 | End: 2021-04-18

## 2021-03-19 NOTE — TELEPHONE ENCOUNTER
Patient called, he can not swallow the 600 mg tablets of gabapentin, can you call in the 300 mg capsules, take 2 tablets, three times a day, #180 to [facility]

## 2021-04-15 DIAGNOSIS — I10 ESSENTIAL HYPERTENSION: ICD-10-CM

## 2021-04-15 NOTE — ASSESSMENT & PLAN NOTE
- -stable/resolved on most recent CT head  - neurosurgery consult appreciated   - Keppra for 7 days prophylaxis Patient now with second IV infiltration. LUE elevated and warm pack applied. Patient's mediport de-accessed earlier this morning. After discussing with charge nurse, it was decided not to re-access port and attempt another ultrasound guided IV insertion. Will relay information to next shift RN.

## 2021-05-03 ENCOUNTER — IMMUNIZATIONS (OUTPATIENT)
Dept: FAMILY MEDICINE CLINIC | Facility: HOSPITAL | Age: 51
End: 2021-05-03

## 2021-05-03 DIAGNOSIS — Z23 ENCOUNTER FOR IMMUNIZATION: Primary | ICD-10-CM

## 2021-05-03 PROCEDURE — 91300 SARS-COV-2 / COVID-19 MRNA VACCINE (PFIZER-BIONTECH) 30 MCG: CPT

## 2021-05-03 PROCEDURE — 0001A SARS-COV-2 / COVID-19 MRNA VACCINE (PFIZER-BIONTECH) 30 MCG: CPT

## 2021-05-20 DIAGNOSIS — S22.42XA CLOSED FRACTURE OF MULTIPLE RIBS OF LEFT SIDE: ICD-10-CM

## 2021-05-21 RX ORDER — PANTOPRAZOLE SODIUM 40 MG/1
TABLET, DELAYED RELEASE ORAL
Qty: 30 TABLET | Refills: 0 | Status: SHIPPED | OUTPATIENT
Start: 2021-05-21 | End: 2021-07-03 | Stop reason: SDUPTHER

## 2021-05-28 ENCOUNTER — IMMUNIZATIONS (OUTPATIENT)
Dept: FAMILY MEDICINE CLINIC | Facility: HOSPITAL | Age: 51
End: 2021-05-28

## 2021-05-28 DIAGNOSIS — Z23 ENCOUNTER FOR IMMUNIZATION: Primary | ICD-10-CM

## 2021-05-28 PROCEDURE — 91300 SARS-COV-2 / COVID-19 MRNA VACCINE (PFIZER-BIONTECH) 30 MCG: CPT

## 2021-05-28 PROCEDURE — 0002A SARS-COV-2 / COVID-19 MRNA VACCINE (PFIZER-BIONTECH) 30 MCG: CPT

## 2021-06-24 ENCOUNTER — TELEPHONE (OUTPATIENT)
Dept: INTERNAL MEDICINE CLINIC | Facility: CLINIC | Age: 51
End: 2021-06-24

## 2021-06-25 ENCOUNTER — TELEPHONE (OUTPATIENT)
Dept: INTERNAL MEDICINE CLINIC | Facility: CLINIC | Age: 51
End: 2021-06-25

## 2021-06-25 DIAGNOSIS — S22.42XA CLOSED FRACTURE OF MULTIPLE RIBS OF LEFT SIDE: ICD-10-CM

## 2021-07-03 RX ORDER — PANTOPRAZOLE SODIUM 40 MG/1
40 TABLET, DELAYED RELEASE ORAL DAILY
Qty: 30 TABLET | Refills: 5 | Status: SHIPPED | OUTPATIENT
Start: 2021-07-03 | End: 2021-11-22

## 2021-07-11 DIAGNOSIS — I10 ESSENTIAL HYPERTENSION: ICD-10-CM

## 2021-07-11 DIAGNOSIS — G56.21 LESION OF ULNAR NERVE, RIGHT UPPER LIMB: ICD-10-CM

## 2021-07-12 RX ORDER — GABAPENTIN 400 MG/1
CAPSULE ORAL
Qty: 180 CAPSULE | Refills: 4 | Status: SHIPPED | OUTPATIENT
Start: 2021-07-12 | End: 2021-11-23

## 2021-07-19 ENCOUNTER — TELEPHONE (OUTPATIENT)
Dept: INTERNAL MEDICINE CLINIC | Facility: CLINIC | Age: 51
End: 2021-07-19

## 2021-07-19 DIAGNOSIS — N52.9 ERECTILE DYSFUNCTION, UNSPECIFIED ERECTILE DYSFUNCTION TYPE: Primary | ICD-10-CM

## 2021-07-19 RX ORDER — SILDENAFIL 50 MG/1
50 TABLET, FILM COATED ORAL DAILY PRN
Qty: 10 TABLET | Refills: 0 | Status: SHIPPED | OUTPATIENT
Start: 2021-07-19 | End: 2022-05-03 | Stop reason: SDUPTHER

## 2021-08-03 NOTE — PROGRESS NOTES
Pt is anxious and depressed; focused on medications  He rates his low back as a 10  Received Fentanyl patch as ordered, 2 RN's witnessed administration  Pt denies S/H/I or AVH  He was eating snack in room prior to breakfast, he shared that he has lost 130 lbs in the past 1 1/2 years eating crackers and drinking Gatorade  Pt stated he was not hungry at lunch time, and had some snacks if was to get hungry  Pt does not attend groups despite encouragement  Will continue to monitor, provide support  Spironolactone Pregnancy And Lactation Text: This medication can cause feminization of the male fetus and should be avoided during pregnancy. The active metabolite is also found in breast milk. Topical Retinoid counseling:  Patient advised to apply a pea-sized amount only at bedtime and wait 30 minutes after washing their face before applying.  If too drying, patient may add a non-comedogenic moisturizer. The patient verbalized understanding of the proper use and possible adverse effects of retinoids.  All of the patient's questions and concerns were addressed. Isotretinoin Counseling: Patient should get monthly blood tests, not donate blood, not drive at night if vision affected, not share medication, and not undergo elective surgery for 6 months after tx completed. Side effects reviewed, pt to contact office should one occur. Bactrim Pregnancy And Lactation Text: This medication is Pregnancy Category D and is known to cause fetal risk.  It is also excreted in breast milk. Minocycline Pregnancy And Lactation Text: This medication is Pregnancy Category D and not consider safe during pregnancy. It is also excreted in breast milk. Bactrim Counseling:  I discussed with the patient the risks of sulfa antibiotics including but not limited to GI upset, allergic reaction, drug rash, diarrhea, dizziness, photosensitivity, and yeast infections.  Rarely, more serious reactions can occur including but not limited to aplastic anemia, agranulocytosis, methemoglobinemia, blood dyscrasias, liver or kidney failure, lung infiltrates or desquamative/blistering drug rashes. Minocycline Counseling: Patient advised regarding possible photosensitivity and discoloration of the teeth, skin, lips, tongue and gums.  Patient instructed to avoid sunlight, if possible.  When exposed to sunlight, patients should wear protective clothing, sunglasses, and sunscreen.  The patient was instructed to call the office immediately if the following severe adverse effects occur:  hearing changes, easy bruising/bleeding, severe headache, or vision changes.  The patient verbalized understanding of the proper use and possible adverse effects of minocycline.  All of the patient's questions and concerns were addressed. Dapsone Pregnancy And Lactation Text: This medication is Pregnancy Category C and is not considered safe during pregnancy or breast feeding. Detail Level: Zone Topical Clindamycin Counseling: Patient counseled that this medication may cause skin irritation or allergic reactions.  In the event of skin irritation, the patient was advised to reduce the amount of the drug applied or use it less frequently.   The patient verbalized understanding of the proper use and possible adverse effects of clindamycin.  All of the patient's questions and concerns were addressed. Isotretinoin Pregnancy And Lactation Text: This medication is Pregnancy Category X and is considered extremely dangerous during pregnancy. It is unknown if it is excreted in breast milk. Topical Retinoid Pregnancy And Lactation Text: This medication is Pregnancy Category C. It is unknown if this medication is excreted in breast milk. Birth Control Pills Counseling: Birth Control Pill Counseling: I discussed with the patient the potential side effects of OCPs including but not limited to increased risk of stroke, heart attack, thrombophlebitis, deep venous thrombosis, hepatic adenomas, breast changes, GI upset, headaches, and depression.  The patient verbalized understanding of the proper use and possible adverse effects of OCPs. All of the patient's questions and concerns were addressed. Sarecycline Counseling: Patient advised regarding possible photosensitivity and discoloration of the teeth, skin, lips, tongue and gums.  Patient instructed to avoid sunlight, if possible.  When exposed to sunlight, patients should wear protective clothing, sunglasses, and sunscreen.  The patient was instructed to call the office immediately if the following severe adverse effects occur:  hearing changes, easy bruising/bleeding, severe headache, or vision changes.  The patient verbalized understanding of the proper use and possible adverse effects of sarecycline.  All of the patient's questions and concerns were addressed. Doxycycline Pregnancy And Lactation Text: This medication is Pregnancy Category D and not consider safe during pregnancy. It is also excreted in breast milk but is considered safe for shorter treatment courses. Azithromycin Counseling:  I discussed with the patient the risks of azithromycin including but not limited to GI upset, allergic reaction, drug rash, diarrhea, and yeast infections. Topical Clindamycin Pregnancy And Lactation Text: This medication is Pregnancy Category B and is considered safe during pregnancy. It is unknown if it is excreted in breast milk. Doxycycline Counseling:  Patient counseled regarding possible photosensitivity and increased risk for sunburn.  Patient instructed to avoid sunlight, if possible.  When exposed to sunlight, patients should wear protective clothing, sunglasses, and sunscreen.  The patient was instructed to call the office immediately if the following severe adverse effects occur:  hearing changes, easy bruising/bleeding, severe headache, or vision changes.  The patient verbalized understanding of the proper use and possible adverse effects of doxycycline.  All of the patient's questions and concerns were addressed. Use Enhanced Medication Counseling?: No Tetracycline Counseling: Patient counseled regarding possible photosensitivity and increased risk for sunburn.  Patient instructed to avoid sunlight, if possible.  When exposed to sunlight, patients should wear protective clothing, sunglasses, and sunscreen.  The patient was instructed to call the office immediately if the following severe adverse effects occur:  hearing changes, easy bruising/bleeding, severe headache, or vision changes.  The patient verbalized understanding of the proper use and possible adverse effects of tetracycline.  All of the patient's questions and concerns were addressed. Patient understands to avoid pregnancy while on therapy due to potential birth defects. Birth Control Pills Pregnancy And Lactation Text: This medication should be avoided if pregnant and for the first 30 days post-partum. Tazorac Counseling:  Patient advised that medication is irritating and drying.  Patient may need to apply sparingly and wash off after an hour before eventually leaving it on overnight.  The patient verbalized understanding of the proper use and possible adverse effects of tazorac.  All of the patient's questions and concerns were addressed. Topical Sulfur Applications Pregnancy And Lactation Text: This medication is Pregnancy Category C and has an unknown safety profile during pregnancy. It is unknown if this topical medication is excreted in breast milk. Topical Sulfur Applications Counseling: Topical Sulfur Counseling: Patient counseled that this medication may cause skin irritation or allergic reactions.  In the event of skin irritation, the patient was advised to reduce the amount of the drug applied or use it less frequently.   The patient verbalized understanding of the proper use and possible adverse effects of topical sulfur application.  All of the patient's questions and concerns were addressed. High Dose Vitamin A Counseling: Side effects reviewed, pt to contact office should one occur. Spironolactone Counseling: Patient advised regarding risks of diarrhea, abdominal pain, hyperkalemia, birth defects (for female patients), liver toxicity and renal toxicity. The patient may need blood work to monitor liver and kidney function and potassium levels while on therapy. The patient verbalized understanding of the proper use and possible adverse effects of spironolactone.  All of the patient's questions and concerns were addressed. Tazorac Pregnancy And Lactation Text: This medication is not safe during pregnancy. It is unknown if this medication is excreted in breast milk. Erythromycin Pregnancy And Lactation Text: This medication is Pregnancy Category B and is considered safe during pregnancy. It is also excreted in breast milk. Benzoyl Peroxide Pregnancy And Lactation Text: This medication is Pregnancy Category C. It is unknown if benzoyl peroxide is excreted in breast milk. Erythromycin Counseling:  I discussed with the patient the risks of erythromycin including but not limited to GI upset, allergic reaction, drug rash, diarrhea, increase in liver enzymes, and yeast infections. Benzoyl Peroxide Counseling: Patient counseled that medicine may cause skin irritation and bleach clothing.  In the event of skin irritation, the patient was advised to reduce the amount of the drug applied or use it less frequently.   The patient verbalized understanding of the proper use and possible adverse effects of benzoyl peroxide.  All of the patient's questions and concerns were addressed. Azithromycin Pregnancy And Lactation Text: This medication is considered safe during pregnancy and is also secreted in breast milk. High Dose Vitamin A Pregnancy And Lactation Text: High dose vitamin A therapy is contraindicated during pregnancy and breast feeding. Dapsone Counseling: I discussed with the patient the risks of dapsone including but not limited to hemolytic anemia, agranulocytosis, rashes, methemoglobinemia, kidney failure, peripheral neuropathy, headaches, GI upset, and liver toxicity.  Patients who start dapsone require monitoring including baseline LFTs and weekly CBCs for the first month, then every month thereafter.  The patient verbalized understanding of the proper use and possible adverse effects of dapsone.  All of the patient's questions and concerns were addressed.

## 2021-08-24 ENCOUNTER — TELEPHONE (OUTPATIENT)
Dept: INTERNAL MEDICINE CLINIC | Facility: CLINIC | Age: 51
End: 2021-08-24

## 2021-08-24 DIAGNOSIS — Z12.5 SCREENING FOR PROSTATE CANCER: Primary | ICD-10-CM

## 2021-08-24 NOTE — TELEPHONE ENCOUNTER
Patient called, wants yearly bw done and psa, I told him he will have to come in for office visit when the results are in

## 2021-10-25 DIAGNOSIS — I10 ESSENTIAL HYPERTENSION: ICD-10-CM

## 2021-11-21 DIAGNOSIS — S22.42XA CLOSED FRACTURE OF MULTIPLE RIBS OF LEFT SIDE: ICD-10-CM

## 2021-11-22 RX ORDER — PANTOPRAZOLE SODIUM 40 MG/1
TABLET, DELAYED RELEASE ORAL
Qty: 30 TABLET | Refills: 0 | Status: SHIPPED | OUTPATIENT
Start: 2021-11-22 | End: 2021-12-21

## 2021-11-23 DIAGNOSIS — G56.21 LESION OF ULNAR NERVE, RIGHT UPPER LIMB: ICD-10-CM

## 2021-11-23 RX ORDER — GABAPENTIN 400 MG/1
CAPSULE ORAL
Qty: 180 CAPSULE | Refills: 4 | Status: SHIPPED | OUTPATIENT
Start: 2021-11-23 | End: 2022-06-11 | Stop reason: SDUPTHER

## 2021-12-20 DIAGNOSIS — S22.42XA CLOSED FRACTURE OF MULTIPLE RIBS OF LEFT SIDE: ICD-10-CM

## 2021-12-21 RX ORDER — PANTOPRAZOLE SODIUM 40 MG/1
TABLET, DELAYED RELEASE ORAL
Qty: 30 TABLET | Refills: 1 | Status: SHIPPED | OUTPATIENT
Start: 2021-12-21 | End: 2022-02-14

## 2022-02-14 DIAGNOSIS — S22.42XA CLOSED FRACTURE OF MULTIPLE RIBS OF LEFT SIDE: ICD-10-CM

## 2022-02-14 RX ORDER — PANTOPRAZOLE SODIUM 40 MG/1
TABLET, DELAYED RELEASE ORAL
Qty: 30 TABLET | Refills: 1 | Status: SHIPPED | OUTPATIENT
Start: 2022-02-14 | End: 2022-05-03 | Stop reason: SDUPTHER

## 2022-02-14 NOTE — TELEPHONE ENCOUNTER
Requested medication(s) are due for refill today: Yes  Patient has already received a courtesy refill: No  Other reason request has been forwarded to provider: need review

## 2022-04-08 DIAGNOSIS — I10 ESSENTIAL HYPERTENSION: ICD-10-CM

## 2022-05-03 ENCOUNTER — OFFICE VISIT (OUTPATIENT)
Dept: INTERNAL MEDICINE CLINIC | Facility: CLINIC | Age: 52
End: 2022-05-03
Payer: COMMERCIAL

## 2022-05-03 VITALS
HEIGHT: 67 IN | OXYGEN SATURATION: 97 % | SYSTOLIC BLOOD PRESSURE: 120 MMHG | TEMPERATURE: 97 F | WEIGHT: 287 LBS | DIASTOLIC BLOOD PRESSURE: 70 MMHG | HEART RATE: 74 BPM | BODY MASS INDEX: 45.04 KG/M2 | RESPIRATION RATE: 12 BRPM

## 2022-05-03 DIAGNOSIS — E78.2 MIXED HYPERLIPIDEMIA: ICD-10-CM

## 2022-05-03 DIAGNOSIS — R73.03 PREDIABETES: ICD-10-CM

## 2022-05-03 DIAGNOSIS — Z12.11 SCREENING FOR COLON CANCER: ICD-10-CM

## 2022-05-03 DIAGNOSIS — F41.1 GENERALIZED ANXIETY DISORDER: ICD-10-CM

## 2022-05-03 DIAGNOSIS — I10 PRIMARY HYPERTENSION: ICD-10-CM

## 2022-05-03 DIAGNOSIS — Z13.6 SCREENING FOR CARDIOVASCULAR CONDITION: ICD-10-CM

## 2022-05-03 DIAGNOSIS — S06.5X9A SUBDURAL HEMATOMA (HCC): ICD-10-CM

## 2022-05-03 DIAGNOSIS — F20.9 SCHIZOPHRENIA, UNSPECIFIED TYPE (HCC): ICD-10-CM

## 2022-05-03 DIAGNOSIS — F31.9 BIPOLAR 1 DISORDER (HCC): ICD-10-CM

## 2022-05-03 DIAGNOSIS — S22.42XA CLOSED FRACTURE OF MULTIPLE RIBS OF LEFT SIDE: ICD-10-CM

## 2022-05-03 DIAGNOSIS — I10 ESSENTIAL HYPERTENSION: ICD-10-CM

## 2022-05-03 DIAGNOSIS — N52.9 ERECTILE DYSFUNCTION, UNSPECIFIED ERECTILE DYSFUNCTION TYPE: ICD-10-CM

## 2022-05-03 DIAGNOSIS — G89.4 CHRONIC PAIN DISORDER: ICD-10-CM

## 2022-05-03 DIAGNOSIS — R94.31 PROLONGED Q-T INTERVAL ON ECG: ICD-10-CM

## 2022-05-03 DIAGNOSIS — Z12.5 SCREENING FOR PROSTATE CANCER: ICD-10-CM

## 2022-05-03 DIAGNOSIS — F19.20 DRUG ABUSE AND DEPENDENCE (HCC): ICD-10-CM

## 2022-05-03 DIAGNOSIS — E11.9 TYPE 2 DIABETES MELLITUS WITHOUT COMPLICATION, WITHOUT LONG-TERM CURRENT USE OF INSULIN (HCC): ICD-10-CM

## 2022-05-03 DIAGNOSIS — M48.061 SPINAL STENOSIS OF LUMBAR REGION, UNSPECIFIED WHETHER NEUROGENIC CLAUDICATION PRESENT: Primary | ICD-10-CM

## 2022-05-03 PROCEDURE — 99214 OFFICE O/P EST MOD 30 MIN: CPT | Performed by: INTERNAL MEDICINE

## 2022-05-03 PROCEDURE — 1036F TOBACCO NON-USER: CPT | Performed by: INTERNAL MEDICINE

## 2022-05-03 PROCEDURE — 3008F BODY MASS INDEX DOCD: CPT | Performed by: INTERNAL MEDICINE

## 2022-05-03 RX ORDER — SILDENAFIL 50 MG/1
50 TABLET, FILM COATED ORAL DAILY PRN
Qty: 10 TABLET | Refills: 0 | Status: SHIPPED | OUTPATIENT
Start: 2022-05-03

## 2022-05-03 RX ORDER — TRAZODONE HYDROCHLORIDE 50 MG/1
50 TABLET ORAL
COMMUNITY
Start: 2022-01-31

## 2022-05-03 RX ORDER — LITHIUM CARBONATE 300 MG/1
300 TABLET, FILM COATED, EXTENDED RELEASE ORAL
COMMUNITY
Start: 2022-01-31 | End: 2022-05-03

## 2022-05-03 RX ORDER — HYDROXYZINE HYDROCHLORIDE 25 MG/1
TABLET, FILM COATED ORAL
COMMUNITY
Start: 2022-04-13

## 2022-05-03 RX ORDER — PANTOPRAZOLE SODIUM 40 MG/1
40 TABLET, DELAYED RELEASE ORAL DAILY
Qty: 30 TABLET | Refills: 1 | Status: SHIPPED | OUTPATIENT
Start: 2022-05-03 | End: 2022-06-01

## 2022-05-03 RX ORDER — PALIPERIDONE 1.5 MG/1
1.5 TABLET, EXTENDED RELEASE ORAL
COMMUNITY
Start: 2022-01-31 | End: 2022-05-03

## 2022-05-03 NOTE — PROGRESS NOTES
BMI Counseling: Body mass index is 44 95 kg/m²  The BMI is above normal  Nutrition recommendations include decreasing portion sizes  Exercise recommendations include exercising 3-5 times per week  Patient referred to PCP  Rationale for BMI follow-up plan is due to patient being overweight or obese  Depression Screening and Follow-up Plan: Clincally patient does not have depression  No treatment is required  Assessment/Plan:    No problem-specific Assessment & Plan notes found for this encounter  Diagnoses and all orders for this visit:    Spinal stenosis of lumbar region, unspecified whether neurogenic claudication present    [facility]  [facility]  [facility]  Orders:  -     CBC and differential; Future  -     CBC and differential    Mixed hyperlipidemia  -     Lipid panel; Future  -     Comprehensive metabolic panel; Future  -     Lipid panel  -     Comprehensive metabolic panel    Bipolar 1 disorder (HCC)    Prediabetes  -     Microalbumin / creatinine urine ratio  -     Hemoglobin A1C; Future  -     Microalbumin,Urine    Prolonged Q-T interval on ECG    Drug abuse and dependence (HCC)    Chronic pain disorder    Generalized anxiety disorder  -     TSH, 3rd generation with Free T4 reflex; Future  -     TSH, 3rd generation with Free T4 reflex    Primary hypertension    Screening for cardiovascular condition    Screening for prostate cancer  -     PSA, Total Screen; Future  -     PSA, Total Screen    Type 2 diabetes mellitus without complication, without long-term current use of insulin (HCC)    Subdural hematoma (HCC)    Screening for colon cancer  -     Cologidris    Other orders  -     Discontinue: lithium carbonate (LITHOBID) 300 mg CR tablet; Take 300 mg by mouth daily at bedtime  -     traZODone (DESYREL) 50 mg tablet; Take 50 mg by mouth daily at bedtime  -     Discontinue: paliperidone (INVEGA) 1 5 MG 24 hr tablet;  Take 1 5 mg by mouth daily at bedtime  -     hydrOXYzine HCL (ATARAX) 25 mg tablet; TAKE 1 OR 2 TABLETS BY MOUTH DAILY          Subjective:      Patient ID: Arya Echevarria is a 47 y o  male  Last ov 2/2021--never got labs or did 3 mo follow up  Oldest son   32  Youngest son passed -heaven  Wt [employer]? The following portions of the patient's history were reviewed and updated as appropriate: allergies, current medications, past family history, past medical history, past social history, past surgical history, and problem list     Review of Systems   Constitutional: Negative for activity change and fatigue  HENT: Negative for ear discharge, ear pain, rhinorrhea and sore throat  Eyes: Negative for pain and visual disturbance  Respiratory: Negative for cough and shortness of breath  Cardiovascular: Negative for chest pain and leg swelling  Gastrointestinal: Negative for abdominal pain, constipation and diarrhea  Endocrine: Negative for cold intolerance and polyuria  Genitourinary: Negative for flank pain and hematuria  Musculoskeletal: Negative for back pain and joint swelling  Skin: Negative for pallor and wound  Neurological: Negative for dizziness, seizures and speech difficulty  Psychiatric/Behavioral: Negative for confusion and hallucinations  Objective:      /70   Pulse 74   Temp (!) 97 °F (36 1 °C)   Resp 12   Ht 5' 7" (1 702 m)   Wt 130 kg (287 lb)   SpO2 97%   BMI 44 95 kg/m²          Physical Exam  Vitals and nursing note reviewed  Constitutional:       General: He is not in acute distress  Appearance: Normal appearance  He is not ill-appearing  HENT:      Head: Normocephalic  Right Ear: External ear normal  There is no impacted cerumen  Left Ear: External ear normal  There is no impacted cerumen  Nose: No congestion or rhinorrhea  Mouth/Throat:      Pharynx: No posterior oropharyngeal erythema  Eyes:      General: No scleral icterus          Right eye: No discharge  Left eye: No discharge  Neck:      Vascular: No carotid bruit  Cardiovascular:      Rate and Rhythm: Normal rate and regular rhythm  Pulses: no weak pulses          Dorsalis pedis pulses are 2+ on the right side and 2+ on the left side  Heart sounds: Normal heart sounds  No murmur heard  No friction rub  No gallop  Pulmonary:      Breath sounds: No wheezing or rhonchi  Abdominal:      General: There is no distension  Tenderness: There is no abdominal tenderness  There is no guarding  Musculoskeletal:         General: No swelling  Cervical back: No rigidity  Right lower leg: No edema  Left lower leg: No edema  Feet:      Right foot:      Skin integrity: No ulcer, skin breakdown, erythema, warmth, callus or dry skin  Left foot:      Skin integrity: No ulcer, skin breakdown, erythema, warmth, callus or dry skin  Lymphadenopathy:      Cervical: No cervical adenopathy  Skin:     Coloration: Skin is not jaundiced  Neurological:      Mental Status: He is alert  Cranial Nerves: No cranial nerve deficit  Motor: No weakness  Coordination: Coordination normal    Psychiatric:         Mood and Affect: Mood normal        Diabetic Foot Exam    Patient's shoes and socks removed  Right Foot/Ankle   Right Foot Inspection  Skin Exam: skin normal and skin intact  No dry skin, no warmth, no callus, no erythema, no maceration, no abnormal color, no pre-ulcer, no ulcer and no callus  Toe Exam: ROM and strength within normal limits  Sensory   Monofilament testing: intact    Vascular  The right DP pulse is 2+  Left Foot/Ankle  Left Foot Inspection  Skin Exam: skin normal and skin intact  No dry skin, no warmth, no erythema, no maceration, normal color, no pre-ulcer, no ulcer and no callus  Toe Exam: ROM and strength within normal limits  Sensory   Monofilament testing: intact    Vascular  The left DP pulse is 2+  Assign Risk Category  No deformity present  No loss of protective sensation  No weak pulses  Risk: 0

## 2022-05-16 LAB — COLOGUARD RESULT REPORTABLE: NORMAL

## 2022-06-01 DIAGNOSIS — S22.42XA CLOSED FRACTURE OF MULTIPLE RIBS OF LEFT SIDE: ICD-10-CM

## 2022-06-01 DIAGNOSIS — I10 ESSENTIAL HYPERTENSION: ICD-10-CM

## 2022-06-01 RX ORDER — PANTOPRAZOLE SODIUM 40 MG/1
40 TABLET, DELAYED RELEASE ORAL DAILY
Qty: 30 TABLET | Refills: 1 | Status: SHIPPED | OUTPATIENT
Start: 2022-06-01

## 2022-06-11 DIAGNOSIS — G56.21 LESION OF ULNAR NERVE, RIGHT UPPER LIMB: ICD-10-CM

## 2022-06-11 RX ORDER — GABAPENTIN 400 MG/1
800 CAPSULE ORAL EVERY 8 HOURS
Qty: 180 CAPSULE | Refills: 0 | Status: SHIPPED | OUTPATIENT
Start: 2022-06-11

## 2022-06-11 NOTE — TELEPHONE ENCOUNTER
Medication Refill Request     Name gabapentin (NEURONTIN) 400 mg capsule  Dose/Frequency TAKE TWO CAPSULES BY MOUTH EVERY 8 HOURS  Quantity 180 ca[si;es  Verified pharmacy   [x]  Verified ordering Provider   [x]  Verified enough for 3 days  []  Will be out of medication on 6/13/2022

## 2022-06-24 DIAGNOSIS — I10 ESSENTIAL HYPERTENSION: ICD-10-CM

## 2022-07-12 DIAGNOSIS — I10 ESSENTIAL HYPERTENSION: ICD-10-CM

## 2022-07-12 RX ORDER — LITHIUM CARBONATE 300 MG/1
600 TABLET, FILM COATED, EXTENDED RELEASE ORAL
COMMUNITY
Start: 2022-06-10

## 2022-10-11 PROBLEM — Z12.11 SCREENING FOR COLON CANCER: Status: RESOLVED | Noted: 2020-11-06 | Resolved: 2022-10-11

## 2022-10-12 DIAGNOSIS — G56.21 LESION OF ULNAR NERVE, RIGHT UPPER LIMB: ICD-10-CM

## 2022-10-12 RX ORDER — GABAPENTIN 400 MG/1
800 CAPSULE ORAL EVERY 8 HOURS
Qty: 180 CAPSULE | Refills: 0 | Status: SHIPPED | OUTPATIENT
Start: 2022-10-12

## 2022-10-13 ENCOUNTER — TELEPHONE (OUTPATIENT)
Dept: INTERNAL MEDICINE CLINIC | Facility: CLINIC | Age: 52
End: 2022-10-13

## 2022-10-13 DIAGNOSIS — N52.9 ERECTILE DYSFUNCTION, UNSPECIFIED ERECTILE DYSFUNCTION TYPE: ICD-10-CM

## 2022-10-13 RX ORDER — SILDENAFIL 50 MG/1
50 TABLET, FILM COATED ORAL DAILY PRN
Qty: 20 TABLET | Refills: 0 | Status: SHIPPED | OUTPATIENT
Start: 2022-10-13 | End: 2022-10-28

## 2022-10-21 NOTE — ASSESSMENT & PLAN NOTE
- rib fracture protocol  - PT/OT /PMR  - Multimodal pain regimen  - APS following, appreciate recommendations Medication teaching and assessment/Rehabilitation services/Teaching and training

## 2022-10-28 ENCOUNTER — TELEPHONE (OUTPATIENT)
Dept: INTERNAL MEDICINE CLINIC | Facility: CLINIC | Age: 52
End: 2022-10-28

## 2022-10-28 DIAGNOSIS — N52.9 ERECTILE DYSFUNCTION, UNSPECIFIED ERECTILE DYSFUNCTION TYPE: Primary | ICD-10-CM

## 2022-10-28 RX ORDER — SILDENAFIL 100 MG/1
100 TABLET, FILM COATED ORAL DAILY PRN
Qty: 10 TABLET | Refills: 4 | Status: SHIPPED | OUTPATIENT
Start: 2022-10-28

## 2022-10-28 NOTE — TELEPHONE ENCOUNTER
Patient called asking for refill of viagra, he wants 100mg instead of 50mg, said the 50mg he has to take an extra tab most times

## 2022-11-30 DIAGNOSIS — I10 ESSENTIAL HYPERTENSION: ICD-10-CM

## 2022-12-02 DIAGNOSIS — G56.21 LESION OF ULNAR NERVE, RIGHT UPPER LIMB: ICD-10-CM

## 2022-12-02 RX ORDER — GABAPENTIN 400 MG/1
CAPSULE ORAL
Qty: 180 CAPSULE | Refills: 0 | Status: SHIPPED | OUTPATIENT
Start: 2022-12-02

## 2022-12-16 DIAGNOSIS — N52.9 ERECTILE DYSFUNCTION, UNSPECIFIED ERECTILE DYSFUNCTION TYPE: ICD-10-CM

## 2022-12-16 RX ORDER — SILDENAFIL 100 MG/1
100 TABLET, FILM COATED ORAL DAILY PRN
Qty: 10 TABLET | Refills: 0 | Status: SHIPPED | OUTPATIENT
Start: 2022-12-16

## 2022-12-16 NOTE — TELEPHONE ENCOUNTER
Medication Refill Request     Name Sildenafil   Dose/Frequency 100mg daily PRN  Quantity 10  Verified pharmacy   [x]  Verified ordering Provider   [x]  Does patient have enough for the next 3 days?  Yes [] No [x]

## 2022-12-29 ENCOUNTER — OFFICE VISIT (OUTPATIENT)
Dept: INTERNAL MEDICINE CLINIC | Facility: CLINIC | Age: 52
End: 2022-12-29

## 2022-12-29 VITALS
RESPIRATION RATE: 12 BRPM | BODY MASS INDEX: 40.49 KG/M2 | SYSTOLIC BLOOD PRESSURE: 130 MMHG | WEIGHT: 258 LBS | HEIGHT: 67 IN | OXYGEN SATURATION: 98 % | DIASTOLIC BLOOD PRESSURE: 70 MMHG | HEART RATE: 74 BPM | TEMPERATURE: 97.8 F

## 2022-12-29 DIAGNOSIS — F19.20 DRUG ABUSE AND DEPENDENCE (HCC): ICD-10-CM

## 2022-12-29 DIAGNOSIS — G89.4 CHRONIC PAIN DISORDER: ICD-10-CM

## 2022-12-29 DIAGNOSIS — N52.9 ERECTILE DYSFUNCTION, UNSPECIFIED ERECTILE DYSFUNCTION TYPE: ICD-10-CM

## 2022-12-29 DIAGNOSIS — Z12.11 SCREENING FOR COLORECTAL CANCER: ICD-10-CM

## 2022-12-29 DIAGNOSIS — Z12.12 SCREENING FOR COLORECTAL CANCER: ICD-10-CM

## 2022-12-29 DIAGNOSIS — Z00.00 MEDICARE ANNUAL WELLNESS VISIT, SUBSEQUENT: Primary | ICD-10-CM

## 2022-12-29 DIAGNOSIS — F80.81 STUTTER: ICD-10-CM

## 2022-12-29 DIAGNOSIS — F31.9 BIPOLAR 1 DISORDER (HCC): ICD-10-CM

## 2022-12-29 DIAGNOSIS — E11.9 TYPE 2 DIABETES MELLITUS WITHOUT COMPLICATION, WITHOUT LONG-TERM CURRENT USE OF INSULIN (HCC): ICD-10-CM

## 2022-12-29 DIAGNOSIS — R90.82 WHITE MATTER ABNORMALITY ON MRI OF BRAIN: ICD-10-CM

## 2022-12-29 DIAGNOSIS — Z12.5 SCREENING FOR PROSTATE CANCER: ICD-10-CM

## 2022-12-29 DIAGNOSIS — S06.5X9A TRAUMATIC SUBDURAL HEMORRHAGE WITH LOSS OF CONSCIOUSNESS OF UNSPECIFIED DURATION, INITIAL ENCOUNTER (HCC): ICD-10-CM

## 2022-12-29 DIAGNOSIS — I10 PRIMARY HYPERTENSION: ICD-10-CM

## 2022-12-29 DIAGNOSIS — F20.9 SCHIZOPHRENIA, UNSPECIFIED TYPE (HCC): ICD-10-CM

## 2022-12-29 DIAGNOSIS — F41.1 GENERALIZED ANXIETY DISORDER: ICD-10-CM

## 2022-12-29 DIAGNOSIS — M48.061 SPINAL STENOSIS OF LUMBAR REGION, UNSPECIFIED WHETHER NEUROGENIC CLAUDICATION PRESENT: ICD-10-CM

## 2022-12-29 DIAGNOSIS — F31.62 BIPOLAR DISORDER, CURRENT EPISODE MIXED, MODERATE (HCC): ICD-10-CM

## 2022-12-29 LAB — SL AMB POCT HEMOGLOBIN AIC: 6.8 (ref ?–6.5)

## 2022-12-29 RX ORDER — SILDENAFIL 100 MG/1
100 TABLET, FILM COATED ORAL DAILY PRN
Qty: 10 TABLET | Refills: 0 | Status: SHIPPED | OUTPATIENT
Start: 2022-12-29

## 2022-12-29 NOTE — PATIENT INSTRUCTIONS
Medicare Preventive Visit Patient Instructions  Thank you for completing your Welcome to Medicare Visit or Medicare Annual Wellness Visit today  Your next wellness visit will be due in one year (12/30/2023)  The screening/preventive services that you may require over the next 5-10 years are detailed below  Some tests may not apply to you based off risk factors and/or age  Screening tests ordered at today's visit but not completed yet may show as past due  Also, please note that scanned in results may not display below  Preventive Screenings:  Service Recommendations Previous Testing/Comments   Colorectal Cancer Screening  · Colonoscopy    · Fecal Occult Blood Test (FOBT)/Fecal Immunochemical Test (FIT)  · Fecal DNA/Cologuard Test  · Flexible Sigmoidoscopy Age: 39-70 years old   Colonoscopy: every 10 years (May be performed more frequently if at higher risk)  OR  FOBT/FIT: every 1 year  OR  Cologuard: every 3 years  OR  Sigmoidoscopy: every 5 years  Screening may be recommended earlier than age 39 if at higher risk for colorectal cancer  Also, an individualized decision between you and your healthcare provider will decide whether screening between the ages of 74-80 would be appropriate   Colonoscopy: Not on file  FOBT/FIT: Not on file  Cologuard: 05/16/2022  Sigmoidoscopy: Not on file          Prostate Cancer Screening Individualized decision between patient and health care provider in men between ages of 53-78   Medicare will cover every 12 months beginning on the day after your 50th birthday PSA: No results in last 5 years           Hepatitis C Screening Once for adults born between 1945 and 1965  More frequently in patients at high risk for Hepatitis C Hep C Antibody: 08/16/2018        Diabetes Screening 1-2 times per year if you're at risk for diabetes or have pre-diabetes Fasting glucose: 105 mg/dL (11/5/2020)  A1C: 5 7 % (11/6/2020)      Cholesterol Screening Once every 5 years if you don't have a lipid disorder  May order more often based on risk factors  Lipid panel: 11/06/2020         Other Preventive Screenings Covered by Medicare:  1  Abdominal Aortic Aneurysm (AAA) Screening: covered once if your at risk  You're considered to be at risk if you have a family history of AAA or a male between the age of 73-68 who smoking at least 100 cigarettes in your lifetime  2  Lung Cancer Screening: covers low dose CT scan once per year if you meet all of the following conditions: (1) Age 50-69; (2) No signs or symptoms of lung cancer; (3) Current smoker or have quit smoking within the last 15 years; (4) You have a tobacco smoking history of at least 20 pack years (packs per day x number of years you smoked); (5) You get a written order from a healthcare provider  3  Glaucoma Screening: covered annually if you're considered high risk: (1) You have diabetes OR (2) Family history of glaucoma OR (3)  aged 48 and older OR (3)  American aged 72 and older  3  Osteoporosis Screening: covered every 2 years if you meet one of the following conditions: (1) Have a vertebral abnormality; (2) On glucocorticoid therapy for more than 3 months; (3) Have primary hyperparathyroidism; (4) On osteoporosis medications and need to assess response to drug therapy  5  HIV Screening: covered annually if you're between the age of 12-76  Also covered annually if you are younger than 13 and older than 72 with risk factors for HIV infection  For pregnant patients, it is covered up to 3 times per pregnancy      Immunizations:  Immunization Recommendations   Influenza Vaccine Annual influenza vaccination during flu season is recommended for all persons aged >= 6 months who do not have contraindications   Pneumococcal Vaccine   * Pneumococcal conjugate vaccine = PCV13 (Prevnar 13), PCV15 (Vaxneuvance), PCV20 (Prevnar 20)  * Pneumococcal polysaccharide vaccine = PPSV23 (Pneumovax) Adults 25-60 years old: 1-3 doses may be recommended based on certain risk factors  Adults 72 years old: 1-2 doses may be recommended based off what pneumonia vaccine you previously received   Hepatitis B Vaccine 3 dose series if at intermediate or high risk (ex: diabetes, end stage renal disease, liver disease)   Tetanus (Td) Vaccine - COST NOT COVERED BY MEDICARE PART B Following completion of primary series, a booster dose should be given every 10 years to maintain immunity against tetanus  Td may also be given as tetanus wound prophylaxis  Tdap Vaccine - COST NOT COVERED BY MEDICARE PART B Recommended at least once for all adults  For pregnant patients, recommended with each pregnancy  Shingles Vaccine (Shingrix) - COST NOT COVERED BY MEDICARE PART B  2 shot series recommended in those aged 48 and above     Health Maintenance Due:      Topic Date Due   • HIV Screening  05/03/2024 (Originally 2/20/1985)   • Colorectal Cancer Screening  05/16/2025   • Hepatitis C Screening  Completed     Immunizations Due:      Topic Date Due   • Hepatitis A Vaccine (1 of 2 - Risk 2-dose series) Never done   • Pneumococcal Vaccine: Pediatrics (0 to 5 Years) and At-Risk Patients (6 to 59 Years) (1 - PCV) Never done   • COVID-19 Vaccine (3 - Booster for Pfizer series) 07/23/2021   • Influenza Vaccine (1) Never done     Advance Directives   What are advance directives? Advance directives are legal documents that state your wishes and plans for medical care  These plans are made ahead of time in case you lose your ability to make decisions for yourself  Advance directives can apply to any medical decision, such as the treatments you want, and if you want to donate organs  What are the types of advance directives? There are many types of advance directives, and each state has rules about how to use them  You may choose a combination of any of the following:  · Living will: This is a written record of the treatment you want   You can also choose which treatments you do not want, which to limit, and which to stop at a certain time  This includes surgery, medicine, IV fluid, and tube feedings  · Durable power of  for healthcare [facility]): This is a written record that states who you want to make healthcare choices for you when you are unable to make them for yourself  This person, called a proxy, is usually a family member or a friend  You may choose more than 1 proxy  · Do not resuscitate (DNR) order:  A DNR order is used in case your heart stops beating or you stop breathing  It is a request not to have certain forms of treatment, such as CPR  A DNR order may be included in other types of advance directives  · Medical directive: This covers the care that you want if you are in a coma, near death, or unable to make decisions for yourself  You can list the treatments you want for each condition  Treatment may include pain medicine, surgery, blood transfusions, dialysis, IV or tube feedings, and a ventilator (breathing machine)  · Values history: This document has questions about your views, beliefs, and how you feel and think about life  This information can help others choose the care that you would choose  Why are advance directives important? An advance directive helps you control your care  Although spoken wishes may be used, it is better to have your wishes written down  Spoken wishes can be misunderstood, or not followed  Treatments may be given even if you do not want them  An advance directive may make it easier for your family to make difficult choices about your care  Weight Management   Why it is important to manage your weight:  Being overweight increases your risk of health conditions such as heart disease, high blood pressure, type 2 diabetes, and certain types of cancer  It can also increase your risk for osteoarthritis, sleep apnea, and other respiratory problems  Aim for a slow, steady weight loss   Even a small amount of weight loss can lower your risk of health problems  How to lose weight safely:  A safe and healthy way to lose weight is to eat fewer calories and get regular exercise  You can lose up about 1 pound a week by decreasing the number of calories you eat by 500 calories each day  Healthy meal plan for weight management:  A healthy meal plan includes a variety of foods, contains fewer calories, and helps you stay healthy  A healthy meal plan includes the following:  · Eat whole-grain foods more often  A healthy meal plan should contain fiber  Fiber is the part of grains, fruits, and vegetables that is not broken down by your body  Whole-grain foods are healthy and provide extra fiber in your diet  Some examples of whole-grain foods are whole-wheat breads and pastas, oatmeal, brown rice, and bulgur  · Eat a variety of vegetables every day  Include dark, leafy greens such as spinach, kale, kobe greens, and mustard greens  Eat yellow and orange vegetables such as carrots, sweet potatoes, and winter squash  · Eat a variety of fruits every day  Choose fresh or canned fruit (canned in its own juice or light syrup) instead of juice  Fruit juice has very little or no fiber  · Eat low-fat dairy foods  Drink fat-free (skim) milk or 1% milk  Eat fat-free yogurt and low-fat cottage cheese  Try low-fat cheeses such as mozzarella and other reduced-fat cheeses  · Choose meat and other protein foods that are low in fat  Choose beans or other legumes such as split peas or lentils  Choose fish, skinless poultry (chicken or turkey), or lean cuts of red meat (beef or pork)  Before you cook meat or poultry, cut off any visible fat  · Use less fat and oil  Try baking foods instead of frying them  Add less fat, such as margarine, sour cream, regular salad dressing and mayonnaise to foods  Eat fewer high-fat foods  Some examples of high-fat foods include french fries, doughnuts, ice cream, and cakes  · Eat fewer sweets    Limit foods and drinks that are high in sugar  This includes candy, cookies, regular soda, and sweetened drinks  Exercise:  Exercise at least 30 minutes per day on most days of the week  Some examples of exercise include walking, biking, dancing, and swimming  You can also fit in more physical activity by taking the stairs instead of the elevator or parking farther away from stores  Ask your healthcare provider about the best exercise plan for you  © Copyright [employer] 2018 Information is for End User's use only and may not be sold, redistributed or otherwise used for commercial purposes   All illustrations and images included in CareNotes® are the copyrighted property of [employer]  or [address] [place] [address]
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
## 2023-01-03 ENCOUNTER — TELEPHONE (OUTPATIENT)
Dept: INTERNAL MEDICINE CLINIC | Facility: CLINIC | Age: 53
End: 2023-01-03

## 2023-01-03 DIAGNOSIS — K21.9 GASTROESOPHAGEAL REFLUX DISEASE WITHOUT ESOPHAGITIS: Primary | ICD-10-CM

## 2023-01-03 DIAGNOSIS — G56.21 LESION OF ULNAR NERVE, RIGHT UPPER LIMB: ICD-10-CM

## 2023-01-03 RX ORDER — GABAPENTIN 400 MG/1
800 CAPSULE ORAL EVERY 8 HOURS
Qty: 180 CAPSULE | Refills: 2 | Status: SHIPPED | OUTPATIENT
Start: 2023-01-03

## 2023-01-03 RX ORDER — PANTOPRAZOLE SODIUM 40 MG/1
40 TABLET, DELAYED RELEASE ORAL
Qty: 30 TABLET | Refills: 5 | Status: SHIPPED | OUTPATIENT
Start: 2023-01-03

## 2023-01-09 ENCOUNTER — TELEPHONE (OUTPATIENT)
Dept: INTERNAL MEDICINE CLINIC | Facility: CLINIC | Age: 53
End: 2023-01-09

## 2023-01-09 RX ORDER — TRAZODONE HYDROCHLORIDE 50 MG/1
100 TABLET ORAL
COMMUNITY
Start: 2023-01-02 | End: 2023-07-06

## 2023-01-09 RX ORDER — DULOXETIN HYDROCHLORIDE 30 MG/1
30 CAPSULE, DELAYED RELEASE ORAL DAILY
COMMUNITY
Start: 2023-01-02

## 2023-01-09 NOTE — TELEPHONE ENCOUNTER
Patient called, his neurontin is not working, he wants to increase to 600 mg , (HE WANTS TABLETS) 2 tablets every 8 hours #180

## 2023-01-19 ENCOUNTER — TELEPHONE (OUTPATIENT)
Dept: NEUROLOGY | Facility: CLINIC | Age: 53
End: 2023-01-19

## 2023-01-19 NOTE — TELEPHONE ENCOUNTER
Reminder appt call     Patient is scheduled on 01/30/2023 @ 8 am with an arrival time  745 am in the [facility] location with Dr David Mcallister  Left message on voicemail

## 2023-01-30 ENCOUNTER — TELEPHONE (OUTPATIENT)
Dept: NEUROLOGY | Facility: CLINIC | Age: 53
End: 2023-01-30

## 2023-01-30 NOTE — TELEPHONE ENCOUNTER
Patient is a No Show for his visit with Neurology today  Please schedule visit with [facility] if patient calls back

## 2023-02-27 PROBLEM — Z12.5 SCREENING FOR PROSTATE CANCER: Status: RESOLVED | Noted: 2020-11-06 | Resolved: 2023-02-27

## 2023-03-03 DIAGNOSIS — G56.21 LESION OF ULNAR NERVE, RIGHT UPPER LIMB: ICD-10-CM

## 2023-03-04 RX ORDER — GABAPENTIN 400 MG/1
CAPSULE ORAL
Qty: 180 CAPSULE | Refills: 2 | Status: SHIPPED | OUTPATIENT
Start: 2023-03-04

## 2023-03-16 DIAGNOSIS — N52.9 ERECTILE DYSFUNCTION, UNSPECIFIED ERECTILE DYSFUNCTION TYPE: ICD-10-CM

## 2023-03-16 RX ORDER — SILDENAFIL 100 MG/1
100 TABLET, FILM COATED ORAL DAILY PRN
Qty: 15 TABLET | Refills: 0 | Status: SHIPPED | OUTPATIENT
Start: 2023-03-16

## 2023-04-01 DIAGNOSIS — N52.9 ERECTILE DYSFUNCTION, UNSPECIFIED ERECTILE DYSFUNCTION TYPE: ICD-10-CM

## 2023-04-01 RX ORDER — SILDENAFIL 100 MG/1
100 TABLET, FILM COATED ORAL DAILY PRN
Qty: 15 TABLET | Refills: 0 | Status: SHIPPED | OUTPATIENT
Start: 2023-04-01

## 2023-04-01 NOTE — TELEPHONE ENCOUNTER
Medication Refill Request     Name sildenafil (VIAGRA) 100 mg tablet  Dose/Frequency Take 1 tablet (100 mg total) by mouth daily as needed for erectile dysfunction  Quantity 15  Verified pharmacy   [x]  Verified ordering Provider   [x]  Does patient have enough for the next 3 days?  Yes [] No [x]

## 2023-04-29 ENCOUNTER — HOSPITAL ENCOUNTER (INPATIENT)
Facility: HOSPITAL | Age: 53
LOS: 4 days | End: 2023-05-03
Attending: EMERGENCY MEDICINE | Admitting: HOSPITALIST

## 2023-04-29 DIAGNOSIS — F29 PSYCHOSIS (HCC): ICD-10-CM

## 2023-04-29 DIAGNOSIS — F31.62 BIPOLAR DISORDER, CURRENT EPISODE MIXED, MODERATE (HCC): ICD-10-CM

## 2023-04-29 DIAGNOSIS — E87.6 HYPOKALEMIA: ICD-10-CM

## 2023-04-29 DIAGNOSIS — N17.9 AKI (ACUTE KIDNEY INJURY) (HCC): Primary | ICD-10-CM

## 2023-04-29 PROBLEM — R79.89 ELEVATED SERUM CREATININE: Status: ACTIVE | Noted: 2023-04-29

## 2023-04-29 LAB
ALBUMIN SERPL BCP-MCNC: 4.9 G/DL (ref 3.5–5)
ALP SERPL-CCNC: 111 U/L (ref 34–104)
ALT SERPL W P-5'-P-CCNC: 16 U/L (ref 7–52)
AMPHETAMINES SERPL QL SCN: NEGATIVE
ANION GAP SERPL CALCULATED.3IONS-SCNC: 12 MMOL/L (ref 4–13)
ANION GAP SERPL CALCULATED.3IONS-SCNC: 7 MMOL/L (ref 4–13)
AST SERPL W P-5'-P-CCNC: 38 U/L (ref 13–39)
BARBITURATES UR QL: NEGATIVE
BASOPHILS # BLD AUTO: 0.07 THOUSANDS/ÂΜL (ref 0–0.1)
BASOPHILS NFR BLD AUTO: 1 % (ref 0–1)
BENZODIAZ UR QL: NEGATIVE
BILIRUB SERPL-MCNC: 0.62 MG/DL (ref 0.2–1)
BILIRUB UR QL STRIP: NEGATIVE
BUN SERPL-MCNC: 16 MG/DL (ref 5–25)
BUN SERPL-MCNC: 17 MG/DL (ref 5–25)
CALCIUM SERPL-MCNC: 8.6 MG/DL (ref 8.4–10.2)
CALCIUM SERPL-MCNC: 9.9 MG/DL (ref 8.4–10.2)
CHLORIDE SERPL-SCNC: 100 MMOL/L (ref 96–108)
CHLORIDE SERPL-SCNC: 104 MMOL/L (ref 96–108)
CLARITY UR: CLEAR
CO2 SERPL-SCNC: 22 MMOL/L (ref 21–32)
CO2 SERPL-SCNC: 24 MMOL/L (ref 21–32)
COCAINE UR QL: NEGATIVE
COLOR UR: YELLOW
CREAT SERPL-MCNC: 1.76 MG/DL (ref 0.6–1.3)
CREAT SERPL-MCNC: 1.81 MG/DL (ref 0.6–1.3)
EOSINOPHIL # BLD AUTO: 0.33 THOUSAND/ÂΜL (ref 0–0.61)
EOSINOPHIL NFR BLD AUTO: 3 % (ref 0–6)
ERYTHROCYTE [DISTWIDTH] IN BLOOD BY AUTOMATED COUNT: 12.4 % (ref 11.6–15.1)
ETHANOL EXG-MCNC: 0 MG/DL
GFR SERPL CREATININE-BSD FRML MDRD: 41 ML/MIN/1.73SQ M
GFR SERPL CREATININE-BSD FRML MDRD: 42 ML/MIN/1.73SQ M
GLUCOSE SERPL-MCNC: 127 MG/DL (ref 65–140)
GLUCOSE SERPL-MCNC: 165 MG/DL (ref 65–140)
GLUCOSE UR STRIP-MCNC: NEGATIVE MG/DL
HCT VFR BLD AUTO: 46.1 % (ref 36.5–49.3)
HGB BLD-MCNC: 15.6 G/DL (ref 12–17)
HGB UR QL STRIP.AUTO: NEGATIVE
IMM GRANULOCYTES # BLD AUTO: 0.12 THOUSAND/UL (ref 0–0.2)
IMM GRANULOCYTES NFR BLD AUTO: 1 % (ref 0–2)
KETONES UR STRIP-MCNC: ABNORMAL MG/DL
LEUKOCYTE ESTERASE UR QL STRIP: NEGATIVE
LITHIUM SERPL-SCNC: 0.9 MMOL/L (ref 0.6–1.2)
LYMPHOCYTES # BLD AUTO: 2.35 THOUSANDS/ÂΜL (ref 0.6–4.47)
LYMPHOCYTES NFR BLD AUTO: 20 % (ref 14–44)
MCH RBC QN AUTO: 29.1 PG (ref 26.8–34.3)
MCHC RBC AUTO-ENTMCNC: 33.8 G/DL (ref 31.4–37.4)
MCV RBC AUTO: 86 FL (ref 82–98)
METHADONE UR QL: NEGATIVE
MONOCYTES # BLD AUTO: 0.8 THOUSAND/ÂΜL (ref 0.17–1.22)
MONOCYTES NFR BLD AUTO: 7 % (ref 4–12)
NEUTROPHILS # BLD AUTO: 7.98 THOUSANDS/ÂΜL (ref 1.85–7.62)
NEUTS SEG NFR BLD AUTO: 68 % (ref 43–75)
NITRITE UR QL STRIP: NEGATIVE
NRBC BLD AUTO-RTO: 0 /100 WBCS
OPIATES UR QL SCN: NEGATIVE
OXYCODONE+OXYMORPHONE UR QL SCN: NEGATIVE
PCP UR QL: NEGATIVE
PH UR STRIP.AUTO: 6 [PH]
PLATELET # BLD AUTO: 366 THOUSANDS/UL (ref 149–390)
PMV BLD AUTO: 10 FL (ref 8.9–12.7)
POTASSIUM SERPL-SCNC: 3.5 MMOL/L (ref 3.5–5.3)
POTASSIUM SERPL-SCNC: 3.8 MMOL/L (ref 3.5–5.3)
PROT SERPL-MCNC: 8.7 G/DL (ref 6.4–8.4)
PROT UR STRIP-MCNC: NEGATIVE MG/DL
RBC # BLD AUTO: 5.37 MILLION/UL (ref 3.88–5.62)
SODIUM SERPL-SCNC: 134 MMOL/L (ref 135–147)
SODIUM SERPL-SCNC: 135 MMOL/L (ref 135–147)
SP GR UR STRIP.AUTO: 1.02 (ref 1–1.03)
THC UR QL: POSITIVE
TSH SERPL DL<=0.05 MIU/L-ACNC: 2.89 UIU/ML (ref 0.45–4.5)
UROBILINOGEN UR STRIP-ACNC: <2 MG/DL
WBC # BLD AUTO: 11.65 THOUSAND/UL (ref 4.31–10.16)

## 2023-04-29 RX ORDER — SODIUM CHLORIDE 9 MG/ML
75 INJECTION, SOLUTION INTRAVENOUS CONTINUOUS
Status: DISCONTINUED | OUTPATIENT
Start: 2023-04-29 | End: 2023-05-01

## 2023-04-29 RX ADMIN — SODIUM CHLORIDE 1000 ML: 0.9 INJECTION, SOLUTION INTRAVENOUS at 20:39

## 2023-04-29 NOTE — Clinical Note
Randy Crownpoint should be transferred out to [address] [place] [address] and has been medically cleared

## 2023-04-30 PROBLEM — F19.11 HISTORY OF SUBSTANCE ABUSE (HCC): Status: ACTIVE | Noted: 2023-04-30

## 2023-04-30 LAB
ANION GAP SERPL CALCULATED.3IONS-SCNC: 10 MMOL/L (ref 4–13)
ATRIAL RATE: 85 BPM
BASOPHILS # BLD AUTO: 0.08 THOUSANDS/ÂΜL (ref 0–0.1)
BASOPHILS NFR BLD AUTO: 1 % (ref 0–1)
BUN SERPL-MCNC: 15 MG/DL (ref 5–25)
CALCIUM SERPL-MCNC: 9.3 MG/DL (ref 8.4–10.2)
CHLORIDE SERPL-SCNC: 102 MMOL/L (ref 96–108)
CO2 SERPL-SCNC: 23 MMOL/L (ref 21–32)
CREAT SERPL-MCNC: 1.67 MG/DL (ref 0.6–1.3)
EOSINOPHIL # BLD AUTO: 0.41 THOUSAND/ÂΜL (ref 0–0.61)
EOSINOPHIL NFR BLD AUTO: 4 % (ref 0–6)
ERYTHROCYTE [DISTWIDTH] IN BLOOD BY AUTOMATED COUNT: 12.5 % (ref 11.6–15.1)
GFR SERPL CREATININE-BSD FRML MDRD: 45 ML/MIN/1.73SQ M
GLUCOSE SERPL-MCNC: 142 MG/DL (ref 65–140)
HCT VFR BLD AUTO: 42.5 % (ref 36.5–49.3)
HGB BLD-MCNC: 14.4 G/DL (ref 12–17)
IMM GRANULOCYTES # BLD AUTO: 0.08 THOUSAND/UL (ref 0–0.2)
IMM GRANULOCYTES NFR BLD AUTO: 1 % (ref 0–2)
LYMPHOCYTES # BLD AUTO: 2.38 THOUSANDS/ÂΜL (ref 0.6–4.47)
LYMPHOCYTES NFR BLD AUTO: 21 % (ref 14–44)
MCH RBC QN AUTO: 29 PG (ref 26.8–34.3)
MCHC RBC AUTO-ENTMCNC: 33.9 G/DL (ref 31.4–37.4)
MCV RBC AUTO: 86 FL (ref 82–98)
MONOCYTES # BLD AUTO: 0.83 THOUSAND/ÂΜL (ref 0.17–1.22)
MONOCYTES NFR BLD AUTO: 8 % (ref 4–12)
NEUTROPHILS # BLD AUTO: 7.35 THOUSANDS/ÂΜL (ref 1.85–7.62)
NEUTS SEG NFR BLD AUTO: 65 % (ref 43–75)
NRBC BLD AUTO-RTO: 0 /100 WBCS
P AXIS: 66 DEGREES
PLATELET # BLD AUTO: 306 THOUSANDS/UL (ref 149–390)
PLATELET # BLD AUTO: 306 THOUSANDS/UL (ref 149–390)
PMV BLD AUTO: 10.4 FL (ref 8.9–12.7)
PMV BLD AUTO: 10.4 FL (ref 8.9–12.7)
POTASSIUM SERPL-SCNC: 3.2 MMOL/L (ref 3.5–5.3)
PR INTERVAL: 158 MS
QRS AXIS: 70 DEGREES
QRSD INTERVAL: 98 MS
QT INTERVAL: 396 MS
QTC INTERVAL: 471 MS
RBC # BLD AUTO: 4.97 MILLION/UL (ref 3.88–5.62)
SODIUM SERPL-SCNC: 135 MMOL/L (ref 135–147)
T WAVE AXIS: -52 DEGREES
VENTRICULAR RATE: 85 BPM
WBC # BLD AUTO: 11.13 THOUSAND/UL (ref 4.31–10.16)

## 2023-04-30 RX ORDER — POTASSIUM CHLORIDE 20 MEQ/1
40 TABLET, EXTENDED RELEASE ORAL ONCE
Status: COMPLETED | OUTPATIENT
Start: 2023-04-30 | End: 2023-04-30

## 2023-04-30 RX ORDER — ZOLPIDEM TARTRATE 5 MG/1
10 TABLET ORAL
Status: DISCONTINUED | OUTPATIENT
Start: 2023-04-30 | End: 2023-05-03 | Stop reason: HOSPADM

## 2023-04-30 RX ORDER — QUETIAPINE FUMARATE 50 MG/1
50 TABLET, EXTENDED RELEASE ORAL
Status: DISCONTINUED | OUTPATIENT
Start: 2023-04-30 | End: 2023-05-03 | Stop reason: HOSPADM

## 2023-04-30 RX ORDER — LITHIUM CARBONATE 300 MG/1
600 TABLET, FILM COATED, EXTENDED RELEASE ORAL
Status: DISCONTINUED | OUTPATIENT
Start: 2023-04-30 | End: 2023-05-03 | Stop reason: HOSPADM

## 2023-04-30 RX ORDER — CLONAZEPAM 1 MG/1
1 TABLET ORAL ONCE
Status: COMPLETED | OUTPATIENT
Start: 2023-04-30 | End: 2023-04-30

## 2023-04-30 RX ORDER — ACETAMINOPHEN 325 MG/1
650 TABLET ORAL EVERY 6 HOURS PRN
Status: DISCONTINUED | OUTPATIENT
Start: 2023-04-30 | End: 2023-05-03 | Stop reason: HOSPADM

## 2023-04-30 RX ORDER — GABAPENTIN 300 MG/1
600 CAPSULE ORAL EVERY 8 HOURS
Status: DISCONTINUED | OUTPATIENT
Start: 2023-04-30 | End: 2023-05-03 | Stop reason: HOSPADM

## 2023-04-30 RX ORDER — DULOXETIN HYDROCHLORIDE 30 MG/1
30 CAPSULE, DELAYED RELEASE ORAL DAILY
Status: DISCONTINUED | OUTPATIENT
Start: 2023-04-30 | End: 2023-05-03 | Stop reason: HOSPADM

## 2023-04-30 RX ORDER — HEPARIN SODIUM 5000 [USP'U]/ML
5000 INJECTION, SOLUTION INTRAVENOUS; SUBCUTANEOUS EVERY 8 HOURS SCHEDULED
Status: DISCONTINUED | OUTPATIENT
Start: 2023-04-30 | End: 2023-05-03 | Stop reason: HOSPADM

## 2023-04-30 RX ORDER — PANTOPRAZOLE SODIUM 40 MG/1
40 TABLET, DELAYED RELEASE ORAL
Status: DISCONTINUED | OUTPATIENT
Start: 2023-04-30 | End: 2023-05-03 | Stop reason: HOSPADM

## 2023-04-30 RX ORDER — CLONAZEPAM 1 MG/1
1 TABLET ORAL 3 TIMES DAILY
Status: DISCONTINUED | OUTPATIENT
Start: 2023-04-30 | End: 2023-05-03 | Stop reason: HOSPADM

## 2023-04-30 RX ADMIN — CLONAZEPAM 1 MG: 1 TABLET ORAL at 16:02

## 2023-04-30 RX ADMIN — CLONAZEPAM 1 MG: 1 TABLET ORAL at 00:42

## 2023-04-30 RX ADMIN — LITHIUM CARBONATE 600 MG: 300 TABLET, EXTENDED RELEASE ORAL at 21:58

## 2023-04-30 RX ADMIN — PANTOPRAZOLE SODIUM 40 MG: 40 TABLET, DELAYED RELEASE ORAL at 05:42

## 2023-04-30 RX ADMIN — LITHIUM CARBONATE 600 MG: 300 TABLET, EXTENDED RELEASE ORAL at 00:32

## 2023-04-30 RX ADMIN — SODIUM CHLORIDE 75 ML/HR: 0.9 INJECTION, SOLUTION INTRAVENOUS at 00:31

## 2023-04-30 RX ADMIN — SODIUM CHLORIDE 75 ML/HR: 0.9 INJECTION, SOLUTION INTRAVENOUS at 13:12

## 2023-04-30 RX ADMIN — ZOLPIDEM TARTRATE 10 MG: 5 TABLET ORAL at 21:58

## 2023-04-30 RX ADMIN — POTASSIUM CHLORIDE 40 MEQ: 1500 TABLET, EXTENDED RELEASE ORAL at 05:43

## 2023-04-30 RX ADMIN — CLONAZEPAM 1 MG: 1 TABLET ORAL at 08:29

## 2023-04-30 RX ADMIN — METOPROLOL TARTRATE 25 MG: 25 TABLET, FILM COATED ORAL at 00:32

## 2023-04-30 RX ADMIN — GABAPENTIN 600 MG: 300 CAPSULE ORAL at 00:32

## 2023-04-30 RX ADMIN — GABAPENTIN 600 MG: 300 CAPSULE ORAL at 16:02

## 2023-04-30 RX ADMIN — METOPROLOL TARTRATE 25 MG: 25 TABLET, FILM COATED ORAL at 13:15

## 2023-04-30 RX ADMIN — ZOLPIDEM TARTRATE 10 MG: 5 TABLET ORAL at 00:32

## 2023-04-30 RX ADMIN — HEPARIN SODIUM 5000 UNITS: 5000 INJECTION INTRAVENOUS; SUBCUTANEOUS at 21:58

## 2023-04-30 RX ADMIN — HEPARIN SODIUM 5000 UNITS: 5000 INJECTION INTRAVENOUS; SUBCUTANEOUS at 13:14

## 2023-04-30 RX ADMIN — QUETIAPINE FUMARATE 50 MG: 50 TABLET, EXTENDED RELEASE ORAL at 21:58

## 2023-04-30 RX ADMIN — CLONAZEPAM 1 MG: 1 TABLET ORAL at 21:58

## 2023-04-30 RX ADMIN — GABAPENTIN 600 MG: 300 CAPSULE ORAL at 08:29

## 2023-04-30 RX ADMIN — HEPARIN SODIUM 5000 UNITS: 5000 INJECTION INTRAVENOUS; SUBCUTANEOUS at 05:42

## 2023-04-30 RX ADMIN — DULOXETINE 30 MG: 30 CAPSULE, DELAYED RELEASE ORAL at 08:29

## 2023-04-30 RX ADMIN — ACETAMINOPHEN 325MG 650 MG: 325 TABLET ORAL at 01:53

## 2023-04-30 NOTE — ED PROVIDER NOTES
"History  Chief Complaint   Patient presents with    Psychiatric Evaluation     Pt to er via police after wife placed a 302  Patient apparently broke into an RV store, has not been taking care of himself or showering  PATIENT PENDING INVOLUNTARY PSYCHIATRIC ADMISSION  THIS MUST BE ADDRESSED PRIOR TO DISCHARGE    Patient is a 78-year-old male with a history of schizoaffective disorder the presents for evaluation of 302   302 alleges the patient has been unable to care for himself  He apparently has not been bathing or grooming himself  He also is alleged to have said that he was considering breaking his wife's knee caps or putting something in her food  He did break into the neighbors RV earlier today  He does confirm that \"Agnes\", visual hallucination of his, let him into the RV and they were planning to go somewhere  He says she went to go to the bathroom and then disappeared  [id number] alleges that he is having auditory hallucinations but he denies this  Currently not suicidal homicidal ideations  Patient with persistent delusions on exam   He continues to claim that he is neuro [employer] and that he is being offered half $1 billion  He is claiming that he is going to [facility] [place]  Prior to Admission Medications   Prescriptions Last Dose Informant Patient Reported? Taking?    DULoxetine (CYMBALTA) 30 mg delayed release capsule   Yes No   Sig: Take 30 mg by mouth daily   clonazePAM (KlonoPIN) 1 mg tablet   Yes No   Sig: Take 1 Tablet By Oral Route 2 times per day   gabapentin (NEURONTIN) 400 mg capsule   No No   Sig: TAKE TWO CAPSULES BY MOUTH EVERY 8 HOURS   hydrOXYzine HCL (ATARAX) 25 mg tablet   Yes No   Sig: TAKE 1 OR 2 TABLETS BY MOUTH DAILY   lithium carbonate (LITHOBID) 300 mg CR tablet   Yes No   Sig: Take 600 mg by mouth daily at bedtime   metoprolol tartrate (LOPRESSOR) 25 mg tablet   No No   Sig: TAKE ONE TABLET BY MOUTH EVERY TWELVE HOURS   pantoprazole (PROTONIX) 40 mg tablet " No No   Sig: Take 1 tablet (40 mg total) by mouth daily before breakfast   sildenafil (VIAGRA) 100 mg tablet   No No   Sig: Take 1 tablet (100 mg total) by mouth daily as needed for erectile dysfunction   traZODone (DESYREL) 50 mg tablet   Yes No   Sig: Take 100 mg by mouth daily at bedtime   zolpidem (AMBIEN CR) 12 5 MG CR tablet   Yes No   Sig: Take 1 Tablet By Oral Route 1 at bedtime      Facility-Administered Medications: None       Past Medical History:   Diagnosis Date    Alcohol withdrawal ([facility])     Alcoholism ([place] [address] )     Anxiety     Back pain     Back pain, chronic     Bipolar 1 disorder ([facility])     Bipolar disorder, current episode mixed, moderate ([facility])     BPH (benign prostatic hyperplasia)     Cardiac disease     CHF (congestive heart failure) ([facility])     Chronic pain disorder     Chronic renal failure     Demyelinating disease ([place] [address] )     Dental disorder     Depression     Diabetes mellitus ([place] [address] )     Drug abuse ([place] [address] )     Drug withdrawal ([place] [address] )     ED (erectile dysfunction)     Edema     Encephalopathy     Encephalopathy     Fatigue     GERD (gastroesophageal reflux disease)     Heart rate fast     Hepatitis     unspecified     Hyperlipidemia     Hypertension     Hypokalemia     Knee buckling     MI (myocardial infarction) ([place] [address] )     NIDDY (non-insulin dependent diabetes mellitus in young) ([place] [address] )     Obesity     Opiate dependence ([facility])     Opiate withdrawal ([facility])     Osteoarthritis     Pleural effusion     Pneumonia     Prolonged Q-T interval on ECG     Psychiatric disorder     Psychiatric illness     Psychosis ([place] [address] )     Renal disorder     Spinal stenosis, lumbar     Traumatic subdural hemorrhage with loss of consciousness of unspecified duration, initial encounter ([place] [address] ) 12/29/2022    Ulcer of calf ([facility])     Ulnar neuritis, right     Ulnar neuropathy at elbow     Weight loss        Past Surgical History:   Procedure Laterality Date    BACK SURGERY      report thoracic surgery    LUMBAR FUSION  05/2006    L5/S1 Francisco Javier        Family History   Problem Relation Age of Onset    No Known Problems Mother     No Known Problems Father     No Known Problems Sister     No Known Problems Brother     No Known Problems Maternal Aunt     No Known Problems Paternal Aunt     No Known Problems Maternal Uncle     No Known Problems Paternal Uncle     No Known Problems Maternal Grandfather     No Known Problems Maternal Grandmother     No Known Problems Paternal Grandfather     No Known Problems Paternal Grandmother     No Known Problems Cousin     ADD / ADHD Neg Hx     Alcohol abuse Neg Hx     Anxiety disorder Neg Hx     Bipolar disorder Neg Hx     Dementia Neg Hx     Depression Neg Hx     Drug abuse Neg Hx     OCD Neg Hx     Paranoid behavior Neg Hx     Schizophrenia Neg Hx     Seizures Neg Hx     Self-Injury Neg Hx     Suicide Attempts Neg Hx      I have reviewed and agree with the history as documented  E-Cigarette/Vaping    E-Cigarette Use Never User      E-Cigarette/Vaping Substances    Nicotine No     THC No     CBD No     Flavoring No     Other No     Unknown No      Social History     Tobacco Use    Smoking status: Former    Smokeless tobacco: Never    Tobacco comments:     patient is a non - smoker   Vaping Use    Vaping Use: Never used   Substance Use Topics    Alcohol use: Not Currently     Comment: history 10 years ago heavy drinking    Drug use: Not Currently       Review of Systems   Constitutional: Negative for fever  HENT: Negative for sore throat  Eyes: Negative for photophobia  Respiratory: Negative for shortness of breath  Cardiovascular: Negative for chest pain  Gastrointestinal: Negative for abdominal pain  Genitourinary: Negative for dysuria  Musculoskeletal: Negative for back pain  Skin: Negative for rash  Neurological: Negative for light-headedness  Hematological: Negative for adenopathy  Psychiatric/Behavioral: Positive for hallucinations  Negative for agitation  The patient is nervous/anxious  All other systems reviewed and are negative  Physical Exam  Physical Exam  Vitals reviewed  Constitutional:       General: He is not in acute distress  Appearance: He is well-developed  HENT:      Head: Normocephalic  Eyes:      Pupils: Pupils are equal, round, and reactive to light  Cardiovascular:      Rate and Rhythm: Normal rate and regular rhythm  Heart sounds: Normal heart sounds  No murmur heard  No friction rub  No gallop  Pulmonary:      Effort: Pulmonary effort is normal       Breath sounds: Normal breath sounds  Abdominal:      General: Bowel sounds are normal  There is no distension  Palpations: Abdomen is soft  Tenderness: There is no abdominal tenderness  There is no guarding  Musculoskeletal:         General: Normal range of motion  Cervical back: Normal range of motion and neck supple  Skin:     Capillary Refill: Capillary refill takes less than 2 seconds  Neurological:      Mental Status: He is alert and oriented to person, place, and time  Cranial Nerves: No cranial nerve deficit  Sensory: No sensory deficit  Motor: No abnormal muscle tone  Psychiatric:      Comments: Somewhat pressured speech    Bizarre statements         Vital Signs  ED Triage Vitals   Temperature Pulse Respirations Blood Pressure SpO2   04/29/23 1859 04/29/23 1859 04/29/23 1859 04/29/23 1859 04/29/23 1859   98 2 °F (36 8 °C) 79 18 126/89 98 %      Temp Source Heart Rate Source Patient Position - Orthostatic VS BP Location FiO2 (%)   04/29/23 1859 04/29/23 1859 04/29/23 1859 04/29/23 1859 --   Temporal Monitor Sitting Right arm       Pain Score       04/29/23 2039       No Pain           Vitals:    04/29/23 1859 04/29/23 2039   BP: 126/89 139/82   Pulse: 79 76   Patient Position - Orthostatic VS: Sitting Lying         Visual Acuity      ED Medications  Medications   sodium chloride 0 9 % bolus 1,000 mL (1,000 mL Intravenous New Bag 4/29/23 2039)       Diagnostic Studies  Results Reviewed     Procedure Component Value Units Date/Time    Basic metabolic panel [[id number]]  (Abnormal) Collected: 04/29/23 2125    Lab Status: Final result Specimen: Blood from Arm, Left Updated: 04/29/23 2143     Sodium 135 mmol/L      Potassium 3 5 mmol/L      Chloride 104 mmol/L      CO2 24 mmol/L      ANION GAP 7 mmol/L      BUN 16 mg/dL      Creatinine 1 76 mg/dL      Glucose 165 mg/dL      Calcium 8 6 mg/dL      eGFR 42 ml/min/1 73sq m     Narrative:      [place] guidelines for Chronic Kidney Disease (CKD):     Stage 1 with normal or high GFR (GFR > 90 mL/min/1 73 square meters)    Stage 2 Mild CKD (GFR = 60-89 mL/min/1 73 square meters)    Stage 3A Moderate CKD (GFR = 45-59 mL/min/1 73 square meters)    Stage 3B Moderate CKD (GFR = 30-44 mL/min/1 73 square meters)    Stage 4 Severe CKD (GFR = 15-29 mL/min/1 73 square meters)    Stage 5 End Stage CKD (GFR <15 mL/min/1 73 square meters)  Note: GFR calculation is accurate only with a steady state creatinine    Rapid drug screen, urine [[id number]]  (Abnormal) Collected: 04/29/23 2002    Lab Status: Final result Specimen: Urine, Clean Catch Updated: 04/29/23 2031     Amph/Meth UR Negative     Barbiturate Ur Negative     Benzodiazepine Urine Negative     Cocaine Urine Negative     Methadone Urine Negative     Opiate Urine Negative     PCP Ur Negative     THC Urine Positive     Oxycodone Urine Negative    Narrative:      Presumptive report  If requested, specimen will be sent to reference lab for confirmation  FOR MEDICAL PURPOSES ONLY  IF CONFIRMATION NEEDED PLEASE CONTACT THE LAB WITHIN 5 DAYS      Drug Screen Cutoff Levels:  AMPHETAMINE/METHAMPHETAMINES  1000 ng/mL  BARBITURATES     200 ng/mL  BENZODIAZEPINES     200 ng/mL  COCAINE      300 ng/mL  METHADONE      300 ng/mL  OPIATES      300 ng/mL  PHENCYCLIDINE     25 ng/mL  THC       50 ng/mL  OXYCODONE      100 ng/mL    UA (URINE) with reflex to Scope [[id number]]  (Abnormal) Collected: 04/29/23 2002    Lab Status: Final result Specimen: Urine, Clean Catch Updated: 04/29/23 2012     Color, UA Yellow     Clarity, UA Clear     Specific Gravity, UA 1 025     pH, UA 6 0     Leukocytes, UA Negative     Nitrite, UA Negative     Protein, UA Negative mg/dl      Glucose, UA Negative mg/dl      Ketones, UA Trace mg/dl      Urobilinogen, UA <2 0 mg/dl      Bilirubin, UA Negative     Occult Blood, UA Negative    TSH, 3rd generation with Free T4 reflex [[id number]]  (Normal) Collected: 04/29/23 1903    Lab Status: Final result Specimen: Blood from Hand, Right Updated: 04/29/23 1954     TSH 3RD GENERATON 2 888 uIU/mL     Lithium level [[id number]]  (Normal) Collected: 04/29/23 1903    Lab Status: Final result Specimen: Blood from Hand, Right Updated: 04/29/23 1938     Lithium Lvl 0 9 mmol/L     Comprehensive metabolic panel [[id number]]  (Abnormal) Collected: 04/29/23 1903    Lab Status: Final result Specimen: Blood from Hand, Right Updated: 04/29/23 1938     Sodium 134 mmol/L      Potassium 3 8 mmol/L      Chloride 100 mmol/L      CO2 22 mmol/L      ANION GAP 12 mmol/L      BUN 17 mg/dL      Creatinine 1 81 mg/dL      Glucose 127 mg/dL      Calcium 9 9 mg/dL      AST 38 U/L      ALT 16 U/L      Alkaline Phosphatase 111 U/L      Total Protein 8 7 g/dL      Albumin 4 9 g/dL      Total Bilirubin 0 62 mg/dL      eGFR 41 ml/min/1 73sq m     Narrative:      [place] guidelines for Chronic Kidney Disease (CKD):     Stage 1 with normal or high GFR (GFR > 90 mL/min/1 73 square meters)    Stage 2 Mild CKD (GFR = 60-89 mL/min/1 73 square meters)    Stage 3A Moderate CKD (GFR = 45-59 mL/min/1 73 square meters)    Stage 3B Moderate CKD (GFR = 30-44 mL/min/1 73 square meters)    Stage 4 Severe CKD (GFR = 15-29 mL/min/1 73 square meters)    Stage 5 End Stage CKD (GFR <15 mL/min/1 73 square meters)  Note: GFR calculation is accurate only with a steady state creatinine    CBC and differential [[id number]]  (Abnormal) Collected: 04/29/23 1903    Lab Status: Final result Specimen: Blood from Hand, Right Updated: 04/29/23 1922     WBC 11 65 Thousand/uL      RBC 5 37 Million/uL      Hemoglobin 15 6 g/dL      Hematocrit 46 1 %      MCV 86 fL      MCH 29 1 pg      MCHC 33 8 g/dL      RDW 12 4 %      MPV 10 0 fL      Platelets 359 Thousands/uL      nRBC 0 /100 WBCs      Neutrophils Relative 68 %      Immat GRANS % 1 %      Lymphocytes Relative 20 %      Monocytes Relative 7 %      Eosinophils Relative 3 %      Basophils Relative 1 %      Neutrophils Absolute 7 98 Thousands/µL      Immature Grans Absolute 0 12 Thousand/uL      Lymphocytes Absolute 2 35 Thousands/µL      Monocytes Absolute 0 80 Thousand/µL      Eosinophils Absolute 0 33 Thousand/µL      Basophils Absolute 0 07 Thousands/µL     POCT alcohol breath test [[id number]]  (Normal) Resulted: 04/29/23 1900    Lab Status: Final result Updated: 04/29/23 1900     EXTBreath Alcohol 0 000                 No orders to display              Procedures  Procedures         ED Course                               SBIRT 22yo+    Flowsheet Row Most Recent Value   Initial Alcohol Screen: US AUDIT-C     1  How often do you have a drink containing alcohol? 0 Filed at: 04/29/2023 1846   2  How many drinks containing alcohol do you have on a typical day you are drinking? 0 Filed at: 04/29/2023 1846   3a  Male UNDER 65: How often do you have five or more drinks on one occasion? 0 Filed at: 04/29/2023 1846   3b  FEMALE Any Age, or MALE 65+: How often do you have 4 or more drinks on one occassion? 0 Filed at: 04/29/2023 1846   Audit-C Score 0 Filed at: 04/29/2023 1846   IMER: How many times in the past year have you    Used an illegal drug or used a prescription medication for non-medical reasons?  Never Filed at: 04/29/2023 1846                    Medical Decision Making  Patient is a 50-year-old male presents for evaluation with signed 302  I was planning to uphold the 302 but instantly found to have acute kidney injury  This did not improve after 1 L fluids  Patient will require admission the hospital for further work-up and management  Amount and/or Complexity of Data Reviewed  Labs: ordered  Risk  Decision regarding hospitalization  Disposition  Final diagnoses:   Psychosis ([facility] )   CATHRYN (acute kidney injury) ([facility] )     Time reflects when diagnosis was documented in both MDM as applicable and the Disposition within this note     Time User Action Codes Description Comment    4/29/2023  7:57 PM Richard  Add [F29] Psychosis ([facility] )     4/29/2023  9:54 PM Richard  Add [N17 9] CATHRYN (acute kidney injury) ([facility] )     4/29/2023  9:54 PM Richard  Modify [F29] Psychosis ([facility] )     4/29/2023  9:54 PM Larena Pleas [N17 9] CATHRYN (acute kidney injury) [facility])       ED Disposition     ED Disposition   Admit    Condition   Stable    Date/Time   Sat Apr 29, 2023  9:54 PM    Comment   Patient to be admitted to observation           Follow-up Information    None         Patient's Medications   Discharge Prescriptions    No medications on file       No discharge procedures on file      PDMP Review       Value Time User    PDMP Reviewed  Yes 5/3/2022  5:18 PM Glenis Walton DO          ED Provider  Electronically Signed by           Ro Jenkins MD  04/29/23 2371

## 2023-04-30 NOTE — ASSESSMENT & PLAN NOTE
· Reports he is sober from narcotics for 8  Had abused OxyContin and fentanyl  Initially started due to chronic back pain  · UDS positive for THC  Negative otherwise  · Reports prior heavy alcohol consumption  About 8 years ago cut down  Has a beer once a month or so   Ethanol 0 on admission
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
## 2023-04-30 NOTE — ASSESSMENT & PLAN NOTE
· Presented to the ER with psychosis  Does not appear fluid overload  Suspect poor p o  intake  No signs of N/V/D at this time  · Initial creatinine 1 81 in the ER  Rechecked after 1 L bolus and found to be 1 76  · Labs from 3 years ago show baseline of about 1 1-1 2  · Patient on lithium due to bipolar disorder  Possible etiology     · Place on gentle fluid  · Recheck BMP in the morning
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
## 2023-05-01 PROBLEM — Z00.8 MEDICAL CLEARANCE FOR PSYCHIATRIC ADMISSION: Status: ACTIVE | Noted: 2023-05-01

## 2023-05-01 LAB
ALBUMIN SERPL BCP-MCNC: 3.6 G/DL (ref 3.5–5)
ALP SERPL-CCNC: 77 U/L (ref 34–104)
ALT SERPL W P-5'-P-CCNC: 13 U/L (ref 7–52)
ANION GAP SERPL CALCULATED.3IONS-SCNC: 4 MMOL/L (ref 4–13)
AST SERPL W P-5'-P-CCNC: 18 U/L (ref 13–39)
BASOPHILS # BLD AUTO: 0.03 THOUSANDS/ÂΜL (ref 0–0.1)
BASOPHILS NFR BLD AUTO: 0 % (ref 0–1)
BILIRUB SERPL-MCNC: 0.41 MG/DL (ref 0.2–1)
BUN SERPL-MCNC: 7 MG/DL (ref 5–25)
CALCIUM SERPL-MCNC: 8.3 MG/DL (ref 8.4–10.2)
CHLORIDE SERPL-SCNC: 109 MMOL/L (ref 96–108)
CO2 SERPL-SCNC: 24 MMOL/L (ref 21–32)
CREAT SERPL-MCNC: 1.2 MG/DL (ref 0.6–1.3)
EOSINOPHIL # BLD AUTO: 0.33 THOUSAND/ÂΜL (ref 0–0.61)
EOSINOPHIL NFR BLD AUTO: 5 % (ref 0–6)
ERYTHROCYTE [DISTWIDTH] IN BLOOD BY AUTOMATED COUNT: 12.7 % (ref 11.6–15.1)
GFR SERPL CREATININE-BSD FRML MDRD: 68 ML/MIN/1.73SQ M
GLUCOSE SERPL-MCNC: 116 MG/DL (ref 65–140)
HCT VFR BLD AUTO: 37.3 % (ref 36.5–49.3)
HGB BLD-MCNC: 12.1 G/DL (ref 12–17)
IMM GRANULOCYTES # BLD AUTO: 0.04 THOUSAND/UL (ref 0–0.2)
IMM GRANULOCYTES NFR BLD AUTO: 1 % (ref 0–2)
LYMPHOCYTES # BLD AUTO: 2.11 THOUSANDS/ÂΜL (ref 0.6–4.47)
LYMPHOCYTES NFR BLD AUTO: 31 % (ref 14–44)
MCH RBC QN AUTO: 28.5 PG (ref 26.8–34.3)
MCHC RBC AUTO-ENTMCNC: 32.4 G/DL (ref 31.4–37.4)
MCV RBC AUTO: 88 FL (ref 82–98)
MONOCYTES # BLD AUTO: 0.52 THOUSAND/ÂΜL (ref 0.17–1.22)
MONOCYTES NFR BLD AUTO: 8 % (ref 4–12)
NEUTROPHILS # BLD AUTO: 3.77 THOUSANDS/ÂΜL (ref 1.85–7.62)
NEUTS SEG NFR BLD AUTO: 55 % (ref 43–75)
NRBC BLD AUTO-RTO: 0 /100 WBCS
PLATELET # BLD AUTO: 237 THOUSANDS/UL (ref 149–390)
PMV BLD AUTO: 9.7 FL (ref 8.9–12.7)
POTASSIUM SERPL-SCNC: 3.6 MMOL/L (ref 3.5–5.3)
PROT SERPL-MCNC: 6.3 G/DL (ref 6.4–8.4)
RBC # BLD AUTO: 4.25 MILLION/UL (ref 3.88–5.62)
SODIUM SERPL-SCNC: 137 MMOL/L (ref 135–147)
WBC # BLD AUTO: 6.8 THOUSAND/UL (ref 4.31–10.16)

## 2023-05-01 RX ADMIN — HEPARIN SODIUM 5000 UNITS: 5000 INJECTION INTRAVENOUS; SUBCUTANEOUS at 21:40

## 2023-05-01 RX ADMIN — METOPROLOL TARTRATE 25 MG: 25 TABLET, FILM COATED ORAL at 23:44

## 2023-05-01 RX ADMIN — ACETAMINOPHEN 325MG 650 MG: 325 TABLET ORAL at 09:58

## 2023-05-01 RX ADMIN — GABAPENTIN 600 MG: 300 CAPSULE ORAL at 08:08

## 2023-05-01 RX ADMIN — ZOLPIDEM TARTRATE 10 MG: 5 TABLET ORAL at 22:49

## 2023-05-01 RX ADMIN — CLONAZEPAM 1 MG: 1 TABLET ORAL at 16:55

## 2023-05-01 RX ADMIN — HEPARIN SODIUM 5000 UNITS: 5000 INJECTION INTRAVENOUS; SUBCUTANEOUS at 12:32

## 2023-05-01 RX ADMIN — DULOXETINE 30 MG: 30 CAPSULE, DELAYED RELEASE ORAL at 08:08

## 2023-05-01 RX ADMIN — PANTOPRAZOLE SODIUM 40 MG: 40 TABLET, DELAYED RELEASE ORAL at 05:40

## 2023-05-01 RX ADMIN — GABAPENTIN 600 MG: 300 CAPSULE ORAL at 23:44

## 2023-05-01 RX ADMIN — GABAPENTIN 600 MG: 300 CAPSULE ORAL at 00:15

## 2023-05-01 RX ADMIN — QUETIAPINE FUMARATE 50 MG: 50 TABLET, EXTENDED RELEASE ORAL at 22:49

## 2023-05-01 RX ADMIN — METOPROLOL TARTRATE 25 MG: 25 TABLET, FILM COATED ORAL at 00:15

## 2023-05-01 RX ADMIN — METOPROLOL TARTRATE 25 MG: 25 TABLET, FILM COATED ORAL at 12:32

## 2023-05-01 RX ADMIN — CLONAZEPAM 1 MG: 1 TABLET ORAL at 08:08

## 2023-05-01 RX ADMIN — ACETAMINOPHEN 325MG 650 MG: 325 TABLET ORAL at 16:58

## 2023-05-01 RX ADMIN — GABAPENTIN 600 MG: 300 CAPSULE ORAL at 16:55

## 2023-05-01 RX ADMIN — HEPARIN SODIUM 5000 UNITS: 5000 INJECTION INTRAVENOUS; SUBCUTANEOUS at 05:40

## 2023-05-01 RX ADMIN — CLONAZEPAM 1 MG: 1 TABLET ORAL at 21:40

## 2023-05-01 RX ADMIN — LITHIUM CARBONATE 600 MG: 300 TABLET, EXTENDED RELEASE ORAL at 21:40

## 2023-05-01 NOTE — OCCUPATIONAL THERAPY NOTE
Occupational Therapy Screen Note     Patient Name: Elieser Ryder  HXKNW'I Date: 5/1/2023  Problem List  Principal Problem:    Elevated serum creatinine  Active Problems:    Psychosis (HCC)    BMI 40 0-44 9, adult ([facility] )    History of substance abuse ([facility] )    Medical clearance for psychiatric admission          05/01/23 1352   OT Last Visit   OT Visit Date 05/01/23   Note Type   Note type Screen   Additional Comments OT orders received  Chart reviewed  Per nursing, pt independent within room  No acute OT needs indicated at this time  Will D/C OT orders               Ida Dooley OTR/L

## 2023-05-01 NOTE — ASSESSMENT & PLAN NOTE
· Initially presenting with hallucinations and delusions  · CATHRYN resolved as of 5/1  · Patient is medically cleared for inpatient psychiatric admission  · Case management on board to assist with dispo
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
## 2023-05-01 NOTE — ASSESSMENT & PLAN NOTE
Background: Presented to the ER with psychosis  Does not appear fluid overload  · Per wife very poor PO intake  No signs of N/V/D at this time  · Do not suspect secondary to lithium  · Present on admission suspect creatinine 1 81  · Baseline appears to be approximately 1 1-1 2     · Resolved status post IV fluid  · See rest of plan as noted below

## 2023-05-01 NOTE — PHYSICAL THERAPY NOTE
PHYSICAL THERAPY SCREEN NOTE    Patient Name: Theresa Robbins  XPHYF'C Date: 5/1/2023 05/01/23 1203   Note Type   Note type Screen   Additional Comments PT orders received, chart review performed  Spoke to PCA, pt is currently a self in the room without mobility deficits   Pt w/ no PT needs, will DC from [address] Yasir Jaquez, PT, DPT

## 2023-05-01 NOTE — UTILIZATION REVIEW
NOTIFICATION OF INPATIENT ADMISSION   AUTHORIZATION REQUEST   SERV[facility] FACILITY[facility]   [facility]  [address] [place] [address]  Tax ID: [id number]  NPI: [id number] ATTENDING PROVIDER:  Attending Name and NPI#: Herman No Md [[id number]]  Address: [address] [place], [place] [address]  Phone: [phone]   ADMISSION INFORMATION:  Place of Service: [place] [address]  Place of Service Code: 21  Inpatient Admission Date/Time: 4/29/23  9:55 PM  Discharge Date/Time: No discharge date for patient encounter  Admitting Diagnosis Code/Description:  Psychosis ([facility] ) [F29]  CATHRYN (acute kidney injury) ([facility] ) [N17 9]  Encounter for psychological evaluation [Z00 8]     UTILIZATION REVIEW CONTACT:  Sujey Guerrero Utilization   Network Utilization Review Department  Phone: [phone]  Fax [phone]  Email: [email or web address]  Contact for approvals/pending authorizations, clinical reviews, and discharge  PHYSICIAN ADVISORY SERVICES:  Medical Necessity Denial & Rgtz-co-Ns[facility] Review  Phone: [phone]  Fax: [phone]  Email: [email or web address]
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
## 2023-05-01 NOTE — PLAN OF CARE
Problem: PAIN - ADULT  Goal: Verbalizes/displays adequate comfort level or baseline comfort level  Description: Interventions:  - Encourage patient to monitor pain and request assistance  - Assess pain using appropriate pain scale  - Administer analgesics based on type and severity of pain and evaluate response  - Implement non-pharmacological measures as appropriate and evaluate response  - Consider cultural and social influences on pain and pain management  - Notify physician/advanced practitioner if interventions unsuccessful or patient reports new pain  Outcome: Progressing     Problem: INFECTION - ADULT  Goal: Absence or prevention of progression during hospitalization  Description: INTERVENTIONS:  - Assess and monitor for signs and symptoms of infection  - Monitor lab/diagnostic results  - Monitor all insertion sites, i e  indwelling lines, tubes, and drains  - Monitor endotracheal if appropriate and nasal secretions for changes in amount and color  - [place] appropriate cooling/warming therapies per order  - Administer medications as ordered  - Instruct and encourage patient and family to use good hand hygiene technique  - Identify and instruct in appropriate isolation precautions for identified infection/condition  Outcome: Progressing  Goal: Absence of fever/infection during neutropenic period  Description: INTERVENTIONS:  - Monitor WBC    Outcome: Progressing     Problem: SAFETY ADULT  Goal: Patient will remain free of falls  Description: INTERVENTIONS:  - Educate patient/family on patient safety including physical limitations  - Instruct patient to call for assistance with activity   - Consult OT/PT to assist with strengthening/mobility   - Keep Call bell within reach  - Keep bed low and locked with side rails adjusted as appropriate  - Keep care items and personal belongings within reach  - Initiate and maintain comfort rounds  - Make Fall Risk Sign visible to staff  - Offer Toileting every  Hours, in advance of need  - Initiate/Maintain alarm  - Obtain necessary fall risk management equipment:   - Apply yellow socks and bracelet for high fall risk patients  - Consider moving patient to room near nurses station  Outcome: Progressing  Goal: Maintain or return to baseline ADL function  Description: INTERVENTIONS:  -  Assess patient's ability to carry out ADLs; assess patient's baseline for ADL function and identify physical deficits which impact ability to perform ADLs (bathing, care of mouth/teeth, toileting, grooming, dressing, etc )  - Assess/evaluate cause of self-care deficits   - Assess range of motion  - Assess patient's mobility; develop plan if impaired  - Assess patient's need for assistive devices and provide as appropriate  - Encourage maximum independence but intervene and supervise when necessary  - Involve family in performance of ADLs  - Assess for home care needs following discharge   - Consider OT consult to assist with ADL evaluation and planning for discharge  - Provide patient education as appropriate  Outcome: Progressing  Goal: Maintains/Returns to pre admission functional level  Description: INTERVENTIONS:  - Perform BMAT or MOVE assessment daily    - Set and communicate daily mobility goal to care team and patient/family/caregiver  - Collaborate with rehabilitation services on mobility goals if consulted  - Perform Range of Motion  times a day  - Reposition patient every  hours    - Dangle patient  times a day  - Stand patient  times a day  - Ambulate patient  times a day  - Out of bed to chair  times a day   - Out of bed for meals  times a day  - Out of bed for toileting  - Record patient progress and toleration of activity level   Outcome: Progressing     Problem: DISCHARGE PLANNING  Goal: Discharge to home or other facility with appropriate resources  Description: INTERVENTIONS:  - Identify barriers to discharge w/patient and caregiver  - Arrange for needed discharge resources and transportation as appropriate  - Identify discharge learning needs (meds, wound care, etc )  - Arrange for interpretive services to assist at discharge as needed  - Refer to Case Management Department for coordinating discharge planning if the patient needs post-hospital services based on physician/advanced practitioner order or complex needs related to functional status, cognitive ability, or social support system  Outcome: Progressing     Problem: Knowledge Deficit  Goal: Patient/family/caregiver demonstrates understanding of disease process, treatment plan, medications, and discharge instructions  Description: Complete learning assessment and assess knowledge base    Interventions:  - Provide teaching at level of understanding  - Provide teaching via preferred learning methods  Outcome: Progressing     Problem: Risk for Violence/Aggression Toward Others  Goal: Treatment Goal: Refrain from acts of violence/aggression during length of stay, and demonstrate improved impulse control at the time of discharge  Outcome: Progressing  Goal: Verbalize thoughts and feelings  Description: Interventions:  - Assess and re-assess patient's level of risk, every waking shift  - Engage patient in 1:1 interactions, daily, for a minimum of 15 minutes   - Allow patient to express feelings and frustrations in a safe and non-threatening manner   - Establish rapport/trust with patient   Interventions:  - Promote a nonjudgmental and trusting relationship with the patient through active listening and therapeutic communication  - Assess patient's level of functioning, behavior and potential for risk  - Engage patient in 1 on 1 interactions  - Encourage patient to express fears, feelings, frustrations, and discuss symptoms    - [place] patient to reality, help patient recognize reality-based thinking   - Administer medications as ordered and assess for potential side effects  - Provide the patient education related to the signs and symptoms of the illness and desired effects of prescribed medications  Outcome: Progressing  Goal: Refrain from harming others  Outcome: Progressing  Goal: Refrain from destructive acts on the environment or property  Outcome: Progressing  Goal: Control angry outbursts  Description: Interventions:  - Monitor patient closely, per order  - Ensure early verbal de-escalation  - Monitor prn medication needs  - Set reasonable/therapeutic limits, outline behavioral expectations, and consequences   - Provide a non-threatening milieu, utilizing the least restrictive interventions   Outcome: Progressing  Goal: Attend and participate in unit activities, including therapeutic, recreational, and educational groups  Description: Interventions:  - Provide therapeutic and educational activities daily, encourage attendance and participation, and document same in the medical record   Interventions:  -Encourage Visitation and family involvement in care  Outcome: Progressing  Goal: Identify appropriate positive anger management techniques  Description: Interventions:  - Offer anger management and coping skills groups   - Staff will provide positive feedback for appropriate anger control  Outcome: Progressing     Problem: Alteration in Thoughts and Perception  Goal: Verbalize thoughts and feelings  Description: Interventions:  - Assess and re-assess patient's level of risk, every waking shift  - Engage patient in 1:1 interactions, daily, for a minimum of 15 minutes   - Allow patient to express feelings and frustrations in a safe and non-threatening manner   - Establish rapport/trust with patient   Interventions:  - Promote a nonjudgmental and trusting relationship with the patient through active listening and therapeutic communication  - Assess patient's level of functioning, behavior and potential for risk  - Engage patient in 1 on 1 interactions  - Encourage patient to express fears, feelings, frustrations, and discuss symptoms    - [place] patient to reality, help patient recognize reality-based thinking   - Administer medications as ordered and assess for potential side effects  - Provide the patient education related to the signs and symptoms of the illness and desired effects of prescribed medications  Outcome: Progressing  Goal: Attend and participate in unit activities, including therapeutic, recreational, and educational groups  Description: Interventions:  - Provide therapeutic and educational activities daily, encourage attendance and participation, and document same in the medical record   Interventions:  -Encourage Visitation and family involvement in care  Outcome: Progressing  Goal: Treatment Goal: Gain control of psychotic behaviors/thinking, reduce/eliminate presenting symptoms and demonstrate improved reality functioning upon discharge  Outcome: Progressing  Goal: Refrain from acting on delusional thinking/internal stimuli  Description: Interventions:  - Monitor patient closely, per order   - Utilize least restrictive measures   - Set reasonable limits, give positive feedback for acceptable   - Administer medications as ordered and monitor of potential side effects  Outcome: Progressing  Goal: Agree to be compliant with medication regime, as prescribed and report medication side effects  Description: Interventions:  - Offer appropriate PRN medication and supervise ingestion; conduct AIMS, as needed   Outcome: Progressing  Goal: Recognize dysfunctional thoughts, communicate reality-based thoughts at the time of discharge  Description: Interventions:  - Provide medication and psycho-education to assist patient in compliance and developing insight into his/her illness   Outcome: Progressing  Goal: Complete daily ADLs, including personal hygiene independently, as able  Description: Interventions:  - Observe, teach, and assist patient with ADLS  - Monitor and promote a balance of rest/activity, with adequate nutrition and elimination Outcome: Progressing     Problem: Ineffective Coping  Goal: Cooperates with admission process  Description: Interventions:   - Complete admission process  Outcome: Progressing  Goal: Identifies ineffective coping skills  Outcome: Progressing  Goal: Identifies healthy coping skills  Outcome: Progressing  Goal: Demonstrates healthy coping skills  Outcome: Progressing  Goal: Participates in unit activities  Description: Interventions:  - Provide therapeutic environment   - Provide required programming   - Redirect inappropriate behaviors   Outcome: Progressing  Goal: Patient/Family participate in treatment and DC plans  Description: Interventions:  - Provide therapeutic environment  Outcome: Progressing  Goal: Patient/Family verbalizes awareness of resources  Outcome: Progressing  Goal: Understands least restrictive measures  Description: Interventions:  - Utilize least restrictive behavior  Outcome: Progressing  Goal: Free from restraint events  Description: - Utilize least restrictive measures   - Provide behavioral interventions   - Redirect inappropriate behaviors   Outcome: Progressing     Problem: NEUROSENSORY - ADULT  Goal: Achieves stable or improved neurological status  Description: INTERVENTIONS  - Monitor and report changes in neurological status  - Monitor vital signs such as temperature, blood pressure, glucose, and any other labs ordered   - Initiate measures to prevent increased intracranial pressure  - Monitor for seizure activity and implement precautions if appropriate      Outcome: Progressing     Problem: METABOLIC, FLUID AND ELECTROLYTES - ADULT  Goal: Electrolytes maintained within normal limits  Description: INTERVENTIONS:  - Monitor labs and assess patient for signs and symptoms of electrolyte imbalances  - Administer electrolyte replacement as ordered  - Monitor response to electrolyte replacements, including repeat lab results as appropriate  - Instruct patient on fluid and nutrition as appropriate  Outcome: Progressing     Problem: Potential for Falls  Goal: Patient will remain free of falls  Description: INTERVENTIONS:  - Educate patient/family on patient safety including physical limitations  - Instruct patient to call for assistance with activity   - Consult OT/PT to assist with strengthening/mobility   - Keep Call bell within reach  - Keep bed low and locked with side rails adjusted as appropriate  - Keep care items and personal belongings within reach  - Initiate and maintain comfort rounds  - Make Fall Risk Sign visible to staff  - Offer Toileting every  Hours, in advance of need  - Initiate/Maintain alarm  - Obtain necessary fall risk management equipment  - Apply yellow socks and bracelet for high fall risk patients  - Consider moving patient to room near nurses station  Outcome: Progressing
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
## 2023-05-02 LAB
FLUAV RNA RESP QL NAA+PROBE: NEGATIVE
FLUBV RNA RESP QL NAA+PROBE: NEGATIVE
RSV RNA RESP QL NAA+PROBE: NEGATIVE
SARS-COV-2 RNA RESP QL NAA+PROBE: NEGATIVE

## 2023-05-02 RX ORDER — QUETIAPINE FUMARATE 50 MG/1
50 TABLET, EXTENDED RELEASE ORAL
Qty: 30 TABLET | Refills: 0 | Status: SHIPPED | OUTPATIENT
Start: 2023-05-02 | End: 2023-06-01

## 2023-05-02 RX ADMIN — METOPROLOL TARTRATE 25 MG: 25 TABLET, FILM COATED ORAL at 23:57

## 2023-05-02 RX ADMIN — HEPARIN SODIUM 5000 UNITS: 5000 INJECTION INTRAVENOUS; SUBCUTANEOUS at 13:10

## 2023-05-02 RX ADMIN — GABAPENTIN 600 MG: 300 CAPSULE ORAL at 16:21

## 2023-05-02 RX ADMIN — PANTOPRAZOLE SODIUM 40 MG: 40 TABLET, DELAYED RELEASE ORAL at 05:10

## 2023-05-02 RX ADMIN — METOPROLOL TARTRATE 25 MG: 25 TABLET, FILM COATED ORAL at 12:00

## 2023-05-02 RX ADMIN — CLONAZEPAM 1 MG: 1 TABLET ORAL at 21:31

## 2023-05-02 RX ADMIN — DULOXETINE 30 MG: 30 CAPSULE, DELAYED RELEASE ORAL at 08:25

## 2023-05-02 RX ADMIN — LITHIUM CARBONATE 600 MG: 300 TABLET, EXTENDED RELEASE ORAL at 21:31

## 2023-05-02 RX ADMIN — CLONAZEPAM 1 MG: 1 TABLET ORAL at 16:21

## 2023-05-02 RX ADMIN — CLONAZEPAM 1 MG: 1 TABLET ORAL at 08:25

## 2023-05-02 RX ADMIN — ACETAMINOPHEN 325MG 650 MG: 325 TABLET ORAL at 17:21

## 2023-05-02 RX ADMIN — GABAPENTIN 600 MG: 300 CAPSULE ORAL at 08:25

## 2023-05-02 RX ADMIN — HEPARIN SODIUM 5000 UNITS: 5000 INJECTION INTRAVENOUS; SUBCUTANEOUS at 05:10

## 2023-05-02 RX ADMIN — QUETIAPINE FUMARATE 50 MG: 50 TABLET, EXTENDED RELEASE ORAL at 21:31

## 2023-05-02 RX ADMIN — ZOLPIDEM TARTRATE 10 MG: 5 TABLET ORAL at 21:31

## 2023-05-02 NOTE — PLAN OF CARE
Problem: PAIN - ADULT  Goal: Verbalizes/displays adequate comfort level or baseline comfort level  Description: Interventions:  - Encourage patient to monitor pain and request assistance  - Assess pain using appropriate pain scale  - Administer analgesics based on type and severity of pain and evaluate response  - Implement non-pharmacological measures as appropriate and evaluate response  - Consider cultural and social influences on pain and pain management  - Notify physician/advanced practitioner if interventions unsuccessful or patient reports new pain  Outcome: Progressing     Problem: INFECTION - ADULT  Goal: Absence or prevention of progression during hospitalization  Description: INTERVENTIONS:  - Assess and monitor for signs and symptoms of infection  - Monitor lab/diagnostic results  - Monitor all insertion sites, i e  indwelling lines, tubes, and drains  - Monitor endotracheal if appropriate and nasal secretions for changes in amount and color  - [place] appropriate cooling/warming therapies per order  - Administer medications as ordered  - Instruct and encourage patient and family to use good hand hygiene technique  - Identify and instruct in appropriate isolation precautions for identified infection/condition  Outcome: Progressing  Goal: Absence of fever/infection during neutropenic period  Description: INTERVENTIONS:  - Monitor WBC    Outcome: Progressing     Problem: SAFETY ADULT  Goal: Patient will remain free of falls  Description: INTERVENTIONS:  - Educate patient/family on patient safety including physical limitations  - Instruct patient to call for assistance with activity   - Consult OT/PT to assist with strengthening/mobility   - Keep Call bell within reach  - Keep bed low and locked with side rails adjusted as appropriate  - Keep care items and personal belongings within reach  - Initiate and maintain comfort rounds  - Make Fall Risk Sign visible to staff  - Offer Toileting every 2 Hours, in advance of need  - Initiate/Maintain alarm  - Obtain necessary fall risk management equipment:   - Apply yellow socks and bracelet for high fall risk patients  - Consider moving patient to room near nurses station  Outcome: Progressing  Goal: Maintain or return to baseline ADL function  Description: INTERVENTIONS:  -  Assess patient's ability to carry out ADLs; assess patient's baseline for ADL function and identify physical deficits which impact ability to perform ADLs (bathing, care of mouth/teeth, toileting, grooming, dressing, etc )  - Assess/evaluate cause of self-care deficits   - Assess range of motion  - Assess patient's mobility; develop plan if impaired  - Assess patient's need for assistive devices and provide as appropriate  - Encourage maximum independence but intervene and supervise when necessary  - Involve family in performance of ADLs  - Assess for home care needs following discharge   - Consider OT consult to assist with ADL evaluation and planning for discharge  - Provide patient education as appropriate  Outcome: Progressing  Goal: Maintains/Returns to pre admission functional level  Description: INTERVENTIONS:  - Perform BMAT or MOVE assessment daily    - Set and communicate daily mobility goal to care team and patient/family/caregiver  - Collaborate with rehabilitation services on mobility goals if consulted  - Perform Range of Motion 3 times a day  - Reposition patient every 2 hours    - Dangle patient 3 times a day  - Stand patient 3 times a day  - Ambulate patient 3 times a day  - Out of bed to chair 3 times a day   - Out of bed for meals 3 times a day  - Out of bed for toileting  - Record patient progress and toleration of activity level   Outcome: Progressing     Problem: DISCHARGE PLANNING  Goal: Discharge to home or other facility with appropriate resources  Description: INTERVENTIONS:  - Identify barriers to discharge w/patient and caregiver  - Arrange for needed discharge resources and transportation as appropriate  - Identify discharge learning needs (meds, wound care, etc )  - Arrange for interpretive services to assist at discharge as needed  - Refer to Case Management Department for coordinating discharge planning if the patient needs post-hospital services based on physician/advanced practitioner order or complex needs related to functional status, cognitive ability, or social support system  Outcome: Progressing     Problem: Knowledge Deficit  Goal: Patient/family/caregiver demonstrates understanding of disease process, treatment plan, medications, and discharge instructions  Description: Complete learning assessment and assess knowledge base    Interventions:  - Provide teaching at level of understanding  - Provide teaching via preferred learning methods  Outcome: Progressing     Problem: Risk for Violence/Aggression Toward Others  Goal: Treatment Goal: Refrain from acts of violence/aggression during length of stay, and demonstrate improved impulse control at the time of discharge  Outcome: Progressing  Goal: Verbalize thoughts and feelings  Description: Interventions:  - Assess and re-assess patient's level of risk, every waking shift  - Engage patient in 1:1 interactions, daily, for a minimum of 15 minutes   - Allow patient to express feelings and frustrations in a safe and non-threatening manner   - Establish rapport/trust with patient   Interventions:  - Promote a nonjudgmental and trusting relationship with the patient through active listening and therapeutic communication  - Assess patient's level of functioning, behavior and potential for risk  - Engage patient in 1 on 1 interactions  - Encourage patient to express fears, feelings, frustrations, and discuss symptoms    - [place] patient to reality, help patient recognize reality-based thinking   - Administer medications as ordered and assess for potential side effects  - Provide the patient education related to the signs and symptoms of the illness and desired effects of prescribed medications  Outcome: Progressing  Goal: Refrain from harming others  Outcome: Progressing  Goal: Refrain from destructive acts on the environment or property  Outcome: Progressing  Goal: Control angry outbursts  Description: Interventions:  - Monitor patient closely, per order  - Ensure early verbal de-escalation  - Monitor prn medication needs  - Set reasonable/therapeutic limits, outline behavioral expectations, and consequences   - Provide a non-threatening milieu, utilizing the least restrictive interventions   Outcome: Progressing  Goal: Attend and participate in unit activities, including therapeutic, recreational, and educational groups  Description: Interventions:  - Provide therapeutic and educational activities daily, encourage attendance and participation, and document same in the medical record   Interventions:  -Encourage Visitation and family involvement in care  Outcome: Progressing  Goal: Identify appropriate positive anger management techniques  Description: Interventions:  - Offer anger management and coping skills groups   - Staff will provide positive feedback for appropriate anger control  Outcome: Progressing     Problem: Alteration in Thoughts and Perception  Goal: Verbalize thoughts and feelings  Description: Interventions:  - Assess and re-assess patient's level of risk, every waking shift  - Engage patient in 1:1 interactions, daily, for a minimum of 15 minutes   - Allow patient to express feelings and frustrations in a safe and non-threatening manner   - Establish rapport/trust with patient   Interventions:  - Promote a nonjudgmental and trusting relationship with the patient through active listening and therapeutic communication  - Assess patient's level of functioning, behavior and potential for risk  - Engage patient in 1 on 1 interactions  - Encourage patient to express fears, feelings, frustrations, and discuss symptoms - [place] patient to reality, help patient recognize reality-based thinking   - Administer medications as ordered and assess for potential side effects  - Provide the patient education related to the signs and symptoms of the illness and desired effects of prescribed medications  Outcome: Progressing  Goal: Attend and participate in unit activities, including therapeutic, recreational, and educational groups  Description: Interventions:  - Provide therapeutic and educational activities daily, encourage attendance and participation, and document same in the medical record   Interventions:  -Encourage Visitation and family involvement in care  Outcome: Progressing  Goal: Treatment Goal: Gain control of psychotic behaviors/thinking, reduce/eliminate presenting symptoms and demonstrate improved reality functioning upon discharge  Outcome: Progressing  Goal: Refrain from acting on delusional thinking/internal stimuli  Description: Interventions:  - Monitor patient closely, per order   - Utilize least restrictive measures   - Set reasonable limits, give positive feedback for acceptable   - Administer medications as ordered and monitor of potential side effects  Outcome: Progressing  Goal: Agree to be compliant with medication regime, as prescribed and report medication side effects  Description: Interventions:  - Offer appropriate PRN medication and supervise ingestion; conduct AIMS, as needed   Outcome: Progressing  Goal: Recognize dysfunctional thoughts, communicate reality-based thoughts at the time of discharge  Description: Interventions:  - Provide medication and psycho-education to assist patient in compliance and developing insight into his/her illness   Outcome: Progressing  Goal: Complete daily ADLs, including personal hygiene independently, as able  Description: Interventions:  - Observe, teach, and assist patient with ADLS  - Monitor and promote a balance of rest/activity, with adequate nutrition and elimination   Outcome: Progressing     Problem: Ineffective Coping  Goal: Cooperates with admission process  Description: Interventions:   - Complete admission process  Outcome: Progressing  Goal: Identifies ineffective coping skills  Outcome: Progressing  Goal: Identifies healthy coping skills  Outcome: Progressing  Goal: Demonstrates healthy coping skills  Outcome: Progressing  Goal: Participates in unit activities  Description: Interventions:  - Provide therapeutic environment   - Provide required programming   - Redirect inappropriate behaviors   Outcome: Progressing  Goal: Patient/Family participate in treatment and DC plans  Description: Interventions:  - Provide therapeutic environment  Outcome: Progressing  Goal: Patient/Family verbalizes awareness of resources  Outcome: Progressing  Goal: Understands least restrictive measures  Description: Interventions:  - Utilize least restrictive behavior  Outcome: Progressing  Goal: Free from restraint events  Description: - Utilize least restrictive measures   - Provide behavioral interventions   - Redirect inappropriate behaviors   Outcome: Progressing     Problem: NEUROSENSORY - ADULT  Goal: Achieves stable or improved neurological status  Description: INTERVENTIONS  - Monitor and report changes in neurological status  - Monitor vital signs such as temperature, blood pressure, glucose, and any other labs ordered   - Initiate measures to prevent increased intracranial pressure  - Monitor for seizure activity and implement precautions if appropriate      Outcome: Progressing     Problem: METABOLIC, FLUID AND ELECTROLYTES - ADULT  Goal: Electrolytes maintained within normal limits  Description: INTERVENTIONS:  - Monitor labs and assess patient for signs and symptoms of electrolyte imbalances  - Administer electrolyte replacement as ordered  - Monitor response to electrolyte replacements, including repeat lab results as appropriate  - Instruct patient on fluid and nutrition as appropriate  Outcome: Progressing     Problem: Potential for Falls  Goal: Patient will remain free of falls  Description: INTERVENTIONS:  - Educate patient/family on patient safety including physical limitations  - Instruct patient to call for assistance with activity   - Consult OT/PT to assist with strengthening/mobility   - Keep Call bell within reach  - Keep bed low and locked with side rails adjusted as appropriate  - Keep care items and personal belongings within reach  - Initiate and maintain comfort rounds  - Make Fall Risk Sign visible to staff  - Offer Toileting every  Hours, in advance of need  - Initiate/Maintain alarm  - Obtain necessary fall risk management equipment:   - Apply yellow socks and bracelet for high fall risk patients  - Consider moving patient to room near nurses station  Outcome: Progressing
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
## 2023-05-02 NOTE — CASE MANAGEMENT
Case Management Progress Note    Patient name Nitin Childress  Location /-87 MRN [id number]  : 1970 Date 2023       LOS (days): 3  Geometric Mean LOS (GMLOS) (days): 2 50  Days to GMLOS:0        OBJECTIVE:     Current admission status: Inpatient  Preferred Pharmacy:   [address] [place] [address], [address], [address] [facility] [address] [facility] [address]  Phone: [phone] Fax: [phone]    Primary Care Provider: Lon Mckeon DO    Primary Insurance: [facility]  Secondary Insurance:     PROGRESS NOTE:    Cm received no responses from referrals sent on Aidin for inpt 302 psychiatric bed  Cm placed calls to the following places:     1  [facility] at [phone] - no beds  2   at [phone]  3  [facility] with [facility] at [phone]- no beds  [facility] at [phone] opt  1    [address] at [phone]   [address] [facility] at [phone]  - no beds  7  [address] [facility] [address] at [phone] -GE beds  8  [address] at [address] at [phone]  [address] at [phone]  - no beds  11  [facility] [phone]  [address] [facility] at [phone]  [facility] texted [facility]  Faxes for those with possible beds as below  Malik: Spoke to [place] at [phone] and faxed clinicals to [phone]  : Spoke to [place] at [phone] and faxed clinicals to [phone]  [place]: - Spoke to [facility] at [phone] and faxed clinicals to [phone]  Emiliano: spoke to M[place] at [phone] and faxed clinicals to [phone]  [place]: spoke to [place] at [phone] and faxed clinicals to [phone]  [facility]: spoke to [place] at [phone] and faxed clinicals to [phone]    [place] : spoke to [place] at [phone] and faxed clinicals to [phone] [address] [facility]: spoke to Davonte Howard and faxed clinicals to [phone]
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
## 2023-05-03 VITALS
DIASTOLIC BLOOD PRESSURE: 89 MMHG | SYSTOLIC BLOOD PRESSURE: 129 MMHG | TEMPERATURE: 97.8 F | BODY MASS INDEX: 38.81 KG/M2 | RESPIRATION RATE: 16 BRPM | HEART RATE: 58 BPM | WEIGHT: 247.3 LBS | OXYGEN SATURATION: 91 % | HEIGHT: 67 IN

## 2023-05-03 RX ORDER — OLANZAPINE 10 MG/1
5 INJECTION, POWDER, LYOPHILIZED, FOR SOLUTION INTRAMUSCULAR ONCE
Status: COMPLETED | OUTPATIENT
Start: 2023-05-03 | End: 2023-05-03

## 2023-05-03 RX ORDER — LORAZEPAM 1 MG/1
1 TABLET ORAL ONCE
Status: COMPLETED | OUTPATIENT
Start: 2023-05-03 | End: 2023-05-03

## 2023-05-03 RX ADMIN — CLONAZEPAM 1 MG: 1 TABLET ORAL at 09:39

## 2023-05-03 RX ADMIN — PANTOPRAZOLE SODIUM 40 MG: 40 TABLET, DELAYED RELEASE ORAL at 05:28

## 2023-05-03 RX ADMIN — LORAZEPAM 1 MG: 1 TABLET ORAL at 11:15

## 2023-05-03 RX ADMIN — METOPROLOL TARTRATE 25 MG: 25 TABLET, FILM COATED ORAL at 11:15

## 2023-05-03 RX ADMIN — GABAPENTIN 600 MG: 300 CAPSULE ORAL at 09:39

## 2023-05-03 RX ADMIN — GABAPENTIN 600 MG: 300 CAPSULE ORAL at 00:00

## 2023-05-03 RX ADMIN — HEPARIN SODIUM 5000 UNITS: 5000 INJECTION INTRAVENOUS; SUBCUTANEOUS at 05:28

## 2023-05-03 RX ADMIN — OLANZAPINE 5 MG: 10 INJECTION, POWDER, LYOPHILIZED, FOR SOLUTION INTRAMUSCULAR at 09:35

## 2023-05-03 RX ADMIN — ACETAMINOPHEN 325MG 650 MG: 325 TABLET ORAL at 09:39

## 2023-05-03 RX ADMIN — DULOXETINE 30 MG: 30 CAPSULE, DELAYED RELEASE ORAL at 09:39

## 2023-05-03 NOTE — PLAN OF CARE
Problem: Alteration in Thoughts and Perception  Goal: Treatment Goal: Gain control of psychotic behaviors/thinking, reduce/eliminate presenting symptoms and demonstrate improved reality functioning upon discharge  Outcome: Progressing  Goal: Verbalize thoughts and feelings  Description: Interventions:  - Promote a nonjudgmental and trusting relationship with the patient through active listening and therapeutic communication  - Assess patient's level of functioning, behavior and potential for risk  - Engage patient in 1 on 1 interactions  - Encourage patient to express fears, feelings, frustrations, and discuss symptoms    - [place] patient to reality, help patient recognize reality-based thinking   - Administer medications as ordered and assess for potential side effects  - Provide the patient education related to the signs and symptoms of the illness and desired effects of prescribed medications  Outcome: Progressing  Goal: Refrain from acting on delusional thinking/internal stimuli  Description: Interventions:  - Monitor patient closely, per order   - Utilize least restrictive measures   - Set reasonable limits, give positive feedback for acceptable   - Administer medications as ordered and monitor of potential side effects  Outcome: Progressing  Goal: Recognize dysfunctional thoughts, communicate reality-based thoughts at the time of discharge  Description: Interventions:  - Provide medication and psycho-education to assist patient in compliance and developing insight into his/her illness   Outcome: Progressing

## 2023-05-03 NOTE — PLAN OF CARE
Problem: Alteration in Thoughts and Perception  Goal: Treatment Goal: Gain control of psychotic behaviors/thinking, reduce/eliminate presenting symptoms and demonstrate improved reality functioning upon discharge  Outcome: Progressing  Goal: Verbalize thoughts and feelings  Description: Interventions:  - Promote a nonjudgmental and trusting relationship with the patient through active listening and therapeutic communication  - Assess patient's level of functioning, behavior and potential for risk  - Engage patient in 1 on 1 interactions  - Encourage patient to express fears, feelings, frustrations, and discuss symptoms    - [place] patient to reality, help patient recognize reality-based thinking   - Administer medications as ordered and assess for potential side effects  - Provide the patient education related to the signs and symptoms of the illness and desired effects of prescribed medications  Outcome: Progressing  Goal: Refrain from acting on delusional thinking/internal stimuli  Description: Interventions:  - Monitor patient closely, per order   - Utilize least restrictive measures   - Set reasonable limits, give positive feedback for acceptable   - Administer medications as ordered and monitor of potential side effects  Outcome: Progressing  Goal: Recognize dysfunctional thoughts, communicate reality-based thoughts at the time of discharge  Description: Interventions:  - Provide medication and psycho-education to assist patient in compliance and developing insight into his/her illness   Outcome: Progressing
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
## 2023-05-03 NOTE — CASE MANAGEMENT
[address] received request for transport authorization from Care Manager  [facility]: [facility] [place]  Date of transport: 5/3/23  Start Location:[facility]  End Location: [address] [place] [address]  Authorization#: Oliver HERNANDEZ   No Authorization Required  Call [id number]
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
## 2023-05-03 NOTE — NURSING NOTE
[facility] at [phone] spoke with Juan R Ramsay who advised no report needed but clarified he is non violent and cooperative to transport    Advised of Zyprexa and Ativan doses given

## 2023-05-03 NOTE — NURSING NOTE
"Transport here to take patient to [facility]  Patient refusing to leave d/t delusions of his 36 being written in \"bad medical practice\" and continually threatening staff with personal lawsuits if we were to come near him  Patient continued to say staff better not dare try to get him on the stretcher because we will not like what happens  RN called [facility] admissions to confirm the severity of not accepting a sedated patient as this would be the safest way to transport patient  Per their house doctor patient cannot be sedated or restrained for them to accept him at their facility and that our staff must verbally calm him down/ get him to comply  Provider, security, hospital supervisor, transport team and floor staff present in trying to calm him down \"verbally\" which was unsuccessful  Transport team left as they did not feel comfortable transporting a delusional patient with potential for violence if he could not be restrained for the ride as he is independent at baseline  Patient remains discharged and RN called [facility] admissions again to relay information of failed transport  Per admissions they will cancel his bed      Fred Vicente RN  05/03/2023 0155    Discharge order discontinued  Ok for no IV per provider    Fred Vicente RN  05/03/2023 4260  "

## 2023-05-03 NOTE — CASE MANAGEMENT
Case Management Progress Note    Patient name David Hamilton  Location /-36 MRN [id number]  : 1970 Date 5/3/2023       LOS (days): 4  Geometric Mean LOS (GMLOS) (days): 3 10  Days to GMLOS:-0 5        OBJECTIVE:     Current admission status: Inpatient  Preferred Pharmacy:   [address] [place] [address], [address], [address] [facility] [address] [facility] [address]  Phone: [phone] Fax: [phone]    Primary Care Provider: Sanya Schumacher DO    Primary Insurance: CIGNA [facility] REP  Secondary Insurance:     PROGRESS NOTE:    Cm received call from [facility] rep, Will, that his authorization is updated today and the review is due on May 7, 2023  Victorino notify Admissions at [facility]       Auth #: [id number]

## 2023-05-04 NOTE — UTILIZATION REVIEW
NOTIFICATION OF ADMISSION DISCHARGE   This is a Notification of Discharge from [address] [place] [address]  Please be advised that this patient has been discharge from our facility  Below you will find the admission and discharge date and time including the patients disposition  UTILIZATION REVIEW CONTACT:  Rut Martin  Utilization   Network Utilization Review Department  Phone: [phone] x carefully listen to the prompts  All voicemails are confidential   Email: [email or web address]     ADMISSION INFORMATION  PRESENTATION DATE: 4/29/2023  6:40 PM  OBERVATION ADMISSION DATE:   INPATIENT ADMISSION DATE: 4/29/23  9:55 PM   DISCHARGE DATE: 5/3/2023 12:23 PM   DISPOSITION:Non [facility]/Distinct Psych    IMPORTANT INFORMATION:  Send all requests for admission clinical reviews, approved or denied determinations and any other requests to dedicated fax number below belonging to the campus where the patient is receiving treatment   List of dedicated fax numbers:  [address] DENIALS (Administrative/Medical Necessity) [phone]   [address] (Maternity/NICU/Pediatrics) [phone]   [facility] [phone]   [address] [place] [address] [phone]   [address] [place] [address] [phone]   [address] [place] [address] [address] [place] [address],[address] [place] [address] [phone]   [facility]  [phone]   [address] [facility] [address]  [facility] [address] [facility] [phone] n/a

## 2023-06-27 DIAGNOSIS — I10 ESSENTIAL HYPERTENSION: ICD-10-CM

## 2023-06-30 DIAGNOSIS — G56.21 LESION OF ULNAR NERVE, RIGHT UPPER LIMB: ICD-10-CM

## 2023-07-05 PROBLEM — F31.78: Status: ACTIVE | Noted: 2023-01-12

## 2023-07-14 ENCOUNTER — OFFICE VISIT (OUTPATIENT)
Dept: INTERNAL MEDICINE CLINIC | Facility: CLINIC | Age: 53
End: 2023-07-14
Payer: COMMERCIAL

## 2023-07-14 VITALS
WEIGHT: 258 LBS | DIASTOLIC BLOOD PRESSURE: 62 MMHG | HEART RATE: 74 BPM | OXYGEN SATURATION: 97 % | SYSTOLIC BLOOD PRESSURE: 130 MMHG | TEMPERATURE: 97.7 F | RESPIRATION RATE: 12 BRPM | BODY MASS INDEX: 40.41 KG/M2

## 2023-07-14 DIAGNOSIS — F20.9 SCHIZOPHRENIA, UNSPECIFIED TYPE (HCC): ICD-10-CM

## 2023-07-14 DIAGNOSIS — F19.20 DRUG ABUSE AND DEPENDENCE (HCC): ICD-10-CM

## 2023-07-14 DIAGNOSIS — Z12.5 SCREENING FOR PROSTATE CANCER: ICD-10-CM

## 2023-07-14 DIAGNOSIS — S06.5X9A TRAUMATIC SUBDURAL HEMORRHAGE WITH LOSS OF CONSCIOUSNESS OF UNSPECIFIED DURATION, INITIAL ENCOUNTER (HCC): ICD-10-CM

## 2023-07-14 DIAGNOSIS — E78.2 MIXED HYPERLIPIDEMIA: ICD-10-CM

## 2023-07-14 DIAGNOSIS — F31.78 BIPOLAR AFFECTIVE DISORDER, MIXED, IN FULL REMISSION (HCC): ICD-10-CM

## 2023-07-14 DIAGNOSIS — N52.9 ERECTILE DYSFUNCTION, UNSPECIFIED ERECTILE DYSFUNCTION TYPE: ICD-10-CM

## 2023-07-14 DIAGNOSIS — E08.00 DIABETES MELLITUS DUE TO UNDERLYING CONDITION WITH HYPEROSMOLARITY WITHOUT COMA, WITHOUT LONG-TERM CURRENT USE OF INSULIN (HCC): Primary | ICD-10-CM

## 2023-07-14 DIAGNOSIS — I10 ESSENTIAL HYPERTENSION: ICD-10-CM

## 2023-07-14 DIAGNOSIS — K21.9 GASTROESOPHAGEAL REFLUX DISEASE WITHOUT ESOPHAGITIS: ICD-10-CM

## 2023-07-14 DIAGNOSIS — F31.62 BIPOLAR DISORDER, CURRENT EPISODE MIXED, MODERATE (HCC): ICD-10-CM

## 2023-07-14 DIAGNOSIS — M48.061 SPINAL STENOSIS OF LUMBAR REGION WITHOUT NEUROGENIC CLAUDICATION: ICD-10-CM

## 2023-07-14 PROCEDURE — 83036 HEMOGLOBIN GLYCOSYLATED A1C: CPT | Performed by: INTERNAL MEDICINE

## 2023-07-14 PROCEDURE — 99214 OFFICE O/P EST MOD 30 MIN: CPT | Performed by: INTERNAL MEDICINE

## 2023-07-14 RX ORDER — PANTOPRAZOLE SODIUM 40 MG/1
40 TABLET, DELAYED RELEASE ORAL
Qty: 30 TABLET | Refills: 5 | Status: SHIPPED | OUTPATIENT
Start: 2023-07-14

## 2023-07-14 RX ORDER — GABAPENTIN 800 MG/1
800 TABLET ORAL 3 TIMES DAILY
Qty: 90 TABLET | Refills: 2 | Status: SHIPPED | OUTPATIENT
Start: 2023-07-14 | End: 2023-08-13

## 2023-07-14 RX ORDER — SILDENAFIL 100 MG/1
100 TABLET, FILM COATED ORAL DAILY PRN
Qty: 15 TABLET | Refills: 0 | Status: SHIPPED | OUTPATIENT
Start: 2023-07-14
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
## 2023-07-18 LAB — SL AMB POCT HEMOGLOBIN AIC: 7.1 (ref ?–6.5)

## 2023-08-10 ENCOUNTER — TELEPHONE (OUTPATIENT)
Dept: INTERNAL MEDICINE CLINIC | Facility: CLINIC | Age: 53
End: 2023-08-10

## 2023-08-10 RX ORDER — HYDROXYZINE HYDROCHLORIDE 25 MG/1
TABLET, FILM COATED ORAL
COMMUNITY
Start: 2023-07-24

## 2023-08-10 RX ORDER — ZOLPIDEM TARTRATE 12.5 MG/1
12.5 TABLET, FILM COATED, EXTENDED RELEASE ORAL
COMMUNITY
Start: 2023-07-25

## 2023-08-10 RX ORDER — QUETIAPINE FUMARATE 50 MG/1
50 TABLET, EXTENDED RELEASE ORAL
COMMUNITY
Start: 2023-07-25

## 2023-08-10 RX ORDER — GABAPENTIN 400 MG/1
800 CAPSULE ORAL EVERY 8 HOURS
COMMUNITY
Start: 2023-07-24 | End: 2023-08-21 | Stop reason: SDUPTHER

## 2023-08-11 ENCOUNTER — TELEPHONE (OUTPATIENT)
Dept: INTERNAL MEDICINE CLINIC | Facility: CLINIC | Age: 53
End: 2023-08-11

## 2023-08-11 DIAGNOSIS — E08.00 DIABETES MELLITUS DUE TO UNDERLYING CONDITION WITH HYPEROSMOLARITY WITHOUT COMA, WITHOUT LONG-TERM CURRENT USE OF INSULIN (HCC): Primary | ICD-10-CM

## 2023-08-21 ENCOUNTER — TELEPHONE (OUTPATIENT)
Dept: INTERNAL MEDICINE CLINIC | Facility: CLINIC | Age: 53
End: 2023-08-21

## 2023-08-21 DIAGNOSIS — K21.9 GASTROESOPHAGEAL REFLUX DISEASE WITHOUT ESOPHAGITIS: ICD-10-CM

## 2023-08-21 DIAGNOSIS — M48.061 SPINAL STENOSIS OF LUMBAR REGION, UNSPECIFIED WHETHER NEUROGENIC CLAUDICATION PRESENT: Primary | ICD-10-CM

## 2023-08-21 RX ORDER — PANTOPRAZOLE SODIUM 40 MG/1
TABLET, DELAYED RELEASE ORAL
Qty: 30 TABLET | Refills: 5 | Status: SHIPPED | OUTPATIENT
Start: 2023-08-21

## 2023-08-21 RX ORDER — GABAPENTIN 400 MG/1
800 CAPSULE ORAL EVERY 8 HOURS
Qty: 180 CAPSULE | Refills: 2 | Status: SHIPPED | OUTPATIENT
Start: 2023-08-21 | End: 2023-09-20

## 2023-08-21 NOTE — TELEPHONE ENCOUNTER
Patient called, he can not swallow the gabapentin 800 mg pills, can you call in 400mg pills, they are smaller, he would need to take 6 pills a day

## 2023-09-12 PROBLEM — Z12.5 SCREENING FOR PROSTATE CANCER: Status: RESOLVED | Noted: 2020-11-06 | Resolved: 2023-09-12

## 2023-09-26 DIAGNOSIS — E08.00 DIABETES MELLITUS DUE TO UNDERLYING CONDITION WITH HYPEROSMOLARITY WITHOUT COMA, WITHOUT LONG-TERM CURRENT USE OF INSULIN (HCC): ICD-10-CM

## 2023-10-16 ENCOUNTER — TELEPHONE (OUTPATIENT)
Dept: INTERNAL MEDICINE CLINIC | Facility: CLINIC | Age: 53
End: 2023-10-16

## 2023-10-16 DIAGNOSIS — E08.00 DIABETES MELLITUS DUE TO UNDERLYING CONDITION WITH HYPEROSMOLARITY WITHOUT COMA, WITHOUT LONG-TERM CURRENT USE OF INSULIN (HCC): Primary | ICD-10-CM

## 2023-10-16 RX ORDER — PEN NEEDLE, DIABETIC 32GX 5/32"
NEEDLE, DISPOSABLE MISCELLANEOUS
COMMUNITY
Start: 2023-08-11

## 2023-11-13 DIAGNOSIS — M48.061 SPINAL STENOSIS OF LUMBAR REGION, UNSPECIFIED WHETHER NEUROGENIC CLAUDICATION PRESENT: ICD-10-CM

## 2023-11-13 RX ORDER — GABAPENTIN 400 MG/1
800 CAPSULE ORAL EVERY 8 HOURS
Qty: 180 CAPSULE | Refills: 2 | Status: SHIPPED | OUTPATIENT
Start: 2023-11-13

## 2023-11-30 DIAGNOSIS — I10 ESSENTIAL HYPERTENSION: ICD-10-CM

## 2024-01-12 DIAGNOSIS — N52.9 ERECTILE DYSFUNCTION, UNSPECIFIED ERECTILE DYSFUNCTION TYPE: ICD-10-CM

## 2024-01-12 RX ORDER — SILDENAFIL 100 MG/1
100 TABLET, FILM COATED ORAL DAILY PRN
Qty: 15 TABLET | Refills: 0 | Status: SHIPPED | OUTPATIENT
Start: 2024-01-12

## 2024-01-19 NOTE — PROGRESS NOTES
Progress Note - Linda Iyer R Sovocool 51 y o  male MRN: [id number]  Unit/Bed#: [facility] 258-02 Encounter: [id number]    Assessment/Plan   Principal Problem:    Bipolar disorder, current episode mixed, moderate (HCC)  Active Problems:    Generalized anxiety disorder      Subjective:  Patient is compliant with medications but reports worsening of GI reflux symptoms from hiatal hernia  Patient reports difficulty sleeping because of the symptoms  Patient reports Prilosec in addition to Protonix helps at home  No one from family brought his Prilosec yesterday  Patient agreed with plan of adding increase dose of Protonix 40 mg with Mylanta today  Patient refused morning dose of duloxetine because of the symptoms, which I am in agreement with at this time  Patient reports poor sleep after switching from Seroquel to Zyprexa  Patient agreed with plan of increasing Zyprexa for mood stabilization, psychosis management and additional benefit with insomnia  Patient remained seclusive to self but is consenting for safety on the unit  Patient is motivated to participate in milieu therapy once GI symptoms have improved      Current Medications:    Current Facility-Administered Medications:  acetaminophen 650 mg Oral Q6H PRN Viona Prom, PA-C   acetaminophen 650 mg Oral Q4H PRN Viona Prom, PA-C   acetaminophen 975 mg Oral Q6H PRN Viona Prom, PA-C   aluminum-magnesium hydroxide-simethicone 30 mL Oral Q4H PRN Viona Prom, PA-C   benztropine 1 mg Intramuscular Q6H PRN Viona Prom, PA-C   benztropine 1 mg Oral Q6H PRN Viona Prom, PA-C   clonazePAM 1 mg Oral Q8H PRN Viona Prom, PA-C   DULoxetine 30 mg Oral Daily Cristopher Diop   fentaNYL 75 mcg Transdermal Q72H Cristopher Diop   gabapentin 600 mg Oral BID Cristopher Diop   haloperidol 5 mg Oral Q8H PRN Viona Prom, PA-C   haloperidol lactate 5 mg Intramuscular Q8H PRN Viona Prom, PA-C   hydrOXYzine HCL 25 mg Oral Q6H PRN Quincy Crease, PA-C   LORazepam 2 mg Intramuscular Q8H PRN Quincy Crease, PA-C   magnesium hydroxide 30 mL Oral Daily PRN Quincy Crease, PA-C   OLANZapine 5 mg Intramuscular Q8H PRN Quincy Crease, PA-C   OLANZapine 10 mg Oral HS [facility]   OLANZapine 5 mg Oral Q8H PRN Quincy Crease, PA-C   omeprazole 20 mg Oral Early Morning [facility]   ondansetron 4 mg Oral Q6H PRN [facility]   pantoprazole 40 mg Oral Early Morning [facility]   risperiDONE 1 mg Oral Q8H PRN Quincy Crease, PA-C   traZODone 50 mg Oral HS PRN Quincy Crease, PA-C   zolpidem 10 mg Oral HS PRN Quincy Crease, PA-C       Behavioral Health Medications: all current active meds have been reviewed  Vital signs in last 24 hours:  Temp:  [97 1 °F (36 2 °C)-97 9 °F (36 6 °C)] 97 1 °F (36 2 °C)  HR:  [79-97] 97  Resp:  [16] 16  BP: (131-136)/(83-84) 131/83    Laboratory results:    I have personally reviewed all pertinent laboratory/tests results  Labs in last 72 hours: No results for input(s): WBC, RBC, HGB, HCT, PLT, RDW, NEUTROABS, NA, K, CL, CO2, BUN, CREATININE, GLUCOSE, CALCIUM, AST, ALT, ALKPHOS, PROT, ALBUMIN, BILITOT, CHOL, HDL, TRIG, LDLCALC, VALPROICTOT, CARBAMAZEPIN, LITHIUM, AMMONIA, [id number], FREET4, T3FREE, PREGTESTUR, PREGSERUM, HCG, HCGQUANT, RPR in the last 72 hours      Invalid input(s):  RBC  Admission Labs:   Admission on 08/06/2018   Component Date Value    WBC 08/06/2018 4 63     RBC 08/06/2018 5 07     Hemoglobin 08/06/2018 13 7     Hematocrit 08/06/2018 41 9     MCV 08/06/2018 83     MCH 08/06/2018 27 0     MCHC 08/06/2018 32 7     RDW 08/06/2018 13 2     MPV 08/06/2018 10 9     Platelets 62/17/1605 202     nRBC 08/06/2018 0     Neutrophils Relative 08/06/2018 54     Immat GRANS % 08/06/2018 0     Lymphocytes Relative 08/06/2018 36     Monocytes Relative 08/06/2018 7     Eosinophils Relative 08/06/2018 2     Basophils Relative 08/06/2018 1     Neutrophils Absolute 08/06/2018 2 48     Immature Grans Absolute 08/06/2018 0 01     Lymphocytes Absolute 08/06/2018 1 67     Monocytes Absolute 08/06/2018 0 33     Eosinophils Absolute 08/06/2018 0 11     Basophils Absolute 08/06/2018 0 03     Protime 08/06/2018 13 0     INR 08/06/2018 1 04     PTT 08/06/2018 22*    Sodium 08/06/2018 134*    Potassium 08/06/2018 3 7     Chloride 08/06/2018 99*    CO2 08/06/2018 24     Anion Gap 08/06/2018 11     BUN 08/06/2018 14     Creatinine 08/06/2018 1 20     Glucose 08/06/2018 132     Calcium 08/06/2018 9 3     AST 08/06/2018 38     ALT 08/06/2018 38     Alkaline Phosphatase 08/06/2018 227*    Total Protein 08/06/2018 8 0     Albumin 08/06/2018 4 0     Total Bilirubin 08/06/2018 0 80     eGFR 08/06/2018 71     Amph/Meth UR 08/06/2018 Negative     Barbiturate Ur 08/06/2018 Negative     Benzodiazepine Urine 08/06/2018 Positive*    Cocaine Urine 08/06/2018 Negative     Methadone Urine 08/06/2018 Negative     Opiate Urine 08/06/2018 Negative     PCP Ur 08/06/2018 Negative     THC Urine 08/06/2018 Negative     EXTBreath Alcohol 08/06/2018 0 000     Color, UA 08/07/2018 Orange     Clarity, UA 08/07/2018 Clear     Specific Gravity, UA 08/07/2018 >=1 030     pH, UA 08/07/2018 7 0     Leukocytes, UA 08/07/2018 Negative     Nitrite, UA 08/07/2018 Negative     Protein, UA 08/07/2018 Trace*    Glucose, UA 08/07/2018 Negative     Ketones, UA 08/07/2018 Negative     Urobilinogen, UA 08/07/2018 0 2     Bilirubin, UA 08/07/2018 Interference- unable to analyze*    Blood, UA 08/07/2018 Negative     Cholesterol 08/07/2018 220*    Triglycerides 08/07/2018 113     HDL, Direct 08/07/2018 47     LDL Calculated 08/07/2018 150*    Non-HDL-Chol (CHOL-HDL) 08/07/2018 173     TSH 3RD GENERATON 08/07/2018 1 789     Hemoglobin A1C 08/07/2018 5 1     EAG 08/07/2018 100     RPR 08/07/2018 Non-Reactive     RBC, UA 08/07/2018 None Seen     WBC, UA 08/07/2018 0-1*    Epithelial Cells 08/07/2018 Occasional     Bacteria, UA 08/07/2018 Occasional     MUCOUS THREADS 08/07/2018 Innumerable*       Psychiatric Review of Systems:  Behavior over the last 24 hours:  unchanged  Sleep: insomnia  Appetite: poor  Medication side effects: No  ROS: nausea and abdominal pain    Mental Status Evaluation:  Appearance:  casually dressed   Behavior:  guarded   Speech:  soft   Mood:  anxious and depressed   Affect:  constricted   Language naming objects and repeating phrases   Thought Process:  circumstantial   Thought Content:  obsessions   Perceptual Disturbances: Auditory hallucinations without commands   Risk Potential: Suicidal Ideations without plan, Homicidal Ideations none and Potential for Aggression No   Sensorium:  person and place   Cognition:  grossly intact   Consciousness:  awake    Attention: attention span appeared shorter than expected for age   Insight:  limited   Judgment: limited   Intellect fair   Gait/Station: normal gait/station   Motor Activity: no abnormal movements     Memory: Short and long term memory  fair     Progress Toward Goals: progressing    Recommended Treatment:   Add Protonix 40 mg daily for GI reflux symptoms management  Increase Zyprexa to 10 mg daily at bedtime for psychosis, mood stabilization and additional benefit with insomnia  Continue with group therapy, milieu therapy and occupational therapy      Continue following current medications:   Current Facility-Administered Medications:  acetaminophen 650 mg Oral Q6H PRN Reinaldo Rodriguez PA-C   acetaminophen 650 mg Oral Q4H PRN Reinaldo Rodriguez PA-C   acetaminophen 975 mg Oral Q6H PRN Reinaldo Rodriguez PA-C   aluminum-magnesium hydroxide-simethicone 30 mL Oral Q4H PRN Reinaldo Rodriguez PA-C   benztropine 1 mg Intramuscular Q6H PRN Reinaldo Rodriguez PA-C   benztropine 1 mg Oral Q6H PRN Reinaldo Rodriguez PA-C   clonazePAM 1 mg Oral Q8H PRN Reinaldo Rodriguez PA-C   DULoxetine 30 mg Oral Daily [facility]   fentaNYL 75 mcg Transdermal Q72H [facility]   gabapentin 600 mg Oral BID [facility]   haloperidol 5 mg Oral Q8H PRN Lorraine Maxwell, PA-C   haloperidol lactate 5 mg Intramuscular Q8H PRN Lorraine Maxwell, PA-C   hydrOXYzine HCL 25 mg Oral Q6H PRN Lorraine Maxwell, PA-C   LORazepam 2 mg Intramuscular Q8H PRN Lorraine Maxwell, PA-C   magnesium hydroxide 30 mL Oral Daily PRN Lorraine Maxwell, PA-C   OLANZapine 5 mg Intramuscular Q8H PRN Lorraine Maxwell, PA-C   OLANZapine 10 mg Oral HS [facility]   OLANZapine 5 mg Oral Q8H PRN Lorraine Maxwell, PA-C   omeprazole 20 mg Oral Early Morning [facility]   ondansetron 4 mg Oral Q6H PRN [facility]   pantoprazole 40 mg Oral Early Morning [facility]   risperiDONE 1 mg Oral Q8H PRN Lorraine Maxwell, PA-C   traZODone 50 mg Oral HS PRN Lorraine Maxwell, PA-C   zolpidem 10 mg Oral HS PRN Lorraine Maxwell, PA-C       Risks, benefits and possible side effects of Medications:   Risks, benefits, and possible side effects of medications explained to patient and patient verbalizes understanding  This note has been constructed using a voice recognition system  There may be translation, syntax,  or grammatical errors  If you have any questions, please contact the dictating provider  [phone]

## 2024-01-22 DIAGNOSIS — I10 ESSENTIAL HYPERTENSION: ICD-10-CM

## 2024-02-09 DIAGNOSIS — E08.00 DIABETES MELLITUS DUE TO UNDERLYING CONDITION WITH HYPEROSMOLARITY WITHOUT COMA, WITHOUT LONG-TERM CURRENT USE OF INSULIN (HCC): ICD-10-CM
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
## 2024-02-26 DIAGNOSIS — E08.00 DIABETES MELLITUS DUE TO UNDERLYING CONDITION WITH HYPEROSMOLARITY WITHOUT COMA, WITHOUT LONG-TERM CURRENT USE OF INSULIN (HCC): ICD-10-CM

## 2024-03-22 DIAGNOSIS — I10 ESSENTIAL HYPERTENSION: ICD-10-CM

## 2024-03-22 DIAGNOSIS — E08.00 DIABETES MELLITUS DUE TO UNDERLYING CONDITION WITH HYPEROSMOLARITY WITHOUT COMA, WITHOUT LONG-TERM CURRENT USE OF INSULIN (HCC): ICD-10-CM

## 2024-03-22 DIAGNOSIS — M48.061 SPINAL STENOSIS OF LUMBAR REGION, UNSPECIFIED WHETHER NEUROGENIC CLAUDICATION PRESENT: ICD-10-CM

## 2024-03-22 RX ORDER — GABAPENTIN 400 MG/1
800 CAPSULE ORAL EVERY 8 HOURS
Qty: 180 CAPSULE | Refills: 2 | Status: SHIPPED | OUTPATIENT
Start: 2024-03-22

## 2024-04-11 ENCOUNTER — OFFICE VISIT (OUTPATIENT)
Dept: INTERNAL MEDICINE CLINIC | Facility: CLINIC | Age: 54
End: 2024-04-11
Payer: COMMERCIAL

## 2024-04-11 VITALS
DIASTOLIC BLOOD PRESSURE: 70 MMHG | BODY MASS INDEX: 35.47 KG/M2 | RESPIRATION RATE: 12 BRPM | WEIGHT: 226 LBS | HEART RATE: 78 BPM | SYSTOLIC BLOOD PRESSURE: 130 MMHG | TEMPERATURE: 97.8 F | HEIGHT: 67 IN | OXYGEN SATURATION: 98 %

## 2024-04-11 DIAGNOSIS — E08.00 DIABETES MELLITUS DUE TO UNDERLYING CONDITION WITH HYPEROSMOLARITY WITHOUT COMA, WITHOUT LONG-TERM CURRENT USE OF INSULIN (HCC): ICD-10-CM

## 2024-04-11 DIAGNOSIS — S76.219A GROIN STRAIN, UNSPECIFIED LATERALITY, INITIAL ENCOUNTER: Primary | ICD-10-CM

## 2024-04-11 DIAGNOSIS — F20.9 SCHIZOPHRENIA, UNSPECIFIED TYPE (HCC): ICD-10-CM

## 2024-04-11 DIAGNOSIS — F19.20 DRUG ABUSE AND DEPENDENCE (HCC): ICD-10-CM

## 2024-04-11 DIAGNOSIS — S06.5X9A TRAUMATIC SUBDURAL HEMORRHAGE WITH LOSS OF CONSCIOUSNESS OF UNSPECIFIED DURATION, INITIAL ENCOUNTER (HCC): ICD-10-CM

## 2024-04-11 DIAGNOSIS — Z12.11 SCREENING FOR COLON CANCER: ICD-10-CM

## 2024-04-11 DIAGNOSIS — R21 RASH: ICD-10-CM

## 2024-04-11 PROCEDURE — G2211 COMPLEX E/M VISIT ADD ON: HCPCS | Performed by: INTERNAL MEDICINE

## 2024-04-11 PROCEDURE — 99213 OFFICE O/P EST LOW 20 MIN: CPT | Performed by: INTERNAL MEDICINE

## 2024-04-11 RX ORDER — CLOBETASOL PROPIONATE 0.5 MG/G
OINTMENT TOPICAL 2 TIMES DAILY
Qty: 60 G | Refills: 1 | Status: SHIPPED | OUTPATIENT
Start: 2024-04-11

## 2024-04-11 NOTE — PROGRESS NOTES
Assessment/Plan:    No problem-specific Assessment & Plan notes found for this encounter.       There are no diagnoses linked to this encounter.      Subjective:      Patient ID: Solo Mack is a 54 y.o. male.    Reprinted labs  requested 7/2023    Day 4  flu  symptoms  all skin  burns        The following portions of the patient's history were reviewed and updated as appropriate: allergies, current medications, past family history, past medical history, past social history, past surgical history, and problem list.    Review of Systems   Constitutional:  Positive for fever. Negative for activity change and fatigue.   HENT:  Negative for ear discharge, ear pain, rhinorrhea and sore throat.    Eyes:  Negative for pain and visual disturbance.   Respiratory:  Negative for cough and shortness of breath.    Cardiovascular:  Negative for chest pain and leg swelling.   Gastrointestinal:  Negative for abdominal pain, constipation and diarrhea.   Endocrine: Negative for cold intolerance and polyuria.   Genitourinary:  Negative for flank pain and hematuria.   Musculoskeletal:  Positive for myalgias. Negative for back pain and joint swelling.   Skin:  Negative for pallor and wound.   Neurological:  Negative for dizziness, seizures and speech difficulty.   Psychiatric/Behavioral:  Negative for confusion and hallucinations.          Objective:      There were no vitals taken for this visit.         Physical Exam  Vitals and nursing note reviewed.   Constitutional:       General: He is not in acute distress.     Appearance: Normal appearance. He is not ill-appearing.   HENT:      Head: Normocephalic.      Right Ear: External ear normal. There is no impacted cerumen.      Left Ear: External ear normal. There is no impacted cerumen.      Nose: No congestion or rhinorrhea.      Mouth/Throat:      Pharynx: No posterior oropharyngeal erythema.   Eyes:      General: No scleral icterus.        Right eye: No discharge.          Left eye: No discharge.   Neck:      Vascular: No carotid bruit.   Cardiovascular:      Rate and Rhythm: Normal rate and regular rhythm.      Heart sounds: Normal heart sounds. No murmur heard.     No friction rub. No gallop.   Pulmonary:      Breath sounds: No wheezing or rhonchi.   Abdominal:      General: There is no distension.      Tenderness: There is no abdominal tenderness. There is no guarding.   Musculoskeletal:         General: No swelling.      Cervical back: No rigidity.      Right lower leg: No edema.      Left lower leg: No edema.   Lymphadenopathy:      Cervical: No cervical adenopathy.   Skin:     Coloration: Skin is not jaundiced.   Neurological:      Mental Status: He is alert.      Cranial Nerves: No cranial nerve deficit.      Motor: No weakness.      Coordination: Coordination normal.   Psychiatric:         Mood and Affect: Mood normal.

## 2024-04-13 ENCOUNTER — APPOINTMENT (OUTPATIENT)
Dept: LAB | Facility: HOSPITAL | Age: 54
End: 2024-04-13
Payer: COMMERCIAL

## 2024-04-13 DIAGNOSIS — Z12.11 SCREENING FOR COLON CANCER: ICD-10-CM

## 2024-04-13 DIAGNOSIS — Z12.5 SCREENING FOR PROSTATE CANCER: ICD-10-CM

## 2024-04-13 DIAGNOSIS — S76.219A GROIN STRAIN, UNSPECIFIED LATERALITY, INITIAL ENCOUNTER: ICD-10-CM

## 2024-04-13 DIAGNOSIS — F31.62 BIPOLAR DISORDER, CURRENT EPISODE MIXED, MODERATE (HCC): ICD-10-CM

## 2024-04-13 DIAGNOSIS — E08.00 DIABETES MELLITUS DUE TO UNDERLYING CONDITION WITH HYPEROSMOLARITY WITHOUT COMA, WITHOUT LONG-TERM CURRENT USE OF INSULIN (HCC): ICD-10-CM

## 2024-04-13 DIAGNOSIS — E78.2 MIXED HYPERLIPIDEMIA: ICD-10-CM

## 2024-04-13 LAB
ALBUMIN SERPL BCP-MCNC: 4.6 G/DL (ref 3.5–5)
ALP SERPL-CCNC: 88 U/L (ref 34–104)
ALT SERPL W P-5'-P-CCNC: 11 U/L (ref 7–52)
ANION GAP SERPL CALCULATED.3IONS-SCNC: 11 MMOL/L (ref 4–13)
AST SERPL W P-5'-P-CCNC: 15 U/L (ref 13–39)
BASOPHILS # BLD AUTO: 0.07 THOUSANDS/ÂΜL (ref 0–0.1)
BASOPHILS NFR BLD AUTO: 1 % (ref 0–1)
BILIRUB SERPL-MCNC: 0.45 MG/DL (ref 0.2–1)
BUN SERPL-MCNC: 14 MG/DL (ref 5–25)
CALCIUM SERPL-MCNC: 9.7 MG/DL (ref 8.4–10.2)
CHLORIDE SERPL-SCNC: 100 MMOL/L (ref 96–108)
CHOLEST SERPL-MCNC: 254 MG/DL
CO2 SERPL-SCNC: 25 MMOL/L (ref 21–32)
CREAT SERPL-MCNC: 1.6 MG/DL (ref 0.6–1.3)
CREAT UR-MCNC: 220.2 MG/DL
EOSINOPHIL # BLD AUTO: 0.45 THOUSAND/ÂΜL (ref 0–0.61)
EOSINOPHIL NFR BLD AUTO: 5 % (ref 0–6)
ERYTHROCYTE [DISTWIDTH] IN BLOOD BY AUTOMATED COUNT: 12.2 % (ref 11.6–15.1)
GFR SERPL CREATININE-BSD FRML MDRD: 47 ML/MIN/1.73SQ M
GLUCOSE P FAST SERPL-MCNC: 151 MG/DL (ref 65–99)
HCT VFR BLD AUTO: 49.8 % (ref 36.5–49.3)
HDLC SERPL-MCNC: 41 MG/DL
HGB BLD-MCNC: 16.4 G/DL (ref 12–17)
IMM GRANULOCYTES # BLD AUTO: 0.05 THOUSAND/UL (ref 0–0.2)
IMM GRANULOCYTES NFR BLD AUTO: 1 % (ref 0–2)
LDLC SERPL CALC-MCNC: 174 MG/DL (ref 0–100)
LIPASE SERPL-CCNC: 38 U/L (ref 11–82)
LITHIUM SERPL-SCNC: <0.1 MMOL/L (ref 0.6–1.2)
LYMPHOCYTES # BLD AUTO: 2.1 THOUSANDS/ÂΜL (ref 0.6–4.47)
LYMPHOCYTES NFR BLD AUTO: 25 % (ref 14–44)
MCH RBC QN AUTO: 28 PG (ref 26.8–34.3)
MCHC RBC AUTO-ENTMCNC: 32.9 G/DL (ref 31.4–37.4)
MCV RBC AUTO: 85 FL (ref 82–98)
MICROALBUMIN UR-MCNC: 33.8 MG/L
MICROALBUMIN/CREAT 24H UR: 15 MG/G CREATININE (ref 0–30)
MONOCYTES # BLD AUTO: 0.68 THOUSAND/ÂΜL (ref 0.17–1.22)
MONOCYTES NFR BLD AUTO: 8 % (ref 4–12)
NEUTROPHILS # BLD AUTO: 4.95 THOUSANDS/ÂΜL (ref 1.85–7.62)
NEUTS SEG NFR BLD AUTO: 60 % (ref 43–75)
NONHDLC SERPL-MCNC: 213 MG/DL
NRBC BLD AUTO-RTO: 0 /100 WBCS
PLATELET # BLD AUTO: 341 THOUSANDS/UL (ref 149–390)
PMV BLD AUTO: 11.2 FL (ref 8.9–12.7)
POTASSIUM SERPL-SCNC: 4.9 MMOL/L (ref 3.5–5.3)
PROT SERPL-MCNC: 8.1 G/DL (ref 6.4–8.4)
PSA SERPL-MCNC: 0.32 NG/ML (ref 0–4)
RBC # BLD AUTO: 5.85 MILLION/UL (ref 3.88–5.62)
SODIUM SERPL-SCNC: 136 MMOL/L (ref 135–147)
TRIGL SERPL-MCNC: 195 MG/DL
WBC # BLD AUTO: 8.3 THOUSAND/UL (ref 4.31–10.16)

## 2024-04-13 PROCEDURE — G0103 PSA SCREENING: HCPCS

## 2024-04-13 PROCEDURE — 80061 LIPID PANEL: CPT

## 2024-04-13 PROCEDURE — 36415 COLL VENOUS BLD VENIPUNCTURE: CPT

## 2024-04-13 PROCEDURE — 80053 COMPREHEN METABOLIC PANEL: CPT

## 2024-04-13 PROCEDURE — 80178 ASSAY OF LITHIUM: CPT

## 2024-04-13 PROCEDURE — 82570 ASSAY OF URINE CREATININE: CPT

## 2024-04-13 PROCEDURE — 85025 COMPLETE CBC W/AUTO DIFF WBC: CPT

## 2024-04-13 PROCEDURE — 83690 ASSAY OF LIPASE: CPT

## 2024-04-13 PROCEDURE — 82043 UR ALBUMIN QUANTITATIVE: CPT

## 2024-04-15 ENCOUNTER — APPOINTMENT (OUTPATIENT)
Dept: LAB | Facility: HOSPITAL | Age: 54
End: 2024-04-15
Attending: INTERNAL MEDICINE

## 2024-04-16 ENCOUNTER — APPOINTMENT (OUTPATIENT)
Dept: LAB | Facility: HOSPITAL | Age: 54
End: 2024-04-16
Payer: COMMERCIAL

## 2024-04-16 DIAGNOSIS — Z12.11 SCREENING FOR COLON CANCER: ICD-10-CM

## 2024-04-16 LAB — HEMOCCULT STL QL IA: NEGATIVE

## 2024-04-16 PROCEDURE — G0328 FECAL BLOOD SCRN IMMUNOASSAY: HCPCS

## 2024-04-19 ENCOUNTER — TELEPHONE (OUTPATIENT)
Dept: INTERNAL MEDICINE CLINIC | Facility: CLINIC | Age: 54
End: 2024-04-19

## 2024-04-19 DIAGNOSIS — E78.2 MIXED HYPERLIPIDEMIA: Primary | ICD-10-CM

## 2024-04-19 RX ORDER — ATORVASTATIN CALCIUM 10 MG/1
10 TABLET, FILM COATED ORAL DAILY
Qty: 30 TABLET | Refills: 4 | Status: SHIPPED | OUTPATIENT
Start: 2024-04-19

## 2024-04-22 ENCOUNTER — TELEPHONE (OUTPATIENT)
Dept: INTERNAL MEDICINE CLINIC | Facility: CLINIC | Age: 54
End: 2024-04-22

## 2024-04-22 NOTE — TELEPHONE ENCOUNTER
Lvm to call back for appt----- Message from Florin Lindsay DO sent at 4/22/2024  9:55 AM EDT -----  Labs inn schedule  follow up

## 2024-04-29 ENCOUNTER — OFFICE VISIT (OUTPATIENT)
Dept: INTERNAL MEDICINE CLINIC | Facility: CLINIC | Age: 54
End: 2024-04-29
Payer: COMMERCIAL

## 2024-04-29 VITALS
RESPIRATION RATE: 12 BRPM | BODY MASS INDEX: 35.63 KG/M2 | WEIGHT: 227 LBS | DIASTOLIC BLOOD PRESSURE: 70 MMHG | HEIGHT: 67 IN | SYSTOLIC BLOOD PRESSURE: 130 MMHG | HEART RATE: 74 BPM | TEMPERATURE: 97.7 F

## 2024-04-29 DIAGNOSIS — N28.9 RENAL INSUFFICIENCY: ICD-10-CM

## 2024-04-29 DIAGNOSIS — E78.00 PURE HYPERCHOLESTEROLEMIA: Primary | ICD-10-CM

## 2024-04-29 DIAGNOSIS — R10.84 GENERALIZED ABDOMINAL PAIN: ICD-10-CM

## 2024-04-29 LAB
SL AMB  POCT GLUCOSE, UA: NORMAL
SL AMB LEUKOCYTE ESTERASE,UA: NORMAL
SL AMB POCT BILIRUBIN,UA: NORMAL
SL AMB POCT BLOOD,UA: NORMAL
SL AMB POCT CLARITY,UA: NORMAL
SL AMB POCT COLOR,UA: NORMAL
SL AMB POCT KETONES,UA: NORMAL
SL AMB POCT NITRITE,UA: NORMAL
SL AMB POCT PH,UA: NORMAL
SL AMB POCT SPECIFIC GRAVITY,UA: NORMAL
SL AMB POCT URINE PROTEIN: NORMAL
SL AMB POCT UROBILINOGEN: NORMAL

## 2024-04-29 PROCEDURE — 3061F NEG MICROALBUMINURIA REV: CPT | Performed by: INTERNAL MEDICINE

## 2024-04-29 PROCEDURE — G2211 COMPLEX E/M VISIT ADD ON: HCPCS | Performed by: INTERNAL MEDICINE

## 2024-04-29 PROCEDURE — 99213 OFFICE O/P EST LOW 20 MIN: CPT | Performed by: INTERNAL MEDICINE

## 2024-04-29 PROCEDURE — 81002 URINALYSIS NONAUTO W/O SCOPE: CPT | Performed by: INTERNAL MEDICINE

## 2024-04-29 NOTE — PROGRESS NOTES
Assessment/Plan:    No problem-specific Assessment & Plan notes found for this encounter.       There are no diagnoses linked to this encounter.      Subjective:      Patient ID: Solo Mack is a 54 y.o. male.    Ldl 174=statin recommended        The following portions of the patient's history were reviewed and updated as appropriate: allergies, current medications, past family history, past medical history, past social history, past surgical history, and problem list.    Review of Systems   Constitutional:  Negative for activity change and fatigue.   HENT:  Negative for ear discharge, ear pain, rhinorrhea and sore throat.    Eyes:  Negative for pain and visual disturbance.   Respiratory:  Negative for cough and shortness of breath.    Cardiovascular:  Negative for chest pain and leg swelling.   Gastrointestinal:  Negative for abdominal pain, constipation and diarrhea.   Endocrine: Negative for cold intolerance and polyuria.   Genitourinary:  Negative for flank pain and hematuria.   Musculoskeletal:  Negative for back pain and joint swelling.   Skin:  Negative for pallor and wound.   Neurological:  Negative for dizziness, seizures and speech difficulty.   Psychiatric/Behavioral:  Negative for confusion and hallucinations.          Objective:      There were no vitals taken for this visit.         Physical Exam  Vitals and nursing note reviewed.   Constitutional:       General: He is not in acute distress.     Appearance: Normal appearance. He is not ill-appearing.   HENT:      Head: Normocephalic.      Right Ear: External ear normal. There is no impacted cerumen.      Left Ear: External ear normal. There is no impacted cerumen.      Nose: No congestion or rhinorrhea.      Mouth/Throat:      Pharynx: No posterior oropharyngeal erythema.   Eyes:      General: No scleral icterus.        Right eye: No discharge.         Left eye: No discharge.   Neck:      Vascular: No carotid bruit.   Cardiovascular:       Rate and Rhythm: Normal rate and regular rhythm.      Heart sounds: Normal heart sounds. No murmur heard.     No friction rub. No gallop.   Pulmonary:      Breath sounds: No wheezing or rhonchi.   Abdominal:      General: There is no distension.      Tenderness: There is no abdominal tenderness. There is no guarding.   Musculoskeletal:         General: No swelling.      Cervical back: No rigidity.      Right lower leg: No edema.      Left lower leg: No edema.   Lymphadenopathy:      Cervical: No cervical adenopathy.   Skin:     Coloration: Skin is not jaundiced.   Neurological:      Mental Status: He is alert.      Cranial Nerves: No cranial nerve deficit.      Motor: No weakness.      Coordination: Coordination normal.   Psychiatric:         Mood and Affect: Mood normal.

## 2024-04-30 ENCOUNTER — TELEPHONE (OUTPATIENT)
Age: 54
End: 2024-04-30

## 2024-04-30 NOTE — TELEPHONE ENCOUNTER
PT call to inform the two test his PCP inform him to schedule is schedule for  May 6, 2024 at 4 pm. PT call in the inform you about the appointment he got was the sooner he can get. Thank you

## 2024-05-06 ENCOUNTER — HOSPITAL ENCOUNTER (OUTPATIENT)
Dept: RADIOLOGY | Facility: HOSPITAL | Age: 54
Discharge: HOME | End: 2024-05-06
Payer: COMMERCIAL

## 2024-05-06 ENCOUNTER — TELEPHONE (OUTPATIENT)
Dept: OTHER | Facility: OTHER | Age: 54
End: 2024-05-06

## 2024-05-06 DIAGNOSIS — R10.84 GENERALIZED ABDOMINAL PAIN: ICD-10-CM

## 2024-05-06 DIAGNOSIS — N28.9 RENAL INSUFFICIENCY: ICD-10-CM

## 2024-05-06 PROCEDURE — 76857 US EXAM PELVIC LIMITED: CPT

## 2024-05-06 PROCEDURE — 76700 US EXAM ABDOM COMPLETE: CPT

## 2024-05-07 ENCOUNTER — TELEPHONE (OUTPATIENT)
Dept: INTERNAL MEDICINE CLINIC | Facility: CLINIC | Age: 54
End: 2024-05-07

## 2024-05-07 ENCOUNTER — TELEPHONE (OUTPATIENT)
Age: 54
End: 2024-05-07

## 2024-05-07 NOTE — TELEPHONE ENCOUNTER
Patient calling in regarding results. Advised of results per PCP notes. Patient put the results into the search engine and says that it gave him results that differ from what Dr. Lindsay is saying. He is unsure if he can trust him as this online search engine is telling him different. Discussed that it is a search engine and they have faults. Patient still concerned and unsure of what PCP is saying. He is okay with seeing GI so if we could put a referral in. He is still having pain on the right side. Please advise.

## 2024-05-09 ENCOUNTER — TELEPHONE (OUTPATIENT)
Dept: INTERNAL MEDICINE CLINIC | Facility: CLINIC | Age: 54
End: 2024-05-09

## 2024-05-10 ENCOUNTER — APPOINTMENT (EMERGENCY)
Dept: CT IMAGING | Facility: HOSPITAL | Age: 54
End: 2024-05-10
Payer: COMMERCIAL

## 2024-05-10 ENCOUNTER — TELEPHONE (OUTPATIENT)
Age: 54
End: 2024-05-10

## 2024-05-10 ENCOUNTER — HOSPITAL ENCOUNTER (EMERGENCY)
Facility: HOSPITAL | Age: 54
Discharge: HOME | End: 2024-05-10
Attending: EMERGENCY MEDICINE
Payer: COMMERCIAL

## 2024-05-10 VITALS
RESPIRATION RATE: 14 BRPM | SYSTOLIC BLOOD PRESSURE: 123 MMHG | TEMPERATURE: 98.1 F | DIASTOLIC BLOOD PRESSURE: 87 MMHG | BODY MASS INDEX: 35.63 KG/M2 | HEART RATE: 56 BPM | WEIGHT: 227 LBS | OXYGEN SATURATION: 95 % | HEIGHT: 67 IN

## 2024-05-10 DIAGNOSIS — R10.9 ABDOMINAL PAIN: ICD-10-CM

## 2024-05-10 DIAGNOSIS — R91.1 PULMONARY NODULE: ICD-10-CM

## 2024-05-10 DIAGNOSIS — K80.20 GALLSTONES: ICD-10-CM

## 2024-05-10 DIAGNOSIS — N48.89 PENILE PAIN: ICD-10-CM

## 2024-05-10 DIAGNOSIS — R10.13 EPIGASTRIC PAIN: Primary | ICD-10-CM

## 2024-05-10 LAB
ALBUMIN SERPL BCP-MCNC: 4.6 G/DL (ref 3.5–5)
ALP SERPL-CCNC: 89 U/L (ref 34–104)
ALT SERPL W P-5'-P-CCNC: 11 U/L (ref 7–52)
ANION GAP SERPL CALCULATED.3IONS-SCNC: 8 MMOL/L (ref 4–13)
AST SERPL W P-5'-P-CCNC: 20 U/L (ref 13–39)
BACTERIA UR QL AUTO: ABNORMAL /HPF
BASOPHILS # BLD AUTO: 0.06 THOUSANDS/ÂΜL (ref 0–0.1)
BASOPHILS NFR BLD AUTO: 1 % (ref 0–1)
BILIRUB SERPL-MCNC: 0.51 MG/DL (ref 0.2–1)
BILIRUB UR QL STRIP: NEGATIVE
BUN SERPL-MCNC: 12 MG/DL (ref 5–25)
CALCIUM SERPL-MCNC: 9.7 MG/DL (ref 8.4–10.2)
CARDIAC TROPONIN I PNL SERPL HS: 4 NG/L
CHLORIDE SERPL-SCNC: 101 MMOL/L (ref 96–108)
CLARITY UR: CLEAR
CO2 SERPL-SCNC: 26 MMOL/L (ref 21–32)
COLOR UR: YELLOW
CREAT SERPL-MCNC: 1.45 MG/DL (ref 0.6–1.3)
EOSINOPHIL # BLD AUTO: 0.34 THOUSAND/ÂΜL (ref 0–0.61)
EOSINOPHIL NFR BLD AUTO: 5 % (ref 0–6)
ERYTHROCYTE [DISTWIDTH] IN BLOOD BY AUTOMATED COUNT: 12.1 % (ref 11.6–15.1)
GFR SERPL CREATININE-BSD FRML MDRD: 53 ML/MIN/1.73SQ M
GLUCOSE SERPL-MCNC: 158 MG/DL (ref 65–140)
GLUCOSE UR STRIP-MCNC: NEGATIVE MG/DL
HCT VFR BLD AUTO: 48.8 % (ref 36.5–49.3)
HGB BLD-MCNC: 16.2 G/DL (ref 12–17)
HGB UR QL STRIP.AUTO: NEGATIVE
HYALINE CASTS #/AREA URNS LPF: ABNORMAL /LPF
IMM GRANULOCYTES # BLD AUTO: 0.04 THOUSAND/UL (ref 0–0.2)
IMM GRANULOCYTES NFR BLD AUTO: 1 % (ref 0–2)
KETONES UR STRIP-MCNC: NEGATIVE MG/DL
LEUKOCYTE ESTERASE UR QL STRIP: NEGATIVE
LIPASE SERPL-CCNC: 21 U/L (ref 11–82)
LYMPHOCYTES # BLD AUTO: 1.38 THOUSANDS/ÂΜL (ref 0.6–4.47)
LYMPHOCYTES NFR BLD AUTO: 18 % (ref 14–44)
MCH RBC QN AUTO: 27.3 PG (ref 26.8–34.3)
MCHC RBC AUTO-ENTMCNC: 33.2 G/DL (ref 31.4–37.4)
MCV RBC AUTO: 82 FL (ref 82–98)
MONOCYTES # BLD AUTO: 0.55 THOUSAND/ÂΜL (ref 0.17–1.22)
MONOCYTES NFR BLD AUTO: 7 % (ref 4–12)
MUCOUS THREADS UR QL AUTO: ABNORMAL
NEUTROPHILS # BLD AUTO: 5.25 THOUSANDS/ÂΜL (ref 1.85–7.62)
NEUTS SEG NFR BLD AUTO: 68 % (ref 43–75)
NITRITE UR QL STRIP: NEGATIVE
NON-SQ EPI CELLS URNS QL MICRO: ABNORMAL /HPF
NRBC BLD AUTO-RTO: 0 /100 WBCS
PH UR STRIP.AUTO: 6 [PH]
PLATELET # BLD AUTO: 277 THOUSANDS/UL (ref 149–390)
PMV BLD AUTO: 10 FL (ref 8.9–12.7)
POTASSIUM SERPL-SCNC: 4.5 MMOL/L (ref 3.5–5.3)
PROT SERPL-MCNC: 7.7 G/DL (ref 6.4–8.4)
PROT UR STRIP-MCNC: ABNORMAL MG/DL
RBC # BLD AUTO: 5.93 MILLION/UL (ref 3.88–5.62)
RBC #/AREA URNS AUTO: ABNORMAL /HPF
SODIUM SERPL-SCNC: 135 MMOL/L (ref 135–147)
SP GR UR STRIP.AUTO: 1.02 (ref 1–1.03)
UROBILINOGEN UR STRIP-ACNC: <2 MG/DL
WBC # BLD AUTO: 7.62 THOUSAND/UL (ref 4.31–10.16)
WBC #/AREA URNS AUTO: ABNORMAL /HPF

## 2024-05-10 PROCEDURE — 99284 EMERGENCY DEPT VISIT MOD MDM: CPT

## 2024-05-10 PROCEDURE — 83690 ASSAY OF LIPASE: CPT | Performed by: PHYSICIAN ASSISTANT

## 2024-05-10 PROCEDURE — 84484 ASSAY OF TROPONIN QUANT: CPT | Performed by: PHYSICIAN ASSISTANT

## 2024-05-10 PROCEDURE — 93005 ELECTROCARDIOGRAM TRACING: CPT

## 2024-05-10 PROCEDURE — 71260 CT THORAX DX C+: CPT

## 2024-05-10 PROCEDURE — 74177 CT ABD & PELVIS W/CONTRAST: CPT

## 2024-05-10 PROCEDURE — 80053 COMPREHEN METABOLIC PANEL: CPT | Performed by: PHYSICIAN ASSISTANT

## 2024-05-10 PROCEDURE — 85025 COMPLETE CBC W/AUTO DIFF WBC: CPT | Performed by: PHYSICIAN ASSISTANT

## 2024-05-10 PROCEDURE — 36415 COLL VENOUS BLD VENIPUNCTURE: CPT | Performed by: PHYSICIAN ASSISTANT

## 2024-05-10 PROCEDURE — 81001 URINALYSIS AUTO W/SCOPE: CPT | Performed by: PHYSICIAN ASSISTANT

## 2024-05-10 PROCEDURE — 99285 EMERGENCY DEPT VISIT HI MDM: CPT | Performed by: PHYSICIAN ASSISTANT

## 2024-05-10 RX ADMIN — IOHEXOL 100 ML: 350 INJECTION, SOLUTION INTRAVENOUS at 20:24

## 2024-05-10 NOTE — ED PROVIDER NOTES
History  Chief Complaint   Patient presents with    Penis Pain     Continual burning x6 weeks from penis head to anus, has seen PCP multiple time for this, tylenol and motrin with cool cloth are no longer helping; took 4 motrin and 2 tylenol at 1730, has been taking these around the clock for weeks     Patient is a 55 y/o M with h/o Bipolar d/o, CHF, HTN, Hyperlipidemia, DM that presents to the ED with multiple complaints.  He called his PCP today and told him the lump in his chest is growing so he was told to go to the ED.  Patient states he has a history of hiatal hernia for over 30 years, but has noticed lately a lump in his epigastrium that is enlarging and becoming more painful.  He denies nausea or vomiting.  He states he has diarrhea.  No fevers, chills.  He is also complaining of burning from the head of his penis to his scrotum and anus for 6 weeks.  He was seen by PCP and had an u/s of abdomen and bladder on 5/6 which showed no acute abnormalities.  He had a couple incidental findings like gallstones, hepatic steatosis and stones in kidney.  He states during the u/s while they were pushing on the liver he had pain and is concerned because they saw a fatty liver.  Patient has been taking tylenol/motrin for pain, but states he needs to stop taking all these meds because he has a history of CATHRYN.  Patient also c/o flu like symptoms for over 1 week.        History provided by:  Patient  Penis Pain  Associated symptoms: abdominal pain, chest pain and diarrhea    Associated symptoms: no cough, no fever, no nausea, no rash, no shortness of breath and no vomiting        Prior to Admission Medications   Prescriptions Last Dose Informant Patient Reported? Taking?   Easy Comfort Pen Needles 33G X 5 MM MISC   Yes No   Sig: AS DIRECTED FOR ozempic EVERY WEEK   atorvastatin (LIPITOR) 10 mg tablet   No No   Sig: Take 1 tablet (10 mg total) by mouth daily   clobetasol (TEMOVATE) 0.05 % ointment   No No   Sig: Apply  topically 2 (two) times a day   clonazePAM (KlonoPIN) 1 mg tablet   Yes No   Sig: 3 (three) times a day   gabapentin (NEURONTIN) 400 mg capsule   No No   Sig: Take 2 capsules (800 mg total) by mouth every 8 (eight) hours   hydrOXYzine HCL (ATARAX) 25 mg tablet   Yes No   Sig: TAKE ONE TABLET BY MOUTH TWICE DAILY AT 5pm AND 8pm   metFORMIN (GLUCOPHAGE) 500 mg tablet   No No   Sig: Take 1 tablet (500 mg total) by mouth daily with dinner   metoprolol tartrate (LOPRESSOR) 25 mg tablet   No No   Sig: Take 1 tablet (25 mg total) by mouth every 12 (twelve) hours   pantoprazole (PROTONIX) 40 mg tablet   No No   Sig: TAKE ONE TABLET BY MOUTH DAILY BEFORE BREAKFAST   semaglutide, 1 mg/dose, (Ozempic) 4 mg/3 mL injection pen   No No   Sig: Inject 0.75 mL (1 mg total) under the skin once a week   sildenafil (VIAGRA) 100 mg tablet   No No   Sig: Take 1 tablet (100 mg total) by mouth daily as needed for erectile dysfunction   zolpidem (AMBIEN CR) 12.5 MG CR tablet   Yes No   Sig: Take 12.5 mg by mouth daily at bedtime      Facility-Administered Medications: None       Past Medical History:   Diagnosis Date    Alcohol withdrawal ([facility])     Alcoholism ([facility])     Anxiety     Back pain     Back pain, chronic     Bipolar 1 disorder ([facility])     Bipolar disorder, current episode mixed, moderate ([facility])     BPH (benign prostatic hyperplasia)     Cardiac disease     CHF (congestive heart failure) ([facility])     Chronic pain disorder     Chronic renal failure     Demyelinating disease ([facility])     Dental disorder     Depression     Diabetes mellitus ([facility])     Drug abuse ([facility])     Drug withdrawal ([facility])     ED (erectile dysfunction)     Edema     Encephalopathy     Encephalopathy     Fatigue     GERD (gastroesophageal reflux disease)     Heart rate fast     Hepatitis     unspecified     Hyperlipidemia     Hypertension     Hypokalemia     Knee buckling     MI (myocardial infarction) ([facility])     Morbid obesity with BMI of 40.0-44.9, adult ([facility])     NIDDY  (non-insulin dependent diabetes mellitus in young) ([facility])     Obesity     Opiate dependence ([facility])     Opiate withdrawal ([facility])     Osteoarthritis     Pleural effusion     Pneumonia     Prolonged Q-T interval on ECG     Psychiatric disorder     Psychiatric illness     Psychosis ([facility])     Renal disorder     Schizo-affective schizophrenia ([facility])     Spinal stenosis, lumbar     Traumatic subdural hemorrhage with loss of consciousness of unspecified duration, initial encounter ([facility]) 12/29/2022    Ulcer of calf ([facility])     Ulnar neuritis, right     Ulnar neuropathy at elbow     Weight loss        Past Surgical History:   Procedure Laterality Date    BACK SURGERY      report thoracic surgery    LUMBAR FUSION  05/2006    L5/S1 Gratch        Family History   Problem Relation Age of Onset    No Known Problems Mother     No Known Problems Father     No Known Problems Sister     No Known Problems Brother     No Known Problems Maternal Aunt     No Known Problems Paternal Aunt     No Known Problems Maternal Uncle     No Known Problems Paternal Uncle     No Known Problems Maternal Grandfather     No Known Problems Maternal Grandmother     No Known Problems Paternal Grandfather     No Known Problems Paternal Grandmother     No Known Problems Cousin     ADD / ADHD Neg Hx     Alcohol abuse Neg Hx     Anxiety disorder Neg Hx     Bipolar disorder Neg Hx     Dementia Neg Hx     Depression Neg Hx     Drug abuse Neg Hx     OCD Neg Hx     Paranoid behavior Neg Hx     Schizophrenia Neg Hx     Seizures Neg Hx     Self-Injury Neg Hx     Suicide Attempts Neg Hx      I have reviewed and agree with the history as documented.    E-Cigarette/Vaping    E-Cigarette Use Never User      E-Cigarette/Vaping Substances    Nicotine No     THC No     CBD No     Flavoring No     Other No     Unknown No      Social History     Tobacco Use    Smoking status: Former    Smokeless tobacco: Never    Tobacco comments:     patient is a non - smoker   Vaping Use     Vaping status: Never Used   Substance Use Topics    Alcohol use: Yes     Comment: beer here and there per pt    Drug use: Not Currently       Review of Systems   Constitutional:  Negative for chills and fever.   HENT: Negative.     Respiratory:  Negative for cough and shortness of breath.    Cardiovascular:  Positive for chest pain. Negative for palpitations and leg swelling.   Gastrointestinal:  Positive for abdominal pain, diarrhea and rectal pain. Negative for nausea and vomiting.   Genitourinary:  Positive for penile pain.   Musculoskeletal:  Negative for neck pain.   Skin:  Negative for color change, pallor and rash.   Neurological:  Negative for dizziness, weakness, light-headedness and numbness.   All other systems reviewed and are negative.      Physical Exam  Physical Exam  Vitals and nursing note reviewed.   Constitutional:       General: He is not in acute distress.     Appearance: Normal appearance. He is well-developed, well-groomed and overweight. He is not ill-appearing or diaphoretic.   HENT:      Head: Normocephalic and atraumatic.   Cardiovascular:      Rate and Rhythm: Normal rate and regular rhythm.      Heart sounds: Normal heart sounds.   Pulmonary:      Effort: Pulmonary effort is normal.      Breath sounds: Normal breath sounds. No wheezing, rhonchi or rales.   Abdominal:      General: Abdomen is flat. Bowel sounds are normal.      Palpations: Abdomen is soft.      Tenderness: There is abdominal tenderness in the epigastric area. There is no guarding or rebound.      Hernia: No hernia is present.      Comments: No mass palpable   Musculoskeletal:         General: Normal range of motion.      Cervical back: Normal range of motion.      Right lower leg: No edema.      Left lower leg: No edema.   Skin:     General: Skin is warm and dry.      Coloration: Skin is not jaundiced or pale.      Findings: No rash.   Neurological:      General: No focal deficit present.      Mental Status: He is alert  and oriented to person, place, and time.   Psychiatric:         Mood and Affect: Mood normal.         Behavior: Behavior is cooperative.         Vital Signs  ED Triage Vitals [05/10/24 1849]   Temperature Pulse Respirations Blood Pressure SpO2   98.1 °F (36.7 °C) 73 16 (!) 156/109 98 %      Temp Source Heart Rate Source Patient Position - Orthostatic VS BP Location FiO2 (%)   Oral Monitor Lying Left arm --      Pain Score       7           Vitals:    05/10/24 2030 05/10/24 2100 05/10/24 2130 05/10/24 2200   BP: 137/89 122/88 131/87 127/91   Pulse: 59 59 57 57   Patient Position - Orthostatic VS:  Sitting           Visual Acuity      ED Medications  Medications   iohexol (OMNIPAQUE) 350 MG/ML injection (SINGLE-DOSE) 100 mL (100 mL Intravenous Given 5/10/24 2024)       Diagnostic Studies  Results Reviewed       Procedure Component Value Units Date/Time    Urine Microscopic [[id number]]  (Abnormal) Collected: 05/10/24 1946    Lab Status: Final result Specimen: Urine, Other Updated: 05/10/24 2047     RBC, UA 0-1 /hpf      WBC, UA 0-1 /hpf      Epithelial Cells Occasional /hpf      Bacteria, UA Occasional /hpf      MUCUS THREADS Occasional     Hyaline Casts, UA 2-4 /lpf     Comprehensive metabolic panel [[id number]]  (Abnormal) Collected: 05/10/24 1934    Lab Status: Final result Specimen: Blood from Arm, Right Updated: 05/10/24 2010     Sodium 135 mmol/L      Potassium 4.5 mmol/L      Chloride 101 mmol/L      CO2 26 mmol/L      ANION GAP 8 mmol/L      BUN 12 mg/dL      Creatinine 1.45 mg/dL      Glucose 158 mg/dL      Calcium 9.7 mg/dL      AST 20 U/L      ALT 11 U/L      Alkaline Phosphatase 89 U/L      Total Protein 7.7 g/dL      Albumin 4.6 g/dL      Total Bilirubin 0.51 mg/dL      eGFR 53 ml/min/1.73sq m     Narrative:      National Kidney Disease Foundation guidelines for Chronic Kidney Disease (CKD):     Stage 1 with normal or high GFR (GFR > 90 mL/min/1.73 square meters)    Stage 2 Mild CKD (GFR = 60-89 mL/min/1.73  square meters)    Stage 3A Moderate CKD (GFR = 45-59 mL/min/1.73 square meters)    Stage 3B Moderate CKD (GFR = 30-44 mL/min/1.73 square meters)    Stage 4 Severe CKD (GFR = 15-29 mL/min/1.73 square meters)    Stage 5 End Stage CKD (GFR <15 mL/min/1.73 square meters)  Note: GFR calculation is accurate only with a steady state creatinine    Lipase [[id number]]  (Normal) Collected: 05/10/24 1934    Lab Status: Final result Specimen: Blood from Arm, Right Updated: 05/10/24 2010     Lipase 21 u/L     HS Troponin 0hr (reflex protocol) [[id number]]  (Normal) Collected: 05/10/24 1934    Lab Status: Final result Specimen: Blood from Arm, Right Updated: 05/10/24 2002     hs TnI 0hr 4 ng/L     UA w Reflex to Microscopic w Reflex to Culture [[id number]]  (Abnormal) Collected: 05/10/24 1946    Lab Status: Final result Specimen: Urine, Other Updated: 05/10/24 1954     Color, UA Yellow     Clarity, UA Clear     Specific Gravity, UA 1.025     pH, UA 6.0     Leukocytes, UA Negative     Nitrite, UA Negative     Protein, UA Trace mg/dl      Glucose, UA Negative mg/dl      Ketones, UA Negative mg/dl      Urobilinogen, UA <2.0 mg/dl      Bilirubin, UA Negative     Occult Blood, UA Negative    CBC and differential [[id number]]  (Abnormal) Collected: 05/10/24 1934    Lab Status: Final result Specimen: Blood from Arm, Right Updated: 05/10/24 1941     WBC 7.62 Thousand/uL      RBC 5.93 Million/uL      Hemoglobin 16.2 g/dL      Hematocrit 48.8 %      MCV 82 fL      MCH 27.3 pg      MCHC 33.2 g/dL      RDW 12.1 %      MPV 10.0 fL      Platelets 277 Thousands/uL      nRBC 0 /100 WBCs      Segmented % 68 %      Immature Grans % 1 %      Lymphocytes % 18 %      Monocytes % 7 %      Eosinophils Relative 5 %      Basophils Relative 1 %      Absolute Neutrophils 5.25 Thousands/µL      Absolute Immature Grans 0.04 Thousand/uL      Absolute Lymphocytes 1.38 Thousands/µL      Absolute Monocytes 0.55 Thousand/µL      Eosinophils Absolute 0.34 Thousand/µL       Basophils Absolute 0.06 Thousands/µL                    CT chest abdomen pelvis w contrast   Final Result by Aneesh Valencia MD (05/10 2207)   1. Patchy nodular densities in the bilateral lung bases and medial right middle lobe, likely postinflammatory/infectious.   2. Cholelithiasis.                     Workstation performed: [id number]                    Procedures  ECG 12 Lead Documentation Only    Date/Time: 5/10/2024 7:48 PM    Performed by: Lauren Escamilla PA-C  Authorized by: Lauren Escamilla PA-C    Indications / Diagnosis:  Chest pain  ECG reviewed by me, the ED Provider: yes    Patient location:  ED  Previous ECG:     Previous ECG:  Compared to current    Comparison ECG info:  T wave inversion no longer present in inferior and aterolateral leads.  Rate:     ECG rate:  61  Rhythm:     Rhythm: sinus rhythm    Conduction:     Conduction: normal    ST segments:     ST segments:  Normal  T waves:     T waves: normal             ED Course                               SBIRT 20yo+      Flowsheet Row Most Recent Value   Initial Alcohol Screen: US AUDIT-C     1. How often do you have a drink containing alcohol? 0 Filed at: 05/10/2024 1903   2. How many drinks containing alcohol do you have on a typical day you are drinking?  0 Filed at: 05/10/2024 1903   3a. Male UNDER 65: How often do you have five or more drinks on one occasion? 0 Filed at: 05/10/2024 1903   Audit-C Score 0 Filed at: 05/10/2024 1903   IMER: How many times in the past year have you...    Used an illegal drug or used a prescription medication for non-medical reasons? Never Filed at: 05/10/2024 1903                      Medical Decision Making  Patient presenting with multiple chronic complaints, abdominal pain, penile pain, epigastric pain, will order labs, CT scan to r/o anemia, ACS, electrolyte abnormality, hernia, obstruction, pancreatitis, prostatitis, cystitis.  Patient's labs baseline.  CT scan showing no acute abnormalities.   Patient with pulmonary nodule, advised f/u with PCP for further monitoring.  Patient with patchy b/l densities, afebrile, normal WBC, no cough, no need for abx.     Amount and/or Complexity of Data Reviewed  External Data Reviewed: ECG.  Labs: ordered.  Radiology: ordered.  ECG/medicine tests: ordered and independent interpretation performed.    Risk  Prescription drug management.             Disposition  Final diagnoses:   Epigastric pain   Abdominal pain   Penile pain   Gallstones   Pulmonary nodule     Time reflects when diagnosis was documented in both MDM as applicable and the Disposition within this note       Time User Action Codes Description Comment    5/10/2024 10:13 PM Lauren Escamilla Add [R10.13] Epigastric pain     5/10/2024 10:13 PM Lauren Escamilla Add [R10.9] Abdominal pain     5/10/2024 10:13 PM Lauren Escamilla Add [N48.89] Penile pain     5/10/2024 10:13 PM Lauren Escamilla Add [K80.20] Gallstones     5/10/2024 10:13 PM Lauren Escamilla Add [R91.1] Pulmonary nodule           ED Disposition       ED Disposition   Discharge    Condition   Stable    Date/Time   Fri May 10, 2024 2212    Comment   Solo REY Sovocool discharge to home/self care.                   Follow-up Information       Follow up With Specialties Details Why Contact Info Additional Information    [facility] Schedule an appointment as soon as possible for a visit  For recheck [address]  [place] [address]  [facility] [address] [phone] [facility], [place] [address]  [address]     [phone]    Florin Lindsay DO Internal Medicine Schedule an appointment as soon as possible for a visit  for monitoring of pulmonary nodule. [address] [address]  [phone]               Patient's Medications   Discharge Prescriptions    No medications on file        No discharge procedures on file.    PDMP Review         Value Time User    PDMP Reviewed  Yes 7/6/2023  2:20 PM Florin Lindsay DO            ED Provider  Electronically Signed by             Lauren Escamilla PA-C  05/10/24 1834

## 2024-05-10 NOTE — TELEPHONE ENCOUNTER
Pt called to cancel appt with Dr Lindsay. Pt will be going to ER at 6pm today for a large lump in chest, one point the lump was a hernia, now has grown very large. Since scan in [facility], very sore in left side, goes up right side of back into shoulders, pain is unbearable. Only comfortable on left side. Pt requesting  admit him to hospital for further testing. Also constant pian in right side of neck for quite some time, pain getting worse. Also flu like symptoms for 6 weeks. Please Advise

## 2024-05-11 DIAGNOSIS — M48.061 SPINAL STENOSIS OF LUMBAR REGION, UNSPECIFIED WHETHER NEUROGENIC CLAUDICATION PRESENT: ICD-10-CM

## 2024-05-11 PROBLEM — Z12.11 SCREENING FOR COLON CANCER: Status: RESOLVED | Noted: 2023-05-01 | Resolved: 2024-05-11

## 2024-05-11 LAB
ATRIAL RATE: 61 BPM
P AXIS: 60 DEGREES
PR INTERVAL: 188 MS
QRS AXIS: 47 DEGREES
QRSD INTERVAL: 94 MS
QT INTERVAL: 396 MS
QTC INTERVAL: 398 MS
T WAVE AXIS: 15 DEGREES
VENTRICULAR RATE: 61 BPM

## 2024-05-11 PROCEDURE — 93010 ELECTROCARDIOGRAM REPORT: CPT | Performed by: INTERNAL MEDICINE

## 2024-05-11 NOTE — DISCHARGE INSTRUCTIONS
Follow up with GI doctor concerning abdominal pain.  Follow up with PCP for monitoring of pulmonary nodule.  Continue tylenol/motrin for discomfort.

## 2024-05-14 RX ORDER — GABAPENTIN 400 MG/1
800 CAPSULE ORAL EVERY 8 HOURS
Qty: 180 CAPSULE | Refills: 5 | Status: SHIPPED | OUTPATIENT
Start: 2024-05-14

## 2024-05-15 DIAGNOSIS — E08.00 DIABETES MELLITUS DUE TO UNDERLYING CONDITION WITH HYPEROSMOLARITY WITHOUT COMA, WITHOUT LONG-TERM CURRENT USE OF INSULIN (HCC): ICD-10-CM

## 2024-05-20 ENCOUNTER — TELEPHONE (OUTPATIENT)
Age: 54
End: 2024-05-20

## 2024-05-23 ENCOUNTER — OFFICE VISIT (OUTPATIENT)
Dept: UROLOGY | Facility: MEDICAL CENTER | Age: 54
End: 2024-05-23
Payer: COMMERCIAL

## 2024-05-23 VITALS
WEIGHT: 228 LBS | HEIGHT: 67 IN | DIASTOLIC BLOOD PRESSURE: 80 MMHG | SYSTOLIC BLOOD PRESSURE: 120 MMHG | HEART RATE: 64 BPM | OXYGEN SATURATION: 96 % | BODY MASS INDEX: 35.79 KG/M2 | RESPIRATION RATE: 20 BRPM

## 2024-05-23 DIAGNOSIS — N50.89 PAIN OF MALE GENITALIA: Primary | ICD-10-CM

## 2024-05-23 DIAGNOSIS — N30.10 INTERSTITIAL CYSTITIS: ICD-10-CM

## 2024-05-23 DIAGNOSIS — N28.9 RENAL INSUFFICIENCY: ICD-10-CM

## 2024-05-23 LAB
POST-VOID RESIDUAL VOLUME, ML POC: 84 ML
SL AMB  POCT GLUCOSE, UA: NORMAL
SL AMB LEUKOCYTE ESTERASE,UA: NORMAL
SL AMB POCT BILIRUBIN,UA: NORMAL
SL AMB POCT BLOOD,UA: NORMAL
SL AMB POCT CLARITY,UA: CLEAR
SL AMB POCT COLOR,UA: YELLOW
SL AMB POCT KETONES,UA: NORMAL
SL AMB POCT NITRITE,UA: NORMAL
SL AMB POCT PH,UA: 6
SL AMB POCT SPECIFIC GRAVITY,UA: 1.02
SL AMB POCT URINE PROTEIN: NORMAL
SL AMB POCT UROBILINOGEN: 0.2

## 2024-05-23 PROCEDURE — 81002 URINALYSIS NONAUTO W/O SCOPE: CPT | Performed by: UROLOGY

## 2024-05-23 PROCEDURE — 99204 OFFICE O/P NEW MOD 45 MIN: CPT | Performed by: UROLOGY

## 2024-05-23 PROCEDURE — 51798 US URINE CAPACITY MEASURE: CPT | Performed by: UROLOGY
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
## 2024-05-23 NOTE — PATIENT INSTRUCTIONS
"A..  Symptoms possibly could be interstitial cystitis and for that I recommend:    Website which can give good information about the dietary restrictions:  [employer]org   \"eye - see\"    2.  Medication Elmiron 3 times per day, see if you have good insurance coverage for it    3.  Possible cystoscopy under anesthesia    B. Pelvic floor muscle dysfunction could be the issue as well.  I gave you referral to physical therapy for that    C.  Nephrology referral for mild kidney function diminishing.    Very important that you not take any ibuprofen, Motrin, Aleve, any nonsteroidal anti-inflammatory.    You can take Tylenol, no more than 3000 mg/day    3.  Consider colonoscopy to answer your question about colon issues  "

## 2024-06-03 ENCOUNTER — TELEPHONE (OUTPATIENT)
Age: 54
End: 2024-06-03

## 2024-06-03 ENCOUNTER — TELEPHONE (OUTPATIENT)
Dept: INTERNAL MEDICINE CLINIC | Facility: CLINIC | Age: 54
End: 2024-06-03

## 2024-06-03 NOTE — TELEPHONE ENCOUNTER
Patient left message would like order for colonoscopy, and also where he should go to have it done?

## 2024-06-03 NOTE — TELEPHONE ENCOUNTER
Pt looking for colonoscopy and endoscopy. Stated he would like to speak with Dr. Lindsay regarding some symptoms he's experiencing and is overdue for one. Also mentioned he would like to be be put to sleep for procedure. Please contact pt to advise and once orders placed

## 2024-06-03 NOTE — TELEPHONE ENCOUNTER
Pt. Called and was very upset. Dose not like the current system. He received the # for [facility] but I believe he wrote it down wrong. He was very upset and was saying would leave [facility]. Provided him with correct # [phone].  Did not see colonoscopy order under imaging please verify.  Thank you!!

## 2024-06-18 DIAGNOSIS — I10 ESSENTIAL HYPERTENSION: ICD-10-CM

## 2024-06-18 NOTE — TELEPHONE ENCOUNTER
Patient requests an urgent refill of metoprolol tartrate (LOPRESSOR) 25 mg tablet [place] [facility] [address] as he is completely out of the medication.     Thank you.

## 2024-07-29 DIAGNOSIS — E78.2 MIXED HYPERLIPIDEMIA: ICD-10-CM

## 2024-07-29 RX ORDER — ATORVASTATIN CALCIUM 10 MG/1
10 TABLET, FILM COATED ORAL DAILY
Qty: 30 TABLET | Refills: 5 | Status: SHIPPED | OUTPATIENT
Start: 2024-07-29

## 2024-09-30 PROBLEM — E83.51 HYPOCALCEMIA: Status: RESOLVED | Noted: 2020-11-04 | Resolved: 2024-09-30

## 2024-10-01 ENCOUNTER — TELEPHONE (OUTPATIENT)
Dept: NEPHROLOGY | Facility: HOSPITAL | Age: 54
End: 2024-10-01

## 2024-10-01 NOTE — TELEPHONE ENCOUNTER
Called patient and asked if he wanted to come in early for his appointment today, patient was not aware he had an appointment today and wanted to cancel not reschedule. He states he already sees urology.

## 2024-10-18 ENCOUNTER — TELEPHONE (OUTPATIENT)
Dept: INTERNAL MEDICINE CLINIC | Facility: CLINIC | Age: 54
End: 2024-10-18

## 2024-10-21 ENCOUNTER — OFFICE VISIT (OUTPATIENT)
Dept: INTERNAL MEDICINE CLINIC | Facility: CLINIC | Age: 54
End: 2024-10-21
Payer: COMMERCIAL

## 2024-10-21 VITALS
SYSTOLIC BLOOD PRESSURE: 140 MMHG | BODY MASS INDEX: 35.79 KG/M2 | RESPIRATION RATE: 12 BRPM | HEIGHT: 67 IN | HEART RATE: 83 BPM | OXYGEN SATURATION: 95 % | DIASTOLIC BLOOD PRESSURE: 80 MMHG | WEIGHT: 228 LBS | TEMPERATURE: 97.8 F

## 2024-10-21 DIAGNOSIS — N18.30 STAGE 3 CHRONIC KIDNEY DISEASE, UNSPECIFIED WHETHER STAGE 3A OR 3B CKD (HCC): ICD-10-CM

## 2024-10-21 DIAGNOSIS — E87.6 HYPOKALEMIA: ICD-10-CM

## 2024-10-21 DIAGNOSIS — I10 ESSENTIAL HYPERTENSION: ICD-10-CM

## 2024-10-21 DIAGNOSIS — E03.9 HYPOTHYROIDISM, UNSPECIFIED TYPE: ICD-10-CM

## 2024-10-21 DIAGNOSIS — R00.2 PALPITATIONS: Primary | ICD-10-CM

## 2024-10-21 DIAGNOSIS — R06.09 DOE (DYSPNEA ON EXERTION): ICD-10-CM

## 2024-10-21 DIAGNOSIS — E11.9 TYPE 2 DIABETES MELLITUS WITHOUT COMPLICATION, WITHOUT LONG-TERM CURRENT USE OF INSULIN (HCC): ICD-10-CM

## 2024-10-21 LAB — SL AMB POCT HEMOGLOBIN AIC: 6.9 (ref ?–6.5)

## 2024-10-21 PROCEDURE — 83036 HEMOGLOBIN GLYCOSYLATED A1C: CPT | Performed by: INTERNAL MEDICINE

## 2024-10-21 PROCEDURE — 99213 OFFICE O/P EST LOW 20 MIN: CPT | Performed by: INTERNAL MEDICINE

## 2024-10-21 NOTE — PROGRESS NOTES
Assessment/Plan:    No problem-specific Assessment & Plan notes found for this encounter.       Diagnoses and all orders for this visit:    Palpitations  -     Ambulatory Referral to Cardiology; Future  -     Echo complete w/ contrast if indicated; Future  -     Holter monitor; Future  -     Comprehensive metabolic panel; Future  -     Magnesium; Future  -     Lipid panel; Future    Essential hypertension    Hypokalemia    Hypothyroidism, unspecified type    Type 2 diabetes mellitus without complication, without long-term current use of insulin (HCC)  -     Microalbumin, Random Urine (W/Creatinine); Future  -     POCT hemoglobin A1c  -     Echo complete w/ contrast if indicated; Future  -     Lipid panel; Future    GRIMES (dyspnea on exertion)  -     Ambulatory Referral to Cardiology; Future  -     Echo complete w/ contrast if indicated; Future  -     Lipid panel; Future    Stage 3 chronic kidney disease, unspecified whether stage 3a or 3b CKD ([facility])          Subjective:      Patient ID: Solo Mack is a 54 y.o. male.    Stress  getting friendly divorce  Heart races hours 2 weeks  Taking extra metoprolol 50 bid  or tid with events    [place]  no cp    Off ozempic 1 year could not get        The following portions of the patient's history were reviewed and updated as appropriate: allergies, current medications, past family history, past medical history, past social history, past surgical history, and problem list.    Review of Systems   Constitutional:  Negative for activity change, appetite change, chills, diaphoresis, fatigue and fever.   HENT:  Negative for congestion, facial swelling, hearing loss, mouth sores, rhinorrhea, sore throat, trouble swallowing and voice change.    Eyes:  Negative for photophobia and pain.   Respiratory:  Negative for apnea, cough, chest tightness, shortness of breath and stridor.    Cardiovascular:  Negative for chest pain, palpitations and leg swelling.   Gastrointestinal:   "Negative for abdominal distention, abdominal pain, blood in stool and constipation.   Endocrine: Negative for cold intolerance and heat intolerance.   Genitourinary:  Negative for difficulty urinating, dysuria, flank pain, genital sores, hematuria and urgency.   Musculoskeletal:  Negative for arthralgias, back pain, gait problem, joint swelling and myalgias.   Skin:  Negative for rash and wound.   Allergic/Immunologic: Negative for environmental allergies, food allergies and immunocompromised state.   Neurological:  Negative for dizziness, tremors, seizures, syncope, facial asymmetry, speech difficulty, weakness, light-headedness, numbness and headaches.   Hematological:  Negative for adenopathy. Does not bruise/bleed easily.   Psychiatric/Behavioral:  Negative for agitation, behavioral problems, hallucinations, self-injury, sleep disturbance and suicidal ideas.          Objective:      /80   Pulse 83   Temp 97.8 °F (36.6 °C)   Resp 12   Ht 5' 7\" (1.702 m)   Wt 103 kg (228 lb)   SpO2 95%   BMI 35.71 kg/m²          Physical Exam  Vitals and nursing note reviewed.   Constitutional:       General: He is not in acute distress.     Appearance: Normal appearance. He is not ill-appearing.   HENT:      Head: Normocephalic.      Right Ear: External ear normal. There is no impacted cerumen.      Left Ear: External ear normal. There is no impacted cerumen.      Nose: No congestion or rhinorrhea.      Mouth/Throat:      Pharynx: No posterior oropharyngeal erythema.   Eyes:      General: No scleral icterus.        Right eye: No discharge.         Left eye: No discharge.   Neck:      Vascular: No carotid bruit.   Cardiovascular:      Rate and Rhythm: Normal rate and regular rhythm.      Pulses: no weak pulses.           Dorsalis pedis pulses are 2+ on the right side and 2+ on the left side.      Heart sounds: Normal heart sounds. No murmur heard.     No friction rub. No gallop.   Pulmonary:      Breath sounds: No " wheezing or rhonchi.   Abdominal:      General: There is no distension.      Tenderness: There is no abdominal tenderness. There is no guarding.   Musculoskeletal:         General: No swelling.      Cervical back: No rigidity.      Right lower leg: No edema.      Left lower leg: No edema.   Feet:      Right foot:      Skin integrity: No ulcer, skin breakdown, erythema, warmth, callus or dry skin.      Left foot:      Skin integrity: No ulcer, skin breakdown, erythema, warmth, callus or dry skin.   Lymphadenopathy:      Cervical: No cervical adenopathy.   Skin:     Coloration: Skin is not jaundiced.   Neurological:      Mental Status: He is alert.      Cranial Nerves: No cranial nerve deficit.      Motor: No weakness.      Coordination: Coordination normal.   Psychiatric:         Mood and Affect: Mood normal.       Diabetic Foot Exam    Patient's shoes and socks removed.    Right Foot/Ankle   Right Foot Inspection  Skin Exam: skin normal and skin intact. No dry skin, no warmth, no callus, no erythema, no maceration, no abnormal color, no pre-ulcer, no ulcer and no callus.     Toe Exam: ROM and strength within normal limits.     Sensory   Monofilament testing: intact    Vascular  The right DP pulse is 2+.     Left Foot/Ankle  Left Foot Inspection  Skin Exam: skin normal and skin intact. No dry skin, no warmth, no erythema, no maceration, normal color, no pre-ulcer, no ulcer and no callus.     Toe Exam: ROM and strength within normal limits.     Sensory   Monofilament testing: intact    Vascular  The left DP pulse is 2+.     Assign Risk Category  No deformity present  No loss of protective sensation  No weak pulses  Risk: 0

## 2024-10-22 ENCOUNTER — TELEPHONE (OUTPATIENT)
Age: 54
End: 2024-10-22

## 2024-10-22 NOTE — TELEPHONE ENCOUNTER
Solo asked for Carmen to give him a call regarding the [employer] login. He needs it reset because he forgot one answer to the 3 questions. He mentioned he was working with Carmen before.

## 2024-10-22 NOTE — TELEPHONE ENCOUNTER
Pt is looking for status of urine test completed 10/21; advised still with Dr. Lindsay for review. Pt anxious about creatinine levels and would like to know if anyone can give him a call once they're in to advise him of what they are as he cannot get into mychart.    Please contact to advise once resulted by provider.

## 2024-10-23 ENCOUNTER — APPOINTMENT (OUTPATIENT)
Dept: LAB | Facility: HOSPITAL | Age: 54
End: 2024-10-23
Payer: COMMERCIAL

## 2024-10-23 DIAGNOSIS — E11.9 TYPE 2 DIABETES MELLITUS WITHOUT COMPLICATION, WITHOUT LONG-TERM CURRENT USE OF INSULIN (HCC): ICD-10-CM

## 2024-10-23 DIAGNOSIS — R00.2 PALPITATIONS: ICD-10-CM

## 2024-10-23 DIAGNOSIS — R06.09 DOE (DYSPNEA ON EXERTION): ICD-10-CM

## 2024-10-23 LAB
ALBUMIN SERPL BCG-MCNC: 4.9 G/DL (ref 3.5–5)
ALBUMIN/CREAT UR: 6 MG/G CREAT (ref 0–29)
ALP SERPL-CCNC: 84 U/L (ref 34–104)
ALT SERPL W P-5'-P-CCNC: 15 U/L (ref 7–52)
ANION GAP SERPL CALCULATED.3IONS-SCNC: 7 MMOL/L (ref 4–13)
AST SERPL W P-5'-P-CCNC: 19 U/L (ref 13–39)
BILIRUB SERPL-MCNC: 0.92 MG/DL (ref 0.2–1)
BUN SERPL-MCNC: 10 MG/DL (ref 5–25)
CALCIUM SERPL-MCNC: 9.6 MG/DL (ref 8.4–10.2)
CHLORIDE SERPL-SCNC: 99 MMOL/L (ref 96–108)
CHOLEST SERPL-MCNC: 179 MG/DL
CO2 SERPL-SCNC: 30 MMOL/L (ref 21–32)
CREAT SERPL-MCNC: 1.35 MG/DL (ref 0.6–1.3)
CREAT UR-MCNC: 176.8 MG/DL
CREAT UR-MCNC: 69.1 MG/DL
GFR SERPL CREATININE-BSD FRML MDRD: 58 ML/MIN/1.73SQ M
GLUCOSE P FAST SERPL-MCNC: 155 MG/DL (ref 65–99)
HDLC SERPL-MCNC: 44 MG/DL
LDLC SERPL CALC-MCNC: 98 MG/DL (ref 0–100)
MAGNESIUM SERPL-MCNC: 1.9 MG/DL (ref 1.9–2.7)
MICROALBUMIN UR-MCNC: 23.8 MG/L
MICROALBUMIN UR-MCNC: 4.1 UG/ML
MICROALBUMIN/CREAT 24H UR: 13 MG/G CREATININE (ref 0–30)
NONHDLC SERPL-MCNC: 135 MG/DL
POTASSIUM SERPL-SCNC: 4.8 MMOL/L (ref 3.5–5.3)
PROT SERPL-MCNC: 7.7 G/DL (ref 6.4–8.4)
SODIUM SERPL-SCNC: 136 MMOL/L (ref 135–147)
TRIGL SERPL-MCNC: 183 MG/DL

## 2024-10-23 PROCEDURE — 80053 COMPREHEN METABOLIC PANEL: CPT

## 2024-10-23 PROCEDURE — 83735 ASSAY OF MAGNESIUM: CPT

## 2024-10-23 PROCEDURE — 82043 UR ALBUMIN QUANTITATIVE: CPT

## 2024-10-23 PROCEDURE — 80061 LIPID PANEL: CPT

## 2024-10-23 PROCEDURE — 36415 COLL VENOUS BLD VENIPUNCTURE: CPT

## 2024-10-23 PROCEDURE — 82570 ASSAY OF URINE CREATININE: CPT

## 2024-10-23 NOTE — TELEPHONE ENCOUNTER
Patient had called in and was requesting to speak with Jason only in regards to his labs.     Please have jason advise out to patient whenever she is given the chance.

## 2024-10-24 ENCOUNTER — TELEPHONE (OUTPATIENT)
Dept: INTERNAL MEDICINE CLINIC | Facility: CLINIC | Age: 54
End: 2024-10-24

## 2024-10-24 DIAGNOSIS — E08.00 DIABETES MELLITUS DUE TO UNDERLYING CONDITION WITH HYPEROSMOLARITY WITHOUT COMA, WITHOUT LONG-TERM CURRENT USE OF INSULIN (HCC): ICD-10-CM

## 2024-10-24 NOTE — TELEPHONE ENCOUNTER
Medication Refill Request     Name semaglutide, 1 mg/dose, (Ozempic) 4 mg/3 mL injection pen    Dose/Frequency  Inject 0.75 mL (1 mg total) under the skin once a week   Quantity 9 mL  Verified pharmacy   [x]  Verified ordering Provider   [x]  Does patient have enough for the next 3 days? Yes [] No [x]

## 2024-10-24 NOTE — TELEPHONE ENCOUNTER
Pt called back to speak w/ Carmen regarding a few more questions about his test results. Transferred over to Carmen.

## 2024-11-01 ENCOUNTER — TELEPHONE (OUTPATIENT)
Age: 54
End: 2024-11-01

## 2024-11-01 NOTE — TELEPHONE ENCOUNTER
Requested dose change for refill    Medication:   metoprolol tartrate    Dose/Frequency: : Take 2 tablet (25 mg total) by mouth every 8hr (as per pt request)    Quantity: 180 tablet    Pharmacy: [id number] - [place] - [address]   [address] [place] [address]     Office:   [x] PCP/Provider -   [] Speciality/Provider -     Does the patient have enough for 3 days?   [] Yes   [x] No - Send as HP to POD

## 2024-11-01 NOTE — TELEPHONE ENCOUNTER
Pt called back to see if the prescription was sent to the pharmacy; confirmed it's going to the [id number] - [place] - [address].

## 2024-11-04 DIAGNOSIS — I10 ESSENTIAL HYPERTENSION: Primary | ICD-10-CM

## 2024-11-04 DIAGNOSIS — I10 ESSENTIAL HYPERTENSION: ICD-10-CM

## 2024-11-04 RX ORDER — METOPROLOL TARTRATE 25 MG/1
25 TABLET, FILM COATED ORAL EVERY 12 HOURS
Qty: 180 TABLET | Refills: 1 | Status: SHIPPED | OUTPATIENT
Start: 2024-11-04 | End: 2024-11-04

## 2024-11-04 RX ORDER — METOPROLOL TARTRATE 50 MG
50 TABLET ORAL EVERY 12 HOURS
Qty: 180 TABLET | Refills: 1 | Status: SHIPPED | OUTPATIENT
Start: 2024-11-04 | End: 2025-02-02

## 2024-11-04 NOTE — TELEPHONE ENCOUNTER
Solo called in to check on the status of this refill. He would like for this to be handled asap. He would like to speak to [facility] to confirm that the request was sent to Dr. Lindsay.   normal

## 2024-11-06 ENCOUNTER — TELEPHONE (OUTPATIENT)
Age: 54
End: 2024-11-06

## 2024-11-06 NOTE — TELEPHONE ENCOUNTER
Patient wanted to speak with Carmen for his Scaly rashes on arm they look like scaly blotches and some bug bites. Office was closed.  Scheduled him for 5:45 tomorrow. He was requesting Virtual visit. Please change if appropriate.  He wanted to take Carmen's opinion on his rashes. Please cancel the appointment if you send medication.    Sujit talk to him accordingly.    Thank you!!

## 2024-11-07 NOTE — TELEPHONE ENCOUNTER
Lvm for patient, dr domingo can not do virtual visit for rash, patient needs to come to the office.

## 2024-11-17 ENCOUNTER — HOSPITAL ENCOUNTER (EMERGENCY)
Facility: HOSPITAL | Age: 54
Discharge: HOME | End: 2024-11-17
Attending: EMERGENCY MEDICINE
Payer: COMMERCIAL

## 2024-11-17 VITALS
WEIGHT: 230.6 LBS | DIASTOLIC BLOOD PRESSURE: 81 MMHG | SYSTOLIC BLOOD PRESSURE: 123 MMHG | HEART RATE: 61 BPM | OXYGEN SATURATION: 94 % | RESPIRATION RATE: 18 BRPM | BODY MASS INDEX: 36.12 KG/M2 | TEMPERATURE: 98 F

## 2024-11-17 DIAGNOSIS — R00.2 PALPITATIONS: Primary | ICD-10-CM

## 2024-11-17 LAB
2HR DELTA HS TROPONIN: -1 NG/L
ALBUMIN SERPL BCG-MCNC: 4.8 G/DL (ref 3.5–5)
ALP SERPL-CCNC: 110 U/L (ref 34–104)
ALT SERPL W P-5'-P-CCNC: 20 U/L (ref 7–52)
ANION GAP SERPL CALCULATED.3IONS-SCNC: 8 MMOL/L (ref 4–13)
AST SERPL W P-5'-P-CCNC: 22 U/L (ref 13–39)
BASOPHILS # BLD AUTO: 0.04 THOUSANDS/ÂΜL (ref 0–0.1)
BASOPHILS NFR BLD AUTO: 0 % (ref 0–1)
BILIRUB SERPL-MCNC: 0.95 MG/DL (ref 0.2–1)
BUN SERPL-MCNC: 11 MG/DL (ref 5–25)
CALCIUM SERPL-MCNC: 9.6 MG/DL (ref 8.4–10.2)
CARDIAC TROPONIN I PNL SERPL HS: 5 NG/L (ref ?–50)
CARDIAC TROPONIN I PNL SERPL HS: 6 NG/L (ref ?–50)
CHLORIDE SERPL-SCNC: 101 MMOL/L (ref 96–108)
CO2 SERPL-SCNC: 30 MMOL/L (ref 21–32)
CREAT SERPL-MCNC: 1.4 MG/DL (ref 0.6–1.3)
EOSINOPHIL # BLD AUTO: 0.08 THOUSAND/ÂΜL (ref 0–0.61)
EOSINOPHIL NFR BLD AUTO: 1 % (ref 0–6)
ERYTHROCYTE [DISTWIDTH] IN BLOOD BY AUTOMATED COUNT: 12 % (ref 11.6–15.1)
GFR SERPL CREATININE-BSD FRML MDRD: 56 ML/MIN/1.73SQ M
GLUCOSE SERPL-MCNC: 138 MG/DL (ref 65–140)
HCT VFR BLD AUTO: 48.6 % (ref 36.5–49.3)
HGB BLD-MCNC: 16.9 G/DL (ref 12–17)
IMM GRANULOCYTES # BLD AUTO: 0.07 THOUSAND/UL (ref 0–0.2)
IMM GRANULOCYTES NFR BLD AUTO: 1 % (ref 0–2)
LYMPHOCYTES # BLD AUTO: 2.59 THOUSANDS/ÂΜL (ref 0.6–4.47)
LYMPHOCYTES NFR BLD AUTO: 24 % (ref 14–44)
MCH RBC QN AUTO: 29.6 PG (ref 26.8–34.3)
MCHC RBC AUTO-ENTMCNC: 34.8 G/DL (ref 31.4–37.4)
MCV RBC AUTO: 85 FL (ref 82–98)
MONOCYTES # BLD AUTO: 0.87 THOUSAND/ÂΜL (ref 0.17–1.22)
MONOCYTES NFR BLD AUTO: 8 % (ref 4–12)
NEUTROPHILS # BLD AUTO: 7.39 THOUSANDS/ÂΜL (ref 1.85–7.62)
NEUTS SEG NFR BLD AUTO: 66 % (ref 43–75)
NRBC BLD AUTO-RTO: 0 /100 WBCS
PLATELET # BLD AUTO: 361 THOUSANDS/UL (ref 149–390)
PMV BLD AUTO: 10.1 FL (ref 8.9–12.7)
POTASSIUM SERPL-SCNC: 4 MMOL/L (ref 3.5–5.3)
PROT SERPL-MCNC: 7.5 G/DL (ref 6.4–8.4)
RBC # BLD AUTO: 5.7 MILLION/UL (ref 3.88–5.62)
SODIUM SERPL-SCNC: 139 MMOL/L (ref 135–147)
TSH SERPL DL<=0.05 MIU/L-ACNC: 1 UIU/ML (ref 0.45–4.5)
WBC # BLD AUTO: 11.04 THOUSAND/UL (ref 4.31–10.16)

## 2024-11-17 PROCEDURE — 85025 COMPLETE CBC W/AUTO DIFF WBC: CPT | Performed by: EMERGENCY MEDICINE

## 2024-11-17 PROCEDURE — 36415 COLL VENOUS BLD VENIPUNCTURE: CPT | Performed by: EMERGENCY MEDICINE

## 2024-11-17 PROCEDURE — 84484 ASSAY OF TROPONIN QUANT: CPT | Performed by: EMERGENCY MEDICINE

## 2024-11-17 PROCEDURE — 99285 EMERGENCY DEPT VISIT HI MDM: CPT

## 2024-11-17 PROCEDURE — 80053 COMPREHEN METABOLIC PANEL: CPT | Performed by: EMERGENCY MEDICINE

## 2024-11-17 PROCEDURE — 93005 ELECTROCARDIOGRAM TRACING: CPT

## 2024-11-17 PROCEDURE — 84443 ASSAY THYROID STIM HORMONE: CPT | Performed by: EMERGENCY MEDICINE

## 2024-11-17 PROCEDURE — 99284 EMERGENCY DEPT VISIT MOD MDM: CPT | Performed by: EMERGENCY MEDICINE

## 2024-11-17 RX ORDER — CLONAZEPAM 0.5 MG/1
1 TABLET ORAL ONCE
Status: COMPLETED | OUTPATIENT
Start: 2024-11-17 | End: 2024-11-17

## 2024-11-17 RX ADMIN — CLONAZEPAM 1 MG: 0.5 TABLET ORAL at 16:56
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
## 2024-11-18 LAB
ATRIAL RATE: 72 BPM
P AXIS: 61 DEGREES
PR INTERVAL: 176 MS
QRS AXIS: 43 DEGREES
QRSD INTERVAL: 98 MS
QT INTERVAL: 410 MS
QTC INTERVAL: 448 MS
T WAVE AXIS: -9 DEGREES
VENTRICULAR RATE: 72 BPM

## 2024-11-18 PROCEDURE — 93010 ELECTROCARDIOGRAM REPORT: CPT | Performed by: INTERNAL MEDICINE

## 2024-12-02 ENCOUNTER — HOSPITAL ENCOUNTER (EMERGENCY)
Facility: HOSPITAL | Age: 54
End: 2024-12-03
Attending: EMERGENCY MEDICINE
Payer: COMMERCIAL

## 2024-12-02 DIAGNOSIS — N18.30 STAGE 3 CHRONIC KIDNEY DISEASE, UNSPECIFIED WHETHER STAGE 3A OR 3B CKD (HCC): ICD-10-CM

## 2024-12-02 DIAGNOSIS — E87.6 HYPOKALEMIA: ICD-10-CM

## 2024-12-02 DIAGNOSIS — F29 PSYCHOSES (HCC): ICD-10-CM

## 2024-12-02 DIAGNOSIS — I10 ESSENTIAL HYPERTENSION: ICD-10-CM

## 2024-12-02 DIAGNOSIS — Z00.8 ENCOUNTER FOR PSYCHOLOGICAL EVALUATION: Primary | ICD-10-CM

## 2024-12-02 LAB
AMORPH URATE CRY URNS QL MICRO: ABNORMAL
AMPHETAMINES SERPL QL SCN: NEGATIVE
ANION GAP SERPL CALCULATED.3IONS-SCNC: 9 MMOL/L (ref 4–13)
ATRIAL RATE: 70 BPM
BACTERIA UR QL AUTO: ABNORMAL /HPF
BARBITURATES UR QL: NEGATIVE
BASOPHILS # BLD AUTO: 0.05 THOUSANDS/ÂΜL (ref 0–0.1)
BASOPHILS NFR BLD AUTO: 1 % (ref 0–1)
BENZODIAZ UR QL: NEGATIVE
BILIRUB UR QL STRIP: NEGATIVE
BUN SERPL-MCNC: 17 MG/DL (ref 5–25)
CALCIUM SERPL-MCNC: 9.2 MG/DL (ref 8.4–10.2)
CHLORIDE SERPL-SCNC: 103 MMOL/L (ref 96–108)
CLARITY UR: CLEAR
CO2 SERPL-SCNC: 26 MMOL/L (ref 21–32)
COCAINE UR QL: NEGATIVE
COLOR UR: YELLOW
CREAT SERPL-MCNC: 1.24 MG/DL (ref 0.6–1.3)
EOSINOPHIL # BLD AUTO: 0.08 THOUSAND/ÂΜL (ref 0–0.61)
EOSINOPHIL NFR BLD AUTO: 1 % (ref 0–6)
ERYTHROCYTE [DISTWIDTH] IN BLOOD BY AUTOMATED COUNT: 12.3 % (ref 11.6–15.1)
ETHANOL EXG-MCNC: 0 MG/DL
FENTANYL UR QL SCN: NEGATIVE
GFR SERPL CREATININE-BSD FRML MDRD: 65 ML/MIN/1.73SQ M
GLUCOSE SERPL-MCNC: 152 MG/DL (ref 65–140)
GLUCOSE UR STRIP-MCNC: NEGATIVE MG/DL
HCT VFR BLD AUTO: 45.8 % (ref 36.5–49.3)
HGB BLD-MCNC: 15.7 G/DL (ref 12–17)
HGB UR QL STRIP.AUTO: ABNORMAL
HYDROCODONE UR QL SCN: NEGATIVE
IMM GRANULOCYTES # BLD AUTO: 0.05 THOUSAND/UL (ref 0–0.2)
IMM GRANULOCYTES NFR BLD AUTO: 1 % (ref 0–2)
KETONES UR STRIP-MCNC: ABNORMAL MG/DL
LEUKOCYTE ESTERASE UR QL STRIP: NEGATIVE
LYMPHOCYTES # BLD AUTO: 2.41 THOUSANDS/ÂΜL (ref 0.6–4.47)
LYMPHOCYTES NFR BLD AUTO: 26 % (ref 14–44)
MCH RBC QN AUTO: 29.9 PG (ref 26.8–34.3)
MCHC RBC AUTO-ENTMCNC: 34.3 G/DL (ref 31.4–37.4)
MCV RBC AUTO: 87 FL (ref 82–98)
METHADONE UR QL: NEGATIVE
MONOCYTES # BLD AUTO: 0.9 THOUSAND/ÂΜL (ref 0.17–1.22)
MONOCYTES NFR BLD AUTO: 10 % (ref 4–12)
MUCOUS THREADS UR QL AUTO: ABNORMAL
NEUTROPHILS # BLD AUTO: 5.74 THOUSANDS/ÂΜL (ref 1.85–7.62)
NEUTS SEG NFR BLD AUTO: 61 % (ref 43–75)
NITRITE UR QL STRIP: NEGATIVE
NON-SQ EPI CELLS URNS QL MICRO: ABNORMAL /HPF
NRBC BLD AUTO-RTO: 0 /100 WBCS
OPIATES UR QL SCN: NEGATIVE
OXYCODONE+OXYMORPHONE UR QL SCN: NEGATIVE
P AXIS: 51 DEGREES
PCP UR QL: NEGATIVE
PH UR STRIP.AUTO: 6.5 [PH]
PLATELET # BLD AUTO: 281 THOUSANDS/UL (ref 149–390)
PMV BLD AUTO: 10.1 FL (ref 8.9–12.7)
POTASSIUM SERPL-SCNC: 3.8 MMOL/L (ref 3.5–5.3)
PR INTERVAL: 174 MS
PROT UR STRIP-MCNC: ABNORMAL MG/DL
QRS AXIS: 49 DEGREES
QRSD INTERVAL: 96 MS
QT INTERVAL: 398 MS
QTC INTERVAL: 429 MS
RBC # BLD AUTO: 5.25 MILLION/UL (ref 3.88–5.62)
RBC #/AREA URNS AUTO: ABNORMAL /HPF
SODIUM SERPL-SCNC: 138 MMOL/L (ref 135–147)
SP GR UR STRIP.AUTO: >=1.03 (ref 1–1.03)
T WAVE AXIS: -23 DEGREES
THC UR QL: NEGATIVE
TSH SERPL DL<=0.05 MIU/L-ACNC: 1.31 UIU/ML (ref 0.45–4.5)
UROBILINOGEN UR STRIP-ACNC: 2 MG/DL
VENTRICULAR RATE: 70 BPM
WBC # BLD AUTO: 9.23 THOUSAND/UL (ref 4.31–10.16)
WBC #/AREA URNS AUTO: ABNORMAL /HPF

## 2024-12-02 PROCEDURE — 36415 COLL VENOUS BLD VENIPUNCTURE: CPT | Performed by: EMERGENCY MEDICINE

## 2024-12-02 PROCEDURE — 81001 URINALYSIS AUTO W/SCOPE: CPT | Performed by: EMERGENCY MEDICINE

## 2024-12-02 PROCEDURE — 99285 EMERGENCY DEPT VISIT HI MDM: CPT | Performed by: EMERGENCY MEDICINE

## 2024-12-02 PROCEDURE — 99285 EMERGENCY DEPT VISIT HI MDM: CPT

## 2024-12-02 PROCEDURE — 93005 ELECTROCARDIOGRAM TRACING: CPT

## 2024-12-02 PROCEDURE — 85025 COMPLETE CBC W/AUTO DIFF WBC: CPT | Performed by: EMERGENCY MEDICINE

## 2024-12-02 PROCEDURE — 84443 ASSAY THYROID STIM HORMONE: CPT | Performed by: EMERGENCY MEDICINE

## 2024-12-02 PROCEDURE — 82075 ASSAY OF BREATH ETHANOL: CPT | Performed by: EMERGENCY MEDICINE

## 2024-12-02 PROCEDURE — 93010 ELECTROCARDIOGRAM REPORT: CPT | Performed by: INTERNAL MEDICINE

## 2024-12-02 PROCEDURE — 80307 DRUG TEST PRSMV CHEM ANLYZR: CPT

## 2024-12-02 PROCEDURE — 80048 BASIC METABOLIC PNL TOTAL CA: CPT | Performed by: EMERGENCY MEDICINE

## 2024-12-03 ENCOUNTER — HOSPITAL ENCOUNTER (INPATIENT)
Facility: HOSPITAL | Age: 54
LOS: 15 days | Discharge: HOME | DRG: 885 | End: 2024-12-18
Attending: PSYCHIATRY & NEUROLOGY | Admitting: PSYCHIATRY & NEUROLOGY
Payer: COMMERCIAL

## 2024-12-03 VITALS
RESPIRATION RATE: 18 BRPM | HEART RATE: 72 BPM | OXYGEN SATURATION: 95 % | TEMPERATURE: 98.5 F | DIASTOLIC BLOOD PRESSURE: 73 MMHG | SYSTOLIC BLOOD PRESSURE: 116 MMHG

## 2024-12-03 DIAGNOSIS — F31.9 BIPOLAR 1 DISORDER (HCC): ICD-10-CM

## 2024-12-03 DIAGNOSIS — F19.20 DRUG ABUSE AND DEPENDENCE (HCC): ICD-10-CM

## 2024-12-03 DIAGNOSIS — E87.6 HYPOKALEMIA: ICD-10-CM

## 2024-12-03 DIAGNOSIS — F31.62 BIPOLAR DISORDER, CURRENT EPISODE MIXED, MODERATE (HCC): Primary | ICD-10-CM

## 2024-12-03 DIAGNOSIS — E53.8 VITAMIN B12 DEFICIENCY: ICD-10-CM

## 2024-12-03 DIAGNOSIS — F41.9 ANXIETY: ICD-10-CM

## 2024-12-03 DIAGNOSIS — I10 ESSENTIAL HYPERTENSION: ICD-10-CM

## 2024-12-03 DIAGNOSIS — F51.01 PRIMARY INSOMNIA: ICD-10-CM

## 2024-12-03 DIAGNOSIS — N18.30 STAGE 3 CHRONIC KIDNEY DISEASE, UNSPECIFIED WHETHER STAGE 3A OR 3B CKD (HCC): ICD-10-CM

## 2024-12-03 DIAGNOSIS — F31.78 BIPOLAR AFFECTIVE DISORDER, MIXED, IN FULL REMISSION (HCC): ICD-10-CM

## 2024-12-03 DIAGNOSIS — F29 PSYCHOSIS (HCC): ICD-10-CM

## 2024-12-03 DIAGNOSIS — F20.9 SCHIZOPHRENIA, UNSPECIFIED TYPE (HCC): ICD-10-CM

## 2024-12-03 DIAGNOSIS — G89.4 CHRONIC PAIN DISORDER: ICD-10-CM

## 2024-12-03 DIAGNOSIS — E55.9 VITAMIN D DEFICIENCY: ICD-10-CM

## 2024-12-03 RX ORDER — BENZTROPINE MESYLATE 0.5 MG/1
0.5 TABLET ORAL
Status: DISCONTINUED | OUTPATIENT
Start: 2024-12-03 | End: 2024-12-18 | Stop reason: HOSPADM

## 2024-12-03 RX ORDER — METOPROLOL TARTRATE 50 MG
50 TABLET ORAL EVERY 12 HOURS SCHEDULED
Status: CANCELLED | OUTPATIENT
Start: 2024-12-03

## 2024-12-03 RX ORDER — ATORVASTATIN CALCIUM 10 MG/1
10 TABLET, FILM COATED ORAL
Status: CANCELLED | OUTPATIENT
Start: 2024-12-03

## 2024-12-03 RX ORDER — HYDROXYZINE HYDROCHLORIDE 25 MG/1
25 TABLET, FILM COATED ORAL
Status: CANCELLED | OUTPATIENT
Start: 2024-12-03

## 2024-12-03 RX ORDER — TRAZODONE HYDROCHLORIDE 50 MG/1
50 TABLET, FILM COATED ORAL
Status: CANCELLED | OUTPATIENT
Start: 2024-12-03

## 2024-12-03 RX ORDER — METOPROLOL TARTRATE 50 MG
50 TABLET ORAL EVERY 12 HOURS SCHEDULED
Status: DISCONTINUED | OUTPATIENT
Start: 2024-12-03 | End: 2024-12-06

## 2024-12-03 RX ORDER — LORAZEPAM 0.5 MG/1
0.5 TABLET ORAL
Status: CANCELLED | OUTPATIENT
Start: 2024-12-03

## 2024-12-03 RX ORDER — ACETAMINOPHEN 325 MG/1
975 TABLET ORAL EVERY 6 HOURS PRN
Status: DISCONTINUED | OUTPATIENT
Start: 2024-12-03 | End: 2024-12-18 | Stop reason: HOSPADM

## 2024-12-03 RX ORDER — GABAPENTIN 400 MG/1
400 CAPSULE ORAL 3 TIMES DAILY
Status: CANCELLED | OUTPATIENT
Start: 2024-12-03

## 2024-12-03 RX ORDER — GABAPENTIN 400 MG/1
400 CAPSULE ORAL 3 TIMES DAILY
Status: DISCONTINUED | OUTPATIENT
Start: 2024-12-03 | End: 2024-12-03 | Stop reason: HOSPADM

## 2024-12-03 RX ORDER — OLANZAPINE 2.5 MG/1
2.5 TABLET, FILM COATED ORAL
Status: CANCELLED | OUTPATIENT
Start: 2024-12-03

## 2024-12-03 RX ORDER — ATORVASTATIN CALCIUM 10 MG/1
10 TABLET, FILM COATED ORAL
Status: DISCONTINUED | OUTPATIENT
Start: 2024-12-03 | End: 2024-12-03 | Stop reason: HOSPADM

## 2024-12-03 RX ORDER — OLANZAPINE 10 MG/2ML
5 INJECTION, POWDER, FOR SOLUTION INTRAMUSCULAR
Status: CANCELLED | OUTPATIENT
Start: 2024-12-03

## 2024-12-03 RX ORDER — METOPROLOL TARTRATE 50 MG
50 TABLET ORAL EVERY 12 HOURS SCHEDULED
Status: DISCONTINUED | OUTPATIENT
Start: 2024-12-03 | End: 2024-12-03 | Stop reason: HOSPADM

## 2024-12-03 RX ORDER — ACETAMINOPHEN 325 MG/1
650 TABLET ORAL EVERY 4 HOURS PRN
Status: CANCELLED | OUTPATIENT
Start: 2024-12-03

## 2024-12-03 RX ORDER — LORAZEPAM 2 MG/ML
1 INJECTION INTRAMUSCULAR
Status: CANCELLED | OUTPATIENT
Start: 2024-12-03

## 2024-12-03 RX ORDER — PANTOPRAZOLE SODIUM 40 MG/1
40 TABLET, DELAYED RELEASE ORAL
Status: DISCONTINUED | OUTPATIENT
Start: 2024-12-03 | End: 2024-12-03 | Stop reason: HOSPADM

## 2024-12-03 RX ORDER — CLONAZEPAM 0.5 MG/1
1 TABLET ORAL 3 TIMES DAILY
Status: CANCELLED | OUTPATIENT
Start: 2024-12-03

## 2024-12-03 RX ORDER — OLANZAPINE 5 MG/1
5 TABLET ORAL
Status: DISCONTINUED | OUTPATIENT
Start: 2024-12-03 | End: 2024-12-18 | Stop reason: HOSPADM

## 2024-12-03 RX ORDER — HYDROXYZINE HYDROCHLORIDE 25 MG/1
25 TABLET, FILM COATED ORAL
Status: DISCONTINUED | OUTPATIENT
Start: 2024-12-03 | End: 2024-12-18 | Stop reason: HOSPADM

## 2024-12-03 RX ORDER — LORAZEPAM 1 MG/1
1 TABLET ORAL
Status: DISCONTINUED | OUTPATIENT
Start: 2024-12-03 | End: 2024-12-18 | Stop reason: HOSPADM

## 2024-12-03 RX ORDER — OLANZAPINE 5 MG/1
5 TABLET ORAL
Status: CANCELLED | OUTPATIENT
Start: 2024-12-03

## 2024-12-03 RX ORDER — GABAPENTIN 400 MG/1
400 CAPSULE ORAL 3 TIMES DAILY
Status: DISCONTINUED | OUTPATIENT
Start: 2024-12-03 | End: 2024-12-18 | Stop reason: HOSPADM

## 2024-12-03 RX ORDER — LORAZEPAM 0.5 MG/1
0.5 TABLET ORAL
Status: DISCONTINUED | OUTPATIENT
Start: 2024-12-03 | End: 2024-12-18 | Stop reason: HOSPADM

## 2024-12-03 RX ORDER — OLANZAPINE 10 MG/2ML
5 INJECTION, POWDER, FOR SOLUTION INTRAMUSCULAR
Status: DISCONTINUED | OUTPATIENT
Start: 2024-12-03 | End: 2024-12-18 | Stop reason: HOSPADM

## 2024-12-03 RX ORDER — LORAZEPAM 2 MG/ML
1 INJECTION INTRAMUSCULAR
Status: DISCONTINUED | OUTPATIENT
Start: 2024-12-03 | End: 2024-12-18 | Stop reason: HOSPADM

## 2024-12-03 RX ORDER — CLONAZEPAM 1 MG/1
1 TABLET ORAL 3 TIMES DAILY
Status: DISCONTINUED | OUTPATIENT
Start: 2024-12-03 | End: 2024-12-04

## 2024-12-03 RX ORDER — ACETAMINOPHEN 325 MG/1
975 TABLET ORAL EVERY 6 HOURS PRN
Status: CANCELLED | OUTPATIENT
Start: 2024-12-03

## 2024-12-03 RX ORDER — LORAZEPAM 1 MG/1
1 TABLET ORAL
Status: CANCELLED | OUTPATIENT
Start: 2024-12-03

## 2024-12-03 RX ORDER — TRAZODONE HYDROCHLORIDE 50 MG/1
50 TABLET, FILM COATED ORAL
Status: DISCONTINUED | OUTPATIENT
Start: 2024-12-03 | End: 2024-12-04

## 2024-12-03 RX ORDER — ACETAMINOPHEN 325 MG/1
650 TABLET ORAL EVERY 4 HOURS PRN
Status: DISCONTINUED | OUTPATIENT
Start: 2024-12-03 | End: 2024-12-18 | Stop reason: HOSPADM

## 2024-12-03 RX ORDER — PANTOPRAZOLE SODIUM 40 MG/1
40 TABLET, DELAYED RELEASE ORAL
Status: CANCELLED | OUTPATIENT
Start: 2024-12-04

## 2024-12-03 RX ORDER — BENZTROPINE MESYLATE 0.5 MG/1
0.5 TABLET ORAL
Status: CANCELLED | OUTPATIENT
Start: 2024-12-03

## 2024-12-03 RX ORDER — CLONAZEPAM 0.5 MG/1
1 TABLET ORAL 3 TIMES DAILY
Status: DISCONTINUED | OUTPATIENT
Start: 2024-12-03 | End: 2024-12-03 | Stop reason: HOSPADM

## 2024-12-03 RX ORDER — ATORVASTATIN CALCIUM 10 MG/1
10 TABLET, FILM COATED ORAL
Status: DISCONTINUED | OUTPATIENT
Start: 2024-12-04 | End: 2024-12-18 | Stop reason: HOSPADM

## 2024-12-03 RX ORDER — OLANZAPINE 2.5 MG/1
2.5 TABLET, FILM COATED ORAL
Status: DISCONTINUED | OUTPATIENT
Start: 2024-12-03 | End: 2024-12-18 | Stop reason: HOSPADM

## 2024-12-03 RX ORDER — PANTOPRAZOLE SODIUM 40 MG/1
40 TABLET, DELAYED RELEASE ORAL
Status: DISCONTINUED | OUTPATIENT
Start: 2024-12-04 | End: 2024-12-18 | Stop reason: HOSPADM

## 2024-12-03 RX ADMIN — GABAPENTIN 400 MG: 400 CAPSULE ORAL at 10:19

## 2024-12-03 RX ADMIN — CLONAZEPAM 1 MG: 0.5 TABLET ORAL at 17:00

## 2024-12-03 RX ADMIN — METOPROLOL TARTRATE 50 MG: 50 TABLET, FILM COATED ORAL at 21:13

## 2024-12-03 RX ADMIN — HYDROXYZINE HYDROCHLORIDE 25 MG: 25 TABLET ORAL at 23:10

## 2024-12-03 RX ADMIN — CLONAZEPAM 1 MG: 0.5 TABLET ORAL at 10:19

## 2024-12-03 RX ADMIN — CLONAZEPAM 1 MG: 1 TABLET ORAL at 21:12

## 2024-12-03 RX ADMIN — ATORVASTATIN CALCIUM 10 MG: 10 TABLET, FILM COATED ORAL at 17:00

## 2024-12-03 RX ADMIN — GABAPENTIN 400 MG: 400 CAPSULE ORAL at 17:00

## 2024-12-03 RX ADMIN — METOPROLOL TARTRATE 50 MG: 50 TABLET, FILM COATED ORAL at 10:19

## 2024-12-03 RX ADMIN — PANTOPRAZOLE SODIUM 40 MG: 40 TABLET, DELAYED RELEASE ORAL at 06:31

## 2024-12-03 RX ADMIN — GABAPENTIN 400 MG: 400 CAPSULE ORAL at 21:13

## 2024-12-03 RX ADMIN — TRAZODONE HYDROCHLORIDE 50 MG: 50 TABLET ORAL at 21:13

## 2024-12-03 NOTE — EMTALA/ACUTE CARE TRANSFER
[facility] EMERGENCY DEPARTMENT  [address] [facility] [address]  Dept: [phone]      EMTALA TRANSFER CONSENT    NAME Solo Mack                                         1970                              MRN [id number]    I have been informed of my rights regarding examination, treatment, and transfer   by Dr. Jairo Andrade DO    Benefits: Specialized equipment and/or services available at the receiving facility (Include comment)________________________    Risks:        Consent for Transfer:  I acknowledge that my medical condition has been evaluated and explained to me by the emergency department physician or other qualified medical person and/or my attending physician, who has recommended that I be transferred to the service of  Accepting Physician: Dr East at Accepting Facility Name, City & State : [place]. The above potential benefits of such transfer, the potential risks associated with such transfer, and the probable risks of not being transferred have been explained to me, and I fully understand them.  The doctor has explained that, in my case, the benefits of transfer outweigh the risks.  I agree to be transferred.    I authorize the performance of emergency medical procedures and treatments upon me in both transit and upon arrival at the receiving facility.  Additionally, I authorize the release of any and all medical records to the receiving facility and request they be transported with me, if possible.  I understand that the safest mode of transportation during a medical emergency is an ambulance and that the Hospital advocates the use of this mode of transport. Risks of traveling to the receiving facility by car, including absence of medical control, life sustaining equipment, such as oxygen, and medical personnel has been explained to me and I fully understand them.    (ELIUD CORRECT BOX BELOW)  [  ]  I consent to the stated transfer and to be  transported by ambulance/helicopter.  [  ]  I consent to the stated transfer, but refuse transportation by ambulance and accept full responsibility for my transportation by car.  I understand the risks of non-ambulance transfers and I exonerate the Hospital and its staff from any deterioration in my condition that results from this refusal.    X___________________________________________    DATE  24  TIME________  Signature of patient or legally responsible individual signing on patient behalf           RELATIONSHIP TO PATIENT_________________________          Provider Certification    NAME Solo Mack                                         1970                              MRN [id number]    A medical screening exam was performed on the above named patient.  Based on the examination:    Condition Necessitating Transfer The primary encounter diagnosis was Encounter for psychological evaluation. Diagnoses of Psychoses (HCC), Essential hypertension, Hypokalemia, and Stage 3 chronic kidney disease, unspecified whether stage 3a or 3b CKD (HCC) were also pertinent to this visit.    Patient Condition: The patient has been stabilized such that within reasonable medical probability, no material deterioration of the patient condition or the condition of the unborn child(ruddy) is likely to result from the transfer    Reason for Transfer: Level of Care needed not available at this facility    Transfer Requirements: Facility [facility]   Space available and qualified personnel available for treatment as acknowledged by    Agreed to accept transfer and to provide appropriate medical treatment as acknowledged by       Dr East  Appropriate medical records of the examination and treatment of the patient are provided at the time of transfer   STAFF INITIAL WHEN COMPLETED _______  Transfer will be performed by qualified personnel from    and appropriate transfer equipment as required, including the use of necessary  and appropriate life support measures.    Provider Certification: I have examined the patient and explained the following risks and benefits of being transferred/refusing transfer to the patient/family:  General risk, such as traffic hazards, adverse weather conditions, rough terrain or turbulence, possible failure of equipment (including vehicle or aircraft), or consequences of actions of persons outside the control of the transport personnel      Based on these reasonable risks and benefits to the patient and/or the unborn child(ruddy), and based upon the information available at the time of the patient’s examination, I certify that the medical benefits reasonably to be expected from the provision of appropriate medical treatments at another medical facility outweigh the increasing risks, if any, to the individual’s medical condition, and in the case of labor to the unborn child, from effecting the transfer.    X____________________________________________ DATE 12/03/24        TIME_______      ORIGINAL - SEND TO MEDICAL RECORDS   COPY - SEND WITH PATIENT DURING TRANSFER
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
## 2024-12-03 NOTE — ED NOTES
Patient is accepted at [facility]  Patient is accepted by NP Javier Jarrell    Transportation is arranged with [employer].     Waiting for transport time  Patient may go to the floor after 1900       Nurse report is to be called to [phone] prior to patient transfer.

## 2024-12-03 NOTE — LETTER
ID # [id number]    [facility] # [id number]     Phone # [phone]    DISCHARGE SUMMARY
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
## 2024-12-04 PROBLEM — Z87.898 H/O PROLONGED Q-T INTERVAL ON ECG: Status: ACTIVE | Noted: 2024-12-04

## 2024-12-04 LAB
25(OH)D3 SERPL-MCNC: 14.9 NG/ML (ref 30–100)
CHOLEST SERPL-MCNC: 124 MG/DL (ref ?–200)
EST. AVERAGE GLUCOSE BLD GHB EST-MCNC: 146 MG/DL
FOLATE SERPL-MCNC: 6.9 NG/ML
GLUCOSE SERPL-MCNC: 183 MG/DL (ref 65–140)
HBA1C MFR BLD: 6.7 %
HDLC SERPL-MCNC: 36 MG/DL
LDLC SERPL CALC-MCNC: 54 MG/DL (ref 0–100)
MAGNESIUM SERPL-MCNC: 2 MG/DL (ref 1.9–2.7)
NONHDLC SERPL-MCNC: 88 MG/DL
PHOSPHATE SERPL-MCNC: 4.2 MG/DL (ref 2.7–4.5)
TRIGL SERPL-MCNC: 169 MG/DL (ref ?–150)
VIT B12 SERPL-MCNC: 156 PG/ML (ref 180–914)

## 2024-12-04 PROCEDURE — 99223 1ST HOSP IP/OBS HIGH 75: CPT | Performed by: PSYCHIATRY & NEUROLOGY

## 2024-12-04 PROCEDURE — 82948 REAGENT STRIP/BLOOD GLUCOSE: CPT

## 2024-12-04 PROCEDURE — 83036 HEMOGLOBIN GLYCOSYLATED A1C: CPT

## 2024-12-04 PROCEDURE — 84100 ASSAY OF PHOSPHORUS: CPT

## 2024-12-04 PROCEDURE — 82746 ASSAY OF FOLIC ACID SERUM: CPT

## 2024-12-04 PROCEDURE — 82607 VITAMIN B-12: CPT

## 2024-12-04 PROCEDURE — 83735 ASSAY OF MAGNESIUM: CPT

## 2024-12-04 PROCEDURE — 82306 VITAMIN D 25 HYDROXY: CPT

## 2024-12-04 PROCEDURE — 80061 LIPID PANEL: CPT

## 2024-12-04 RX ORDER — CLONAZEPAM 1 MG/1
1 TABLET ORAL 2 TIMES DAILY
Status: DISCONTINUED | OUTPATIENT
Start: 2024-12-04 | End: 2024-12-11

## 2024-12-04 RX ORDER — ZOLPIDEM TARTRATE 5 MG/1
10 TABLET ORAL
Status: DISCONTINUED | OUTPATIENT
Start: 2024-12-04 | End: 2024-12-18 | Stop reason: HOSPADM

## 2024-12-04 RX ORDER — ZOLPIDEM TARTRATE 5 MG/1
10 TABLET ORAL ONCE
Status: COMPLETED | OUTPATIENT
Start: 2024-12-04 | End: 2024-12-04

## 2024-12-04 RX ORDER — MAGNESIUM HYDROXIDE/ALUMINUM HYDROXICE/SIMETHICONE 120; 1200; 1200 MG/30ML; MG/30ML; MG/30ML
30 SUSPENSION ORAL EVERY 4 HOURS PRN
Status: DISCONTINUED | OUTPATIENT
Start: 2024-12-04 | End: 2024-12-18 | Stop reason: HOSPADM

## 2024-12-04 RX ORDER — POLYETHYLENE GLYCOL 3350 17 G/17G
17 POWDER, FOR SOLUTION ORAL DAILY PRN
Status: DISCONTINUED | OUTPATIENT
Start: 2024-12-04 | End: 2024-12-18 | Stop reason: HOSPADM

## 2024-12-04 RX ORDER — OLANZAPINE 10 MG/1
10 TABLET, ORALLY DISINTEGRATING ORAL
Status: DISCONTINUED | OUTPATIENT
Start: 2024-12-04 | End: 2024-12-05

## 2024-12-04 RX ADMIN — METOPROLOL TARTRATE 50 MG: 50 TABLET, FILM COATED ORAL at 21:11

## 2024-12-04 RX ADMIN — GABAPENTIN 400 MG: 400 CAPSULE ORAL at 16:02

## 2024-12-04 RX ADMIN — ZOLPIDEM TARTRATE 10 MG: 5 TABLET ORAL at 00:24

## 2024-12-04 RX ADMIN — OLANZAPINE 10 MG: 10 TABLET, ORALLY DISINTEGRATING ORAL at 21:11

## 2024-12-04 RX ADMIN — GABAPENTIN 400 MG: 400 CAPSULE ORAL at 21:11

## 2024-12-04 RX ADMIN — CLONAZEPAM 1 MG: 1 TABLET ORAL at 09:04

## 2024-12-04 RX ADMIN — CLONAZEPAM 1 MG: 1 TABLET ORAL at 17:07

## 2024-12-04 RX ADMIN — PANTOPRAZOLE SODIUM 40 MG: 40 TABLET, DELAYED RELEASE ORAL at 05:41

## 2024-12-04 RX ADMIN — Medication 3 MG: at 21:11

## 2024-12-04 RX ADMIN — ZOLPIDEM TARTRATE 10 MG: 5 TABLET ORAL at 21:10

## 2024-12-04 RX ADMIN — GABAPENTIN 400 MG: 400 CAPSULE ORAL at 09:04

## 2024-12-04 RX ADMIN — ATORVASTATIN CALCIUM 10 MG: 10 TABLET, FILM COATED ORAL at 16:01

## 2024-12-04 NOTE — NURSING NOTE
Patient admitted to [facility]er [facility] Behavior [facility] Unit, [facility] from [facility] ER with a valid 302.  Physical assessment completed, no wounds or weapons noted.  Refused shower (will shower in AM), no signs of infestation noted.  Bill of Rights and HIPPA regulations reviewed and signed.  Admission medication and diet ordered.  Oriented to unit.  Started on Q 15 minute safety checks. Fluids and food given.  Appetite good.  Personal property recording started. Affect bright and pleasant. Denies all. Delusion thought noted about wife having him committed to have a surprise party for his. Stated he is getting  to a new women in [facility] tomorrow. PTA -Med Rec complete with patient. Medications also had been verified in ED by ER nurse. Very aware of current medications. Bible and reading glasses given to patient after being checked by this RN. Adjusting to unit. Will continue to monitor.
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
## 2024-12-04 NOTE — NURSING NOTE
"Patient refused scheduled Metformin stating \"I don't take it\", \"I haven't taken it for 8 months\". Patient requesting medication be discontinued. Message left on clip board for MD/LAILA.  "

## 2024-12-04 NOTE — CASE MANAGEMENT
12/04/24    Team Meeting   Meeting Type Daily Rounds   Team Members Present   Team Members Present Physician;;Occupational Therapist;Nurse   Physician Team Member Kita DOWNS , Javier WICK,   Nursing Team Member Socrates VELAZQUEZ   Social Work Team Member Kamryn NAYLOR   OT Team Member Jesus ALMANZA   Patient/Family Present   Patient Present No   Patient's Family Present No   201 , New PT bright , pleasant  cooperative , denies all but seems to have some delusions .

## 2024-12-04 NOTE — PLAN OF CARE
Problem: DISCHARGE PLANNING - CARE MANAGEMENT  Goal: Discharge to post-acute care or home with appropriate resources  Description: INTERVENTIONS:  - Conduct assessment to determine patient/family and health care team treatment goals, and need for post-acute services based on payer coverage, community resources, and patient preferences, and barriers to discharge  - Address psychosocial, clinical, and financial barriers to discharge as identified in assessment in conjunction with the patient/family and health care team  - Arrange appropriate level of post-acute services according to patient’s   needs and preference and payer coverage in collaboration with the physician and health care team  - Communicate with and update the patient/family, physician, and health care team regarding progress on the discharge plan  - Arrange appropriate transportation to post-acute venues  Outcome: Progressing     New PT Goal Initiated
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
## 2024-12-04 NOTE — NURSING NOTE
"Patient withdrawn to room in am, requesting to eat in room, did not feel like being social. Generally pleasant and cooperative in interaction. Social with staff. Patient denies anxiety/depression, SI/HI, hallucinations. Patient believes he is here due to his ex-wife and co-workers having a surprise for him. \"I'm here because of a water heater and my wife said I wasn't wearing a shirt outside\". No glycemic reactions. Remains medication compliant and on 15\" checks for safety and behaviors.  "
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
## 2024-12-04 NOTE — ED NOTES
Clinicals faxed to [facility] for clinical review.  Pending Auth # [id number]

## 2024-12-04 NOTE — ED NOTES
Insurance Authorization for admission:   Phone call placed to [employer]  Phone number:[phone].     Clinical Department was closed.

## 2024-12-04 NOTE — NURSING NOTE
"Patient resting in bed talking out loud to self with intermittent singing and laughing to self. \"That's funny as heck\". \"It's a scam, it's all a scam\". \"I need to drink up in Qasim name, I tell you what\". \"It's urgent, a double meaning\". \"Lord, oh boy\". \"Now how can I get mad at him now?\" Nurse spoke with patient, patient stated he usually talks out loud to self. Remains on 15\" checks for safety and behaviors.  "

## 2024-12-04 NOTE — TREATMENT PLAN
TREATMENT PLAN REVIEW - Behavioral Health Solo Oneilkamille 54 y.o. 1970 male MRN: [id number]    [facility] Room / Bed: [facility] 200/[facility] 200-02 Encounter: [id number]          Admit Date/Time:  12/3/2024  8:21 PM    Treatment Team:   MD Marco Stringer MD Jacqueline F Wagner Ashley Lucykanish Nicole L Saas Patricia Solt, RN Leona Lasala Debbie Lafevre    Diagnosis: Principal Problem:    Bipolar disorder, current episode mixed, moderate (HCC)  Active Problems:    Chronic pain disorder    GERD (gastroesophageal reflux disease)    Essential hypertension    Anxiety    H/O prolonged Q-T interval on ECG      Patient Strengths/Assets: cooperative, family ties, negotiates basic needs    Patient Barriers/Limitations: poor insight, poor reasoning ability, self-care deficit    Short Term Goals: decrease in paranoid thoughts, ability to stay free of restraints, improvement in reasoning ability, improvement in self care, mood stabilization, increase in socialization with peers on the unit, acceptance of need for psychiatric treatment, acceptance of psychiatric medications    Long Term Goals: stabilization of mood, improvement in reasoning ability, improved insight, acceptance of need for psychiatric medications, acceptance of need for psychiatric treatment, adequate self care, adequate sleep, adequate appetite, adequate oral intake    Progress Towards Goals: starting psychiatric medications as prescribed    Recommended Treatment: medication management, patient medication education, group therapy, milieu therapy, continued Behavioral Health psychiatric evaluation/assessment process, medical follow up with medical team    Treatment Frequency: daily medication monitoring, group and milieu therapy daily, monitoring through interdisciplinary rounds, monitoring through weekly patient care conferences    Expected Discharge Date:  10 midnights     Discharge  Plan: will be determined    Treatment Plan Created/Updated By: Sameer East MD

## 2024-12-04 NOTE — NURSING NOTE
traZODone (DESYREL) tablet 50 mg given at 2113. Not effective.   zolpidem (AMBIEN) tablet 10 mg one stat dose given at 0024. This was effective for sleep after 0045. Slept well during most q 15 minute safety checks after this time.  No respiratory distress or changes in medical condition.  No suicidal ideations overnight.  Safety maintained via clutter-free environment, ID band, and given non-skid socks.  Will continue to monitor.

## 2024-12-04 NOTE — PLAN OF CARE
Problem: PAIN - ADULT  Goal: Verbalizes/displays adequate comfort level or baseline comfort level  Description: Interventions:  - Encourage patient to monitor pain and request assistance  - Assess pain using appropriate pain scale  - Administer analgesics based on type and severity of pain and evaluate response  - Implement non-pharmacological measures as appropriate and evaluate response  - Consider cultural and social influences on pain and pain management  - Notify physician/advanced practitioner if interventions unsuccessful or patient reports new pain  Outcome: Progressing     Problem: Risk for Self Injury/Neglect  Goal: Verbalize thoughts and feelings  Description: Interventions:  - Assess and re-assess patient's lethality and potential for self-injury  - Engage patient in 1:1 interactions, daily, for a minimum of 15 minutes  - Encourage patient to express feelings, fears, frustrations, hopes  - Establish rapport/trust with patient   Outcome: Progressing     Problem: Ineffective Coping  Goal: Cooperates with admission process  Description: Interventions:   - Complete admission process  Outcome: Completed

## 2024-12-04 NOTE — PLAN OF CARE
Problem: Alteration in Thoughts and Perception  Goal: Attend and participate in unit activities, including therapeutic, recreational, and educational groups  Description: Interventions:  -Encourage Visitation and family involvement in care  Outcome: Not Progressing     Problem: Ineffective Coping  Goal: Participates in unit activities  Description: Interventions:  - Provide therapeutic environment   - Provide required programming   - Redirect inappropriate behaviors   Outcome: Not Progressing     Problem: Risk for Self Injury/Neglect  Goal: Attend and participate in unit activities, including therapeutic, recreational, and educational groups  Description: Interventions:  - Provide therapeutic and educational activities daily, encourage attendance and participation, and document same in the medical record  - Obtain collateral information, encourage visitation and family involvement in care   Outcome: Not Progressing     Problem: Risk for Violence/Aggression Toward Others  Goal: Attend and participate in unit activities, including therapeutic, recreational, and educational groups  Description: Interventions:  - Provide therapeutic and educational activities daily, encourage attendance and participation, and document same in the medical record   Outcome: Not Progressing     Problem: Alteration in Orientation  Goal: Attend and participate in unit activities, including therapeutic, recreational, and educational groups  Description: Interventions:  - Provide therapeutic and educational activities daily, encourage attendance and participation, and document same in the medical record   - Provide appropriate opportunities for reminiscence   - Provide a consistent daily routine   - Encourage family contact/visitation   Outcome: Not Progressing   Patient new to the unit will encourage groups daily for participation.
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
## 2024-12-04 NOTE — NURSING NOTE
Patient complained of mild anxiety at 2310. Meehan Scale 7. Given hydrOXYzine HCL (ATARAX) tablet 25 mg PO at this time. Reassessed at 0010. Meehan Scale 0. No further complaints of anxiety.

## 2024-12-05 LAB
GLUCOSE SERPL-MCNC: 159 MG/DL (ref 65–140)
GLUCOSE SERPL-MCNC: 181 MG/DL (ref 65–140)

## 2024-12-05 PROCEDURE — 82948 REAGENT STRIP/BLOOD GLUCOSE: CPT

## 2024-12-05 PROCEDURE — 99232 SBSQ HOSP IP/OBS MODERATE 35: CPT | Performed by: PSYCHIATRY & NEUROLOGY

## 2024-12-05 RX ORDER — GABAPENTIN 300 MG/1
300 CAPSULE ORAL DAILY PRN
Status: DISCONTINUED | OUTPATIENT
Start: 2024-12-05 | End: 2024-12-05

## 2024-12-05 RX ORDER — ERGOCALCIFEROL 1.25 MG/1
50000 CAPSULE, LIQUID FILLED ORAL WEEKLY
Status: DISCONTINUED | OUTPATIENT
Start: 2024-12-06 | End: 2024-12-18 | Stop reason: HOSPADM

## 2024-12-05 RX ORDER — GABAPENTIN 300 MG/1
300 CAPSULE ORAL DAILY PRN
Status: DISCONTINUED | OUTPATIENT
Start: 2024-12-05 | End: 2024-12-18 | Stop reason: HOSPADM

## 2024-12-05 RX ADMIN — CLONAZEPAM 1 MG: 1 TABLET ORAL at 17:09

## 2024-12-05 RX ADMIN — PANTOPRAZOLE SODIUM 40 MG: 40 TABLET, DELAYED RELEASE ORAL at 05:45

## 2024-12-05 RX ADMIN — CLONAZEPAM 1 MG: 1 TABLET ORAL at 08:33

## 2024-12-05 RX ADMIN — GABAPENTIN 400 MG: 400 CAPSULE ORAL at 17:09

## 2024-12-05 RX ADMIN — GABAPENTIN 400 MG: 400 CAPSULE ORAL at 20:54

## 2024-12-05 RX ADMIN — HYDROXYZINE HYDROCHLORIDE 25 MG: 25 TABLET ORAL at 04:28

## 2024-12-05 RX ADMIN — GABAPENTIN 400 MG: 400 CAPSULE ORAL at 08:33

## 2024-12-05 RX ADMIN — ATORVASTATIN CALCIUM 10 MG: 10 TABLET, FILM COATED ORAL at 17:09

## 2024-12-05 RX ADMIN — OLANZAPINE 15 MG: 10 TABLET, ORALLY DISINTEGRATING ORAL at 20:53

## 2024-12-05 RX ADMIN — METOPROLOL TARTRATE 50 MG: 50 TABLET, FILM COATED ORAL at 20:54

## 2024-12-05 NOTE — PROGRESS NOTES
12/05/24 1249   Activity/Group Checklist   Group Admission/Discharge   Attendance Attended   Attendance Duration (min) 0-15   Interactions Interacted appropriately   Affect/Mood Appropriate   Goals Achieved Identified feelings;Identified triggers;Discussed coping strategies;Able to listen to others;Able to engage in interactions;Able to manage/cope with feelings;Able to self-disclose;Able to recieve feedback       Patient agreeable to meet and complete self assessment with [facility].  Patient information from form can be found in media.

## 2024-12-05 NOTE — PLAN OF CARE
Problem: PAIN - ADULT  Goal: Verbalizes/displays adequate comfort level or baseline comfort level  Description: Interventions:  - Encourage patient to monitor pain and request assistance  - Assess pain using appropriate pain scale  - Administer analgesics based on type and severity of pain and evaluate response  - Implement non-pharmacological measures as appropriate and evaluate response  - Consider cultural and social influences on pain and pain management  - Notify physician/advanced practitioner if interventions unsuccessful or patient reports new pain  Outcome: Progressing     Problem: DISCHARGE PLANNING  Goal: Discharge to home or other facility with appropriate resources  Description: INTERVENTIONS:  - Identify barriers to discharge w/patient and caregiver  - Arrange for needed discharge resources and transportation as appropriate  - Identify discharge learning needs (meds, wound care, etc.)  - Arrange for interpretive services to assist at discharge as needed  - Refer to Case Management Department for coordinating discharge planning if the patient needs post-hospital services based on physician/advanced practitioner order or complex needs related to functional status, cognitive ability, or social support system  Outcome: Progressing     Problem: Alteration in Thoughts and Perception  Goal: Treatment Goal: Gain control of psychotic behaviors/thinking, reduce/eliminate presenting symptoms and demonstrate improved reality functioning upon discharge  Outcome: Progressing  Goal: Verbalize thoughts and feelings  Description: Interventions:  - Promote a nonjudgmental and trusting relationship with the patient through active listening and therapeutic communication  - Assess patient's level of functioning, behavior and potential for risk  - Engage patient in 1 on 1 interactions  - Encourage patient to express fears, feelings, frustrations, and discuss symptoms    - [place] patient to reality, help patient recognize  reality-based thinking   - Administer medications as ordered and assess for potential side effects  - Provide the patient education related to the signs and symptoms of the illness and desired effects of prescribed medications  Outcome: Progressing  Goal: Refrain from acting on delusional thinking/internal stimuli  Description: Interventions:  - Monitor patient closely, per order   - Utilize least restrictive measures   - Set reasonable limits, give positive feedback for acceptable   - Administer medications as ordered and monitor of potential side effects  Outcome: Progressing  Goal: Agree to be compliant with medication regime, as prescribed and report medication side effects  Description: Interventions:  - Offer appropriate PRN medication and supervise ingestion; conduct AIMS, as needed   Outcome: Progressing  Goal: Attend and participate in unit activities, including therapeutic, recreational, and educational groups  Description: Interventions:  -Encourage Visitation and family involvement in care  Outcome: Progressing  Goal: Recognize dysfunctional thoughts, communicate reality-based thoughts at the time of discharge  Description: Interventions:  - Provide medication and psycho-education to assist patient in compliance and developing insight into his/her illness   Outcome: Progressing     Problem: Risk for Self Injury/Neglect  Goal: Treatment Goal: Remain safe during length of stay, learn and adopt new coping skills, and be free of self-injurious ideation, impulses and acts at the time of discharge  Outcome: Progressing  Goal: Verbalize thoughts and feelings  Description: Interventions:  - Assess and re-assess patient's lethality and potential for self-injury  - Engage patient in 1:1 interactions, daily, for a minimum of 15 minutes  - Encourage patient to express feelings, fears, frustrations, hopes  - Establish rapport/trust with patient   Outcome: Progressing  Goal: Refrain from harming self  Description:  Interventions:  - Monitor patient closely, per order  - Develop a trusting relationship  - Supervise medication ingestion, monitor effects and side effects   Outcome: Progressing  Goal: Attend and participate in unit activities, including therapeutic, recreational, and educational groups  Description: Interventions:  - Provide therapeutic and educational activities daily, encourage attendance and participation, and document same in the medical record  - Obtain collateral information, encourage visitation and family involvement in care   Outcome: Progressing  Goal: Recognize maladaptive responses and adopt new coping mechanisms  Outcome: Progressing  Goal: Complete daily ADLs, including personal hygiene independently, as able  Description: Interventions:  - Observe, teach, and assist patient with ADLS  - Monitor and promote a balance of rest/activity, with adequate nutrition and elimination  Outcome: Progressing     Problem: Depression  Goal: Treatment Goal: Demonstrate behavioral control of depressive symptoms, verbalize feelings of improved mood/affect, and adopt new coping skills prior to discharge  Outcome: Progressing  Goal: Refrain from isolation  Description: Interventions:  - Develop a trusting relationship   - Encourage socialization   Outcome: Progressing  Goal: Refrain from self-neglect  Outcome: Progressing     Problem: Anxiety  Goal: Anxiety is at manageable level  Description: Interventions:  - Assess and monitor patient's anxiety level.   - Monitor for signs and symptoms (heart palpitations, chest pain, shortness of breath, headaches, nausea, feeling jumpy, restlessness, irritable, apprehensive).   - Collaborate with interdisciplinary team and initiate plan and interventions as ordered.  - [place] patient to unit/surroundings  - Explain treatment plan  - Encourage participation in care  - Encourage verbalization of concerns/fears  - Identify coping mechanisms  - Assist in developing anxiety-reducing  skills  - Administer/offer alternative therapies  - Limit or eliminate stimulants  Outcome: Progressing     Problem: Risk for Violence/Aggression Toward Others  Goal: Treatment Goal: Refrain from acts of violence/aggression during length of stay, and demonstrate improved impulse control at the time of discharge  Outcome: Progressing  Goal: Refrain from harming others  Outcome: Progressing  Goal: Control angry outbursts  Description: Interventions:  - Monitor patient closely, per order  - Ensure early verbal de-escalation  - Monitor prn medication needs  - Set reasonable/therapeutic limits, outline behavioral expectations, and consequences   - Provide a non-threatening milieu, utilizing the least restrictive interventions   Outcome: Progressing  Goal: Attend and participate in unit activities, including therapeutic, recreational, and educational groups  Description: Interventions:  - Provide therapeutic and educational activities daily, encourage attendance and participation, and document same in the medical record   Outcome: Progressing  Goal: Identify appropriate positive anger management techniques  Description: Interventions:  - Offer anger management and coping skills groups   - Staff will provide positive feedback for appropriate anger control  Outcome: Progressing     Problem: Alteration in Orientation  Goal: Treatment Goal: Demonstrate a reduction of confusion and improved orientation to person, place, time and/or situation upon discharge, according to optimum baseline/ability  Outcome: Progressing  Goal: Interact with staff daily  Description: Interventions:  - Assess and re-assess patient's level of orientation  - Engage patient in 1 on 1 interactions, daily, for a minimum of 15 minutes   - Establish rapport/trust with patient   Outcome: Progressing  Goal: Express concerns related to confused thinking related to:  Description: Interventions:  - Encourage patient to express feelings, fears, frustrations,  hopes  - Assign consistent caregivers   - [place]/re-orient patient as needed  - Allow comfort items, as appropriate  - Provide visual cues, signs, etc.   Outcome: Progressing  Goal: Allow medical examinations, as recommended  Description: Interventions:  - Provide physical/neurological exams and/or referrals, per provider   Outcome: Progressing  Goal: Cooperate with recommended testing/procedures  Description: Interventions:  - Determine need for ancillary testing  - Observe for mental status changes  - Implement falls/precaution protocol   Outcome: Progressing  Goal: Attend and participate in unit activities, including therapeutic, recreational, and educational groups  Description: Interventions:  - Provide therapeutic and educational activities daily, encourage attendance and participation, and document same in the medical record   - Provide appropriate opportunities for reminiscence   - Provide a consistent daily routine   - Encourage family contact/visitation   Outcome: Progressing  Goal: Complete daily ADLs, including personal hygiene independently, as able  Description: Interventions:  - Observe, teach, and assist patient with ADLS  Outcome: Progressing     Problem: DISCHARGE PLANNING - CARE MANAGEMENT  Goal: Discharge to post-acute care or home with appropriate resources  Description: INTERVENTIONS:  - Conduct assessment to determine patient/family and health care team treatment goals, and need for post-acute services based on payer coverage, community resources, and patient preferences, and barriers to discharge  - Address psychosocial, clinical, and financial barriers to discharge as identified in assessment in conjunction with the patient/family and health care team  - Arrange appropriate level of post-acute services according to patient’s   needs and preference and payer coverage in collaboration with the physician and health care team  - Communicate with and update the patient/family, physician, and  health care team regarding progress on the discharge plan  - Arrange appropriate transportation to post-acute venues  Outcome: Progressing

## 2024-12-05 NOTE — NURSING NOTE
Patient withdrawn to room this evening.  Anxiety and depression were denied.  Also denied any suicidal /homicidal ideations.  Pleasant during assessment. No overt delusional thought observed during assessment. zolpidem (AMBIEN) tablet 10 mg given PO at 2110 for insomnia. Patient states he takes 12.5 mg at home. Positive for medication and HS snack. No other complaints voiced beside trouble falling asleep.  Maintained on q 15 minute safety checks.  Will continue to monitor.

## 2024-12-05 NOTE — CASE MANAGEMENT
12/05/24    Team Meeting   Meeting Type Daily Rounds   Team Members Present   Team Members Present Physician;Nurse;;Occupational Therapist   Physician Team Member Kita DOWNS   Nursing Team Member Socrates VELAZQUEZ   Social Work Team Member Kamryn NAYLOR   OT Team Member Jesus ALMANZA   Patient/Family Present   Patient Present No   Patient's Family Present No   201 slept until about 4 am denies all , withdrawn to room . Social with staff. Med complaint .

## 2024-12-05 NOTE — NURSING NOTE
Patient slept till 0400. Restless, pacing hallways, asking for food. Meehan Scale at 0428 was 16. Atarax 25 mg PO given at this time. Reassessed at 0500. Patient states medication was not effective. Noted increase in anxiety. Meehan Scale 19. Informed patient he'd have to try to let medication work. Will reassess.

## 2024-12-05 NOTE — PROGRESS NOTES
"Progress Note - Solo Mack 54 y.o. male MRN: [id number]    Unit/Bed#: -02 Encounter: [id number]        Subjective:   Patient seen and examined at bedside after reviewing the chart and discussing the case with the caring staff.      Patient examined at bedside.  Patient requesting if he can have extra dose of gabapentin daily as needed which is what he does at home.  Also requested for metformin to be discontinued.     Labs reviewed.  Vitamin D 14.9, vitamin B12 156, folate 6.9, hgb A1c 6.7%.    Physical Exam   Vitals: Blood pressure 98/72, pulse 60, temperature 97.5 °F (36.4 °C), temperature source Temporal, resp. rate 18, height 5' 7\" (1.702 m), weight 96.8 kg (213 lb 6.4 oz), SpO2 94%.,Body mass index is 33.42 kg/m².  Constitutional: Patient in no acute distress.  HEENT: PERR, EOMI, MMM.  Cardiovascular: Normal rate and regular rhythm.    Pulmonary/Chest: Effort normal and breath sounds normal.   Abdomen: Soft, + BS, NT.    Assessment/Plan:  Solo Mack is a(n) 54 y.o. male with schizophrenia.     Cardiac with hx of HTN, HLD.  Continue home Lopressor 50 mg q12h and atorvastatin 10 mg daily.  T2DM.  Hgb A1c 6.7% on 12/4/24.  D/c metformin due to pt refusing 12/5.  Accucheks twice daily.  Carb controlled diet.  GERD.  Patient is on Protonix 40 mg daily.  Chronic low back pain.  Continue gabapentin 400 mg TID.  CKD stage 3.  Stable.  Avoid nephrotoxins.   Vitamin D deficiency.  Patient started on vitamin D2 50,000 units weekly for 10 weeks followed by vitamin D3 1000 units daily.  Vitamin B12 deficiency.  Patient started on vitamin B12 1000 mcg daily.    The patient was discussed with Dr. Garza and he is in agreement with the above note.  "

## 2024-12-05 NOTE — ED NOTES
Insurance Authorization for admission:   Phone call placed to [facility].  Phone number: [phone].     Faxed request.     05 days approved.  Level of care: AXEL King.  Review on 12/08/2024   Authorization # [id number]

## 2024-12-05 NOTE — PROGRESS NOTES
Progress Note - Behavioral Health   Name: Solo Mack 54 y.o. male I MRN: [id number]   Unit/Bed#: [facility] 200-02 I Date of Admission: 12/3/2024   Date of Service: 12/5/2024 I Hospital Day: 2         Assessment & Plan  Anxiety    Bipolar disorder, current episode mixed, moderate (HCC)    Chronic pain disorder    Essential hypertension    GERD (gastroesophageal reflux disease)    H/O prolonged Q-T interval on ECG          Recommended Treatment:     Planned medication and treatment changes:    All current active medications have been reviewed  Encourage group therapy, milieu therapy and occupational therapy  Behavioral Health checks for safety monitoring  Increase Zydis to 15 mg po hs.    Current medications:  Current Facility-Administered Medications   Medication Dose Route Frequency Provider Last Rate    acetaminophen  650 mg Oral Q4H PRN Alexander Ledesma, CRNP      acetaminophen  650 mg Oral Q4H PRN Alexander Ledesma, CRNP      acetaminophen  975 mg Oral Q6H PRN Alexander Ledesma, CRNP      aluminum-magnesium hydroxide-simethicone  30 mL Oral Q4H PRN Kelsie Navarrete PA-C      atorvastatin  10 mg Oral Daily With Dinner Alexander Ledesma, CRNP      benztropine  0.5 mg Oral Q4H PRN Max 6/day Alexander Ledesma, CRNP      clonazePAM  1 mg Oral BID Sameer East MD      gabapentin  400 mg Oral TID Alexander Ledesma, CRNP      hydrOXYzine HCL  25 mg Oral Q6H PRN Max 4/day Alexander Ledesma, CRNP      LORazepam  1 mg Intramuscular Q6H PRN Max 3/day Alexander Ledesma, CRNP      LORazepam  0.5 mg Oral Q6H PRN Max 4/day Alexander Ledesma, CRNP      LORazepam  1 mg Oral Q6H PRN Max 3/day Alexander Ledesma, CRNP      melatonin  3 mg Oral HS Sameer East MD      metFORMIN  500 mg Oral Daily With Dinner Alexander Ledesma, CRNP      metoprolol tartrate  50 mg Oral Q12H [facility] Alexander Ledesma, CRNP      OLANZapine  10 mg Oral HS Sameer East MD      OLANZapine  5 mg Intramuscular Q3H PRN Max 3/day Alexander  "JavierShayla, CRNP      OLANZapine  2.5 mg Oral Q4H PRN Max 6/day Alexander Ledesma, CRNP      OLANZapine  5 mg Oral Q4H PRN Max 3/day Alexander Ledesma, CRNP      OLANZapine  5 mg Oral Q3H PRN Max 3/day Alexander Chanm, CRNP      pantoprazole  40 mg Oral Early Morning Alexander Ledesma, CRNP      polyethylene glycol  17 g Oral Daily PRN Kelsie Navarrete PA-C      zolpidem  10 mg Oral HS PRN Sameer East MD         Risks / Benefits of Treatment:    Risks, benefits, and possible side effects of medications explained to patient and patient verbalizes understanding and agreement for treatment.    Subjective:    Behavior over the last 24 hours: limited improvement.     Solo was seen for continuing care. He reports feeling better with reduced anxiety with fair adjustment to the unit. Denies having any major concerns at this time but remains with limited insight into his mental illness. Sleep and appetite have been ok. He did mention about his friend Ham who is \"gone\" and he \"wants him back\". Also states he wants to get out and  a woman name Silvana   Denies SI, HI, visual/auditory hallucinations. He has been compliant with medication and denies any current side effects.    Sleep: improved   Appetite: normal  Medication side effects: No   ROS: no complaints, all other systems are negative    Mental Status Evaluation:    Appearance:  age appropriate   Behavior:  cooperative, calm   Speech:  normal rate and volume   Mood:  dysphoric   Affect:  constricted   Thought Process:  goal directed, linear   Associations: intact associations   Thought Content:  grandiose ideas, ruminations   Perceptual Disturbances: denies auditory or visual hallucinations when asked   Risk Potential: Suicidal ideation - None at present  Homicidal ideation - None at present  Potential for aggression - Not at present   Sensorium:  oriented to person, place, and time/date   Memory:  recent and remote memory grossly intact "   Consciousness:  alert and awake   Attention/Concentration: attention span and concentration are age appropriate   Insight:  limited   Judgment: fair   Gait/Station: in bed   Motor Activity: no abnormal movements     Vital signs in last 24 hours:    Temp:  [97.5 °F (36.4 °C)-97.7 °F (36.5 °C)] 97.5 °F (36.4 °C)  HR:  [60-82] 60  BP: ()/(68-84) 98/72  Resp:  [18] 18  SpO2:  [94 %-100 %] 94 %  O2 Device: None (Room air)         Laboratory results: I have personally reviewed all pertinent laboratory/tests results    Most Recent Labs:   Lab Results   Component Value Date    WBC 9.23 12/02/2024    RBC 5.25 12/02/2024    HGB 15.7 12/02/2024    HCT 45.8 12/02/2024     12/02/2024    RDW 12.3 12/02/2024    NEUTROABS 5.74 12/02/2024    SODIUM 138 12/02/2024    K 3.8 12/02/2024     12/02/2024    CO2 26 12/02/2024    BUN 17 12/02/2024    CREATININE 1.24 12/02/2024    GLUC 152 (H) 12/02/2024    CALCIUM 9.2 12/02/2024    AST 22 11/17/2024    ALT 20 11/17/2024    ALKPHOS 110 (H) 11/17/2024    TP 7.5 11/17/2024    ALB 4.8 11/17/2024    TBILI 0.95 11/17/2024    CHOLESTEROL 124 12/04/2024    HDL 36 (L) 12/04/2024    TRIG 169 (H) 12/04/2024    LDLCALC 54 12/04/2024    NONHDLC 88 12/04/2024    LITHIUM <0.10 (L) 04/13/2024    AMMONIA 15 11/12/2020    [id number] 1.312 12/02/2024    HGBA1C 6.7 (H) 12/04/2024     12/04/2024       Counseling / Coordination of Care:    Patient's progress discussed with staff in treatment team meeting.  Medication changes reviewed with staff in treatment team meeting.  Supportive therapy provided to patient.  Group attendance encouraged.    Sameer East MD 12/05/24

## 2024-12-05 NOTE — NURSING NOTE
"Patient withdrawn to room, rests in bed. Pleasant and cooperative in interaction. Social with staff, minimal interaction with peers. Patient denies anxiety/depression, SI/HI, no overt hallucinations. No glycemic reactions. Remains medication compliant and on 15\" checks for safety and behaviors.  "

## 2024-12-06 LAB
GLUCOSE SERPL-MCNC: 139 MG/DL (ref 65–140)
GLUCOSE SERPL-MCNC: 159 MG/DL (ref 65–140)

## 2024-12-06 PROCEDURE — 99232 SBSQ HOSP IP/OBS MODERATE 35: CPT | Performed by: PSYCHIATRY & NEUROLOGY

## 2024-12-06 PROCEDURE — 82948 REAGENT STRIP/BLOOD GLUCOSE: CPT

## 2024-12-06 RX ORDER — METOPROLOL TARTRATE 25 MG/1
25 TABLET, FILM COATED ORAL EVERY 12 HOURS SCHEDULED
Status: DISCONTINUED | OUTPATIENT
Start: 2024-12-06 | End: 2024-12-17

## 2024-12-06 RX ADMIN — ATORVASTATIN CALCIUM 10 MG: 10 TABLET, FILM COATED ORAL at 16:04

## 2024-12-06 RX ADMIN — ERGOCALCIFEROL 50000 UNITS: 1.25 CAPSULE, LIQUID FILLED ORAL at 08:01

## 2024-12-06 RX ADMIN — ZOLPIDEM TARTRATE 10 MG: 5 TABLET ORAL at 21:04

## 2024-12-06 RX ADMIN — CLONAZEPAM 1 MG: 1 TABLET ORAL at 08:01

## 2024-12-06 RX ADMIN — CYANOCOBALAMIN TAB 500 MCG 1000 MCG: 500 TAB at 08:01

## 2024-12-06 RX ADMIN — GABAPENTIN 300 MG: 300 CAPSULE ORAL at 03:30

## 2024-12-06 RX ADMIN — GABAPENTIN 400 MG: 400 CAPSULE ORAL at 21:04

## 2024-12-06 RX ADMIN — CLONAZEPAM 1 MG: 1 TABLET ORAL at 17:06

## 2024-12-06 RX ADMIN — GABAPENTIN 400 MG: 400 CAPSULE ORAL at 16:04

## 2024-12-06 RX ADMIN — GABAPENTIN 400 MG: 400 CAPSULE ORAL at 08:01

## 2024-12-06 RX ADMIN — METOPROLOL TARTRATE 25 MG: 25 TABLET, FILM COATED ORAL at 21:04

## 2024-12-06 RX ADMIN — PANTOPRAZOLE SODIUM 40 MG: 40 TABLET, DELAYED RELEASE ORAL at 05:51

## 2024-12-06 RX ADMIN — OLANZAPINE 15 MG: 10 TABLET, ORALLY DISINTEGRATING ORAL at 21:04

## 2024-12-06 NOTE — SOCIAL WORK
12/06/24    Team Meeting   Meeting Type Daily Rounds   Team Members Present   Team Members Present Physician;Nurse;;Occupational Therapist   Physician Team Member Kita DOWNS, Javier WICK   Nursing Team Member Catherine VELAZQUEZ   Social Work Team Member Kamryn NAYLOR   OT Team Member Jesus ALMANZA   Patient/Family Present   Patient Present No   Patient's Family Present No   201 broken sleep , Denies all but did appear to be talking to himself med complaint .

## 2024-12-06 NOTE — NURSING NOTE
Patient appears to have had broken sleep through the overnight hours. Patient states gabepentin 300 mg PRN was ineffective. No signs of distress, no concerns voiced. Safety checks in progress.

## 2024-12-06 NOTE — PROGRESS NOTES
Progress Note - Behavioral Health   Name: Solo Mack 54 y.o. male I MRN: [id number]   Unit/Bed#: [facility] 200-02 I Date of Admission: 12/3/2024   Date of Service: 12/6/2024 I Hospital Day: 3         Assessment & Plan  Anxiety    Bipolar disorder, current episode mixed, moderate (HCC)    Chronic pain disorder    Essential hypertension    GERD (gastroesophageal reflux disease)    H/O prolonged Q-T interval on ECG          Recommended Treatment:     Planned medication and treatment changes:    All current active medications have been reviewed  Encourage group therapy, milieu therapy and occupational therapy  Behavioral Health checks for safety monitoring  Possible discharge next week if continues to improve  Ct with current meds and support.    Current medications:  Current Facility-Administered Medications   Medication Dose Route Frequency Provider Last Rate    acetaminophen  650 mg Oral Q4H PRN Alexander Ledesma, CRNP      acetaminophen  650 mg Oral Q4H PRN Alexander Ledesma, CRNP      acetaminophen  975 mg Oral Q6H PRN Alexander Ledesma, DELANO      aluminum-magnesium hydroxide-simethicone  30 mL Oral Q4H PRN Kelsie Navarrete PA-C      atorvastatin  10 mg Oral Daily With Dinner Alexander Ledesma, DELANO      benztropine  0.5 mg Oral Q4H PRN Max 6/day DELANO Gallo      ergocalciferol  50,000 Units Oral Weekly Kelsie Navarrete PA-C      Followed by    [START ON 2/14/2025] Cholecalciferol  1,000 Units Oral Daily Kelsie Navarrete PA-C      clonazePAM  1 mg Oral BID Sameer East MD      cyanocobalamin  1,000 mcg Oral Daily Kelsie Navarrete PA-C      gabapentin  300 mg Oral Daily PRN Kelsie Navarrete PA-C      gabapentin  400 mg Oral TID DELANO Gallo      hydrOXYzine HCL  25 mg Oral Q6H PRN Max 4/day Alexander Ledesma, DELANO      LORazepam  1 mg Intramuscular Q6H PRN Max 3/day Alexander Ledesma, DELANO      LORazepam  0.5 mg Oral Q6H PRN Max 4/day EDLANO Gallo       "LORazepam  1 mg Oral Q6H PRN Max 3/day Alexander Ledesma, CRNP      metoprolol tartrate  25 mg Oral Q12H [facility] Kelsie Navarrete PA-C      OLANZapine  15 mg Oral HS Sameer East MD      OLANZapine  5 mg Intramuscular Q3H PRN Max 3/day Alexander Ledesma, CRNP      OLANZapine  2.5 mg Oral Q4H PRN Max 6/day Alexander MunsonAnom, CRNP      OLANZapine  5 mg Oral Q4H PRN Max 3/day Alexander MunsonAnom, CRNP      OLANZapine  5 mg Oral Q3H PRN Max 3/day Alexander Herrera-Anom, CRNP      pantoprazole  40 mg Oral Early Morning Alexander Ledesma, CROMAR      polyethylene glycol  17 g Oral Daily PRN Kelsie Navarrete PA-C      zolpidem  10 mg Oral HS PRN Sameer East MD         Risks / Benefits of Treatment:    Risks, benefits, and possible side effects of medications explained to patient and patient verbalizes understanding and agreement for treatment.    Subjective:    Behavior over the last 24 hours: slowly improving.     Solo was seen for continuing care. He reports doing better with improved sleep and appetite. Denies having any major concerns but remains with limited insight and staff noted to be talking to himself loud in his room.  Patient claims he usually talk to himself which became an habit and denies any auditory hallucination consistently. He does not verbalize spontaneously about \"marrying Silvana\" today. Denies SI or wish to die. He has been compliant with medication and denies any current side effects. Staff report some participation in group and milieu.    Sleep: improved   Appetite: normal  Medication side effects: No   ROS: no complaints, all other systems are negative    Mental Status Evaluation:    Appearance:  age appropriate, adequate grooming   Behavior:  cooperative, calm   Speech:  normal rate and volume   Mood:  less anxious   Affect:  constricted   Thought Process:  goal directed, linear   Associations: intact associations   Thought Content:  ruminations   Perceptual Disturbances: denies auditory or " visual hallucinations when asked   Risk Potential: Suicidal ideation - None at present  Homicidal ideation - None at present  Potential for aggression - Not at present   Sensorium:  oriented to person, place, and time/date   Memory:  recent and remote memory grossly intact   Consciousness:  alert and awake   Attention/Concentration: attention span and concentration are age appropriate   Insight:  limited   Judgment: fair   Gait/Station: in bed   Motor Activity: no abnormal movements     Vital signs in last 24 hours:    Temp:  [97.8 °F (36.6 °C)-98.1 °F (36.7 °C)] 97.8 °F (36.6 °C)  HR:  [55-79] 55  BP: (107-150)/(65-88) 107/65  Resp:  [18] 18  SpO2:  [97 %-99 %] 99 %  O2 Device: None (Room air)         Laboratory results: I have personally reviewed all pertinent laboratory/tests results    Most Recent Labs:   Lab Results   Component Value Date    WBC 9.23 12/02/2024    RBC 5.25 12/02/2024    HGB 15.7 12/02/2024    HCT 45.8 12/02/2024     12/02/2024    RDW 12.3 12/02/2024    NEUTROABS 5.74 12/02/2024    SODIUM 138 12/02/2024    K 3.8 12/02/2024     12/02/2024    CO2 26 12/02/2024    BUN 17 12/02/2024    CREATININE 1.24 12/02/2024    GLUC 152 (H) 12/02/2024    CALCIUM 9.2 12/02/2024    AST 22 11/17/2024    ALT 20 11/17/2024    ALKPHOS 110 (H) 11/17/2024    TP 7.5 11/17/2024    ALB 4.8 11/17/2024    TBILI 0.95 11/17/2024    CHOLESTEROL 124 12/04/2024    HDL 36 (L) 12/04/2024    TRIG 169 (H) 12/04/2024    LDLCALC 54 12/04/2024    NONHDLC 88 12/04/2024    LITHIUM <0.10 (L) 04/13/2024    AMMONIA 15 11/12/2020    [id number] 1.312 12/02/2024    HGBA1C 6.7 (H) 12/04/2024     12/04/2024       Counseling / Coordination of Care:    Patient's progress discussed with staff in treatment team meeting.  Medication changes reviewed with staff in treatment team meeting.  Supportive therapy provided to patient.  Group attendance encouraged.    Sameer East MD 12/06/24

## 2024-12-06 NOTE — PLAN OF CARE
Problem: Alteration in Thoughts and Perception  Goal: Verbalize thoughts and feelings  Description: Interventions:  - Promote a nonjudgmental and trusting relationship with the patient through active listening and therapeutic communication  - Assess patient's level of functioning, behavior and potential for risk  - Engage patient in 1 on 1 interactions  - Encourage patient to express fears, feelings, frustrations, and discuss symptoms    - Wheeler patient to reality, help patient recognize reality-based thinking   - Administer medications as ordered and assess for potential side effects  - Provide the patient education related to the signs and symptoms of the illness and desired effects of prescribed medications  Outcome: Progressing  Goal: Agree to be compliant with medication regime, as prescribed and report medication side effects  Description: Interventions:  - Offer appropriate PRN medication and supervise ingestion; conduct AIMS, as needed   Outcome: Progressing      Pt accepted to [facility] adolescent unit bed 723B under Dr. Negro Schrader. Writer and [address] obtained double verbal from pt's mom. Writer gave SBAR to Pleasant Cheadle, RN, then provided pt's RN Natasha nAdre with N2N # to call for report. Writer also update Ga Taylor in the ER.

## 2024-12-06 NOTE — NURSING NOTE
Patient pleasant and compliant with HS meds. He participated in a snack this evening and he denies all psychiatric symptoms. No signs of distress, safety checks in progress.

## 2024-12-06 NOTE — PLAN OF CARE
Problem: Alteration in Thoughts and Perception  Goal: Attend and participate in unit activities, including therapeutic, recreational, and educational groups  Description: Interventions:  -Encourage Visitation and family involvement in care  Outcome: Progressing     Problem: Ineffective Coping  Goal: Participates in unit activities  Description: Interventions:  - Provide therapeutic environment   - Provide required programming   - Redirect inappropriate behaviors   Outcome: Progressing     Problem: Risk for Self Injury/Neglect  Goal: Attend and participate in unit activities, including therapeutic, recreational, and educational groups  Description: Interventions:  - Provide therapeutic and educational activities daily, encourage attendance and participation, and document same in the medical record  - Obtain collateral information, encourage visitation and family involvement in care   Outcome: Progressing     Problem: Risk for Violence/Aggression Toward Others  Goal: Attend and participate in unit activities, including therapeutic, recreational, and educational groups  Description: Interventions:  - Provide therapeutic and educational activities daily, encourage attendance and participation, and document same in the medical record   Outcome: Progressing     Problem: Alteration in Orientation  Goal: Attend and participate in unit activities, including therapeutic, recreational, and educational groups  Description: Interventions:  - Provide therapeutic and educational activities daily, encourage attendance and participation, and document same in the medical record   - Provide appropriate opportunities for reminiscence   - Provide a consistent daily routine   - Encourage family contact/visitation   Outcome: Progressing

## 2024-12-06 NOTE — PROGRESS NOTES
"Progress Note - Solo Mack 54 y.o. male MRN: [id number]    Unit/Bed#: -02 Encounter: [id number]        Subjective:   Patient seen and examined at bedside after reviewing the chart and discussing the case with the caring staff.      Patient examined at bedside.  Patient denies any acute symptoms.    Patient's blood pressure has been running low at times.  Yesterday morning 98/72 and this morning 107/65.      Physical Exam   Vitals: Blood pressure 107/65, pulse 55, temperature 97.8 °F (36.6 °C), temperature source Temporal, resp. rate 18, height 5' 7\" (1.702 m), weight 96.8 kg (213 lb 6.4 oz), SpO2 99%.,Body mass index is 33.42 kg/m².  Constitutional: Patient in no acute distress.  HEENT: PERR, EOMI, MMM.  Cardiovascular: Normal rate and regular rhythm.    Pulmonary/Chest: Effort normal and breath sounds normal.   Abdomen: Soft, + BS, NT.    Assessment/Plan:  Solo Mack is a(n) 54 y.o. male with schizophrenia.     Cardiac with hx of HTN, HLD.  Continue home atorvastatin 10 mg daily.  Decr Lopressor from 50 mg to 25 mg q12h 12/6.  T2DM.  Hgb A1c 6.7% on 12/4/24.  D/c metformin due to pt refusing 12/5.  Accucheks twice daily.  Carb controlled diet.  GERD.  Patient is on Protonix 40 mg daily.  Chronic low back pain.  Continue gabapentin 400 mg TID.  CKD stage 3.  Stable.  Avoid nephrotoxins.   Vitamin D deficiency.  Patient started on vitamin D2 50,000 units weekly for 10 weeks followed by vitamin D3 1000 units daily.  Vitamin B12 deficiency.  Patient started on vitamin B12 1000 mcg daily.    The patient was discussed with Dr. Garza and he is in agreement with the above note.  "

## 2024-12-06 NOTE — NURSING NOTE
"Patient pleasant and cooperative with staff. Denies SI/HI or AVH. Patient withdrawn to room/self majority of the day, when asked patient stated, \"there's not much to do here besides sleep.\" Patient visible for meals. During PM medication pass, patient verbally responding to internal stimuli and religiously preoccupied; \"Oh God, you are coming at the right time.\" Patient med compliant and making needs known. Will continue to monitor. Continual q15 min rounding and safety checks in place.   "

## 2024-12-07 LAB
GLUCOSE SERPL-MCNC: 135 MG/DL (ref 65–140)
GLUCOSE SERPL-MCNC: 140 MG/DL (ref 65–140)

## 2024-12-07 PROCEDURE — 82948 REAGENT STRIP/BLOOD GLUCOSE: CPT

## 2024-12-07 PROCEDURE — 99232 SBSQ HOSP IP/OBS MODERATE 35: CPT | Performed by: PSYCHIATRY & NEUROLOGY

## 2024-12-07 RX ADMIN — OLANZAPINE 15 MG: 10 TABLET, ORALLY DISINTEGRATING ORAL at 21:13

## 2024-12-07 RX ADMIN — CYANOCOBALAMIN TAB 500 MCG 1000 MCG: 500 TAB at 08:47

## 2024-12-07 RX ADMIN — GABAPENTIN 400 MG: 400 CAPSULE ORAL at 18:04

## 2024-12-07 RX ADMIN — GABAPENTIN 400 MG: 400 CAPSULE ORAL at 08:47

## 2024-12-07 RX ADMIN — METOPROLOL TARTRATE 25 MG: 25 TABLET, FILM COATED ORAL at 21:13

## 2024-12-07 RX ADMIN — CLONAZEPAM 1 MG: 1 TABLET ORAL at 18:04

## 2024-12-07 RX ADMIN — CLONAZEPAM 1 MG: 1 TABLET ORAL at 08:47

## 2024-12-07 RX ADMIN — ATORVASTATIN CALCIUM 10 MG: 10 TABLET, FILM COATED ORAL at 18:04

## 2024-12-07 RX ADMIN — PANTOPRAZOLE SODIUM 40 MG: 40 TABLET, DELAYED RELEASE ORAL at 05:55

## 2024-12-07 RX ADMIN — GABAPENTIN 400 MG: 400 CAPSULE ORAL at 21:13

## 2024-12-07 NOTE — PROGRESS NOTES
"Progress Note - Solo Mack 54 y.o. male MRN: [id number]    Unit/Bed#: -02 Encounter: [id number]        Subjective:   Patient seen and examined at bedside after reviewing the chart and discussing the case with the caring staff.      Patient examined at bedside.  Patient denies any acute symptoms.    Physical Exam   Vitals: Blood pressure 104/62, pulse 65, temperature (!) 97 °F (36.1 °C), temperature source Temporal, resp. rate 18, height 5' 7\" (1.702 m), weight 96.8 kg (213 lb 6.4 oz), SpO2 98%.,Body mass index is 33.42 kg/m².  Constitutional: Patient in no acute distress.  HEENT: PERR, EOMI, MMM.  Cardiovascular: Normal rate and regular rhythm.    Pulmonary/Chest: Effort normal and breath sounds normal.   Abdomen: Soft, + BS, NT.    Assessment/Plan:  Solo Mack is a(n) 54 y.o. male with schizophrenia.     Cardiac with hx of HTN, HLD.  Continue home atorvastatin 10 mg daily.  Decr Lopressor from 50 mg to 25 mg q12h 12/6.  T2DM.  Hgb A1c 6.7% on 12/4/24.  D/c metformin due to pt refusing 12/5.  Accucheks twice daily.  Carb controlled diet.  GERD.  Patient is on Protonix 40 mg daily.  Chronic low back pain.  Continue gabapentin 400 mg TID.  CKD stage 3.  Stable.  Avoid nephrotoxins.   Vitamin D deficiency.  Patient started on vitamin D2 50,000 units weekly for 10 weeks followed by vitamin D3 1000 units daily.  Vitamin B12 deficiency.  Patient started on vitamin B12 1000 mcg daily.    The patient was discussed with Dr. Garza and he is in agreement with the above note.  "

## 2024-12-07 NOTE — PROGRESS NOTES
Progress Note - Behavioral Health   Name: Solo Mack 54 y.o. male I MRN: [id number]   Unit/Bed#: [facility] 200-02 I Date of Admission: 12/3/2024   Date of Service: 12/7/2024 I Hospital Day: 4         Assessment & Plan  Anxiety  Klonopin 1mg BID  Gabapentin 400mg TID  Bipolar disorder, current episode mixed, moderate (HCC)  Zyprexa 15mg PO at HS  Chronic pain disorder    Essential hypertension    GERD (gastroesophageal reflux disease)    H/O prolonged Q-T interval on ECG          Recommended Treatment:     Treatment plan and medication changes discussed and per the attending physician the plan is:     1.Continue with group therapy, milieu therapy and occupational therapy  2.Behavioral Health checks every 15 minutes  3.Continue frequent safety checks and vitals per unit protocol  4.Continue with SLIM medical management as indicated  5.Continue with current medication regimen  6.Will review labs in the a.m.  7.Disposition Planning: Discharge planning and efforts remain ongoing     Planned medication and treatment changes:    All current active medications have been reviewed  Encourage group therapy, milieu therapy and occupational therapy  Behavioral Health checks for safety monitoring  Continue treatment with group therapy, milieu therapy and occupational therapy  Continue current medications:    Current medications:  Current Facility-Administered Medications   Medication Dose Route Frequency Provider Last Rate    acetaminophen  650 mg Oral Q4H PRN Alexander Ledesma, CRNP      acetaminophen  650 mg Oral Q4H PRN Alexander Ledesma, CRNP      acetaminophen  975 mg Oral Q6H PRN Alexander Ledesma, CRNP      aluminum-magnesium hydroxide-simethicone  30 mL Oral Q4H PRN Kelsie Navarrete PA-C      atorvastatin  10 mg Oral Daily With Dinner DELANO Gallo      benztropine  0.5 mg Oral Q4H PRN Max 6/day DELANO Gallo      ergocalciferol  50,000 Units Oral Weekly Kelsie Navarrete PA-C      Followed  "by    [START ON 2/14/2025] Cholecalciferol  1,000 Units Oral Daily Kelsie Navarrete PA-C      clonazePAM  1 mg Oral BID Sameer East MD      cyanocobalamin  1,000 mcg Oral Daily Kelsie Navarrete PA-C      gabapentin  300 mg Oral Daily PRN Kelsie Navarrete PA-C      gabapentin  400 mg Oral TID Alexander Javier-Anom, CRNP      hydrOXYzine HCL  25 mg Oral Q6H PRN Max 4/day [place] Javier-Anom, CRNP      LORazepam  1 mg Intramuscular Q6H PRN Max 3/day [place] Javier-Anom, CRNP      LORazepam  0.5 mg Oral Q6H PRN Max 4/day [place] Javier-Anom, CRNP      LORazepam  1 mg Oral Q6H PRN Max 3/day [place] Javier-Anom, CRNP      metoprolol tartrate  25 mg Oral Q12H [facility] Kelsie Navarrete PA-C      OLANZapine  15 mg Oral HS Sameer East MD      OLANZapine  5 mg Intramuscular Q3H PRN Max 3/day [place] Javier-Anom, CRNP      OLANZapine  2.5 mg Oral Q4H PRN Max 6/day [place] Javier-Anom, CRNP      OLANZapine  5 mg Oral Q4H PRN Max 3/day [place] Javier-Anom, CRNP      OLANZapine  5 mg Oral Q3H PRN Max 3/day [place] Javier-Anom, CRNP      pantoprazole  40 mg Oral Early Morning [place] Javier-Anom, CRNP      polyethylene glycol  17 g Oral Daily PRN Kelsie Navarrete PA-C      zolpidem  10 mg Oral HS PRN Sameer East MD         Risks / Benefits of Treatment:    Risks, benefits, and possible side effects of medications explained to patient and patient verbalizes understanding and agreement for treatment.    Subjective:    Behavior over the last 24 hours: slowly improving.     Per staff, patient has been calm and cooperative.  He is often withdrawn to room but is visible for meals.  He is observed responding to internal stimuli, he is compliant with meals and medications.    Solo was seen in his room today for psychiatric follow up.  He was calm and scant with the interview.  He reports he is \"resting up\" today.  He reports sleeping well at night but is tired throughout the day as well.  He continues to deny suicidal or homicidal ideation " intent or plan.  He denies auditory or visual hallucinations despite being heard responding to internal stimuli.      Sleep: normal  Appetite: normal  Medication side effects: No   ROS: no complaints    Mental Status Evaluation:    Appearance:  casually dressed, adequate grooming   Behavior:  cooperative, calm   Speech:  scant   Mood:  euthymic   Affect:  constricted   Thought Process:  linear   Associations: intact associations   Thought Content:  no overt delusions   Perceptual Disturbances: denies visual hallucinations when asked, appears responding to internal stimuli   Risk Potential: Suicidal ideation - None  Homicidal ideation - None  Potential for aggression - No   Sensorium:  oriented to person, place, and time/date   Memory:  recent and remote memory grossly intact   Consciousness:  alert and awake   Attention/Concentration: attention span and concentration are age appropriate   Insight:  limited   Judgment: limited   Gait/Station: in bed   Motor Activity: no abnormal movements     Vital signs in last 24 hours:    Temp:  [97 °F (36.1 °C)-98 °F (36.7 °C)] 97 °F (36.1 °C)  HR:  [65-90] 65  BP: (104-133)/(58-68) 104/62  Resp:  [18] 18  SpO2:  [95 %-99 %] 98 %  O2 Device: None (Room air)         Laboratory results: I have personally reviewed all pertinent laboratory/tests results    Results from the past 24 hours:   Recent Results (from the past 24 hours)   Fingerstick Glucose (POCT)    Collection Time: 12/06/24  4:54 PM   Result Value Ref Range    POC Glucose 159 (H) 65 - 140 mg/dl   Fingerstick Glucose (POCT)    Collection Time: 12/07/24  7:30 AM   Result Value Ref Range    POC Glucose 135 65 - 140 mg/dl     Most Recent Labs:   Lab Results   Component Value Date    WBC 9.23 12/02/2024    RBC 5.25 12/02/2024    HGB 15.7 12/02/2024    HCT 45.8 12/02/2024     12/02/2024    RDW 12.3 12/02/2024    NEUTROABS 5.74 12/02/2024    SODIUM 138 12/02/2024    K 3.8 12/02/2024     12/02/2024    CO2 26  12/02/2024    BUN 17 12/02/2024    CREATININE 1.24 12/02/2024    GLUC 152 (H) 12/02/2024    CALCIUM 9.2 12/02/2024    AST 22 11/17/2024    ALT 20 11/17/2024    ALKPHOS 110 (H) 11/17/2024    TP 7.5 11/17/2024    ALB 4.8 11/17/2024    TBILI 0.95 11/17/2024    CHOLESTEROL 124 12/04/2024    HDL 36 (L) 12/04/2024    TRIG 169 (H) 12/04/2024    LDLCALC 54 12/04/2024    NONHDLC 88 12/04/2024    LITHIUM <0.10 (L) 04/13/2024    AMMONIA 15 11/12/2020    [id number] 1.312 12/02/2024    HGBA1C 6.7 (H) 12/04/2024     12/04/2024       Suicide/Homicide Risk Assessment:    Risk of Harm to Self:   Nursing Suicide Risk Assessment Last 24 hours: C-SSRS Risk (Since Last Contact)  Calculated C-SSRS Risk Score (Since Last Contact): No Risk Indicated  Current Specific Risk Factors include: diagnosis of mood disorder  Protective Factors: no current suicidal ideation, ability to communicate with staff on the unit, able to contract for safety on the unit, taking medications as ordered on the unit  Based on today's assessment, Solo presents the following risk of harm to self: low    Risk of Harm to Others:  Nursing Homicide Risk Assessment: Violence Risk to Others: Denies within past 6 months  Current Specific Risk Factors include: social difficulties  Protective Factors: no current homicidal ideation, no current psychotic symptoms, compliant with medications on the unit as ordered, compliant with unit milieu, follows staff redirection  Based on today's assessmentSolo presents the following risk of harm to others: low    The following interventions are recommended: Behavioral Health checks for safety monitoring, continued hospitalization on locked unit    Progress Toward Goals: progressing, attends groups more often, mood is stabilizing    Counseling / Coordination of Care:    Total floor / unit time spent today 30 minutes. Greater than 50% of total time was spent with the patient and / or family counseling and / or coordination  of care. A description of counseling / coordination of care:    Administrative Statements   I have spent a total time of 30 minutes in caring for this patient on the day of the visit/encounter including Diagnostic results, Documenting in the medical record, Reviewing / ordering tests, medicine, procedures  , Obtaining or reviewing history  , and Communicating with other healthcare professionals .    DELANO [place] 12/07/24

## 2024-12-07 NOTE — NURSING NOTE
"Pt withdrawn to room and self throughout the day. Pt is calm, pleasant, and cooperative. He is minimal in conversation. Pt presents as depressed. Pt endorses anxiety r/t \"business/health\" and did not go into further detail. Pt denies depression, SI/HI/AVH. When asked if pt had AVH, pt appeared hesitant in his answer and proceeded to deny AVH. When pt was asked if he had goals for the day, he stated, my goals are to \"sleep and eat.\" Continuous monitoring maintained.   "

## 2024-12-07 NOTE — NURSING NOTE
"Patient appears to have slept through the overnight hours. No signs of distress, no concerns voiced. Continuous 15 \" safety checks in progress.   "

## 2024-12-07 NOTE — PLAN OF CARE
Problem: Alteration in Thoughts and Perception  Goal: Verbalize thoughts and feelings  Description: Interventions:  - Promote a nonjudgmental and trusting relationship with the patient through active listening and therapeutic communication  - Assess patient's level of functioning, behavior and potential for risk  - Engage patient in 1 on 1 interactions  - Encourage patient to express fears, feelings, frustrations, and discuss symptoms    - [place] patient to reality, help patient recognize reality-based thinking   - Administer medications as ordered and assess for potential side effects  - Provide the patient education related to the signs and symptoms of the illness and desired effects of prescribed medications  Outcome: Progressing     Problem: Risk for Self Injury/Neglect  Goal: Refrain from harming self  Description: Interventions:  - Monitor patient closely, per order  - Develop a trusting relationship  - Supervise medication ingestion, monitor effects and side effects   Outcome: Progressing

## 2024-12-07 NOTE — NURSING NOTE
Patient pleasant and compliant with HS meds. He participated in a snack this evening and he denies all psychiatric symptoms. Ambien given at 2104. No signs of distress, safety checks in progress.

## 2024-12-08 LAB
GLUCOSE SERPL-MCNC: 135 MG/DL (ref 65–140)
GLUCOSE SERPL-MCNC: 160 MG/DL (ref 65–140)

## 2024-12-08 PROCEDURE — 99232 SBSQ HOSP IP/OBS MODERATE 35: CPT | Performed by: PSYCHIATRY & NEUROLOGY

## 2024-12-08 PROCEDURE — 82948 REAGENT STRIP/BLOOD GLUCOSE: CPT

## 2024-12-08 RX ADMIN — GABAPENTIN 300 MG: 300 CAPSULE ORAL at 02:44

## 2024-12-08 RX ADMIN — OLANZAPINE 15 MG: 10 TABLET, ORALLY DISINTEGRATING ORAL at 21:42

## 2024-12-08 RX ADMIN — CYANOCOBALAMIN TAB 500 MCG 1000 MCG: 500 TAB at 09:03

## 2024-12-08 RX ADMIN — ATORVASTATIN CALCIUM 10 MG: 10 TABLET, FILM COATED ORAL at 17:20

## 2024-12-08 RX ADMIN — ZOLPIDEM TARTRATE 10 MG: 5 TABLET ORAL at 00:22

## 2024-12-08 RX ADMIN — GABAPENTIN 400 MG: 400 CAPSULE ORAL at 09:03

## 2024-12-08 RX ADMIN — CLONAZEPAM 1 MG: 1 TABLET ORAL at 17:20

## 2024-12-08 RX ADMIN — GABAPENTIN 400 MG: 400 CAPSULE ORAL at 17:20

## 2024-12-08 RX ADMIN — CLONAZEPAM 1 MG: 1 TABLET ORAL at 09:03

## 2024-12-08 RX ADMIN — METOPROLOL TARTRATE 25 MG: 25 TABLET, FILM COATED ORAL at 21:42

## 2024-12-08 RX ADMIN — PANTOPRAZOLE SODIUM 40 MG: 40 TABLET, DELAYED RELEASE ORAL at 06:01

## 2024-12-08 RX ADMIN — GABAPENTIN 400 MG: 400 CAPSULE ORAL at 21:42

## 2024-12-08 NOTE — PROGRESS NOTES
Progress Note - Behavioral Health   Name: Solo Mack 54 y.o. male I MRN: [id number]   Unit/Bed#: [facility]-02 I Date of Admission: 12/3/2024   Date of Service: 12/8/2024 I Hospital Day: 5         Assessment & Plan  Anxiety  Klonopin 1mg BID  Gabapentin 400mg TID  Bipolar disorder, current episode mixed, moderate (HCC)  Zyprexa 15mg PO at HS  Chronic pain disorder    Essential hypertension    GERD (gastroesophageal reflux disease)    H/O prolonged Q-T interval on ECG  QTc 429 on 12/2/2024        Recommended Treatment:     Planned medication and treatment changes:    All current active medications have been reviewed  Encourage group therapy, milieu therapy and occupational therapy  Behavioral Health checks for safety monitoring  Continue current medications:    Current medications:  Current Facility-Administered Medications   Medication Dose Route Frequency Provider Last Rate    acetaminophen  650 mg Oral Q4H PRN Alexander Ledesma, CRNP      acetaminophen  650 mg Oral Q4H PRN Alexander Ledesma, CRNP      acetaminophen  975 mg Oral Q6H PRN Alexander Ledesma, MIGUELANGELNP      aluminum-magnesium hydroxide-simethicone  30 mL Oral Q4H PRN Kelsie Navarrete PA-C      atorvastatin  10 mg Oral Daily With Dinner Alexander Ledesma, MIGUELANGELNP      benztropine  0.5 mg Oral Q4H PRN Max 6/day DELANO Gallo      ergocalciferol  50,000 Units Oral Weekly Kelsie Navarrete PA-C      Followed by    [START ON 2/14/2025] Cholecalciferol  1,000 Units Oral Daily Kelsie Navarrete PA-C      clonazePAM  1 mg Oral BID Sameer East MD      cyanocobalamin  1,000 mcg Oral Daily Kelsietaylor Navarrete PA-C      gabapentin  300 mg Oral Daily PRN Kelsie Navarrete PA-C      gabapentin  400 mg Oral TID DELANO Gallo      hydrOXYzine HCL  25 mg Oral Q6H PRN Max 4/day Alexander Ledesma, DELANO      LORazepam  1 mg Intramuscular Q6H PRN Max 3/day Alexander Ledesma, DELANO      LORazepam  0.5 mg Oral Q6H PRN Max 4/day Alexander  "Javier-Artm, CRNP      LORazepam  1 mg Oral Q6H PRN Max 3/day Alexander Javier-Anocaterina, CRNP      metoprolol tartrate  25 mg Oral Q12H [facility] Kelsie Navarrete PA-C      OLANZapine  15 mg Oral HS Sameer East MD      OLANZapine  5 mg Intramuscular Q3H PRN Max 3/day Alexander Javier-Anom, CRNP      OLANZapine  2.5 mg Oral Q4H PRN Max 6/day Alexander Javier-Anom, CRNP      OLANZapine  5 mg Oral Q4H PRN Max 3/day Alexander Javier-Anom, CRNP      OLANZapine  5 mg Oral Q3H PRN Max 3/day Alexander Javier-Anom, CRNP      pantoprazole  40 mg Oral Early Morning Alexander Ledesma, CROMAR      polyethylene glycol  17 g Oral Daily PRN Kelsie Navarrete PA-C      zolpidem  10 mg Oral HS PRN Sameer East MD         Risks / Benefits of Treatment:    Risks, benefits, and possible side effects of medications explained to patient and patient verbalizes understanding and agreement for treatment.    Subjective:    Behavior over the last 24 hours: slowly improving.     Per staff, patient had interrupted sleep last night.  He received Ambien PRN at 0030 and then Gabapentin 300mg PRN for neuropathy at 0244 which was effective.     Solo was seen in his room today for psychiatric follow up.  He appeared brighter today and reports that his mood is \"pretty good\".  He does reports some interrupted sleep last night but feels he received enough sleep and feels rested this morning.  He denies suicidal or homicidal ideation intent or plan.  He denies AH/VH today and did not appear to be internally preoccupied during the interview.  He denies side effects form medication and none observed on exam.      Sleep: normal  Appetite: normal  Medication side effects: No   ROS: no complaints    Mental Status Evaluation:    Appearance:  casually dressed, adequate grooming   Behavior:  pleasant, cooperative   Speech:  normal rate and volume   Mood:  euthymic   Affect:  constricted   Thought Process:  linear   Associations: intact associations   Thought Content:  no overt " delusions   Perceptual Disturbances: denies auditory or visual hallucinations when asked, does not appear responding to internal stimuli   Risk Potential: Suicidal ideation - None at present  Homicidal ideation - None  Potential for aggression - No   Sensorium:  oriented to person, place, and time/date   Memory:  recent and remote memory grossly intact   Consciousness:  alert and awake   Attention/Concentration: attention span and concentration are age appropriate   Insight:  limited   Judgment: limited   Gait/Station: in bed   Motor Activity: no abnormal movements     Vital signs in last 24 hours:    Temp:  [97.5 °F (36.4 °C)-98.1 °F (36.7 °C)] 97.8 °F (36.6 °C)  HR:  [] 65  BP: (106-129)/(73-95) 106/73  Resp:  [18] 18  SpO2:  [96 %-97 %] 96 %  O2 Device: None (Room air)         Laboratory results: I have personally reviewed all pertinent laboratory/tests results    Results from the past 24 hours:   Recent Results (from the past 24 hours)   Fingerstick Glucose (POCT)    Collection Time: 12/07/24  4:42 PM   Result Value Ref Range    POC Glucose 140 65 - 140 mg/dl   Fingerstick Glucose (POCT)    Collection Time: 12/08/24  7:27 AM   Result Value Ref Range    POC Glucose 135 65 - 140 mg/dl     Most Recent Labs:   Lab Results   Component Value Date    WBC 9.23 12/02/2024    RBC 5.25 12/02/2024    HGB 15.7 12/02/2024    HCT 45.8 12/02/2024     12/02/2024    RDW 12.3 12/02/2024    NEUTROABS 5.74 12/02/2024    SODIUM 138 12/02/2024    K 3.8 12/02/2024     12/02/2024    CO2 26 12/02/2024    BUN 17 12/02/2024    CREATININE 1.24 12/02/2024    GLUC 152 (H) 12/02/2024    CALCIUM 9.2 12/02/2024    AST 22 11/17/2024    ALT 20 11/17/2024    ALKPHOS 110 (H) 11/17/2024    TP 7.5 11/17/2024    ALB 4.8 11/17/2024    TBILI 0.95 11/17/2024    CHOLESTEROL 124 12/04/2024    HDL 36 (L) 12/04/2024    TRIG 169 (H) 12/04/2024    LDLCALC 54 12/04/2024    NONHDLC 88 12/04/2024    LITHIUM <0.10 (L) 04/13/2024    AMMONIA 15  11/12/2020    [id number] 1.312 12/02/2024    HGBA1C 6.7 (H) 12/04/2024     12/04/2024       Suicide/Homicide Risk Assessment:    Risk of Harm to Self:   Nursing Suicide Risk Assessment Last 24 hours: C-SSRS Risk (Since Last Contact)  Calculated C-SSRS Risk Score (Since Last Contact): No Risk Indicated  Current Specific Risk Factors include: mental illness diagnosis  Protective Factors: no current suicidal ideation, ability to communicate with staff on the unit, able to contract for safety on the unit, taking medications as ordered on the unit, improved psychotic symptoms, responds to redirection  Based on today's assessment, Solo presents the following risk of harm to self: low    Risk of Harm to Others:  Nursing Homicide Risk Assessment: Violence Risk to Others: Denies within past 6 months  Current Specific Risk Factors include: social difficulties  Protective Factors: no current homicidal ideation, psychotic symptoms are less prominent, compliant with medications on the unit as ordered, follows staff redirection  Based on today's assessment, Solo presents the following risk of harm to others: low    The following interventions are recommended: Behavioral Health checks for safety monitoring, continued hospitalization on locked unit    Progress Toward Goals: progressing, mood is stabilizing, less preoccupied    Counseling / Coordination of Care:    Total floor / unit time spent today 30 minutes. Greater than 50% of total time was spent with the patient and / or family counseling and / or coordination of care. A description of counseling / coordination of care:    Administrative Statements   I have spent a total time of 30 minutes in caring for this patient on the day of the visit/encounter including Diagnostic results, Documenting in the medical record, Reviewing / ordering tests, medicine, procedures  , Obtaining or reviewing history  , and Communicating with other healthcare professionals .    Tamika  DELANO Arora 12/08/24

## 2024-12-08 NOTE — PROGRESS NOTES
"Progress Note - Solo Mack 54 y.o. male MRN: [id number]    Unit/Bed#: -02 Encounter: [id number]        Subjective:   Patient seen and examined at bedside after reviewing the chart and discussing the case with the caring staff.      Patient examined at bedside.  Patient denies any acute symptoms.    Physical Exam   Vitals: Blood pressure 106/73, pulse 65, temperature 97.8 °F (36.6 °C), temperature source Temporal, resp. rate 18, height 5' 7\" (1.702 m), weight 96.8 kg (213 lb 6.4 oz), SpO2 96%.,Body mass index is 33.42 kg/m².  Constitutional: Patient in no acute distress.  HEENT: PERR, EOMI, MMM.  Cardiovascular: Normal rate and regular rhythm.    Pulmonary/Chest: Effort normal and breath sounds normal.   Abdomen: Soft, + BS, NT.    Assessment/Plan:  Solo Mack is a(n) 54 y.o. male with schizophrenia.     Cardiac with hx of HTN, HLD.  Continue home atorvastatin 10 mg daily.  Decr Lopressor from 50 mg to 25 mg q12h 12/6.  T2DM.  Hgb A1c 6.7% on 12/4/24.  D/c metformin due to pt refusing 12/5.  Accucheks twice daily.  Carb controlled diet.  GERD.  Patient is on Protonix 40 mg daily.  Chronic low back pain.  Continue gabapentin 400 mg TID.  CKD stage 3.  Stable.  Avoid nephrotoxins.   Vitamin D deficiency.  Patient started on vitamin D2 50,000 units weekly for 10 weeks followed by vitamin D3 1000 units daily.  Vitamin B12 deficiency.  Patient started on vitamin B12 1000 mcg daily.    The patient was discussed with Dr. Garza and he is in agreement with the above note.  "

## 2024-12-08 NOTE — PLAN OF CARE
Problem: Risk for Self Injury/Neglect  Goal: Refrain from harming self  Description: Interventions:  - Monitor patient closely, per order  - Develop a trusting relationship  - Supervise medication ingestion, monitor effects and side effects   Outcome: Progressing     Problem: Risk for Violence/Aggression Toward Others  Goal: Refrain from harming others  Outcome: Progressing     Problem: Alteration in Orientation  Goal: Interact with staff daily  Description: Interventions:  - Assess and re-assess patient's level of orientation  - Engage patient in 1 on 1 interactions, daily, for a minimum of 15 minutes   - Establish rapport/trust with patient   Outcome: Progressing

## 2024-12-08 NOTE — NURSING NOTE
Pt intermittently visible on unit and social with select peers. Pt is calm, pleasant, and cooperative. Pt endorses mild anxiety r/t business. Pt was observed talking to himself in his room. He denies depression, SI/Hi/AVH. Pt appetite is good. No acute behaviors noted. Continuous monitoring maintained.

## 2024-12-08 NOTE — NURSING NOTE
BLEPS Assessment Complete    B- Bowel sounds hypoactive. Last BM 12/8/24. Soft, nontender.    L- Lungs sounds clear b/l     E- no edema    P- pulses palpable     S- Intact

## 2024-12-08 NOTE — NURSING NOTE
"Pt intermittently visible on unit and social with select peers. Pt is calm, pleasant, and cooperative. Pt affect is appropriate. Pt denies depression, SI/HI/AVH. 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 Continuous monitoring maintained.   "

## 2024-12-08 NOTE — NURSING NOTE
Patient had some difficulty falling asleep and staying asleep. No signs or symptoms of distress. Q 15 minute safety checks maintained.

## 2024-12-09 LAB
GLUCOSE SERPL-MCNC: 156 MG/DL (ref 65–140)
GLUCOSE SERPL-MCNC: 176 MG/DL (ref 65–140)

## 2024-12-09 PROCEDURE — 82948 REAGENT STRIP/BLOOD GLUCOSE: CPT

## 2024-12-09 PROCEDURE — 99232 SBSQ HOSP IP/OBS MODERATE 35: CPT

## 2024-12-09 RX ORDER — OLANZAPINE 15 MG/1
15 TABLET ORAL
Status: DISCONTINUED | OUTPATIENT
Start: 2024-12-09 | End: 2024-12-14

## 2024-12-09 RX ADMIN — ZOLPIDEM TARTRATE 10 MG: 5 TABLET ORAL at 00:15

## 2024-12-09 RX ADMIN — OLANZAPINE 15 MG: 15 TABLET, FILM COATED ORAL at 21:09

## 2024-12-09 RX ADMIN — ZOLPIDEM TARTRATE 10 MG: 5 TABLET ORAL at 23:53

## 2024-12-09 RX ADMIN — ATORVASTATIN CALCIUM 10 MG: 10 TABLET, FILM COATED ORAL at 17:10

## 2024-12-09 RX ADMIN — GABAPENTIN 400 MG: 400 CAPSULE ORAL at 21:09

## 2024-12-09 RX ADMIN — CYANOCOBALAMIN TAB 500 MCG 1000 MCG: 500 TAB at 09:11

## 2024-12-09 RX ADMIN — CLONAZEPAM 1 MG: 1 TABLET ORAL at 17:11

## 2024-12-09 RX ADMIN — CLONAZEPAM 1 MG: 1 TABLET ORAL at 09:11

## 2024-12-09 RX ADMIN — GABAPENTIN 400 MG: 400 CAPSULE ORAL at 17:10

## 2024-12-09 RX ADMIN — METOPROLOL TARTRATE 25 MG: 25 TABLET, FILM COATED ORAL at 09:11

## 2024-12-09 RX ADMIN — METOPROLOL TARTRATE 25 MG: 25 TABLET, FILM COATED ORAL at 21:09

## 2024-12-09 RX ADMIN — GABAPENTIN 400 MG: 400 CAPSULE ORAL at 09:11

## 2024-12-09 NOTE — PROGRESS NOTES
"Progress Note - Solo Mack 54 y.o. male MRN: [id number]    Unit/Bed#: -02 Encounter: [id number]        Subjective:   Patient seen and examined at bedside after reviewing the chart and discussing the case with the caring staff.      Patient examined at bedside.  Patient denies any acute symptoms.    Physical Exam   Vitals: Blood pressure 101/72, pulse 58, temperature 97.8 °F (36.6 °C), temperature source Temporal, resp. rate 18, height 5' 7\" (1.702 m), weight 96.8 kg (213 lb 6.4 oz), SpO2 97%.,Body mass index is 33.42 kg/m².  Constitutional: Patient in no acute distress.  HEENT: PERR, EOMI, MMM.  Cardiovascular: Normal rate and regular rhythm.    Pulmonary/Chest: Effort normal and breath sounds normal.   Abdomen: Soft, + BS, NT.    Assessment/Plan:  Solo Mack is a(n) 54 y.o. male with schizophrenia.     Cardiac with hx of HTN, HLD.  Continue home atorvastatin 10 mg daily.  Decr Lopressor from 50 mg to 25 mg q12h 12/6.  T2DM.  Hgb A1c 6.7% on 12/4/24.  D/c metformin due to pt refusing 12/5.  Accucheks twice daily.  Carb controlled diet.  GERD.  Patient is on Protonix 40 mg daily.  Chronic low back pain.  Continue gabapentin 400 mg TID.  CKD stage 3.  Stable.  Avoid nephrotoxins.   Vitamin D deficiency.  Patient started on vitamin D2 50,000 units weekly for 10 weeks followed by vitamin D3 1000 units daily.  Vitamin B12 deficiency.  Patient started on vitamin B12 1000 mcg daily.  "

## 2024-12-09 NOTE — PROGRESS NOTES
"Progress Note - Behavioral Health   Name: Solo Mack 54 y.o. male I MRN: [id number]  Unit/Bed#: [facility] 200-02 I Date of Admission: 12/3/2024   Date of Service: 12/9/2024 I Hospital Day: 6    Assessment & Plan  Anxiety  Klonopin 1mg BID  Gabapentin 400mg TID  Bipolar disorder, current episode mixed, moderate (HCC)  Zyprexa 15mg PO at HS  Chronic pain disorder    Essential hypertension    GERD (gastroesophageal reflux disease)    H/O prolonged Q-T interval on ECG  QTc 429 on 12/2/2024    Progress Toward Goals:   All current active medications have been reviewed  Encourage group therapy, milieu therapy and occupational therapy  Behavioral Health checks for safety monitoring  Continue treatment with group therapy, milieu therapy and occupational therapy  Requires continued inpatient treatment due to chronic illness and high risk of decompensation if discharged before long term stability is achieved    Recommended Treatment: Continue with group therapy, milieu therapy and occupational therapy.    Risks, benefits and possible side effects of Medications:   Risks, benefits, and possible side effects of medications explained to patient and patient verbalizes understanding.      History of Present Illness   Behavior over the last 24 hours:  unchanged  Sleep: normal  Appetite: normal  Medication side effects: No  ROS: no complaints and all other systems are negative    Subjective:Archie was seen today for psychiatric follow-up. On assessment patient is withdrawn to his room laying tin bed.Patient observed talking to himself, when patient was asked who he was talking to patient stated \"he prays out loud\". He appears religiously preoccupied, his hygiene is marginal. He denied any SI/HI/AVH although he appears internally preoccupied.    Objective   Mental Status Evaluation:  Appearance:  age appropriate   Behavior:  Calm, cooperative   Speech:  normal pitch and normal volume   Mood:  euthymic   Affect:  constricted "   Thought Process:  goal directed   Associations: concrete associations   Thought Content:  No overt delusions   Perceptual Disturbances: Denied AVH, although he appears internally preoccupied   Risk Potential: Suicidal Ideations none at present  Homicidal Ideations none at present  Potential for Aggression No   Sensorium:  person, place, and time/date   Memory:  recent and remote memory grossly intact   Consciousness:  alert and awake    Attention: attention span and concentration were age appropriate   Insight:  limited   Judgment: limited   Gait/Station: In bed   Motor Activity: no abnormal movements     Medications: all current active meds have been reviewed.      Lab Results: I have reviewed the following results:  Most Recent Labs:   Lab Results   Component Value Date    WBC 9.23 12/02/2024    RBC 5.25 12/02/2024    HGB 15.7 12/02/2024    HCT 45.8 12/02/2024     12/02/2024    RDW 12.3 12/02/2024    NEUTROABS 5.74 12/02/2024    SODIUM 138 12/02/2024    K 3.8 12/02/2024     12/02/2024    CO2 26 12/02/2024    BUN 17 12/02/2024    CREATININE 1.24 12/02/2024    GLUC 152 (H) 12/02/2024    GLUF 155 (H) 10/23/2024    CALCIUM 9.2 12/02/2024    AST 22 11/17/2024    ALT 20 11/17/2024    ALKPHOS 110 (H) 11/17/2024    TP 7.5 11/17/2024    ALB 4.8 11/17/2024    TBILI 0.95 11/17/2024    CHOLESTEROL 124 12/04/2024    HDL 36 (L) 12/04/2024    TRIG 169 (H) 12/04/2024    LDLCALC 54 12/04/2024    NONHDLC 88 12/04/2024    LITHIUM <0.10 (L) 04/13/2024    AMMONIA 15 11/12/2020    [id number] 1.312 12/02/2024    RPR Non-Reactive 11/06/2020    HGBA1C 6.7 (H) 12/04/2024     12/04/2024

## 2024-12-09 NOTE — PLAN OF CARE
Problem: Alteration in Thoughts and Perception  Goal: Attend and participate in unit activities, including therapeutic, recreational, and educational groups  Description: Interventions:  -Encourage Visitation and family involvement in care  Outcome: Not Progressing     Problem: Ineffective Coping  Goal: Participates in unit activities  Description: Interventions:  - Provide therapeutic environment   - Provide required programming   - Redirect inappropriate behaviors   Outcome: Not Progressing     Problem: Risk for Self Injury/Neglect  Goal: Attend and participate in unit activities, including therapeutic, recreational, and educational groups  Description: Interventions:  - Provide therapeutic and educational activities daily, encourage attendance and participation, and document same in the medical record  - Obtain collateral information, encourage visitation and family involvement in care   Outcome: Not Progressing     Problem: Risk for Violence/Aggression Toward Others  Goal: Attend and participate in unit activities, including therapeutic, recreational, and educational groups  Description: Interventions:  - Provide therapeutic and educational activities daily, encourage attendance and participation, and document same in the medical record   Outcome: Not Progressing     Problem: Alteration in Orientation  Goal: Attend and participate in unit activities, including therapeutic, recreational, and educational groups  Description: Interventions:  - Provide therapeutic and educational activities daily, encourage attendance and participation, and document same in the medical record   - Provide appropriate opportunities for reminiscence   - Provide a consistent daily routine   - Encourage family contact/visitation   Outcome: Not Progressing

## 2024-12-09 NOTE — NURSING NOTE
Patient appears to have slept thru the night after initially having trouble falling asleep and needing PRN ambien, No acute behaviors observed or concerns voiced. Will CTM. Continuous patient safety in progress.

## 2024-12-09 NOTE — PROGRESS NOTES
12/09/24    Team Meeting   Meeting Type Daily Rounds   Team Members Present   Team Members Present Physician;Occupational Therapist;Nurse;   Physician Team Member Pb WICK   Nursing Team Member Socrates VELAZQUEZ   Social Work Team Member Venkatesh NAYLOR   Patient/Family Present   Patient Present No   Patient's Family Present No   201, med compliant, slept, denies all, internally/religiously pre occupied, d/c Thrus/Fri, explore php

## 2024-12-10 LAB
GLUCOSE SERPL-MCNC: 152 MG/DL (ref 65–140)
GLUCOSE SERPL-MCNC: 198 MG/DL (ref 65–140)

## 2024-12-10 PROCEDURE — 99232 SBSQ HOSP IP/OBS MODERATE 35: CPT

## 2024-12-10 PROCEDURE — 82948 REAGENT STRIP/BLOOD GLUCOSE: CPT

## 2024-12-10 RX ADMIN — METOPROLOL TARTRATE 25 MG: 25 TABLET, FILM COATED ORAL at 21:13

## 2024-12-10 RX ADMIN — GABAPENTIN 300 MG: 300 CAPSULE ORAL at 04:11

## 2024-12-10 RX ADMIN — GABAPENTIN 400 MG: 400 CAPSULE ORAL at 17:04

## 2024-12-10 RX ADMIN — GABAPENTIN 400 MG: 400 CAPSULE ORAL at 21:13

## 2024-12-10 RX ADMIN — GABAPENTIN 400 MG: 400 CAPSULE ORAL at 08:30

## 2024-12-10 RX ADMIN — ATORVASTATIN CALCIUM 10 MG: 10 TABLET, FILM COATED ORAL at 17:04

## 2024-12-10 RX ADMIN — CYANOCOBALAMIN TAB 500 MCG 1000 MCG: 500 TAB at 08:30

## 2024-12-10 RX ADMIN — ZOLPIDEM TARTRATE 10 MG: 5 TABLET ORAL at 22:44

## 2024-12-10 RX ADMIN — CLONAZEPAM 1 MG: 1 TABLET ORAL at 08:30

## 2024-12-10 RX ADMIN — PANTOPRAZOLE SODIUM 40 MG: 40 TABLET, DELAYED RELEASE ORAL at 05:20

## 2024-12-10 RX ADMIN — CLONAZEPAM 1 MG: 1 TABLET ORAL at 17:04

## 2024-12-10 RX ADMIN — OLANZAPINE 15 MG: 15 TABLET, FILM COATED ORAL at 21:13

## 2024-12-10 NOTE — PROGRESS NOTES
12/10/24   Team Meeting   Meeting Type Daily Rounds   Team Members Present   Team Members Present Physician;Nurse;   Physician Team Member Pb WICK   Nursing Team Member Socrates RN   Social Work Team Member Venkatesh NAYLOR   Patient/Family Present   Patient Present No   Patient's Family Present No   201, broken sleep, PRN, visible in milieu, internally pre occupied, d/c Fri, monitor meds

## 2024-12-10 NOTE — NURSING NOTE
Patient had broken sleep through the overnight hours. He reports the ambien was mildly effective and the gabapentin was effective. Continuous 15 min safety checks in progress.

## 2024-12-10 NOTE — NURSING NOTE
Patient was in his room during morning medication administration. Patient is pleasant and cooperative. Medication compliant. Denied anxiety,depression,SI,HI, or hallucinations. Safety precautions maintained.

## 2024-12-10 NOTE — PROGRESS NOTES
"Progress Note - Solo Mack 54 y.o. male MRN: [id number]    Unit/Bed#: -02 Encounter: [id number]        Subjective:   Patient seen and examined at bedside after reviewing the chart and discussing the case with the caring staff.      Patient examined at bedside.  Patient denies any acute symptoms.    Physical Exam   Vitals: Blood pressure 100/66, pulse 62, temperature 97.5 °F (36.4 °C), temperature source Temporal, resp. rate 16, height 5' 7\" (1.702 m), weight 96.8 kg (213 lb 6.4 oz), SpO2 99%.,Body mass index is 33.42 kg/m².  Constitutional: Patient in no acute distress.  HEENT: PERR, EOMI, MMM.  Cardiovascular: Normal rate and regular rhythm.    Pulmonary/Chest: Effort normal and breath sounds normal.   Abdomen: Soft, + BS, NT.    Assessment/Plan:  Solo Mack is a(n) 54 y.o. male with schizophrenia.     Cardiac with hx of HTN, HLD.  Continue home atorvastatin 10 mg daily.  Decr Lopressor from 50 mg to 25 mg q12h 12/6.  T2DM.  Hgb A1c 6.7% on 12/4/24.  D/c metformin due to pt refusing 12/5.  Accucheks twice daily.  Carb controlled diet.  GERD.  Patient is on Protonix 40 mg daily.  Chronic low back pain.  Continue gabapentin 400 mg TID.  CKD stage 3.  Stable.  Avoid nephrotoxins.   Vitamin D deficiency.  Patient started on vitamin D2 50,000 units weekly for 10 weeks followed by vitamin D3 1000 units daily.  Vitamin B12 deficiency.  Patient started on vitamin B12 1000 mcg daily.    The patient was discussed with Dr. Garza and he is in agreement with the above note.  "

## 2024-12-10 NOTE — PLAN OF CARE
Problem: PAIN - ADULT  Goal: Verbalizes/displays adequate comfort level or baseline comfort level  Description: Interventions:  - Encourage patient to monitor pain and request assistance  - Assess pain using appropriate pain scale  - Administer analgesics based on type and severity of pain and evaluate response  - Implement non-pharmacological measures as appropriate and evaluate response  - Consider cultural and social influences on pain and pain management  - Notify physician/advanced practitioner if interventions unsuccessful or patient reports new pain  Outcome: Progressing     Problem: Alteration in Thoughts and Perception  Goal: Refrain from acting on delusional thinking/internal stimuli  Description: Interventions:  - Monitor patient closely, per order   - Utilize least restrictive measures   - Set reasonable limits, give positive feedback for acceptable   - Administer medications as ordered and monitor of potential side effects  Outcome: Not Progressing

## 2024-12-10 NOTE — NURSING NOTE
"Patient pleasant and visible in our milieu. He participated in a snack this evening and was med complaint. He denies all psychiatric symptoms. Continuous 15 \" safety checks in progress.    "

## 2024-12-10 NOTE — PROGRESS NOTES
"Progress Note - Behavioral Health   Name: Solo Mack 54 y.o. male I MRN: [id number]  Unit/Bed#: [facility] 200-02 I Date of Admission: 12/3/2024   Date of Service: 12/10/2024 I Hospital Day: 7    Assessment & Plan  Anxiety  Klonopin 1mg BID  Gabapentin 400mg TID  Bipolar disorder, current episode mixed, moderate (HCC)  Zyprexa 15mg PO at HS  Chronic pain disorder    Essential hypertension    GERD (gastroesophageal reflux disease)    H/O prolonged Q-T interval on ECG  QTc 429 on 12/2/2024    Progress Toward Goals:   All current active medications have been reviewed  Encourage group therapy, milieu therapy and occupational therapy  Behavioral Health checks for safety monitoring  Continue treatment with group therapy, milieu therapy and occupational therapy  Requires continued inpatient treatment due to chronic illness and high risk of decompensation if discharged before long term stability is achieved    Recommended Treatment: Continue with group therapy, milieu therapy and occupational therapy.    Risks, benefits and possible side effects of Medications:   Risks, benefits, and possible side effects of medications explained to patient and patient verbalizes understanding.      History of Present Illness   Behavior over the last 24 hours:  unchanged  Sleep: normal  Appetite: normal  Medication side effects: No  ROS: no complaints and all other systems are negative    Subjective: Solo was seen today for psychiatric follow-up. Patient is intermittently visible on the unit. He is mostly withdrawn to her room, isolative to self. Patient observed talking to himself alone in his room. He mood is controlled on the unit, and he is medication compliant. He denied any SI/HI/AVH. Although he appears religiously and internally preoccupied.    Objective   Mental Status Evaluation:  Appearance:  age appropriate and casually dressed   Behavior:  Calm, cooperative   Speech:  normal pitch and normal volume   Mood:  \"ok\"   Affect:  " Less constricted   Thought Process:  concrete   Associations: concrete associations   Thought Content:  Religiously preoccupied   Perceptual Disturbances: Appears internally preoccupied   Risk Potential: Suicidal Ideations none at present  Homicidal Ideations none at present  Potential for Aggression No   Sensorium:  person, place, and time/date   Memory:  recent and remote memory grossly intact   Consciousness:  alert and awake    Attention: attention span and concentration were age appropriate   Insight:  limited   Judgment: limited   Gait/Station: In bed   Motor Activity: no abnormal movements     Medications: all current active meds have been reviewed.      Lab Results: I have reviewed the following results:  Most Recent Labs:   Lab Results   Component Value Date    WBC 9.23 12/02/2024    RBC 5.25 12/02/2024    HGB 15.7 12/02/2024    HCT 45.8 12/02/2024     12/02/2024    RDW 12.3 12/02/2024    NEUTROABS 5.74 12/02/2024    SODIUM 138 12/02/2024    K 3.8 12/02/2024     12/02/2024    CO2 26 12/02/2024    BUN 17 12/02/2024    CREATININE 1.24 12/02/2024    GLUC 152 (H) 12/02/2024    GLUF 155 (H) 10/23/2024    CALCIUM 9.2 12/02/2024    AST 22 11/17/2024    ALT 20 11/17/2024    ALKPHOS 110 (H) 11/17/2024    TP 7.5 11/17/2024    ALB 4.8 11/17/2024    TBILI 0.95 11/17/2024    CHOLESTEROL 124 12/04/2024    HDL 36 (L) 12/04/2024    TRIG 169 (H) 12/04/2024    LDLCALC 54 12/04/2024    NONHDLC 88 12/04/2024    LITHIUM <0.10 (L) 04/13/2024    AMMONIA 15 11/12/2020    [id number] 1.312 12/02/2024    RPR Non-Reactive 11/06/2020    HGBA1C 6.7 (H) 12/04/2024     12/04/2024

## 2024-12-11 LAB
GLUCOSE SERPL-MCNC: 195 MG/DL (ref 65–140)
GLUCOSE SERPL-MCNC: 199 MG/DL (ref 65–140)

## 2024-12-11 PROCEDURE — 99232 SBSQ HOSP IP/OBS MODERATE 35: CPT

## 2024-12-11 PROCEDURE — 93005 ELECTROCARDIOGRAM TRACING: CPT

## 2024-12-11 PROCEDURE — 82948 REAGENT STRIP/BLOOD GLUCOSE: CPT

## 2024-12-11 RX ORDER — CLONAZEPAM 0.5 MG/1
0.25 TABLET ORAL DAILY
Status: DISCONTINUED | OUTPATIENT
Start: 2024-12-12 | End: 2024-12-14

## 2024-12-11 RX ORDER — METOPROLOL TARTRATE 25 MG/1
25 TABLET, FILM COATED ORAL EVERY 12 HOURS SCHEDULED
Qty: 60 TABLET | Refills: 0 | Status: SHIPPED | OUTPATIENT
Start: 2024-12-11 | End: 2024-12-18

## 2024-12-11 RX ORDER — ERGOCALCIFEROL 1.25 MG/1
50000 CAPSULE, LIQUID FILLED ORAL WEEKLY
Qty: 9 CAPSULE | Refills: 0 | Status: SHIPPED | OUTPATIENT
Start: 2024-12-13 | End: 2025-02-08

## 2024-12-11 RX ORDER — CLONIDINE HYDROCHLORIDE 0.1 MG/1
0.1 TABLET ORAL 2 TIMES DAILY PRN
Status: DISCONTINUED | OUTPATIENT
Start: 2024-12-11 | End: 2024-12-18 | Stop reason: HOSPADM

## 2024-12-11 RX ORDER — GABAPENTIN 400 MG/1
400 CAPSULE ORAL 3 TIMES DAILY
Qty: 90 CAPSULE | Refills: 0 | Status: SHIPPED | OUTPATIENT
Start: 2024-12-11 | End: 2024-12-18

## 2024-12-11 RX ADMIN — OLANZAPINE 15 MG: 15 TABLET, FILM COATED ORAL at 21:20

## 2024-12-11 RX ADMIN — ATORVASTATIN CALCIUM 10 MG: 10 TABLET, FILM COATED ORAL at 17:11

## 2024-12-11 RX ADMIN — ZOLPIDEM TARTRATE 10 MG: 5 TABLET ORAL at 21:19

## 2024-12-11 RX ADMIN — GABAPENTIN 400 MG: 400 CAPSULE ORAL at 08:29

## 2024-12-11 RX ADMIN — CLONAZEPAM 1 MG: 1 TABLET ORAL at 08:29

## 2024-12-11 RX ADMIN — PANTOPRAZOLE SODIUM 40 MG: 40 TABLET, DELAYED RELEASE ORAL at 05:29

## 2024-12-11 RX ADMIN — LORAZEPAM 1 MG: 1 TABLET ORAL at 18:44

## 2024-12-11 RX ADMIN — HYDROXYZINE HYDROCHLORIDE 25 MG: 25 TABLET ORAL at 15:11

## 2024-12-11 RX ADMIN — GABAPENTIN 400 MG: 400 CAPSULE ORAL at 15:10

## 2024-12-11 RX ADMIN — CYANOCOBALAMIN TAB 500 MCG 1000 MCG: 500 TAB at 08:29

## 2024-12-11 RX ADMIN — METOPROLOL TARTRATE 25 MG: 25 TABLET, FILM COATED ORAL at 21:19

## 2024-12-11 RX ADMIN — GABAPENTIN 400 MG: 400 CAPSULE ORAL at 21:19

## 2024-12-11 NOTE — NURSING NOTE
At 1511 administered 25 mg atarax for increased anxiety. Meehan score 16. Patient upset over decrease in klonopin. Will monitor for effectiveness.

## 2024-12-11 NOTE — PROGRESS NOTES
"Progress Note - Solo Mack 54 y.o. male MRN: [id number]    Unit/Bed#: [facility] 208-02 Encounter: [id number]        Subjective:   Patient seen and examined at bedside after reviewing the chart and discussing the case with the caring staff.      Patient examined at bedside.  Patient denies any acute symptoms.    Physical Exam   Vitals: Blood pressure 98/61, pulse 71, temperature 97.7 °F (36.5 °C), temperature source Temporal, resp. rate 16, height 5' 7\" (1.702 m), weight 96.8 kg (213 lb 6.4 oz), SpO2 96%.,Body mass index is 33.42 kg/m².  Constitutional: Patient in no acute distress.  HEENT: PERR, EOMI, MMM.  Cardiovascular: Normal rate and regular rhythm.    Pulmonary/Chest: Effort normal and breath sounds normal.   Abdomen: Soft, + BS, NT.    Assessment/Plan:  Solo Mack is a(n) 54 y.o. male with schizophrenia.     Cardiac with hx of HTN, HLD.  Continue home atorvastatin 10 mg daily.  Decr Lopressor from 50 mg to 25 mg q12h 12/6.  T2DM.  Hgb A1c 6.7% on 12/4/24.  D/c metformin due to pt refusing 12/5.  Accucheks twice daily.  Carb controlled diet.  GERD.  Patient is on Protonix 40 mg daily.  Chronic low back pain.  Continue gabapentin 400 mg TID.  CKD stage 3.  Stable.  Avoid nephrotoxins.   Vitamin D deficiency.  Patient started on vitamin D2 50,000 units weekly for 10 weeks followed by vitamin D3 1000 units daily.  Vitamin B12 deficiency.  Patient started on vitamin B12 1000 mcg daily.    The patient was discussed with Dr. Garza and he is in agreement with the above note.  "

## 2024-12-11 NOTE — NURSING NOTE
Patient has been visible for breakfast and then returned to his room. He does not attend groups. He is pleasant and cooperative. Medication compliant. He slept well. He endorsed a lot of anxiety. Denied depression. Denied SI,HI, or hallucinations. He is heard talking to himself in his room at times. Safety precautions maintained.

## 2024-12-11 NOTE — NURSING NOTE
Patient complaining of heart racing. He stated he is having a heart attack. No chest pain. B/p 165/102-pulse 110. Oxygen saturation 95%. He is currently sitting in a chair and appears calm. Nurse notified [facility] medical provider.Ativan 1mg administered for Meehan score 27. Staff performing EKG as requested by medical provider.

## 2024-12-11 NOTE — NURSING NOTE
Patient was mostly withdrawn to room this evening. Attended PM snack. Pleasant and cooperative. Medication compliant. C/O mattress being uncomfortable causing lower back pain. Patients mattress was switched out with an unoccupied bed. No complaints since about the mattress. Denies any SI/HI,AV/H, anxiety or depression. Q 15 minute monitoring maintained.

## 2024-12-11 NOTE — PLAN OF CARE
Problem: PAIN - ADULT  Goal: Verbalizes/displays adequate comfort level or baseline comfort level  Description: Interventions:  - Encourage patient to monitor pain and request assistance  - Assess pain using appropriate pain scale  - Administer analgesics based on type and severity of pain and evaluate response  - Implement non-pharmacological measures as appropriate and evaluate response  - Consider cultural and social influences on pain and pain management  - Notify physician/advanced practitioner if interventions unsuccessful or patient reports new pain  Outcome: Progressing     Problem: SAFETY ADULT  Goal: Patient will remain free of falls  Description: INTERVENTIONS:  - Educate patient/family on patient safety including physical limitations  - Instruct patient to call for assistance with activity   - Consult OT/PT to assist with strengthening/mobility   - Keep Call bell within reach  - Keep bed low and locked with side rails adjusted as appropriate  - Keep care items and personal belongings within reach  - Initiate and maintain comfort rounds  - Make Fall Risk Sign visible to staff  - Offer Toileting every 2 Hours, in advance of need  - Initiate/Maintain bed alarm  - Apply yellow socks and bracelet for high fall risk patients  - Consider moving patient to room near nurses station  Outcome: Progressing     Problem: Alteration in Thoughts and Perception  Goal: Verbalize thoughts and feelings  Description: Interventions:  - Promote a nonjudgmental and trusting relationship with the patient through active listening and therapeutic communication  - Assess patient's level of functioning, behavior and potential for risk  - Engage patient in 1 on 1 interactions  - Encourage patient to express fears, feelings, frustrations, and discuss symptoms    - [place] patient to reality, help patient recognize reality-based thinking   - Administer medications as ordered and assess for potential side effects  - Provide the patient  education related to the signs and symptoms of the illness and desired effects of prescribed medications  Outcome: Progressing  Goal: Agree to be compliant with medication regime, as prescribed and report medication side effects  Description: Interventions:  - Offer appropriate PRN medication and supervise ingestion; conduct AIMS, as needed   Outcome: Progressing     Problem: Ineffective Coping  Goal: Demonstrates healthy coping skills  Outcome: Progressing  Goal: Participates in unit activities  Description: Interventions:  - Provide therapeutic environment   - Provide required programming   - Redirect inappropriate behaviors   Outcome: Progressing  Goal: Understands least restrictive measures  Description: Interventions:  - Utilize least restrictive behavior  Outcome: Progressing  Goal: Free from restraint events  Description: - Utilize least restrictive measures   - Provide behavioral interventions   - Redirect inappropriate behaviors   Outcome: Progressing     Problem: Risk for Self Injury/Neglect  Goal: Refrain from harming self  Description: Interventions:  - Monitor patient closely, per order  - Develop a trusting relationship  - Supervise medication ingestion, monitor effects and side effects   Outcome: Progressing     Problem: Anxiety  Goal: Anxiety is at manageable level  Description: Interventions:  - Assess and monitor patient's anxiety level.   - Monitor for signs and symptoms (heart palpitations, chest pain, shortness of breath, headaches, nausea, feeling jumpy, restlessness, irritable, apprehensive).   - Collaborate with interdisciplinary team and initiate plan and interventions as ordered.  - [place] patient to unit/surroundings  - Explain treatment plan  - Encourage participation in care  - Encourage verbalization of concerns/fears  - Identify coping mechanisms  - Assist in developing anxiety-reducing skills  - Administer/offer alternative therapies  - Limit or eliminate stimulants  Outcome:  Progressing     Problem: Risk for Violence/Aggression Toward Others  Goal: Refrain from harming others  Outcome: Progressing  Goal: Control angry outbursts  Description: Interventions:  - Monitor patient closely, per order  - Ensure early verbal de-escalation  - Monitor prn medication needs  - Set reasonable/therapeutic limits, outline behavioral expectations, and consequences   - Provide a non-threatening milieu, utilizing the least restrictive interventions   Outcome: Progressing

## 2024-12-11 NOTE — PROGRESS NOTES
Progress Note - Behavioral Health   Name: Solo Mack 54 y.o. male I MRN: [id number]  Unit/Bed#: [facility] 208-02 I Date of Admission: 12/3/2024   Date of Service: 12/11/2024 I Hospital Day: 8    Assessment & Plan  Anxiety  Klonopin 1mg BID  Gabapentin 400mg TID  Bipolar disorder, current episode mixed, moderate (HCC)  Zyprexa 15mg PO at HS  Chronic pain disorder    Essential hypertension    GERD (gastroesophageal reflux disease)    H/O prolonged Q-T interval on ECG  QTc 429 on 12/2/2024    Progress Toward Goals:   All current active medications have been reviewed  Encourage group therapy, milieu therapy and occupational therapy  Behavioral Health checks for safety monitoring  Continue treatment with group therapy, milieu therapy and occupational therapy  Requires continued inpatient treatment due to chronic illness and high risk of decompensation if discharged before long term stability is achieved    Recommended Treatment: Continue with group therapy, milieu therapy and occupational therapy.    Risks, benefits and possible side effects of Medications:   Risks, benefits, and possible side effects of medications explained to patient and patient verbalizes understanding.      History of Present Illness   Behavior over the last 24 hours:  improved  Sleep: normal  Appetite: normal  Medication side effects: No  ROS: no complaints and all other systems are negative    Subjective:Solo was seen today for psychiatric follow-up. Patient is calm, cooperative. He is withdrawn and isolative to self. His mood is controlled on the unit, with no recent behavioral outbursts. He denied any depression. Anxiety, SI/HI/AVH, however patient appears internally preoccupied    Objective   Mental Status Evaluation:  Appearance:  age appropriate   Behavior:  cooperative   Speech:  normal pitch and normal volume   Mood:  improving   Affect:  mood-congruent   Thought Process:  concrete   Associations: concrete associations   Thought  Content:  Religiously preoccupied   Perceptual Disturbances: Denied AVH, appears internally preoccupied   Risk Potential: Suicidal Ideations none at present  Homicidal Ideations none at present  Potential for Aggression No   Sensorium:  person, place, and time/date   Memory:  recent and remote memory grossly intact   Consciousness:  alert and awake    Attention: attention span and concentration were age appropriate   Insight:  limited   Judgment: limited   Gait/Station: In bed   Motor Activity: no abnormal movements     Medications: all current active meds have been reviewed.      Lab Results: I have reviewed the following results:  Most Recent Labs:   Lab Results   Component Value Date    WBC 9.23 12/02/2024    RBC 5.25 12/02/2024    HGB 15.7 12/02/2024    HCT 45.8 12/02/2024     12/02/2024    RDW 12.3 12/02/2024    NEUTROABS 5.74 12/02/2024    SODIUM 138 12/02/2024    K 3.8 12/02/2024     12/02/2024    CO2 26 12/02/2024    BUN 17 12/02/2024    CREATININE 1.24 12/02/2024    GLUC 152 (H) 12/02/2024    GLUF 155 (H) 10/23/2024    CALCIUM 9.2 12/02/2024    AST 22 11/17/2024    ALT 20 11/17/2024    ALKPHOS 110 (H) 11/17/2024    TP 7.5 11/17/2024    ALB 4.8 11/17/2024    TBILI 0.95 11/17/2024    CHOLESTEROL 124 12/04/2024    HDL 36 (L) 12/04/2024    TRIG 169 (H) 12/04/2024    LDLCALC 54 12/04/2024    NONHDLC 88 12/04/2024    LITHIUM <0.10 (L) 04/13/2024    AMMONIA 15 11/12/2020    [id number] 1.312 12/02/2024    RPR Non-Reactive 11/06/2020    HGBA1C 6.7 (H) 12/04/2024     12/04/2024

## 2024-12-11 NOTE — PROGRESS NOTES
12/11/24   Team Meeting   Meeting Type Daily Rounds   Team Members Present   Team Members Present Physician;Nurse;Occupational Therapist;   Physician Team Member Pb WICK   Nursing Team Member Socrates RN   Social Work Team Member Venkatesh NAYLOR   OT Team Member Jesus CTRS   Patient/Family Present   Patient Present No   Patient's Family Present No   201, d/c home Fri with psych f/u, pleasant, compliant, denies all

## 2024-12-11 NOTE — PLAN OF CARE
Problem: Alteration in Thoughts and Perception  Goal: Attend and participate in unit activities, including therapeutic, recreational, and educational groups  Description: Interventions:  -Encourage Visitation and family involvement in care  Outcome: Not Progressing     Problem: Ineffective Coping  Goal: Participates in unit activities  Description: Interventions:  - Provide therapeutic environment   - Provide required programming   - Redirect inappropriate behaviors   Outcome: Not Progressing     Problem: Risk for Self Injury/Neglect  Goal: Attend and participate in unit activities, including therapeutic, recreational, and educational groups  Description: Interventions:  - Provide therapeutic and educational activities daily, encourage attendance and participation, and document same in the medical record  - Obtain collateral information, encourage visitation and family involvement in care   Outcome: Not Progressing     Problem: Risk for Violence/Aggression Toward Others  Goal: Attend and participate in unit activities, including therapeutic, recreational, and educational groups  Description: Interventions:  - Provide therapeutic and educational activities daily, encourage attendance and participation, and document same in the medical record   Outcome: Not Progressing     Problem: Alteration in Orientation  Goal: Attend and participate in unit activities, including therapeutic, recreational, and educational groups  Description: Interventions:  - Provide therapeutic and educational activities daily, encourage attendance and participation, and document same in the medical record   - Provide appropriate opportunities for reminiscence   - Provide a consistent daily routine   - Encourage family contact/visitation   Outcome: Not Progressing   Patient remains withdrawn to room responding to internal stimuli.

## 2024-12-12 LAB
ATRIAL RATE: 92 BPM
GLUCOSE SERPL-MCNC: 168 MG/DL (ref 65–140)
GLUCOSE SERPL-MCNC: 172 MG/DL (ref 65–140)
P AXIS: 69 DEGREES
PR INTERVAL: 152 MS
QRS AXIS: 52 DEGREES
QRSD INTERVAL: 90 MS
QT INTERVAL: 362 MS
QTC INTERVAL: 448 MS
T WAVE AXIS: -50 DEGREES
VENTRICULAR RATE: 92 BPM

## 2024-12-12 PROCEDURE — 82948 REAGENT STRIP/BLOOD GLUCOSE: CPT

## 2024-12-12 PROCEDURE — 99232 SBSQ HOSP IP/OBS MODERATE 35: CPT

## 2024-12-12 PROCEDURE — 93010 ELECTROCARDIOGRAM REPORT: CPT | Performed by: INTERNAL MEDICINE

## 2024-12-12 RX ORDER — HYDROXYZINE HYDROCHLORIDE 50 MG/1
50 TABLET, FILM COATED ORAL 3 TIMES DAILY
Status: DISCONTINUED | OUTPATIENT
Start: 2024-12-12 | End: 2024-12-18 | Stop reason: HOSPADM

## 2024-12-12 RX ADMIN — OLANZAPINE 15 MG: 15 TABLET, FILM COATED ORAL at 21:31

## 2024-12-12 RX ADMIN — ACETAMINOPHEN 975 MG: 325 TABLET ORAL at 22:10

## 2024-12-12 RX ADMIN — GABAPENTIN 400 MG: 400 CAPSULE ORAL at 08:07

## 2024-12-12 RX ADMIN — HYDROXYZINE HYDROCHLORIDE 50 MG: 50 TABLET, FILM COATED ORAL at 21:30

## 2024-12-12 RX ADMIN — LORAZEPAM 0.5 MG: 0.5 TABLET ORAL at 22:10

## 2024-12-12 RX ADMIN — CYANOCOBALAMIN TAB 500 MCG 1000 MCG: 500 TAB at 08:06

## 2024-12-12 RX ADMIN — ATORVASTATIN CALCIUM 10 MG: 10 TABLET, FILM COATED ORAL at 16:45

## 2024-12-12 RX ADMIN — ZOLPIDEM TARTRATE 10 MG: 5 TABLET ORAL at 21:31

## 2024-12-12 RX ADMIN — GABAPENTIN 400 MG: 400 CAPSULE ORAL at 16:45

## 2024-12-12 RX ADMIN — HYDROXYZINE HYDROCHLORIDE 25 MG: 25 TABLET ORAL at 23:42

## 2024-12-12 RX ADMIN — GABAPENTIN 400 MG: 400 CAPSULE ORAL at 21:31

## 2024-12-12 RX ADMIN — HYDROXYZINE HYDROCHLORIDE 50 MG: 50 TABLET, FILM COATED ORAL at 16:45

## 2024-12-12 RX ADMIN — CLONAZEPAM 0.25 MG: 0.5 TABLET ORAL at 08:06

## 2024-12-12 RX ADMIN — METOPROLOL TARTRATE 25 MG: 25 TABLET, FILM COATED ORAL at 21:31

## 2024-12-12 RX ADMIN — PANTOPRAZOLE SODIUM 40 MG: 40 TABLET, DELAYED RELEASE ORAL at 06:17

## 2024-12-12 RX ADMIN — METOPROLOL TARTRATE 25 MG: 25 TABLET, FILM COATED ORAL at 08:07

## 2024-12-12 RX ADMIN — HYDROXYZINE HYDROCHLORIDE 50 MG: 50 TABLET, FILM COATED ORAL at 12:32

## 2024-12-12 NOTE — PROGRESS NOTES
Progress Note - Behavioral Health   Name: Solo Mack 54 y.o. male I MRN: [id number]  Unit/Bed#: [facility]-02 I Date of Admission: 12/3/2024   Date of Service: 12/12/2024 I Hospital Day: 9    Assessment & Plan  Anxiety  Klonopin 0.25mg BID  Gabapentin 400mg TID  Atarax 50 mg TID  Bipolar disorder, current episode mixed, moderate (HCC)  Zyprexa 15mg PO at HS  Chronic pain disorder    Essential hypertension    GERD (gastroesophageal reflux disease)    H/O prolonged Q-T interval on ECG  QTc 429 on 12/2/2024    Progress Toward Goals:   All current active medications have been reviewed  Encourage group therapy, milieu therapy and occupational therapy  Behavioral Health checks for safety monitoring  Continue treatment with group therapy, milieu therapy and occupational therapy  Requires continued inpatient treatment due to chronic illness and high risk of decompensation if discharged before long term stability is achieved    Recommended Treatment: Continue with group therapy, milieu therapy and occupational therapy.    Risks, benefits and possible side effects of Medications:   Risks, benefits, and possible side effects of medications explained to patient and patient verbalizes understanding.      History of Present Illness   Behavior over the last 24 hours:  unchanged  Sleep: normal  Appetite: normal  Medication side effects: No  ROS: no complaints and all other systems are negative    Subjective:Solo was seen today for psychiatric follow-up. Patient is calm, intermittently visible on the unit for meals. He is mostly withdrawn to his room, isolative to self. He appears to have an irritable edge over Klonopin taper. He is compliant with his medication regimen. He denied any SI/HI/AVH, although patient appears internally preoccupied.    Objective   Mental Status Evaluation:  Appearance:  age appropriate   Behavior:  calm   Speech:  normal pitch and normal volume   Mood:  irritable   Affect:  mood-congruent    Thought Process:  goal directed   Associations: concrete associations   Thought Content:  Religiously preoccupied   Perceptual Disturbances: Denied AVH, appears internally preoccupied   Risk Potential: Suicidal Ideations none at present  Homicidal Ideations none at present  Potential for Aggression No   Sensorium:  person, place, and time/date   Memory:  recent and remote memory grossly intact   Consciousness:  alert and awake    Attention: attention span and concentration were age appropriate   Insight:  limited   Judgment: limited   Gait/Station: In bed   Motor Activity: no abnormal movements     Medications: all current active meds have been reviewed.      Lab Results: I have reviewed the following results:  Most Recent Labs:   Lab Results   Component Value Date    WBC 9.23 12/02/2024    RBC 5.25 12/02/2024    HGB 15.7 12/02/2024    HCT 45.8 12/02/2024     12/02/2024    RDW 12.3 12/02/2024    NEUTROABS 5.74 12/02/2024    SODIUM 138 12/02/2024    K 3.8 12/02/2024     12/02/2024    CO2 26 12/02/2024    BUN 17 12/02/2024    CREATININE 1.24 12/02/2024    GLUC 152 (H) 12/02/2024    GLUF 155 (H) 10/23/2024    CALCIUM 9.2 12/02/2024    AST 22 11/17/2024    ALT 20 11/17/2024    ALKPHOS 110 (H) 11/17/2024    TP 7.5 11/17/2024    ALB 4.8 11/17/2024    TBILI 0.95 11/17/2024    CHOLESTEROL 124 12/04/2024    HDL 36 (L) 12/04/2024    TRIG 169 (H) 12/04/2024    LDLCALC 54 12/04/2024    NONHDLC 88 12/04/2024    LITHIUM <0.10 (L) 04/13/2024    AMMONIA 15 11/12/2020    [id number] 1.312 12/02/2024    RPR Non-Reactive 11/06/2020    HGBA1C 6.7 (H) 12/04/2024     12/04/2024

## 2024-12-12 NOTE — SOCIAL WORK
Cm spoke to pt spouse Deja [phone].  D/c jimmy cancelled due to concerns with no electricity and concerns that pt is not at baseline. Pt is yelling and accusatory of staff and spouse, paranoid, and delusional in thinking. Continue med monitoring/adjustments.

## 2024-12-12 NOTE — PLAN OF CARE
Problem: PAIN - ADULT  Goal: Verbalizes/displays adequate comfort level or baseline comfort level  Description: Interventions:  - Encourage patient to monitor pain and request assistance  - Assess pain using appropriate pain scale  - Administer analgesics based on type and severity of pain and evaluate response  - Implement non-pharmacological measures as appropriate and evaluate response  - Consider cultural and social influences on pain and pain management  - Notify physician/advanced practitioner if interventions unsuccessful or patient reports new pain  Outcome: Progressing     Problem: DISCHARGE PLANNING  Goal: Discharge to home or other facility with appropriate resources  Description: INTERVENTIONS:  - Identify barriers to discharge w/patient and caregiver  - Arrange for needed discharge resources and transportation as appropriate  - Identify discharge learning needs (meds, wound care, etc.)  - Arrange for interpretive services to assist at discharge as needed  - Refer to Case Management Department for coordinating discharge planning if the patient needs post-hospital services based on physician/advanced practitioner order or complex needs related to functional status, cognitive ability, or social support system  Outcome: Progressing     Problem: Alteration in Thoughts and Perception  Goal: Treatment Goal: Gain control of psychotic behaviors/thinking, reduce/eliminate presenting symptoms and demonstrate improved reality functioning upon discharge  Outcome: Progressing  Goal: Verbalize thoughts and feelings  Description: Interventions:  - Promote a nonjudgmental and trusting relationship with the patient through active listening and therapeutic communication  - Assess patient's level of functioning, behavior and potential for risk  - Engage patient in 1 on 1 interactions  - Encourage patient to express fears, feelings, frustrations, and discuss symptoms    - [place] patient to reality, help patient recognize  reality-based thinking   - Administer medications as ordered and assess for potential side effects  - Provide the patient education related to the signs and symptoms of the illness and desired effects of prescribed medications  Outcome: Progressing  Goal: Refrain from acting on delusional thinking/internal stimuli  Description: Interventions:  - Monitor patient closely, per order   - Utilize least restrictive measures   - Set reasonable limits, give positive feedback for acceptable   - Administer medications as ordered and monitor of potential side effects  Outcome: Progressing  Goal: Agree to be compliant with medication regime, as prescribed and report medication side effects  Description: Interventions:  - Offer appropriate PRN medication and supervise ingestion; conduct AIMS, as needed   Outcome: Progressing  Goal: Attend and participate in unit activities, including therapeutic, recreational, and educational groups  Description: Interventions:  -Encourage Visitation and family involvement in care  Outcome: Progressing  Goal: Recognize dysfunctional thoughts, communicate reality-based thoughts at the time of discharge  Description: Interventions:  - Provide medication and psycho-education to assist patient in compliance and developing insight into his/her illness   Outcome: Progressing     Problem: Risk for Self Injury/Neglect  Goal: Treatment Goal: Remain safe during length of stay, learn and adopt new coping skills, and be free of self-injurious ideation, impulses and acts at the time of discharge  Outcome: Progressing  Goal: Verbalize thoughts and feelings  Description: Interventions:  - Assess and re-assess patient's lethality and potential for self-injury  - Engage patient in 1:1 interactions, daily, for a minimum of 15 minutes  - Encourage patient to express feelings, fears, frustrations, hopes  - Establish rapport/trust with patient   Outcome: Progressing  Goal: Refrain from harming self  Description:  Interventions:  - Monitor patient closely, per order  - Develop a trusting relationship  - Supervise medication ingestion, monitor effects and side effects   Outcome: Progressing  Goal: Attend and participate in unit activities, including therapeutic, recreational, and educational groups  Description: Interventions:  - Provide therapeutic and educational activities daily, encourage attendance and participation, and document same in the medical record  - Obtain collateral information, encourage visitation and family involvement in care   Outcome: Progressing  Goal: Recognize maladaptive responses and adopt new coping mechanisms  Outcome: Progressing  Goal: Complete daily ADLs, including personal hygiene independently, as able  Description: Interventions:  - Observe, teach, and assist patient with ADLS  - Monitor and promote a balance of rest/activity, with adequate nutrition and elimination  Outcome: Progressing     Problem: Depression  Goal: Treatment Goal: Demonstrate behavioral control of depressive symptoms, verbalize feelings of improved mood/affect, and adopt new coping skills prior to discharge  Outcome: Progressing  Goal: Refrain from isolation  Description: Interventions:  - Develop a trusting relationship   - Encourage socialization   Outcome: Progressing  Goal: Refrain from self-neglect  Outcome: Progressing     Problem: Anxiety  Goal: Anxiety is at manageable level  Description: Interventions:  - Assess and monitor patient's anxiety level.   - Monitor for signs and symptoms (heart palpitations, chest pain, shortness of breath, headaches, nausea, feeling jumpy, restlessness, irritable, apprehensive).   - Collaborate with interdisciplinary team and initiate plan and interventions as ordered.  - [place] patient to unit/surroundings  - Explain treatment plan  - Encourage participation in care  - Encourage verbalization of concerns/fears  - Identify coping mechanisms  - Assist in developing anxiety-reducing  skills  - Administer/offer alternative therapies  - Limit or eliminate stimulants  Outcome: Progressing     Problem: Risk for Violence/Aggression Toward Others  Goal: Treatment Goal: Refrain from acts of violence/aggression during length of stay, and demonstrate improved impulse control at the time of discharge  Outcome: Progressing  Goal: Refrain from harming others  Outcome: Progressing  Goal: Control angry outbursts  Description: Interventions:  - Monitor patient closely, per order  - Ensure early verbal de-escalation  - Monitor prn medication needs  - Set reasonable/therapeutic limits, outline behavioral expectations, and consequences   - Provide a non-threatening milieu, utilizing the least restrictive interventions   Outcome: Progressing  Goal: Attend and participate in unit activities, including therapeutic, recreational, and educational groups  Description: Interventions:  - Provide therapeutic and educational activities daily, encourage attendance and participation, and document same in the medical record   Outcome: Progressing  Goal: Identify appropriate positive anger management techniques  Description: Interventions:  - Offer anger management and coping skills groups   - Staff will provide positive feedback for appropriate anger control  Outcome: Progressing     Problem: Alteration in Orientation  Goal: Treatment Goal: Demonstrate a reduction of confusion and improved orientation to person, place, time and/or situation upon discharge, according to optimum baseline/ability  Outcome: Progressing  Goal: Interact with staff daily  Description: Interventions:  - Assess and re-assess patient's level of orientation  - Engage patient in 1 on 1 interactions, daily, for a minimum of 15 minutes   - Establish rapport/trust with patient   Outcome: Progressing  Goal: Express concerns related to confused thinking related to:  Description: Interventions:  - Encourage patient to express feelings, fears, frustrations,  hopes  - Assign consistent caregivers   - [place]/re-orient patient as needed  - Allow comfort items, as appropriate  - Provide visual cues, signs, etc.   Outcome: Progressing  Goal: Allow medical examinations, as recommended  Description: Interventions:  - Provide physical/neurological exams and/or referrals, per provider   Outcome: Progressing  Goal: Cooperate with recommended testing/procedures  Description: Interventions:  - Determine need for ancillary testing  - Observe for mental status changes  - Implement falls/precaution protocol   Outcome: Progressing  Goal: Attend and participate in unit activities, including therapeutic, recreational, and educational groups  Description: Interventions:  - Provide therapeutic and educational activities daily, encourage attendance and participation, and document same in the medical record   - Provide appropriate opportunities for reminiscence   - Provide a consistent daily routine   - Encourage family contact/visitation   Outcome: Progressing  Goal: Complete daily ADLs, including personal hygiene independently, as able  Description: Interventions:  - Observe, teach, and assist patient with ADLS  Outcome: Progressing     Problem: DISCHARGE PLANNING - CARE MANAGEMENT  Goal: Discharge to post-acute care or home with appropriate resources  Description: INTERVENTIONS:  - Conduct assessment to determine patient/family and health care team treatment goals, and need for post-acute services based on payer coverage, community resources, and patient preferences, and barriers to discharge  - Address psychosocial, clinical, and financial barriers to discharge as identified in assessment in conjunction with the patient/family and health care team  - Arrange appropriate level of post-acute services according to patient’s   needs and preference and payer coverage in collaboration with the physician and health care team  - Communicate with and update the patient/family, physician, and  health care team regarding progress on the discharge plan  - Arrange appropriate transportation to post-acute venues  Outcome: Progressing

## 2024-12-12 NOTE — NURSING NOTE
"Patient visible in milieu, mood irritable however cooperative in interaction. Social with staff and peers. Patient is out for meals then withdraws to room to rest in bed. Patient endorses high anxiety/irritability over tapering Klonopin dose as patient stated \"I have been on it for 32 years\". NP aware, new orders noted. Patient denies depression, SI/HI, hallucinations. Remains medication compliant and on 15\" checks for safety and behaviors.  "

## 2024-12-12 NOTE — PROGRESS NOTES
"Progress Note - Solo Mack 54 y.o. male MRN: [id number]    Unit/Bed#: [facility] 208-02 Encounter: [id number]        Subjective:   Patient seen and examined at bedside after reviewing the chart and discussing the case with the caring staff.      Patient examined at bedside.  Patient denies any acute symptoms.    Physical Exam   Vitals: Blood pressure 111/65, pulse 60, temperature 97.5 °F (36.4 °C), temperature source Temporal, resp. rate 16, height 5' 7\" (1.702 m), weight 96.8 kg (213 lb 6.4 oz), SpO2 96%.,Body mass index is 33.42 kg/m².  Constitutional: Patient in no acute distress.  HEENT: PERR, EOMI, MMM.  Cardiovascular: Normal rate and regular rhythm.    Pulmonary/Chest: Effort normal and breath sounds normal.   Abdomen: Soft, + BS, NT.    Assessment/Plan:  Solo Mack is a(n) 54 y.o. male with schizophrenia.     Cardiac with hx of HTN, HLD.  Continue home atorvastatin 10 mg daily.  Decr Lopressor from 50 mg to 25 mg q12h 12/6.  T2DM.  Hgb A1c 6.7% on 12/4/24.  D/c metformin due to pt refusing 12/5.  Accucheks twice daily.  Carb controlled diet.  GERD.  Patient is on Protonix 40 mg daily.  Chronic low back pain.  Continue gabapentin 400 mg TID.  CKD stage 3.  Stable.  Avoid nephrotoxins.   Vitamin D deficiency.  Patient started on vitamin D2 50,000 units weekly for 10 weeks followed by vitamin D3 1000 units daily.  Vitamin B12 deficiency.  Patient started on vitamin B12 1000 mcg daily.    The patient was discussed with Dr. Garza and he is in agreement with the above note.  "

## 2024-12-12 NOTE — PROGRESS NOTES
12/12/24   Team Meeting   Meeting Type Daily Rounds   Team Members Present   Team Members Present Physician;Occupational Therapist;Nurse;   Physician Team Member Pb DOWNS   Nursing Team Member Merly VELAZQUEZ   Social Work Team Member Venkatesh NAYLOR   OT Team Member Jesus ALMANZA   Patient/Family Present   Patient Present No   Patient's Family Present No   201, mod anx, internally pre occupied, masturbating in room, multiple PRN's, irritable, high anx due to klonopin being tapered, denies si/hi/avh, d/c home jimmy with psych at [facility]

## 2024-12-12 NOTE — NURSING NOTE
zolpidem (AMBIEN) tablet 10 mg given PO for complaint of insomnia at 2119 effective for sleep overnight. Patient observed sleeping during Q 15 minute safety checks. No SI/HI/AH/VH noted.  Patient shows no s/s of distress.  No complaints of pain or aspiration risks.  Non-labored breathing.  Monitoring continues.  Fluids at bedside to promote hydration.

## 2024-12-12 NOTE — NURSING NOTE
Controlled and visible in the community.  Positive for HS medications and evening snack. Rated anxiety as moderate, but denied depression. Also denied any suicidal or homicidal ideations. No behavioral issues.  No change in medical condition or complaints voiced.  Maintained on q 15 minute checks.  No aspiration risks noted.  Medication education given. zolpidem (AMBIEN) tablet 10 mg given PO for complaint of insomnia at 2119.  Care Plan reviewed and amended.  Will continue to monitor.

## 2024-12-13 LAB
GLUCOSE SERPL-MCNC: 156 MG/DL (ref 65–140)
GLUCOSE SERPL-MCNC: 207 MG/DL (ref 65–140)

## 2024-12-13 PROCEDURE — 82948 REAGENT STRIP/BLOOD GLUCOSE: CPT

## 2024-12-13 PROCEDURE — 99232 SBSQ HOSP IP/OBS MODERATE 35: CPT

## 2024-12-13 RX ORDER — TRAZODONE HYDROCHLORIDE 100 MG/1
100 TABLET ORAL
Status: DISCONTINUED | OUTPATIENT
Start: 2024-12-13 | End: 2024-12-18 | Stop reason: HOSPADM

## 2024-12-13 RX ADMIN — CYANOCOBALAMIN TAB 500 MCG 1000 MCG: 500 TAB at 08:13

## 2024-12-13 RX ADMIN — METOPROLOL TARTRATE 25 MG: 25 TABLET, FILM COATED ORAL at 21:08

## 2024-12-13 RX ADMIN — OLANZAPINE 15 MG: 15 TABLET, FILM COATED ORAL at 21:08

## 2024-12-13 RX ADMIN — GABAPENTIN 400 MG: 400 CAPSULE ORAL at 16:22

## 2024-12-13 RX ADMIN — METOPROLOL TARTRATE 25 MG: 25 TABLET, FILM COATED ORAL at 08:13

## 2024-12-13 RX ADMIN — GABAPENTIN 400 MG: 400 CAPSULE ORAL at 08:13

## 2024-12-13 RX ADMIN — ERGOCALCIFEROL 50000 UNITS: 1.25 CAPSULE, LIQUID FILLED ORAL at 08:13

## 2024-12-13 RX ADMIN — HYDROXYZINE HYDROCHLORIDE 50 MG: 50 TABLET, FILM COATED ORAL at 08:13

## 2024-12-13 RX ADMIN — ZOLPIDEM TARTRATE 10 MG: 5 TABLET ORAL at 21:08

## 2024-12-13 RX ADMIN — HYDROXYZINE HYDROCHLORIDE 50 MG: 50 TABLET, FILM COATED ORAL at 16:22

## 2024-12-13 RX ADMIN — OLANZAPINE 5 MG: 5 TABLET, FILM COATED ORAL at 00:41

## 2024-12-13 RX ADMIN — GABAPENTIN 300 MG: 300 CAPSULE ORAL at 01:48

## 2024-12-13 RX ADMIN — PANTOPRAZOLE SODIUM 40 MG: 40 TABLET, DELAYED RELEASE ORAL at 05:06

## 2024-12-13 RX ADMIN — TRAZODONE HYDROCHLORIDE 100 MG: 100 TABLET ORAL at 21:08

## 2024-12-13 RX ADMIN — GABAPENTIN 400 MG: 400 CAPSULE ORAL at 21:08

## 2024-12-13 RX ADMIN — ATORVASTATIN CALCIUM 10 MG: 10 TABLET, FILM COATED ORAL at 16:22

## 2024-12-13 RX ADMIN — HYDROXYZINE HYDROCHLORIDE 50 MG: 50 TABLET, FILM COATED ORAL at 21:08

## 2024-12-13 RX ADMIN — CLONAZEPAM 0.25 MG: 0.5 TABLET ORAL at 08:14

## 2024-12-13 RX ADMIN — LORAZEPAM 1 MG: 1 TABLET ORAL at 05:06

## 2024-12-13 NOTE — PLAN OF CARE
Problem: Alteration in Thoughts and Perception  Goal: Attend and participate in unit activities, including therapeutic, recreational, and educational groups  Description: Interventions:  -Encourage Visitation and family involvement in care  Outcome: Not Progressing     Problem: Ineffective Coping  Goal: Participates in unit activities  Description: Interventions:  - Provide therapeutic environment   - Provide required programming   - Redirect inappropriate behaviors   Outcome: Not Progressing     Problem: Ineffective Coping  Goal: Participates in unit activities  Description: Interventions:  - Provide therapeutic environment   - Provide required programming   - Redirect inappropriate behaviors   Outcome: Not Progressing     Problem: Risk for Self Injury/Neglect  Goal: Attend and participate in unit activities, including therapeutic, recreational, and educational groups  Description: Interventions:  - Provide therapeutic and educational activities daily, encourage attendance and participation, and document same in the medical record  - Obtain collateral information, encourage visitation and family involvement in care   Outcome: Not Progressing     Problem: Risk for Violence/Aggression Toward Others  Goal: Attend and participate in unit activities, including therapeutic, recreational, and educational groups  Description: Interventions:  - Provide therapeutic and educational activities daily, encourage attendance and participation, and document same in the medical record   Outcome: Not Progressing     Problem: Alteration in Orientation  Goal: Attend and participate in unit activities, including therapeutic, recreational, and educational groups  Description: Interventions:  - Provide therapeutic and educational activities daily, encourage attendance and participation, and document same in the medical record   - Provide appropriate opportunities for reminiscence   - Provide a consistent daily routine   - Encourage  family contact/visitation   Outcome: Not Progressing

## 2024-12-13 NOTE — NURSING NOTE
"Patient in milieu for meals then withdraws to room to rest in bed. Did not attend groups thus far. Patient with irritable edge and complaints of poor sleep. Expressed high anxiety over Klonopin taper, support provided. Denies depression, SI/HI, hallucinations. Noted to be talking to self in room. No glycemic reactions. Remains medication compliant and on 15\" checks for safety and behaviors.  "

## 2024-12-13 NOTE — NURSING NOTE
Patient out in community, minimally social.  Pleasant during assessment.  No current acute behaviors noted.  Denies current suicidal ideations. Rated anxiety as high tonight.and depression as mild. Asked for Ativan for his anxiety, nurse asked patient to try the new order for Atarax 50 mg. Patient agreed.  Maintained on q 15 minute safety checks.  Continues on fall risk protocols.  Will continue to monitor.

## 2024-12-13 NOTE — ASSESSMENT & PLAN NOTE
Continue Klonopin 0.25mg QD at this time, goal is to taper off.  Gabapentin 400mg TID  Atarax 50 mg TID  Start Trazodone 100mg PO qHS for insomnia.

## 2024-12-13 NOTE — PROGRESS NOTES
"Progress Note - Solo Mack 54 y.o. male MRN: [id number]    Unit/Bed#: [facility] 208-02 Encounter: [id number]        Subjective:   Patient seen and examined at bedside after reviewing the chart and discussing the case with the caring staff.      Patient examined at bedside.  Patient denies any acute symptoms.    Physical Exam   Vitals: Blood pressure 119/73, pulse 80, temperature 97.8 °F (36.6 °C), temperature source Temporal, resp. rate 18, height 5' 7\" (1.702 m), weight 96.8 kg (213 lb 6.4 oz), SpO2 96%.,Body mass index is 33.42 kg/m².  Constitutional: Patient in no acute distress.  HEENT: PERR, EOMI, MMM.  Cardiovascular: Normal rate and regular rhythm.    Pulmonary/Chest: Effort normal and breath sounds normal.   Abdomen: Soft, + BS, NT.    Assessment/Plan:  Solo Mack is a(n) 54 y.o. male with schizophrenia.     Cardiac with hx of HTN, HLD.  Continue home atorvastatin 10 mg daily.  Decr Lopressor from 50 mg to 25 mg q12h 12/6.  T2DM.  Hgb A1c 6.7% on 12/4/24.  D/c metformin due to pt refusing 12/5.  Accucheks twice daily.  Carb controlled diet.  GERD.  Patient is on Protonix 40 mg daily.  Chronic low back pain.  Continue gabapentin 400 mg TID.  CKD stage 3.  Stable.  Avoid nephrotoxins.   Vitamin D deficiency.  Patient started on vitamin D2 50,000 units weekly for 10 weeks followed by vitamin D3 1000 units daily.  Vitamin B12 deficiency.  Patient started on vitamin B12 1000 mcg daily.    The patient was discussed with Dr. Garza and he is in agreement with the above note.  "

## 2024-12-13 NOTE — PROGRESS NOTES
12/13/24    Team Meeting   Meeting Type Daily Rounds   Team Members Present   Team Members Present Physician;Nurse;;Occupational Therapist   Physician Team Member Pb LORENZANA   Nursing Team Member Merly VELAZQUEZ   Social Work Team Member Venkatesh NAYLOR   OT Team Member Jesus ALMANZA   Patient/Family Present   Patient Present No   Patient's Family Present No   201, upset with klonopin taper, liable, yelling at staff and peers, multiple PRNs for agitation and sleep, med seeking, d/c Tues home with PF

## 2024-12-13 NOTE — NURSING NOTE
"Patient stated \"nothing is working\". Awake all shift, restless. No overt hostility, however anxiety remains and is elevated all shift. Meehan Scale at 0506 was 26. Given Ativan 1 mg PO at this time. Reassessed at 0600, appears to be sleeping. Meehan Scale 0. Medication effective.  "

## 2024-12-13 NOTE — ASSESSMENT & PLAN NOTE
Zyprexa 15mg PO at HS   Per staff, patient was heard responding to internal stimuli in his room. Consider optimizing Zyprexa to 20mg PO qHS for psychotic symptoms.

## 2024-12-13 NOTE — PLAN OF CARE
Problem: PAIN - ADULT  Goal: Verbalizes/displays adequate comfort level or baseline comfort level  Description: Interventions:  - Encourage patient to monitor pain and request assistance  - Assess pain using appropriate pain scale  - Administer analgesics based on type and severity of pain and evaluate response  - Implement non-pharmacological measures as appropriate and evaluate response  - Consider cultural and social influences on pain and pain management  - Notify physician/advanced practitioner if interventions unsuccessful or patient reports new pain  Outcome: Progressing     Problem: DISCHARGE PLANNING  Goal: Discharge to home or other facility with appropriate resources  Description: INTERVENTIONS:  - Identify barriers to discharge w/patient and caregiver  - Arrange for needed discharge resources and transportation as appropriate  - Identify discharge learning needs (meds, wound care, etc.)  - Arrange for interpretive services to assist at discharge as needed  - Refer to Case Management Department for coordinating discharge planning if the patient needs post-hospital services based on physician/advanced practitioner order or complex needs related to functional status, cognitive ability, or social support system  Outcome: Progressing     Problem: Alteration in Thoughts and Perception  Goal: Treatment Goal: Gain control of psychotic behaviors/thinking, reduce/eliminate presenting symptoms and demonstrate improved reality functioning upon discharge  Outcome: Progressing  Goal: Verbalize thoughts and feelings  Description: Interventions:  - Promote a nonjudgmental and trusting relationship with the patient through active listening and therapeutic communication  - Assess patient's level of functioning, behavior and potential for risk  - Engage patient in 1 on 1 interactions  - Encourage patient to express fears, feelings, frustrations, and discuss symptoms    - [place] patient to reality, help patient recognize  reality-based thinking   - Administer medications as ordered and assess for potential side effects  - Provide the patient education related to the signs and symptoms of the illness and desired effects of prescribed medications  Outcome: Progressing  Goal: Refrain from acting on delusional thinking/internal stimuli  Description: Interventions:  - Monitor patient closely, per order   - Utilize least restrictive measures   - Set reasonable limits, give positive feedback for acceptable   - Administer medications as ordered and monitor of potential side effects  Outcome: Progressing  Goal: Agree to be compliant with medication regime, as prescribed and report medication side effects  Description: Interventions:  - Offer appropriate PRN medication and supervise ingestion; conduct AIMS, as needed   Outcome: Progressing  Goal: Attend and participate in unit activities, including therapeutic, recreational, and educational groups  Description: Interventions:  -Encourage Visitation and family involvement in care  Outcome: Progressing  Goal: Recognize dysfunctional thoughts, communicate reality-based thoughts at the time of discharge  Description: Interventions:  - Provide medication and psycho-education to assist patient in compliance and developing insight into his/her illness   Outcome: Progressing     Problem: Risk for Self Injury/Neglect  Goal: Treatment Goal: Remain safe during length of stay, learn and adopt new coping skills, and be free of self-injurious ideation, impulses and acts at the time of discharge  Outcome: Progressing  Goal: Verbalize thoughts and feelings  Description: Interventions:  - Assess and re-assess patient's lethality and potential for self-injury  - Engage patient in 1:1 interactions, daily, for a minimum of 15 minutes  - Encourage patient to express feelings, fears, frustrations, hopes  - Establish rapport/trust with patient   Outcome: Progressing  Goal: Refrain from harming self  Description:  Interventions:  - Monitor patient closely, per order  - Develop a trusting relationship  - Supervise medication ingestion, monitor effects and side effects   Outcome: Progressing  Goal: Attend and participate in unit activities, including therapeutic, recreational, and educational groups  Description: Interventions:  - Provide therapeutic and educational activities daily, encourage attendance and participation, and document same in the medical record  - Obtain collateral information, encourage visitation and family involvement in care   Outcome: Progressing  Goal: Recognize maladaptive responses and adopt new coping mechanisms  Outcome: Progressing  Goal: Complete daily ADLs, including personal hygiene independently, as able  Description: Interventions:  - Observe, teach, and assist patient with ADLS  - Monitor and promote a balance of rest/activity, with adequate nutrition and elimination  Outcome: Progressing     Problem: Depression  Goal: Treatment Goal: Demonstrate behavioral control of depressive symptoms, verbalize feelings of improved mood/affect, and adopt new coping skills prior to discharge  Outcome: Progressing  Goal: Refrain from isolation  Description: Interventions:  - Develop a trusting relationship   - Encourage socialization   Outcome: Progressing  Goal: Refrain from self-neglect  Outcome: Progressing     Problem: Anxiety  Goal: Anxiety is at manageable level  Description: Interventions:  - Assess and monitor patient's anxiety level.   - Monitor for signs and symptoms (heart palpitations, chest pain, shortness of breath, headaches, nausea, feeling jumpy, restlessness, irritable, apprehensive).   - Collaborate with interdisciplinary team and initiate plan and interventions as ordered.  - [place] patient to unit/surroundings  - Explain treatment plan  - Encourage participation in care  - Encourage verbalization of concerns/fears  - Identify coping mechanisms  - Assist in developing anxiety-reducing  skills  - Administer/offer alternative therapies  - Limit or eliminate stimulants  Outcome: Progressing     Problem: Risk for Violence/Aggression Toward Others  Goal: Treatment Goal: Refrain from acts of violence/aggression during length of stay, and demonstrate improved impulse control at the time of discharge  Outcome: Progressing  Goal: Refrain from harming others  Outcome: Progressing  Goal: Control angry outbursts  Description: Interventions:  - Monitor patient closely, per order  - Ensure early verbal de-escalation  - Monitor prn medication needs  - Set reasonable/therapeutic limits, outline behavioral expectations, and consequences   - Provide a non-threatening milieu, utilizing the least restrictive interventions   Outcome: Progressing  Goal: Attend and participate in unit activities, including therapeutic, recreational, and educational groups  Description: Interventions:  - Provide therapeutic and educational activities daily, encourage attendance and participation, and document same in the medical record   Outcome: Progressing  Goal: Identify appropriate positive anger management techniques  Description: Interventions:  - Offer anger management and coping skills groups   - Staff will provide positive feedback for appropriate anger control  Outcome: Progressing     Problem: Alteration in Orientation  Goal: Treatment Goal: Demonstrate a reduction of confusion and improved orientation to person, place, time and/or situation upon discharge, according to optimum baseline/ability  Outcome: Progressing  Goal: Interact with staff daily  Description: Interventions:  - Assess and re-assess patient's level of orientation  - Engage patient in 1 on 1 interactions, daily, for a minimum of 15 minutes   - Establish rapport/trust with patient   Outcome: Progressing  Goal: Express concerns related to confused thinking related to:  Description: Interventions:  - Encourage patient to express feelings, fears, frustrations,  hopes  - Assign consistent caregivers   - [place]/re-orient patient as needed  - Allow comfort items, as appropriate  - Provide visual cues, signs, etc.   Outcome: Progressing  Goal: Allow medical examinations, as recommended  Description: Interventions:  - Provide physical/neurological exams and/or referrals, per provider   Outcome: Progressing  Goal: Cooperate with recommended testing/procedures  Description: Interventions:  - Determine need for ancillary testing  - Observe for mental status changes  - Implement falls/precaution protocol   Outcome: Progressing  Goal: Attend and participate in unit activities, including therapeutic, recreational, and educational groups  Description: Interventions:  - Provide therapeutic and educational activities daily, encourage attendance and participation, and document same in the medical record   - Provide appropriate opportunities for reminiscence   - Provide a consistent daily routine   - Encourage family contact/visitation   Outcome: Progressing  Goal: Complete daily ADLs, including personal hygiene independently, as able  Description: Interventions:  - Observe, teach, and assist patient with ADLS  Outcome: Progressing     Problem: DISCHARGE PLANNING - CARE MANAGEMENT  Goal: Discharge to post-acute care or home with appropriate resources  Description: INTERVENTIONS:  - Conduct assessment to determine patient/family and health care team treatment goals, and need for post-acute services based on payer coverage, community resources, and patient preferences, and barriers to discharge  - Address psychosocial, clinical, and financial barriers to discharge as identified in assessment in conjunction with the patient/family and health care team  - Arrange appropriate level of post-acute services according to patient’s   needs and preference and payer coverage in collaboration with the physician and health care team  - Communicate with and update the patient/family, physician, and  health care team regarding progress on the discharge plan  - Arrange appropriate transportation to post-acute venues  Outcome: Progressing

## 2024-12-13 NOTE — NURSING NOTE
gabapentin (NEURONTIN) capsule 300 mg given PO for complaint of restless legs at 0148.Patient appears to be resting but not asleep at present. Will continue to monitor.

## 2024-12-13 NOTE — NURSING NOTE
"Patient remains restless and anxious. Stated that Ativan was not effective. Meehan Scale at 2342 was 23. Atarax 25 mg PO given at this time. Assessed at 0040. Stated Atarax was not effective. Irritable, restless and anxious. Meehan Scale remains at 23. Patient asked to again try to relax. Solo upset about being taken off of Klonopin. \"I have been on it for 30 years and the doctor is taking me off of it.\" Agitated Behavior scale completed 23, [place] Violence checklist 2.  Given Zyprexa 5 mg at 0041 for moderate agitation.   "

## 2024-12-13 NOTE — PROGRESS NOTES
Progress Note - Behavioral Health   Name: Solo Mack 54 y.o. male I MRN: [id number]  Unit/Bed#: [facility] 208-02 I Date of Admission: 12/3/2024   Date of Service: 12/13/2024 I Hospital Day: 10     Assessment & Plan  Anxiety  Continue Klonopin 0.25mg QD at this time, goal is to taper off.  Gabapentin 400mg TID  Atarax 50 mg TID  Start Trazodone 100mg PO qHS for insomnia.  Bipolar disorder, current episode mixed, moderate (HCC)  Zyprexa 15mg PO at HS   Per staff, patient was heard responding to internal stimuli in his room. Consider optimizing Zyprexa to 20mg PO qHS for psychotic symptoms.  Chronic pain disorder  Per medical  H/O prolonged Q-T interval on ECG  QTc 429 on 12/2/2024    Planned medication and treatment changes:    All current active medications have been reviewed  Encourage group therapy, milieu therapy and occupational therapy  Behavioral Health checks for safety monitoring  Start Trazodone 100mg PO qHS for insomnia.  Consider increasing Zyprexa to 20mg Po qHS.  Continue current medications:    Behavior over the last 24 hours: slowly improving.     Solo is seen today for psychiatric follow up. Per nursing notes,   Patient remains in behavioral control.  Received Atarax 25 mg p.o.  as needed, Ativan 0.5 mg and Ativan 1 mg p.o. as needed and gabapentin 300 mg p.o. as needed and Zyprexa 5 mg p.o. as needed for difficulty sleeping/restlessness/anxiety, patient with difficulty sleeping.    Today Solo continues to endorse high anxiety regarding Klonopin taper, noting he has been on for 30 years.  He understands he is being tapered off it and was educated at length about dependency, tolerance, and cognitive dulling.  He reports not being able to sleep last night due to anxiety, discussed starting trazodone and patient is agreeable stating he believes this would be helpful for sleep.    Solo remains with poor insight into illness, stating he does not belong here and denying things that brought him into  "the hospital.  Irritable edge.  Appears internally preoccupied, denies auditory visual hallucinations.  Per staff, patient frequently responds to internal stimuli in his room.  Remains with paranoia/delusions, stating that people above watch him with infrared cameras and told this writer to read \"hunting the cartel\" and to use a computer that cannot track it.  Appears to be staff splitting.  Denies psychiatric symptoms when asked, denies medication side effects at this time and does not endorse benzo withdrawal symptoms at this time.  Withdrawn/isolative to self/room.    Sleep:  Difficulty falling asleep, required multiple PRNs  Appetite:  Reports adequate  Medication side effects: No   ROS: no complaints    Mental Status Evaluation:    Appearance:  age appropriate, casually dressed, marginal hygiene, looks stated age   Behavior:  cooperative, guarded, somewhat angry regarding Klonopin taper, mostly calm throughout interaction   Speech:  normal rate and volume   Mood:  irritable   Affect:  Irritable, somewhat labile   Thought Process:  goal directed, perseverative   Associations: concrete associations, perseverative   Thought Content:  paranoid ideation, ruminations, preoccupied with medications   Perceptual Disturbances: denies auditory hallucinations when asked, denies visual hallucinations when asked, appears preoccupied, per staff response to internal stimuli   Risk Potential: Suicidal ideation - None at present  Homicidal ideation - None at present  Potential for aggression - No   Sensorium:  oriented to person, place, and time/date   Memory:  recent and remote memory grossly intact   Consciousness:  alert and awake   Attention/Concentration: attention span and concentration appear shorter than expected for age   Insight:  poor   Judgment: limited   Gait/Station: Seen in bed   Motor Activity: no abnormal movements     Vital signs in last 24 hours:    Temp:  [97.6 °F (36.4 °C)-98.3 °F (36.8 °C)] 97.8 °F (36.6 " °C)  HR:  [63-99] 80  BP: (112-141)/() 119/73  Resp:  [18] 18  SpO2:  [96 %-97 %] 96 %  O2 Device: None (Room air)    Laboratory results: I have personally reviewed all pertinent laboratory/tests results    Results from the past 24 hours:   Recent Results (from the past 24 hours)   Fingerstick Glucose (POCT)    Collection Time: 12/12/24  4:20 PM   Result Value Ref Range    POC Glucose 172 (H) 65 - 140 mg/dl   Fingerstick Glucose (POCT)    Collection Time: 12/13/24  7:50 AM   Result Value Ref Range    POC Glucose 156 (H) 65 - 140 mg/dl       Progress Toward Goals: Withdrawn/isolative to room/self, poor insight, irritation with Klonopin taper, denies psychiatric symptoms when asked however appears internally preoccupied and per staff response to internal stimuli in his room    Assessment & Plan   Principal Problem:    Bipolar disorder, current episode mixed, moderate (HCC)  Active Problems:    Chronic pain disorder    GERD (gastroesophageal reflux disease)    Essential hypertension    Anxiety    H/O prolonged Q-T interval on ECG        Current Facility-Administered Medications   Medication Dose Route Frequency Provider Last Rate    acetaminophen  650 mg Oral Q4H PRN Alexander Ledesma, MIGUELANGELNP      acetaminophen  650 mg Oral Q4H PRN DELANO Gallo      acetaminophen  975 mg Oral Q6H PRN DELANO Gallo      aluminum-magnesium hydroxide-simethicone  30 mL Oral Q4H PRN Kelsie Navarrete PA-C      atorvastatin  10 mg Oral Daily With Dinner DELANO Gallo      benztropine  0.5 mg Oral Q4H PRN Max 6/day DELANO Gallo      ergocalciferol  50,000 Units Oral Weekly Kelsie Navarrete PA-C      Followed by    [START ON 2/14/2025] Cholecalciferol  1,000 Units Oral Daily Kelsie Navarrete PA-C      clonazePAM  0.25 mg Oral Daily DELANO Gallo      cloNIDine  0.1 mg Oral BID PRN Quin Andrade PA-C      cyanocobalamin  1,000 mcg Oral Daily Kelsie Navarrete PA-C       gabapentin  300 mg Oral Daily PRN Kelsie Navarrete PA-C      gabapentin  400 mg Oral TID Alexander Javier-Anom, CRNP      hydrOXYzine HCL  25 mg Oral Q6H PRN Max 4/day Alexander Javier-Anom, CRNP      hydrOXYzine HCL  50 mg Oral TID Alexander Javier-Anom, CRNP      LORazepam  1 mg Intramuscular Q6H PRN Max 3/day Alexander Javier-Anom, CRNP      LORazepam  0.5 mg Oral Q6H PRN Max 4/day Alexander Javier-Anom, CRNP      LORazepam  1 mg Oral Q6H PRN Max 3/day Alexander Javier-Anom, CRNP      metoprolol tartrate  25 mg Oral Q12H ROBER Kelsie Navarrete PA-C      OLANZapine  5 mg Intramuscular Q3H PRN Max 3/day Alexander Javier-Anom, CRNP      OLANZapine  15 mg Oral HS [place] Javier-Anom, CRNP      OLANZapine  2.5 mg Oral Q4H PRN Max 6/day Sheffield Javier-Anom, CRNP      OLANZapine  5 mg Oral Q4H PRN Max 3/day Sheffield Javier-Anom, CRNP      OLANZapine  5 mg Oral Q3H PRN Max 3/day [place] Javier-Anom, CRNP      pantoprazole  40 mg Oral Early Morning [place] Javier-Anom, CRNP      polyethylene glycol  17 g Oral Daily PRN Kelsie Navarrete PA-C      zolpidem  10 mg Oral HS PRN Sameer East MD       Risks / Benefits of Treatment:    Risks, benefits, and possible side effects of medications explained to patient. Patient has limited understanding of risks and benefits of treatment at this time, but agrees to take medications as prescribed.    Counseling / Coordination of Care:    Patient's progress discussed with staff in treatment team meeting.  Medication changes discussed with patient.  Medication education provided to patient.  Educated on importance of medication and treatment compliance.  Reassurance and supportive therapy provided.  Group attendance encouraged.    Andre Arvizu PA-C 12/13/24

## 2024-12-13 NOTE — NURSING NOTE
Patient reassessed for agitation. Agitated Behavior scale completed 15, [place] Violence checklist 0. Given Zyprexa 5 mg at 0041 for moderate agitation appears effective. Patient remains awake unable to sleep, restless.

## 2024-12-13 NOTE — NURSING NOTE
Patient has a noted increase in anxiety, restlessness, feels like mild skin crawling. Complaining of inability to sleep. zolpidem (AMBIEN) tablet 10 mg given PO at 2131. Not effective at present. Meehan Scale at 2210 was 21. Requested and received Ativan 0.5 mg PO at this time. Requested and received Tylenol 975 mg at 2210 for complaint of #8 generalized pain. Reassessed at 2310, his pain had abated but anxiety and restlessness had increased. Meehan Scale increased to 25. Nurse instructed patient to try to relax and let medication work. Solo agreed.

## 2024-12-14 LAB
GLUCOSE SERPL-MCNC: 181 MG/DL (ref 65–140)
GLUCOSE SERPL-MCNC: 231 MG/DL (ref 65–140)

## 2024-12-14 PROCEDURE — 82948 REAGENT STRIP/BLOOD GLUCOSE: CPT

## 2024-12-14 PROCEDURE — 99232 SBSQ HOSP IP/OBS MODERATE 35: CPT

## 2024-12-14 PROCEDURE — 93005 ELECTROCARDIOGRAM TRACING: CPT

## 2024-12-14 RX ORDER — OLANZAPINE 10 MG/1
20 TABLET ORAL
Status: DISCONTINUED | OUTPATIENT
Start: 2024-12-14 | End: 2024-12-18 | Stop reason: HOSPADM

## 2024-12-14 RX ORDER — NITROGLYCERIN 0.4 MG/1
0.4 TABLET SUBLINGUAL
Status: DISCONTINUED | OUTPATIENT
Start: 2024-12-14 | End: 2024-12-18 | Stop reason: HOSPADM

## 2024-12-14 RX ORDER — CLONAZEPAM 0.5 MG/1
0.25 TABLET ORAL ONCE
Status: COMPLETED | OUTPATIENT
Start: 2024-12-15 | End: 2024-12-15

## 2024-12-14 RX ADMIN — TRAZODONE HYDROCHLORIDE 100 MG: 100 TABLET ORAL at 20:37

## 2024-12-14 RX ADMIN — GABAPENTIN 300 MG: 300 CAPSULE ORAL at 01:39

## 2024-12-14 RX ADMIN — HYDROXYZINE HYDROCHLORIDE 50 MG: 50 TABLET, FILM COATED ORAL at 08:48

## 2024-12-14 RX ADMIN — LORAZEPAM 1 MG: 1 TABLET ORAL at 18:32

## 2024-12-14 RX ADMIN — METOPROLOL TARTRATE 25 MG: 25 TABLET, FILM COATED ORAL at 20:38

## 2024-12-14 RX ADMIN — GABAPENTIN 400 MG: 400 CAPSULE ORAL at 17:33

## 2024-12-14 RX ADMIN — ZOLPIDEM TARTRATE 10 MG: 5 TABLET ORAL at 20:37

## 2024-12-14 RX ADMIN — PANTOPRAZOLE SODIUM 40 MG: 40 TABLET, DELAYED RELEASE ORAL at 06:35

## 2024-12-14 RX ADMIN — GABAPENTIN 400 MG: 400 CAPSULE ORAL at 08:48

## 2024-12-14 RX ADMIN — GABAPENTIN 400 MG: 400 CAPSULE ORAL at 20:38

## 2024-12-14 RX ADMIN — ACETAMINOPHEN 650 MG: 325 TABLET ORAL at 01:11

## 2024-12-14 RX ADMIN — CLONIDINE HYDROCHLORIDE 0.1 MG: 0.1 TABLET ORAL at 19:27

## 2024-12-14 RX ADMIN — HYDROXYZINE HYDROCHLORIDE 25 MG: 25 TABLET ORAL at 03:00

## 2024-12-14 RX ADMIN — ATORVASTATIN CALCIUM 10 MG: 10 TABLET, FILM COATED ORAL at 17:34

## 2024-12-14 RX ADMIN — CLONAZEPAM 0.25 MG: 0.5 TABLET ORAL at 08:48

## 2024-12-14 RX ADMIN — LORAZEPAM 1 MG: 1 TABLET ORAL at 01:11

## 2024-12-14 RX ADMIN — GABAPENTIN 300 MG: 300 CAPSULE ORAL at 22:41

## 2024-12-14 RX ADMIN — CYANOCOBALAMIN TAB 500 MCG 1000 MCG: 500 TAB at 09:55

## 2024-12-14 RX ADMIN — OLANZAPINE 20 MG: 10 TABLET, FILM COATED ORAL at 20:38

## 2024-12-14 RX ADMIN — HYDROXYZINE HYDROCHLORIDE 50 MG: 50 TABLET, FILM COATED ORAL at 20:38

## 2024-12-14 RX ADMIN — HYDROXYZINE HYDROCHLORIDE 50 MG: 50 TABLET, FILM COATED ORAL at 17:34

## 2024-12-14 NOTE — ASSESSMENT & PLAN NOTE
Continue Klonopin 0.25mg QD at this time, goal is to taper off.  Gabapentin 400mg TID  Atarax 50 mg TID  Continue Trazodone 100mg PO qHS for insomnia.

## 2024-12-14 NOTE — PLAN OF CARE
Problem: PAIN - ADULT  Goal: Verbalizes/displays adequate comfort level or baseline comfort level  Description: Interventions:  - Encourage patient to monitor pain and request assistance  - Assess pain using appropriate pain scale  - Administer analgesics based on type and severity of pain and evaluate response  - Implement non-pharmacological measures as appropriate and evaluate response  - Consider cultural and social influences on pain and pain management  - Notify physician/advanced practitioner if interventions unsuccessful or patient reports new pain  Outcome: Progressing     Problem: DISCHARGE PLANNING  Goal: Discharge to home or other facility with appropriate resources  Description: INTERVENTIONS:  - Identify barriers to discharge w/patient and caregiver  - Arrange for needed discharge resources and transportation as appropriate  - Identify discharge learning needs (meds, wound care, etc.)  - Arrange for interpretive services to assist at discharge as needed  - Refer to Case Management Department for coordinating discharge planning if the patient needs post-hospital services based on physician/advanced practitioner order or complex needs related to functional status, cognitive ability, or social support system  Outcome: Progressing     Problem: Alteration in Thoughts and Perception  Goal: Treatment Goal: Gain control of psychotic behaviors/thinking, reduce/eliminate presenting symptoms and demonstrate improved reality functioning upon discharge  Outcome: Progressing  Goal: Verbalize thoughts and feelings  Description: Interventions:  - Promote a nonjudgmental and trusting relationship with the patient through active listening and therapeutic communication  - Assess patient's level of functioning, behavior and potential for risk  - Engage patient in 1 on 1 interactions  - Encourage patient to express fears, feelings, frustrations, and discuss symptoms    - [place] patient to reality, help patient recognize  reality-based thinking   - Administer medications as ordered and assess for potential side effects  - Provide the patient education related to the signs and symptoms of the illness and desired effects of prescribed medications  Outcome: Progressing  Goal: Refrain from acting on delusional thinking/internal stimuli  Description: Interventions:  - Monitor patient closely, per order   - Utilize least restrictive measures   - Set reasonable limits, give positive feedback for acceptable   - Administer medications as ordered and monitor of potential side effects  Outcome: Progressing  Goal: Agree to be compliant with medication regime, as prescribed and report medication side effects  Description: Interventions:  - Offer appropriate PRN medication and supervise ingestion; conduct AIMS, as needed   Outcome: Progressing  Goal: Attend and participate in unit activities, including therapeutic, recreational, and educational groups  Description: Interventions:  -Encourage Visitation and family involvement in care  Outcome: Progressing  Goal: Recognize dysfunctional thoughts, communicate reality-based thoughts at the time of discharge  Description: Interventions:  - Provide medication and psycho-education to assist patient in compliance and developing insight into his/her illness   Outcome: Progressing     Problem: Risk for Self Injury/Neglect  Goal: Treatment Goal: Remain safe during length of stay, learn and adopt new coping skills, and be free of self-injurious ideation, impulses and acts at the time of discharge  Outcome: Progressing  Goal: Verbalize thoughts and feelings  Description: Interventions:  - Assess and re-assess patient's lethality and potential for self-injury  - Engage patient in 1:1 interactions, daily, for a minimum of 15 minutes  - Encourage patient to express feelings, fears, frustrations, hopes  - Establish rapport/trust with patient   Outcome: Progressing  Goal: Refrain from harming self  Description:  Interventions:  - Monitor patient closely, per order  - Develop a trusting relationship  - Supervise medication ingestion, monitor effects and side effects   Outcome: Progressing  Goal: Attend and participate in unit activities, including therapeutic, recreational, and educational groups  Description: Interventions:  - Provide therapeutic and educational activities daily, encourage attendance and participation, and document same in the medical record  - Obtain collateral information, encourage visitation and family involvement in care   Outcome: Progressing  Goal: Recognize maladaptive responses and adopt new coping mechanisms  Outcome: Progressing  Goal: Complete daily ADLs, including personal hygiene independently, as able  Description: Interventions:  - Observe, teach, and assist patient with ADLS  - Monitor and promote a balance of rest/activity, with adequate nutrition and elimination  Outcome: Progressing     Problem: Depression  Goal: Treatment Goal: Demonstrate behavioral control of depressive symptoms, verbalize feelings of improved mood/affect, and adopt new coping skills prior to discharge  Outcome: Progressing  Goal: Refrain from isolation  Description: Interventions:  - Develop a trusting relationship   - Encourage socialization   Outcome: Progressing  Goal: Refrain from self-neglect  Outcome: Progressing     Problem: Anxiety  Goal: Anxiety is at manageable level  Description: Interventions:  - Assess and monitor patient's anxiety level.   - Monitor for signs and symptoms (heart palpitations, chest pain, shortness of breath, headaches, nausea, feeling jumpy, restlessness, irritable, apprehensive).   - Collaborate with interdisciplinary team and initiate plan and interventions as ordered.  - [place] patient to unit/surroundings  - Explain treatment plan  - Encourage participation in care  - Encourage verbalization of concerns/fears  - Identify coping mechanisms  - Assist in developing anxiety-reducing  skills  - Administer/offer alternative therapies  - Limit or eliminate stimulants  Outcome: Progressing     Problem: Risk for Violence/Aggression Toward Others  Goal: Treatment Goal: Refrain from acts of violence/aggression during length of stay, and demonstrate improved impulse control at the time of discharge  Outcome: Progressing  Goal: Refrain from harming others  Outcome: Progressing  Goal: Control angry outbursts  Description: Interventions:  - Monitor patient closely, per order  - Ensure early verbal de-escalation  - Monitor prn medication needs  - Set reasonable/therapeutic limits, outline behavioral expectations, and consequences   - Provide a non-threatening milieu, utilizing the least restrictive interventions   Outcome: Progressing  Goal: Attend and participate in unit activities, including therapeutic, recreational, and educational groups  Description: Interventions:  - Provide therapeutic and educational activities daily, encourage attendance and participation, and document same in the medical record   Outcome: Progressing  Goal: Identify appropriate positive anger management techniques  Description: Interventions:  - Offer anger management and coping skills groups   - Staff will provide positive feedback for appropriate anger control  Outcome: Progressing     Problem: Alteration in Orientation  Goal: Treatment Goal: Demonstrate a reduction of confusion and improved orientation to person, place, time and/or situation upon discharge, according to optimum baseline/ability  Outcome: Progressing  Goal: Interact with staff daily  Description: Interventions:  - Assess and re-assess patient's level of orientation  - Engage patient in 1 on 1 interactions, daily, for a minimum of 15 minutes   - Establish rapport/trust with patient   Outcome: Progressing  Goal: Express concerns related to confused thinking related to:  Description: Interventions:  - Encourage patient to express feelings, fears, frustrations,  hopes  - Assign consistent caregivers   - [place]/re-orient patient as needed  - Allow comfort items, as appropriate  - Provide visual cues, signs, etc.   Outcome: Progressing  Goal: Allow medical examinations, as recommended  Description: Interventions:  - Provide physical/neurological exams and/or referrals, per provider   Outcome: Progressing  Goal: Cooperate with recommended testing/procedures  Description: Interventions:  - Determine need for ancillary testing  - Observe for mental status changes  - Implement falls/precaution protocol   Outcome: Progressing  Goal: Attend and participate in unit activities, including therapeutic, recreational, and educational groups  Description: Interventions:  - Provide therapeutic and educational activities daily, encourage attendance and participation, and document same in the medical record   - Provide appropriate opportunities for reminiscence   - Provide a consistent daily routine   - Encourage family contact/visitation   Outcome: Progressing  Goal: Complete daily ADLs, including personal hygiene independently, as able  Description: Interventions:  - Observe, teach, and assist patient with ADLS  Outcome: Progressing     Problem: DISCHARGE PLANNING - CARE MANAGEMENT  Goal: Discharge to post-acute care or home with appropriate resources  Description: INTERVENTIONS:  - Conduct assessment to determine patient/family and health care team treatment goals, and need for post-acute services based on payer coverage, community resources, and patient preferences, and barriers to discharge  - Address psychosocial, clinical, and financial barriers to discharge as identified in assessment in conjunction with the patient/family and health care team  - Arrange appropriate level of post-acute services according to patient’s   needs and preference and payer coverage in collaboration with the physician and health care team  - Communicate with and update the patient/family, physician, and  health care team regarding progress on the discharge plan  - Arrange appropriate transportation to post-acute venues  Outcome: Progressing

## 2024-12-14 NOTE — NURSING NOTE
Patient complained of severe anxiety. Restless. Meehan Scale 25. Given Ativan 1 mg at 0111. Patient asked to try to relax and let medication work. Reassessed at 0211. Patient stated that medication was not effective. Meehan Scale remains at 25. Patient again asked to try let relax and let medication work. Patient agreed.

## 2024-12-14 NOTE — TREATMENT TEAM
12/14/24 1832   Meehan Anxiety Scale   Anxious Mood 4   Tension 4   Fears 4   Insomnia 0   Intellectual 4   Depressed Mood 4   Somatic Complaints: Muscular 0   Somatic Complaints: Sensory 0   Cardiovascular Symptoms 0   Respiratory Symptoms 0   Gastrointestinal Symptoms 0   Genitourinary Symptoms 0   Autonomic Symptoms 2   Behavior at Interview 4   Meehan Anxiety Score 26     1mg Ativan administered at 1832 for severe anxiety as indicated by Meehan score.

## 2024-12-14 NOTE — PROGRESS NOTES
Progress Note - Behavioral Health   Name: Solo Mack 54 y.o. male I MRN: [id number]  Unit/Bed#: [facility] 208-02 I Date of Admission: 12/3/2024   Date of Service: 12/14/2024 I Hospital Day: 11     Assessment & Plan  Anxiety  Continue Klonopin 0.25mg QD at this time, goal is to taper off.  Gabapentin 400mg TID  Atarax 50 mg TID  Continue Trazodone 100mg PO qHS for insomnia.  Bipolar disorder, current episode mixed, moderate (HCC)  Titrate Zyprexa to 20mg PO at HS for continued insomnia, hs anxiety, mood stabilization  Chronic pain disorder  Per medical  H/O prolonged Q-T interval on ECG  QTc 429 on 12/2/2024      Progress Note - Behavioral Health   Solo Mack 54 y.o. male MRN: [id number]  Unit/Bed#: [facility] 208-02 Encounter: [id number]    Assessment & Plan   Principal Problem:    Bipolar disorder, current episode mixed, moderate (HCC)  Active Problems:    Chronic pain disorder    GERD (gastroesophageal reflux disease)    Essential hypertension    Anxiety    H/O prolonged Q-T interval on ECG      Subjective:Patient was seen today for continuation of care, records reviewed and  patient was discussed with the morning case review team.  Patient continues with significant insomnia, anxiety exacerbated by recent Klonopin taper.  Denies any withdrawal side effects from the recent decrease in Klonopin besides disrupting sleep, estimates 3 hours of sleep last evening.  Initiated on trazodone 100 mg last evening for insomnia with limited improvement, would benefit from increased at bedtime dose of Zyprexa for at bedtime anxiety, mood stabilization, insomnia. Staff reports he continues to display poor insight into his illness, does not believe he belongs in the hospital, irritable edge at times.  No reported aggression or agitation.  Appears preoccupied at times in the milieu, he is denying all symptoms including depression, anxiety, hallucinations, delusions, paranoia.  Often withdrawn, isolative to room with limited  interaction with others. Patient denies endorsing any suicidal or homicidal ideation. Remains medication compliance. Denies any side effects from medications.    Psychiatric Review of Systems:    Sleep: slept off and on  Appetite: fair  Medication side effects: No   ROS: no complaints    Vitals:  Vitals:    12/14/24 0819   BP: 99/59   Pulse: 56   Resp: 16   Temp: (!) 97.2 °F (36.2 °C)   SpO2: 94%       Mental Status Evaluation:    Appearance:  age appropriate, casually dressed, dressed appropriately   Behavior:  cooperative, calm   Speech:  normal rate and volume   Mood:  irritable   Affect:  constricted   Thought Process:  concrete   Associations: concrete associations   Thought Content:  some paranoia   Perceptual Disturbances: appears preoccupied   Risk Potential: Suicidal ideation - None at present  Homicidal ideation - None  Potential for aggression - No   Sensorium:  oriented to person, place, and time/date   Memory:  recent and remote memory grossly intact   Consciousness:  alert and awake   Attention: attention span and concentration appear shorter than expected for age   Insight:  limited   Judgment: limited   Gait/Station: in bed   Motor Activity: no abnormal movements     Laboratory results:  I have personally reviewed all pertinent laboratory/tests results.  Most Recent Labs:   Lab Results   Component Value Date    WBC 9.23 12/02/2024    RBC 5.25 12/02/2024    HGB 15.7 12/02/2024    HCT 45.8 12/02/2024     12/02/2024    RDW 12.3 12/02/2024    NEUTROABS 5.74 12/02/2024    SODIUM 138 12/02/2024    K 3.8 12/02/2024     12/02/2024    CO2 26 12/02/2024    BUN 17 12/02/2024    CREATININE 1.24 12/02/2024    GLUC 152 (H) 12/02/2024    GLUF 155 (H) 10/23/2024    CALCIUM 9.2 12/02/2024    AST 22 11/17/2024    ALT 20 11/17/2024    ALKPHOS 110 (H) 11/17/2024    TP 7.5 11/17/2024    ALB 4.8 11/17/2024    TBILI 0.95 11/17/2024    CHOLESTEROL 124 12/04/2024    HDL 36 (L) 12/04/2024    TRIG 169 (H)  12/04/2024    LDLCALC 54 12/04/2024    NONHDLC 88 12/04/2024    LITHIUM <0.10 (L) 04/13/2024    AMMONIA 15 11/12/2020    VIP1DLFIPKVL 1.312 12/02/2024    RPR Non-Reactive 11/06/2020    HGBA1C 6.7 (H) 12/04/2024     12/04/2024           Recommended Treatment:     All current active medications have been reviewed  Encourage group therapy, milieu therapy and occupational therapy  Behavioral Health checks for safety monitoring  Continue treatment with group therapy, milieu therapy and occupational therapy  Continue current medications:  Current Facility-Administered Medications   Medication Dose Route Frequency Provider Last Rate    acetaminophen  650 mg Oral Q4H PRN Alexander Herrera-Anocaterina, CRNP      acetaminophen  650 mg Oral Q4H PRN Alexander Ledesma, CRNP      acetaminophen  975 mg Oral Q6H PRN Alexander Ledesma, CRNP      aluminum-magnesium hydroxide-simethicone  30 mL Oral Q4H PRN Kelsie Navarrete PA-C      atorvastatin  10 mg Oral Daily With Dinner Alexander Ledesma, MIGUELANGELNP      benztropine  0.5 mg Oral Q4H PRN Max 6/day Alexander Ledesma, DELANO      ergocalciferol  50,000 Units Oral Weekly Kelsie Navarrete PA-C      Followed by    [START ON 2/14/2025] Cholecalciferol  1,000 Units Oral Daily Kelsie Navarrete PA-C      clonazePAM  0.25 mg Oral Daily Alexander Ledesma, DELANO      cloNIDine  0.1 mg Oral BID PRN Quin Andrade PA-C      cyanocobalamin  1,000 mcg Oral Daily Kelsie Navarrete PA-C      gabapentin  300 mg Oral Daily PRN Kelsie Navarrete PA-C      gabapentin  400 mg Oral TID Alexander Ledesma, DELANO      hydrOXYzine HCL  25 mg Oral Q6H PRN Max 4/day Alexander Ledesma, CRNP      hydrOXYzine HCL  50 mg Oral TID Alexander Ledesma, CROMAR      LORazepam  1 mg Intramuscular Q6H PRN Max 3/day Alexander Ledesma, CRNP      LORazepam  0.5 mg Oral Q6H PRN Max 4/day DELANO Gallo      LORazepam  1 mg Oral Q6H PRN Max 3/day DELANO Gallo      metoprolol tartrate  25 mg  Oral Q12H [facility] Kelsie Navarrete PA-C      OLANZapine  5 mg Intramuscular Q3H PRN Max 3/day Alexander Javier-Anom, CRNP      OLANZapine  15 mg Oral HS Alexander Javier-Anom, CRNP      OLANZapine  2.5 mg Oral Q4H PRN Max 6/day Alexander Javier-Anom, CRNP      OLANZapine  5 mg Oral Q4H PRN Max 3/day Alexander Javier-Anom, CRNP      OLANZapine  5 mg Oral Q3H PRN Max 3/day Alexander Javier-Anom, CRNP      pantoprazole  40 mg Oral Early Morning Alexander Javier-Anom, CRNP      polyethylene glycol  17 g Oral Daily PRN Kelsie Navarrete PA-C      traZODone  100 mg Oral HS Andre Arvizu PA-C      zolpidem  10 mg Oral HS PRN Sameer East MD         Risks / Benefits of Treatment:     Risks, benefits, and possible side effects of medications explained to patient. Patient has limited understanding of risks and benefits of treatment at this time, but agrees to take medications as prescribed.    Counseling / Coordination of Care:     Patient's progress reviewed with nursing staff.  Medications, treatment progress and treatment plan reviewed with patient.  Supportive counseling provided to the patient.          DELANO Nevarez

## 2024-12-14 NOTE — NURSING NOTE
Patient social and out in the community.  Rated depression as mild and anxiety as high.  Denies any suicidal thoughts or hallucinations.  Pleasant during assessment. Compliant with medications and snacks.   zolpidem (AMBIEN) tablet 10 mg given at 2108 for complaint of insomnia. Reassessed at 2208, medication not effective. Medication education given. Care Plan reviewed and amended. Maintained on q 15 minute checks.  Will continue to monitor.

## 2024-12-14 NOTE — NURSING NOTE
Patient had insomnia during shift. Continued to complain of severe anxiety at 0300. Meehan Scale 25. Given Atarax 25 mg PO per request.  Reassessed at 0400. Appeared to be sleeping. Meehan Scale 0 at this time. Medication effective. Maintained on q 15 minute safety checks. Will continue to monitor.

## 2024-12-14 NOTE — PROGRESS NOTES
"Progress Note - Solo Mack 54 y.o. male MRN: [id number]    Unit/Bed#: -02 Encounter: [id number]        Subjective:   Patient seen and examined at bedside after reviewing the chart and discussing the case with the caring staff.      Patient examined at bedside.  Patient denies any acute symptoms.    Patient is a possible discharge this week.    Physical Exam   Vitals: Blood pressure 99/59, pulse 56, temperature (!) 97.2 °F (36.2 °C), temperature source Temporal, resp. rate 16, height 5' 7\" (1.702 m), weight 96.8 kg (213 lb 6.4 oz), SpO2 94%.,Body mass index is 33.42 kg/m².  Constitutional: Patient in no acute distress.  HEENT: PERR, EOMI, MMM.  Cardiovascular: Normal rate and regular rhythm.    Pulmonary/Chest: Effort normal and breath sounds normal.   Abdomen: Soft, + BS, NT.    Assessment/Plan:  oSlo Mack is a(n) 54 y.o. male with schizophrenia.     Cardiac with hx of HTN, HLD.  Continue home atorvastatin 10 mg daily.  Decr Lopressor from 50 mg to 25 mg q12h 12/6.  T2DM.  Hgb A1c 6.7% on 12/4/24.  D/c metformin due to pt refusing 12/5.  Accucheks twice daily.  Carb controlled diet.  GERD.  Patient is on Protonix 40 mg daily.  Chronic low back pain.  Continue gabapentin 400 mg TID.  CKD stage 3.  Stable.  Avoid nephrotoxins.   Vitamin D deficiency.  Patient started on vitamin D2 50,000 units weekly for 10 weeks followed by vitamin D3 1000 units daily.  Vitamin B12 deficiency.  Patient started on vitamin B12 1000 mcg daily.  "

## 2024-12-15 LAB
ATRIAL RATE: 100 BPM
GLUCOSE SERPL-MCNC: 185 MG/DL (ref 65–140)
GLUCOSE SERPL-MCNC: 215 MG/DL (ref 65–140)
P AXIS: 75 DEGREES
PR INTERVAL: 154 MS
QRS AXIS: 54 DEGREES
QRSD INTERVAL: 94 MS
QT INTERVAL: 328 MS
QTC INTERVAL: 423 MS
T WAVE AXIS: -43 DEGREES
VENTRICULAR RATE: 100 BPM

## 2024-12-15 PROCEDURE — 99232 SBSQ HOSP IP/OBS MODERATE 35: CPT

## 2024-12-15 PROCEDURE — 82948 REAGENT STRIP/BLOOD GLUCOSE: CPT

## 2024-12-15 PROCEDURE — 93010 ELECTROCARDIOGRAM REPORT: CPT | Performed by: INTERNAL MEDICINE

## 2024-12-15 RX ADMIN — GABAPENTIN 400 MG: 400 CAPSULE ORAL at 20:59

## 2024-12-15 RX ADMIN — ATORVASTATIN CALCIUM 10 MG: 10 TABLET, FILM COATED ORAL at 17:08

## 2024-12-15 RX ADMIN — ALUMINUM HYDROXIDE, MAGNESIUM HYDROXIDE, AND DIMETHICONE 30 ML: 200; 20; 200 SUSPENSION ORAL at 23:24

## 2024-12-15 RX ADMIN — PANTOPRAZOLE SODIUM 40 MG: 40 TABLET, DELAYED RELEASE ORAL at 05:54

## 2024-12-15 RX ADMIN — METOPROLOL TARTRATE 25 MG: 25 TABLET, FILM COATED ORAL at 09:00

## 2024-12-15 RX ADMIN — HYDROXYZINE HYDROCHLORIDE 50 MG: 50 TABLET, FILM COATED ORAL at 20:59

## 2024-12-15 RX ADMIN — TRAZODONE HYDROCHLORIDE 100 MG: 100 TABLET ORAL at 20:59

## 2024-12-15 RX ADMIN — METOPROLOL TARTRATE 25 MG: 25 TABLET, FILM COATED ORAL at 20:59

## 2024-12-15 RX ADMIN — CYANOCOBALAMIN TAB 500 MCG 1000 MCG: 500 TAB at 08:59

## 2024-12-15 RX ADMIN — GABAPENTIN 400 MG: 400 CAPSULE ORAL at 09:00

## 2024-12-15 RX ADMIN — LORAZEPAM 1 MG: 1 TABLET ORAL at 21:59

## 2024-12-15 RX ADMIN — HYDROXYZINE HYDROCHLORIDE 50 MG: 50 TABLET, FILM COATED ORAL at 17:07

## 2024-12-15 RX ADMIN — CLONAZEPAM 0.25 MG: 0.5 TABLET ORAL at 08:59

## 2024-12-15 RX ADMIN — OLANZAPINE 20 MG: 10 TABLET, FILM COATED ORAL at 20:59

## 2024-12-15 RX ADMIN — ALUMINUM HYDROXIDE, MAGNESIUM HYDROXIDE, AND DIMETHICONE 30 ML: 200; 20; 200 SUSPENSION ORAL at 11:28

## 2024-12-15 RX ADMIN — HYDROXYZINE HYDROCHLORIDE 50 MG: 50 TABLET, FILM COATED ORAL at 09:00

## 2024-12-15 RX ADMIN — ZOLPIDEM TARTRATE 10 MG: 5 TABLET ORAL at 21:59

## 2024-12-15 RX ADMIN — GABAPENTIN 400 MG: 400 CAPSULE ORAL at 17:08

## 2024-12-15 NOTE — PROGRESS NOTES
Progress Note - Behavioral Health   Name: Solo Mack 54 y.o. male I MRN: [id number]  Unit/Bed#: [facility] 208-02 I Date of Admission: 12/3/2024   Date of Service: 12/15/2024 I Hospital Day: 12     Assessment & Plan  Anxiety  Klonopin taper completed with final dose this am per primary tx team  Gabapentin 400mg TID  Atarax 50 mg TID  Continue Trazodone 100mg PO qHS for insomnia.  Bipolar disorder, current episode mixed, moderate (HCC)  Continue Zyprexa to 20mg PO at HS for continued insomnia, hs anxiety, mood stabilization  Chronic pain disorder  Per medical  H/O prolonged Q-T interval on ECG  QTc 429 on 12/2/2024      Progress Note - Behavioral Health   Solo Mack 54 y.o. male MRN: [id number]  Unit/Bed#: [facility] 208-02 Encounter: [id number]    Assessment & Plan   Principal Problem:    Bipolar disorder, current episode mixed, moderate (HCC)  Active Problems:    Chronic pain disorder    GERD (gastroesophageal reflux disease)    Essential hypertension    Anxiety    H/O prolonged Q-T interval on ECG      Subjective:Patient was seen today for continuation of care, records reviewed and  patient was discussed with the morning case review team.  Patient experienced severe anxiety last evening with elevated blood pressure, received as needed clonidine with effect.  Patient unable to identify trigger to his heightened anxiety besides the recent Klonopin taper, received last dose of Klonopin 0.25 mg this morning.  No evidence of withdrawal effects at this time.  Would benefit from increased coping skills and further therapy to aid with chronic anxiety.  This morning, he is resting comfortably in bed with no signs of distress.  Reports anxiety is moderate and controllable at this time.  Tolerated the recent titration of olanzapine 20 mg at bedtime for mood stabilization, sleep, anxiety.  No reported behaviors or aggression on the unit.  Periods where he continues to appear internally preoccupied and mumbling to self.  Patient denies endorsing any suicidal or homicidal ideation. Remains medication compliance. Denies any side effects from medications.    Psychiatric Review of Systems:    Sleep: slept better  Appetite: fair  Medication side effects: No   ROS: no complaints    Vitals:  Vitals:    12/15/24 0738   BP: 109/69   Pulse: 57   Resp: 16   Temp: 98 °F (36.7 °C)   SpO2: 97%       Mental Status Evaluation:    Appearance:  age appropriate, casually dressed, dressed appropriately   Behavior:  cooperative, calm   Speech:  normal rate and volume   Mood:  anxious   Affect:  constricted   Thought Process:  concrete   Associations: concrete associations   Thought Content:  some paranoia   Perceptual Disturbances: appears preoccupied   Risk Potential: Suicidal ideation - None at present  Homicidal ideation - None  Potential for aggression - No   Sensorium:  oriented to person, place, and time/date   Memory:  recent and remote memory grossly intact   Consciousness:  alert and awake   Attention: attention span and concentration appear shorter than expected for age   Insight:  limited   Judgment: limited   Gait/Station: in bed   Motor Activity: no abnormal movements     Laboratory results:  I have personally reviewed all pertinent laboratory/tests results.  Most Recent Labs:   Lab Results   Component Value Date    WBC 9.23 12/02/2024    RBC 5.25 12/02/2024    HGB 15.7 12/02/2024    HCT 45.8 12/02/2024     12/02/2024    RDW 12.3 12/02/2024    NEUTROABS 5.74 12/02/2024    SODIUM 138 12/02/2024    K 3.8 12/02/2024     12/02/2024    CO2 26 12/02/2024    BUN 17 12/02/2024    CREATININE 1.24 12/02/2024    GLUC 152 (H) 12/02/2024    GLUF 155 (H) 10/23/2024    CALCIUM 9.2 12/02/2024    AST 22 11/17/2024    ALT 20 11/17/2024    ALKPHOS 110 (H) 11/17/2024    TP 7.5 11/17/2024    ALB 4.8 11/17/2024    TBILI 0.95 11/17/2024    CHOLESTEROL 124 12/04/2024    HDL 36 (L) 12/04/2024    TRIG 169 (H) 12/04/2024    LDLCALC 54 12/04/2024     NONHDLC 88 12/04/2024    LITHIUM <0.10 (L) 04/13/2024    AMMONIA 15 11/12/2020    [id number] 1.312 12/02/2024    RPR Non-Reactive 11/06/2020    HGBA1C 6.7 (H) 12/04/2024     12/04/2024           Recommended Treatment:     All current active medications have been reviewed  Encourage group therapy, milieu therapy and occupational therapy  Behavioral Health checks for safety monitoring  Continue treatment with group therapy, milieu therapy and occupational therapy  Continue current medications:  Current Facility-Administered Medications   Medication Dose Route Frequency Provider Last Rate    acetaminophen  650 mg Oral Q4H PRN [facility]-Ano, CRNP      acetaminophen  650 mg Oral Q4H PRN [facility], CR[facility]      acetaminophen  975 mg Oral Q6H PRN Alexander Javier-Ano, CRNP      aluminum-magnesium hydroxide-simethicone  30 mL Oral Q4H PRN Kelsie Navarrete PA-C      atorvastatin  10 mg Oral Daily With Dinner Alexander JavierFeliz, CROMAR      benztropine  0.5 mg Oral Q4H PRN Max 6/day Alexander JavierFeliz, DELANO      ergocalciferol  50,000 Units Oral Weekly Kelsie Navarrete PA-C      Followed by    [START ON 2/14/2025] Cholecalciferol  1,000 Units Oral Daily Kelsie Navarrete PA-C      cloNIDine  0.1 mg Oral BID PRN Quin Andrade PA-C      cyanocobalamin  1,000 mcg Oral Daily Kelsie Navarrete PA-C      gabapentin  300 mg Oral Daily PRN Kelsie Navarrete PA-C      gabapentin  400 mg Oral TID Alexander JavierFeliz, DELANO      hydrOXYzine HCL  25 mg Oral Q6H PRN Max 4/day [facility], CRNP      hydrOXYzine HCL  50 mg Oral TID Alexander [facility]caterina, CROMAR      LORazepam  1 mg Intramuscular Q6H PRN Max 3/day Alexander Javier-[facility]caterina, CRNP      LORazepam  0.5 mg Oral Q6H PRN Max 4/day Alexander Javier-Ano, CRNP      LORazepam  1 mg Oral Q6H PRN Max 3/day Alexander Javier-Anom, CRNP      metoprolol tartrate  25 mg Oral Q12H ROBER Kelsie Navarrete PA-C      nitroglycerin  0.4 mg Sublingual Q5 Min PRN Chance Garza MD       OLANZapine  5 mg Intramuscular Q3H PRN Max 3/day Alexander Javier-Art[facility], CRNP      OLANZapine  2.5 mg Oral Q4H PRN Max 6/day Alexander Javier-Anom, CRNP      OLANZapine  20 mg Oral HS DELANO Quispe      OLANZapine  5 mg Oral Q4H PRN Max 3/day Alexander Javier-Anom, CRNP      OLANZapine  5 mg Oral Q3H PRN Max 3/day Alexander Javier-Anom, CRNP      pantoprazole  40 mg Oral Early Morning Alexander Javier-Anom, CRNP      polyethylene glycol  17 g Oral Daily PRN Kelsie Navarrete PA-C      traZODone  100 mg Oral HS Andre Arvizu PA-C      zolpidem  10 mg Oral HS PRN Sameer East MD         Risks / Benefits of Treatment:     Risks, benefits, and possible side effects of medications explained to patient. Patient has limited understanding of risks and benefits of treatment at this time, but agrees to take medications as prescribed.    Counseling / Coordination of Care:     Patient's progress reviewed with nursing staff.  Medications, treatment progress and treatment plan reviewed with patient.  Supportive counseling provided to the patient.          DELANO Nevarez

## 2024-12-15 NOTE — PROGRESS NOTES
"Progress Note - Solo Mack 54 y.o. male MRN: [id number]    Unit/Bed#: [facility]-02 Encounter: [id number]        Subjective:   Patient seen and examined at bedside after reviewing the chart and discussing the case with the caring staff.      Patient examined at bedside.  Patient received as needed clonidine as patient's blood pressure was 180/97 yesterday evening.    Patient is a possible discharge this week.    Physical Exam   Vitals: Blood pressure 109/69, pulse 57, temperature 98 °F (36.7 °C), temperature source Temporal, resp. rate 16, height 5' 7\" (1.702 m), weight 96.8 kg (213 lb 6.4 oz), SpO2 97%.,Body mass index is 33.42 kg/m².  Constitutional: Patient in no acute distress.  HEENT: PERR, EOMI, MMM.  Cardiovascular: Normal rate and regular rhythm.    Pulmonary/Chest: Effort normal and breath sounds normal.   Abdomen: Soft, + BS, NT.    Assessment/Plan:  Solo Mack is a(n) 54 y.o. male with schizophrenia.     Cardiac with hx of HTN, HLD.  Continue home atorvastatin 10 mg daily.  Decr Lopressor from 50 mg to 25 mg q12h 12/6.  T2DM.  Hgb A1c 6.7% on 12/4/24.  D/c metformin due to pt refusing 12/5.  Accucheks twice daily.  Carb controlled diet.  GERD.  Patient is on Protonix 40 mg daily.  Chronic low back pain.  Continue gabapentin 400 mg TID.  CKD stage 3.  Stable.  Avoid nephrotoxins.   Vitamin D deficiency.  Patient started on vitamin D2 50,000 units weekly for 10 weeks followed by vitamin D3 1000 units daily.  Vitamin B12 deficiency.  Patient started on vitamin B12 1000 mcg daily.  "

## 2024-12-15 NOTE — TREATMENT TEAM
12/14/24 2034   Vital Signs   Pulse 100   Heart Rate Source Radial   Blood Pressure (!) 172/96   BP Location Left arm   BP Method Manual   Patient Position - Orthostatic VS Lying

## 2024-12-15 NOTE — NURSING NOTE
Follow up Gabapentin 300 mg, effective. Pt observed resting in bed. No sign of pain or discomfort. Breathing calm and even. No SOB or labored breathing. Will continue to monitor. Safety checks continued.

## 2024-12-15 NOTE — NURSING NOTE
Patient is visible in milieu for meals otherwise withdrawn to room. Patient presents as flat and anxious. Patient endorses anxiety 5-6/10 today. Denies SI/HI/AVH. Patient is compliant with medications. No acute behaviors noted. Q 15 min safety checks maintained. Fall precautions maintained.

## 2024-12-15 NOTE — ASSESSMENT & PLAN NOTE
Klonopin taper completed with final dose this am per primary tx team  Gabapentin 400mg TID  Atarax 50 mg TID  Continue Trazodone 100mg PO qHS for insomnia.

## 2024-12-15 NOTE — PLAN OF CARE
Problem: Anxiety  Goal: Anxiety is at manageable level  Description: Interventions:  - Assess and monitor patient's anxiety level.   - Monitor for signs and symptoms (heart palpitations, chest pain, shortness of breath, headaches, nausea, feeling jumpy, restlessness, irritable, apprehensive).   - Collaborate with interdisciplinary team and initiate plan and interventions as ordered.  - [place] patient to unit/surroundings  - Explain treatment plan  - Encourage participation in care  - Encourage verbalization of concerns/fears  - Identify coping mechanisms  - Assist in developing anxiety-reducing skills  - Administer/offer alternative therapies  - Limit or eliminate stimulants  Outcome: Not Progressing

## 2024-12-15 NOTE — NURSING NOTE
Upon reassessment, patient reports no relief. He continues to report high anxiety and feels like he is having a heart attack. VS assessed. BP (185/97) and HR (121) elevated. PRN clonidine administered at 1927.  EKG completed and is unchanged from prior EKGs. Dr. Garza made aware and awaiting a response at this time.  Coping skills encouraged and patient is requesting more PRN medication. Patient informed he will be receiving night time medication shortly. Snack and cold drink provided.

## 2024-12-16 ENCOUNTER — TELEPHONE (OUTPATIENT)
Age: 54
End: 2024-12-16

## 2024-12-16 LAB
GLUCOSE SERPL-MCNC: 187 MG/DL (ref 65–140)
GLUCOSE SERPL-MCNC: 199 MG/DL (ref 65–140)

## 2024-12-16 PROCEDURE — 82948 REAGENT STRIP/BLOOD GLUCOSE: CPT

## 2024-12-16 PROCEDURE — 99232 SBSQ HOSP IP/OBS MODERATE 35: CPT

## 2024-12-16 RX ORDER — CLONIDINE HYDROCHLORIDE 0.1 MG/1
0.1 TABLET ORAL EVERY 12 HOURS SCHEDULED
Status: DISCONTINUED | OUTPATIENT
Start: 2024-12-16 | End: 2024-12-18 | Stop reason: HOSPADM

## 2024-12-16 RX ADMIN — METOPROLOL TARTRATE 25 MG: 25 TABLET, FILM COATED ORAL at 21:14

## 2024-12-16 RX ADMIN — HYDROXYZINE HYDROCHLORIDE 50 MG: 50 TABLET, FILM COATED ORAL at 21:15

## 2024-12-16 RX ADMIN — GABAPENTIN 400 MG: 400 CAPSULE ORAL at 16:02

## 2024-12-16 RX ADMIN — TRAZODONE HYDROCHLORIDE 100 MG: 100 TABLET ORAL at 21:15

## 2024-12-16 RX ADMIN — CYANOCOBALAMIN TAB 500 MCG 1000 MCG: 500 TAB at 08:16

## 2024-12-16 RX ADMIN — CLONIDINE HYDROCHLORIDE 0.1 MG: 0.1 TABLET ORAL at 13:23

## 2024-12-16 RX ADMIN — LORAZEPAM 1 MG: 1 TABLET ORAL at 16:02

## 2024-12-16 RX ADMIN — GABAPENTIN 400 MG: 400 CAPSULE ORAL at 08:16

## 2024-12-16 RX ADMIN — HYDROXYZINE HYDROCHLORIDE 50 MG: 50 TABLET, FILM COATED ORAL at 08:16

## 2024-12-16 RX ADMIN — ZOLPIDEM TARTRATE 10 MG: 5 TABLET ORAL at 21:15

## 2024-12-16 RX ADMIN — ATORVASTATIN CALCIUM 10 MG: 10 TABLET, FILM COATED ORAL at 16:02

## 2024-12-16 RX ADMIN — OLANZAPINE 20 MG: 10 TABLET, FILM COATED ORAL at 21:14

## 2024-12-16 RX ADMIN — HYDROXYZINE HYDROCHLORIDE 25 MG: 25 TABLET ORAL at 10:51

## 2024-12-16 RX ADMIN — GABAPENTIN 300 MG: 300 CAPSULE ORAL at 00:37

## 2024-12-16 RX ADMIN — CLONIDINE HYDROCHLORIDE 0.1 MG: 0.1 TABLET ORAL at 21:14

## 2024-12-16 RX ADMIN — GABAPENTIN 400 MG: 400 CAPSULE ORAL at 21:15

## 2024-12-16 NOTE — PROGRESS NOTES
"Progress Note - Solo Mack 54 y.o. male MRN: [id number]    Unit/Bed#: [facility]02 Encounter: [id number]        Subjective:   Patient seen and examined at bedside after reviewing the chart and discussing the case with the caring staff.      Patient examined at bedside.  Patient reports no acute symptoms.    Patient is a possible discharge this week.    Physical Exam   Vitals: Blood pressure 90/50, pulse 55, temperature 98.1 °F (36.7 °C), temperature source Temporal, resp. rate 16, height 5' 7\" (1.702 m), weight 96.8 kg (213 lb 6.4 oz), SpO2 93%.,Body mass index is 33.42 kg/m².  Constitutional: Patient in no acute distress.  HEENT: PERR, EOMI, MMM.  Cardiovascular: Normal rate and regular rhythm.    Pulmonary/Chest: Effort normal and breath sounds normal.   Abdomen: Soft, + BS, NT.    Assessment/Plan:  Solo Mack is a(n) 54 y.o. male with schizophrenia.     Cardiac with hx of HTN, HLD.  Continue home atorvastatin 10 mg daily.  Decr Lopressor from 50 mg to 25 mg q12h 12/6.  T2DM.  Hgb A1c 6.7% on 12/4/24.  D/c metformin due to pt refusing 12/5.  Accucheks twice daily.  Carb controlled diet.  GERD.  Patient is on Protonix 40 mg daily.  Chronic low back pain.  Continue gabapentin 400 mg TID.  CKD stage 3.  Stable.  Avoid nephrotoxins.   Vitamin D deficiency.  Patient started on vitamin D2 50,000 units weekly for 10 weeks followed by vitamin D3 1000 units daily.  Vitamin B12 deficiency.  Patient started on vitamin B12 1000 mcg daily.  "

## 2024-12-16 NOTE — PLAN OF CARE
Problem: Alteration in Thoughts and Perception  Goal: Verbalize thoughts and feelings  Description: Interventions:  - Promote a nonjudgmental and trusting relationship with the patient through active listening and therapeutic communication  - Assess patient's level of functioning, behavior and potential for risk  - Engage patient in 1 on 1 interactions  - Encourage patient to express fears, feelings, frustrations, and discuss symptoms    - [place] patient to reality, help patient recognize reality-based thinking   - Administer medications as ordered and assess for potential side effects  - Provide the patient education related to the signs and symptoms of the illness and desired effects of prescribed medications  Outcome: Progressing     Problem: Ineffective Coping  Goal: Free from restraint events  Description: - Utilize least restrictive measures   - Provide behavioral interventions   - Redirect inappropriate behaviors   Outcome: Progressing

## 2024-12-16 NOTE — PLAN OF CARE
Problem: Alteration in Thoughts and Perception  Goal: Attend and participate in unit activities, including therapeutic, recreational, and educational groups  Description: Interventions:  -Encourage Visitation and family involvement in care  Outcome: Not Progressing     Problem: Ineffective Coping  Goal: Participates in unit activities  Description: Interventions:  - Provide therapeutic environment   - Provide required programming   - Redirect inappropriate behaviors   Outcome: Not Progressing     Problem: Risk for Violence/Aggression Toward Others  Goal: Attend and participate in unit activities, including therapeutic, recreational, and educational groups  Description: Interventions:  - Provide therapeutic and educational activities daily, encourage attendance and participation, and document same in the medical record   Outcome: Not Progressing     Problem: Alteration in Orientation  Goal: Attend and participate in unit activities, including therapeutic, recreational, and educational groups  Description: Interventions:  - Provide therapeutic and educational activities daily, encourage attendance and participation, and document same in the medical record   - Provide appropriate opportunities for reminiscence   - Provide a consistent daily routine   - Encourage family contact/visitation   Outcome: Not Progressing

## 2024-12-16 NOTE — TREATMENT TEAM
12/16/24 1045   Meehan Anxiety Scale   Anxious Mood 1   Tension 1   Fears 0   Insomnia 1   Intellectual 1   Depressed Mood 2   Somatic Complaints: Muscular 0   Somatic Complaints: Sensory 0   Cardiovascular Symptoms 0   Respiratory Symptoms 0   Gastrointestinal Symptoms 0   Genitourinary Symptoms 0   Autonomic Symptoms 2   Behavior at Interview 2   Meehan Anxiety Score 10     Patient approached the nursing stations he was having racing thoughts, high BP, and felt restless. This writer suggested he go to group and asked what he typically uses as coping skills. He stated that he doesn't have coping skills and did not want to go to group and was angry that he had been tapered off the Klonipin after 30 years. Vitals taken. Patient resting in gurjit-chair by nurses station. 25mg Atarax given. Will monitor for effectiveness.

## 2024-12-16 NOTE — NURSING NOTE
Pt was mostly withdrawn to room and self this evening. Pt endorsed high anxiety. Administered 1mg ativan @ 2159, upon reassessment @ 2250, somewhat effective, pt was asking for more PRN anxiety medications. Pt states they are still experiencing anxiety. Pt denies all other psych symptoms, but appears internally preoccupied at times talking to themselves. Pt was cooperative and med compliant. Will CTM. Q15 minute pt safety checks ongoing.

## 2024-12-16 NOTE — NURSING NOTE
Patient selectively visible in milieu with peers, at times withdrawn to room, medication compliant and cooperative. Patient denied all this morning, no anxiety/depression, denies SI/HI and hallucinations. This afternoon patient was shaky and had no appetite, claims he wasn't aware of klonopin taper. Patient was having severe anxiety with a Meehan of 26 and gave PRN Ativan 1 mg at 1602. Tried several times to educate about benzos, and offered Atarax 50 mg instead, but patient was insistent. Continued care and safety rounds in progress. PRN Ativan 1 mg at 1602 was effective.

## 2024-12-16 NOTE — TELEPHONE ENCOUNTER
Received ASAP referral to schedule C appt.  Pt scheduled on 03/24/25 @ 2pm w/ Dr. Cope.  [facility] with appt details sent to [facility].

## 2024-12-16 NOTE — SOCIAL WORK
Victorino spoke to Deja [phone], pt spouse. Discussed d/c plan and supports. Deja in agreement and will p/u pt on Wed at 6pm after work,  Deja was appreciative of d/c plan and getting pt appt within  network. She expressed concern with pt misuse of benzo and med seeking behavior. Deja expressed gratitude and call ended mutually,

## 2024-12-16 NOTE — PROGRESS NOTES
Progress Note - Behavioral Health   Name: Solo Mack 54 y.o. male I MRN: [id number]  Unit/Bed#: [facility] 208-02 I Date of Admission: 12/3/2024   Date of Service: 12/16/2024 I Hospital Day: 13     Assessment & Plan  Anxiety  Klonopin taper completed Sunday.   Start clonidine 0.1 mg p.o. twice daily for chronic benzo use/anxiety.   Blood pressure parameters in comment section of order.  Gabapentin 400mg TID  Atarax 50 mg TID  Continue Trazodone 100mg PO qHS for insomnia.  Bipolar disorder, current episode mixed, moderate (HCC)  Continue Zyprexa 20mg PO at HS for continued insomnia, hs anxiety, mood stabilization  Chronic pain disorder  Per medical  H/O prolonged Q-T interval on ECG  QTc 429 on 12/2/2024    Planned medication and treatment changes:    All current active medications have been reviewed  Encourage group therapy, milieu therapy and occupational therapy  Behavioral Health checks for safety monitoring  Start clonidine 0.1 mg p.o. twice daily  Continue all other medications.  201 committment.    Behavior over the last 24 hours: slowly improving.     Solo is seen today for psychiatric follow up. Per nursing notes, visible otherwise withdrawn to room, endorses anxiety, denies SI HI and AVH.  Received Ativan 1 mg p.o. as needed and was asking for more/med seeking. Noted to talk to staff at times. Had broken sleep.    Today Solo is seen lying in his room superficially cooperative. He has an irritable edge, but does calm down/become cooperative when educated on reasoning. Discussed patient's symptoms and patient is agreeable to starting Clonidine for possible rebound anxiety symptoms. Patient voicing frustration and was provided reassurance/support. Reports anxiety today and denies all else, stating he gets racing thoughts about life's stressors. Patient was more fixated on discharge/anxiety symptoms, however no overt delusional content elicited during today's conversation. Tolerating Zyprexa since  increased, denies medication side effects at this time. Again, educated patient on the risks/side effects and black box warnings of benzo usage. Denies suicidal and homicidal ideations.    Sleep:  Broken sleep, utilized prns to aid sleep.  Appetite:  adequate  Medication side effects: No   ROS: no complaints    Mental Status Evaluation:    Appearance:  age appropriate, marginal hygiene, dressed in hospital attire, looks stated age,     Behavior:  Superficially cooperative, mostly calm with irritable edge, voicing frustration related to anxiety symptoms in the context of benzo taper.   Speech:  normal rate and volume   Mood:  anxious, irritable   Affect:  Irritable, does not appear anxious but voices it   Thought Process:  perseverative   Associations: concrete associations, perseverative   Thought Content:  some paranoia, ruminations, preoccupied with medications/discharge, racing thoughts   Perceptual Disturbances: denies auditory hallucinations when asked, denies visual hallucinations when asked, appears preoccupied, per staff response to internal stimuli however appears less frequent   Risk Potential: Suicidal ideation - None at present  Homicidal ideation - None at present  Potential for aggression - No   Sensorium:  oriented to person, place, and time/date   Memory:  recent and remote memory grossly intact   Consciousness:  alert and awake   Attention/Concentration: attention span and concentration appear shorter than expected for age   Insight:  poor   Judgment: limited   Gait/Station: Seen sitting in a chair   Motor Activity: no abnormal movements     Vital signs in last 24 hours:    Temp:  [97.8 °F (36.6 °C)-98.1 °F (36.7 °C)] 98.1 °F (36.7 °C)  HR:  [55-97] 97  BP: ()/(50-91) 147/91  Resp:  [16-18] 16  SpO2:  [93 %-96 %] 93 %  O2 Device: None (Room air)    Laboratory results: I have personally reviewed all pertinent laboratory/tests results    Results from the past 24 hours:   Recent Results (from the  past 24 hours)   Fingerstick Glucose (POCT)    Collection Time: 12/15/24  4:29 PM   Result Value Ref Range    POC Glucose 215 (H) 65 - 140 mg/dl   Fingerstick Glucose (POCT)    Collection Time: 12/16/24  7:48 AM   Result Value Ref Range    POC Glucose 187 (H) 65 - 140 mg/dl       Progress Toward Goals: Irritable and voices frustration about medications/anxiety/discharge, denies psych symptoms other than anxiety when asked, agreeable to starting Clonidine, educated to reduce usage of benzos as well as the side effects/risks. Denies SI/HI. Less talking/mumbling to self per staff.    Assessment & Plan   Principal Problem:    Bipolar disorder, current episode mixed, moderate (HCC)  Active Problems:    Chronic pain disorder    GERD (gastroesophageal reflux disease)    Essential hypertension    Anxiety    H/O prolonged Q-T interval on ECG        Current Facility-Administered Medications   Medication Dose Route Frequency Provider Last Rate    acetaminophen  650 mg Oral Q4H PRN Alexander Ledesma, MIGUELANGELNP      acetaminophen  650 mg Oral Q4H PRN DELANO Gallo      acetaminophen  975 mg Oral Q6H PRN DELANO Gallo      aluminum-magnesium hydroxide-simethicone  30 mL Oral Q4H PRN Kelsie Navarrete PA-C      atorvastatin  10 mg Oral Daily With Dinner DELANO Gallo      benztropine  0.5 mg Oral Q4H PRN Max 6/day DELANO Gallo      ergocalciferol  50,000 Units Oral Weekly Kelsie Navarrete PA-C      Followed by    [START ON 2/14/2025] Cholecalciferol  1,000 Units Oral Daily Kelsie Navarrete PA-C      cloNIDine  0.1 mg Oral BID PRN Quin Andrade PA-C      cloNIDine  0.1 mg Oral Q12H ROBER Arvizu PA-C      cyanocobalamin  1,000 mcg Oral Daily Kelsie Navarrete PA-C      gabapentin  300 mg Oral Daily PRN Kelsie Navarrete PA-C      gabapentin  400 mg Oral TID DELANO Gallo      hydrOXYzine HCL  25 mg Oral Q6H PRN Max 4/day DELANO Galol       hydrOXYzine HCL  50 mg Oral TID Alexander Javier-Anom, CRNP      LORazepam  1 mg Intramuscular Q6H PRN Max 3/day Alexander Javier-Anom, CRNP      LORazepam  0.5 mg Oral Q6H PRN Max 4/day Alexander Javier-Anom, CRNP      LORazepam  1 mg Oral Q6H PRN Max 3/day Alexander Javier-Anom, CRNP      metoprolol tartrate  25 mg Oral Q12H [facility] Kelsie Navarrete PA-C      nitroglycerin  0.4 mg Sublingual Q5 Min PRN Chance Garza MD      OLANZapine  5 mg Intramuscular Q3H PRN Max 3/day Alexander Javier-Anom, CRNP      OLANZapine  2.5 mg Oral Q4H PRN Max 6/day [place] Javier-Anom, CRNP      OLANZapine  20 mg Oral HS Marco Lemos, CRNP      OLANZapine  5 mg Oral Q4H PRN Max 3/day [place] Javier-Anom, CRNP      OLANZapine  5 mg Oral Q3H PRN Max 3/day [place] Javier-Anom, CRNP      pantoprazole  40 mg Oral Early Morning [place] Javier-Anom, CRNP      polyethylene glycol  17 g Oral Daily PRN Kelsie Navarrete PA-C      traZODone  100 mg Oral HS Andre Arvizu PA-C      zolpidem  10 mg Oral HS PRN Sameer East MD       Risks / Benefits of Treatment:    Risks, benefits, and possible side effects of medications explained to patient and patient verbalizes understanding and agreement for treatment.    Counseling / Coordination of Care:    Patient's progress discussed with staff in treatment team meeting.  Medication changes discussed with patient.  Medication education provided to patient.  Educated on importance of medication and treatment compliance.  Reassurance and supportive therapy provided.  Group attendance encouraged.    Andre Arvizu PA-C 12/16/24

## 2024-12-16 NOTE — NUTRITION
12/16/24 1355   Biochemical Data,Medical Tests, and Procedures   Biochemical Data/Medical Tests/Procedures Lab values reviewed;Meds reviewed   Labs (Comment) BG elevated   Meds (Comment) Vit D, Vit B12, neurontin, ativan, lopressor, protonix   Nutrition-Focused Physical Exam   Nutrition-Focused Physical Exam Findings RN skin assessment reviewed;No skin issues documented   Medical-Related Concerns PMH reviewed   Adequacy of Intake   Nutrition Modality PO   Feeding Route   PO Independent   Current PO Intake   Current Diet Order CCD 2 diet thin liquids   Current Meal Intake %   Estimated calorie intake compared to estimated need Nutrient needs met.   PES Statement   Problem Continue previous diagnosis   Recommendations/Interventions   Malnutrition/BMI Present No  (does not meet criteria)   Summary CCD 2 diet thin liquids. No nutrition supplements. Meal completions mostly 100%. No new weight. Medications reviewed. BG elevated. Skin intact. Reports his appetite is great. Diet explained. Possible discharge this week per notes.   Interventions/Recommendations Continue current diet order   Education Assessment   Education Patient/caregiver not appropriate for education at this time;Education not indicated at this time   Patient Nutrition Goals   Goal Avoid weight loss;Adequate hydration;Adequate intake   Goal Status Met;Extended   Timeframe to complete goal by next f/u   Nutrition Complexity Risk   Nutrition complexity level Low risk   Follow up date 12/30/24

## 2024-12-16 NOTE — PROGRESS NOTES
12/16/24    Team Meeting   Meeting Type Daily Rounds   Team Members Present   Team Members Present Physician;Nurse;Occupational Therapist;   Physician Team Member Kita LORENZANA   Nursing Team Member Sabrina VELAZQUEZ   Social Work Team Member Venkatesh NAYLOR   OT Team Member Quincy LÓPEZA/CAMERON   Patient/Family Present   Patient Present No   Patient's Family Present No   201,denies all, paranoid, internally pre-occupied, klonopin taper, multiple PRNs, bizarre, withdrawn to room

## 2024-12-16 NOTE — NURSING NOTE
Patient had broken sleep. No acute behaviors observed. Will CTM. Q15 minute patient safety checks in progress.

## 2024-12-16 NOTE — CMS CERTIFICATION NOTE
Recertification: Based upon physical, mental and social evaluations, I certify that inpatient psychiatric services continue to be medically necessary for this patient for a duration of 30 midnights for the treatment of Bipolar disorder, current episode mixed, moderate (HCC) Available alternative community resources still do not meet the patient's mental health care needs. I further attest that an established written individualized plan of care has been updated and is outlined in the patient's medical records.

## 2024-12-17 ENCOUNTER — HOSPITAL ENCOUNTER (EMERGENCY)
Facility: HOSPITAL | Age: 54
Discharge: HOME | End: 2024-12-18
Attending: EMERGENCY MEDICINE
Payer: COMMERCIAL

## 2024-12-17 DIAGNOSIS — I10 HYPERTENSION: Primary | ICD-10-CM

## 2024-12-17 DIAGNOSIS — F41.9 ANXIETY: ICD-10-CM

## 2024-12-17 DIAGNOSIS — R07.9 CHEST PAIN: ICD-10-CM

## 2024-12-17 PROBLEM — R00.2 PALPITATIONS: Status: RESOLVED | Noted: 2024-10-21 | Resolved: 2024-12-17

## 2024-12-17 PROBLEM — R21 RASH: Status: RESOLVED | Noted: 2024-04-11 | Resolved: 2024-12-17

## 2024-12-17 PROBLEM — F41.1 GENERALIZED ANXIETY DISORDER: Status: RESOLVED | Noted: 2018-08-07 | Resolved: 2024-12-17

## 2024-12-17 PROBLEM — R11.10 VOMITING: Status: RESOLVED | Noted: 2018-08-15 | Resolved: 2024-12-17

## 2024-12-17 PROBLEM — R10.84 GENERALIZED ABDOMINAL PAIN: Status: RESOLVED | Noted: 2018-08-15 | Resolved: 2024-12-17

## 2024-12-17 PROBLEM — F32.9 MAJOR DEPRESSIVE DISORDER, SINGLE EPISODE, UNSPECIFIED: Status: RESOLVED | Noted: 2024-12-17 | Resolved: 2024-12-17

## 2024-12-17 PROBLEM — F29 PSYCHOSIS (HCC): Status: ACTIVE | Noted: 2024-12-17

## 2024-12-17 PROBLEM — F32.9 MAJOR DEPRESSIVE DISORDER, SINGLE EPISODE, UNSPECIFIED: Status: ACTIVE | Noted: 2024-12-17

## 2024-12-17 PROBLEM — F29 PSYCHOSIS (HCC): Status: RESOLVED | Noted: 2024-12-17 | Resolved: 2024-12-17

## 2024-12-17 LAB
ANION GAP SERPL CALCULATED.3IONS-SCNC: 9 MMOL/L (ref 4–13)
BASOPHILS # BLD AUTO: 0.03 THOUSANDS/ΜL (ref 0–0.1)
BASOPHILS NFR BLD AUTO: 0 % (ref 0–1)
BUN SERPL-MCNC: 13 MG/DL (ref 5–25)
CALCIUM SERPL-MCNC: 9.6 MG/DL (ref 8.4–10.2)
CARDIAC TROPONIN I PNL SERPL HS: 6 NG/L (ref ?–50)
CHLORIDE SERPL-SCNC: 102 MMOL/L (ref 96–108)
CO2 SERPL-SCNC: 26 MMOL/L (ref 21–32)
CREAT SERPL-MCNC: 1.34 MG/DL (ref 0.6–1.3)
EOSINOPHIL # BLD AUTO: 0.09 THOUSAND/ΜL (ref 0–0.61)
EOSINOPHIL NFR BLD AUTO: 1 % (ref 0–6)
ERYTHROCYTE [DISTWIDTH] IN BLOOD BY AUTOMATED COUNT: 11.9 % (ref 11.6–15.1)
GFR SERPL CREATININE-BSD FRML MDRD: 59 ML/MIN/1.73SQ M
GLUCOSE SERPL-MCNC: 173 MG/DL (ref 65–140)
GLUCOSE SERPL-MCNC: 194 MG/DL (ref 65–140)
GLUCOSE SERPL-MCNC: 212 MG/DL (ref 65–140)
HCT VFR BLD AUTO: 42.2 % (ref 36.5–49.3)
HGB BLD-MCNC: 14.9 G/DL (ref 12–17)
IMM GRANULOCYTES # BLD AUTO: 0.03 THOUSAND/UL (ref 0–0.2)
IMM GRANULOCYTES NFR BLD AUTO: 0 % (ref 0–2)
LYMPHOCYTES # BLD AUTO: 1.73 THOUSANDS/ΜL (ref 0.6–4.47)
LYMPHOCYTES NFR BLD AUTO: 19 % (ref 14–44)
MAGNESIUM SERPL-MCNC: 1.7 MG/DL (ref 1.9–2.7)
MCH RBC QN AUTO: 30.2 PG (ref 26.8–34.3)
MCHC RBC AUTO-ENTMCNC: 35.3 G/DL (ref 31.4–37.4)
MCV RBC AUTO: 86 FL (ref 82–98)
MONOCYTES # BLD AUTO: 0.72 THOUSAND/ΜL (ref 0.17–1.22)
MONOCYTES NFR BLD AUTO: 8 % (ref 4–12)
NEUTROPHILS # BLD AUTO: 6.41 THOUSANDS/ΜL (ref 1.85–7.62)
NEUTS SEG NFR BLD AUTO: 72 % (ref 43–75)
NRBC BLD AUTO-RTO: 0 /100 WBCS
PLATELET # BLD AUTO: 236 THOUSANDS/UL (ref 149–390)
PMV BLD AUTO: 10.1 FL (ref 8.9–12.7)
POTASSIUM SERPL-SCNC: 3.7 MMOL/L (ref 3.5–5.3)
RBC # BLD AUTO: 4.93 MILLION/UL (ref 3.88–5.62)
SODIUM SERPL-SCNC: 137 MMOL/L (ref 135–147)
TSH SERPL DL<=0.05 MIU/L-ACNC: 1.25 UIU/ML (ref 0.45–4.5)
WBC # BLD AUTO: 9.01 THOUSAND/UL (ref 4.31–10.16)

## 2024-12-17 PROCEDURE — 93005 ELECTROCARDIOGRAM TRACING: CPT

## 2024-12-17 PROCEDURE — 99284 EMERGENCY DEPT VISIT MOD MDM: CPT

## 2024-12-17 PROCEDURE — 84484 ASSAY OF TROPONIN QUANT: CPT | Performed by: EMERGENCY MEDICINE

## 2024-12-17 PROCEDURE — 82948 REAGENT STRIP/BLOOD GLUCOSE: CPT

## 2024-12-17 PROCEDURE — 84443 ASSAY THYROID STIM HORMONE: CPT | Performed by: EMERGENCY MEDICINE

## 2024-12-17 PROCEDURE — 99232 SBSQ HOSP IP/OBS MODERATE 35: CPT

## 2024-12-17 PROCEDURE — 83735 ASSAY OF MAGNESIUM: CPT | Performed by: EMERGENCY MEDICINE

## 2024-12-17 PROCEDURE — 96361 HYDRATE IV INFUSION ADD-ON: CPT

## 2024-12-17 PROCEDURE — 80048 BASIC METABOLIC PNL TOTAL CA: CPT | Performed by: EMERGENCY MEDICINE

## 2024-12-17 PROCEDURE — 85025 COMPLETE CBC W/AUTO DIFF WBC: CPT | Performed by: EMERGENCY MEDICINE

## 2024-12-17 PROCEDURE — 36415 COLL VENOUS BLD VENIPUNCTURE: CPT | Performed by: EMERGENCY MEDICINE

## 2024-12-17 PROCEDURE — 99285 EMERGENCY DEPT VISIT HI MDM: CPT | Performed by: EMERGENCY MEDICINE

## 2024-12-17 PROCEDURE — 96365 THER/PROPH/DIAG IV INF INIT: CPT

## 2024-12-17 RX ORDER — HYDROXYZINE HYDROCHLORIDE 50 MG/1
50 TABLET, FILM COATED ORAL 3 TIMES DAILY
Qty: 90 TABLET | Refills: 0 | Status: SHIPPED | OUTPATIENT
Start: 2024-12-17

## 2024-12-17 RX ORDER — METOPROLOL TARTRATE 25 MG/1
12.5 TABLET, FILM COATED ORAL EVERY 12 HOURS SCHEDULED
Status: DISCONTINUED | OUTPATIENT
Start: 2024-12-17 | End: 2024-12-18 | Stop reason: HOSPADM

## 2024-12-17 RX ORDER — TRAZODONE HYDROCHLORIDE 100 MG/1
100 TABLET ORAL
Qty: 30 TABLET | Refills: 0 | Status: SHIPPED | OUTPATIENT
Start: 2024-12-17 | End: 2024-12-18

## 2024-12-17 RX ORDER — OLANZAPINE 20 MG/1
20 TABLET ORAL
Qty: 30 TABLET | Refills: 0 | Status: SHIPPED | OUTPATIENT
Start: 2024-12-17 | End: 2024-12-18

## 2024-12-17 RX ORDER — MAGNESIUM SULFATE HEPTAHYDRATE 40 MG/ML
2 INJECTION, SOLUTION INTRAVENOUS ONCE
Status: COMPLETED | OUTPATIENT
Start: 2024-12-17 | End: 2024-12-17

## 2024-12-17 RX ORDER — CLONIDINE HYDROCHLORIDE 0.1 MG/1
0.1 TABLET ORAL EVERY 12 HOURS SCHEDULED
Qty: 60 TABLET | Refills: 0 | Status: SHIPPED | OUTPATIENT
Start: 2024-12-17 | End: 2024-12-18

## 2024-12-17 RX ADMIN — HYDROXYZINE HYDROCHLORIDE 50 MG: 50 TABLET, FILM COATED ORAL at 08:25

## 2024-12-17 RX ADMIN — HYDROXYZINE HYDROCHLORIDE 25 MG: 25 TABLET ORAL at 13:57

## 2024-12-17 RX ADMIN — CYANOCOBALAMIN TAB 500 MCG 1000 MCG: 500 TAB at 08:25

## 2024-12-17 RX ADMIN — SODIUM CHLORIDE 1000 ML: 0.9 INJECTION, SOLUTION INTRAVENOUS at 22:43

## 2024-12-17 RX ADMIN — PANTOPRAZOLE SODIUM 40 MG: 40 TABLET, DELAYED RELEASE ORAL at 05:55

## 2024-12-17 RX ADMIN — ATORVASTATIN CALCIUM 10 MG: 10 TABLET, FILM COATED ORAL at 16:58

## 2024-12-17 RX ADMIN — LORAZEPAM 1 MG: 1 TABLET ORAL at 19:38

## 2024-12-17 RX ADMIN — GABAPENTIN 300 MG: 300 CAPSULE ORAL at 03:08

## 2024-12-17 RX ADMIN — ACETAMINOPHEN 975 MG: 325 TABLET ORAL at 16:58

## 2024-12-17 RX ADMIN — GABAPENTIN 400 MG: 400 CAPSULE ORAL at 08:33

## 2024-12-17 RX ADMIN — GABAPENTIN 400 MG: 400 CAPSULE ORAL at 16:57

## 2024-12-17 RX ADMIN — CLONIDINE HYDROCHLORIDE 0.1 MG: 0.1 TABLET ORAL at 19:58

## 2024-12-17 RX ADMIN — GABAPENTIN 400 MG: 400 CAPSULE ORAL at 19:57

## 2024-12-17 RX ADMIN — HYDROXYZINE HYDROCHLORIDE 50 MG: 50 TABLET, FILM COATED ORAL at 16:57

## 2024-12-17 RX ADMIN — MAGNESIUM SULFATE HEPTAHYDRATE 2 G: 40 INJECTION, SOLUTION INTRAVENOUS at 22:42

## 2024-12-17 RX ADMIN — METOPROLOL TARTRATE 12.5 MG: 25 TABLET, FILM COATED ORAL at 19:57

## 2024-12-17 NOTE — PROGRESS NOTES
12/17/24    Team Meeting   Meeting Type Daily Rounds   Team Members Present   Team Members Present Physician;Nurse;;Occupational Therapist   Physician Team Member Kita WICK   Nursing Team Member Misha VELAZQUEZ   Social Work Team Member Venkatesh NAYLOR   OT Team Member Quincy BARNES   Patient/Family Present   Patient Present No   Patient's Family Present No   201, med seeking, d/c home jimmy with SL f/u, withdrawn to room, refuses to participate in group

## 2024-12-17 NOTE — NURSING NOTE
Patient visible in milieu with peers, medication compliant, cooperative and flat affect. Patient endorses moderate anxiety, denies SI/HI and hallucinations. Patient had PRN Atarax 25 mg at 1357 for ventura of 27 and medication was effective. Continued care and safety rounds in progress.

## 2024-12-17 NOTE — PROGRESS NOTES
"Progress Note - Solo Mack 54 y.o. male MRN: [id number]    Unit/Bed#: [facility] 208-02 Encounter: [id number]        Subjective:   Patient seen and examined at bedside after reviewing the chart and discussing the case with the caring staff.      Patient examined at bedside.  Patient reports no acute symptoms.    Patient's blood pressure was low this morning 84/58.  Asymptomatic.  He was started on clonidine 0.1mg twice daily on 12/16 for chronic benzo use/anxiety.     Physical Exam   Vitals: Blood pressure (!) 84/58, pulse 57, temperature 97.6 °F (36.4 °C), temperature source Temporal, resp. rate 18, height 5' 7\" (1.702 m), weight 96.8 kg (213 lb 6.4 oz), SpO2 98%.,Body mass index is 33.42 kg/m².  Constitutional: Patient in no acute distress.  HEENT: PERR, EOMI, MMM.  Cardiovascular: Normal rate and regular rhythm.    Pulmonary/Chest: Effort normal and breath sounds normal.   Abdomen: Soft, + BS, NT.    Assessment/Plan:  Solo Mack is a(n) 54 y.o. male with schizophrenia.     Cardiac with hx of HTN, HLD.  Continue home atorvastatin 10 mg daily.  Decr Lopressor from 50 mg to 25 mg q12h 12/6.  Started on clonidine 0.1 mg BID by psych team on 12/16 for chronic benzo use/anxiety.  Will decr Lopressor further to 12.5 mg 12/17.   T2DM.  Hgb A1c 6.7% on 12/4/24.  D/c metformin due to pt refusing 12/5.  Accucheks twice daily.  Carb controlled diet.  GERD.  Patient is on Protonix 40 mg daily.  Chronic low back pain.  Continue gabapentin 400 mg TID.  CKD stage 3.  Stable.  Avoid nephrotoxins.   Vitamin D deficiency.  Patient started on vitamin D2 50,000 units weekly for 10 weeks followed by vitamin D3 1000 units daily.  Vitamin B12 deficiency.  Patient started on vitamin B12 1000 mcg daily.    The patient was discussed with Dr. Garza and he is in agreement with the above note.  "

## 2024-12-17 NOTE — PLAN OF CARE
Problem: Alteration in Thoughts and Perception  Goal: Verbalize thoughts and feelings  Description: Interventions:  - Promote a nonjudgmental and trusting relationship with the patient through active listening and therapeutic communication  - Assess patient's level of functioning, behavior and potential for risk  - Engage patient in 1 on 1 interactions  - Encourage patient to express fears, feelings, frustrations, and discuss symptoms    - [place] patient to reality, help patient recognize reality-based thinking   - Administer medications as ordered and assess for potential side effects  - Provide the patient education related to the signs and symptoms of the illness and desired effects of prescribed medications  Outcome: Progressing     Problem: Ineffective Coping  Goal: Understands least restrictive measures  Description: Interventions:  - Utilize least restrictive behavior  Outcome: Progressing

## 2024-12-17 NOTE — ASSESSMENT & PLAN NOTE
Klonopin taper completed Sunday.   Start clonidine 0.1 mg p.o. twice daily for chronic benzo use/anxiety.  Blood pressure parameters in comment section of order.  Gabapentin 400mg TID  Atarax 50 mg TID  Continue Trazodone 100mg PO qHS for insomnia.

## 2024-12-17 NOTE — NURSING NOTE
Patient endorses moderate anxiety and no depression, denies SI, HI, AVH. He is pleasant and med compliant. He participated in a snack this evening and received Ambien at 2115. Continuous 15 min safety checks in progress.

## 2024-12-17 NOTE — SOCIAL WORK
Cm spoke to pt spouse deja [phone]. cm confirmed that all benzos and ambein have been removed from the home. Deja expressed gratitude for assistance with med adjustments as it has been an on-going issue in the home.

## 2024-12-17 NOTE — NURSING NOTE
Patient appears to have had broken sleep for the majority of the overnight hours. Ambien mildly effective for a few hours. Gabapentin 300 was effective. No concerns voiced, no signs of distress. Continuous 15 min safety checks in progress.

## 2024-12-17 NOTE — PROGRESS NOTES
Progress Note - Behavioral Health   Name: Solo Mack 54 y.o. male I MRN: [id number]  Unit/Bed#: [facility] 208-02 I Date of Admission: 12/3/2024   Date of Service: 12/17/2024 I Hospital Day: 14    Assessment & Plan  Anxiety  Klonopin taper completed Sunday.   Start clonidine 0.1 mg p.o. twice daily for chronic benzo use/anxiety.  Blood pressure parameters in comment section of order.  Gabapentin 400mg TID  Atarax 50 mg TID  Continue Trazodone 100mg PO qHS for insomnia.  Bipolar disorder, current episode mixed, moderate (HCC)  Continue Zyprexa 20mg PO at HS for continued insomnia, hs anxiety, mood stabilization  Chronic pain disorder  Per medical  H/O prolonged Q-T interval on ECG  QTc 429 on 12/2/2024    Progress Toward Goals:   All current active medications have been reviewed  Encourage group therapy, milieu therapy and occupational therapy  Behavioral Health checks for safety monitoring  Continue treatment with group therapy, milieu therapy and occupational therapy  Requires continued inpatient treatment due to chronic illness and high risk of decompensation if discharged before long term stability is achieved    Recommended Treatment: Continue with group therapy, milieu therapy and occupational therapy.    Risks, benefits and possible side effects of Medications:   Risks, benefits, and possible side effects of medications explained to patient and patient verbalizes understanding.      History of Present Illness   Behavior over the last 24 hours:  unchanged  Sleep: broken  Appetite: normal  Medication side effects: No  ROS: no complaints and all other systems are negative    Subjective: Solo was seen today for psychiatric follow-up. On assessment patient is calm, cooperative.he remains med seeking, patient continues to ask for Ativan. Patient educated and made aware of available alternatives for anxiety, however patient does not appear anxious. He verbalized understanding. He reports high anxiety. He is  "medication compliant. He denied any SI/HI/AVH, although patient observed responding to internal stimuli    Objective   Mental Status Evaluation:  Appearance:  age appropriate   Behavior:  Calm, cooperative   Speech:  normal pitch and normal volume   Mood:  \"anxious\"   Affect:  mood-incongruent   Thought Process:  perserverative   Associations: concrete associations   Thought Content:  ruminations   Perceptual Disturbances: Denied AVH, although patient observed responding to internally preoccupied   Risk Potential: Suicidal Ideations none at present  Homicidal Ideations none at present  Potential for Aggression No   Sensorium:  person, place, and time/date   Memory:  recent and remote memory grossly intact   Consciousness:  alert and awake    Attention: attention span and concentration were age appropriate   Insight:  fair   Judgment: fair   Gait/Station: normal gait/station   Motor Activity: no abnormal movements     Medications: all current active meds have been reviewed.      Lab Results: I have reviewed the following results:  Most Recent Labs:   Lab Results   Component Value Date    WBC 9.23 12/02/2024    RBC 5.25 12/02/2024    HGB 15.7 12/02/2024    HCT 45.8 12/02/2024     12/02/2024    RDW 12.3 12/02/2024    NEUTROABS 5.74 12/02/2024    SODIUM 138 12/02/2024    K 3.8 12/02/2024     12/02/2024    CO2 26 12/02/2024    BUN 17 12/02/2024    CREATININE 1.24 12/02/2024    GLUC 152 (H) 12/02/2024    GLUF 155 (H) 10/23/2024    CALCIUM 9.2 12/02/2024    AST 22 11/17/2024    ALT 20 11/17/2024    ALKPHOS 110 (H) 11/17/2024    TP 7.5 11/17/2024    ALB 4.8 11/17/2024    TBILI 0.95 11/17/2024    CHOLESTEROL 124 12/04/2024    HDL 36 (L) 12/04/2024    TRIG 169 (H) 12/04/2024    LDLCALC 54 12/04/2024    NONHDLC 88 12/04/2024    LITHIUM <0.10 (L) 04/13/2024    AMMONIA 15 11/12/2020    GIJ8JIZPEFBC 1.312 12/02/2024    RPR Non-Reactive 11/06/2020    HGBA1C 6.7 (H) 12/04/2024     12/04/2024         "

## 2024-12-17 NOTE — NURSING NOTE
Gabapentin 300 mg administered as ordered per pt's request for neuropathic pain. Will continue to monitor. Safety checks continued.

## 2024-12-18 ENCOUNTER — TELEPHONE (OUTPATIENT)
Age: 54
End: 2024-12-18

## 2024-12-18 VITALS
RESPIRATION RATE: 14 BRPM | HEART RATE: 61 BPM | OXYGEN SATURATION: 95 % | SYSTOLIC BLOOD PRESSURE: 119 MMHG | DIASTOLIC BLOOD PRESSURE: 83 MMHG

## 2024-12-18 VITALS
DIASTOLIC BLOOD PRESSURE: 85 MMHG | OXYGEN SATURATION: 97 % | BODY MASS INDEX: 33.49 KG/M2 | SYSTOLIC BLOOD PRESSURE: 129 MMHG | RESPIRATION RATE: 18 BRPM | HEIGHT: 67 IN | TEMPERATURE: 98.3 F | WEIGHT: 213.4 LBS | HEART RATE: 85 BPM

## 2024-12-18 LAB
2HR DELTA HS TROPONIN: 1 NG/L
ATRIAL RATE: 70 BPM
CARDIAC TROPONIN I PNL SERPL HS: 7 NG/L (ref ?–50)
GLUCOSE SERPL-MCNC: 167 MG/DL (ref 65–140)
P AXIS: 69 DEGREES
PR INTERVAL: 164 MS
QRS AXIS: 49 DEGREES
QRSD INTERVAL: 98 MS
QT INTERVAL: 406 MS
QTC INTERVAL: 439 MS
T WAVE AXIS: 24 DEGREES
VENTRICULAR RATE: 70 BPM

## 2024-12-18 PROCEDURE — 93010 ELECTROCARDIOGRAM REPORT: CPT | Performed by: INTERNAL MEDICINE

## 2024-12-18 PROCEDURE — 96361 HYDRATE IV INFUSION ADD-ON: CPT

## 2024-12-18 PROCEDURE — 82948 REAGENT STRIP/BLOOD GLUCOSE: CPT

## 2024-12-18 PROCEDURE — 99238 HOSP IP/OBS DSCHRG MGMT 30/<: CPT

## 2024-12-18 RX ORDER — CLONIDINE HYDROCHLORIDE 0.1 MG/1
0.1 TABLET ORAL EVERY 12 HOURS SCHEDULED
Qty: 60 TABLET | Refills: 3 | Status: SHIPPED | OUTPATIENT
Start: 2024-12-18

## 2024-12-18 RX ORDER — METOPROLOL TARTRATE 25 MG/1
12.5 TABLET, FILM COATED ORAL EVERY 12 HOURS SCHEDULED
Qty: 30 TABLET | Refills: 0 | Status: SHIPPED | OUTPATIENT
Start: 2024-12-18

## 2024-12-18 RX ORDER — GABAPENTIN 400 MG/1
400 CAPSULE ORAL 3 TIMES DAILY
Qty: 90 CAPSULE | Refills: 3 | Status: SHIPPED | OUTPATIENT
Start: 2024-12-18

## 2024-12-18 RX ORDER — OLANZAPINE 20 MG/1
20 TABLET ORAL
Qty: 30 TABLET | Refills: 3 | Status: SHIPPED | OUTPATIENT
Start: 2024-12-18

## 2024-12-18 RX ORDER — TRAZODONE HYDROCHLORIDE 100 MG/1
100 TABLET ORAL
Qty: 30 TABLET | Refills: 3 | Status: SHIPPED | OUTPATIENT
Start: 2024-12-18

## 2024-12-18 RX ADMIN — HYDROXYZINE HYDROCHLORIDE 25 MG: 25 TABLET ORAL at 11:51

## 2024-12-18 RX ADMIN — GABAPENTIN 400 MG: 400 CAPSULE ORAL at 08:47

## 2024-12-18 RX ADMIN — PANTOPRAZOLE SODIUM 40 MG: 40 TABLET, DELAYED RELEASE ORAL at 05:52

## 2024-12-18 RX ADMIN — OLANZAPINE 20 MG: 10 TABLET, FILM COATED ORAL at 01:43

## 2024-12-18 RX ADMIN — HYDROXYZINE HYDROCHLORIDE 25 MG: 25 TABLET ORAL at 01:44

## 2024-12-18 RX ADMIN — ALUMINUM HYDROXIDE, MAGNESIUM HYDROXIDE, AND DIMETHICONE 30 ML: 200; 20; 200 SUSPENSION ORAL at 09:47

## 2024-12-18 RX ADMIN — LORAZEPAM 1 MG: 1 TABLET ORAL at 06:19

## 2024-12-18 RX ADMIN — HYDROXYZINE HYDROCHLORIDE 50 MG: 50 TABLET, FILM COATED ORAL at 08:47

## 2024-12-18 RX ADMIN — CYANOCOBALAMIN TAB 500 MCG 1000 MCG: 500 TAB at 08:47

## 2024-12-18 RX ADMIN — TRAZODONE HYDROCHLORIDE 100 MG: 100 TABLET ORAL at 01:43

## 2024-12-18 NOTE — BH TRANSITION RECORD
Contact Information: If you have any questions, concerns, pended studies, tests and/or procedures, or emergencies regarding your inpatient behavioral health visit. Please contact [facility] [phone] and ask to speak to a , nurse or physician. A contact is available 24 hours/ 7 days a week at this number.     Summary of Procedures Performed During your Stay:  Below is a list of major procedures performed during your hospital stay and a summary of results:  - Cardiac Procedures/Studies: EKG.   Normal sinus rhythm  Nonspecific ST-t wave changes  QTC interval 423    Pending Studies (From admission, onward)      None          Please follow up on the above pending studies with your PCP and/or referring provider.

## 2024-12-18 NOTE — NURSING NOTE
Patient noted to have difficulty falling asleep. Noted to have fallen asleep around 0230. Non labored breathing noted while asleep. Q 15 min safety checks maintained.

## 2024-12-18 NOTE — NURSING NOTE
Patient noted to be visible on the milieu. Presents w/ flat affect. Denied SI/HI/AVH and depression. Endorses fluctuating anxiety. Patient became irritable r/t multiple request to be evaluated in ED for elevated BP. Patient medicated w/ scheduled BP medications and prn for anxiety and noted to be ineffective. Dr. Garza notified, made aware, and approved for patient to be evaluated in ED. Patient sent out @2053 and returned at this time in stable condition.

## 2024-12-18 NOTE — NURSING NOTE
Solo is pleasant and cooperative this shift.  He is denies depression but does endorse anxiety.  He reports that he is anxious about going home.  He was given atarax 25 mg at 1115 and that was effective.  Patient has been out of his room for meds, meals and some groups.  He is looking forward to being discharged today at 1600.  Continue with q 15 min checks until then.
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
## 2024-12-18 NOTE — DISCHARGE INSTR - OTHER ORDERS
You are being discharged to your home at [address]  [place] [address] TELEPHONE [phone]     Triggers you have identified during your hospitalization that led to your admission of a regressed mood include ineffective coping skills. Coping skills you have identified during your hospitalization include reading the bible and going to Confucianism. If you are unable to deal with your distressed mood alone please talk to your wife Deja or your mental health provider at [facility] [phone]. If that is not effective and you continue to have (ex: suicidal ideation, homicidal ideation, distressed mood, overwhelmed, in crisis) please contact Crisis by dialing 428, call [facility]  or [facility] , dial 911 or go to the nearest emergency center.        *[facility] Crisis [phone]   *[facility] [phone]   *National Suicide Prevention Lifeline:  1-358.518.6070  *Alcohol Anonymous: 274.504.9977  *[facility] Drug and Alcohol Commission: 119.258.7733  *National [place] on Mental Illness (BABITA) HELPLINE: 457.354.2498/Website: www.babita.org  *Substance Abuse and Mental Health Services Administration([facility]) National Helpline, which is a confidential, free, 24-hour-a-day, 365-day-a-year, information service for individuals and family members facing mental health and/or substance use disorders. This service provides referrals to local treatment facilities, support groups, and community-based organizations. Callers can also order free publications and other information.  Call 1-391.830.3169/Website: www.[facility].gov  *[facility] 2-1-1: This is a toll free, confidential, 24-hour-a-day service which connects you to a community  in your area who can help you find services and resources that are available to you locally and provide critical services that can improve and save lives.  Call: 211  /Website: http://www.eyeQ.org/        Agnes, or Mona, our Behavioral Health Nurse Navigators, will be calling you after your discharge, on the phone number that you provided.  They will be available as an additional support, if needed.   If you wish to speak with one of them, you may contact Agnes at [phone] or Jessenia at [phone].      Adult PHP Programs include:      [facility] [phone]. [phone], [place] [address]   -  [email or web address]  Intake Walk-In Hours  Monday-Thursday: 8am-3pm  Friday: 8am-12pm      [facility] [phone] [address] [place] [address] - [email or web address]    [facility]  [phone] [address] [place] [address]  [email or web address]   [facility] Drug & Alcohol Resources:    [facility] Drug & Alcohol Commission, Inc.  [address] [address]  ([phone]    Assessment Sites   [facility]   www.[email or web address]   627.521.1271   Hours: 24/7 In Person or Telehealth     [facility]   www.[facility]"[employer]   [phone]   Hours: M-F 8 am - 8 pm; Sat-Sun 9 am - 3 pm In Person or Telehealth     [facility] [employer] [facility] [place]   www.[email or web address] ?  [phone]   Hours: M-F 6 am - 12 pm In Person     [facility]?   [email or web address]   [phone] or 1-904.329.2287 Hours: M-F 8 am - 4 pm In Person or Telehealth

## 2024-12-18 NOTE — SOCIAL WORK
CM met with pt to review d/c plan and f/u supports. IMM explained to pt who expressed verbal confirmation of understanding and verbalized understanding. Pt declined the right to appeal d/c at this time. Pt signed IMM and verbalized readiness to d/c home today.

## 2024-12-18 NOTE — NURSING NOTE
Atarax prn given prior noted to be effective evidenced by patient appearing to be asleep at this time. No signs of discomfort/distress noted.

## 2024-12-18 NOTE — NURSING NOTE
"Patient reports feeling anxious s/p returning from ED with hypertension and stated \"they didn't give me any medications in the ED.\" Atarax 25 mg given for Meehan anxiety score of 17. Will monitor and follow up for effectiveness.   "

## 2024-12-18 NOTE — PROGRESS NOTES
12/18/24 1154    Discharge Notification   Notification of Discharge Provided to: Family;PCP;Psychiatrist   Family Notified via: Phone call   PCP Notified via: Fax;Phone call   Psychiatrist Notified via: Fax  ( secured new pt appointment via orders)     CM spoke to Deja pt spouse [phone]. CM reviewed d/c plan and supports. Deja in agreement and verbalized understanding.     CM placed call to pt pcp PRASANNA [facility] [phone]. CM spoke to June and notified of pt d/c.  June to inform pt provider.    BRUCE confirmed pt psych appt secured on 3/24 at 2pm with Dr Gage via [facility]

## 2024-12-18 NOTE — PROGRESS NOTES
12/18/24 1202   Discharge Planning   Living Arrangements Lives w/ Spouse/significant other   Support Systems Self;Spouse/significant other;Family members;Taoist/gunnar community;Friends/neighbors;Psychiatrist   Assistance Needed continue op mh services, pt would benefit from d&a however declines   Type of Current Residence Private residence   Current Home Care Services No   Other Referral/Resources/Interventions Provided:   Referrals Provided: Psychiatrist;Declines resources  (pt declines d&a services)   Discharge Communications   Discharge planning discussed with: pt tx team family psych pcp   [facility] Choice Yes   CM contacted family/caregiver? Yes   Were Treatment Team discharge recommendations reviewed with patient/caregiver? Yes   Did patient/caregiver verbalize understanding of patient care needs? Yes   Were patient/caregiver advised of the risks associated with not following Treatment Team discharge recommendations? Yes   Contacts   Patient Contacts Deja Thadkamille (spouse)   Relationship to Patient: Family   Contact Method Phone   Phone Number [phone]   Reason/Outcome Continuity of Care;Emergency Contact;Discharge Planning   [facility]   Would you like to participate in our [facility] service program?   No - Declined

## 2024-12-18 NOTE — TREATMENT TEAM
12/17/24 1938   Meehan Anxiety Scale   Anxious Mood 4   Tension 4   Fears 3   Insomnia 0   Intellectual 0   Depressed Mood 1   Somatic Complaints: Muscular 2   Somatic Complaints: Sensory 1   Cardiovascular Symptoms 3   Respiratory Symptoms 1   Gastrointestinal Symptoms 0   Genitourinary Symptoms 0   Autonomic Symptoms 3   Behavior at Interview 3   Meehan Anxiety Score 25       Ativan 1 mg given at 1938 for a Meehan of 25. Will reassess.

## 2024-12-18 NOTE — PROGRESS NOTES
12/18/24   Team Meeting   Meeting Type Daily Rounds   Team Members Present   Team Members Present Physician;Nurse;;Occupational Therapist   Physician Team Member Kita LORENZANA   Nursing Team Member Merly VELAZQUEZ   Social Work Team Member Venkatesh NAYLOR   OT Team Member Jesus ALMANZA   Patient/Family Present   Patient Present No   Patient's Family Present No   201, d/c today at 6pm, ED for bld pressure/anxiety, med complaint, broken sleep, med seeking, visible

## 2024-12-18 NOTE — NURSING NOTE
Patient reports Ativan given prior noted to be ineffective in mitigating symptoms of anxiety. Patient encouraged top utilize coping skills.

## 2024-12-18 NOTE — NURSING NOTE
Ativan 1 mg given @0619 for a Meehan anxiety score 26. Will monitor and follow up for effectiveness.

## 2024-12-18 NOTE — NURSING NOTE
Patient approached this writer with complaints of feeling anxious.  He HAS score is 17.  25 mg atarax given.  Will reassess in 1 hour.

## 2024-12-18 NOTE — PROGRESS NOTES
"Progress Note - Solo Mack 54 y.o. male MRN: [id number]    Unit/Bed#: [facility] 208-02 Encounter: [id number]        Subjective:   Patient seen and examined at bedside after reviewing the chart and discussing the case with the caring staff.      Patient examined at bedside.  Patient reports no acute symptoms.    Patient is being discharged today, Wednesday, 12/18/2024.    Physical Exam   Vitals: Blood pressure 90/61, pulse 62, temperature 97.9 °F (36.6 °C), temperature source Temporal, resp. rate 16, height 5' 7\" (1.702 m), weight 96.8 kg (213 lb 6.4 oz), SpO2 93%.,Body mass index is 33.42 kg/m².  Constitutional: Patient in no acute distress.  HEENT: PERR, EOMI, MMM.  Cardiovascular: Normal rate and regular rhythm.    Pulmonary/Chest: Effort normal and breath sounds normal.   Abdomen: Soft, + BS, NT.    Assessment/Plan:  Solo Mack is a(n) 54 y.o. male with schizophrenia.     Medical clearance.  Patient is medically cleared for discharge.  All scripts were sent for the patient.    Cardiac with hx of HTN, HLD.  Continue home atorvastatin 10 mg daily.  Decr Lopressor from 50 mg to 25 mg q12h 12/6.  Started on clonidine 0.1 mg BID by psych team on 12/16 for chronic benzo use/anxiety.  Will decr Lopressor further to 12.5 mg 12/17.   T2DM.  Hgb A1c 6.7% on 12/4/24.  D/c metformin due to pt refusing 12/5.  Accucheks twice daily.  Carb controlled diet.  GERD.  Patient is on Protonix 40 mg daily.  Chronic low back pain.  Continue gabapentin 400 mg TID.  CKD stage 3.  Stable.  Avoid nephrotoxins.   Vitamin D deficiency.  Patient started on vitamin D2 50,000 units weekly for 10 weeks followed by vitamin D3 1000 units daily.  Vitamin B12 deficiency.  Patient started on vitamin B12 1000 mcg daily.    The patient was discussed with Dr. Garza and he is in agreement with the above note.  "

## 2024-12-18 NOTE — PROGRESS NOTES
12/04/24    Team Meeting   Meeting Type Tx Team Meeting   Team Members Present   Team Members Present Physician;Nurse;   Physician Team Member Kita DOWNS   Nursing Team Member Socrates VELAZQUEZ   Social Work Team Member Kamryn NAYLOR   Patient/Family Present   Patient Present Yes   Patient's Family Present No      Tx team reviewed pt strengths,limitations,tx goals and plan.  All in agreement and signed.

## 2024-12-18 NOTE — ED PROVIDER NOTES
Time reflects when diagnosis was documented in both MDM as applicable and the Disposition within this note       Time User Action Codes Description Comment    12/18/2024 12:33 AM Yosef Adorno [I10] Hypertension     12/18/2024 12:33 AM Yosef Adorno [R07.9] Chest pain     12/18/2024 12:33 AM Yosef Adorno [F41.9] Anxiety           ED Disposition       ED Disposition   Discharge    Condition   Stable    Date/Time   Wed Dec 18, 2024 12:33 AM    Comment   Solo Mack discharge to home/self care.                   Assessment & Plan       Medical Decision Making  84-year-old male who was recently tapered off his long-term Klonopin admitted from behavioral health unit for reports of racing heart rate, chest tightness, shortness of breath body aches and feeling unwell.  On evaluation patient in no acute distress with vitals within normal limits.  Cardiac workup along with TSH showing no acute abnormalities no obvious signs of ischemia.  Patient is still admitted on an inpatient basis for behavioral health.  Will discharge back to facility.    Amount and/or Complexity of Data Reviewed  Labs: ordered. Decision-making details documented in ED Course.    Risk  Prescription drug management.        ED Course as of 12/18/24 1447   Wed Dec 18, 2024   0032 Delta 2hr hsTnI: 1       Medications   sodium chloride 0.9 % bolus 1,000 mL (0 mL Intravenous Stopped 12/18/24 0043)   magnesium sulfate 2 g/50 mL IVPB (premix) 2 g (0 g Intravenous Stopped 12/17/24 2312)       ED Risk Strat Scores   HEART Risk Score      Flowsheet Row Most Recent Value   Heart Score Risk Calculator    History 0 Filed at: 12/18/2024 1446   ECG 0 Filed at: 12/18/2024 1446   Age 1 Filed at: 12/18/2024 1446   Risk Factors 1 Filed at: 12/18/2024 1446   Troponin 0 Filed at: 12/18/2024 1446   HEART Score 2 Filed at: 12/18/2024 1446          HEART Risk Score      Flowsheet Row Most Recent Value   Heart Score Risk Calculator    History 0  Filed at: 12/18/2024 1446   ECG 0 Filed at: 12/18/2024 1446   Age 1 Filed at: 12/18/2024 1446   Risk Factors 1 Filed at: 12/18/2024 1446   Troponin 0 Filed at: 12/18/2024 1446   HEART Score 2 Filed at: 12/18/2024 1446                            SBIRT 22yo+      Flowsheet Row Most Recent Value   Initial Alcohol Screen: US AUDIT-C     1. How often do you have a drink containing alcohol? 1 Filed at: 12/17/2024 2121   2. How many drinks containing alcohol do you have on a typical day you are drinking?  1 Filed at: 12/17/2024 2121   3a. Male UNDER 65: How often do you have five or more drinks on one occasion? 0 Filed at: 12/17/2024 2121   Audit-C Score 2 Filed at: 12/17/2024 2121   IMER: How many times in the past year have you...    Used an illegal drug or used a prescription medication for non-medical reasons? Never Filed at: 12/17/2024 2121                            History of Present Illness       Chief Complaint   Patient presents with    Hypertension     Pt reports hypertension, shaking, cramping, face twitching since yesterday; pt reports that he was taken off of his klonopin three days ago       Past Medical History:   Diagnosis Date    Alcohol withdrawal (HCC)     Alcoholism (HCC)     Anxiety     Back pain     Back pain, chronic     Bipolar 1 disorder (HCC)     Bipolar disorder, current episode mixed, moderate (HCC)     BPH (benign prostatic hyperplasia)     Cardiac disease     CHF (congestive heart failure) (HCC)     Chronic pain disorder     Chronic renal failure     Demyelinating disease (HCC)     Dental disorder     Depression     Diabetes mellitus (HCC)     Drug abuse (HCC)     Drug withdrawal (HCC)     ED (erectile dysfunction)     Edema     Encephalopathy     Encephalopathy     Fatigue     GERD (gastroesophageal reflux disease)     H/O prolonged Q-T interval on ECG 12/4/2024    Heart rate fast     Hepatitis     unspecified     Hyperlipidemia     Hypertension     Hypokalemia     Hypothyroidism 10/21/2024     Knee buckling     MI (myocardial infarction) ([facility])     Morbid obesity with BMI of 40.0-44.9, adult ([facility])     NIDDY (non-insulin dependent diabetes mellitus in young) ([facility])     Obesity     Opiate dependence ([facility])     Opiate withdrawal ([facility])     Osteoarthritis     Pleural effusion     Pneumonia     Prolonged Q-T interval on ECG     Psychiatric disorder     Psychiatric illness     Psychosis ([facility])     Psychosis ([facility]) 12/17/2024    Renal disorder     Schizo-affective schizophrenia ([facility])     Spinal stenosis, lumbar     Traumatic subdural hemorrhage with loss of consciousness of unspecified duration, initial encounter ([facility]) 12/29/2022    Ulcer of calf ([facility])     Ulnar neuritis, right     Ulnar neuropathy at elbow     Weight loss       Past Surgical History:   Procedure Laterality Date    BACK SURGERY      report thoracic surgery    LUMBAR FUSION  05/2006    L5/S1 Gratch       Family History   Problem Relation Age of Onset    No Known Problems Mother     No Known Problems Father     No Known Problems Sister     No Known Problems Brother     No Known Problems Maternal Aunt     No Known Problems Paternal Aunt     No Known Problems Maternal Uncle     No Known Problems Paternal Uncle     No Known Problems Maternal Grandfather     No Known Problems Maternal Grandmother     No Known Problems Paternal Grandfather     No Known Problems Paternal Grandmother     No Known Problems Cousin     ADD / ADHD Neg Hx     Alcohol abuse Neg Hx     Anxiety disorder Neg Hx     Bipolar disorder Neg Hx     Dementia Neg Hx     Depression Neg Hx     Drug abuse Neg Hx     OCD Neg Hx     Paranoid behavior Neg Hx     Schizophrenia Neg Hx     Seizures Neg Hx     Self-Injury Neg Hx     Suicide Attempts Neg Hx       Social History     Tobacco Use    Smoking status: Former    Smokeless tobacco: Never    Tobacco comments:     patient is a non - smoker   Vaping Use    Vaping status: Never Used   Substance Use Topics    Alcohol use: Yes     Comment: beer  here and there per pt    Drug use: Not Currently      E-Cigarette/Vaping    E-Cigarette Use Never User       E-Cigarette/Vaping Substances    Nicotine No     THC No     CBD No     Flavoring No     Other No     Unknown No       I have reviewed and agree with the history as documented.     54-year-old male presenting to the ED for reports of racing heart rate, chest tightness, shakiness and generalized muscle pain.  Patient is coming over from inpatient behavioral health stating that he was tapered off his long-term Klonopin recently.  No other complaints at this time.      Hypertension  Associated symptoms: chest pain and shortness of breath        Review of Systems   Respiratory:  Positive for chest tightness and shortness of breath.    Cardiovascular:  Positive for chest pain.   Musculoskeletal:  Positive for myalgias.   Neurological:  Positive for tremors.   All other systems reviewed and are negative.          Objective       ED Triage Vitals [12/17/24 2115]   Temp Pulse Blood Pressure Respirations SpO2 Patient Position - Orthostatic VS   -- 78 167/100 18 97 % Lying      Temp src Heart Rate Source BP Location FiO2 (%) Pain Score    -- Monitor Left arm -- --      Vitals      Date and Time Temp Pulse SpO2 Resp BP Pain Score FACES Pain Rating User   12/18/24 0100 -- 61 95 % 14 119/83 -- -- KB   12/18/24 0000 -- 58 95 % 15 104/70 -- -- KB   12/17/24 2330 -- 63 94 % 16 114/75 -- -- KB   12/17/24 2230 -- 71 95 % 15 132/93 -- -- KB   12/17/24 2200 -- 73 96 % 13 155/98 -- -- KB   12/17/24 2130 -- 83 96 % 18 150/109 -- -- KB   12/17/24 2115 -- 78 97 % 18 167/100 -- -- KB            Physical Exam  Vitals and nursing note reviewed.   Constitutional:       General: He is not in acute distress.     Appearance: He is well-developed. He is not diaphoretic.   HENT:      Head: Normocephalic and atraumatic.      Right Ear: External ear normal.      Left Ear: External ear normal.      Nose: Nose normal. No congestion or rhinorrhea.       Mouth/Throat:      Mouth: Mucous membranes are moist.      Pharynx: Oropharynx is clear. No oropharyngeal exudate or posterior oropharyngeal erythema.   Eyes:      General: No scleral icterus.        Right eye: No discharge.         Left eye: No discharge.      Conjunctiva/sclera: Conjunctivae normal.   Cardiovascular:      Rate and Rhythm: Normal rate and regular rhythm.      Heart sounds: Normal heart sounds. No murmur heard.     No friction rub. No gallop.   Pulmonary:      Effort: Pulmonary effort is normal. No respiratory distress.      Breath sounds: Normal breath sounds. No wheezing or rales.   Abdominal:      General: Bowel sounds are normal. There is no distension.      Palpations: Abdomen is soft. There is no mass.      Tenderness: There is no abdominal tenderness. There is no guarding.   Musculoskeletal:         General: No tenderness or deformity. Normal range of motion.      Cervical back: Normal range of motion and neck supple.   Skin:     General: Skin is warm and dry.      Coloration: Skin is not pale.      Findings: No erythema or rash.   Neurological:      Mental Status: He is alert and oriented to person, place, and time.   Psychiatric:         Behavior: Behavior normal.         Thought Content: Thought content normal.         Judgment: Judgment normal.         Results Reviewed       Procedure Component Value Units Date/Time    HS Troponin I 2hr [[id number]]  (Normal) Collected: 12/17/24 2337    Lab Status: Final result Specimen: Blood from Arm, Right Updated: 12/18/24 0004     hs TnI 2hr 7 ng/L      Delta 2hr hsTnI 1 ng/L     TSH, 3rd generation with Free T4 reflex [[id number]]  (Normal) Collected: 12/17/24 2143    Lab Status: Final result Specimen: Blood from Arm, Left Updated: 12/17/24 2219     TSH 3RD GENERATON 1.247 uIU/mL     HS Troponin 0hr (reflex protocol) [[id number]]  (Normal) Collected: 12/17/24 2143    Lab Status: Final result Specimen: Blood from Arm, Left Updated: 12/17/24 2210      hs TnI 0hr 6 ng/L     Basic metabolic panel [[id number]]  (Abnormal) Collected: 12/17/24 2143    Lab Status: Final result Specimen: Blood from Arm, Left Updated: 12/17/24 2207     Sodium 137 mmol/L      Potassium 3.7 mmol/L      Chloride 102 mmol/L      CO2 26 mmol/L      ANION GAP 9 mmol/L      BUN 13 mg/dL      Creatinine 1.34 mg/dL      Glucose 212 mg/dL      Calcium 9.6 mg/dL      eGFR 59 ml/min/1.73sq m     Narrative:      National Kid[employer] Foundation guidelines for Chronic Kidney Disease (CKD):     Stage 1 with normal or high GFR (GFR > 90 mL/min/1.73 square meters)    Stage 2 Mild CKD (GFR = 60-89 mL/min/1.73 square meters)    Stage 3A Moderate CKD (GFR = 45-59 mL/min/1.73 square meters)    Stage 3B Moderate CKD (GFR = 30-44 mL/min/1.73 square meters)    Stage 4 Severe CKD (GFR = 15-29 mL/min/1.73 square meters)    Stage 5 End Stage CKD (GFR <15 mL/min/1.73 square meters)  Note: GFR calculation is accurate only with a steady state creatinine    Magnesium [[id number]]  (Abnormal) Collected: 12/17/24 2143    Lab Status: Final result Specimen: Blood from Arm, Left Updated: 12/17/24 2207     Magnesium 1.7 mg/dL     CBC and differential [[id number]] Collected: 12/17/24 2143    Lab Status: Final result Specimen: Blood from Arm, Left Updated: 12/17/24 2148     WBC 9.01 Thousand/uL      RBC 4.93 Million/uL      Hemoglobin 14.9 g/dL      Hematocrit 42.2 %      MCV 86 fL      MCH 30.2 pg      MCHC 35.3 g/dL      RDW 11.9 %      MPV 10.1 fL      Platelets 236 Thousands/uL      nRBC 0 /100 WBCs      Segmented % 72 %      Immature Grans % 0 %      Lymphocytes % 19 %      Monocytes % 8 %      Eosinophils Relative 1 %      Basophils Relative 0 %      Absolute Neutrophils 6.41 Thousands/µL      Absolute Immature Grans 0.03 Thousand/uL      Absolute Lymphocytes 1.73 Thousands/µL      Absolute Monocytes 0.72 Thousand/µL      Eosinophils Absolute 0.09 Thousand/µL      Basophils Absolute 0.03 Thousands/µL             No  orders to display       ECG 12 Lead Documentation Only    Date/Time: 12/17/2024 11:39 PM    Performed by: Yosef Adorno DO  Authorized by: Yosef Adorno DO    ECG reviewed by me, the ED Provider: yes    Patient location:  ED  Previous ECG:     Comparison to cardiac monitor: Yes    Interpretation:     Interpretation: normal    Rate:     ECG rate:  100    ECG rate assessment: tachycardic    Rhythm:     Rhythm: sinus tachycardia    Ectopy:     Ectopy: none    QRS:     QRS axis:  Left    QRS intervals:  Normal  Conduction:     Conduction: normal    ST segments:     ST segments:  Normal  T waves:     T waves: normal        ED Medication and Procedure Management   Prior to Admission Medications   Prescriptions Last Dose Informant Patient Reported? Taking?   Cholecalciferol (VITAMIN D3) 1,000 units tablet   No No   Sig: Take 1 tablet (1,000 Units total) by mouth daily Do not start before February 14, 2025.   Easy Comfort Pen Needles 33G X 5 MM MISC   Yes No   Sig: AS DIRECTED FOR ozempic EVERY WEEK   atorvastatin (LIPITOR) 10 mg tablet   No No   Sig: TAKE ONE TABLET BY MOUTH EVERY DAY   clobetasol (TEMOVATE) 0.05 % ointment   No No   Sig: Apply topically 2 (two) times a day   Patient not taking: Reported on 12/3/2024   clonazePAM (KlonoPIN) 1 mg tablet   Yes No   Sig: 3 (three) times a day   cyanocobalamin (VITAMIN B-12) 1000 MCG tablet   No No   Sig: Take 1 tablet (1,000 mcg total) by mouth daily   ergocalciferol (VITAMIN D2) 50,000 units   No No   Sig: Take 1 capsule (50,000 Units total) by mouth once a week for 9 doses   gabapentin (NEURONTIN) 400 mg capsule   No No   Sig: TAKE TWO CAPSULES BY MOUTH EVERY 8 HOURS   hydrOXYzine HCL (ATARAX) 25 mg tablet   Yes No   hydrOXYzine HCL (ATARAX) 50 mg tablet   No No   Sig: Take 1 tablet (50 mg total) by mouth 3 (three) times a day   metFORMIN (GLUCOPHAGE) 500 mg tablet   No No   Sig: Take 1 tablet (500 mg total) by mouth daily with dinner   metoprolol tartrate  (LOPRESSOR) 50 mg tablet   No No   Sig: Take 1 tablet (50 mg total) by mouth every 12 (twelve) hours   pantoprazole (PROTONIX) 40 mg tablet   No No   Sig: TAKE ONE TABLET BY MOUTH DAILY BEFORE BREAKFAST   pentosan polysulfate (ELMIRON) 100 mg capsule   No No   Sig: Take 1 capsule (100 mg total) by mouth 3 (three) times a day before meals   semaglutide, 1 mg/dose, (Ozempic) 4 mg/3 mL injection pen   No No   Sig: Inject 0.75 mL (1 mg total) under the skin once a week   sildenafil (VIAGRA) 100 mg tablet   No No   Sig: Take 1 tablet (100 mg total) by mouth daily as needed for erectile dysfunction   zolpidem (AMBIEN CR) 12.5 MG CR tablet   Yes No   Sig: Take 12.5 mg by mouth daily at bedtime      Facility-Administered Medications: None     Discharge Medication List as of 12/18/2024 12:33 AM        CONTINUE these medications which have NOT CHANGED    Details   atorvastatin (LIPITOR) 10 mg tablet TAKE ONE TABLET BY MOUTH EVERY DAY, Starting Mon 7/29/2024, Normal      Cholecalciferol (VITAMIN D3) 1,000 units tablet Take 1 tablet (1,000 Units total) by mouth daily Do not start before February 14, 2025., Starting Fri 2/14/2025, Normal      clobetasol (TEMOVATE) 0.05 % ointment Apply topically 2 (two) times a day, Starting Thu 4/11/2024, Normal      clonazePAM (KlonoPIN) 1 mg tablet 3 (three) times a day, Starting Sun 1/31/2021, Historical Med      cyanocobalamin (VITAMIN B-12) 1000 MCG tablet Take 1 tablet (1,000 mcg total) by mouth daily, Starting Thu 12/12/2024, Normal      Easy Comfort Pen Needles 33G X 5 MM MISC AS DIRECTED FOR ozempic EVERY WEEK, Historical Med      ergocalciferol (VITAMIN D2) 50,000 units Take 1 capsule (50,000 Units total) by mouth once a week for 9 doses, Starting Fri 12/13/2024, Until Sat 2/8/2025, Normal      !! gabapentin (NEURONTIN) 400 mg capsule TAKE TWO CAPSULES BY MOUTH EVERY 8 HOURS, Starting Tue 5/14/2024, Normal      !! hydrOXYzine HCL (ATARAX) 25 mg tablet Historical Med      !!  hydrOXYzine HCL (ATARAX) 50 mg tablet Take 1 tablet (50 mg total) by mouth 3 (three) times a day, Starting Tue 12/17/2024, Normal      metFORMIN (GLUCOPHAGE) 500 mg tablet Take 1 tablet (500 mg total) by mouth daily with dinner, Starting Wed 5/15/2024, Until Sat 10/12/2024, Normal      !! metoprolol tartrate (LOPRESSOR) 50 mg tablet Take 1 tablet (50 mg total) by mouth every 12 (twelve) hours, Starting Mon 11/4/2024, Until Sun 2/2/2025, Normal      pantoprazole (PROTONIX) 40 mg tablet TAKE ONE TABLET BY MOUTH DAILY BEFORE BREAKFAST, Normal      pentosan polysulfate (ELMIRON) 100 mg capsule Take 1 capsule (100 mg total) by mouth 3 (three) times a day before meals, Starting Thu 5/23/2024, Normal      semaglutide, 1 mg/dose, (Ozempic) 4 mg/3 mL injection pen Inject 0.75 mL (1 mg total) under the skin once a week, Starting Fri 10/25/2024, Normal      sildenafil (VIAGRA) 100 mg tablet Take 1 tablet (100 mg total) by mouth daily as needed for erectile dysfunction, Starting Fri 1/12/2024, Normal      zolpidem (AMBIEN CR) 12.5 MG CR tablet Take 12.5 mg by mouth daily at bedtime, Starting Tue 7/25/2023, Historical Med      cloNIDine (CATAPRES) 0.1 mg tablet Take 1 tablet (0.1 mg total) by mouth every 12 (twelve) hours, Starting Tue 12/17/2024, Normal      !! gabapentin (NEURONTIN) 400 mg capsule Take 1 capsule (400 mg total) by mouth 3 (three) times a day, Starting Wed 12/11/2024, Normal      !! metoprolol tartrate (LOPRESSOR) 25 mg tablet Take 1 tablet (25 mg total) by mouth every 12 (twelve) hours, Starting Wed 12/11/2024, Normal      OLANZapine (ZyPREXA) 20 MG tablet Take 1 tablet (20 mg total) by mouth daily at bedtime, Starting Tue 12/17/2024, Normal      traZODone (DESYREL) 100 mg tablet Take 1 tablet (100 mg total) by mouth daily at bedtime, Starting Tue 12/17/2024, Normal       !! - Potential duplicate medications found. Please discuss with provider.        No discharge procedures on file.  ED SEPSIS DOCUMENTATION    Time reflects when diagnosis was documented in both MDM as applicable and the Disposition within this note       Time User Action Codes Description Comment    12/18/2024 12:33 AM Yosef Adorno [I10] Hypertension     12/18/2024 12:33 AM Yosef Adorno [R07.9] Chest pain     12/18/2024 12:33 AM Yosef Adorno [F41.9] Anxiety                  Yosef Adorno, DO  12/18/24 1440

## 2024-12-18 NOTE — DISCHARGE INSTRUCTIONS
If you develop any new or worsening symptoms please immediately return to your nearest emergency department.

## 2024-12-19 LAB
ATRIAL RATE: 77 BPM
P AXIS: 65 DEGREES
PR INTERVAL: 168 MS
QRS AXIS: 42 DEGREES
QRSD INTERVAL: 98 MS
QT INTERVAL: 382 MS
QTC INTERVAL: 432 MS
T WAVE AXIS: 20 DEGREES
VENTRICULAR RATE: 77 BPM

## 2024-12-19 PROCEDURE — 93010 ELECTROCARDIOGRAM REPORT: CPT | Performed by: INTERNAL MEDICINE

## 2025-01-01 DIAGNOSIS — N52.9 ERECTILE DYSFUNCTION, UNSPECIFIED ERECTILE DYSFUNCTION TYPE: ICD-10-CM

## 2025-01-01 NOTE — TELEPHONE ENCOUNTER
Medication: sildenafil (VIAGRA) 100 mg tablet     Dose/Frequency: Take 1 tablet (100 mg total) by mouth daily as needed for erectile dysfunction     Quantity: 15  tab     Pharmacy: [id number] - [place] - [address]     Office:   [x] PCP/Provider -   [] Speciality/Provider -     Does the patient have enough for 3 days?   [] Yes   [x] No - Send as HP to POD

## 2025-01-02 RX ORDER — SILDENAFIL 100 MG/1
100 TABLET, FILM COATED ORAL DAILY PRN
Qty: 15 TABLET | Refills: 0 | Status: SHIPPED | OUTPATIENT
Start: 2025-01-02

## 2025-02-11 DIAGNOSIS — N52.9 ERECTILE DYSFUNCTION, UNSPECIFIED ERECTILE DYSFUNCTION TYPE: ICD-10-CM

## 2025-02-11 RX ORDER — SILDENAFIL 100 MG/1
100 TABLET, FILM COATED ORAL DAILY PRN
Qty: 6 TABLET | Refills: 2 | Status: SHIPPED | OUTPATIENT
Start: 2025-02-11

## 2025-02-11 NOTE — TELEPHONE ENCOUNTER
Patient called and stated refills did not transfer from [facility] to [facility]      Medication: Slidenafil     Dose/Frequency: 100mg 1 tab day as needed    Quantity: 15     Pharmacy: [facility]     Office:   [x] PCP/Provider -   [] Speciality/Provider -     Does the patient have enough for 3 days?   [] Yes   [x] No - Send as HP to POD

## 2025-02-17 ENCOUNTER — TELEPHONE (OUTPATIENT)
Dept: PSYCHIATRY | Facility: CLINIC | Age: 55
End: 2025-02-17

## 2025-02-17 NOTE — TELEPHONE ENCOUNTER
Writer left a message informing pt that his appt on 3/24 with Dr. Cope has to be re scheduled and provided the phone number to call if he wants to re schedule. Appt was canceled.

## 2025-03-03 ENCOUNTER — TELEPHONE (OUTPATIENT)
Age: 55
End: 2025-03-03

## 2025-03-03 NOTE — TELEPHONE ENCOUNTER
Patient had called in and stated that the medication     metoprolol tartrate (LOPRESSOR) 25 mg tablet he needs refilled,  and signed off by dr. Lindsay as he got this medication prescribed to him through the hospital.     Please advise out to the provider if he is able to send in refills, as the patient only has enough for tomorrow the 4th.

## 2025-03-16 PROBLEM — Z00.00 MEDICARE ANNUAL WELLNESS VISIT, SUBSEQUENT: Status: ACTIVE | Noted: 2023-05-01

## 2025-03-16 NOTE — PATIENT INSTRUCTIONS
Medicare Preventive Visit Patient Instructions  Thank you for completing your Welcome to Medicare Visit or Medicare Annual Wellness Visit today. Your next wellness visit will be due in one year (3/17/2026).  The screening/preventive services that you may require over the next 5-10 years are detailed below. Some tests may not apply to you based off risk factors and/or age. Screening tests ordered at today's visit but not completed yet may show as past due. Also, please note that scanned in results may not display below.  Preventive Screenings:  Service Recommendations Previous Testing/Comments   Colorectal Cancer Screening  Colonoscopy    Fecal Occult Blood Test (FOBT)/Fecal Immunochemical Test (FIT)  Fecal DNA/Cologuard Test  Flexible Sigmoidoscopy Age: 45-75 years old   Colonoscopy: every 10 years (May be performed more frequently if at higher risk)  OR  FOBT/FIT: every 1 year  OR  Cologuard: every 3 years  OR  Sigmoidoscopy: every 5 years  Screening may be recommended earlier than age 45 if at higher risk for colorectal cancer. Also, an individualized decision between you and your healthcare provider will decide whether screening between the ages of 76-85 would be appropriate. Colonoscopy: Not on file  FOBT/FIT: 04/16/2024  Cologuard: Not on file  Sigmoidoscopy: Not on file          Prostate Cancer Screening Individualized decision between patient and health care provider in men between ages of 55-69   Medicare will cover every 12 months beginning on the day after your 50th birthday PSA: 0.32 ng/mL           Hepatitis C Screening Once for adults born between 1945 and 1965  More frequently in patients at high risk for Hepatitis C Hep C Antibody: 08/16/2018        Diabetes Screening 1-2 times per year if you're at risk for diabetes or have pre-diabetes Fasting glucose: 155 mg/dL (10/23/2024)  A1C: 6.7 % (12/4/2024)      Cholesterol Screening Once every 5 years if you don't have a lipid disorder. May order more often  based on risk factors. Lipid panel: 12/04/2024         Other Preventive Screenings Covered by Medicare:  Abdominal Aortic Aneurysm (AAA) Screening: covered once if your at risk. You're considered to be at risk if you have a family history of AAA or a male between the age of 65-75 who smoking at least 100 cigarettes in your lifetime.  Lung Cancer Screening: covers low dose CT scan once per year if you meet all of the following conditions: (1) Age 55-77; (2) No signs or symptoms of lung cancer; (3) Current smoker or have quit smoking within the last 15 years; (4) You have a tobacco smoking history of at least 20 pack years (packs per day x number of years you smoked); (5) You get a written order from a healthcare provider.  Glaucoma Screening: covered annually if you're considered high risk: (1) You have diabetes OR (2) Family history of glaucoma OR (3)  aged 50 and older OR (4)  American aged 65 and older  Osteoporosis Screening: covered every 2 years if you meet one of the following conditions: (1) Have a vertebral abnormality; (2) On glucocorticoid therapy for more than 3 months; (3) Have primary hyperparathyroidism; (4) On osteoporosis medications and need to assess response to drug therapy.  HIV Screening: covered annually if you're between the age of 15-65. Also covered annually if you are younger than 15 and older than 65 with risk factors for HIV infection. For pregnant patients, it is covered up to 3 times per pregnancy.    Immunizations:  Immunization Recommendations   Influenza Vaccine Annual influenza vaccination during flu season is recommended for all persons aged >= 6 months who do not have contraindications   Pneumococcal Vaccine   * Pneumococcal conjugate vaccine = PCV13 (Prevnar 13), PCV15 (Vaxneuvance), PCV20 (Prevnar 20)  * Pneumococcal polysaccharide vaccine = PPSV23 (Pneumovax) Adults 19-63 yo with certain risk factors or if 65+ yo  If never received any pneumonia vaccine:  recommend Prevnar 20 (PCV20)  Give PCV20 if previously received 1 dose of PCV13 or PPSV23   Hepatitis B Vaccine 3 dose series if at intermediate or high risk (ex: diabetes, end stage renal disease, liver disease)   Respiratory syncytial virus (RSV) Vaccine - COVERED BY MEDICARE PART D  * RSVPreF3 (Arexvy) CDC recommends that adults 60 years of age and older may receive a single dose of RSV vaccine using shared clinical decision-making (SCDM)   Tetanus (Td) Vaccine - COST NOT COVERED BY MEDICARE PART B Following completion of primary series, a booster dose should be given every 10 years to maintain immunity against tetanus. Td may also be given as tetanus wound prophylaxis.   Tdap Vaccine - COST NOT COVERED BY MEDICARE PART B Recommended at least once for all adults. For pregnant patients, recommended with each pregnancy.   Shingles Vaccine (Shingrix) - COST NOT COVERED BY MEDICARE PART B  2 shot series recommended in those 19 years and older who have or will have weakened immune systems or those 50 years and older     Health Maintenance Due:      Topic Date Due   • HIV Screening  Never done   • Colorectal Cancer Screening  04/16/2025   • Hepatitis C Screening  Completed     Immunizations Due:      Topic Date Due   • Pneumococcal Vaccine: Pediatrics (0 to 5 Years) and At-Risk Patients (6 to 64 Years) (1 of 2 - PCV) Never done   • Hepatitis A Vaccine (1 of 2 - Risk 2-dose series) Never done   • Influenza Vaccine (1) Never done   • COVID-19 Vaccine (4 - 2024-25 season) 09/01/2024     Advance Directives   What are advance directives?  Advance directives are legal documents that state your wishes and plans for medical care. These plans are made ahead of time in case you lose your ability to make decisions for yourself. Advance directives can apply to any medical decision, such as the treatments you want, and if you want to donate organs.   What are the types of advance directives?  There are many types of advance  directives, and each state has rules about how to use them. You may choose a combination of any of the following:  Living will:  This is a written record of the treatment you want. You can also choose which treatments you do not want, which to limit, and which to stop at a certain time. This includes surgery, medicine, IV fluid, and tube feedings.   Durable power of  for healthcare (DPAHC):  This is a written record that states who you want to make healthcare choices for you when you are unable to make them for yourself. This person, called a proxy, is usually a family member or a friend. You may choose more than 1 proxy.  Do not resuscitate (DNR) order:  A DNR order is used in case your heart stops beating or you stop breathing. It is a request not to have certain forms of treatment, such as CPR. A DNR order may be included in other types of advance directives.  Medical directive:  This covers the care that you want if you are in a coma, near death, or unable to make decisions for yourself. You can list the treatments you want for each condition. Treatment may include pain medicine, surgery, blood transfusions, dialysis, IV or tube feedings, and a ventilator (breathing machine).  Values history:  This document has questions about your views, beliefs, and how you feel and think about life. This information can help others choose the care that you would choose.  Why are advance directives important?  An advance directive helps you control your care. Although spoken wishes may be used, it is better to have your wishes written down. Spoken wishes can be misunderstood, or not followed. Treatments may be given even if you do not want them. An advance directive may make it easier for your family to make difficult choices about your care.   Weight Management   Why it is important to manage your weight:  Being overweight increases your risk of health conditions such as heart disease, high blood pressure, type 2  diabetes, and certain types of cancer. It can also increase your risk for osteoarthritis, sleep apnea, and other respiratory problems. Aim for a slow, steady weight loss. Even a small amount of weight loss can lower your risk of health problems.  How to lose weight safely:  A safe and healthy way to lose weight is to eat fewer calories and get regular exercise. You can lose up about 1 pound a week by decreasing the number of calories you eat by 500 calories each day.   Healthy meal plan for weight management:  A healthy meal plan includes a variety of foods, contains fewer calories, and helps you stay healthy. A healthy meal plan includes the following:  Eat whole-grain foods more often.  A healthy meal plan should contain fiber. Fiber is the part of grains, fruits, and vegetables that is not broken down by your body. Whole-grain foods are healthy and provide extra fiber in your diet. Some examples of whole-grain foods are whole-wheat breads and pastas, oatmeal, brown rice, and bulgur.  Eat a variety of vegetables every day.  Include dark, leafy greens such as spinach, kale, kobe greens, and mustard greens. Eat yellow and orange vegetables such as carrots, sweet potatoes, and winter squash.   Eat a variety of fruits every day.  Choose fresh or canned fruit (canned in its own juice or light syrup) instead of juice. Fruit juice has very little or no fiber.  Eat low-fat dairy foods.  Drink fat-free (skim) milk or 1% milk. Eat fat-free yogurt and low-fat cottage cheese. Try low-fat cheeses such as mozzarella and other reduced-fat cheeses.  Choose meat and other protein foods that are low in fat.  Choose beans or other legumes such as split peas or lentils. Choose fish, skinless poultry (chicken or turkey), or lean cuts of red meat (beef or pork). Before you cook meat or poultry, cut off any visible fat.   Use less fat and oil.  Try baking foods instead of frying them. Add less fat, such as margarine, sour cream,  regular salad dressing and mayonnaise to foods. Eat fewer high-fat foods. Some examples of high-fat foods include french fries, doughnuts, ice cream, and cakes.  Eat fewer sweets.  Limit foods and drinks that are high in sugar. This includes candy, cookies, regular soda, and sweetened drinks.  Exercise:  Exercise at least 30 minutes per day on most days of the week. Some examples of exercise include walking, biking, dancing, and swimming. You can also fit in more physical activity by taking the stairs instead of the elevator or parking farther away from stores. Ask your healthcare provider about the best exercise plan for you.      © Copyright [employer] 2018 Information is for End User's use only and may not be sold, redistributed or otherwise used for commercial purposes. All illustrations and images included in CareNotes® are the copyrighted property of [employer]M., Inc. or [employer]

## 2025-03-16 NOTE — ASSESSMENT & PLAN NOTE
Lab Results   Component Value Date    HGBA1C 6.7 (H) 12/04/2024          
Lab Results   Component Value Date    EGFR 59 12/17/2024    EGFR 65 12/02/2024    EGFR 56 11/17/2024    CREATININE 1.34 (H) 12/17/2024    CREATININE 1.24 12/02/2024    CREATININE 1.40 (H) 11/17/2024          
Unknown

## 2025-03-16 NOTE — PROGRESS NOTES
Name: Solo Mack      : 1970      MRN: [id number]  Encounter Provider: Florin Lindsay DO  Encounter Date: 3/17/2025   Encounter department: [facility] INTERNAL MEDICINE    Assessment & Plan  Essential hypertension         Other specified hypothyroidism         Diabetes mellitus due to underlying condition with hyperosmolarity without coma, without long-term current use of insulin ([facility])    Lab Results   Component Value Date    HGBA1C 6.7 (H) 2024          Stage 3 chronic kidney disease, unspecified whether stage 3a or 3b CKD ([facility])  Lab Results   Component Value Date    EGFR 59 2024    EGFR 65 2024    EGFR 56 2024    CREATININE 1.34 (H) 2024    CREATININE 1.24 2024    CREATININE 1.40 (H) 2024          Schizophrenia, unspecified type ([facility])         Spinal stenosis of lumbar region without neurogenic claudication         Prediabetes         BMI 40.0-44.9, adult ([facility])         Medicare annual wellness visit, subsequent              Preventive health issues were discussed with patient, and age appropriate screening tests were ordered as noted in patient's After Visit Summary. Personalized health advice and appropriate referrals for health education or preventive services given if needed, as noted in patient's After Visit Summary.    History of Present Illness     No news  3 hots and a cot         Patient Care Team:  Florin Lindsay DO as PCP - General (Internal Medicine)  Shama Weiss DO as PCP - PCP-[place] (RTE)  MD Cheko Field MD Nicole D Yoder, DO Donald Brislin, DO (Internal Medicine)  Daily Sarabia DO (Nephrology)    Review of Systems   Constitutional:  Negative for activity change, appetite change, chills, diaphoresis, fatigue and fever.   HENT:  Negative for congestion, facial swelling, hearing loss, mouth sores, rhinorrhea, sore throat, trouble swallowing and voice change.    Eyes:  Negative for photophobia and pain.   Respiratory:   Negative for apnea, cough, chest tightness, shortness of breath and stridor.    Cardiovascular:  Negative for chest pain, palpitations and leg swelling.   Gastrointestinal:  Negative for abdominal distention, abdominal pain, blood in stool and constipation.   Endocrine: Negative for cold intolerance and heat intolerance.   Genitourinary:  Negative for difficulty urinating, dysuria, flank pain, genital sores, hematuria and urgency.   Musculoskeletal:  Negative for arthralgias, back pain, gait problem, joint swelling and myalgias.   Skin:  Negative for rash and wound.   Allergic/Immunologic: Negative for environmental allergies, food allergies and immunocompromised state.   Neurological:  Negative for dizziness, tremors, seizures, syncope, facial asymmetry, speech difficulty, weakness, light-headedness, numbness and headaches.   Hematological:  Negative for adenopathy. Does not bruise/bleed easily.   Psychiatric/Behavioral:  Negative for agitation, behavioral problems, hallucinations, self-injury, sleep disturbance and suicidal ideas.      Medical History Reviewed by provider this encounter:       Annual Wellness Visit Questionnaire       Health Risk Assessment:   Patient rates overall health as good. Patient feels that their physical health rating is same. Patient is satisfied with their life. Eyesight was rated as slightly better. Hearing was rated as same. Patient feels that their emotional and mental health rating is same. Patients states they are never, rarely angry. Patient states they are sometimes unusually tired/fatigued. Pain experienced in the last 7 days has been none. Patient states that he has experienced no weight loss or gain in last 6 months.     Depression Screening:   PHQ-2 Score: 0      Home Safety:  Patient does not have trouble with stairs inside or outside of their home. Patient has working smoke alarms and has no working carbon monoxide detector. Home safety hazards include: none.      Nutrition:   Current diet is Low Cholesterol.     Medications:   Patient is not currently taking any over-the-counter supplements. Patient is able to manage medications.     Activities of Daily Living (ADLs)/Instrumental Activities of Daily Living (IADLs):   Walk and transfer into and out of bed and chair?: Yes  Dress and groom yourself?: Yes    Bathe or shower yourself?: Yes    Feed yourself? Yes  Do your laundry/housekeeping?: Yes  Manage your money, pay your bills and track your expenses?: Yes  Make your own meals?: Yes    Do your own shopping?: Yes    Previous Hospitalizations:   Any hospitalizations or ED visits within the last 12 months?: No      PREVENTIVE SCREENINGS      Cardiovascular Screening:    General: Screening Not Indicated and History Lipid Disorder      Diabetes Screening:     General: Screening Not Indicated and History Diabetes      Colorectal Cancer Screening:     General: Screening Current      Prostate Cancer Screening:    General: Screening Current      Abdominal Aortic Aneurysm (AAA) Screening:    Risk factors include: tobacco use        Lung Cancer Screening:     General: Screening Not Indicated      Hepatitis C Screening:    General: Screening Current    Screening, Brief Intervention, and Referral to Treatment (SBIRT)     Screening  Typical number of drinks in a day: 0  Typical number of drinks in a week: 4  Interpretation: Low risk drinking behavior.    Single Item Drug Screening:  How often have you used an illegal drug (including marijuana) or a prescription medication for non-medical reasons in the past year? never    Single Item Drug Screen Score: 0  Interpretation: Negative screen for possible drug use disorder    Other Counseling Topics:   Car/seat belt/driving safety.     Social Drivers of Health     Financial Resource Strain: Low Risk  (12/4/2024)    Overall Financial Resource Strain (CARDIA)     Difficulty of Paying Living Expenses: Not very hard   Food Insecurity: No Food  Insecurity (12/4/2024)    Hunger Vital Sign     Worried About Running Out of Food in the Last Year: Never true     Ran Out of Food in the Last Year: Never true   Transportation Needs: No Transportation Needs (12/4/2024)    PRAPARE - Transportation     Lack of Transportation (Medical): No     Lack of Transportation (Non-Medical): No   Housing Stability: Unknown (12/4/2024)    Housing Stability Vital Sign     Unable to Pay for Housing in the Last Year: No     Homeless in the Last Year: No   Utilities: Not At Risk (12/4/2024)    [facility] Utilities     Threatened with loss of utilities: No     No results found.    Objective   There were no vitals taken for this visit.    Physical Exam  Constitutional:       General: He is not in acute distress.     Appearance: Normal appearance. He is not ill-appearing or toxic-appearing.   HENT:      Head: Normocephalic and atraumatic.      Right Ear: Tympanic membrane and external ear normal.      Left Ear: Tympanic membrane and external ear normal.      Nose: Nose normal.      Mouth/Throat:      Mouth: Mucous membranes are moist.      Pharynx: Oropharynx is clear.   Eyes:      General: No scleral icterus.        Right eye: No discharge.         Left eye: No discharge.      Extraocular Movements: Extraocular movements intact.      Conjunctiva/sclera: Conjunctivae normal.      Pupils: Pupils are equal, round, and reactive to light.   Neck:      Vascular: No carotid bruit.   Cardiovascular:      Rate and Rhythm: Normal rate and regular rhythm.      Pulses: Normal pulses.      Heart sounds: No murmur heard.     No friction rub. No gallop.   Pulmonary:      Effort: Pulmonary effort is normal.      Breath sounds: Normal breath sounds. No wheezing, rhonchi or rales.   Abdominal:      General: Bowel sounds are normal. There is no distension.      Palpations: Abdomen is soft. There is no mass.      Tenderness: There is no guarding or rebound.   Musculoskeletal:         General: No swelling.       Cervical back: Normal range of motion and neck supple. No rigidity.      Right lower leg: No edema.      Left lower leg: No edema.   Lymphadenopathy:      Cervical: No cervical adenopathy.   Skin:     General: Skin is warm.      Capillary Refill: Capillary refill takes less than 2 seconds.      Coloration: Skin is not jaundiced.      Findings: No rash.   Neurological:      General: No focal deficit present.      Mental Status: He is alert and oriented to person, place, and time.      Cranial Nerves: No cranial nerve deficit.      Sensory: No sensory deficit.      Motor: No weakness.      Gait: Gait normal.      Deep Tendon Reflexes: Reflexes normal.   Psychiatric:         Mood and Affect: Mood normal.         Behavior: Behavior normal.         Judgment: Judgment normal.

## 2025-03-17 ENCOUNTER — OFFICE VISIT (OUTPATIENT)
Dept: INTERNAL MEDICINE CLINIC | Facility: CLINIC | Age: 55
End: 2025-03-17
Payer: COMMERCIAL

## 2025-03-17 VITALS
HEIGHT: 67 IN | TEMPERATURE: 97.8 F | BODY MASS INDEX: 36.57 KG/M2 | SYSTOLIC BLOOD PRESSURE: 120 MMHG | OXYGEN SATURATION: 97 % | WEIGHT: 233 LBS | DIASTOLIC BLOOD PRESSURE: 80 MMHG | RESPIRATION RATE: 12 BRPM

## 2025-03-17 DIAGNOSIS — F20.9 SCHIZOPHRENIA, UNSPECIFIED TYPE (HCC): ICD-10-CM

## 2025-03-17 DIAGNOSIS — E08.00 DIABETES MELLITUS DUE TO UNDERLYING CONDITION WITH HYPEROSMOLARITY WITHOUT COMA, WITHOUT LONG-TERM CURRENT USE OF INSULIN (HCC): ICD-10-CM

## 2025-03-17 DIAGNOSIS — I10 ESSENTIAL HYPERTENSION: ICD-10-CM

## 2025-03-17 DIAGNOSIS — F19.20 DRUG ABUSE AND DEPENDENCE (HCC): ICD-10-CM

## 2025-03-17 DIAGNOSIS — S06.5X9A TRAUMATIC SUBDURAL HEMORRHAGE WITH LOSS OF CONSCIOUSNESS OF UNSPECIFIED DURATION, INITIAL ENCOUNTER (HCC): ICD-10-CM

## 2025-03-17 DIAGNOSIS — N18.30 STAGE 3 CHRONIC KIDNEY DISEASE, UNSPECIFIED WHETHER STAGE 3A OR 3B CKD (HCC): ICD-10-CM

## 2025-03-17 DIAGNOSIS — F41.9 ANXIETY: ICD-10-CM

## 2025-03-17 DIAGNOSIS — M48.061 SPINAL STENOSIS OF LUMBAR REGION WITHOUT NEUROGENIC CLAUDICATION: ICD-10-CM

## 2025-03-17 DIAGNOSIS — E03.8 OTHER SPECIFIED HYPOTHYROIDISM: ICD-10-CM

## 2025-03-17 DIAGNOSIS — R73.03 PREDIABETES: ICD-10-CM

## 2025-03-17 DIAGNOSIS — Z00.00 MEDICARE ANNUAL WELLNESS VISIT, SUBSEQUENT: Primary | ICD-10-CM

## 2025-03-17 PROCEDURE — G0439 PPPS, SUBSEQ VISIT: HCPCS | Performed by: INTERNAL MEDICINE

## 2025-03-17 RX ORDER — METOPROLOL TARTRATE 25 MG/1
12.5 TABLET, FILM COATED ORAL EVERY 12 HOURS SCHEDULED
Qty: 30 TABLET | Refills: 0 | Status: SHIPPED | OUTPATIENT
Start: 2025-03-17

## 2025-03-17 RX ORDER — HYDROXYZINE HYDROCHLORIDE 50 MG/1
50 TABLET, FILM COATED ORAL 3 TIMES DAILY
Qty: 90 TABLET | Refills: 0 | Status: SHIPPED | OUTPATIENT
Start: 2025-03-17

## 2025-03-17 NOTE — TELEPHONE ENCOUNTER
Patient saw Dr. Lindsay today for AWV he forgot that he was put on Atarax 50 mg three times a day when he was in the hospital in December 2024. They said that DR. Lindsay should take care of the refills.    Thank you!!    Medication: hydrOXYzine HCL (ATARAX) 50 mg tablet     Dose/Frequency: Take 1 tablet (50 mg total) by mouth 3 (three) times a day     Quantity:  90 tablet     Pharmacy: [id number] - [place], [place] - [address]     Office:   [x] PCP/Provider - Provided by hospital in 12/2024  [] Speciality/Provider -     Does the patient have enough for 3 days?   [] Yes   [x] No - Send as HP to POD   hydrOXYzine HCL (ATARAX) 50 mg tablet

## 2025-03-18 ENCOUNTER — TELEPHONE (OUTPATIENT)
Dept: INTERNAL MEDICINE CLINIC | Facility: CLINIC | Age: 55
End: 2025-03-18

## 2025-03-18 ENCOUNTER — TELEPHONE (OUTPATIENT)
Age: 55
End: 2025-03-18

## 2025-03-18 NOTE — TELEPHONE ENCOUNTER
Lvm for patient regarding strength and dose verified with pharmacy metoprolol 25mg/ 1/2 tab;(12.5mg)q 12 hours, getting since december

## 2025-03-18 NOTE — TELEPHONE ENCOUNTER
Patient called stating he takes  metoprolol tartrate (LOPRESSOR) 50 mg tablet twice a day. He is requesting a new script be sent to [id number] - [place], [place] - [address]. Please advise back to patient to further assist.

## 2025-03-18 NOTE — TELEPHONE ENCOUNTER
Pharmacy confirms the rx metoprolol 25mg 1/2 tab (12.5mg)q 12 hrs on the last fill also in January 2025

## 2025-03-19 LAB
ALBUMIN/CREAT UR: <3 MG/G CREAT (ref 0–29)
CREAT UR-MCNC: 111.3 MG/DL
MICROALBUMIN UR-MCNC: <3 UG/ML

## 2025-03-25 NOTE — DISCHARGE SUMMARY
Discharge- Shanique Herr [employer]ocool 1970, 51 y o  male MRN: [id number]    Unit/Bed#: [address] [place] [address] Encounter: [id number]    Primary Care Provider: David Pappas DO   Date and time admitted to hospital: 9/13/2018  7:23 PM        * Prolonged Q-T interval on ECG   Assessment & Plan    Patient was admitted with prolonged QTC which was most likely due to hypokalemia and haloperidol  Patient's help pedal was discontinued and his hypokalemia was corrected with p o  Potassium  Patient is QTC interval returned to normal on day of discharge  His lungs were clear to auscultation bilaterally heart was regular rate and rhythm on physical examination discharge        Hypokalemia   Assessment & Plan    Hypokalemia resolved after potassium supplementation        Bipolar disorder, current episode mixed, moderate ([address] )   Assessment & Plan    Patient was seen by Psychiatry who felt that patient does not need inpatient psychiatric treatment at this time  I discussed the case with Dr Liat Mustafa on the phone in detail on day of discharge! I tried to reach out to patient's wife as well left message on her answering machine to call me back        Acute metabolic encephalopathy   Assessment & Plan    Patient's encephalopathy as evidenced by hearing voices and talking to people thinking that he owns to [facility] was most likely due to withdrawal from benzodiazepines and fentanyl   his nausea and vomiting was also most likely due to that  Patient was seen in consultation by Psychiatry who resumed clonazepam 0 5 mg p o  b i d , discontinued help pedal due to QTC prolongation and started patient on Zyprexa  Patient should be taking Zyprexa 15 mg at bedtime  I did give him 7 days supply this medication and following psychiatry's recommendations I urged patient to follow up with his psychiatrist within 1 week after discharge!     Patient had breakfast and lunch without any nausea vomiting or abdominal pain on day of Including Pricing Information In The Note: No discharge and he no longer had any visual or auditory hallucinations  His mild resting tremor of the outstretched hands also resolved after institution of clonazepam     Since patient's encephalopathy resolved, hypokalemia and QTC prolongation resolved rapidly and unexpectedly he was stable for discharge today                  Hospital Course:     Deana Biggs is a 51 y o  male patient who originally presented to the hospital on   Admission Orders     Ordered        09/13/18 2143  Inpatient Admission (expected length of stay for this patient is greater than two midnights)  Once            due to QTC prolongation, encephalopathy    Please see above list of diagnoses and related plan for additional information  Condition at Discharge:  good      Discharge instructions/Information to patient and family:   See after visit summary for information provided to patient and family  Provisions for Follow-Up Care:  See after visit summary for information related to follow-up care and any pertinent home health orders  Disposition:     Home       Discharge Statement:  I spent 35 minutes discharging the patient  This time was spent on the day of discharge  I had direct contact with the patient on the day of discharge  Greater than 50% of the total time was spent examining patient, answering all patient questions, arranging and discussing plan of care with patient as well as directly providing post-discharge instructions  Additional time then spent on discharge activities  Discharge Medications:  See after visit summary for reconciled discharge medications provided to patient and family        ** Please Note: This note has been constructed using a voice recognition system ** Additional Area 4 Units: 0 Lot #: [id number] barcode: [id number] Additional Area 1 Location: Upper Cutaneous Lip Price (Use Numbers Only, No Special Characters Or $): 996 Post-Care Instructions: Patient instructed to not lie down for 4 hours and limit physical activity for 24 hours. Patient instructed not to travel by airplane for 48 hours. Detail Level: Detailed Document As Units Or Cc?: units Expiration Date (Month Year): 04/2027 Dilution (U/0.1 Cc): 4 Additional Area 1 Units: 2 Glabellar Complex Units: 18 Consent: Written consent obtained. Risks include but not limited to lid/brow ptosis, bruising, swelling, diplopia, temporary effect, incomplete chemical denervation. Quantity Per Injection Site (Units Or Cc): 2 U Quantity Per Injection Site (Units Or Cc): 3 U

## 2025-04-12 DIAGNOSIS — F41.9 ANXIETY: ICD-10-CM

## 2025-04-14 RX ORDER — HYDROXYZINE HYDROCHLORIDE 50 MG/1
50 TABLET, FILM COATED ORAL 3 TIMES DAILY
Qty: 90 TABLET | Refills: 0 | Status: SHIPPED | OUTPATIENT
Start: 2025-04-14

## 2025-04-15 PROBLEM — Z00.00 MEDICARE ANNUAL WELLNESS VISIT, SUBSEQUENT: Status: RESOLVED | Noted: 2023-05-01 | Resolved: 2025-04-15

## 2025-04-28 ENCOUNTER — APPOINTMENT (EMERGENCY)
Dept: RADIOLOGY | Facility: HOSPITAL | Age: 55
DRG: 683 | End: 2025-04-28
Payer: COMMERCIAL

## 2025-04-28 ENCOUNTER — HOSPITAL ENCOUNTER (INPATIENT)
Facility: HOSPITAL | Age: 55
LOS: 1 days | DRG: 683 | End: 2025-05-01
Attending: EMERGENCY MEDICINE | Admitting: INTERNAL MEDICINE
Payer: COMMERCIAL

## 2025-04-28 ENCOUNTER — APPOINTMENT (EMERGENCY)
Dept: CT IMAGING | Facility: HOSPITAL | Age: 55
DRG: 683 | End: 2025-04-28
Payer: COMMERCIAL

## 2025-04-28 DIAGNOSIS — R74.8 ELEVATED CPK: ICD-10-CM

## 2025-04-28 DIAGNOSIS — N17.9 ACUTE KIDNEY INJURY SUPERIMPOSED ON CHRONIC KIDNEY DISEASE  (HCC): ICD-10-CM

## 2025-04-28 DIAGNOSIS — E87.6 HYPOKALEMIA: ICD-10-CM

## 2025-04-28 DIAGNOSIS — Z00.8 MEDICAL CLEARANCE FOR PSYCHIATRIC ADMISSION: ICD-10-CM

## 2025-04-28 DIAGNOSIS — N18.9 ACUTE KIDNEY INJURY SUPERIMPOSED ON CHRONIC KIDNEY DISEASE  (HCC): ICD-10-CM

## 2025-04-28 DIAGNOSIS — F29 PSYCHOSIS (HCC): Primary | ICD-10-CM

## 2025-04-28 DIAGNOSIS — F20.9 SCHIZOPHRENIA, UNSPECIFIED TYPE (HCC): ICD-10-CM

## 2025-04-28 LAB
2HR DELTA HS TROPONIN: 0 NG/L
ALBUMIN SERPL BCG-MCNC: 4.6 G/DL (ref 3.5–5)
ALP SERPL-CCNC: 65 U/L (ref 34–104)
ALT SERPL W P-5'-P-CCNC: 16 U/L (ref 7–52)
AMMONIA PLAS-SCNC: 21 UMOL/L (ref 18–72)
AMPHETAMINES SERPL QL SCN: NEGATIVE
ANION GAP SERPL CALCULATED.3IONS-SCNC: 16 MMOL/L (ref 4–13)
APAP SERPL-MCNC: <2 UG/ML (ref 10–20)
APTT PPP: 28 SECONDS (ref 23–34)
AST SERPL W P-5'-P-CCNC: 26 U/L (ref 13–39)
BACTERIA UR QL AUTO: ABNORMAL /HPF
BARBITURATES UR QL: NEGATIVE
BASOPHILS # BLD AUTO: 0.03 THOUSANDS/ÂΜL (ref 0–0.1)
BASOPHILS NFR BLD AUTO: 0 % (ref 0–1)
BENZODIAZ UR QL: NEGATIVE
BILIRUB SERPL-MCNC: 0.99 MG/DL (ref 0.2–1)
BILIRUB UR QL STRIP: NEGATIVE
BNP SERPL-MCNC: 15 PG/ML (ref 0–100)
BUN SERPL-MCNC: 11 MG/DL (ref 5–25)
CALCIUM SERPL-MCNC: 9.3 MG/DL (ref 8.4–10.2)
CARDIAC TROPONIN I PNL SERPL HS: 6 NG/L (ref ?–50)
CARDIAC TROPONIN I PNL SERPL HS: 6 NG/L (ref ?–50)
CHLORIDE SERPL-SCNC: 98 MMOL/L (ref 96–108)
CK SERPL-CCNC: 473 U/L (ref 39–308)
CK SERPL-CCNC: 591 U/L (ref 39–308)
CLARITY UR: CLEAR
CO2 SERPL-SCNC: 22 MMOL/L (ref 21–32)
COCAINE UR QL: NEGATIVE
COLOR UR: YELLOW
CREAT SERPL-MCNC: 1.19 MG/DL (ref 0.6–1.3)
EOSINOPHIL # BLD AUTO: 0.03 THOUSAND/ÂΜL (ref 0–0.61)
EOSINOPHIL NFR BLD AUTO: 0 % (ref 0–6)
ERYTHROCYTE [DISTWIDTH] IN BLOOD BY AUTOMATED COUNT: 11.9 % (ref 11.6–15.1)
ETHANOL EXG-MCNC: 0 MG/DL
ETHANOL EXG-MCNC: 0 MG/DL
ETHANOL SERPL-MCNC: <10 MG/DL
FENTANYL UR QL SCN: NEGATIVE
FLUAV RNA RESP QL NAA+PROBE: NEGATIVE
FLUBV RNA RESP QL NAA+PROBE: NEGATIVE
GFR SERPL CREATININE-BSD FRML MDRD: 67 ML/MIN/1.73SQ M
GLUCOSE SERPL-MCNC: 152 MG/DL (ref 65–140)
GLUCOSE UR STRIP-MCNC: NEGATIVE MG/DL
HCT VFR BLD AUTO: 45.8 % (ref 36.5–49.3)
HGB BLD-MCNC: 16.4 G/DL (ref 12–17)
HGB UR QL STRIP.AUTO: ABNORMAL
HYALINE CASTS #/AREA URNS LPF: ABNORMAL /LPF
HYDROCODONE UR QL SCN: NEGATIVE
IMM GRANULOCYTES # BLD AUTO: 0.05 THOUSAND/UL (ref 0–0.2)
IMM GRANULOCYTES NFR BLD AUTO: 1 % (ref 0–2)
INR PPP: 1.06 (ref 0.85–1.19)
KETONES UR STRIP-MCNC: ABNORMAL MG/DL
LACTATE SERPL-SCNC: 0.9 MMOL/L (ref 0.5–2)
LEUKOCYTE ESTERASE UR QL STRIP: NEGATIVE
LYMPHOCYTES # BLD AUTO: 1.7 THOUSANDS/ÂΜL (ref 0.6–4.47)
LYMPHOCYTES NFR BLD AUTO: 18 % (ref 14–44)
MAGNESIUM SERPL-MCNC: 1.7 MG/DL (ref 1.9–2.7)
MCH RBC QN AUTO: 29 PG (ref 26.8–34.3)
MCHC RBC AUTO-ENTMCNC: 35.8 G/DL (ref 31.4–37.4)
MCV RBC AUTO: 81 FL (ref 82–98)
METHADONE UR QL: NEGATIVE
MONOCYTES # BLD AUTO: 0.88 THOUSAND/ÂΜL (ref 0.17–1.22)
MONOCYTES NFR BLD AUTO: 9 % (ref 4–12)
MUCOUS THREADS UR QL AUTO: ABNORMAL
NEUTROPHILS # BLD AUTO: 6.63 THOUSANDS/ÂΜL (ref 1.85–7.62)
NEUTS SEG NFR BLD AUTO: 72 % (ref 43–75)
NITRITE UR QL STRIP: NEGATIVE
NON-SQ EPI CELLS URNS QL MICRO: ABNORMAL /HPF
NRBC BLD AUTO-RTO: 0 /100 WBCS
OPIATES UR QL SCN: NEGATIVE
OXYCODONE+OXYMORPHONE UR QL SCN: NEGATIVE
PCP UR QL: NEGATIVE
PH UR STRIP.AUTO: 6 [PH]
PLATELET # BLD AUTO: 248 THOUSANDS/UL (ref 149–390)
PMV BLD AUTO: 10.4 FL (ref 8.9–12.7)
POTASSIUM SERPL-SCNC: 3 MMOL/L (ref 3.5–5.3)
PROCALCITONIN SERPL-MCNC: 0.06 NG/ML
PROT SERPL-MCNC: 7.4 G/DL (ref 6.4–8.4)
PROT UR STRIP-MCNC: ABNORMAL MG/DL
PROTHROMBIN TIME: 14.3 SECONDS (ref 12.3–15)
RBC # BLD AUTO: 5.66 MILLION/UL (ref 3.88–5.62)
RBC #/AREA URNS AUTO: ABNORMAL /HPF
RSV RNA RESP QL NAA+PROBE: NEGATIVE
SALICYLATES SERPL-MCNC: <5 MG/DL (ref 3–20)
SARS-COV-2 RNA RESP QL NAA+PROBE: NEGATIVE
SODIUM SERPL-SCNC: 136 MMOL/L (ref 135–147)
SP GR UR STRIP.AUTO: 1.02 (ref 1–1.03)
THC UR QL: NEGATIVE
TSH SERPL DL<=0.05 MIU/L-ACNC: 0.49 UIU/ML (ref 0.45–4.5)
UROBILINOGEN UR STRIP-ACNC: <2 MG/DL
WBC # BLD AUTO: 9.32 THOUSAND/UL (ref 4.31–10.16)
WBC #/AREA URNS AUTO: ABNORMAL /HPF

## 2025-04-28 PROCEDURE — 84145 PROCALCITONIN (PCT): CPT

## 2025-04-28 PROCEDURE — 0241U HB NFCT DS VIR RESP RNA 4 TRGT: CPT

## 2025-04-28 PROCEDURE — 83605 ASSAY OF LACTIC ACID: CPT

## 2025-04-28 PROCEDURE — 99285 EMERGENCY DEPT VISIT HI MDM: CPT

## 2025-04-28 PROCEDURE — 96361 HYDRATE IV INFUSION ADD-ON: CPT

## 2025-04-28 PROCEDURE — 99284 EMERGENCY DEPT VISIT MOD MDM: CPT

## 2025-04-28 PROCEDURE — 82075 ASSAY OF BREATH ETHANOL: CPT

## 2025-04-28 PROCEDURE — 82550 ASSAY OF CK (CPK): CPT

## 2025-04-28 PROCEDURE — 80053 COMPREHEN METABOLIC PANEL: CPT

## 2025-04-28 PROCEDURE — 96372 THER/PROPH/DIAG INJ SC/IM: CPT

## 2025-04-28 PROCEDURE — 36415 COLL VENOUS BLD VENIPUNCTURE: CPT

## 2025-04-28 PROCEDURE — 82077 ASSAY SPEC XCP UR&BREATH IA: CPT

## 2025-04-28 PROCEDURE — 84484 ASSAY OF TROPONIN QUANT: CPT

## 2025-04-28 PROCEDURE — 81001 URINALYSIS AUTO W/SCOPE: CPT

## 2025-04-28 PROCEDURE — 84443 ASSAY THYROID STIM HORMONE: CPT

## 2025-04-28 PROCEDURE — 83735 ASSAY OF MAGNESIUM: CPT

## 2025-04-28 PROCEDURE — 80143 DRUG ASSAY ACETAMINOPHEN: CPT

## 2025-04-28 PROCEDURE — 71045 X-RAY EXAM CHEST 1 VIEW: CPT

## 2025-04-28 PROCEDURE — 70450 CT HEAD/BRAIN W/O DYE: CPT

## 2025-04-28 PROCEDURE — 93005 ELECTROCARDIOGRAM TRACING: CPT

## 2025-04-28 PROCEDURE — 85610 PROTHROMBIN TIME: CPT

## 2025-04-28 PROCEDURE — 85730 THROMBOPLASTIN TIME PARTIAL: CPT

## 2025-04-28 PROCEDURE — 73200 CT UPPER EXTREMITY W/O DYE: CPT

## 2025-04-28 PROCEDURE — 82140 ASSAY OF AMMONIA: CPT

## 2025-04-28 PROCEDURE — 85025 COMPLETE CBC W/AUTO DIFF WBC: CPT

## 2025-04-28 PROCEDURE — 80179 DRUG ASSAY SALICYLATE: CPT

## 2025-04-28 PROCEDURE — 80307 DRUG TEST PRSMV CHEM ANLYZR: CPT

## 2025-04-28 PROCEDURE — 96365 THER/PROPH/DIAG IV INF INIT: CPT

## 2025-04-28 PROCEDURE — 83880 ASSAY OF NATRIURETIC PEPTIDE: CPT

## 2025-04-28 PROCEDURE — 96368 THER/DIAG CONCURRENT INF: CPT

## 2025-04-28 RX ORDER — MAGNESIUM SULFATE HEPTAHYDRATE 40 MG/ML
2 INJECTION, SOLUTION INTRAVENOUS ONCE
Status: COMPLETED | OUTPATIENT
Start: 2025-04-28 | End: 2025-04-28

## 2025-04-28 RX ORDER — DIPHENHYDRAMINE HYDROCHLORIDE 50 MG/ML
50 INJECTION, SOLUTION INTRAMUSCULAR; INTRAVENOUS ONCE
Status: COMPLETED | OUTPATIENT
Start: 2025-04-28 | End: 2025-04-28

## 2025-04-28 RX ORDER — POTASSIUM CHLORIDE 1500 MG/1
40 TABLET, EXTENDED RELEASE ORAL ONCE
Status: DISCONTINUED | OUTPATIENT
Start: 2025-04-28 | End: 2025-04-28

## 2025-04-28 RX ORDER — OLANZAPINE 10 MG/2ML
10 INJECTION, POWDER, FOR SOLUTION INTRAMUSCULAR ONCE
Status: COMPLETED | OUTPATIENT
Start: 2025-04-28 | End: 2025-04-28

## 2025-04-28 RX ORDER — LORAZEPAM 2 MG/ML
2 INJECTION INTRAMUSCULAR ONCE
Status: COMPLETED | OUTPATIENT
Start: 2025-04-28 | End: 2025-04-28

## 2025-04-28 RX ORDER — POTASSIUM CHLORIDE 14.9 MG/ML
20 INJECTION INTRAVENOUS ONCE
Status: COMPLETED | OUTPATIENT
Start: 2025-04-28 | End: 2025-04-28

## 2025-04-28 RX ORDER — LORAZEPAM 2 MG/ML
1 INJECTION INTRAMUSCULAR ONCE
Status: DISCONTINUED | OUTPATIENT
Start: 2025-04-28 | End: 2025-04-28

## 2025-04-28 RX ADMIN — SODIUM CHLORIDE 500 ML: 0.9 INJECTION, SOLUTION INTRAVENOUS at 13:13

## 2025-04-28 RX ADMIN — DIPHENHYDRAMINE HYDROCHLORIDE 50 MG: 50 INJECTION, SOLUTION INTRAMUSCULAR; INTRAVENOUS at 17:46

## 2025-04-28 RX ADMIN — POTASSIUM CHLORIDE 20 MEQ: 14.9 INJECTION, SOLUTION INTRAVENOUS at 11:28

## 2025-04-28 RX ADMIN — MAGNESIUM SULFATE HEPTAHYDRATE 2 G: 2 INJECTION, SOLUTION INTRAVENOUS at 11:23

## 2025-04-28 RX ADMIN — SODIUM CHLORIDE 1000 ML: 0.9 INJECTION, SOLUTION INTRAVENOUS at 14:30

## 2025-04-28 RX ADMIN — OLANZAPINE 10 MG: 10 INJECTION, POWDER, FOR SOLUTION INTRAMUSCULAR at 17:46

## 2025-04-28 RX ADMIN — LORAZEPAM 2 MG: 2 INJECTION INTRAMUSCULAR; INTRAVENOUS at 17:47

## 2025-04-28 NOTE — ED NOTES
55 y.o male patient presented to the ED via Police. Police filed a [place] 302.  This writer introduced himself as Crisis and completed the assessment. Pt was unable to make eye contact with this writer and appeared to be reacting to internal stimuli. Pt stated they did not know why he was in the ED. Crisis informed pt he was here on a 302 petition.  Pt stated he was cleared of the 302 and was going to be discharged. Pt was brought to the ED due to unsafe behaviors and unable to care for himself. Pt appeared to be confused and was  observed washing his hands with coffee at a [employer] this morning. Pt was confused when interacting with the Police given wrong names and addresses.  Pt denies any SI HI and AVH.  Pt has been diagnosed with bipolar and Schizophrenia, unspecified type.  Pt refused to answer if any fire arms where present in the home. Pt  has prior inpatient treatment at [facility].  Pt has no outpatient services.  Pt denies legal issue. Pt has a history of substance abuse. Pt's 302 was upheld by the Provider.

## 2025-04-28 NOTE — ED PROVIDER NOTES
Time reflects when diagnosis was documented in both MDM as applicable and the Disposition within this note       Time User Action Codes Description Comment    4/28/2025  8:56 PM Patsy Beaulieu Add [F29] Psychosis (HCC)           ED Disposition       ED Disposition   Transfer to Behavioral Health Condition   --    Date/Time   Mon Apr 28, 2025  5:57 PM    Comment   Solo Mack should be transferred out to [facility] and has been medically cleared.               Assessment & Plan       Medical Decision Making  The patient presents with acute psychosis in the setting of multiple medical comorbidities and psychiatric history. Differential diagnosis includes acute on chronic psychiatric decompensation, electrolyte abnormalities, bipolar disorder, manic episode, medication noncompliance, etc. Will obtain labs and imaging.    302 upheld. Labs revealed mild hypomagnesemia, hypokalemia, and an elevated creatine kinase (591). Replenished electrolytes and IVF initiated. See ED course and chart for additional results. Patient case was signed out to the ED attending, Dr. Hunter, for continued monitoring and care.     Amount and/or Complexity of Data Reviewed  Labs: ordered. Decision-making details documented in ED Course.  Radiology: ordered. Decision-making details documented in ED Course.    Risk  Prescription drug management.  Decision regarding hospitalization.        ED Course as of 04/28/25 2134   Mon Apr 28, 2025   1041 Potassium(!): 3.0  Magnesium pending add-on, plan to replenish   1053 CT head without contrast  IMPRESSION:     No acute intracranial abnormality.  Chronic microangiopathic changes.   1128 302 done   1136 HS Troponin 0hr (reflex protocol)  Will delta   1155 MAGNESIUM(!): 1.7  Replenishing with potassium   1403 SLIM: CK is minimally elevated this isn't a level we would give continuous IVF for, we would just monitor. no need for admission. I would recommend repletion of electrolytes and maybe another 1L  bolus on top of the 500cc you just gave. thank you   1416 Will have crisis worker eval patient   1418 Unlikely for active infection, no sign of infection at this time   1742 Patient reportedly running around the emergency department.  When provider arrived to the room, patient was receiving IV Ativan however before medication could be flushed, the patient ripped out his IV. IM medications ordered.  Restraints placed   1853 RN reported 1500 completed, repeat CK ordered       Medications   magnesium sulfate 2 g/50 mL IVPB (premix) 2 g (0 g Intravenous Stopped 4/28/25 1223)   potassium chloride 20 mEq IVPB (premix) (0 mEq Intravenous Stopped 4/28/25 1318)   sodium chloride 0.9 % bolus 500 mL (0 mL Intravenous Stopped 4/28/25 1413)   sodium chloride 0.9 % bolus 1,000 mL (0 mL Intravenous Stopped 4/28/25 1530)   OLANZapine (ZyPREXA) IM injection 10 mg (10 mg Intramuscular Given 4/28/25 1746)   LORazepam (ATIVAN) injection 2 mg (2 mg Intramuscular Given 4/28/25 1747)   diphenhydrAMINE (BENADRYL) injection 50 mg (50 mg Intramuscular Given 4/28/25 1746)       ED Risk Strat Scores   HEART Risk Score      Flowsheet Row Most Recent Value   Heart Score Risk Calculator    History 0 Filed at: 04/28/2025 2131   ECG 1 Filed at: 04/28/2025 2131   Age 1 Filed at: 04/28/2025 2131   Risk Factors 2 Filed at: 04/28/2025 2131   Troponin 0 Filed at: 04/28/2025 2131   HEART Score 4 Filed at: 04/28/2025 2131          HEART Risk Score      Flowsheet Row Most Recent Value   Heart Score Risk Calculator    History 0 Filed at: 04/28/2025 2131   ECG 1 Filed at: 04/28/2025 2131   Age 1 Filed at: 04/28/2025 2131   Risk Factors 2 Filed at: 04/28/2025 2131   Troponin 0 Filed at: 04/28/2025 2131   HEART Score 4 Filed at: 04/28/2025 2131                      No data recorded        SBIRT 20yo+      Flowsheet Row Most Recent Value   Initial Alcohol Screen: US AUDIT-C     1. How often do you have a drink containing alcohol? 0 Filed at: 04/28/2025 0907  "  2. How many drinks containing alcohol do you have on a typical day you are drinking?  0 Filed at: 04/28/2025 0907   3a. Male UNDER 65: How often do you have five or more drinks on one occasion? 0 Filed at: 04/28/2025 0907   3b. FEMALE Any Age, or MALE 65+: How often do you have 4 or more drinks on one occassion? 0 Filed at: 04/28/2025 0907   Audit-C Score 0 Filed at: 04/28/2025 0907   IMER: How many times in the past year have you...    Used an illegal drug or used a prescription medication for non-medical reasons? Never Filed at: 04/28/2025 0907                            History of Present Illness       Chief Complaint   Patient presents with    Psychiatric Evaluation     Pt to er with police. Police states that they are filing a 302 due to \"being a danger to himself. He's going to walk into traffic and kill himself one day\". Patient reports getting little sleep lately. Police report that he was washing his hands at a diner this am with coffee.        Past Medical History:   Diagnosis Date    Alcohol withdrawal (HCC)     Alcoholism (HCC)     Anxiety     Back pain     Back pain, chronic     Bipolar 1 disorder (HCC)     Bipolar disorder, current episode mixed, moderate (HCC)     BPH (benign prostatic hyperplasia)     Cardiac disease     CHF (congestive heart failure) (HCC)     Chronic pain disorder     Chronic renal failure     Demyelinating disease (HCC)     Dental disorder     Depression     Diabetes mellitus (HCC)     Drug abuse (HCC)     Drug withdrawal (HCC)     ED (erectile dysfunction)     Edema     Encephalopathy     Encephalopathy     Fatigue     GERD (gastroesophageal reflux disease)     H/O prolonged Q-T interval on ECG 12/4/2024    Heart rate fast     Hepatitis     unspecified     Hyperlipidemia     Hypertension     Hypokalemia     Hypothyroidism 10/21/2024    Knee buckling     MI (myocardial infarction) (HCC)     Morbid obesity with BMI of 40.0-44.9, adult ([facility])     NIDDY (non-insulin dependent " diabetes mellitus in young) ([facility])     Obesity     Opiate dependence ([facility])     Opiate withdrawal ([facility])     Osteoarthritis     Pleural effusion     Pneumonia     Prolonged Q-T interval on ECG     Psychiatric disorder     Psychiatric illness     Psychosis ([facility])     Psychosis ([facility]) 12/17/2024    Renal disorder     Schizo-affective schizophrenia ([facility])     Spinal stenosis, lumbar     Traumatic subdural hemorrhage with loss of consciousness of unspecified duration, initial encounter ([facility]) 12/29/2022    Ulcer of calf ([facility])     Ulnar neuritis, right     Ulnar neuropathy at elbow     Weight loss       Past Surgical History:   Procedure Laterality Date    BACK SURGERY      report thoracic surgery    LUMBAR FUSION  05/2006    L5/S1 Gratch       Family History   Problem Relation Age of Onset    No Known Problems Mother     No Known Problems Father     No Known Problems Sister     No Known Problems Brother     No Known Problems Maternal Aunt     No Known Problems Paternal Aunt     No Known Problems Maternal Uncle     No Known Problems Paternal Uncle     No Known Problems Maternal Grandfather     No Known Problems Maternal Grandmother     No Known Problems Paternal Grandfather     No Known Problems Paternal Grandmother     No Known Problems Cousin     ADD / ADHD Neg Hx     Alcohol abuse Neg Hx     Anxiety disorder Neg Hx     Bipolar disorder Neg Hx     Dementia Neg Hx     Depression Neg Hx     Drug abuse Neg Hx     OCD Neg Hx     Paranoid behavior Neg Hx     Schizophrenia Neg Hx     Seizures Neg Hx     Self-Injury Neg Hx     Suicide Attempts Neg Hx       Social History     Tobacco Use    Smoking status: Former     Passive exposure: Never    Smokeless tobacco: Never    Tobacco comments:     patient is a non - smoker   Vaping Use    Vaping status: Never Used   Substance Use Topics    Alcohol use: Yes     Comment: beer here and there per pt    Drug use: Not Currently      E-Cigarette/Vaping    E-Cigarette Use Never User        E-Cigarette/Vaping Substances    Nicotine No     THC No     CBD No     Flavoring No     Other No     Unknown No       I have reviewed and agree with the history as documented.     The patient is a 55-year-old male with a history of bipolar disorder, encephalopathy, subarachnoid hemorrhage, hypertension, hyperlipidemia, diabetes, chronic kidney disease, amongst others that are listed below, who presents to the ER via police escort. Police report they are filing a 302 for involuntary psychiatric evaluation due to concern for self-harm. The patient has a history of prior 302. Earlier today, he was seen washing his hands with coffee at a diner. Upon evaluation, the patient reports feeling extremely tired and states he has been unable to sleep for the past two weeks. He does not recall the police escort or pouring coffee on his hands. He denies recent alcohol use, illicit drug use, or tobacco use. He also denies suicidal ideation, homicidal ideation, auditory or visual hallucinations, chest pain, abdominal pain, or fevers.      Psychiatric Evaluation  Presenting symptoms: no hallucinations    Associated symptoms: fatigue    Associated symptoms: no abdominal pain, no chest pain and no headaches        Review of Systems   Constitutional:  Positive for activity change and fatigue. Negative for chills and fever.   HENT:  Negative for congestion and rhinorrhea.    Eyes:  Negative for photophobia and visual disturbance.   Respiratory:  Negative for cough and shortness of breath.    Cardiovascular:  Negative for chest pain and palpitations.   Gastrointestinal:  Negative for abdominal pain and vomiting.   Genitourinary:  Negative for dysuria.   Musculoskeletal:  Negative for arthralgias and back pain.   Skin:  Negative for color change and rash.   Neurological:  Negative for dizziness, tremors, seizures, syncope, weakness, numbness and headaches.   Psychiatric/Behavioral:  Positive for confusion and sleep disturbance. Negative  for hallucinations.    All other systems reviewed and are negative.          Objective       ED Triage Vitals [04/28/25 0906]   Temperature Pulse Blood Pressure Respirations SpO2 Patient Position - Orthostatic VS   97.5 °F (36.4 °C) (!) 109 (!) 149/107 18 97 % Sitting      Temp Source Heart Rate Source BP Location FiO2 (%) Pain Score    Temporal Monitor Left arm -- --      Vitals      Date and Time Temp Pulse SpO2 Resp BP Pain Score FACES Pain Rating User   04/28/25 1600 -- 101 98 % 16 167/100 -- -- MB   04/28/25 1430 -- 89 98 % 18 -- -- -- EW   04/28/25 1400 -- 86 98 % 18 159/96 -- -- EW   04/28/25 1345 -- 91 98 % 18 -- -- -- EW   04/28/25 1330 -- 85 99 % 18 -- -- -- EW   04/28/25 1315 -- 85 98 % 18 -- -- -- EW   04/28/25 1300 -- 77 97 % 20 143/84 -- -- SD   04/28/25 1245 -- 79 99 % 23 -- -- -- EW   04/28/25 1230 -- 81 100 % 19 -- -- -- EW   04/28/25 1215 -- 79 98 % 22 134/87 -- -- EW   04/28/25 1130 -- 84 98 % 18 136/97 -- -- MB   04/28/25 0906 97.5 °F (36.4 °C) 109 97 % 18 149/107 -- -- HR            Physical Exam  Vitals and nursing note reviewed.   Constitutional:       General: He is not in acute distress.     Appearance: Normal appearance. He is well-developed. He is not ill-appearing.   HENT:      Head: Normocephalic and atraumatic.      Nose: Nose normal.      Mouth/Throat:      Mouth: Mucous membranes are moist.   Eyes:      Conjunctiva/sclera: Conjunctivae normal.      Pupils: Pupils are equal, round, and reactive to light.   Cardiovascular:      Rate and Rhythm: Normal rate and regular rhythm.      Pulses: Normal pulses.      Heart sounds: No murmur heard.     No friction rub. No gallop.   Pulmonary:      Effort: Pulmonary effort is normal. No respiratory distress.      Breath sounds: Normal breath sounds. No wheezing, rhonchi or rales.   Abdominal:      General: Abdomen is flat. Bowel sounds are normal.      Palpations: Abdomen is soft.      Tenderness: There is no abdominal tenderness.    Musculoskeletal:         General: No swelling, tenderness or deformity. Normal range of motion.      Cervical back: Normal range of motion and neck supple.      Comments: Patient exhibits normal range of motion and strength in the left upper extremity. No tenderness to palpation, bruising, or erythema noted. No crepitus.   Skin:     General: Skin is warm and dry.      Capillary Refill: Capillary refill takes less than 2 seconds.   Neurological:      General: No focal deficit present.      Mental Status: He is alert. He is confused.      Comments: Patient appears disoriented and intermittently confused. No focal deficits. Neurovascularly intact. Cap refill <2s, radial and ulnar pulse intact and equal bilaterally.   Psychiatric:         Attention and Perception: He is inattentive. He does not perceive auditory or visual hallucinations.         Mood and Affect: Mood normal. Affect is flat.         Speech: He is noncommunicative.         Behavior: Behavior is agitated. Behavior is not aggressive.         Thought Content: Thought content does not include homicidal or suicidal ideation. Thought content does not include homicidal or suicidal plan.         Cognition and Memory: Memory is impaired.         Judgment: Judgment is impulsive.         Results Reviewed       Procedure Component Value Units Date/Time    CK [[id number]]  (Abnormal) Collected: 04/28/25 1903    Lab Status: Final result Specimen: Blood from Arm, Right Updated: 04/28/25 1927     Total  U/L     Procalcitonin [[id number]]  (Normal) Collected: 04/28/25 1508    Lab Status: Final result Specimen: Blood from Arm, Right Updated: 04/28/25 1540     Procalcitonin 0.06 ng/ml     FLU/RSV/COVID - if FLU/RSV clinically relevant [[id number]]  (Normal) Collected: 04/28/25 1443    Lab Status: Final result Specimen: Nares from Nasopharyngeal Swab Updated: 04/28/25 1540     SARS-CoV-2 Negative     INFLUENZA A PCR Negative     INFLUENZA B PCR Negative     RSV PCR  Negative    Narrative:      This test has been performed using the CoV-2/Flu/RSV plus assay on the [employer] GeneXpert platform. This test has been validated by the  and verified by the performing laboratory.     This test is designed to amplify and detect the following: nucleocapsid (N), envelope (E), and RNA-dependent RNA polymerase (RdRP) genes of the SARS-CoV-2 genome; matrix (M), basic polymerase (PB2), and acidic protein (PA) segments of the influenza A genome; matrix (M) and non-structural protein (NS) segments of the influenza B genome, and the nucleocapsid genes of RSV A and RSV B.     Positive results are indicative of the presence of Flu A, Flu B, RSV, and/or SARS-CoV-2 RNA. Positive results for SARS-CoV-2 or suspected novel influenza should be reported to state, local, or federal health departments according to local reporting requirements.      All results should be assessed in conjunction with clinical presentation and other laboratory markers for clinical management.     FOR PEDIATRIC PATIENTS - copy/paste COVID Guidelines URL to browser: [email or web address]       Lactic acid [[id number]]  (Normal) Collected: 04/28/25 1508    Lab Status: Final result Specimen: Blood from Arm, Right Updated: 04/28/25 1530     LACTIC ACID 0.9 mmol/L     Narrative:      Result may be elevated if tourniquet was used during collection.    Protime-INR [[id number]]  (Normal) Collected: 04/28/25 1508    Lab Status: Final result Specimen: Blood from Arm, Right Updated: 04/28/25 1530     Protime 14.3 seconds      INR 1.06    Narrative:      INR Therapeutic Range    Indication                                             INR Range      Atrial Fibrillation                                               2.0-3.0  Hypercoagulable State                                    2.0.2.3  Left Ventricular Asist Device                            2.0-3.0  Mechanical Heart Valve                                   -    Aortic(with afib, MI, embolism, HF, LA enlargement,    and/or coagulopathy)                                     2.0-3.0 (2.5-3.5)     Mitral                                                             2.5-3.5  Prosthetic/Bioprosthetic Heart Valve               2.0-3.0  Venous thromboembolism (VTE: VT, PE        2.0-3.0    APTT [[id number]]  (Normal) Collected: 04/28/25 1508    Lab Status: Final result Specimen: Blood from Arm, Right Updated: 04/28/25 1530     PTT 28 seconds     Ammonia [[id number]]  (Normal) Collected: 04/28/25 1215    Lab Status: Final result Specimen: Blood from Arm, Right Updated: 04/28/25 1317     Ammonia 21 umol/L     HS Troponin I 2hr [[id number]]  (Normal) Collected: 04/28/25 1215    Lab Status: Final result Specimen: Blood from Arm, Right Updated: 04/28/25 1253     hs TnI 2hr 6 ng/L      Delta 2hr hsTnI 0 ng/L     B-Type Natriuretic Peptide(BNP) [[id number]]  (Normal) Collected: 04/28/25 1012    Lab Status: Final result Specimen: Blood from Arm, Left Updated: 04/28/25 1214     BNP 15 pg/mL     Magnesium [[id number]]  (Abnormal) Collected: 04/28/25 1012    Lab Status: Final result Specimen: Blood from Arm, Left Updated: 04/28/25 1154     Magnesium 1.7 mg/dL     HS Troponin 0hr (reflex protocol) [[id number]]  (Normal) Collected: 04/28/25 1012    Lab Status: Final result Specimen: Blood from Arm, Left Updated: 04/28/25 1107     hs TnI 0hr 6 ng/L     Rapid drug screen, urine [[id number]]  (Normal) Collected: 04/28/25 0918    Lab Status: Final result Specimen: Urine, Clean Catch Updated: 04/28/25 1101     Amph/Meth UR Negative     Barbiturate Ur Negative     Benzodiazepine Urine Negative     Cocaine Urine Negative     Methadone Urine Negative     Opiate Urine Negative     PCP Ur Negative     THC Urine Negative     Oxycodone Urine Negative     Fentanyl Urine Negative     HYDROCODONE URINE Negative    Narrative:      FOR MEDICAL PURPOSES ONLY.   IF CONFIRMATION NEEDED PLEASE  CONTACT THE LAB WITHIN 5 DAYS.    Drug Screen Cutoff Levels:  AMPHETAMINE/METHAMPHETAMINES  1000 ng/mL  BARBITURATES     200 ng/mL  BENZODIAZEPINES     200 ng/mL  COCAINE      300 ng/mL  METHADONE      300 ng/mL  OPIATES      300 ng/mL  PHENCYCLIDINE     25 ng/mL  THC       50 ng/mL  OXYCODONE      100 ng/mL  FENTANYL      5 ng/mL  HYDROCODONE     300 ng/mL    TSH [[id number]]  (Normal) Collected: 04/28/25 1012    Lab Status: Final result Specimen: Blood from Arm, Left Updated: 04/28/25 1052     TSH 3RD GENERATON 0.493 uIU/mL     Salicylate level [[id number]]  (Normal) Collected: 04/28/25 1012    Lab Status: Final result Specimen: Blood from Arm, Left Updated: 04/28/25 1050     Salicylate Lvl <5 mg/dL     CK [[id number]]  (Abnormal) Collected: 04/28/25 1012    Lab Status: Final result Specimen: Blood from Arm, Left Updated: 04/28/25 1049     Total  U/L     Acetaminophen level-If concentration is detectable, please discuss with medical  on call. [[id number]]  (Abnormal) Collected: 04/28/25 1012    Lab Status: Final result Specimen: Blood from Arm, Left Updated: 04/28/25 1049     Acetaminophen Level <2 ug/mL     Comprehensive metabolic panel [[id number]]  (Abnormal) Collected: 04/28/25 1012    Lab Status: Final result Specimen: Blood from Arm, Left Updated: 04/28/25 1039     Sodium 136 mmol/L      Potassium 3.0 mmol/L      Chloride 98 mmol/L      CO2 22 mmol/L      ANION GAP 16 mmol/L      BUN 11 mg/dL      Creatinine 1.19 mg/dL      Glucose 152 mg/dL      Calcium 9.3 mg/dL      AST 26 U/L      ALT 16 U/L      Alkaline Phosphatase 65 U/L      Total Protein 7.4 g/dL      Albumin 4.6 g/dL      Total Bilirubin 0.99 mg/dL      eGFR 67 ml/min/1.73sq m     Narrative:      National Kid[employer] guidelines for Chronic Kidney Disease (CKD):     Stage 1 with normal or high GFR (GFR > 90 mL/min/1.73 square meters)    Stage 2 Mild CKD (GFR = 60-89 mL/min/1.73 square meters)    Stage 3A Moderate CKD (GFR  = 45-59 mL/min/1.73 square meters)    Stage 3B Moderate CKD (GFR = 30-44 mL/min/1.73 square meters)    Stage 4 Severe CKD (GFR = 15-29 mL/min/1.73 square meters)    Stage 5 End Stage CKD (GFR <15 mL/min/1.73 square meters)  Note: GFR calculation is accurate only with a steady state creatinine    Ethanol [[id number]]  (Normal) Collected: 04/28/25 1012    Lab Status: Final result Specimen: Blood from Arm, Left Updated: 04/28/25 1038     Ethanol Lvl <10 mg/dL     CBC and differential [[id number]]  (Abnormal) Collected: 04/28/25 1012    Lab Status: Final result Specimen: Blood from Arm, Left Updated: 04/28/25 1021     WBC 9.32 Thousand/uL      RBC 5.66 Million/uL      Hemoglobin 16.4 g/dL      Hematocrit 45.8 %      MCV 81 fL      MCH 29.0 pg      MCHC 35.8 g/dL      RDW 11.9 %      MPV 10.4 fL      Platelets 248 Thousands/uL      nRBC 0 /100 WBCs      Segmented % 72 %      Immature Grans % 1 %      Lymphocytes % 18 %      Monocytes % 9 %      Eosinophils Relative 0 %      Basophils Relative 0 %      Absolute Neutrophils 6.63 Thousands/µL      Absolute Immature Grans 0.05 Thousand/uL      Absolute Lymphocytes 1.70 Thousands/µL      Absolute Monocytes 0.88 Thousand/µL      Eosinophils Absolute 0.03 Thousand/µL      Basophils Absolute 0.03 Thousands/µL     Urine Microscopic [[id number]]  (Abnormal) Collected: 04/28/25 0918    Lab Status: Final result Specimen: Urine, Clean Catch Updated: 04/28/25 1009     RBC, UA None Seen /hpf      WBC, UA 1-2 /hpf      Epithelial Cells Occasional /hpf      Bacteria, UA Occasional /hpf      MUCUS THREADS Occasional     Hyaline Casts, UA 1-2 /lpf     UA w Reflex to Microscopic w Reflex to Culture [[id number]]  (Abnormal) Collected: 04/28/25 0918    Lab Status: Final result Specimen: Urine, Clean Catch Updated: 04/28/25 1008     Color, UA Yellow     Clarity, UA Clear     Specific Gravity, UA 1.020     pH, UA 6.0     Leukocytes, UA Negative     Nitrite, UA Negative     Protein, UA Trace mg/dl       Glucose, UA Negative mg/dl      Ketones, UA 80 (3+) mg/dl      Urobilinogen, UA <2.0 mg/dl      Bilirubin, UA Negative     Occult Blood, UA Trace    POCT alcohol breath test [[id number]]  (Normal) Resulted: 04/28/25 1003    Lab Status: Final result Updated: 04/28/25 1003     EXTBreath Alcohol 0.000    POCT alcohol breath test [[id number]]  (Normal) Resulted: 04/28/25 0921    Lab Status: Final result Updated: 04/28/25 0921     EXTBreath Alcohol 0.000            CT head without contrast   Final Interpretation by Jordan Marie MD (04/28 1044)      No acute intracranial abnormality.  Chronic microangiopathic changes.                  Workstation performed: [id number]         XR chest 1 view   Final Interpretation by Alexander Burris MD (04/28 1153)      No focal consolidation, pleural effusion, or pneumothorax.      Question left scapular fracture. Correlate with physical exam findings. Recommend dedicated radiographs of the shoulder and scapula.            Resident: Pasquale Ventura I, the attending radiologist, have reviewed the images and agree with the final report above.      Workstation performed: [id number]         CT upper extremity wo contrast left    (Results Pending)       ECG 12 Lead Documentation Only    Date/Time: 4/28/2025 9:51 AM    Performed by: Patsy Beaulieu PA-C  Authorized by: Patsy Beaulieu PA-C    Indications / Diagnosis:  Psychosis  ECG reviewed by me, the ED Provider: yes (amando)    Patient location:  ED  Rate:     ECG rate:  83    ECG rate assessment: normal    ST segments:     ST segments:  Abnormal      ED Medication and Procedure Management   Prior to Admission Medications   Prescriptions Last Dose Informant Patient Reported? Taking?   Easy Comfort Pen Needles 33G X 5 MM MISC   Yes No   Sig: AS DIRECTED FOR ozempic EVERY WEEK   cloNIDine (CATAPRES) 0.1 mg tablet   No No   Sig: Take 1 tablet (0.1 mg total) by mouth every 12 (twelve) hours   clonazePAM (KlonoPIN) 1 mg tablet   Yes  No   Sig: Take 1 mg by mouth 3 (three) times a day   gabapentin (NEURONTIN) 400 mg capsule   No No   Sig: Take 1 capsule (400 mg total) by mouth 3 (three) times a day   hydrOXYzine HCL (ATARAX) 50 mg tablet   No No   Sig: TAKE 1 TABLET BY MOUTH THREE TIMES A DAY   metoprolol tartrate (LOPRESSOR) 25 mg tablet   No No   Sig: Take 0.5 tablets (12.5 mg total) by mouth every 12 (twelve) hours   pantoprazole (PROTONIX) 40 mg tablet   No No   Sig: TAKE ONE TABLET BY MOUTH DAILY BEFORE BREAKFAST   sildenafil (VIAGRA) 100 mg tablet   No No   Sig: Take 1 tablet (100 mg total) by mouth daily as needed for erectile dysfunction   zolpidem (AMBIEN CR) 12.5 MG CR tablet   Yes No   Sig: Take 12.5 mg by mouth daily at bedtime      Facility-Administered Medications: None     Patient's Medications   Discharge Prescriptions    No medications on file     No discharge procedures on file.  ED SEPSIS DOCUMENTATION   Time reflects when diagnosis was documented in both MDM as applicable and the Disposition within this note       Time User Action Codes Description Comment    4/28/2025  8:56 PM Patsy Beaulieu Add [F29] Psychosis (HCC)                  Patsy Beaulieu PA-C  04/28/25 2136

## 2025-04-28 NOTE — ED NOTES
"Patient was brought in via police ([employer]) who filed a [facility] 302. 302 was delegate to [facility] and Sergey Carrizales upheld the 302 with a warrant time of 9:48. Dr. Rosario upheld the 302 at 10:01am. Patient provided his 302 rights. Patient does not appear to understand his rights. Patient would not engage with writer when informing him about the 302.     302 statement:   \"This officers was dispatched to [address] [facility] to [facility]. A male who was later identified as Solo Peña was walking around confused and washing his hands with coffee. Throughout my interaction with Yehuda he gave me several names and several wrong address. He doesn't know his date of birth or his wife's name. He said he walked from his home to the diner which is located directly on [address]. Several times Yehuda would walk away dazed and confused. I fear he is a danger to himself and not capable of caring for himself.   "

## 2025-04-28 NOTE — ED NOTES
"Control team called for patient. Patient states \"I'm the federal police and you're all arrested\" patient became verbally and physically aggressive with staff unable to be redirected despite all active attempts to do so. Provider at bedside during event along with ED staff.      Jessica Treviño RN  04/28/25 6365    "

## 2025-04-29 PROBLEM — R74.8 ELEVATED CK: Status: ACTIVE | Noted: 2025-04-29

## 2025-04-29 PROBLEM — N18.9 CKD (CHRONIC KIDNEY DISEASE): Status: ACTIVE | Noted: 2025-04-29

## 2025-04-29 PROBLEM — E83.42 HYPOMAGNESEMIA: Status: ACTIVE | Noted: 2025-04-29

## 2025-04-29 LAB
ANION GAP SERPL CALCULATED.3IONS-SCNC: 12 MMOL/L (ref 4–13)
ANION GAP SERPL CALCULATED.3IONS-SCNC: 8 MMOL/L (ref 4–13)
ATRIAL RATE: 83 BPM
BASOPHILS # BLD AUTO: 0.04 THOUSANDS/ÂΜL (ref 0–0.1)
BASOPHILS NFR BLD AUTO: 1 % (ref 0–1)
BUN SERPL-MCNC: 12 MG/DL (ref 5–25)
BUN SERPL-MCNC: 16 MG/DL (ref 5–25)
CALCIUM SERPL-MCNC: 8.3 MG/DL (ref 8.4–10.2)
CALCIUM SERPL-MCNC: 9.4 MG/DL (ref 8.4–10.2)
CHLORIDE SERPL-SCNC: 105 MMOL/L (ref 96–108)
CHLORIDE SERPL-SCNC: 105 MMOL/L (ref 96–108)
CK SERPL-CCNC: 583 U/L (ref 39–308)
CK SERPL-CCNC: 670 U/L (ref 39–308)
CO2 SERPL-SCNC: 21 MMOL/L (ref 21–32)
CO2 SERPL-SCNC: 25 MMOL/L (ref 21–32)
CREAT SERPL-MCNC: 1.16 MG/DL (ref 0.6–1.3)
CREAT SERPL-MCNC: 1.59 MG/DL (ref 0.6–1.3)
EOSINOPHIL # BLD AUTO: 0.08 THOUSAND/ÂΜL (ref 0–0.61)
EOSINOPHIL NFR BLD AUTO: 1 % (ref 0–6)
ERYTHROCYTE [DISTWIDTH] IN BLOOD BY AUTOMATED COUNT: 12.1 % (ref 11.6–15.1)
GFR SERPL CREATININE-BSD FRML MDRD: 47 ML/MIN/1.73SQ M
GFR SERPL CREATININE-BSD FRML MDRD: 70 ML/MIN/1.73SQ M
GLUCOSE SERPL-MCNC: 125 MG/DL (ref 65–140)
GLUCOSE SERPL-MCNC: 238 MG/DL (ref 65–140)
GLUCOSE SERPL-MCNC: 255 MG/DL (ref 65–140)
HCT VFR BLD AUTO: 37 % (ref 36.5–49.3)
HGB BLD-MCNC: 12.7 G/DL (ref 12–17)
IMM GRANULOCYTES # BLD AUTO: 0.02 THOUSAND/UL (ref 0–0.2)
IMM GRANULOCYTES NFR BLD AUTO: 0 % (ref 0–2)
LACTATE SERPL-SCNC: 1.9 MMOL/L (ref 0.5–2)
LYMPHOCYTES # BLD AUTO: 1.4 THOUSANDS/ÂΜL (ref 0.6–4.47)
LYMPHOCYTES NFR BLD AUTO: 23 % (ref 14–44)
MAGNESIUM SERPL-MCNC: 1.8 MG/DL (ref 1.9–2.7)
MAGNESIUM SERPL-MCNC: 2.3 MG/DL (ref 1.9–2.7)
MCH RBC QN AUTO: 28.2 PG (ref 26.8–34.3)
MCHC RBC AUTO-ENTMCNC: 34.3 G/DL (ref 31.4–37.4)
MCV RBC AUTO: 82 FL (ref 82–98)
MONOCYTES # BLD AUTO: 0.69 THOUSAND/ÂΜL (ref 0.17–1.22)
MONOCYTES NFR BLD AUTO: 11 % (ref 4–12)
NEUTROPHILS # BLD AUTO: 3.95 THOUSANDS/ÂΜL (ref 1.85–7.62)
NEUTS SEG NFR BLD AUTO: 64 % (ref 43–75)
NRBC BLD AUTO-RTO: 0 /100 WBCS
P AXIS: 68 DEGREES
PLATELET # BLD AUTO: 208 THOUSANDS/UL (ref 149–390)
PMV BLD AUTO: 11.1 FL (ref 8.9–12.7)
POTASSIUM SERPL-SCNC: 3.1 MMOL/L (ref 3.5–5.3)
POTASSIUM SERPL-SCNC: 3.1 MMOL/L (ref 3.5–5.3)
PR INTERVAL: 146 MS
QRS AXIS: 62 DEGREES
QRSD INTERVAL: 100 MS
QT INTERVAL: 378 MS
QTC INTERVAL: 445 MS
RBC # BLD AUTO: 4.5 MILLION/UL (ref 3.88–5.62)
SODIUM SERPL-SCNC: 138 MMOL/L (ref 135–147)
SODIUM SERPL-SCNC: 138 MMOL/L (ref 135–147)
T WAVE AXIS: -26 DEGREES
VENTRICULAR RATE: 83 BPM
WBC # BLD AUTO: 6.18 THOUSAND/UL (ref 4.31–10.16)

## 2025-04-29 PROCEDURE — 80048 BASIC METABOLIC PNL TOTAL CA: CPT | Performed by: EMERGENCY MEDICINE

## 2025-04-29 PROCEDURE — 96365 THER/PROPH/DIAG IV INF INIT: CPT

## 2025-04-29 PROCEDURE — 82550 ASSAY OF CK (CPK): CPT | Performed by: EMERGENCY MEDICINE

## 2025-04-29 PROCEDURE — 36415 COLL VENOUS BLD VENIPUNCTURE: CPT | Performed by: EMERGENCY MEDICINE

## 2025-04-29 PROCEDURE — 96366 THER/PROPH/DIAG IV INF ADDON: CPT

## 2025-04-29 PROCEDURE — 85025 COMPLETE CBC W/AUTO DIFF WBC: CPT | Performed by: EMERGENCY MEDICINE

## 2025-04-29 PROCEDURE — 96372 THER/PROPH/DIAG INJ SC/IM: CPT

## 2025-04-29 PROCEDURE — 96368 THER/DIAG CONCURRENT INF: CPT

## 2025-04-29 PROCEDURE — 83605 ASSAY OF LACTIC ACID: CPT | Performed by: EMERGENCY MEDICINE

## 2025-04-29 PROCEDURE — 93010 ELECTROCARDIOGRAM REPORT: CPT | Performed by: INTERNAL MEDICINE

## 2025-04-29 PROCEDURE — 83735 ASSAY OF MAGNESIUM: CPT | Performed by: EMERGENCY MEDICINE

## 2025-04-29 PROCEDURE — 99221 1ST HOSP IP/OBS SF/LOW 40: CPT | Performed by: INTERNAL MEDICINE

## 2025-04-29 PROCEDURE — 82948 REAGENT STRIP/BLOOD GLUCOSE: CPT

## 2025-04-29 RX ORDER — LORAZEPAM 2 MG/ML
2 INJECTION INTRAMUSCULAR ONCE
Status: COMPLETED | OUTPATIENT
Start: 2025-04-29 | End: 2025-04-29

## 2025-04-29 RX ORDER — LORAZEPAM 1 MG/1
1 TABLET ORAL ONCE
Status: COMPLETED | OUTPATIENT
Start: 2025-04-29 | End: 2025-04-29

## 2025-04-29 RX ORDER — SODIUM CHLORIDE, SODIUM GLUCONATE, SODIUM ACETATE, POTASSIUM CHLORIDE, MAGNESIUM CHLORIDE, SODIUM PHOSPHATE, DIBASIC, AND POTASSIUM PHOSPHATE .53; .5; .37; .037; .03; .012; .00082 G/100ML; G/100ML; G/100ML; G/100ML; G/100ML; G/100ML; G/100ML
1000 INJECTION, SOLUTION INTRAVENOUS ONCE
Status: COMPLETED | OUTPATIENT
Start: 2025-04-29 | End: 2025-04-29

## 2025-04-29 RX ORDER — INSULIN LISPRO 100 [IU]/ML
1-6 INJECTION, SOLUTION INTRAVENOUS; SUBCUTANEOUS
Status: DISCONTINUED | OUTPATIENT
Start: 2025-04-29 | End: 2025-05-01 | Stop reason: HOSPADM

## 2025-04-29 RX ORDER — POTASSIUM CHLORIDE 1500 MG/1
40 TABLET, EXTENDED RELEASE ORAL ONCE
Status: COMPLETED | OUTPATIENT
Start: 2025-04-29 | End: 2025-04-29

## 2025-04-29 RX ORDER — CLONAZEPAM 1 MG/1
1 TABLET ORAL 3 TIMES DAILY
Status: DISCONTINUED | OUTPATIENT
Start: 2025-04-29 | End: 2025-05-01 | Stop reason: HOSPADM

## 2025-04-29 RX ORDER — CLONIDINE HYDROCHLORIDE 0.1 MG/1
0.1 TABLET ORAL EVERY 12 HOURS SCHEDULED
Status: DISCONTINUED | OUTPATIENT
Start: 2025-04-29 | End: 2025-05-01 | Stop reason: HOSPADM

## 2025-04-29 RX ORDER — PANTOPRAZOLE SODIUM 40 MG/1
40 TABLET, DELAYED RELEASE ORAL
Status: DISCONTINUED | OUTPATIENT
Start: 2025-04-30 | End: 2025-05-01 | Stop reason: HOSPADM

## 2025-04-29 RX ORDER — OLANZAPINE 10 MG/2ML
10 INJECTION, POWDER, FOR SOLUTION INTRAMUSCULAR ONCE
Status: COMPLETED | OUTPATIENT
Start: 2025-04-29 | End: 2025-04-29

## 2025-04-29 RX ORDER — LORAZEPAM 1 MG/1
1 TABLET ORAL EVERY 6 HOURS PRN
Status: DISCONTINUED | OUTPATIENT
Start: 2025-04-29 | End: 2025-04-29

## 2025-04-29 RX ORDER — GABAPENTIN 400 MG/1
400 CAPSULE ORAL 3 TIMES DAILY
Status: DISCONTINUED | OUTPATIENT
Start: 2025-04-29 | End: 2025-05-01 | Stop reason: HOSPADM

## 2025-04-29 RX ORDER — HEPARIN SODIUM 5000 [USP'U]/ML
5000 INJECTION, SOLUTION INTRAVENOUS; SUBCUTANEOUS EVERY 8 HOURS SCHEDULED
Status: DISCONTINUED | OUTPATIENT
Start: 2025-04-29 | End: 2025-05-01 | Stop reason: HOSPADM

## 2025-04-29 RX ORDER — OLANZAPINE 10 MG/1
10 TABLET, FILM COATED ORAL ONCE
Status: COMPLETED | OUTPATIENT
Start: 2025-04-29 | End: 2025-04-29

## 2025-04-29 RX ORDER — MAGNESIUM SULFATE HEPTAHYDRATE 40 MG/ML
2 INJECTION, SOLUTION INTRAVENOUS ONCE
Status: COMPLETED | OUTPATIENT
Start: 2025-04-29 | End: 2025-04-29

## 2025-04-29 RX ORDER — ZOLPIDEM TARTRATE 5 MG/1
10 TABLET ORAL
Status: DISCONTINUED | OUTPATIENT
Start: 2025-04-29 | End: 2025-05-01

## 2025-04-29 RX ORDER — SODIUM CHLORIDE, SODIUM GLUCONATE, SODIUM ACETATE, POTASSIUM CHLORIDE, MAGNESIUM CHLORIDE, SODIUM PHOSPHATE, DIBASIC, AND POTASSIUM PHOSPHATE .53; .5; .37; .037; .03; .012; .00082 G/100ML; G/100ML; G/100ML; G/100ML; G/100ML; G/100ML; G/100ML
100 INJECTION, SOLUTION INTRAVENOUS CONTINUOUS
Status: DISPENSED | OUTPATIENT
Start: 2025-04-29 | End: 2025-04-30

## 2025-04-29 RX ADMIN — OLANZAPINE 10 MG: 10 INJECTION, POWDER, FOR SOLUTION INTRAMUSCULAR at 12:05

## 2025-04-29 RX ADMIN — METOPROLOL TARTRATE 12.5 MG: 25 TABLET, FILM COATED ORAL at 15:33

## 2025-04-29 RX ADMIN — OLANZAPINE 10 MG: 10 TABLET, FILM COATED ORAL at 08:29

## 2025-04-29 RX ADMIN — CLONIDINE HYDROCHLORIDE 0.1 MG: 0.1 TABLET ORAL at 15:33

## 2025-04-29 RX ADMIN — CLONAZEPAM 1 MG: 0.5 TABLET ORAL at 21:31

## 2025-04-29 RX ADMIN — MAGNESIUM SULFATE HEPTAHYDRATE 2 G: 2 INJECTION, SOLUTION INTRAVENOUS at 15:35

## 2025-04-29 RX ADMIN — SODIUM CHLORIDE 1000 ML: 0.9 INJECTION, SOLUTION INTRAVENOUS at 22:37

## 2025-04-29 RX ADMIN — CLONAZEPAM 1 MG: 0.5 TABLET ORAL at 15:33

## 2025-04-29 RX ADMIN — GABAPENTIN 400 MG: 400 CAPSULE ORAL at 21:31

## 2025-04-29 RX ADMIN — GABAPENTIN 400 MG: 400 CAPSULE ORAL at 15:34

## 2025-04-29 RX ADMIN — POTASSIUM CHLORIDE 40 MEQ: 1500 TABLET, EXTENDED RELEASE ORAL at 15:31

## 2025-04-29 RX ADMIN — SODIUM CHLORIDE, SODIUM GLUCONATE, SODIUM ACETATE, POTASSIUM CHLORIDE, MAGNESIUM CHLORIDE, SODIUM PHOSPHATE, DIBASIC, AND POTASSIUM PHOSPHATE 1000 ML: .53; .5; .37; .037; .03; .012; .00082 INJECTION, SOLUTION INTRAVENOUS at 12:26

## 2025-04-29 RX ADMIN — HEPARIN SODIUM 5000 UNITS: 5000 INJECTION INTRAVENOUS; SUBCUTANEOUS at 22:39

## 2025-04-29 RX ADMIN — ZOLPIDEM TARTRATE 10 MG: 5 TABLET, FILM COATED ORAL at 22:39

## 2025-04-29 RX ADMIN — POTASSIUM CHLORIDE 40 MEQ: 1500 TABLET, EXTENDED RELEASE ORAL at 22:39

## 2025-04-29 RX ADMIN — LORAZEPAM 1 MG: 1 TABLET ORAL at 04:28

## 2025-04-29 RX ADMIN — HEPARIN SODIUM 5000 UNITS: 5000 INJECTION INTRAVENOUS; SUBCUTANEOUS at 16:43

## 2025-04-29 RX ADMIN — OLANZAPINE 10 MG: 10 TABLET, FILM COATED ORAL at 02:36

## 2025-04-29 RX ADMIN — INSULIN LISPRO 3 UNITS: 100 INJECTION, SOLUTION INTRAVENOUS; SUBCUTANEOUS at 22:38

## 2025-04-29 RX ADMIN — SODIUM CHLORIDE, SODIUM GLUCONATE, SODIUM ACETATE, POTASSIUM CHLORIDE, MAGNESIUM CHLORIDE, SODIUM PHOSPHATE, DIBASIC, AND POTASSIUM PHOSPHATE 100 ML/HR: .53; .5; .37; .037; .03; .012; .00082 INJECTION, SOLUTION INTRAVENOUS at 15:35

## 2025-04-29 RX ADMIN — LORAZEPAM 2 MG: 2 INJECTION INTRAMUSCULAR; INTRAVENOUS at 12:04

## 2025-04-29 NOTE — ED CARE HANDOFF
Emergency Department Sign Out Note        Sign out and transfer of care from previous provider. See Separate Emergency Department note.     The patient, Solo Mack, was evaluated by the previous provider for psychiatric evaluation.    Workup Completed:  Adequate clearance workup    ED Course / Workup Pending (followup):  Inpatient psychiatric bed search and placement      HEART Risk Score      Flowsheet Row Most Recent Value   Heart Score Risk Calculator    History 0 Filed at: 04/28/2025 2131   ECG 1 Filed at: 04/28/2025 2131   Age 1 Filed at: 04/28/2025 2131   Risk Factors 2 Filed at: 04/28/2025 2131   Troponin 0 Filed at: 04/28/2025 2131   HEART Score 4 Filed at: 04/28/2025 2131          No data recorded                          ED Course as of 04/29/25 0136   Mon Apr 28, 2025   1850 Patient came in with acute psychosis via police escort.  302 completed.  Patient is currently awaiting inpatient psychiatric bed search and placement.  Patient was wandering through the hallway in the emergency department and was extremely rude to staff.  Patient also ripped out his IV access.  Patient given Zyprexa, Ativan, Benadryl for sedation.   1851 Total CK(!): 591  1500 cc of IV fluids are ordered for elevated CK.  Will repeat CK after fluids are completed.   2122 Patient's chest x-ray shows possible left scapular fracture.  CT scan ordered to better visualize the scapula.   Tue Apr 29, 2025   0134 CT upper extremity wo contrast left  CT left shoulder including scapula shows:  IMPRESSION:     No definite acute fracture or dislocation. Osseous discontinuity at the base of the acromion appears well-corticated, suggestive of chronic fracture deformity versus less likely os acromiale variant. Of note, this finding was likely present on prior   radiographs dating back to 2020.     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     Clustered tree-in-bud nodularity within the left lower lobe, likely  infectious/inflammatory in etiology.       Procedures  Medical Decision Making  Patient signed out pending inpatient psychiatric bed search and placement.  Earlier chest x-ray showed concern for possible scapular fracture.  CT of left shoulder including the scapula was ordered that did not show any acute bony deformity.  Patient was initially agitated requiring sedation.  No other acute issues noted during my shift.  Care of patient signed out to the next attending who will continue to monitor patient until an inpatient psychiatric bed has become available.  No other acute issues noted during my shift.    Amount and/or Complexity of Data Reviewed  Labs: ordered. Decision-making details documented in ED Course.  Radiology: ordered. Decision-making details documented in ED Course.    Risk  Prescription drug management.  Decision regarding hospitalization.            Disposition  Final diagnoses:   Psychosis (HCC)     Time reflects when diagnosis was documented in both MDM as applicable and the Disposition within this note       Time User Action Codes Description Comment    4/28/2025  8:56 PM Patsy Beaulieu Add [F29] Psychosis (HCC)           ED Disposition       ED Disposition   Transfer to Behavioral Health Condition   --    Date/Time   Mon Apr 28, 2025 2177    Comment   Solo Mack should be transferred out to [facility] and has been medically cleared.               MD Documentation      Flowsheet Row Most Recent Value   Sending MD Dr Harmeet Batista DO          Follow-up Information    None       Patient's Medications   Discharge Prescriptions    No medications on file     No discharge procedures on file.       ED Provider  Electronically Signed by     Elsa Coleman DO  04/29/25 0136

## 2025-04-29 NOTE — ED NOTES
Pt is stand at double door and staring at them. Pt states that he is waiting for the secret service as they are coming to get him and escort him out. RN tried to redirect but pt just continues to stare at the double doors      Janet Miranda RN  04/29/25 9760

## 2025-04-29 NOTE — ASSESSMENT & PLAN NOTE
Recent Labs     04/29/25  1225   GLUC 238*      Lab Results   Component Value Date    HGBA1C 8.3 (A) 03/17/2025      Continue with SSI  Adjust as needed  Hyperglycemia also due to olanzapine

## 2025-04-29 NOTE — ASSESSMENT & PLAN NOTE
/91   Pulse 95   Temp 98.5 °F (36.9 °C) (Oral)   Resp 19   SpO2 95%   Continue with lopressor and clonidine

## 2025-04-29 NOTE — ASSESSMENT & PLAN NOTE
Recent Labs     04/28/25  1012 04/28/25  1903 04/29/25  1107   CKTOTAL 591* 473* 670*      Bolus given in ED  Continue with IV fluids

## 2025-04-29 NOTE — ED NOTES
Pt responding to internal stimuli and struggling with following redirection. Pt repeatedly trying to open secure holding doors.

## 2025-04-29 NOTE — ED NOTES
Pt states that he is hearing voices. Pt states that he feels like there are people are burning in the basement. RN instructed pt that people are not burning and offered if he wanted the TV on for background but pt declined      Janet Miranda RN  04/29/25 3415

## 2025-04-29 NOTE — ED NOTES
Pt speaking to multiple people not present in the room while eating dinner, having full conversations. Pt staring at wall and ceiling as if he can see these imaginary people.     Osbaldo Chow  04/29/25 6619

## 2025-04-29 NOTE — ED NOTES
"Pt was transferred to the bubble @ 0200. Pt slept at no time from 5879-2502. Pt was agitated and unable to stay in one position. Pt was observed numerous times having full blown conversations w/ himself (talking about his wealth management, being found in a field, etc). Pt at one point while being medicated stated to charge nurse that the \"secret service are coming to get me from this door.\" Pt has been fixated on secure hold doors exiting to ED since being transferred. Pt continues to knock on the doors and stares through crack between doors. Pt continues to make complaints of voices but it was unclear if it was due to [place] traffic. Pt would not stay in [facility] [address] and continually stayed in the hallway clutching his bedding and getting up/sitting down/staring aimlessly all over. Pt fixated on the cameras appearing very paranoid and also scared at the same time. Pt was redirectable but was unable to maintain calm for any more than five minutes. Pt was medicated 2x during the shift in an attempt to provide the Pt w/ relief from symptoms. Pt's mood not affected by the medication. Pt continues to heavily fixate on cameras and stare intently at them speaking in nonsensical language at times. Pt consistently came to bubble door repeatedly for the last hour. Pt continues to ask to come into the office or for staff to come in and keep him company stating that he is scared of the people in the room. This worker attempted to redirect Pt but Pt continues to wander back and forth from [facility] [address] to secure holding doors. Pt appears to be responding to internal stimuli at this time.    "

## 2025-04-29 NOTE — ED NOTES
Patient is awake eating dinner tray. Arm restraints removed for dinner. Patient is calm at this time.     Osbaldo Chow  04/29/25 1804       Osbaldo Chow  04/29/25 1802

## 2025-04-29 NOTE — ED NOTES
Patient pacing around in room, calling out names. Talking to people that are not present in the room. Exhibiting paranoia.      Carol Altman RN  04/29/25 9488

## 2025-04-29 NOTE — ED NOTES
Call placed to [facility] to inform of CK results and they can not accommodate due to it elevating. Attending provider notified.

## 2025-04-29 NOTE — ED CARE HANDOFF
Emergency Department Sign Out Note        Sign out and transfer of care from Dr. Coleman. See Separate Emergency Department note.     The patient, Solo Mack, was evaluated by the previous provider for psych eval.    Workup Completed:  302    ED Course / Workup Pending (followup):  Pending CT UE and placement.      HEART Risk Score      Flowsheet Row Most Recent Value   Heart Score Risk Calculator    History 0 Filed at: 04/28/2025 2131   ECG 1 Filed at: 04/28/2025 2131   Age 1 Filed at: 04/28/2025 2131   Risk Factors 2 Filed at: 04/28/2025 2131   Troponin 0 Filed at: 04/28/2025 2131   HEART Score 4 Filed at: 04/28/2025 2131          No data recorded                          ED Course as of 04/29/25 0355   Tue Apr 29, 2025   0215 Patient resting, mild hyperactive though verbally redirectable. Reviewed CT findings suggestive of shoulder arthritis. Will continue observation while bed search ongoing.     Procedures  Medical Decision Making  Amount and/or Complexity of Data Reviewed  Labs: ordered.  Radiology: ordered.    Risk  Prescription drug management.  Decision regarding hospitalization.            Disposition  Final diagnoses:   Psychosis (HCC)     Time reflects when diagnosis was documented in both MDM as applicable and the Disposition within this note       Time User Action Codes Description Comment    4/28/2025  8:56 PM Patsy Beaulieu Add [F29] Psychosis (HCC)           ED Disposition       ED Disposition   Transfer to Behavioral Health Condition   --    Date/Time   Mon Apr 28, 2025 2822    Comment   Solo Mcak should be transferred out to [facility] and has been medically cleared.               MD Documentation      Flowsheet Row Most Recent Value   Sending MD Dr Harmeet Batista, DO          Follow-up Information    None       Patient's Medications   Discharge Prescriptions    No medications on file     No discharge procedures on file.       ED Provider  Electronically Signed by     Kaiden Duke,  DO  04/29/25 0355

## 2025-04-29 NOTE — RESTRAINT FACE TO FACE
Restraint Face to Face   Solo R Vanessaandresgamaliel 55 y.o. male MRN: [id number]  Unit/Bed#: ED 04 Encounter: [id number]      Physical Evaluation agitated  Purpose for Restraints/ Seclusion High risk for self harm, High risk for causing significant disruption of treatment environment , High risk for harm to others, and high risk for flight  Patient's reaction to the intervention  Patient's medical condition  Patient's Behavioral condition  Restraints to be Continued

## 2025-04-29 NOTE — ED CARE HANDOFF
Emergency Department Sign Out Note        Sign out and transfer of care from Dr. Duke. See Separate Emergency Department note.     The patient, Solo Mack, was evaluated by the previous provider for acute psychosis, history of schizoaffective disorder, bipolar 1, CKD, alcohol abuse, hypertension, CHF, prolonged QT interval..    Workup Completed:  Medically cleared.  Elevated CK improved after fluids.  QTc was 445 yesterday.    ED Course / Workup Pending (followup):  Will require further antipsychotic medication.  Currently hallucinating.  Pacing the room, talking to himself and some reading.  Asking to speak to the president understates.  Will order psychiatry consult.  We have to has been obtained.  Bed search underway.      HEART Risk Score      Flowsheet Row Most Recent Value   Heart Score Risk Calculator    History 0 Filed at: 04/28/2025 2131   ECG 1 Filed at: 04/28/2025 2131   Age 1 Filed at: 04/28/2025 2131   Risk Factors 2 Filed at: 04/28/2025 2131   Troponin 0 Filed at: 04/28/2025 2131   HEART Score 4 Filed at: 04/28/2025 2131          No data recorded                          ED Course as of 04/30/25 1257   Tue Apr 29, 2025   1202 Repeat CK elevated to 670 units/L.  After receiving the repeat dose of antipsychotics patient is more agitated, confused and hallucinating.  Very difficult to redirect.  Not cooperative.  Will order Zyprexa and Ativan, IV fluids, repeat basic metabolic, magnesium level and lactic acid.  Will order psychiatric consultation since patient has been here for greater than 24 hours and for medication suggestions.   1449 Consultation ordered for SLIM assessment.  Dr. Horowitz aware.   1632 Signed out to Dr. Coleman at end of shift.     Procedures  Medical Decision Making  Amount and/or Complexity of Data Reviewed  Labs: ordered.  Radiology: ordered.    Risk  Prescription drug management.  Decision regarding hospitalization.            Disposition  Final diagnoses:   Psychosis (HCC)    Acute kidney injury superimposed on chronic kidney disease  (HCC)   Schizophrenia, unspecified type (HCC)   Elevated CPK     Time reflects when diagnosis was documented in both MDM as applicable and the Disposition within this note       Time User Action Codes Description Comment    4/28/2025  8:56 PM Patsy Beaulieu Add [F29] Psychosis (HCC)     4/29/2025  1:18 PM Harman Mancini [N17.9,  N18.9] Acute kidney injury superimposed on chronic kidney disease  (HCC)     4/29/2025  1:18 PM Harman Mancini [F20.9] Schizophrenia, unspecified type (HCC)     4/29/2025  1:18 PM Harman Mancini [R74.8] Elevated CPK           ED Disposition       ED Disposition   Transfer to Behavioral Health Condition   --    Date/Time   Mon Apr 28, 2025 5162    Comment   Solo Mack should be transferred out to [facility] and has been medically cleared.               MD Documentation      Flowsheet Row Most Recent Value   Sending MD Dr Harmeet Batista DO          Follow-up Information    None       Patient's Medications   Discharge Prescriptions    No medications on file     No discharge procedures on file.       ED Provider  Electronically Signed by     Harman Mancini DO  04/30/25 1257

## 2025-04-29 NOTE — ASSESSMENT & PLAN NOTE
Recent Labs     04/28/25  1012 04/29/25  1225   K 3.0* 3.1*   MG 1.7* 1.8*      Secondary to decrease intake  Will replete and continue to monitor

## 2025-04-29 NOTE — CONSULTS
"Consultation - Hospitalist   Name: Solo Mack 55 y.o. male I MRN: [id number]  Unit/Bed#: ED 04 I Date of Admission: 4/28/2025   Date of Service: 4/29/2025 I Hospital Day: 0   Consult to internal medicine  Consult performed by: Inna Wray MD  Consult ordered by: Harman Mancini DO        Physician Requesting Evaluation: Elsa Coleman DO   Reason for Evaluation / Principal Problem:   Chief Complaint   Patient presents with    Psychiatric Evaluation     Pt to er with police. Police states that they are filing a 302 due to \"being a danger to himself. He's going to walk into traffic and kill himself one day\". Patient reports getting little sleep lately. Police report that he was washing his hands at a diner this am with coffee.          Assessment & Plan  GERD (gastroesophageal reflux disease)  Continue with protonix  Hypokalemia  Recent Labs     04/28/25  1012 04/29/25  1225   K 3.0* 3.1*   MG 1.7* 1.8*      Secondary to decrease intake  Will replete and continue to monitor  Essential hypertension  /91   Pulse 95   Temp 98.5 °F (36.9 °C) (Oral)   Resp 19   SpO2 95%   Continue with lopressor and clonidine   Type 2 diabetes mellitus, without long-term current use of insulin (HCC)  Recent Labs     04/29/25  1225   GLUC 238*      Lab Results   Component Value Date    HGBA1C 8.3 (A) 03/17/2025      Continue with SSI  Adjust as needed  Hyperglycemia also due to olanzapine  Schizophrenia, unspecified type (HCC)  Pending psych placement   Continual observation  Hypomagnesemia  Will replete  Continue to monitor   Elevated CK  Recent Labs     04/28/25  1012 04/28/25  1903 04/29/25  1107   CKTOTAL 591* 473* 670*      Bolus given in ED  Continue with IV fluids   CKD (chronic kidney disease)  Lab Results   Component Value Date    EGFR 47 04/29/2025    EGFR 67 04/28/2025    EGFR 59 12/17/2024    CREATININE 1.59 (H) 04/29/2025    CREATININE 1.19 04/28/2025    CREATININE 1.34 (H) 12/17/2024     Currently at " "baseline   Urinary retention protocol  Continue to monitor         VTE Pharmacologic Prophylaxis:   Moderate Risk (Score 3-4) - Pharmacological DVT Prophylaxis Ordered: heparin.  Code Status: Prior patient/chart review  Discussion with family:  not available.     Anticipated Length of Stay: Patient will be admitted on an observation basis with an anticipated length of stay of less than 2 midnights secondary to pending inpatient psych .    History of Present Illness   Chief Complaint:   Chief Complaint   Patient presents with    Psychiatric Evaluation     Pt to er with police. Police states that they are filing a 302 due to \"being a danger to himself. He's going to walk into traffic and kill himself one day\". Patient reports getting little sleep lately. Police report that he was washing his hands at a diner this am with coffee.          Solo Mack is a 55 y.o. male with a PMH of hypertension, GERD, CKD, diabetes presented with acute psychosis with underlying schizophrenia. He was noted to have electrolyte abnormalities and elevated CK.SLIM was consulted for further management    Review of Systems   Constitutional:  Positive for activity change, appetite change and fatigue.   HENT:  Negative for congestion.    Respiratory:  Negative for cough and shortness of breath.    Cardiovascular:  Negative for chest pain and palpitations.   Gastrointestinal:  Negative for abdominal pain.   Genitourinary:  Negative for dysuria and urgency.   Musculoskeletal:  Positive for arthralgias and back pain.   Neurological:  Positive for weakness. Negative for headaches.   Psychiatric/Behavioral:  Negative for agitation and confusion.        Historical Information   Past Medical History:   Diagnosis Date    Alcohol withdrawal (HCC)     Alcoholism (HCC)     Anxiety     Back pain     Back pain, chronic     Bipolar 1 disorder (HCC)     Bipolar disorder, current episode mixed, moderate (HCC)     BPH (benign prostatic hyperplasia)     " Cardiac disease     CHF (congestive heart failure) ([facility])     Chronic pain disorder     Chronic renal failure     Demyelinating disease ([facility])     Dental disorder     Depression     Diabetes mellitus ([facility])     Drug abuse ([facility])     Drug withdrawal ([facility])     ED (erectile dysfunction)     Edema     Encephalopathy     Encephalopathy     Fatigue     GERD (gastroesophageal reflux disease)     H/O prolonged Q-T interval on ECG 12/4/2024    Heart rate fast     Hepatitis     unspecified     Hyperlipidemia     Hypertension     Hypokalemia     Hypothyroidism 10/21/2024    Knee buckling     MI (myocardial infarction) ([facility])     Morbid obesity with BMI of 40.0-44.9, adult ([facility])     NIDDY (non-insulin dependent diabetes mellitus in young) ([facility])     Obesity     Opiate dependence ([facility])     Opiate withdrawal ([facility])     Osteoarthritis     Pleural effusion     Pneumonia     Prolonged Q-T interval on ECG     Psychiatric disorder     Psychiatric illness     Psychosis ([facility])     Psychosis ([facility]) 12/17/2024    Renal disorder     Schizo-affective schizophrenia ([facility])     Spinal stenosis, lumbar     Traumatic subdural hemorrhage with loss of consciousness of unspecified duration, initial encounter ([facility]) 12/29/2022    Ulcer of calf ([facility])     Ulnar neuritis, right     Ulnar neuropathy at elbow     Weight loss      Past Surgical History:   Procedure Laterality Date    BACK SURGERY      report thoracic surgery    LUMBAR FUSION  05/2006    L5/S1 Gratch      Social History     Tobacco Use    Smoking status: Former     Passive exposure: Never    Smokeless tobacco: Never    Tobacco comments:     patient is a non - smoker   Vaping Use    Vaping status: Never Used   Substance and Sexual Activity    Alcohol use: Yes     Comment: beer here and there per pt    Drug use: Not Currently    Sexual activity: Not on file     E-Cigarette/Vaping    E-Cigarette Use Never User      E-Cigarette/Vaping Substances    Nicotine No     THC No     CBD No     Flavoring No      Other No     Unknown No      Family History   Problem Relation Age of Onset    No Known Problems Mother     No Known Problems Father     No Known Problems Sister     No Known Problems Brother     No Known Problems Maternal Aunt     No Known Problems Paternal Aunt     No Known Problems Maternal Uncle     No Known Problems Paternal Uncle     No Known Problems Maternal Grandfather     No Known Problems Maternal Grandmother     No Known Problems Paternal Grandfather     No Known Problems Paternal Grandmother     No Known Problems Cousin     ADD / ADHD Neg Hx     Alcohol abuse Neg Hx     Anxiety disorder Neg Hx     Bipolar disorder Neg Hx     Dementia Neg Hx     Depression Neg Hx     Drug abuse Neg Hx     OCD Neg Hx     Paranoid behavior Neg Hx     Schizophrenia Neg Hx     Seizures Neg Hx     Self-Injury Neg Hx     Suicide Attempts Neg Hx      Social History:  Marital Status: /Civil Union     Meds/Allergies   I have reviewed home medications using recent Epic encounter.  Prior to Admission medications    Medication Sig Start Date End Date Taking? Authorizing Provider   clonazePAM (KlonoPIN) 1 mg tablet Take 1 mg by mouth 3 (three) times a day 2/28/25   Historical Provider, MD   cloNIDine (CATAPRES) 0.1 mg tablet Take 1 tablet (0.1 mg total) by mouth every 12 (twelve) hours 12/18/24   DELANO Gallo   Easy Comfort Pen Needles 33G X 5 MM MISC AS DIRECTED FOR ozempic EVERY WEEK 8/11/23   Historical Provider, MD   gabapentin (NEURONTIN) 400 mg capsule Take 1 capsule (400 mg total) by mouth 3 (three) times a day 12/18/24   DELANO Gallo   hydrOXYzine HCL (ATARAX) 50 mg tablet TAKE 1 TABLET BY MOUTH THREE TIMES A DAY 4/14/25   Florin Lindsay DO   metoprolol tartrate (LOPRESSOR) 25 mg tablet Take 0.5 tablets (12.5 mg total) by mouth every 12 (twelve) hours 3/17/25   Florin Lindsay DO   pantoprazole (PROTONIX) 40 mg tablet TAKE ONE TABLET BY MOUTH DAILY BEFORE BREAKFAST 8/21/23   Florin Lindsay DO    sildenafil (VIAGRA) 100 mg tablet Take 1 tablet (100 mg total) by mouth daily as needed for erectile dysfunction 2/11/25   Florin Lindsay DO   zolpidem (AMBIEN CR) 12.5 MG CR tablet Take 12.5 mg by mouth daily at bedtime 7/25/23   Historical Provider, MD     Allergies   Allergen Reactions    Haldol [Haloperidol]     Lexapro [Escitalopram Oxalate] Hives    Penicillins        Objective :  Temp:  [98.5 °F (36.9 °C)] 98.5 °F (36.9 °C)  HR:  [] 95  BP: (125-139)/(88-91) 136/91  Resp:  [17-19] 19  SpO2:  [95 %-98 %] 95 %    Physical Exam  Vitals reviewed.   Constitutional:       Appearance: He is obese.   HENT:      Head: Atraumatic.      Mouth/Throat:      Mouth: Mucous membranes are dry.   Cardiovascular:      Rate and Rhythm: Normal rate.      Heart sounds: Normal heart sounds.   Pulmonary:      Effort: No respiratory distress.   Abdominal:      General: Bowel sounds are normal.   Musculoskeletal:      Right lower leg: No edema.      Left lower leg: No edema.   Skin:     Findings: Bruising and erythema present.   Neurological:      Mental Status: He is alert. He is disoriented.          Lines/Drains:            Lab Results: I have reviewed the following results:  Results from last 7 days   Lab Units 04/28/25  1012   WBC Thousand/uL 9.32   HEMOGLOBIN g/dL 16.4   HEMATOCRIT % 45.8   PLATELETS Thousands/uL 248   SEGS PCT % 72   LYMPHO PCT % 18   MONO PCT % 9   EOS PCT % 0     Results from last 7 days   Lab Units 04/29/25  1225 04/28/25  1012   SODIUM mmol/L 138 136   POTASSIUM mmol/L 3.1* 3.0*   CHLORIDE mmol/L 105 98   CO2 mmol/L 21 22   BUN mg/dL 16 11   CREATININE mg/dL 1.59* 1.19   ANION GAP mmol/L 12 16*   CALCIUM mg/dL 9.4 9.3   ALBUMIN g/dL  --  4.6   TOTAL BILIRUBIN mg/dL  --  0.99   ALK PHOS U/L  --  65   ALT U/L  --  16   AST U/L  --  26   GLUCOSE RANDOM mg/dL 238* 152*     Results from last 7 days   Lab Units 04/28/25  1508   INR  1.06         Lab Results   Component Value Date    HGBA1C 8.3 (A)  03/17/2025    HGBA1C 6.7 (H) 12/04/2024    HGBA1C 6.9 (A) 10/21/2024     Results from last 7 days   Lab Units 04/29/25  1225 04/28/25  1508   LACTIC ACID mmol/L 1.9 0.9   PROCALCITONIN ng/ml  --  0.06       Imaging Results Review: No pertinent imaging studies reviewed.  Other Study Results Review: No additional pertinent studies reviewed.    Administrative Statements   I have spent a total time of 65 minutes in caring for this patient on the day of the visit/encounter including Instructions for management, Patient and family education, Importance of tx compliance, Documenting in the medical record, Reviewing/placing orders in the medical record (including tests, medications, and/or procedures), Obtaining or reviewing history  , and Communicating with other healthcare professionals .    ** Please Note: This note has been constructed using a voice recognition system. **

## 2025-04-29 NOTE — ED NOTES
Pt informed of repeat lab work needed. Pt not receptive to this writer. Pt reported that he would be leaving. This writer engaged in cognitive restructuring but pt not receptive to this. Pt stated he is leaving to go to [facility] to get .

## 2025-04-29 NOTE — ASSESSMENT & PLAN NOTE
Lab Results   Component Value Date    EGFR 47 04/29/2025    EGFR 67 04/28/2025    EGFR 59 12/17/2024    CREATININE 1.59 (H) 04/29/2025    CREATININE 1.19 04/28/2025    CREATININE 1.34 (H) 12/17/2024     Currently at baseline   Urinary retention protocol  Continue to monitor

## 2025-04-30 LAB
ALBUMIN SERPL BCG-MCNC: 2.9 G/DL (ref 3.5–5)
ALP SERPL-CCNC: 49 U/L (ref 34–104)
ALT SERPL W P-5'-P-CCNC: 15 U/L (ref 7–52)
ANION GAP SERPL CALCULATED.3IONS-SCNC: 4 MMOL/L (ref 4–13)
AST SERPL W P-5'-P-CCNC: 18 U/L (ref 13–39)
BILIRUB SERPL-MCNC: 0.45 MG/DL (ref 0.2–1)
BUN SERPL-MCNC: 6 MG/DL (ref 5–25)
CALCIUM ALBUM COR SERPL-MCNC: 7.7 MG/DL (ref 8.3–10.1)
CALCIUM SERPL-MCNC: 6.8 MG/DL (ref 8.4–10.2)
CHLORIDE SERPL-SCNC: 115 MMOL/L (ref 96–108)
CK SERPL-CCNC: 265 U/L (ref 39–308)
CK SERPL-CCNC: 385 U/L (ref 39–308)
CK SERPL-CCNC: 435 U/L (ref 39–308)
CO2 SERPL-SCNC: 23 MMOL/L (ref 21–32)
CREAT SERPL-MCNC: 0.9 MG/DL (ref 0.6–1.3)
GFR SERPL CREATININE-BSD FRML MDRD: 95 ML/MIN/1.73SQ M
GLUCOSE SERPL-MCNC: 121 MG/DL (ref 65–140)
GLUCOSE SERPL-MCNC: 168 MG/DL (ref 65–140)
GLUCOSE SERPL-MCNC: 202 MG/DL (ref 65–140)
GLUCOSE SERPL-MCNC: 215 MG/DL (ref 65–140)
POTASSIUM SERPL-SCNC: 2.9 MMOL/L (ref 3.5–5.3)
PROT SERPL-MCNC: 4.5 G/DL (ref 6.4–8.4)
SODIUM SERPL-SCNC: 142 MMOL/L (ref 135–147)

## 2025-04-30 PROCEDURE — 82948 REAGENT STRIP/BLOOD GLUCOSE: CPT

## 2025-04-30 PROCEDURE — 80053 COMPREHEN METABOLIC PANEL: CPT | Performed by: INTERNAL MEDICINE

## 2025-04-30 PROCEDURE — 82550 ASSAY OF CK (CPK): CPT | Performed by: INTERNAL MEDICINE

## 2025-04-30 PROCEDURE — 96366 THER/PROPH/DIAG IV INF ADDON: CPT

## 2025-04-30 PROCEDURE — 99222 1ST HOSP IP/OBS MODERATE 55: CPT | Performed by: INTERNAL MEDICINE

## 2025-04-30 PROCEDURE — 82550 ASSAY OF CK (CPK): CPT | Performed by: EMERGENCY MEDICINE

## 2025-04-30 PROCEDURE — 36415 COLL VENOUS BLD VENIPUNCTURE: CPT | Performed by: EMERGENCY MEDICINE

## 2025-04-30 PROCEDURE — 96372 THER/PROPH/DIAG INJ SC/IM: CPT

## 2025-04-30 RX ORDER — POTASSIUM CHLORIDE 20MEQ/15ML
40 LIQUID (ML) ORAL ONCE
Status: COMPLETED | OUTPATIENT
Start: 2025-04-30 | End: 2025-04-30

## 2025-04-30 RX ORDER — SODIUM CHLORIDE, SODIUM GLUCONATE, SODIUM ACETATE, POTASSIUM CHLORIDE, MAGNESIUM CHLORIDE, SODIUM PHOSPHATE, DIBASIC, AND POTASSIUM PHOSPHATE .53; .5; .37; .037; .03; .012; .00082 G/100ML; G/100ML; G/100ML; G/100ML; G/100ML; G/100ML; G/100ML
125 INJECTION, SOLUTION INTRAVENOUS CONTINUOUS
Status: DISCONTINUED | OUTPATIENT
Start: 2025-04-30 | End: 2025-05-01

## 2025-04-30 RX ORDER — POTASSIUM CHLORIDE 1500 MG/1
40 TABLET, EXTENDED RELEASE ORAL 2 TIMES DAILY
Status: DISCONTINUED | OUTPATIENT
Start: 2025-04-30 | End: 2025-05-01

## 2025-04-30 RX ORDER — POTASSIUM CHLORIDE 1500 MG/1
40 TABLET, EXTENDED RELEASE ORAL
Status: DISCONTINUED | OUTPATIENT
Start: 2025-04-30 | End: 2025-04-30

## 2025-04-30 RX ADMIN — GABAPENTIN 400 MG: 400 CAPSULE ORAL at 20:55

## 2025-04-30 RX ADMIN — CLONAZEPAM 1 MG: 0.5 TABLET ORAL at 20:56

## 2025-04-30 RX ADMIN — PANTOPRAZOLE SODIUM 40 MG: 40 TABLET, DELAYED RELEASE ORAL at 06:06

## 2025-04-30 RX ADMIN — SODIUM CHLORIDE, SODIUM GLUCONATE, SODIUM ACETATE, POTASSIUM CHLORIDE, MAGNESIUM CHLORIDE, SODIUM PHOSPHATE, DIBASIC, AND POTASSIUM PHOSPHATE 125 ML/HR: .53; .5; .37; .037; .03; .012; .00082 INJECTION, SOLUTION INTRAVENOUS at 20:57

## 2025-04-30 RX ADMIN — GABAPENTIN 400 MG: 400 CAPSULE ORAL at 09:14

## 2025-04-30 RX ADMIN — POTASSIUM CHLORIDE 40 MEQ: 20 SOLUTION ORAL at 13:43

## 2025-04-30 RX ADMIN — HEPARIN SODIUM 5000 UNITS: 5000 INJECTION INTRAVENOUS; SUBCUTANEOUS at 06:06

## 2025-04-30 RX ADMIN — CLONAZEPAM 1 MG: 0.5 TABLET ORAL at 09:15

## 2025-04-30 RX ADMIN — ZOLPIDEM TARTRATE 10 MG: 5 TABLET, FILM COATED ORAL at 20:55

## 2025-04-30 RX ADMIN — CLONIDINE HYDROCHLORIDE 0.1 MG: 0.1 TABLET ORAL at 20:55

## 2025-04-30 RX ADMIN — POTASSIUM CHLORIDE 40 MEQ: 1500 TABLET, EXTENDED RELEASE ORAL at 20:55

## 2025-04-30 RX ADMIN — METOPROLOL TARTRATE 12.5 MG: 25 TABLET, FILM COATED ORAL at 20:58

## 2025-04-30 NOTE — QUICK NOTE
Patient is medically stable to be transferred to inpatient psych for further management of acute psychosis.  Suspect persistent elevated CK levels in the setting of as needed Zyprexa being administered to patient for agitation.

## 2025-04-30 NOTE — ED NOTES
Bed Search    Fany (Timothy) will review changes in chart w/ staff  Corine (answering service) someone to call back  Sunitha (Tyree) cannot review at this time, CK levels

## 2025-04-30 NOTE — ED NOTES
Pt is not in soft restraints due to effective alternative methods.      Lisa Hill  04/30/25 [id number]

## 2025-04-30 NOTE — ASSESSMENT & PLAN NOTE
Recent Labs     04/30/25  0325 04/30/25  0606 04/30/25  1241   CKTOTAL 385* 435* 265      Bolus given in ED  Continue with IV fluids

## 2025-04-30 NOTE — ED NOTES
"Patient refusing additional fluids/insulin. States \"doesn't have diabetes just recently high sugars and can drink water and does not need IV fluids\" . Patient is eating/drinking appropriately     Jessica Treviño RN  04/30/25 [id number]    "

## 2025-04-30 NOTE — ED NOTES
PC to Tyree Helton. Pt's CK level is too high at this time. Pt cannot be reviewed for admission until such time it's controlled.

## 2025-04-30 NOTE — PLAN OF CARE
Problem: PAIN - ADULT  Goal: Verbalizes/displays adequate comfort level or baseline comfort level  Description: Interventions:- Encourage patient to monitor pain and request assistance- Assess pain using appropriate pain scale- Administer analgesics based on type and severity of pain and evaluate response- Implement non-pharmacological measures as appropriate and evaluate response- Consider cultural and social influences on pain and pain management- Notify physician/advanced practitioner if interventions unsuccessful or patient reports new pain  Outcome: Progressing     Problem: INFECTION - ADULT  Goal: Absence or prevention of progression during hospitalization  Description: INTERVENTIONS:- Assess and monitor for signs and symptoms of infection- Monitor lab/diagnostic results- Monitor all insertion sites, i.e. indwelling lines, tubes, and drains- Monitor endotracheal if appropriate and nasal secretions for changes in amount and color- [place] appropriate cooling/warming therapies per order- Administer medications as ordered- Instruct and encourage patient and family to use good hand hygiene technique- Identify and instruct in appropriate isolation precautions for identified infection/condition  Outcome: Progressing  Goal: Absence of fever/infection during neutropenic period  Description: INTERVENTIONS:- Monitor WBC  Outcome: Progressing

## 2025-04-30 NOTE — ASSESSMENT & PLAN NOTE
Recent Labs     04/28/25  1012 04/29/25  1225 04/29/25 2050 04/30/25  1241   K 3.0* 3.1* 3.1* 2.9*   MG 1.7* 1.8* 2.3  --       Secondary to decrease intake  Will replete and continue to monitor

## 2025-04-30 NOTE — ED NOTES
"Pt has been speaking to several different people and is telling them \"I am not doing that, they will tie me down again!\" \"You guys need to stop!\" Pt is continuously staring at the wall when speaking.              Anette Gonzales  04/29/25 2048    "

## 2025-04-30 NOTE — ASSESSMENT & PLAN NOTE
Lab Results   Component Value Date    EGFR 95 04/30/2025    EGFR 70 04/29/2025    EGFR 47 04/29/2025    CREATININE 0.90 04/30/2025    CREATININE 1.16 04/29/2025    CREATININE 1.59 (H) 04/29/2025     Currently at baseline   Urinary retention protocol  Continue to monitor

## 2025-04-30 NOTE — ASSESSMENT & PLAN NOTE
Recent Labs     04/29/25  1829 04/29/25 2050 04/30/25  0632 04/30/25  1237 04/30/25  1241   POCGLU 125  --  121 215*  --    GLUC  --    < >  --   --  202*    < > = values in this interval not displayed.      Lab Results   Component Value Date    HGBA1C 8.3 (A) 03/17/2025      Continue with SSI  Adjust as needed  Hyperglycemia also due to olanzapine

## 2025-04-30 NOTE — ED NOTES
PC from [facility] So[facility]. Timothy reviewed the case w/ facility physician. Unfortunately at this time they will be requiring TWO results under 500 to consider. Pt not appropriate at this time.

## 2025-04-30 NOTE — ASSESSMENT & PLAN NOTE
/67   Pulse 95   Temp 98.5 °F (36.9 °C) (Oral)   Resp 16   SpO2 98%   Continue with lopressor and clonidine

## 2025-04-30 NOTE — H&P
H&P - Hospitalist   Name: Solo Mack 55 y.o. male I MRN: [id number]  Unit/Bed#: ED 04 I Date of Admission: 4/28/2025   Date of Service: 4/30/2025 I Hospital Day: 0     Assessment & Plan  GERD (gastroesophageal reflux disease)  Continue with protonix  Hypokalemia  Recent Labs     04/28/25  1012 04/29/25  1225 04/29/25 2050 04/30/25  1241   K 3.0* 3.1* 3.1* 2.9*   MG 1.7* 1.8* 2.3  --       Secondary to decrease intake  Will replete and continue to monitor  Essential hypertension  /67   Pulse 95   Temp 98.5 °F (36.9 °C) (Oral)   Resp 16   SpO2 98%   Continue with lopressor and clonidine   Type 2 diabetes mellitus, without long-term current use of insulin (HCC)  Recent Labs     04/29/25  1829 04/29/25 2050 04/30/25  0632 04/30/25  1237 04/30/25  1241   POCGLU 125  --  121 215*  --    GLUC  --    < >  --   --  202*    < > = values in this interval not displayed.      Lab Results   Component Value Date    HGBA1C 8.3 (A) 03/17/2025      Continue with SSI  Adjust as needed  Hyperglycemia also due to olanzapine  Schizophrenia, unspecified type (HCC)  Pending psych placement   Continual observation  Hypomagnesemia  Will replete  Continue to monitor   Elevated CK  Recent Labs     04/30/25  0325 04/30/25  0606 04/30/25  1241   CKTOTAL 385* 435* 265      Bolus given in ED  Continue with IV fluids   CKD (chronic kidney disease)  Lab Results   Component Value Date    EGFR 95 04/30/2025    EGFR 70 04/29/2025    EGFR 47 04/29/2025    CREATININE 0.90 04/30/2025    CREATININE 1.16 04/29/2025    CREATININE 1.59 (H) 04/29/2025     Currently at baseline   Urinary retention protocol  Continue to monitor       VTE Pharmacologic Prophylaxis:   Moderate Risk (Score 3-4) - Pharmacological DVT Prophylaxis Ordered: heparin.  Code Status: Level 1 - Full Code chart review   Discussion with family:  not available .     Anticipated Length of Stay: Patient will be admitted on an observation basis with an anticipated length of  "stay of less than 2 midnights secondary to pending inpatient psych due to psychosis.    History of Present Illness   Chief Complaint:   Chief Complaint   Patient presents with    Psychiatric Evaluation     Pt to er with police. Police states that they are filing a 302 due to \"being a danger to himself. He's going to walk into traffic and kill himself one day\". Patient reports getting little sleep lately. Police report that he was washing his hands at a diner this am with coffee.          Solo Mack is a 55 y.o. male with a PMH of hypertension, hypothyroidism, type 2 diabetes mellitus, CKD stage III, schizophrenia, obesity presented with acute psychosis with underlying schizophrenia.  Patient currently is pending inpatient psych after medical stability.    Review of Systems   Unable to perform ROS: Mental status change       Historical Information   Past Medical History:   Diagnosis Date    Alcohol withdrawal ([facility])     Alcoholism ([facility])     Anxiety     Back pain     Back pain, chronic     Bipolar 1 disorder ([facility])     Bipolar disorder, current episode mixed, moderate ([facility])     BPH (benign prostatic hyperplasia)     Cardiac disease     CHF (congestive heart failure) ([facility])     Chronic pain disorder     Chronic renal failure     Demyelinating disease ([facility])     Dental disorder     Depression     Diabetes mellitus (HCC)     Drug abuse (HCC)     Drug withdrawal (HCC)     ED (erectile dysfunction)     Edema     Encephalopathy     Encephalopathy     Fatigue     GERD (gastroesophageal reflux disease)     H/O prolonged Q-T interval on ECG 12/4/2024    Heart rate fast     Hepatitis     unspecified     Hyperlipidemia     Hypertension     Hypokalemia     Hypothyroidism 10/21/2024    Knee buckling     MI (myocardial infarction) ([facility])     Morbid obesity with BMI of 40.0-44.9, adult ([facility])     NIDDY (non-insulin dependent diabetes mellitus in young) ([facility])     Obesity     Opiate dependence (HCC)     Opiate withdrawal ([facility])     " Osteoarthritis     Pleural effusion     Pneumonia     Prolonged Q-T interval on ECG     Psychiatric disorder     Psychiatric illness     Psychosis ([facility])     Psychosis ([facility]) 12/17/2024    Renal disorder     Schizo-affective schizophrenia ([facility])     Spinal stenosis, lumbar     Traumatic subdural hemorrhage with loss of consciousness of unspecified duration, initial encounter ([facility]) 12/29/2022    Ulcer of calf ([facility])     Ulnar neuritis, right     Ulnar neuropathy at elbow     Weight loss      Past Surgical History:   Procedure Laterality Date    BACK SURGERY      report thoracic surgery    LUMBAR FUSION  05/2006    L5/S1 Gratch      Social History     Tobacco Use    Smoking status: Former     Passive exposure: Never    Smokeless tobacco: Never    Tobacco comments:     patient is a non - smoker   Vaping Use    Vaping status: Never Used   Substance and Sexual Activity    Alcohol use: Yes     Comment: beer here and there per pt    Drug use: Not Currently    Sexual activity: Not on file     E-Cigarette/Vaping    E-Cigarette Use Never User      E-Cigarette/Vaping Substances    Nicotine No     THC No     CBD No     Flavoring No     Other No     Unknown No      Family History   Problem Relation Age of Onset    No Known Problems Mother     No Known Problems Father     No Known Problems Sister     No Known Problems Brother     No Known Problems Maternal Aunt     No Known Problems Paternal Aunt     No Known Problems Maternal Uncle     No Known Problems Paternal Uncle     No Known Problems Maternal Grandfather     No Known Problems Maternal Grandmother     No Known Problems Paternal Grandfather     No Known Problems Paternal Grandmother     No Known Problems Cousin     ADD / ADHD Neg Hx     Alcohol abuse Neg Hx     Anxiety disorder Neg Hx     Bipolar disorder Neg Hx     Dementia Neg Hx     Depression Neg Hx     Drug abuse Neg Hx     OCD Neg Hx     Paranoid behavior Neg Hx     Schizophrenia Neg Hx     Seizures Neg Hx     Self-Injury  Neg Hx     Suicide Attempts Neg Hx      Social History:  Marital Status: /Civil Union       Meds/Allergies   I have reviewed home medications using recent Epic encounter.  Prior to Admission medications    Medication Sig Start Date End Date Taking? Authorizing Provider   clonazePAM (KlonoPIN) 1 mg tablet Take 1 mg by mouth 3 (three) times a day 2/28/25   Historical Provider, MD   cloNIDine (CATAPRES) 0.1 mg tablet Take 1 tablet (0.1 mg total) by mouth every 12 (twelve) hours 12/18/24   DELANO Gallo   Easy Comfort Pen Needles 33G X 5 MM MISC AS DIRECTED FOR ozempic EVERY WEEK 8/11/23   Historical Provider, MD   gabapentin (NEURONTIN) 400 mg capsule Take 1 capsule (400 mg total) by mouth 3 (three) times a day 12/18/24   DELANO Gallo   hydrOXYzine HCL (ATARAX) 50 mg tablet TAKE 1 TABLET BY MOUTH THREE TIMES A DAY 4/14/25   Florin Lindsay,    metoprolol tartrate (LOPRESSOR) 25 mg tablet Take 0.5 tablets (12.5 mg total) by mouth every 12 (twelve) hours 3/17/25   Florin Lindsay, DO   pantoprazole (PROTONIX) 40 mg tablet TAKE ONE TABLET BY MOUTH DAILY BEFORE BREAKFAST 8/21/23   Florin Lindsay DO   sildenafil (VIAGRA) 100 mg tablet Take 1 tablet (100 mg total) by mouth daily as needed for erectile dysfunction 2/11/25   Florin Lindsay, DO   zolpidem (AMBIEN CR) 12.5 MG CR tablet Take 12.5 mg by mouth daily at bedtime 7/25/23   Historical Provider, MD     Allergies   Allergen Reactions    Haldol [Haloperidol]     Lexapro [Escitalopram Oxalate] Hives    Penicillins        Objective :  HR:  [73-96] 95  BP: ()/(56-93) 111/67  Resp:  [16-22] 16  SpO2:  [96 %-98 %] 98 %  O2 Device: None (Room air)    Physical Exam  Constitutional:       Appearance: Normal appearance.   HENT:      Head: Atraumatic.      Mouth/Throat:      Mouth: Mucous membranes are dry.   Cardiovascular:      Rate and Rhythm: Normal rate.      Heart sounds: Normal heart sounds.   Pulmonary:      Effort: No respiratory  distress.   Abdominal:      General: Bowel sounds are normal.   Musculoskeletal:      Right lower leg: No edema.      Left lower leg: No edema.   Neurological:      Mental Status: He is alert. He is disoriented.      Motor: No weakness.          Lines/Drains:            Lab Results: I have reviewed the following results:  Results from last 7 days   Lab Units 04/29/25  2050   WBC Thousand/uL 6.18   HEMOGLOBIN g/dL 12.7   HEMATOCRIT % 37.0   PLATELETS Thousands/uL 208   SEGS PCT % 64   LYMPHO PCT % 23   MONO PCT % 11   EOS PCT % 1     Results from last 7 days   Lab Units 04/30/25  1241   SODIUM mmol/L 142   POTASSIUM mmol/L 2.9*   CHLORIDE mmol/L 115*   CO2 mmol/L 23   BUN mg/dL 6   CREATININE mg/dL 0.90   ANION GAP mmol/L 4   CALCIUM mg/dL 6.8*   ALBUMIN g/dL 2.9*   TOTAL BILIRUBIN mg/dL 0.45   ALK PHOS U/L 49   ALT U/L 15   AST U/L 18   GLUCOSE RANDOM mg/dL 202*     Results from last 7 days   Lab Units 04/28/25  1508   INR  1.06     Results from last 7 days   Lab Units 04/30/25  1237 04/30/25  0632 04/29/25  1829   POC GLUCOSE mg/dl 215* 121 125     Lab Results   Component Value Date    HGBA1C 8.3 (A) 03/17/2025    HGBA1C 6.7 (H) 12/04/2024    HGBA1C 6.9 (A) 10/21/2024     Results from last 7 days   Lab Units 04/29/25  1225 04/28/25  1508   LACTIC ACID mmol/L 1.9 0.9   PROCALCITONIN ng/ml  --  0.06       Imaging Results Review: No pertinent imaging studies reviewed.  Other Study Results Review: No additional pertinent studies reviewed.    Administrative Statements   I have spent a total time of 65 minutes in caring for this patient on the day of the visit/encounter including Patient and family education, Importance of tx compliance, Counseling / Coordination of care, Documenting in the medical record, Reviewing/placing orders in the medical record (including tests, medications, and/or procedures), Obtaining or reviewing history  , and Communicating with other healthcare professionals .    ** Please Note: This note  has been constructed using a voice recognition system. **

## 2025-05-01 ENCOUNTER — APPOINTMENT (INPATIENT)
Dept: ULTRASOUND IMAGING | Facility: HOSPITAL | Age: 55
DRG: 683 | End: 2025-05-01
Payer: COMMERCIAL

## 2025-05-01 ENCOUNTER — HOSPITAL ENCOUNTER (INPATIENT)
Facility: HOSPITAL | Age: 55
LOS: 1 days | Discharge: ANOTHER HOSPITAL | End: 2025-05-02
Attending: PSYCHIATRY & NEUROLOGY | Admitting: PSYCHIATRY & NEUROLOGY
Payer: COMMERCIAL

## 2025-05-01 ENCOUNTER — APPOINTMENT (INPATIENT)
Dept: RADIOLOGY | Facility: HOSPITAL | Age: 55
End: 2025-05-01
Payer: COMMERCIAL

## 2025-05-01 VITALS
RESPIRATION RATE: 18 BRPM | DIASTOLIC BLOOD PRESSURE: 98 MMHG | SYSTOLIC BLOOD PRESSURE: 159 MMHG | TEMPERATURE: 97.9 F | HEART RATE: 76 BPM | OXYGEN SATURATION: 97 %

## 2025-05-01 DIAGNOSIS — Z00.8 MEDICAL CLEARANCE FOR PSYCHIATRIC ADMISSION: ICD-10-CM

## 2025-05-01 DIAGNOSIS — K81.0 ACUTE CHOLECYSTITIS: Primary | ICD-10-CM

## 2025-05-01 PROBLEM — R10.11 RUQ PAIN: Status: ACTIVE | Noted: 2025-05-01

## 2025-05-01 PROBLEM — R74.8 ELEVATED CK: Status: RESOLVED | Noted: 2025-04-29 | Resolved: 2025-05-01

## 2025-05-01 PROBLEM — F31.2 BIPOLAR AFFECTIVE DISORDER, CURRENT EPISODE MANIC WITH PSYCHOTIC SYMPTOMS (HCC): Status: ACTIVE | Noted: 2025-05-01

## 2025-05-01 PROBLEM — E87.6 HYPOKALEMIA: Status: RESOLVED | Noted: 2018-08-16 | Resolved: 2025-05-01

## 2025-05-01 PROBLEM — E83.42 HYPOMAGNESEMIA: Status: RESOLVED | Noted: 2025-04-29 | Resolved: 2025-05-01

## 2025-05-01 LAB
ALBUMIN SERPL BCG-MCNC: 3.9 G/DL (ref 3.5–5)
ALBUMIN SERPL BCG-MCNC: 4.4 G/DL (ref 3.5–5)
ALP SERPL-CCNC: 73 U/L (ref 34–104)
ALP SERPL-CCNC: 77 U/L (ref 34–104)
ALT SERPL W P-5'-P-CCNC: 19 U/L (ref 7–52)
ALT SERPL W P-5'-P-CCNC: 25 U/L (ref 7–52)
ANION GAP SERPL CALCULATED.3IONS-SCNC: 10 MMOL/L (ref 4–13)
ANION GAP SERPL CALCULATED.3IONS-SCNC: 7 MMOL/L (ref 4–13)
AST SERPL W P-5'-P-CCNC: 19 U/L (ref 13–39)
AST SERPL W P-5'-P-CCNC: 22 U/L (ref 13–39)
BACTERIA UR QL AUTO: NORMAL /HPF
BASOPHILS # BLD AUTO: 0.02 THOUSANDS/ÂΜL (ref 0–0.1)
BASOPHILS # BLD AUTO: 0.06 THOUSANDS/ÂΜL (ref 0–0.1)
BASOPHILS NFR BLD AUTO: 0 % (ref 0–1)
BASOPHILS NFR BLD AUTO: 1 % (ref 0–1)
BILIRUB DIRECT SERPL-MCNC: 0.05 MG/DL (ref 0–0.2)
BILIRUB SERPL-MCNC: 0.37 MG/DL (ref 0.2–1)
BILIRUB SERPL-MCNC: 1.18 MG/DL (ref 0.2–1)
BILIRUB UR QL STRIP: NEGATIVE
BUN SERPL-MCNC: 7 MG/DL (ref 5–25)
BUN SERPL-MCNC: 7 MG/DL (ref 5–25)
CALCIUM SERPL-MCNC: 8.5 MG/DL (ref 8.4–10.2)
CALCIUM SERPL-MCNC: 9 MG/DL (ref 8.4–10.2)
CHLORIDE SERPL-SCNC: 108 MMOL/L (ref 96–108)
CHLORIDE SERPL-SCNC: 92 MMOL/L (ref 96–108)
CLARITY UR: CLEAR
CO2 SERPL-SCNC: 27 MMOL/L (ref 21–32)
CO2 SERPL-SCNC: 30 MMOL/L (ref 21–32)
COLOR UR: ABNORMAL
CREAT SERPL-MCNC: 1.03 MG/DL (ref 0.6–1.3)
CREAT SERPL-MCNC: 1.2 MG/DL (ref 0.6–1.3)
EOSINOPHIL # BLD AUTO: 0.01 THOUSAND/ÂΜL (ref 0–0.61)
EOSINOPHIL # BLD AUTO: 0.17 THOUSAND/ÂΜL (ref 0–0.61)
EOSINOPHIL NFR BLD AUTO: 0 % (ref 0–6)
EOSINOPHIL NFR BLD AUTO: 2 % (ref 0–6)
ERYTHROCYTE [DISTWIDTH] IN BLOOD BY AUTOMATED COUNT: 12.1 % (ref 11.6–15.1)
ERYTHROCYTE [DISTWIDTH] IN BLOOD BY AUTOMATED COUNT: 12.9 % (ref 11.6–15.1)
FLUAV RNA RESP QL NAA+PROBE: NEGATIVE
FLUBV RNA RESP QL NAA+PROBE: NEGATIVE
GFR SERPL CREATININE-BSD FRML MDRD: 67 ML/MIN/1.73SQ M
GFR SERPL CREATININE-BSD FRML MDRD: 80 ML/MIN/1.73SQ M
GLUCOSE SERPL-MCNC: 196 MG/DL (ref 65–140)
GLUCOSE SERPL-MCNC: 198 MG/DL (ref 65–140)
GLUCOSE SERPL-MCNC: 220 MG/DL (ref 65–140)
GLUCOSE SERPL-MCNC: 227 MG/DL (ref 65–140)
GLUCOSE SERPL-MCNC: 269 MG/DL (ref 65–140)
GLUCOSE UR STRIP-MCNC: ABNORMAL MG/DL
HCT VFR BLD AUTO: 41.9 % (ref 36.5–49.3)
HCT VFR BLD AUTO: 42.9 % (ref 36.5–49.3)
HGB BLD-MCNC: 14.2 G/DL (ref 12–17)
HGB BLD-MCNC: 14.7 G/DL (ref 12–17)
HGB UR QL STRIP.AUTO: 10
IMM GRANULOCYTES # BLD AUTO: 0.03 THOUSAND/UL (ref 0–0.2)
IMM GRANULOCYTES # BLD AUTO: 0.32 THOUSAND/UL (ref 0–0.2)
IMM GRANULOCYTES NFR BLD AUTO: 0 % (ref 0–2)
IMM GRANULOCYTES NFR BLD AUTO: 2 % (ref 0–2)
INR PPP: 0.96 (ref 0.85–1.19)
KETONES UR STRIP-MCNC: NEGATIVE MG/DL
LACTATE SERPL-SCNC: 1.6 MMOL/L (ref 0.5–2)
LEUKOCYTE ESTERASE UR QL STRIP: NEGATIVE
LYMPHOCYTES # BLD AUTO: 0.59 THOUSANDS/ÂΜL (ref 0.6–4.47)
LYMPHOCYTES # BLD AUTO: 1.54 THOUSANDS/ÂΜL (ref 0.6–4.47)
LYMPHOCYTES NFR BLD AUTO: 22 % (ref 14–44)
LYMPHOCYTES NFR BLD AUTO: 4 % (ref 14–44)
MAGNESIUM SERPL-MCNC: 1.3 MG/DL (ref 1.9–2.7)
MCH RBC QN AUTO: 28.2 PG (ref 26.8–34.3)
MCH RBC QN AUTO: 28.9 PG (ref 26.8–34.3)
MCHC RBC AUTO-ENTMCNC: 33.9 G/DL (ref 31.4–37.4)
MCHC RBC AUTO-ENTMCNC: 34.3 G/DL (ref 31.4–37.4)
MCV RBC AUTO: 82 FL (ref 82–98)
MCV RBC AUTO: 85 FL (ref 82–98)
MONOCYTES # BLD AUTO: 0.67 THOUSAND/ÂΜL (ref 0.17–1.22)
MONOCYTES # BLD AUTO: 1.2 THOUSAND/ÂΜL (ref 0.17–1.22)
MONOCYTES NFR BLD AUTO: 10 % (ref 4–12)
MONOCYTES NFR BLD AUTO: 8 % (ref 4–12)
NEUTROPHILS # BLD AUTO: 12.56 THOUSANDS/ÂΜL (ref 1.85–7.62)
NEUTROPHILS # BLD AUTO: 4.56 THOUSANDS/ÂΜL (ref 1.85–7.62)
NEUTS SEG NFR BLD AUTO: 65 % (ref 43–75)
NEUTS SEG NFR BLD AUTO: 86 % (ref 43–75)
NITRITE UR QL STRIP: NEGATIVE
NON-SQ EPI CELLS URNS QL MICRO: NORMAL /HPF
NRBC BLD AUTO-RTO: 0 /100 WBCS
NRBC BLD AUTO-RTO: 0 /100 WBCS
PH UR STRIP.AUTO: 7 [PH]
PLATELET # BLD AUTO: 222 THOUSANDS/UL (ref 149–390)
PLATELET # BLD AUTO: 235 THOUSANDS/UL (ref 149–390)
PMV BLD AUTO: 10.9 FL (ref 8.9–12.7)
PMV BLD AUTO: 11.2 FL (ref 8.9–12.7)
POTASSIUM SERPL-SCNC: 3.6 MMOL/L (ref 3.5–5.3)
POTASSIUM SERPL-SCNC: 3.8 MMOL/L (ref 3.5–5.3)
PROCALCITONIN SERPL-MCNC: 0.24 NG/ML
PROT SERPL-MCNC: 6.3 G/DL (ref 6.4–8.4)
PROT SERPL-MCNC: 7.4 G/DL (ref 6.4–8.4)
PROT UR STRIP-MCNC: ABNORMAL MG/DL
PROTHROMBIN TIME: 13.1 SECONDS (ref 12.3–15)
RBC # BLD AUTO: 4.92 MILLION/UL (ref 3.88–5.62)
RBC # BLD AUTO: 5.22 MILLION/UL (ref 3.88–5.62)
RBC #/AREA URNS AUTO: NORMAL /HPF
RSV RNA RESP QL NAA+PROBE: NEGATIVE
SARS-COV-2 RNA RESP QL NAA+PROBE: NEGATIVE
SODIUM SERPL-SCNC: 132 MMOL/L (ref 135–147)
SODIUM SERPL-SCNC: 142 MMOL/L (ref 135–147)
SP GR UR STRIP.AUTO: 1.01 (ref 1–1.04)
UROBILINOGEN UA: NEGATIVE MG/DL
WBC # BLD AUTO: 14.7 THOUSAND/UL (ref 4.31–10.16)
WBC # BLD AUTO: 7.03 THOUSAND/UL (ref 4.31–10.16)
WBC #/AREA URNS AUTO: NORMAL /HPF

## 2025-05-01 PROCEDURE — 0241U HB NFCT DS VIR RESP RNA 4 TRGT: CPT

## 2025-05-01 PROCEDURE — 83605 ASSAY OF LACTIC ACID: CPT

## 2025-05-01 PROCEDURE — 99239 HOSP IP/OBS DSCHRG MGMT >30: CPT | Performed by: INTERNAL MEDICINE

## 2025-05-01 PROCEDURE — 82948 REAGENT STRIP/BLOOD GLUCOSE: CPT

## 2025-05-01 PROCEDURE — 87040 BLOOD CULTURE FOR BACTERIA: CPT

## 2025-05-01 PROCEDURE — 80048 BASIC METABOLIC PNL TOTAL CA: CPT | Performed by: INTERNAL MEDICINE

## 2025-05-01 PROCEDURE — 80076 HEPATIC FUNCTION PANEL: CPT | Performed by: INTERNAL MEDICINE

## 2025-05-01 PROCEDURE — 76705 ECHO EXAM OF ABDOMEN: CPT

## 2025-05-01 PROCEDURE — 81001 URINALYSIS AUTO W/SCOPE: CPT

## 2025-05-01 PROCEDURE — 85025 COMPLETE CBC W/AUTO DIFF WBC: CPT

## 2025-05-01 PROCEDURE — 80053 COMPREHEN METABOLIC PANEL: CPT

## 2025-05-01 PROCEDURE — 99245 OFF/OP CONSLTJ NEW/EST HI 55: CPT | Performed by: STUDENT IN AN ORGANIZED HEALTH CARE EDUCATION/TRAINING PROGRAM

## 2025-05-01 PROCEDURE — 85025 COMPLETE CBC W/AUTO DIFF WBC: CPT | Performed by: INTERNAL MEDICINE

## 2025-05-01 PROCEDURE — 93005 ELECTROCARDIOGRAM TRACING: CPT

## 2025-05-01 PROCEDURE — 83735 ASSAY OF MAGNESIUM: CPT

## 2025-05-01 PROCEDURE — 71045 X-RAY EXAM CHEST 1 VIEW: CPT

## 2025-05-01 PROCEDURE — 84145 PROCALCITONIN (PCT): CPT

## 2025-05-01 PROCEDURE — 85610 PROTHROMBIN TIME: CPT

## 2025-05-01 RX ORDER — GABAPENTIN 400 MG/1
400 CAPSULE ORAL 3 TIMES DAILY
Status: CANCELLED | OUTPATIENT
Start: 2025-05-01

## 2025-05-01 RX ORDER — LORAZEPAM 1 MG/1
1 TABLET ORAL
Status: DISCONTINUED | OUTPATIENT
Start: 2025-05-01 | End: 2025-05-02 | Stop reason: HOSPADM

## 2025-05-01 RX ORDER — ZOLPIDEM TARTRATE 5 MG/1
10 TABLET ORAL
Status: CANCELLED | OUTPATIENT
Start: 2025-05-01

## 2025-05-01 RX ORDER — OLANZAPINE 5 MG/1
2.5 TABLET, FILM COATED ORAL
Status: CANCELLED | OUTPATIENT
Start: 2025-05-01

## 2025-05-01 RX ORDER — INSULIN LISPRO 100 [IU]/ML
1-6 INJECTION, SOLUTION INTRAVENOUS; SUBCUTANEOUS
Status: DISCONTINUED | OUTPATIENT
Start: 2025-05-01 | End: 2025-05-02 | Stop reason: HOSPADM

## 2025-05-01 RX ORDER — OLANZAPINE 5 MG/1
5 TABLET, FILM COATED ORAL
Status: CANCELLED | OUTPATIENT
Start: 2025-05-01

## 2025-05-01 RX ORDER — INSULIN LISPRO 100 [IU]/ML
1-6 INJECTION, SOLUTION INTRAVENOUS; SUBCUTANEOUS
Status: CANCELLED | OUTPATIENT
Start: 2025-05-01

## 2025-05-01 RX ORDER — IBUPROFEN 400 MG/1
400 TABLET, FILM COATED ORAL EVERY 6 HOURS PRN
Status: DISCONTINUED | OUTPATIENT
Start: 2025-05-01 | End: 2025-05-02 | Stop reason: HOSPADM

## 2025-05-01 RX ORDER — CLONIDINE HYDROCHLORIDE 0.1 MG/1
0.1 TABLET ORAL EVERY 12 HOURS SCHEDULED
Status: CANCELLED | OUTPATIENT
Start: 2025-05-01

## 2025-05-01 RX ORDER — IBUPROFEN 400 MG/1
400 TABLET, FILM COATED ORAL ONCE
Status: DISCONTINUED | OUTPATIENT
Start: 2025-05-01 | End: 2025-05-01 | Stop reason: HOSPADM

## 2025-05-01 RX ORDER — IBUPROFEN 600 MG/1
600 TABLET, FILM COATED ORAL EVERY 8 HOURS PRN
Status: DISCONTINUED | OUTPATIENT
Start: 2025-05-01 | End: 2025-05-02 | Stop reason: HOSPADM

## 2025-05-01 RX ORDER — HYDROMORPHONE HCL IN WATER/PF 6 MG/30 ML
0.2 PATIENT CONTROLLED ANALGESIA SYRINGE INTRAVENOUS ONCE
Refills: 0 | Status: DISCONTINUED | OUTPATIENT
Start: 2025-05-01 | End: 2025-05-01

## 2025-05-01 RX ORDER — ACETAMINOPHEN 325 MG/1
650 TABLET ORAL EVERY 6 HOURS PRN
Status: DISCONTINUED | OUTPATIENT
Start: 2025-05-01 | End: 2025-05-01 | Stop reason: HOSPADM

## 2025-05-01 RX ORDER — OLANZAPINE 5 MG/1
5 TABLET, FILM COATED ORAL
Status: DISCONTINUED | OUTPATIENT
Start: 2025-05-01 | End: 2025-05-02 | Stop reason: HOSPADM

## 2025-05-01 RX ORDER — OLANZAPINE 5 MG/1
10 TABLET, FILM COATED ORAL
Status: CANCELLED | OUTPATIENT
Start: 2025-05-01

## 2025-05-01 RX ORDER — IBUPROFEN 400 MG/1
200 TABLET, FILM COATED ORAL EVERY 6 HOURS PRN
Status: CANCELLED | OUTPATIENT
Start: 2025-05-01

## 2025-05-01 RX ORDER — OLANZAPINE 10 MG/2ML
5 INJECTION, POWDER, FOR SOLUTION INTRAMUSCULAR
Status: CANCELLED | OUTPATIENT
Start: 2025-05-01

## 2025-05-01 RX ORDER — POLYETHYLENE GLYCOL 3350 17 G/17G
17 POWDER, FOR SOLUTION ORAL DAILY PRN
Status: CANCELLED | OUTPATIENT
Start: 2025-05-01

## 2025-05-01 RX ORDER — PANTOPRAZOLE SODIUM 40 MG/1
40 TABLET, DELAYED RELEASE ORAL
Status: DISCONTINUED | OUTPATIENT
Start: 2025-05-02 | End: 2025-05-02 | Stop reason: HOSPADM

## 2025-05-01 RX ORDER — LANOLIN ALCOHOL/MO/W.PET/CERES
800 CREAM (GRAM) TOPICAL ONCE
Status: COMPLETED | OUTPATIENT
Start: 2025-05-01 | End: 2025-05-02

## 2025-05-01 RX ORDER — LORAZEPAM 1 MG/1
1 TABLET ORAL
Status: CANCELLED | OUTPATIENT
Start: 2025-05-01

## 2025-05-01 RX ORDER — HYDROXYZINE HYDROCHLORIDE 50 MG/1
50 TABLET, FILM COATED ORAL
Status: DISCONTINUED | OUTPATIENT
Start: 2025-05-01 | End: 2025-05-02 | Stop reason: HOSPADM

## 2025-05-01 RX ORDER — IBUPROFEN 400 MG/1
400 TABLET, FILM COATED ORAL EVERY 6 HOURS PRN
Status: CANCELLED | OUTPATIENT
Start: 2025-05-01

## 2025-05-01 RX ORDER — OXYCODONE HYDROCHLORIDE 5 MG/1
5 TABLET ORAL EVERY 4 HOURS PRN
Refills: 0 | Status: DISCONTINUED | OUTPATIENT
Start: 2025-05-01 | End: 2025-05-02 | Stop reason: HOSPADM

## 2025-05-01 RX ORDER — LORAZEPAM 2 MG/ML
1 INJECTION INTRAMUSCULAR
Status: CANCELLED | OUTPATIENT
Start: 2025-05-01

## 2025-05-01 RX ORDER — IBUPROFEN 600 MG/1
600 TABLET, FILM COATED ORAL EVERY 8 HOURS PRN
Status: CANCELLED | OUTPATIENT
Start: 2025-05-01

## 2025-05-01 RX ORDER — PANTOPRAZOLE SODIUM 40 MG/1
40 TABLET, DELAYED RELEASE ORAL
Status: CANCELLED | OUTPATIENT
Start: 2025-05-02

## 2025-05-01 RX ORDER — HYDROXYZINE HYDROCHLORIDE 25 MG/1
50 TABLET, FILM COATED ORAL
Status: CANCELLED | OUTPATIENT
Start: 2025-05-01

## 2025-05-01 RX ORDER — AMOXICILLIN 250 MG
1 CAPSULE ORAL DAILY PRN
Status: CANCELLED | OUTPATIENT
Start: 2025-05-01

## 2025-05-01 RX ORDER — TRAZODONE HYDROCHLORIDE 50 MG/1
50 TABLET ORAL
Status: DISCONTINUED | OUTPATIENT
Start: 2025-05-01 | End: 2025-05-02 | Stop reason: HOSPADM

## 2025-05-01 RX ORDER — HEPARIN SODIUM 5000 [USP'U]/ML
5000 INJECTION, SOLUTION INTRAVENOUS; SUBCUTANEOUS EVERY 8 HOURS SCHEDULED
Status: DISCONTINUED | OUTPATIENT
Start: 2025-05-01 | End: 2025-05-01

## 2025-05-01 RX ORDER — LORAZEPAM 2 MG/ML
1 INJECTION INTRAMUSCULAR
Status: DISCONTINUED | OUTPATIENT
Start: 2025-05-01 | End: 2025-05-02 | Stop reason: HOSPADM

## 2025-05-01 RX ORDER — ACETAMINOPHEN 325 MG/1
650 TABLET ORAL EVERY 6 HOURS PRN
Status: CANCELLED | OUTPATIENT
Start: 2025-05-01

## 2025-05-01 RX ORDER — ACETAMINOPHEN 325 MG/1
650 TABLET ORAL EVERY 6 HOURS PRN
Status: DISCONTINUED | OUTPATIENT
Start: 2025-05-01 | End: 2025-05-02 | Stop reason: HOSPADM

## 2025-05-01 RX ORDER — HYDROXYZINE HYDROCHLORIDE 25 MG/1
25 TABLET, FILM COATED ORAL
Status: DISCONTINUED | OUTPATIENT
Start: 2025-05-01 | End: 2025-05-02 | Stop reason: HOSPADM

## 2025-05-01 RX ORDER — OLANZAPINE 2.5 MG/1
2.5 TABLET, FILM COATED ORAL
Status: DISCONTINUED | OUTPATIENT
Start: 2025-05-01 | End: 2025-05-02 | Stop reason: HOSPADM

## 2025-05-01 RX ORDER — CLONAZEPAM 1 MG/1
1 TABLET ORAL 2 TIMES DAILY PRN
Status: CANCELLED | OUTPATIENT
Start: 2025-05-01

## 2025-05-01 RX ORDER — OXYCODONE HYDROCHLORIDE 5 MG/1
5 TABLET ORAL EVERY 4 HOURS PRN
Refills: 0 | Status: CANCELLED | OUTPATIENT
Start: 2025-05-01

## 2025-05-01 RX ORDER — OLANZAPINE 10 MG/1
10 TABLET, FILM COATED ORAL
Status: DISCONTINUED | OUTPATIENT
Start: 2025-05-01 | End: 2025-05-02 | Stop reason: HOSPADM

## 2025-05-01 RX ORDER — OLANZAPINE 5 MG/1
10 TABLET, FILM COATED ORAL
Status: DISCONTINUED | OUTPATIENT
Start: 2025-05-01 | End: 2025-05-01 | Stop reason: HOSPADM

## 2025-05-01 RX ORDER — OXYCODONE HYDROCHLORIDE 5 MG/1
5 TABLET ORAL EVERY 4 HOURS PRN
Refills: 0 | Status: DISCONTINUED | OUTPATIENT
Start: 2025-05-01 | End: 2025-05-01 | Stop reason: HOSPADM

## 2025-05-01 RX ORDER — CLONAZEPAM 1 MG/1
1 TABLET ORAL 2 TIMES DAILY PRN
Status: DISCONTINUED | OUTPATIENT
Start: 2025-05-01 | End: 2025-05-01

## 2025-05-01 RX ORDER — POLYETHYLENE GLYCOL 3350 17 G/17G
17 POWDER, FOR SOLUTION ORAL DAILY PRN
Status: DISCONTINUED | OUTPATIENT
Start: 2025-05-01 | End: 2025-05-02 | Stop reason: HOSPADM

## 2025-05-01 RX ORDER — AMOXICILLIN 250 MG
1 CAPSULE ORAL DAILY PRN
Status: DISCONTINUED | OUTPATIENT
Start: 2025-05-01 | End: 2025-05-02 | Stop reason: HOSPADM

## 2025-05-01 RX ORDER — IBUPROFEN 200 MG
200 TABLET ORAL EVERY 6 HOURS PRN
Status: DISCONTINUED | OUTPATIENT
Start: 2025-05-01 | End: 2025-05-02 | Stop reason: HOSPADM

## 2025-05-01 RX ORDER — OLANZAPINE 10 MG/2ML
5 INJECTION, POWDER, FOR SOLUTION INTRAMUSCULAR
Status: DISCONTINUED | OUTPATIENT
Start: 2025-05-01 | End: 2025-05-02 | Stop reason: HOSPADM

## 2025-05-01 RX ORDER — CLONIDINE HYDROCHLORIDE 0.1 MG/1
0.1 TABLET ORAL EVERY 12 HOURS SCHEDULED
Status: DISCONTINUED | OUTPATIENT
Start: 2025-05-01 | End: 2025-05-02 | Stop reason: HOSPADM

## 2025-05-01 RX ORDER — GABAPENTIN 400 MG/1
400 CAPSULE ORAL 3 TIMES DAILY
Status: DISCONTINUED | OUTPATIENT
Start: 2025-05-01 | End: 2025-05-02 | Stop reason: HOSPADM

## 2025-05-01 RX ORDER — TRAZODONE HYDROCHLORIDE 50 MG/1
50 TABLET ORAL
Status: CANCELLED | OUTPATIENT
Start: 2025-05-01

## 2025-05-01 RX ORDER — HEPARIN SODIUM 5000 [USP'U]/ML
5000 INJECTION, SOLUTION INTRAVENOUS; SUBCUTANEOUS EVERY 8 HOURS SCHEDULED
Status: CANCELLED | OUTPATIENT
Start: 2025-05-01

## 2025-05-01 RX ORDER — HYDROMORPHONE HCL/PF 1 MG/ML
0.5 SYRINGE (ML) INJECTION ONCE
Status: COMPLETED | OUTPATIENT
Start: 2025-05-01 | End: 2025-05-01

## 2025-05-01 RX ORDER — HYDROXYZINE HYDROCHLORIDE 25 MG/1
25 TABLET, FILM COATED ORAL
Status: CANCELLED | OUTPATIENT
Start: 2025-05-01

## 2025-05-01 RX ORDER — CLONAZEPAM 1 MG/1
1 TABLET ORAL 2 TIMES DAILY PRN
Status: DISCONTINUED | OUTPATIENT
Start: 2025-05-01 | End: 2025-05-02 | Stop reason: HOSPADM

## 2025-05-01 RX ADMIN — INSULIN LISPRO 3 UNITS: 100 INJECTION, SOLUTION INTRAVENOUS; SUBCUTANEOUS at 08:27

## 2025-05-01 RX ADMIN — CLONAZEPAM 1 MG: 0.5 TABLET ORAL at 08:17

## 2025-05-01 RX ADMIN — METOPROLOL TARTRATE 12.5 MG: 25 TABLET, FILM COATED ORAL at 08:17

## 2025-05-01 RX ADMIN — OLANZAPINE 10 MG: 10 TABLET, FILM COATED ORAL at 21:24

## 2025-05-01 RX ADMIN — CLONIDINE HYDROCHLORIDE 0.1 MG: 0.1 TABLET ORAL at 08:17

## 2025-05-01 RX ADMIN — INSULIN LISPRO 2 UNITS: 100 INJECTION, SOLUTION INTRAVENOUS; SUBCUTANEOUS at 17:32

## 2025-05-01 RX ADMIN — GABAPENTIN 400 MG: 400 CAPSULE ORAL at 08:17

## 2025-05-01 RX ADMIN — OXYCODONE HYDROCHLORIDE 5 MG: 5 TABLET ORAL at 12:31

## 2025-05-01 RX ADMIN — INSULIN LISPRO 2 UNITS: 100 INJECTION, SOLUTION INTRAVENOUS; SUBCUTANEOUS at 22:16

## 2025-05-01 RX ADMIN — PANTOPRAZOLE SODIUM 40 MG: 40 TABLET, DELAYED RELEASE ORAL at 05:16

## 2025-05-01 RX ADMIN — GABAPENTIN 400 MG: 400 CAPSULE ORAL at 21:24

## 2025-05-01 RX ADMIN — HYDROMORPHONE HYDROCHLORIDE 0.5 MG: 1 INJECTION, SOLUTION INTRAMUSCULAR; INTRAVENOUS; SUBCUTANEOUS at 08:17

## 2025-05-01 RX ADMIN — ACETAMINOPHEN 650 MG: 325 TABLET, FILM COATED ORAL at 03:19

## 2025-05-01 RX ADMIN — CLONIDINE HYDROCHLORIDE 0.1 MG: 0.1 TABLET ORAL at 21:24

## 2025-05-01 RX ADMIN — ACETAMINOPHEN 650 MG: 325 TABLET, FILM COATED ORAL at 21:24

## 2025-05-01 RX ADMIN — Medication 12.5 MG: at 21:24

## 2025-05-01 RX ADMIN — GABAPENTIN 400 MG: 400 CAPSULE ORAL at 17:32

## 2025-05-01 NOTE — ASSESSMENT & PLAN NOTE
/98 (BP Location: Left arm)   Pulse 76   Temp 97.9 °F (36.6 °C) (Temporal)   Resp 18   SpO2 97%   Continue with lopressor and clonidine

## 2025-05-01 NOTE — ASSESSMENT & PLAN NOTE
Lab Results   Component Value Date    EGFR 67 05/01/2025    EGFR 95 04/30/2025    EGFR 70 04/29/2025    CREATININE 1.20 05/01/2025    CREATININE 0.90 04/30/2025    CREATININE 1.16 04/29/2025     Currently at baseline   Urinary retention protocol  Continue to monitor

## 2025-05-01 NOTE — CONSULTS
TELEConsultation - Behavioral Health   Solo Mcak 55 y.o. male MRN: [id number]  Unit/Bed#: -01 Encounter: [id number]  Hospital Day: 1    VIRTUAL CARE DOCUMENTATION:     1. This service was provided via Telemedicine using: Teams Virtual Rounding      2. Parties in the room with patient during teleconsult: Patient only    3. Confidentiality: My office door was closed     4. Participants: No one else was in the room    5. Patient acknowledged consent and understanding of privacy and security of the  Telemedicine consult. I informed the patient that I have reviewed their record in Epic and presented the opportunity for them to ask any questions regarding the visit today.  The patient agreed to participate.    6. I have spent a total time of 60 minutes in caring for this patient on the day of the visit/encounter, NOT including the time spent for establishing the audio/video connection, but including Diagnostic results, Prognosis, Risks and benefits of tx options, Instructions for management, Patient and family education including obtaining collateral information, Importance of tx compliance, Risk factor reductions, Assessment & Impressions, Counseling / Coordination of care, Documenting in the medical record, Reviewing / ordering tests, medicine, and procedures  , Obtaining or reviewing history  , and Communicating with other healthcare professionals.       Assessment & Plan     Assessment: 55-year-old male with a history of mood disorder with psychosis presents to the hospital with disorganized behavior and thought process in addition to psychotic symptoms of auditory hallucinations, internal preoccupation, responding to internal stimuli.  Significant agitation requiring restraints and numerous doses of IM antipsychotic and sedative medications.  He is somewhat more organized than initial presentation and I believe this is because of his multiple doses of IM antipsychotic medications that he received for  agitation in addition to p.o. doses of Zyprexa 10 mg.  The patient has ongoing psychotic symptoms of internal preoccupation, responding to internal stimuli although he denies auditory or visual hallucinations and denies any suicidal or homicidal ideations.  He denies any current depressed mood only endorsing anxiety.  He meets criteria for current psychotic episode given his evident hallucinations, disorganized thought process and behavior, and paranoid ideations.  He is no longer exhibiting significant agitation and behaviors but he lacks insight into his current mental health symptoms and does not believe he needs mental health treatment at this time.  The patient is not willing to follow-up with outpatient psychiatry as he does not believe he has a mental health problem and lacks significant insight into his symptoms.  Thus the least restrictive venue of care is inpatient behavioral health hospitalization for safety and stabilization.  The patient has significant laboratory evidence of inability to care for herself as evidenced by elevated CK and hypokalemia in the setting of likely dehydration and poor p.o. intake.  The patient has poor awareness of recent events as evidenced by him not remembering why the police brought him to the hospital.  Without inpatient behavioral health hospitalization, the patient is likely to continue to decompensate and a further psychotic episode and requires treatment for improvement in functional status.  He lacks capacity to sign a 201 for voluntary psychiatric hospitalization.  This is due to poor insight into his mental health symptoms and condition and need for treatment.  Will proceed with 303 and inpatient psychiatric hospitalization.  Patient should be referred for inpatient psychiatric hospitalization now that he is medically stable.    Assessment & Plan  Bipolar affective disorder, current episode manic with psychotic symptoms (HCC)  --303 and referred for inpatient  "behavioral health hospitalization  --Zyprexa 10 mg at bedtime  Patient may not leave AMA       Diagnoses, available treatment options, alternatives to treatment, and risks vs. benefits of current psychiatric treatment plan were discussed with the patient.  Prior records were reviewed in [facility].  The case was discussed with the primary team.      History of Present Illness   Physician Requesting Consult: Inna Wray MD    Chief Complaint: \"I do not have any mental health problems right now\"    Reason for Consult / Principal Problem:.  303 Evaluation    Patient was brought in via police ([employer]) who filed a [facility] 302. 302 was delegate to [facility] and Sergey Carrizales upheld the 302 with a warrant time of 9:48. Dr. Rosario upheld the 302 at 10:01am. Patient provided his 302 rights. Patient does not appear to understand his rights. Patient would not engage with writer when informing him about the 302.      302 statement:   \"This officers was dispatched to [facility] to [facility]. A male who was later identified as Solo Peña was walking around confused and washing his hands with coffee. Throughout my interaction with Yehuda he gave me several names and several wrong address. He doesn't know his date of birth or his wife's name. He said he walked from his home to the diner which is located directly on [address]. Several times Yehuda would walk away dazed and confused. I fear he is a danger to himself and not capable of caring for himself.     Per Crisis: 55 y.o male patient presented to the ED via Police. Police filed a [place] 302.  This writer introduced himself as Crisis and completed the assessment. Pt was unable to make eye contact with this writer and appeared to be reacting to internal stimuli. Pt stated they did not know why he was in the ED. Crisis informed pt he was here on a 302 petition.  Pt stated he was cleared of the 302 and was going to be discharged. Pt was brought to the ED " "due to unsafe behaviors and unable to care for himself. Pt appeared to be confused and was  observed washing his hands with coffee at a Diner this morning. Pt was confused when interacting with the Police given wrong names and addresses.  Pt denies any SI HI and AVH.  Pt has been diagnosed with bipolar and Schizophrenia, unspecified type.  Pt refused to answer if any fire arms where present in the home. Pt  has prior inpatient treatment at [facility].  Pt has no outpatient services.  Pt denies legal issue. Pt has a history of substance abuse. Pt's 302 was upheld by the Provider.     Patient had elevated CK and hypokalemia and was admitted to medicine for stabilization.  Patient is now medically stable per internal medicine.    Psychiatry consulted for 303 evaluation.    On evaluation,    Patient was superficially cooperative with interview.  He was disheveled and lying in bed.  He was internally preoccupied and occasionally responding to internal stimuli.  He answered my questions directly and reports that he is in the hospital for unknown reasons.  Reports that he does not have any current mental health problems and simply is in agony because of his pain.  Feels that he has gallstones or other medical problems despite lack of evidence for this.  He is unable to recount the events that led to his hospitalization and reports that he was brought in because he was \"wandering in the grass\".  I attempted to discuss that he was brought in by police and he lacked awareness of these events.  He reports having mental health history of mood disorder but does not take any current medications or have any current outpatient psychiatric treatment.  He stated that he does not believe he needs mental health treatment at this time.  He reports not having slept for the past 3 weeks.  Poor p.o. intake.  Reports ongoing anxiety as he feels he has a lot of things to do.  He directly denied feeling as though people were out to get him or " "trying to harm him but when I approach the topic of the [employer] with him he became extremely evasive and reported that that was \"a private topic\".  He denies experiencing auditory hallucinations for the past year despite having reported them in the chart documented several days ago.  Not willing to receive mental health treatment or medications.  Refusing medical treatments as well as documented in the chart.  Denies current SI/HI/AVH.  Generally disorganized thought process as his explanations for most questions did not make sense and were difficult to follow.    Psychiatric Review Of Systems:  Medication side effects: none  Sleep: Poor sleep for several weeks  Appetite: Poor  Hygiene: able to tend to instrumental and basic ADLs  Anxiety Symptoms: Endorses  Psychotic Symptoms: Paranoid  Depression Symptoms: denies  Manic Symptoms: denies  PTSD Symptoms: denies  Suicidal Thoughts: denies  Homicidal Thoughts: denies    Historical Information   Psychiatric History:   Diagnoses: Bipolar affective disorder with psychosis  Inpatient Hx: Multiple  Outpatient Hx: None currently  Medications/Trials: Patient unaware of previous medications    Substance Abuse History:    Social History     Substance and Sexual Activity   Alcohol Use Yes    Comment: beer here and there per pt     Social History     Substance and Sexual Activity   Drug Use Not Currently       I discussed substance abuse with the patient and, if pertinent, discussed risks vs benefits of decreasing frequency of use.    Family History:   Family History   Problem Relation Age of Onset    No Known Problems Mother     No Known Problems Father     No Known Problems Sister     No Known Problems Brother     No Known Problems Maternal Aunt     No Known Problems Paternal Aunt     No Known Problems Maternal Uncle     No Known Problems Paternal Uncle     No Known Problems Maternal Grandfather     No Known Problems Maternal Grandmother     No Known Problems Paternal " Grandfather     No Known Problems Paternal Grandmother     No Known Problems Cousin     ADD / ADHD Neg Hx     Alcohol abuse Neg Hx     Anxiety disorder Neg Hx     Bipolar disorder Neg Hx     Dementia Neg Hx     Depression Neg Hx     Drug abuse Neg Hx     OCD Neg Hx     Paranoid behavior Neg Hx     Schizophrenia Neg Hx     Seizures Neg Hx     Self-Injury Neg Hx     Suicide Attempts Neg Hx        Social History  Rest of social history as per below:  Social History     Socioeconomic History    Marital status: /Civil Union     Spouse name: Not on file    Number of children: Not on file    Years of education: Not on file    Highest education level: Not on file   Occupational History    Not on file   Tobacco Use    Smoking status: Former     Passive exposure: Never    Smokeless tobacco: Never    Tobacco comments:     patient is a non - smoker   Vaping Use    Vaping status: Never Used   Substance and Sexual Activity    Alcohol use: Yes     Comment: beer here and there per pt    Drug use: Not Currently    Sexual activity: Not on file   Other Topics Concern    Not on file   Social History Narrative    ** Merged History Encounter **          Social Drivers of Health     Financial Resource Strain: Low Risk  (12/4/2024)    Overall Financial Resource Strain (CARDIA)     Difficulty of Paying Living Expenses: Not very hard   Food Insecurity: Patient Declined (4/30/2025)    Nursing - Inadequate Food Risk Classification     Worried About Running Out of Food in the Last Year: Never true     Ran Out of Food in the Last Year: Never true     Ran Out of Food in the Last Year: Patient declined   Transportation Needs: Patient Declined (4/30/2025)    Nursing - Transportation Risk Classification     Lack of Transportation: Not on file     Lack of Transportation: Patient declined   Physical Activity: Not on file   Stress: Not on file   Social Connections: Not on file   Intimate Partner Violence: Patient Declined (4/30/2025)    Nursing  IPS     Feels Physically and Emotionally Safe: Not on file     Physically Hurt by Someone: Not on file     Humiliated or Emotionally Abused by Someone: Not on file     Physically Hurt by Someone: Patient declined     Hurt or Threatened by Someone: Patient declined   Housing Stability: Patient Declined (2025)    Nursing: Inadequate Housing Risk Classification     Has Housing: Not on file     Worried About Losing Housing: Not on file     Unable to Get Utilities: Not on file     Unable to Pay for Housing in the Last Year: Patient declined     Has Housin       Past Medical History:   Diagnosis Date    Alcohol withdrawal ([facility])     Alcoholism ([facility])     Anxiety     Back pain     Back pain, chronic     Bipolar 1 disorder ([facility])     Bipolar disorder, current episode mixed, moderate ([facility])     BPH (benign prostatic hyperplasia)     Cardiac disease     CHF (congestive heart failure) ([facility])     Chronic pain disorder     Chronic renal failure     Demyelinating disease ([facility])     Dental disorder     Depression     Diabetes mellitus ([facility])     Drug abuse ([facility])     Drug withdrawal ([facility])     ED (erectile dysfunction)     Edema     Encephalopathy     Encephalopathy     Fatigue     GERD (gastroesophageal reflux disease)     H/O prolonged Q-T interval on ECG 2024    Heart rate fast     Hepatitis     unspecified     Hyperlipidemia     Hypertension     Hypokalemia     Hypothyroidism 10/21/2024    Knee buckling     MI (myocardial infarction) ([facility])     Morbid obesity with BMI of 40.0-44.9, adult ([facility])     NIDDY (non-insulin dependent diabetes mellitus in young) ([facility])     Obesity     Opiate dependence ([facility])     Opiate withdrawal ([facility])     Osteoarthritis     Pleural effusion     Pneumonia     Prolonged Q-T interval on ECG     Psychiatric disorder     Psychiatric illness     Psychosis ([facility])     Psychosis ([facility]) 2024    Renal disorder     Schizo-affective schizophrenia ([facility])     Spinal stenosis, lumbar     Traumatic subdural  hemorrhage with loss of consciousness of unspecified duration, initial encounter ([facility]) 12/29/2022    Ulcer of calf ([facility])     Ulnar neuritis, right     Ulnar neuropathy at elbow     Weight loss        Meds/Allergies   Allergies   Allergen Reactions    Haldol [Haloperidol]     Lexapro [Escitalopram Oxalate] Hives    Penicillins        Current Facility-Administered Medications:     acetaminophen (TYLENOL) tablet 650 mg, Q6H PRN    clonazePAM (KlonoPIN) tablet 1 mg, TID    cloNIDine (CATAPRES) tablet 0.1 mg, Q12H ROBER    gabapentin (NEURONTIN) capsule 400 mg, TID    heparin (porcine) subcutaneous injection 5,000 Units, Q8H ROBER    ibuprofen (MOTRIN) tablet 400 mg, Once    insulin lispro (HumALOG/ADMELOG) 100 units/mL subcutaneous injection 1-6 Units, TID AC **AND** Fingerstick Glucose (POCT), TID AC    insulin lispro (HumALOG/ADMELOG) 100 units/mL subcutaneous injection 1-6 Units, HS    metoprolol tartrate (LOPRESSOR) partial tablet 12.5 mg, Q12H ROBER    pantoprazole (PROTONIX) EC tablet 40 mg, Early Morning    zolpidem (AMBIEN) tablet 10 mg, HS    Current Medications:  Current medications as per above. All medications have been reviewed.   Risks, benefits, alternatives, and possible side effects of patient's psychiatric medications were discussed with patient.     Objective   Vital signs in last 24 hours:  Temp:  [97 °F (36.1 °C)-97.9 °F (36.6 °C)] 97.9 °F (36.6 °C)  HR:  [] 76  BP: ()/(56-98) 159/98  Resp:  [16-19] 18  SpO2:  [95 %-97 %] 97 %  O2 Device: None (Room air)    Mental Status Exam:  Appearance: disheveled, appears consistent with stated age  Motor: +PMA, no gait abnormalities  Behavior: superficially cooperative  Speech: normal rate and rhythm  Mood: anxious  Affect: blunted  Thought Process: disorganized  Thought Content: paranoid, suspicious  Risk Potential: denies suicidal ideation, plan, or intent. Denies homicidal ideation  Perceptions: +IP/RIS, denies auditory hallucinations, denies visual  hallucinations  Sensorium: Oriented to person, place, time, and situation  Cognition: cognitive ability appears intact but was not quantitatively tested  Consciousness: alert and awake  Attention: intact  Insight: limited  Judgement: limited      Laboratory results:  I have personally reviewed all pertinent laboratory/tests results.  Recent Results (from the past 48 hours)   CK    Collection Time: 04/29/25 11:07 AM   Result Value Ref Range    Total  (H) 39 - 308 U/L   Basic metabolic panel    Collection Time: 04/29/25 12:25 PM   Result Value Ref Range    Sodium 138 135 - 147 mmol/L    Potassium 3.1 (L) 3.5 - 5.3 mmol/L    Chloride 105 96 - 108 mmol/L    CO2 21 21 - 32 mmol/L    ANION GAP 12 4 - 13 mmol/L    BUN 16 5 - 25 mg/dL    Creatinine 1.59 (H) 0.60 - 1.30 mg/dL    Glucose 238 (H) 65 - 140 mg/dL    Calcium 9.4 8.4 - 10.2 mg/dL    eGFR 47 ml/min/1.73sq m   Magnesium    Collection Time: 04/29/25 12:25 PM   Result Value Ref Range    Magnesium 1.8 (L) 1.9 - 2.7 mg/dL   Lactic acid, plasma (w/reflex if result > 2.0)    Collection Time: 04/29/25 12:25 PM   Result Value Ref Range    LACTIC ACID 1.9 0.5 - 2.0 mmol/L   Fingerstick Glucose (POCT)    Collection Time: 04/29/25  6:29 PM   Result Value Ref Range    POC Glucose 125 65 - 140 mg/dl   CBC and differential    Collection Time: 04/29/25  8:50 PM   Result Value Ref Range    WBC 6.18 4.31 - 10.16 Thousand/uL    RBC 4.50 3.88 - 5.62 Million/uL    Hemoglobin 12.7 12.0 - 17.0 g/dL    Hematocrit 37.0 36.5 - 49.3 %    MCV 82 82 - 98 fL    MCH 28.2 26.8 - 34.3 pg    MCHC 34.3 31.4 - 37.4 g/dL    RDW 12.1 11.6 - 15.1 %    MPV 11.1 8.9 - 12.7 fL    Platelets 208 149 - 390 Thousands/uL    nRBC 0 /100 WBCs    Segmented % 64 43 - 75 %    Immature Grans % 0 0 - 2 %    Lymphocytes % 23 14 - 44 %    Monocytes % 11 4 - 12 %    Eosinophils Relative 1 0 - 6 %    Basophils Relative 1 0 - 1 %    Absolute Neutrophils 3.95 1.85 - 7.62 Thousands/µL    Absolute Immature Grans 0.02  0.00 - 0.20 Thousand/uL    Absolute Lymphocytes 1.40 0.60 - 4.47 Thousands/µL    Absolute Monocytes 0.69 0.17 - 1.22 Thousand/µL    Eosinophils Absolute 0.08 0.00 - 0.61 Thousand/µL    Basophils Absolute 0.04 0.00 - 0.10 Thousands/µL   Basic metabolic panel    Collection Time: 04/29/25  8:50 PM   Result Value Ref Range    Sodium 138 135 - 147 mmol/L    Potassium 3.1 (L) 3.5 - 5.3 mmol/L    Chloride 105 96 - 108 mmol/L    CO2 25 21 - 32 mmol/L    ANION GAP 8 4 - 13 mmol/L    BUN 12 5 - 25 mg/dL    Creatinine 1.16 0.60 - 1.30 mg/dL    Glucose 255 (H) 65 - 140 mg/dL    Calcium 8.3 (L) 8.4 - 10.2 mg/dL    eGFR 70 ml/min/1.73sq m   Magnesium    Collection Time: 04/29/25  8:50 PM   Result Value Ref Range    Magnesium 2.3 1.9 - 2.7 mg/dL   CK    Collection Time: 04/29/25  8:50 PM   Result Value Ref Range    Total  (H) 39 - 308 U/L   CK    Collection Time: 04/30/25  3:25 AM   Result Value Ref Range    Total  (H) 39 - 308 U/L   CK    Collection Time: 04/30/25  6:06 AM   Result Value Ref Range    Total  (H) 39 - 308 U/L   Fingerstick Glucose (POCT)    Collection Time: 04/30/25  6:32 AM   Result Value Ref Range    POC Glucose 121 65 - 140 mg/dl   Fingerstick Glucose (POCT)    Collection Time: 04/30/25 12:37 PM   Result Value Ref Range    POC Glucose 215 (H) 65 - 140 mg/dl   CK    Collection Time: 04/30/25 12:41 PM   Result Value Ref Range    Total  39 - 308 U/L   Comprehensive metabolic panel    Collection Time: 04/30/25 12:41 PM   Result Value Ref Range    Sodium 142 135 - 147 mmol/L    Potassium 2.9 (L) 3.5 - 5.3 mmol/L    Chloride 115 (H) 96 - 108 mmol/L    CO2 23 21 - 32 mmol/L    ANION GAP 4 4 - 13 mmol/L    BUN 6 5 - 25 mg/dL    Creatinine 0.90 0.60 - 1.30 mg/dL    Glucose 202 (H) 65 - 140 mg/dL    Calcium 6.8 (L) 8.4 - 10.2 mg/dL    Corrected Calcium 7.7 (L) 8.3 - 10.1 mg/dL    AST 18 13 - 39 U/L    ALT 15 7 - 52 U/L    Alkaline Phosphatase 49 34 - 104 U/L    Total Protein 4.5 (L) 6.4 - 8.4 g/dL     Albumin 2.9 (L) 3.5 - 5.0 g/dL    Total Bilirubin 0.45 0.20 - 1.00 mg/dL    eGFR 95 ml/min/1.73sq m   Fingerstick Glucose (POCT)    Collection Time: 04/30/25  8:01 PM   Result Value Ref Range    POC Glucose 168 (H) 65 - 140 mg/dl   Basic metabolic panel    Collection Time: 05/01/25  2:56 AM   Result Value Ref Range    Sodium 142 135 - 147 mmol/L    Potassium 3.8 3.5 - 5.3 mmol/L    Chloride 108 96 - 108 mmol/L    CO2 27 21 - 32 mmol/L    ANION GAP 7 4 - 13 mmol/L    BUN 7 5 - 25 mg/dL    Creatinine 1.20 0.60 - 1.30 mg/dL    Glucose 198 (H) 65 - 140 mg/dL    Calcium 8.5 8.4 - 10.2 mg/dL    eGFR 67 ml/min/1.73sq m   CBC and differential    Collection Time: 05/01/25  2:56 AM   Result Value Ref Range    WBC 7.03 4.31 - 10.16 Thousand/uL    RBC 4.92 3.88 - 5.62 Million/uL    Hemoglobin 14.2 12.0 - 17.0 g/dL    Hematocrit 41.9 36.5 - 49.3 %    MCV 85 82 - 98 fL    MCH 28.9 26.8 - 34.3 pg    MCHC 33.9 31.4 - 37.4 g/dL    RDW 12.9 11.6 - 15.1 %    MPV 11.2 8.9 - 12.7 fL    Platelets 235 149 - 390 Thousands/uL    nRBC 0 /100 WBCs    Segmented % 65 43 - 75 %    Immature Grans % 0 0 - 2 %    Lymphocytes % 22 14 - 44 %    Monocytes % 10 4 - 12 %    Eosinophils Relative 2 0 - 6 %    Basophils Relative 1 0 - 1 %    Absolute Neutrophils 4.56 1.85 - 7.62 Thousands/µL    Absolute Immature Grans 0.03 0.00 - 0.20 Thousand/uL    Absolute Lymphocytes 1.54 0.60 - 4.47 Thousands/µL    Absolute Monocytes 0.67 0.17 - 1.22 Thousand/µL    Eosinophils Absolute 0.17 0.00 - 0.61 Thousand/µL    Basophils Absolute 0.06 0.00 - 0.10 Thousands/µL   Fingerstick Glucose (POCT)    Collection Time: 05/01/25  7:52 AM   Result Value Ref Range    POC Glucose 269 (H) 65 - 140 mg/dl        Jasbir Thornton MD    This note has been constructed using a voice recognition system. There may be translation, syntax, or grammatical errors. If you have any questions, please contact the dictating provider.

## 2025-05-01 NOTE — UTILIZATION REVIEW
NOTIFICATION OF INPATIENT ADMISSION   AUTHORIZATION REQUEST   SERVICING FACILITY:   [place] [address]  Tax ID: [id number]  NPI: [id number] ATTENDING PROVIDER:  Attending Name and NPI#: Inna Wray Md [[id number]]  Address: [address] [place] [address]  Phone: [phone]   ADMISSION INFORMATION:  Place of Service: Inpatient [facility]  Place of Service Code: 21  Inpatient Admission Date/Time: 4/30/25  3:03 PM  Discharge Date/Time: No discharge date for patient encounter.  Admitting Diagnosis Code/Description:  Hypokalemia [E87.6]  Psychosis (HCC) [F29]  Elevated CPK [R74.8]  Encounter for psychological evaluation [Z00.8]  Schizophrenia, unspecified type (HCC) [F20.9]  Acute kidney injury superimposed on chronic kidney disease  (HCC) [N17.9, N18.9]     UTILIZATION REVIEW CONTACT:  Terra Mcnamara, Utilization   Network Utilization Review Department  Phone: [phone]  Fax: [phone]  Email: [email or web address]  Contact for approvals/pending authorizations, clinical reviews, and discharge.     PHYSICIAN ADVISORY SERVICES:  Medical Necessity Denial & [id number]-Sncu Review  Phone: [phone]  Fax: [phone]  Email: [email or web address]     DISCHARGE SUPPORT TEAM:  For Patients Discharge Needs & Updates  Phone: [phone] opt. 2 Fax: [phone]  Email: [email or web address]

## 2025-05-01 NOTE — PLAN OF CARE
Problem: HEMATOLOGIC - ADULT  Goal: Maintains hematologic stability  Description: INTERVENTIONS- Assess for signs and symptoms of bleeding or hemorrhage- Monitor labs- Administer supportive blood products/factors as ordered and appropriate  Outcome: Progressing     Problem: SAFETY,RESTRAINT: NV/NON-SELF DESTRUCTIVE BEHAVIOR  Goal: Remains free of harm/injury (restraint for non violent/non self-detsructive behavior)  Description: INTERVENTIONS:- Instruct patient/family regarding restraint use - Assess and monitor physiologic and psychological status - Provide interventions and comfort measures to meet assessed patient needs - Identify and implement measures to help patient regain control- Assess readiness for release of restraint   Outcome: Progressing  Goal: Returns to optimal restraint-free functioning  Description: INTERVENTIONS:- Assess the patient's behavior and symptoms that indicate continued need for restraint- Identify and implement measures to help patient regain control- Assess readiness for release of restraint   Outcome: Progressing

## 2025-05-01 NOTE — ED NOTES
[place] [id number] Hearing will begin at 10:00 am via Teams.  Meeting ID: [phone]  Passcode: [email or web address]    Dr Thornton was notified via Epic Chat.    Bret Donovan was left a voicemail with the Teams login information.

## 2025-05-01 NOTE — ED NOTES
Teams login info, hearing time pending:  Meeting ID: [phone]  Passcode: [email or web address]    Or call in (audio only)  [phone]     Phone Conference ID: [phone]#

## 2025-05-01 NOTE — NURSING NOTE
"Pt admitted to unit on a 302 status from [facility]. Per records, patient was brought into ED by police d/t washing hands with coffee in diner, responding to internal stimuli, and appearing to be a danger to self. He was admitted to [facility] unit for medical stabilization. He is now being admitted to [facility] for psychiatric stabilization. Patient is alert and oriented x 3 on admission. Polite and calm on approach. Denies all psychiatric s/s but appears to have the delusion that he is medically unwell. Patient educated on medical clearance. Patient appears to be a poor historian, originally denying history of alcohol abuse but then stating, \"I've been to places like this before when I had problems with alcohol.\" Denies SI/HI and any history of suicide attempts. Not currently responding to internal stimuli. Displays a steady gait but does report that he had a recent fall out of bed. Skin check completed and generalized bruising noted to left arm from bloodwork. Two scars to back noted. Patient states, \"it's from back surgery.\" Medical history significant for DMT2, hypokalemia, GERD, and CKD. Psychiatric history significant for schizophrenia, bipolar 1, depression, and alcoholism. Oriented to the unit. Initiated Q 15 min safety checks.   "

## 2025-05-01 NOTE — ASSESSMENT & PLAN NOTE
Recent Labs     04/29/25  1225 04/29/25 2050 04/30/25  1241 05/01/25  0256   K 3.1* 3.1* 2.9* 3.8   MG 1.8* 2.3  --   --       Secondary to decrease intake  Will replete and continue to monitor

## 2025-05-01 NOTE — PLAN OF CARE
Problem: Ineffective Coping  Goal: Cooperates with admission process  Description: Interventions: - Complete admission process  Outcome: Completed     Problem: Alteration in Thoughts and Perception  Goal: Treatment Goal: Gain control of psychotic behaviors/thinking, reduce/eliminate presenting symptoms and demonstrate improved reality functioning upon discharge  Outcome: Not Progressing

## 2025-05-01 NOTE — UTILIZATION REVIEW
"Patient arrived on 4/28/2025  9:08 AM. Care then began on 4/28/25 @ 0918 when labs collected. The patient has already surpassed 2 midnights with active ongoing care.     Initial Clinical Review    Admission: Date/Time/Statement:   Admission Orders (From admission, onward)       Ordered        04/30/25 1503  INPATIENT ADMISSION  Once                          Orders Placed This Encounter   Procedures    INPATIENT ADMISSION     Standing Status:   Standing     Number of Occurrences:   1     Level of Care:   Med Surg [16]     Estimated length of stay:   More than 2 Midnights     Certification:   I certify that inpatient services are medically necessary for this patient for a duration of greater than two midnights. See H&P and MD Progress Notes for additional information about the patient's course of treatment.     ED Arrival Information       Expected   -    Arrival   4/28/2025 08:55    Acuity   Emergent              Means of arrival   Walk-In    Escorted by   Police    Service   Hospitalist    Admission type   Emergency              Arrival complaint   Psych eval             Chief Complaint   Patient presents with    Psychiatric Evaluation     Pt to er with police. Police states that they are filing a 302 due to \"being a danger to himself. He's going to walk into traffic and kill himself one day\". Patient reports getting little sleep lately. Police report that he was washing his hands at a diner this am with coffee.        Initial Presentation: 55 y.o. male  w/ a PMH of HTN, hypothyroidism, type 2 diabetes mellitus, MI, BPH, CHF, CKD stage III, Bipolar, schizophrenia, ETOH, Drug abuse, obesity who presented w/ police to [facility] ED. Admitted as Inpatient for evaluation and treatment of Schizophrenia, Bipolar w/ psychosis.      Presented via police escort for acute psychosis w/ underlying schizophrenia. Police reports they are filing a 302 due to \"being a danger to himself. He's going to walk into traffic " "and kill himself one day.\"  Police reports he was washing his hands at a diner this am w/ coffee. Pt reports unable to sleep for the past two wks. He does not recall the police escort or pouring coffee on his hands. Pt has a hx of prior 302. On exam, Pt appears disoriented and intermittently confused. No focal deficits. Neurovascularly intact. Cap refill <2s, radial and ulnar pulse intact and equal bilaterally.  Labs K 3.0, Mg 1.7, Total CK trends: 591, 473, 670, 583, 385. Please see below med list for meds given in the ED.     Plan: Continue 1:1 observation, IV fluids, Trend CK, Monitor K and Mg and replete. SSI, adjust as needed.  Psychiatry consulted.    Anticipated Length of Stay/Certification Statement: Patient will be admitted on an observation basis with an anticipated length of stay of less than 2 midnights secondary to pending inpatient psych due to psychosis.     Date: 5/1/25  Day 3: Has surpassed a 2nd midnight with active treatments and services.    Psychiatry: 303 Evaluation. Pt w/ Bipolar affective disorder, current episode manic w/ psychotic symptoms. Plan: 303 and referred for inpatient behavioral health hospitalization. Zyprexa at bedtime. Pt may not leave AMA.     Pt medically cleared for discharge to Inpatient behavior health hospitalization today, elevated CK has resolved. Discharging to [facility].      ED Treatment-Medication Administration from 04/28/2025 0854 to 04/30/2025 1558         Date/Time Order Dose Route Action     04/28/2025 1123 magnesium sulfate 2 g/50 mL IVPB (premix) 2 g 2 g Intravenous New Bag     04/28/2025 1128 potassium chloride 20 mEq IVPB (premix) 20 mEq Intravenous New Bag     04/28/2025 1313 sodium chloride 0.9 % bolus 500 mL 500 mL Intravenous New Bag     04/28/2025 1430 sodium chloride 0.9 % bolus 1,000 mL 1,000 mL Intravenous New Bag     04/28/2025 1746 OLANZapine (ZyPREXA) IM injection 10 mg 10 mg Intramuscular Given     04/28/2025 1747 " LORazepam (ATIVAN) injection 2 mg 2 mg Intramuscular Given     04/28/2025 1746 diphenhydrAMINE (BENADRYL) injection 50 mg 50 mg Intramuscular Given     04/29/2025 0236 OLANZapine (ZyPREXA) tablet 10 mg 10 mg Oral Given     04/29/2025 0428 LORazepam (ATIVAN) tablet 1 mg 1 mg Oral Given     04/29/2025 0829 OLANZapine (ZyPREXA) tablet 10 mg 10 mg Oral Given     04/29/2025 1205 OLANZapine (ZyPREXA) IM injection 10 mg 10 mg Intramuscular Given     04/29/2025 1204 LORazepam (ATIVAN) injection 2 mg 2 mg Intramuscular Given     04/29/2025 1226 multi-electrolyte (Plasmalyte-A/Isolyte-S PH 7.4/Normosol-R) IV solution 1,000 mL 1,000 mL Intravenous New Bag     04/29/2025 1535 multi-electrolyte (Plasmalyte-A/Isolyte-S PH 7.4/Normosol-R) IV solution 100 mL/hr Intravenous New Bag     04/29/2025 1531 potassium chloride (Klor-Con M20) CR tablet 40 mEq 40 mEq Oral Given     04/29/2025 1535 magnesium sulfate 2 g/50 mL IVPB (premix) 2 g 2 g Intravenous New Bag     04/29/2025 2238 insulin lispro (HumALOG/ADMELOG) 100 units/mL subcutaneous injection 1-6 Units 3 Units Subcutaneous Given     04/29/2025 1533 clonazePAM (KlonoPIN) tablet 1 mg 1 mg Oral Given     04/29/2025 2131 clonazePAM (KlonoPIN) tablet 1 mg 1 mg Oral Given     04/30/2025 0915 clonazePAM (KlonoPIN) tablet 1 mg 1 mg Oral Given     04/29/2025 1533 cloNIDine (CATAPRES) tablet 0.1 mg 0.1 mg Oral Given     04/29/2025 1534 gabapentin (NEURONTIN) capsule 400 mg 400 mg Oral Given     04/29/2025 2131 gabapentin (NEURONTIN) capsule 400 mg 400 mg Oral Given     04/30/2025 0914 gabapentin (NEURONTIN) capsule 400 mg 400 mg Oral Given     04/29/2025 1533 metoprolol tartrate (LOPRESSOR) partial tablet 12.5 mg 12.5 mg Oral Given     04/30/2025 0606 pantoprazole (PROTONIX) EC tablet 40 mg 40 mg Oral Given     04/29/2025 2239 zolpidem (AMBIEN) tablet 10 mg 10 mg Oral Given     04/29/2025 1643 heparin (porcine) subcutaneous injection 5,000 Units 5,000 Units Subcutaneous Given     04/29/2025  2239 heparin (porcine) subcutaneous injection 5,000 Units 5,000 Units Subcutaneous Given     04/30/2025 0606 heparin (porcine) subcutaneous injection 5,000 Units 5,000 Units Subcutaneous Given     04/29/2025 2237 sodium chloride 0.9 % bolus 1,000 mL 1,000 mL Intravenous New Bag     04/29/2025 2239 potassium chloride (Klor-Con M20) CR tablet 40 mEq 40 mEq Oral Given     04/30/2025 1343 potassium chloride oral solution 40 mEq 40 mEq Oral Given            Scheduled Medications:  clonazePAM, 1 mg, Oral, TID  cloNIDine, 0.1 mg, Oral, Q12H ROBER  gabapentin, 400 mg, Oral, TID  heparin (porcine), 5,000 Units, Subcutaneous, Q8H ROBER  ibuprofen, 400 mg, Oral, Once  insulin lispro, 1-6 Units, Subcutaneous, TID AC  insulin lispro, 1-6 Units, Subcutaneous, HS  metoprolol tartrate, 12.5 mg, Oral, Q12H ROBER  pantoprazole, 40 mg, Oral, Early Morning  zolpidem, 10 mg, Oral, HS  HYDROmorphone (DILAUDID) injection 0.5 mg  Dose: 0.5 mg  Freq: Once Route: IV  Start: 05/01/25 0800 End: 05/01/25 0817  potassium chloride (Klor-Con M20) CR tablet 40 mEq  Dose: 40 mEq  Freq: 2 times daily Route: PO  Start: 04/30/25 1600 End: 05/01/25 0758      Continuous IV Infusions:  multi-electrolyte (Plasmalyte-A/Isolyte-S PH 7.4/Normosol-R) IV solution  Rate: 125 mL/hr Dose: 125 mL/hr  Freq: Continuous Route: IV  Indications of Use: IV Hydration  Last Dose: Stopped (05/01/25 0809)  Start: 04/30/25 1230 End: 05/01/25 0758  multi-electrolyte (Plasmalyte-A/Isolyte-S PH 7.4/Normosol-R) IV solution  Rate: 100 mL/hr Dose: 100 mL/hr  Freq: Continuous Route: IV  Indications of Use: IV Hydration  Last Dose: Stopped (04/30/25 0349)  Start: 04/29/25 1430 End: 04/30/25 0423       PRN Meds:  acetaminophen, 650 mg, Oral, Q6H PRN - given x1 5/1      ED Triage Vitals   Temperature Pulse Respirations Blood Pressure SpO2 Pain Score   04/28/25 0906 04/28/25 0906 04/28/25 0906 04/28/25 0906 04/28/25 0906 04/30/25 1500   97.5 °F (36.4 °C) (!) 109 18 (!) 149/107 97 % No Pain      Weight (last 2 days)       None            Vital Signs (last 3 days)       Date/Time Temp Pulse Resp BP MAP (mmHg) SpO2 O2 Device Patient Position - Orthostatic VS Manuel Coma Scale Score Pain    05/01/25 07:51:22 97.9 °F (36.6 °C) 76 18 159/98 118 97 % None (Room air) Lying -- --    05/01/25 0650 -- 111 -- -- -- 95 % -- -- -- --    05/01/25 0534 -- 79 -- -- -- 96 % -- -- -- --    05/01/25 0319 -- -- -- -- -- -- -- -- -- 7    05/01/25 0244 -- -- -- -- -- -- -- -- -- 7    04/30/25 2300 -- -- -- -- -- -- -- -- -- No Pain    04/30/25 22:19:31 97.2 °F (36.2 °C) 73 19 130/87 101 97 % None (Room air) Lying -- --    04/30/25 19:32:58 97.9 °F (36.6 °C) 91 16 143/95 111 97 % None (Room air) Lying -- --    04/30/25 1803 -- -- -- -- -- -- -- -- -- No Pain    04/30/25 16:45:33 97 °F (36.1 °C) -- -- 140/79 99 -- -- -- -- --    04/30/25 1500 -- -- -- -- -- -- None (Room air) -- -- No Pain    04/30/25 1445 -- 90 17 121/79 96 97 % None (Room air) -- -- --    04/30/25 1330 -- -- -- 111/67 85 -- -- -- -- --    04/30/25 1300 -- -- -- 128/84 102 -- -- -- -- --    04/30/25 1130 -- -- -- 125/66 91 -- -- -- -- --    04/30/25 1015 -- -- -- 114/72 88 -- -- -- -- --    04/30/25 1000 -- -- 16 102/63 78 -- -- -- -- --    04/30/25 0945 -- -- -- 98/56 71 -- -- -- -- --    04/30/25 0930 -- -- 16 104/63 78 -- -- -- -- --    04/30/25 0915 -- -- -- 100/62 76 -- -- -- -- --    04/30/25 0913 -- -- 18 103/64 79 -- -- -- -- --    04/30/25 0903 -- -- -- 125/58 85 -- -- -- -- --    04/30/25 0853 -- -- -- 156/76 109 -- -- -- -- --    04/30/25 0843 -- -- -- 149/80 110 -- -- -- -- --    04/30/25 0833 -- -- -- 153/93 118 -- -- -- -- --    04/30/25 0823 -- -- -- 143/83 107 -- -- -- -- --    04/30/25 0813 -- -- -- 139/83 104 -- -- -- -- --    04/30/25 0803 -- -- -- 144/88 111 -- -- -- -- --    04/30/25 0753 -- -- -- 145/85 108 -- -- -- -- --    04/30/25 0730 -- -- -- 142/91 113 98 % None (Room air) Lying -- --    04/30/25 0445 -- 95 20 105/68 82 98 % None  (Room air) -- -- --    04/30/25 0145 -- 96 22 122/77 95 98 % None (Room air) Lying -- --    04/29/25 2122 -- 85 21 138/86 108 96 % None (Room air) Lying -- --    04/29/25 1930 -- 86 19 137/83 105 98 % -- -- -- --    04/29/25 1630 -- 73 19 94/60 72 97 % None (Room air) -- -- --    04/29/25 1533 -- 95 -- 136/91 -- -- -- -- -- --    04/29/25 1530 -- 86 19 136/91 109 97 % None (Room air) -- -- --    04/29/25 0844 -- 124 19 125/91 104 95 % -- Sitting -- --    04/29/25 0030 98.5 °F (36.9 °C) 103 17 139/88 109 98 % -- Lying -- --    04/28/25 1600 -- 101 16 167/100 130 98 % -- -- -- --    04/28/25 1430 -- 89 18 -- -- 98 % -- -- -- --    04/28/25 1400 -- 86 18 159/96 123 98 % -- -- -- --    04/28/25 1345 -- 91 18 -- -- 98 % -- -- -- --    04/28/25 1330 -- 85 18 -- -- 99 % -- -- -- --    04/28/25 1315 -- 85 18 -- -- 98 % -- -- -- --    04/28/25 1300 -- 77 20 143/84 104 97 % None (Room air) -- -- --    04/28/25 1245 -- 79 23 -- -- 99 % -- -- -- --    04/28/25 1230 -- 81 19 -- -- 100 % -- -- -- --    04/28/25 1215 -- 79 22 134/87 106 98 % -- -- -- --    04/28/25 1130 -- 84 18 136/97 113 98 % -- -- -- --    04/28/25 1030 -- -- -- -- -- -- None (Room air) -- 14 --    04/28/25 0906 97.5 °F (36.4 °C) 109 18 149/107 -- 97 % None (Room air) Sitting -- --              Pertinent Labs/Diagnostic Test Results:   Radiology:  CT upper extremity wo contrast left   Final Interpretation by Clinton Samuels MD (04/29 0100)      No definite acute fracture or dislocation. Osseous discontinuity at the base of the acromion appears well-corticated, suggestive of chronic fracture deformity versus less likely os acromiale variant. Of note, this finding was likely present on prior    radiographs dating back to 2020.      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      Clustered tree-in-bud nodularity within the left lower lobe, likely infectious/inflammatory in etiology.            Workstation performed: [id number]         CT  head without contrast   Final Interpretation by Jordan Marie MD (04/28 1044)      No acute intracranial abnormality.  Chronic microangiopathic changes.                  Workstation performed: [id number]         XR chest 1 view   Final Interpretation by Alexander Burris MD (04/28 1153)      No focal consolidation, pleural effusion, or pneumothorax.      Question left scapular fracture. Correlate with physical exam findings. Recommend dedicated radiographs of the shoulder and scapula.            Resident: Pasquale Ventura I, the attending radiologist, have reviewed the images and agree with the final report above.      Workstation performed: [id number]           Cardiology:  ECG 12 lead   Final Result by Derick King MD (04/29 1340)   Normal sinus rhythm with sinus arrhythmia   ST & T wave abnormality, consider inferolateral ischemia   Abnormal ECG   When compared with ECG of 17-Dec-2024 21:19,   T wave inversion more evident in Inferior leads   T wave inversion now evident in Lateral leads   Confirmed by Derick King ([id number]) on 4/29/2025 1:40:05 PM        Results from last 7 days   Lab Units 04/28/25  1443   SARS-COV-2  Negative     Results from last 7 days   Lab Units 05/01/25  0256 04/29/25 2050 04/28/25  1012   WBC Thousand/uL 7.03 6.18 9.32   HEMOGLOBIN g/dL 14.2 12.7 16.4   HEMATOCRIT % 41.9 37.0 45.8   PLATELETS Thousands/uL 235 208 248   TOTAL NEUT ABS Thousands/µL 4.56 3.95 6.63         Results from last 7 days   Lab Units 05/01/25  0256 04/30/25  1241 04/29/25 2050 04/29/25  1225 04/28/25  1012   SODIUM mmol/L 142 142 138 138 136   POTASSIUM mmol/L 3.8 2.9* 3.1* 3.1* 3.0*   CHLORIDE mmol/L 108 115* 105 105 98   CO2 mmol/L 27 23 25 21 22   ANION GAP mmol/L 7 4 8 12 16*   BUN mg/dL 7 6 12 16 11   CREATININE mg/dL 1.20 0.90 1.16 1.59* 1.19   EGFR ml/min/1.73sq m 67 95 70 47 67   CALCIUM mg/dL 8.5 6.8* 8.3* 9.4 9.3   MAGNESIUM mg/dL  --   --  2.3 1.8* 1.7*     Results from last 7 days   Lab  Units 04/30/25  1241 04/28/25  1215 04/28/25  1012   AST U/L 18  --  26   ALT U/L 15  --  16   ALK PHOS U/L 49  --  65   TOTAL PROTEIN g/dL 4.5*  --  7.4   ALBUMIN g/dL 2.9*  --  4.6   TOTAL BILIRUBIN mg/dL 0.45  --  0.99   AMMONIA umol/L  --  21  --      Results from last 7 days   Lab Units 05/01/25  0752 04/30/25 2001 04/30/25  1237 04/30/25  0632 04/29/25  1829   POC GLUCOSE mg/dl 269* 168* 215* 121 125     Results from last 7 days   Lab Units 05/01/25  0256 04/30/25  1241 04/29/25 2050 04/29/25  1225 04/28/25  1012   GLUCOSE RANDOM mg/dL 198* 202* 255* 238* 152*     Results from last 7 days   Lab Units 04/30/25  1241 04/30/25  0606 04/30/25  0325   CK TOTAL U/L 265 435* 385*     Results from last 7 days   Lab Units 04/28/25  1215 04/28/25  1012   HS TNI 0HR ng/L  --  6   HS TNI 2HR ng/L 6  --    HSTNI D2 ng/L 0  --          Results from last 7 days   Lab Units 04/28/25  1508   PROTIME seconds 14.3   INR  1.06   PTT seconds 28     Results from last 7 days   Lab Units 04/28/25  1012   TSH 3RD GENERATON uIU/mL 0.493     Results from last 7 days   Lab Units 04/28/25  1508   PROCALCITONIN ng/ml 0.06     Results from last 7 days   Lab Units 04/29/25  1225 04/28/25  1508   LACTIC ACID mmol/L 1.9 0.9             Results from last 7 days   Lab Units 04/28/25  1012   BNP pg/mL 15     Results from last 7 days   Lab Units 04/28/25  0918   CLARITY UA  Clear   COLOR UA  Yellow   SPEC GRAV UA  1.020   PH UA  6.0   GLUCOSE UA mg/dl Negative   KETONES UA mg/dl 80 (3+)*   BLOOD UA  Trace*   PROTEIN UA mg/dl Trace*   NITRITE UA  Negative   BILIRUBIN UA  Negative   UROBILINOGEN UA (BE) mg/dl <2.0   LEUKOCYTES UA  Negative   WBC UA /hpf 1-2   RBC UA /hpf None Seen   BACTERIA UA /hpf Occasional   EPITHELIAL CELLS WET PREP /hpf Occasional   MUCUS THREADS  Occasional*     Results from last 7 days   Lab Units 04/28/25  1443   INFLUENZA A PCR  Negative   INFLUENZA B PCR  Negative   RSV PCR  Negative         Results from last 7 days    Lab Units 04/28/25  0918   AMPH/METH  Negative   BARBITURATE UR  Negative   BENZODIAZEPINE UR  Negative   COCAINE UR  Negative   METHADONE URINE  Negative   OPIATE UR  Negative   PCP UR  Negative   THC UR  Negative     Results from last 7 days   Lab Units 04/28/25  1012   ETHANOL LVL mg/dL <10   ACETAMINOPHEN LVL ug/mL <2*   SALICYLATE LVL mg/dL <5       Past Medical History:   Diagnosis Date    Alcohol withdrawal ([facility])     Alcoholism ([facility])     Anxiety     Back pain     Back pain, chronic     Bipolar 1 disorder ([facility])     Bipolar disorder, current episode mixed, moderate ([facility])     BPH (benign prostatic hyperplasia)     Cardiac disease     CHF (congestive heart failure) ([facility])     Chronic pain disorder     Chronic renal failure     Demyelinating disease ([facility])     Dental disorder     Depression     Diabetes mellitus ([facility])     Drug abuse ([facility])     Drug withdrawal ([facility])     ED (erectile dysfunction)     Edema     Encephalopathy     Encephalopathy     Fatigue     GERD (gastroesophageal reflux disease)     H/O prolonged Q-T interval on ECG 12/4/2024    Heart rate fast     Hepatitis     unspecified     Hyperlipidemia     Hypertension     Hypokalemia     Hypothyroidism 10/21/2024    Knee buckling     MI (myocardial infarction) ([facility])     Morbid obesity with BMI of 40.0-44.9, adult ([facility])     NIDDY (non-insulin dependent diabetes mellitus in young) ([facility])     Obesity     Opiate dependence ([facility])     Opiate withdrawal ([facility])     Osteoarthritis     Pleural effusion     Pneumonia     Prolonged Q-T interval on ECG     Psychiatric disorder     Psychiatric illness     Psychosis ([facility])     Psychosis ([facility]) 12/17/2024    Renal disorder     Schizo-affective schizophrenia ([facility])     Spinal stenosis, lumbar     Traumatic subdural hemorrhage with loss of consciousness of unspecified duration, initial encounter ([facility]) 12/29/2022    Ulcer of calf ([facility])     Ulnar neuritis, right     Ulnar neuropathy at elbow     Weight loss      Present on  Admission:   Hypokalemia   Hypomagnesemia   Elevated CK   CKD (chronic kidney disease)   GERD (gastroesophageal reflux disease)   Essential hypertension   Type 2 diabetes mellitus, without long-term current use of insulin (HCC)   Schizophrenia, unspecified type (HCC)      Admitting Diagnosis: Hypokalemia [E87.6]  Psychosis (HCC) [F29]  Elevated CPK [R74.8]  Encounter for psychological evaluation [Z00.8]  Schizophrenia, unspecified type (HCC) [F20.9]  Acute kidney injury superimposed on chronic kidney disease  (HCC) [N17.9, N18.9]  Age/Sex: 55 y.o. male    Network Utilization Review Department  ATTENTION: Please call with any questions or concerns to [phone] and carefully listen to the prompts so that you are directed to the right person. All voicemails are confidential.   For Discharge needs, contact Care Management DC Support Team at [phone] opt. [phone]  Send all requests for admission clinical reviews, approved or denied determinations and any other requests to dedicated fax number below belonging to the [place] where the patient is receiving treatment. List of dedicated fax numbers for the Facilities:  FACILITY NAME UR FAX NUMBER   ADMISSION DENIALS (Administrative/Medical Necessity) [phone]   DISCHARGE SUPPORT TEAM (NETWORK) [phone]   PARENT CHILD HEALTH (Maternity/NICU/Pediatrics) [phone]   General acute hospital [phone]   [facility] [phone]   Critical access hospital [phone]   [facility] [phone]   [facility] [phone]   [facility] [phone]   [facility] [phone]   [facility] [phone]   [facility] [phone]   [facility] [phone]   [facility] [phone]   [facility]  [facility] [phone]          Alert-The patient is alert, awake and responds to voice. The patient is oriented to time, place, and person. The triage nurse is able to obtain subjective information.

## 2025-05-01 NOTE — ED NOTES
Patient is accepted at [facility] - 6B  Patient is accepted by Dr. Monse Jarrell.     Transportation is arranged with [employer].     Transportation is scheduled for [facility]   Patient may go to the floor after lunch.         Nurse report is to be called to [phone] prior to patient transfer.

## 2025-05-01 NOTE — LETTER
ID # [id number]    [facility] # [id number]    UR PHONE # [phone]    INPATIENT BEHAVIORAL HEALTH DISCHARGE SUMMARY

## 2025-05-01 NOTE — NURSING NOTE
Called [facility] pharmacy in [place] form medication reconciliation. Patient has not filled any medications at that location in over a month. Some medications not filled for over 2 months.

## 2025-05-01 NOTE — TREATMENT TEAM
05/01/25 1700   Provider Notification   Reason for Communication Admission   Provider Name Monse Alanis   Provider Role Hospitalist   Method of Communication Other (Comment)  (EPIC secure chat)   Response No new orders   Notification Time 1700     Notified provider that PTA med list was unable to be updated d/t patient being a poor historian and [facility] reporting that patient has not picked up medications in over a month.

## 2025-05-01 NOTE — ED NOTES
303 rights explained to pt and he appeared to understand. 303 rights also left at bedside.     Phone call placed to [facility] Scheduling to schedule 303. Spoke with Mayte to schedule a 303 and will get a call back with a hearing time for a hearing on 5/2/25.

## 2025-05-01 NOTE — ASSESSMENT & PLAN NOTE
Recent Labs     04/30/25 2001 05/01/25  0256 05/01/25  0752 05/01/25  1137   POCGLU 168*  --  269* 196*   GLUC  --  198*  --   --       Lab Results   Component Value Date    HGBA1C 8.3 (A) 03/17/2025      Continue with SSI  Adjust as needed  Hyperglycemia also due to olanzapine  However patient has been refusing Accu-Cheks/insulin and patient

## 2025-05-01 NOTE — ED NOTES
Insurance Authorization for admission:   [facility]  Phone number: [phone]  Attempted precert and unable to connect after 52.5 minutes

## 2025-05-01 NOTE — ASSESSMENT & PLAN NOTE
--303 and referred for inpatient behavioral health hospitalization  --Zyprexa 10 mg at bedtime  Patient may not leave AMA

## 2025-05-01 NOTE — QUICK NOTE
Currently stopped IV fluids, patient's site has infiltrated and is currently sleeping. Will attempt to replace IV

## 2025-05-01 NOTE — DISCHARGE SUMMARY
Discharge Summary - Hospitalist   Name: Solo Mack 55 y.o. male I MRN: [id number]  Unit/Bed#: -01 I Date of Admission: 4/28/2025   Date of Service: 5/1/2025 I Hospital Day: 1     Assessment & Plan  GERD (gastroesophageal reflux disease)  Continue with protonix  Hypokalemia (Resolved: 5/1/2025)  Recent Labs     04/29/25  1225 04/29/25  2050 04/30/25  1241 05/01/25  0256   K 3.1* 3.1* 2.9* 3.8   MG 1.8* 2.3  --   --       Secondary to decrease intake  Will replete and continue to monitor  Essential hypertension  /98 (BP Location: Left arm)   Pulse 76   Temp 97.9 °F (36.6 °C) (Temporal)   Resp 18   SpO2 97%   Continue with lopressor and clonidine   Type 2 diabetes mellitus, without long-term current use of insulin (HCC)  Recent Labs     04/30/25 2001 05/01/25  0256 05/01/25  0752 05/01/25  1137   POCGLU 168*  --  269* 196*   GLUC  --  198*  --   --       Lab Results   Component Value Date    HGBA1C 8.3 (A) 03/17/2025      Continue with SSI  Adjust as needed  Hyperglycemia also due to olanzapine  However patient has been refusing Accu-Cheks/insulin and patient  Schizophrenia, unspecified type (HCC)  Pending psych placement   Continual observation  Hypomagnesemia (Resolved: 5/1/2025)  Will replete  Continue to monitor   Elevated CK (Resolved: 5/1/2025)  Recent Labs     04/30/25  0325 04/30/25  0606 04/30/25  1241   CKTOTAL 385* 435* 265      Bolus given in ED  Continue with IV fluids   CKD (chronic kidney disease)  Lab Results   Component Value Date    EGFR 67 05/01/2025    EGFR 95 04/30/2025    EGFR 70 04/29/2025    CREATININE 1.20 05/01/2025    CREATININE 0.90 04/30/2025    CREATININE 1.16 04/29/2025     Currently at baseline   Urinary retention protocol  Continue to monitor   Bipolar affective disorder, current episode manic with psychotic symptoms (HCC)    RUQ pain  Complaining of right upper quadrant pain on 5/1  No leukocytosis or fever reported  Hepatic panel-within normal limits  Right  upper quadrant ultrasound-with cholelithiasis without evidence of cholecystitis  Outpatient general surgery follow-up     Admission Date: 4/28/2025 0908  Discharge Date: 05/01/25  Admitting Diagnosis: Hypokalemia [E87.6]  Psychosis (HCC) [F29]  Elevated CPK [R74.8]  Encounter for psychological evaluation [Z00.8]  Schizophrenia, unspecified type (HCC) [F20.9]  Acute kidney injury superimposed on chronic kidney disease  (HCC) [N17.9, N18.9]  Discharge Diagnosis:   Medical Problems       Resolved Problems  Date Reviewed: 4/30/2025          Resolved    Hypokalemia 5/1/2025     Resolved by  Inna Wray MD    Hypomagnesemia 5/1/2025     Resolved by  Inna Wray MD    Elevated CK 5/1/2025     Resolved by  Inna Wray MD          Procedures Performed:   Orders Placed This Encounter   Procedures    ED ECG Documentation Only       Summary of Hospital Course: Patient presented with acute psychosis with underlying schizophrenia.  Seen and evaluated by psychiatry who recommended 303/inpatient psych evaluation and treatment.    Significant Findings, Care, Treatment and Services Provided: See above    Complications: None    Condition at Discharge: stable       Discharge instructions/Information to patient and family:   See After Visit Summary (AVS) for information provided to patient and family.      Provisions for Follow-Up Care:  See after visit summary for information related to follow-up care and any pertinent home health orders.      PCP: Florin Lindsay DO    Disposition:  Inpatient psych, [place]    Planned Readmission: No         Discharge Medications:  See after visit summary for reconciled discharge medications provided to patient and family.      Discharge Statement:  I have spent a total time of 36 minutes in caring for this patient on the day of the visit/encounter. >30 minutes of time was spent on: Patient and family education, Importance of tx compliance, Counseling / Coordination of care, Documenting in the  medical record, Reviewing / ordering tests, medicine, procedures  , and Communicating with other healthcare professionals .

## 2025-05-01 NOTE — PLAN OF CARE
Problem: PAIN - ADULT  Goal: Verbalizes/displays adequate comfort level or baseline comfort level  Description: Interventions:- Encourage patient to monitor pain and request assistance- Assess pain using appropriate pain scale- Administer analgesics based on type and severity of pain and evaluate response- Implement non-pharmacological measures as appropriate and evaluate response- Consider cultural and social influences on pain and pain management- Notify physician/advanced practitioner if interventions unsuccessful or patient reports new pain  Outcome: Progressing     Problem: INFECTION - ADULT  Goal: Absence or prevention of progression during hospitalization  Description: INTERVENTIONS:- Assess and monitor for signs and symptoms of infection- Monitor lab/diagnostic results- Monitor all insertion sites, i.e. indwelling lines, tubes, and drains- Monitor endotracheal if appropriate and nasal secretions for changes in amount and color- Beatty appropriate cooling/warming therapies per order- Administer medications as ordered- Instruct and encourage patient and family to use good hand hygiene technique- Identify and instruct in appropriate isolation precautions for identified infection/condition  Outcome: Progressing     Problem: SAFETY ADULT  Goal: Patient will remain free of falls  Description: INTERVENTIONS:- Educate patient/family on patient safety including physical limitations- Instruct patient to call for assistance with activity - Consult OT/PT to assist with strengthening/mobility - Keep Call bell within reach- Keep bed low and locked with side rails adjusted as appropriate- Keep care items and personal belongings within reach- Initiate and maintain comfort rounds- Make Fall Risk Sign visible to staff- Offer Toileting every 2 Hours, in advance of need- Initiate/Maintain bed/chair alarm- Obtain necessary fall risk management equipment: - Apply yellow socks and bracelet for high fall risk patients-  Consider moving patient to room near nurses station  INTERVENTIONS:- Educate patient/family on patient safety including physical limitations- Instruct patient to call for assistance with activity - Consult OT/PT to assist with strengthening/mobility - Keep Call bell within reach- Keep bed low and locked with side rails adjusted as appropriate- Keep care items and personal belongings within reach- Initiate and maintain comfort rounds- Make Fall Risk Sign visible to staff- Offer Toileting every 2 Hours, in advance of need- Initiate/Maintain bed/chair alarm- Obtain necessary fall risk management equipment: - Apply yellow socks and bracelet for high fall risk patients- Consider moving patient to room near nurses station  Outcome: Progressing     Problem: Potential for Falls  Goal: Patient will remain free of falls  Description: INTERVENTIONS:- Educate patient/family on patient safety including physical limitations- Instruct patient to call for assistance with activity - Consult OT/PT to assist with strengthening/mobility - Keep Call bell within reach- Keep bed low and locked with side rails adjusted as appropriate- Keep care items and personal belongings within reach- Initiate and maintain comfort rounds- Make Fall Risk Sign visible to staff- Offer Toileting every 2 Hours, in advance of need- Initiate/Maintain bed/chair alarm- Obtain necessary fall risk management equipment: - Apply yellow socks and bracelet for high fall risk patients- Consider moving patient to room near nurses station  INTERVENTIONS:- Educate patient/family on patient safety including physical limitations- Instruct patient to call for assistance with activity - Consult OT/PT to assist with strengthening/mobility - Keep Call bell within reach- Keep bed low and locked with side rails adjusted as appropriate- Keep care items and personal belongings within reach- Initiate and maintain comfort rounds- Make Fall Risk Sign visible to staff- Offer Toileting  every 2 Hours, in advance of need- Initiate/Maintain bed/chair alarm- Obtain necessary fall risk management equipment: - Apply yellow socks and bracelet for high fall risk patients- Consider moving patient to room near nurses station  Outcome: Progressing

## 2025-05-02 ENCOUNTER — ANESTHESIA EVENT (INPATIENT)
Dept: RADIOLOGY | Facility: HOSPITAL | Age: 55
DRG: 853 | End: 2025-05-02
Payer: COMMERCIAL

## 2025-05-02 ENCOUNTER — APPOINTMENT (INPATIENT)
Dept: RADIOLOGY | Facility: HOSPITAL | Age: 55
DRG: 853 | End: 2025-05-02
Attending: STUDENT IN AN ORGANIZED HEALTH CARE EDUCATION/TRAINING PROGRAM
Payer: COMMERCIAL

## 2025-05-02 ENCOUNTER — HOSPITAL ENCOUNTER (INPATIENT)
Facility: HOSPITAL | Age: 55
LOS: 1 days | Discharge: ANOTHER HOSPITAL | End: 2025-05-02
Attending: INTERNAL MEDICINE | Admitting: INTERNAL MEDICINE
Payer: COMMERCIAL

## 2025-05-02 ENCOUNTER — HOSPITAL ENCOUNTER (INPATIENT)
Facility: HOSPITAL | Age: 55
LOS: 12 days | Discharge: HOME | DRG: 853 | End: 2025-05-14
Attending: INTERNAL MEDICINE | Admitting: INTERNAL MEDICINE
Payer: COMMERCIAL

## 2025-05-02 ENCOUNTER — APPOINTMENT (INPATIENT)
Dept: CT IMAGING | Facility: HOSPITAL | Age: 55
End: 2025-05-02
Payer: COMMERCIAL

## 2025-05-02 ENCOUNTER — APPOINTMENT (INPATIENT)
Dept: RADIOLOGY | Facility: HOSPITAL | Age: 55
DRG: 853 | End: 2025-05-02
Payer: COMMERCIAL

## 2025-05-02 ENCOUNTER — ANESTHESIA (INPATIENT)
Dept: RADIOLOGY | Facility: HOSPITAL | Age: 55
DRG: 853 | End: 2025-05-02
Payer: COMMERCIAL

## 2025-05-02 VITALS
OXYGEN SATURATION: 94 % | RESPIRATION RATE: 20 BRPM | BODY MASS INDEX: 34.78 KG/M2 | WEIGHT: 221.6 LBS | HEART RATE: 90 BPM | DIASTOLIC BLOOD PRESSURE: 68 MMHG | HEIGHT: 67 IN | TEMPERATURE: 102.1 F | SYSTOLIC BLOOD PRESSURE: 122 MMHG

## 2025-05-02 VITALS
RESPIRATION RATE: 16 BRPM | BODY MASS INDEX: 34.74 KG/M2 | SYSTOLIC BLOOD PRESSURE: 84 MMHG | WEIGHT: 221.34 LBS | TEMPERATURE: 99.2 F | HEART RATE: 83 BPM | HEIGHT: 67 IN | DIASTOLIC BLOOD PRESSURE: 59 MMHG | OXYGEN SATURATION: 97 %

## 2025-05-02 DIAGNOSIS — R79.89 ABNORMAL TSH: ICD-10-CM

## 2025-05-02 DIAGNOSIS — K81.0 ACUTE CHOLECYSTITIS: ICD-10-CM

## 2025-05-02 DIAGNOSIS — A41.9 SEPTIC SHOCK (HCC): ICD-10-CM

## 2025-05-02 DIAGNOSIS — K81.0 ACUTE CHOLECYSTITIS: Primary | ICD-10-CM

## 2025-05-02 DIAGNOSIS — F20.9 SCHIZOPHRENIA, UNSPECIFIED TYPE (HCC): Chronic | ICD-10-CM

## 2025-05-02 DIAGNOSIS — E11.9 TYPE 2 DIABETES MELLITUS, WITHOUT LONG-TERM CURRENT USE OF INSULIN (HCC): Chronic | ICD-10-CM

## 2025-05-02 DIAGNOSIS — Z00.8 MEDICAL CLEARANCE FOR PSYCHIATRIC ADMISSION: ICD-10-CM

## 2025-05-02 DIAGNOSIS — Z01.89 ENCOUNTER FOR COMPETENCY EVALUATION: ICD-10-CM

## 2025-05-02 DIAGNOSIS — R65.21 SEPTIC SHOCK (HCC): ICD-10-CM

## 2025-05-02 DIAGNOSIS — F41.9 ANXIETY: ICD-10-CM

## 2025-05-02 DIAGNOSIS — E53.8 VITAMIN B12 DEFICIENCY: ICD-10-CM

## 2025-05-02 DIAGNOSIS — F31.2 BIPOLAR AFFECTIVE DISORDER, CURRENT EPISODE MANIC WITH PSYCHOTIC SYMPTOMS (HCC): Primary | Chronic | ICD-10-CM

## 2025-05-02 DIAGNOSIS — K21.9 GASTROESOPHAGEAL REFLUX DISEASE WITHOUT ESOPHAGITIS: ICD-10-CM

## 2025-05-02 DIAGNOSIS — I10 ESSENTIAL HYPERTENSION: ICD-10-CM

## 2025-05-02 PROBLEM — R10.11 RUQ PAIN: Status: RESOLVED | Noted: 2025-05-01 | Resolved: 2025-05-02

## 2025-05-02 PROBLEM — S06.5X9A TRAUMATIC SUBDURAL HEMORRHAGE WITH LOSS OF CONSCIOUSNESS OF UNSPECIFIED DURATION, INITIAL ENCOUNTER (HCC): Status: RESOLVED | Noted: 2022-12-29 | Resolved: 2025-05-02

## 2025-05-02 PROBLEM — S22.32XA LEFT RIB FRACTURE: Status: RESOLVED | Noted: 2020-09-06 | Resolved: 2025-05-02

## 2025-05-02 PROBLEM — N28.9 RENAL INSUFFICIENCY: Status: RESOLVED | Noted: 2024-05-23 | Resolved: 2025-05-02

## 2025-05-02 PROBLEM — S42.142A CLOSED FRACTURE OF GLENOID CAVITY AND NECK OF LEFT SCAPULA: Status: RESOLVED | Noted: 2020-09-06 | Resolved: 2025-05-02

## 2025-05-02 PROBLEM — N18.2 CHRONIC KIDNEY DISEASE, STAGE 2 (MILD): Chronic | Status: ACTIVE | Noted: 2024-10-21

## 2025-05-02 PROBLEM — S06.5XAA SUBDURAL HEMATOMA (HCC): Status: RESOLVED | Noted: 2020-09-06 | Resolved: 2025-05-02

## 2025-05-02 PROBLEM — S42.152A CLOSED FRACTURE OF GLENOID CAVITY AND NECK OF LEFT SCAPULA: Status: RESOLVED | Noted: 2020-09-06 | Resolved: 2025-05-02

## 2025-05-02 PROBLEM — S52.92XA FRACTURE OF LEFT RADIUS: Status: RESOLVED | Noted: 2020-09-06 | Resolved: 2025-05-02

## 2025-05-02 PROBLEM — N50.89 PAIN OF MALE GENITALIA: Status: RESOLVED | Noted: 2024-05-23 | Resolved: 2025-05-02

## 2025-05-02 PROBLEM — S76.219A GROIN STRAIN: Status: RESOLVED | Noted: 2024-04-11 | Resolved: 2025-05-02

## 2025-05-02 PROBLEM — I60.9 SAH (SUBARACHNOID HEMORRHAGE) (HCC): Status: RESOLVED | Noted: 2020-09-06 | Resolved: 2025-05-02

## 2025-05-02 PROBLEM — G93.41 ACUTE METABOLIC ENCEPHALOPATHY: Status: RESOLVED | Noted: 2018-09-14 | Resolved: 2025-05-02

## 2025-05-02 PROBLEM — N18.9 CKD (CHRONIC KIDNEY DISEASE): Status: RESOLVED | Noted: 2025-04-29 | Resolved: 2025-05-02

## 2025-05-02 PROBLEM — R06.09 DOE (DYSPNEA ON EXERTION): Status: RESOLVED | Noted: 2024-10-21 | Resolved: 2025-05-02

## 2025-05-02 PROBLEM — E83.39 HYPOPHOSPHATEMIA: Status: ACTIVE | Noted: 2025-05-02

## 2025-05-02 PROBLEM — F19.11 HISTORY OF SUBSTANCE ABUSE (HCC): Status: RESOLVED | Noted: 2023-04-30 | Resolved: 2025-05-02

## 2025-05-02 LAB
25(OH)D3 SERPL-MCNC: 20.1 NG/ML (ref 30–100)
ALBUMIN SERPL BCG-MCNC: 3.1 G/DL (ref 3.5–5)
ALBUMIN SERPL BCG-MCNC: 3.2 G/DL (ref 3.5–5)
ALP SERPL-CCNC: 56 U/L (ref 34–104)
ALP SERPL-CCNC: 63 U/L (ref 34–104)
ALT SERPL W P-5'-P-CCNC: 26 U/L (ref 7–52)
ALT SERPL W P-5'-P-CCNC: 30 U/L (ref 7–52)
ANION GAP SERPL CALCULATED.3IONS-SCNC: 10 MMOL/L (ref 4–13)
ANION GAP SERPL CALCULATED.3IONS-SCNC: 10 MMOL/L (ref 4–13)
ANION GAP SERPL CALCULATED.3IONS-SCNC: 6 MMOL/L (ref 4–13)
AST SERPL W P-5'-P-CCNC: 24 U/L (ref 13–39)
AST SERPL W P-5'-P-CCNC: 27 U/L (ref 13–39)
ATRIAL RATE: 85 BPM
ATRIAL RATE: 89 BPM
ATRIAL RATE: 92 BPM
ATRIAL RATE: 98 BPM
BASE EXCESS BLDA CALC-SCNC: 0.4 MMOL/L
BASOPHILS # BLD AUTO: 0.12 THOUSANDS/ÂΜL (ref 0–0.1)
BASOPHILS # BLD MANUAL: 0 THOUSAND/UL (ref 0–0.1)
BASOPHILS NFR BLD AUTO: 0 % (ref 0–1)
BASOPHILS NFR MAR MANUAL: 0 % (ref 0–1)
BILIRUB SERPL-MCNC: 1.26 MG/DL (ref 0.2–1)
BILIRUB SERPL-MCNC: 1.49 MG/DL (ref 0.2–1)
BUN SERPL-MCNC: 10 MG/DL (ref 5–25)
BUN SERPL-MCNC: 13 MG/DL (ref 5–25)
BUN SERPL-MCNC: 16 MG/DL (ref 5–25)
CALCIUM ALBUM COR SERPL-MCNC: 8.1 MG/DL (ref 8.3–10.1)
CALCIUM ALBUM COR SERPL-MCNC: 8.2 MG/DL (ref 8.3–10.1)
CALCIUM SERPL-MCNC: 7.2 MG/DL (ref 8.4–10.2)
CALCIUM SERPL-MCNC: 7.4 MG/DL (ref 8.4–10.2)
CALCIUM SERPL-MCNC: 7.6 MG/DL (ref 8.4–10.2)
CHLORIDE SERPL-SCNC: 90 MMOL/L (ref 96–108)
CHLORIDE SERPL-SCNC: 92 MMOL/L (ref 96–108)
CHLORIDE SERPL-SCNC: 96 MMOL/L (ref 96–108)
CO2 SERPL-SCNC: 25 MMOL/L (ref 21–32)
CO2 SERPL-SCNC: 28 MMOL/L (ref 21–32)
CO2 SERPL-SCNC: 28 MMOL/L (ref 21–32)
CORTIS SERPL-MCNC: 22.7 UG/DL
CREAT SERPL-MCNC: 1.1 MG/DL (ref 0.6–1.3)
CREAT SERPL-MCNC: 1.29 MG/DL (ref 0.6–1.3)
CREAT SERPL-MCNC: 1.45 MG/DL (ref 0.6–1.3)
EOSINOPHIL # BLD AUTO: 0.02 THOUSAND/ÂΜL (ref 0–0.61)
EOSINOPHIL # BLD MANUAL: 0 THOUSAND/UL (ref 0–0.4)
EOSINOPHIL NFR BLD AUTO: 0 % (ref 0–6)
EOSINOPHIL NFR BLD MANUAL: 0 % (ref 0–6)
ERYTHROCYTE [DISTWIDTH] IN BLOOD BY AUTOMATED COUNT: 12 % (ref 11.6–15.1)
ERYTHROCYTE [DISTWIDTH] IN BLOOD BY AUTOMATED COUNT: 12.1 % (ref 11.6–15.1)
FOLATE SERPL-MCNC: 6.3 NG/ML
GFR SERPL CREATININE-BSD FRML MDRD: 53 ML/MIN/1.73SQ M
GFR SERPL CREATININE-BSD FRML MDRD: 61 ML/MIN/1.73SQ M
GFR SERPL CREATININE-BSD FRML MDRD: 74 ML/MIN/1.73SQ M
GLUCOSE SERPL-MCNC: 160 MG/DL (ref 65–140)
GLUCOSE SERPL-MCNC: 181 MG/DL (ref 65–140)
GLUCOSE SERPL-MCNC: 210 MG/DL (ref 65–140)
GLUCOSE SERPL-MCNC: 226 MG/DL (ref 65–140)
GLUCOSE SERPL-MCNC: 280 MG/DL (ref 65–140)
GLUCOSE SERPL-MCNC: 293 MG/DL (ref 65–140)
GLUCOSE SERPL-MCNC: 314 MG/DL (ref 65–140)
HCO3 BLDA-SCNC: 23.6 MMOL/L (ref 22–28)
HCT VFR BLD AUTO: 35.5 % (ref 36.5–49.3)
HCT VFR BLD AUTO: 37.7 % (ref 36.5–49.3)
HGB BLD-MCNC: 12.5 G/DL (ref 12–17)
HGB BLD-MCNC: 13 G/DL (ref 12–17)
IMM GRANULOCYTES # BLD AUTO: >0.5 THOUSAND/UL (ref 0–0.2)
IMM GRANULOCYTES NFR BLD AUTO: 2 % (ref 0–2)
LACTATE SERPL-SCNC: 1.8 MMOL/L (ref 0.5–2)
LACTATE SERPL-SCNC: 3 MMOL/L (ref 0.5–2)
LYMPHOCYTES # BLD AUTO: 1.32 THOUSAND/UL (ref 0.6–4.47)
LYMPHOCYTES # BLD AUTO: 2.18 THOUSANDS/ÂΜL (ref 0.6–4.47)
LYMPHOCYTES # BLD AUTO: 5 % (ref 14–44)
LYMPHOCYTES NFR BLD AUTO: 6 % (ref 14–44)
MAGNESIUM SERPL-MCNC: 1.1 MG/DL (ref 1.9–2.7)
MCH RBC QN AUTO: 28.4 PG (ref 26.8–34.3)
MCH RBC QN AUTO: 28.5 PG (ref 26.8–34.3)
MCHC RBC AUTO-ENTMCNC: 34.5 G/DL (ref 31.4–37.4)
MCHC RBC AUTO-ENTMCNC: 35.2 G/DL (ref 31.4–37.4)
MCV RBC AUTO: 81 FL (ref 82–98)
MCV RBC AUTO: 82 FL (ref 82–98)
MONOCYTES # BLD AUTO: 1.05 THOUSAND/UL (ref 0–1.22)
MONOCYTES # BLD AUTO: 2.21 THOUSAND/ÂΜL (ref 0.17–1.22)
MONOCYTES NFR BLD AUTO: 6 % (ref 4–12)
MONOCYTES NFR BLD: 4 % (ref 4–12)
NEUTROPHILS # BLD AUTO: 31.08 THOUSANDS/ÂΜL (ref 1.85–7.62)
NEUTROPHILS # BLD MANUAL: 23.97 THOUSAND/UL (ref 1.85–7.62)
NEUTS BAND NFR BLD MANUAL: 8 % (ref 0–8)
NEUTS SEG NFR BLD AUTO: 83 % (ref 43–75)
NEUTS SEG NFR BLD AUTO: 86 % (ref 43–75)
NRBC BLD AUTO-RTO: 0 /100 WBCS
O2 CT BLDA-SCNC: 17.9 ML/DL (ref 16–23)
OXYHGB MFR BLDA: 94.1 % (ref 94–97)
P AXIS: 39 DEGREES
P AXIS: 55 DEGREES
P AXIS: 56 DEGREES
P AXIS: 67 DEGREES
PCO2 BLDA: 33.8 MM HG (ref 36–44)
PH BLDA: 7.46 [PH] (ref 7.35–7.45)
PHOSPHATE SERPL-MCNC: 2.7 MG/DL (ref 2.7–4.5)
PHOSPHATE SERPL-MCNC: <1 MG/DL (ref 2.7–4.5)
PLATELET # BLD AUTO: 183 THOUSANDS/UL (ref 149–390)
PLATELET # BLD AUTO: 276 THOUSANDS/UL (ref 149–390)
PLATELET BLD QL SMEAR: ADEQUATE
PMV BLD AUTO: 10.6 FL (ref 8.9–12.7)
PMV BLD AUTO: 10.7 FL (ref 8.9–12.7)
PO2 BLDA: 68.6 MM HG (ref 75–129)
POTASSIUM SERPL-SCNC: 3.5 MMOL/L (ref 3.5–5.3)
POTASSIUM SERPL-SCNC: 3.6 MMOL/L (ref 3.5–5.3)
POTASSIUM SERPL-SCNC: 4.3 MMOL/L (ref 3.5–5.3)
PR INTERVAL: 148 MS
PR INTERVAL: 148 MS
PR INTERVAL: 152 MS
PR INTERVAL: 158 MS
PROCALCITONIN SERPL-MCNC: 19.91 NG/ML
PROCALCITONIN SERPL-MCNC: 50.31 NG/ML
PROT SERPL-MCNC: 5.2 G/DL (ref 6.4–8.4)
PROT SERPL-MCNC: 5.4 G/DL (ref 6.4–8.4)
QRS AXIS: 26 DEGREES
QRS AXIS: 48 DEGREES
QRS AXIS: 58 DEGREES
QRS AXIS: 78 DEGREES
QRSD INTERVAL: 82 MS
QRSD INTERVAL: 82 MS
QRSD INTERVAL: 83 MS
QRSD INTERVAL: 84 MS
QT INTERVAL: 354 MS
QT INTERVAL: 378 MS
QT INTERVAL: 414 MS
QT INTERVAL: 444 MS
QTC INTERVAL: 431 MS
QTC INTERVAL: 483 MS
QTC INTERVAL: 513 MS
QTC INTERVAL: 528 MS
RBC # BLD AUTO: 4.39 MILLION/UL (ref 3.88–5.62)
RBC # BLD AUTO: 4.58 MILLION/UL (ref 3.88–5.62)
RBC MORPH BLD: NORMAL
SODIUM SERPL-SCNC: 127 MMOL/L (ref 135–147)
SODIUM SERPL-SCNC: 128 MMOL/L (ref 135–147)
SODIUM SERPL-SCNC: 130 MMOL/L (ref 135–147)
SPECIMEN SOURCE: ABNORMAL
T WAVE AXIS: 12 DEGREES
T WAVE AXIS: 15 DEGREES
T WAVE AXIS: 25 DEGREES
T WAVE AXIS: 27 DEGREES
T4 FREE SERPL-MCNC: 1.3 NG/DL (ref 0.61–1.12)
TSH SERPL DL<=0.05 MIU/L-ACNC: 0.22 UIU/ML (ref 0.45–4.5)
VENTRICULAR RATE: 85 BPM
VENTRICULAR RATE: 89 BPM
VENTRICULAR RATE: 92 BPM
VENTRICULAR RATE: 98 BPM
VIT B12 SERPL-MCNC: 158 PG/ML (ref 180–914)
WBC # BLD AUTO: 26.34 THOUSAND/UL (ref 4.31–10.16)
WBC # BLD AUTO: 36.47 THOUSAND/UL (ref 4.31–10.16)

## 2025-05-02 PROCEDURE — 84100 ASSAY OF PHOSPHORUS: CPT

## 2025-05-02 PROCEDURE — 82533 TOTAL CORTISOL: CPT

## 2025-05-02 PROCEDURE — 82805 BLOOD GASES W/O2 SATURATION: CPT

## 2025-05-02 PROCEDURE — 93010 ELECTROCARDIOGRAM REPORT: CPT | Performed by: INTERNAL MEDICINE

## 2025-05-02 PROCEDURE — 99291 CRITICAL CARE FIRST HOUR: CPT | Performed by: INTERNAL MEDICINE

## 2025-05-02 PROCEDURE — NC001 PR NO CHARGE: Performed by: INTERNAL MEDICINE

## 2025-05-02 PROCEDURE — 84100 ASSAY OF PHOSPHORUS: CPT | Performed by: PHYSICIAN ASSISTANT

## 2025-05-02 PROCEDURE — 74177 CT ABD & PELVIS W/CONTRAST: CPT

## 2025-05-02 PROCEDURE — 93005 ELECTROCARDIOGRAM TRACING: CPT

## 2025-05-02 PROCEDURE — 87040 BLOOD CULTURE FOR BACTERIA: CPT

## 2025-05-02 PROCEDURE — 84145 PROCALCITONIN (PCT): CPT

## 2025-05-02 PROCEDURE — 87040 BLOOD CULTURE FOR BACTERIA: CPT | Performed by: PHYSICIAN ASSISTANT

## 2025-05-02 PROCEDURE — 36556 INSERT NON-TUNNEL CV CATH: CPT | Performed by: INTERNAL MEDICINE

## 2025-05-02 PROCEDURE — 83605 ASSAY OF LACTIC ACID: CPT

## 2025-05-02 PROCEDURE — 82948 REAGENT STRIP/BLOOD GLUCOSE: CPT

## 2025-05-02 PROCEDURE — 84145 PROCALCITONIN (PCT): CPT | Performed by: PHYSICIAN ASSISTANT

## 2025-05-02 PROCEDURE — 80053 COMPREHEN METABOLIC PANEL: CPT

## 2025-05-02 PROCEDURE — 85025 COMPLETE CBC W/AUTO DIFF WBC: CPT

## 2025-05-02 PROCEDURE — 85007 BL SMEAR W/DIFF WBC COUNT: CPT | Performed by: PHYSICIAN ASSISTANT

## 2025-05-02 PROCEDURE — 93010 ELECTROCARDIOGRAM REPORT: CPT

## 2025-05-02 PROCEDURE — 83605 ASSAY OF LACTIC ACID: CPT | Performed by: PHYSICIAN ASSISTANT

## 2025-05-02 PROCEDURE — 4A133J1 MONITORING OF ARTERIAL PULSE, PERIPHERAL, PERCUTANEOUS APPROACH: ICD-10-PCS | Performed by: INTERNAL MEDICINE

## 2025-05-02 PROCEDURE — C1729 CATH, DRAINAGE: HCPCS

## 2025-05-02 PROCEDURE — 82306 VITAMIN D 25 HYDROXY: CPT | Performed by: PHYSICIAN ASSISTANT

## 2025-05-02 PROCEDURE — 99448 NTRPROF PH1/NTRNET/EHR 21-30: CPT | Performed by: STUDENT IN AN ORGANIZED HEALTH CARE EDUCATION/TRAINING PROGRAM

## 2025-05-02 PROCEDURE — 84439 ASSAY OF FREE THYROXINE: CPT | Performed by: PHYSICIAN ASSISTANT

## 2025-05-02 PROCEDURE — 02HV33Z INSERTION OF INFUSION DEVICE INTO SUPERIOR VENA CAVA, PERCUTANEOUS APPROACH: ICD-10-PCS | Performed by: INTERNAL MEDICINE

## 2025-05-02 PROCEDURE — C1769 GUIDE WIRE: HCPCS

## 2025-05-02 PROCEDURE — 4A133B1 MONITORING OF ARTERIAL PRESSURE, PERIPHERAL, PERCUTANEOUS APPROACH: ICD-10-PCS | Performed by: INTERNAL MEDICINE

## 2025-05-02 PROCEDURE — 80053 COMPREHEN METABOLIC PANEL: CPT | Performed by: PHYSICIAN ASSISTANT

## 2025-05-02 PROCEDURE — 80048 BASIC METABOLIC PNL TOTAL CA: CPT

## 2025-05-02 PROCEDURE — 71045 X-RAY EXAM CHEST 1 VIEW: CPT

## 2025-05-02 PROCEDURE — 36620 INSERTION CATHETER ARTERY: CPT | Performed by: INTERNAL MEDICINE

## 2025-05-02 PROCEDURE — NC001 PR NO CHARGE: Performed by: PSYCHIATRY & NEUROLOGY

## 2025-05-02 PROCEDURE — 85027 COMPLETE CBC AUTOMATED: CPT | Performed by: PHYSICIAN ASSISTANT

## 2025-05-02 PROCEDURE — 99236 HOSP IP/OBS SAME DATE HI 85: CPT | Performed by: PSYCHIATRY & NEUROLOGY

## 2025-05-02 PROCEDURE — 82607 VITAMIN B-12: CPT | Performed by: PHYSICIAN ASSISTANT

## 2025-05-02 PROCEDURE — 84443 ASSAY THYROID STIM HORMONE: CPT | Performed by: PHYSICIAN ASSISTANT

## 2025-05-02 PROCEDURE — 03HY32Z INSERTION OF MONITORING DEVICE INTO UPPER ARTERY, PERCUTANEOUS APPROACH: ICD-10-PCS | Performed by: INTERNAL MEDICINE

## 2025-05-02 PROCEDURE — 82746 ASSAY OF FOLIC ACID SERUM: CPT | Performed by: PHYSICIAN ASSISTANT

## 2025-05-02 PROCEDURE — 99223 1ST HOSP IP/OBS HIGH 75: CPT | Performed by: SPECIALIST

## 2025-05-02 PROCEDURE — 83735 ASSAY OF MAGNESIUM: CPT | Performed by: PHYSICIAN ASSISTANT

## 2025-05-02 RX ORDER — IBUPROFEN 400 MG/1
400 TABLET, FILM COATED ORAL EVERY 6 HOURS PRN
Status: CANCELLED | OUTPATIENT
Start: 2025-05-02

## 2025-05-02 RX ORDER — PANTOPRAZOLE SODIUM 40 MG/10ML
40 INJECTION, POWDER, LYOPHILIZED, FOR SOLUTION INTRAVENOUS ONCE
Status: COMPLETED | OUTPATIENT
Start: 2025-05-02 | End: 2025-05-02

## 2025-05-02 RX ORDER — TRAZODONE HYDROCHLORIDE 50 MG/1
50 TABLET ORAL
Status: DISCONTINUED | OUTPATIENT
Start: 2025-05-02 | End: 2025-05-02 | Stop reason: HOSPADM

## 2025-05-02 RX ORDER — LORAZEPAM 2 MG/ML
1 INJECTION INTRAMUSCULAR
Status: DISCONTINUED | OUTPATIENT
Start: 2025-05-02 | End: 2025-05-04

## 2025-05-02 RX ORDER — LORAZEPAM 2 MG/ML
1 INJECTION INTRAMUSCULAR
Status: CANCELLED | OUTPATIENT
Start: 2025-05-02

## 2025-05-02 RX ORDER — CYANOCOBALAMIN 1000 UG/ML
1000 INJECTION, SOLUTION INTRAMUSCULAR; SUBCUTANEOUS
Status: DISCONTINUED | OUTPATIENT
Start: 2025-05-02 | End: 2025-05-14 | Stop reason: HOSPADM

## 2025-05-02 RX ORDER — OLANZAPINE 10 MG/1
10 TABLET, FILM COATED ORAL
Status: CANCELLED | OUTPATIENT
Start: 2025-05-02

## 2025-05-02 RX ORDER — CEFTRIAXONE 1 G/50ML
INJECTION, SOLUTION INTRAVENOUS
Status: COMPLETED
Start: 2025-05-02 | End: 2025-05-02

## 2025-05-02 RX ORDER — ACETAMINOPHEN 325 MG/1
650 TABLET ORAL EVERY 6 HOURS PRN
Status: CANCELLED | OUTPATIENT
Start: 2025-05-02

## 2025-05-02 RX ORDER — AMOXICILLIN 250 MG
1 CAPSULE ORAL DAILY PRN
Status: DISCONTINUED | OUTPATIENT
Start: 2025-05-02 | End: 2025-05-02 | Stop reason: HOSPADM

## 2025-05-02 RX ORDER — IBUPROFEN 600 MG/1
600 TABLET, FILM COATED ORAL EVERY 8 HOURS PRN
Status: DISCONTINUED | OUTPATIENT
Start: 2025-05-02 | End: 2025-05-02 | Stop reason: HOSPADM

## 2025-05-02 RX ORDER — CEFTRIAXONE 2 G/50ML
2000 INJECTION, SOLUTION INTRAVENOUS EVERY 24 HOURS
Status: CANCELLED | OUTPATIENT
Start: 2025-05-03

## 2025-05-02 RX ORDER — SODIUM CHLORIDE, SODIUM GLUCONATE, SODIUM ACETATE, POTASSIUM CHLORIDE, MAGNESIUM CHLORIDE, SODIUM PHOSPHATE, DIBASIC, AND POTASSIUM PHOSPHATE .53; .5; .37; .037; .03; .012; .00082 G/100ML; G/100ML; G/100ML; G/100ML; G/100ML; G/100ML; G/100ML
1000 INJECTION, SOLUTION INTRAVENOUS ONCE
Status: COMPLETED | OUTPATIENT
Start: 2025-05-02 | End: 2025-05-02

## 2025-05-02 RX ORDER — MAGNESIUM SULFATE HEPTAHYDRATE 40 MG/ML
2 INJECTION, SOLUTION INTRAVENOUS ONCE
Status: COMPLETED | OUTPATIENT
Start: 2025-05-02 | End: 2025-05-02

## 2025-05-02 RX ORDER — OLANZAPINE 10 MG/2ML
5 INJECTION, POWDER, FOR SOLUTION INTRAMUSCULAR
Status: CANCELLED | OUTPATIENT
Start: 2025-05-02

## 2025-05-02 RX ORDER — POLYETHYLENE GLYCOL 3350 17 G/17G
17 POWDER, FOR SOLUTION ORAL DAILY PRN
Status: CANCELLED | OUTPATIENT
Start: 2025-05-02

## 2025-05-02 RX ORDER — INSULIN LISPRO 100 [IU]/ML
1-6 INJECTION, SOLUTION INTRAVENOUS; SUBCUTANEOUS
Status: DISCONTINUED | OUTPATIENT
Start: 2025-05-02 | End: 2025-05-02

## 2025-05-02 RX ORDER — GABAPENTIN 400 MG/1
400 CAPSULE ORAL 3 TIMES DAILY
Status: DISCONTINUED | OUTPATIENT
Start: 2025-05-02 | End: 2025-05-03

## 2025-05-02 RX ORDER — SODIUM CHLORIDE 9 MG/ML
126 INJECTION, SOLUTION INTRAVENOUS CONTINUOUS
Status: CANCELLED | OUTPATIENT
Start: 2025-05-02

## 2025-05-02 RX ORDER — METRONIDAZOLE 500 MG/100ML
500 INJECTION, SOLUTION INTRAVENOUS EVERY 8 HOURS
Status: DISCONTINUED | OUTPATIENT
Start: 2025-05-02 | End: 2025-05-09

## 2025-05-02 RX ORDER — OLANZAPINE 10 MG/1
10 TABLET, FILM COATED ORAL
Status: DISCONTINUED | OUTPATIENT
Start: 2025-05-02 | End: 2025-05-02 | Stop reason: HOSPADM

## 2025-05-02 RX ORDER — INSULIN LISPRO 100 [IU]/ML
1-6 INJECTION, SOLUTION INTRAVENOUS; SUBCUTANEOUS
Status: CANCELLED | OUTPATIENT
Start: 2025-05-02

## 2025-05-02 RX ORDER — HYDROXYZINE HYDROCHLORIDE 50 MG/1
50 TABLET, FILM COATED ORAL
Status: CANCELLED | OUTPATIENT
Start: 2025-05-02

## 2025-05-02 RX ORDER — IBUPROFEN 600 MG/1
600 TABLET, FILM COATED ORAL EVERY 8 HOURS PRN
Status: CANCELLED | OUTPATIENT
Start: 2025-05-02

## 2025-05-02 RX ORDER — HYDROMORPHONE HCL/PF 1 MG/ML
0.5 SYRINGE (ML) INJECTION EVERY 4 HOURS PRN
Refills: 0 | Status: DISCONTINUED | OUTPATIENT
Start: 2025-05-02 | End: 2025-05-03

## 2025-05-02 RX ORDER — HYDROXYZINE HYDROCHLORIDE 50 MG/1
50 TABLET, FILM COATED ORAL
Status: DISCONTINUED | OUTPATIENT
Start: 2025-05-02 | End: 2025-05-02 | Stop reason: HOSPADM

## 2025-05-02 RX ORDER — HYDROMORPHONE HCL/PF 1 MG/ML
1 SYRINGE (ML) INJECTION EVERY 4 HOURS PRN
Refills: 0 | Status: DISCONTINUED | OUTPATIENT
Start: 2025-05-02 | End: 2025-05-02 | Stop reason: HOSPADM

## 2025-05-02 RX ORDER — ONDANSETRON 2 MG/ML
4 INJECTION INTRAMUSCULAR; INTRAVENOUS EVERY 6 HOURS PRN
Status: CANCELLED | OUTPATIENT
Start: 2025-05-02

## 2025-05-02 RX ORDER — SODIUM CHLORIDE, SODIUM LACTATE, POTASSIUM CHLORIDE, CALCIUM CHLORIDE 600; 310; 30; 20 MG/100ML; MG/100ML; MG/100ML; MG/100ML
50 INJECTION, SOLUTION INTRAVENOUS CONTINUOUS
Status: DISCONTINUED | OUTPATIENT
Start: 2025-05-02 | End: 2025-05-06

## 2025-05-02 RX ORDER — HYDROXYZINE HYDROCHLORIDE 25 MG/1
50 TABLET, FILM COATED ORAL
Status: DISCONTINUED | OUTPATIENT
Start: 2025-05-02 | End: 2025-05-14 | Stop reason: HOSPADM

## 2025-05-02 RX ORDER — CLONAZEPAM 1 MG/1
1 TABLET ORAL 2 TIMES DAILY PRN
Status: DISCONTINUED | OUTPATIENT
Start: 2025-05-02 | End: 2025-05-14 | Stop reason: HOSPADM

## 2025-05-02 RX ORDER — ACETAMINOPHEN 325 MG/1
650 TABLET ORAL EVERY 6 HOURS PRN
Status: DISCONTINUED | OUTPATIENT
Start: 2025-05-02 | End: 2025-05-02 | Stop reason: HOSPADM

## 2025-05-02 RX ORDER — TRAZODONE HYDROCHLORIDE 50 MG/1
50 TABLET ORAL
Status: CANCELLED | OUTPATIENT
Start: 2025-05-02

## 2025-05-02 RX ORDER — INSULIN LISPRO 100 [IU]/ML
1-6 INJECTION, SOLUTION INTRAVENOUS; SUBCUTANEOUS EVERY 6 HOURS SCHEDULED
Status: CANCELLED | OUTPATIENT
Start: 2025-05-02

## 2025-05-02 RX ORDER — CEFTRIAXONE 1 G/50ML
1000 INJECTION, SOLUTION INTRAVENOUS EVERY 24 HOURS
Status: CANCELLED | OUTPATIENT
Start: 2025-05-03

## 2025-05-02 RX ORDER — OLANZAPINE 10 MG/2ML
5 INJECTION, POWDER, FOR SOLUTION INTRAMUSCULAR
Status: DISCONTINUED | OUTPATIENT
Start: 2025-05-02 | End: 2025-05-02 | Stop reason: HOSPADM

## 2025-05-02 RX ORDER — CLONIDINE HYDROCHLORIDE 0.1 MG/1
0.1 TABLET ORAL EVERY 12 HOURS SCHEDULED
Status: DISCONTINUED | OUTPATIENT
Start: 2025-05-02 | End: 2025-05-02 | Stop reason: HOSPADM

## 2025-05-02 RX ORDER — HYDROMORPHONE HCL/PF 1 MG/ML
1 SYRINGE (ML) INJECTION EVERY 4 HOURS PRN
Refills: 0 | Status: DISCONTINUED | OUTPATIENT
Start: 2025-05-02 | End: 2025-05-03

## 2025-05-02 RX ORDER — PANTOPRAZOLE SODIUM 40 MG/10ML
40 INJECTION, POWDER, LYOPHILIZED, FOR SOLUTION INTRAVENOUS
Status: DISCONTINUED | OUTPATIENT
Start: 2025-05-02 | End: 2025-05-10

## 2025-05-02 RX ORDER — HYDROCORTISONE SODIUM SUCCINATE 100 MG/2ML
100 INJECTION INTRAMUSCULAR; INTRAVENOUS ONCE
Status: COMPLETED | OUTPATIENT
Start: 2025-05-02 | End: 2025-05-02

## 2025-05-02 RX ORDER — CLONIDINE HYDROCHLORIDE 0.1 MG/1
0.1 TABLET ORAL EVERY 12 HOURS SCHEDULED
Status: CANCELLED | OUTPATIENT
Start: 2025-05-02

## 2025-05-02 RX ORDER — LORAZEPAM 1 MG/1
1 TABLET ORAL
Status: CANCELLED | OUTPATIENT
Start: 2025-05-02

## 2025-05-02 RX ORDER — ENOXAPARIN SODIUM 100 MG/ML
40 INJECTION SUBCUTANEOUS DAILY
Status: DISCONTINUED | OUTPATIENT
Start: 2025-05-02 | End: 2025-05-14 | Stop reason: HOSPADM

## 2025-05-02 RX ORDER — SODIUM CHLORIDE 9 MG/ML
125 INJECTION, SOLUTION INTRAVENOUS CONTINUOUS
Status: DISCONTINUED | OUTPATIENT
Start: 2025-05-02 | End: 2025-05-02

## 2025-05-02 RX ORDER — PANTOPRAZOLE SODIUM 40 MG/1
40 TABLET, DELAYED RELEASE ORAL
Status: CANCELLED | OUTPATIENT
Start: 2025-05-02

## 2025-05-02 RX ORDER — SODIUM CHLORIDE 9 MG/ML
75 INJECTION, SOLUTION INTRAVENOUS CONTINUOUS
Status: CANCELLED | OUTPATIENT
Start: 2025-05-02

## 2025-05-02 RX ORDER — METRONIDAZOLE 500 MG/100ML
500 INJECTION, SOLUTION INTRAVENOUS EVERY 12 HOURS
Status: DISCONTINUED | OUTPATIENT
Start: 2025-05-02 | End: 2025-05-02

## 2025-05-02 RX ORDER — HYDROXYZINE HYDROCHLORIDE 25 MG/1
25 TABLET, FILM COATED ORAL
Status: DISCONTINUED | OUTPATIENT
Start: 2025-05-02 | End: 2025-05-02 | Stop reason: HOSPADM

## 2025-05-02 RX ORDER — IBUPROFEN 400 MG/1
400 TABLET, FILM COATED ORAL EVERY 6 HOURS PRN
Status: DISCONTINUED | OUTPATIENT
Start: 2025-05-02 | End: 2025-05-02 | Stop reason: HOSPADM

## 2025-05-02 RX ORDER — HYDROXYZINE HYDROCHLORIDE 25 MG/1
25 TABLET, FILM COATED ORAL
Status: DISCONTINUED | OUTPATIENT
Start: 2025-05-02 | End: 2025-05-14 | Stop reason: HOSPADM

## 2025-05-02 RX ORDER — GABAPENTIN 400 MG/1
400 CAPSULE ORAL 3 TIMES DAILY
Status: CANCELLED | OUTPATIENT
Start: 2025-05-02

## 2025-05-02 RX ORDER — METRONIDAZOLE 500 MG/100ML
500 INJECTION, SOLUTION INTRAVENOUS EVERY 12 HOURS
Status: DISCONTINUED | OUTPATIENT
Start: 2025-05-02 | End: 2025-05-02 | Stop reason: HOSPADM

## 2025-05-02 RX ORDER — HYDROXYZINE HYDROCHLORIDE 25 MG/1
25 TABLET, FILM COATED ORAL
Status: CANCELLED | OUTPATIENT
Start: 2025-05-02

## 2025-05-02 RX ORDER — METRONIDAZOLE 500 MG/100ML
500 INJECTION, SOLUTION INTRAVENOUS EVERY 12 HOURS
Status: CANCELLED | OUTPATIENT
Start: 2025-05-02

## 2025-05-02 RX ORDER — AMOXICILLIN 250 MG
1 CAPSULE ORAL DAILY PRN
Status: CANCELLED | OUTPATIENT
Start: 2025-05-02

## 2025-05-02 RX ORDER — POLYETHYLENE GLYCOL 3350 17 G/17G
17 POWDER, FOR SOLUTION ORAL DAILY PRN
Status: DISCONTINUED | OUTPATIENT
Start: 2025-05-02 | End: 2025-05-02 | Stop reason: HOSPADM

## 2025-05-02 RX ORDER — HYDROMORPHONE HCL/PF 1 MG/ML
1 SYRINGE (ML) INJECTION EVERY 4 HOURS PRN
Refills: 0 | Status: CANCELLED | OUTPATIENT
Start: 2025-05-02

## 2025-05-02 RX ORDER — INSULIN LISPRO 100 [IU]/ML
1-6 INJECTION, SOLUTION INTRAVENOUS; SUBCUTANEOUS EVERY 6 HOURS SCHEDULED
Status: DISCONTINUED | OUTPATIENT
Start: 2025-05-02 | End: 2025-05-02 | Stop reason: HOSPADM

## 2025-05-02 RX ORDER — HYDROMORPHONE HCL IN WATER/PF 6 MG/30 ML
0.2 PATIENT CONTROLLED ANALGESIA SYRINGE INTRAVENOUS EVERY 4 HOURS PRN
Status: DISCONTINUED | OUTPATIENT
Start: 2025-05-02 | End: 2025-05-02 | Stop reason: HOSPADM

## 2025-05-02 RX ORDER — HYDROMORPHONE HCL IN WATER/PF 6 MG/30 ML
0.2 PATIENT CONTROLLED ANALGESIA SYRINGE INTRAVENOUS EVERY 4 HOURS PRN
Refills: 0 | Status: DISCONTINUED | OUTPATIENT
Start: 2025-05-02 | End: 2025-05-14 | Stop reason: HOSPADM

## 2025-05-02 RX ORDER — IBUPROFEN 400 MG/1
200 TABLET, FILM COATED ORAL EVERY 6 HOURS PRN
Status: DISCONTINUED | OUTPATIENT
Start: 2025-05-02 | End: 2025-05-02 | Stop reason: HOSPADM

## 2025-05-02 RX ORDER — LORAZEPAM 1 MG/1
1 TABLET ORAL
Status: DISCONTINUED | OUTPATIENT
Start: 2025-05-02 | End: 2025-05-14 | Stop reason: HOSPADM

## 2025-05-02 RX ORDER — METRONIDAZOLE 500 MG/100ML
500 INJECTION, SOLUTION INTRAVENOUS EVERY 8 HOURS
Status: DISCONTINUED | OUTPATIENT
Start: 2025-05-02 | End: 2025-05-02

## 2025-05-02 RX ORDER — OXYCODONE HYDROCHLORIDE 5 MG/1
5 TABLET ORAL EVERY 4 HOURS PRN
Refills: 0 | Status: CANCELLED | OUTPATIENT
Start: 2025-05-02

## 2025-05-02 RX ORDER — OLANZAPINE 5 MG/1
5 TABLET, FILM COATED ORAL
Status: DISCONTINUED | OUTPATIENT
Start: 2025-05-02 | End: 2025-05-02 | Stop reason: HOSPADM

## 2025-05-02 RX ORDER — CHLORHEXIDINE GLUCONATE ORAL RINSE 1.2 MG/ML
15 SOLUTION DENTAL EVERY 12 HOURS SCHEDULED
Status: DISCONTINUED | OUTPATIENT
Start: 2025-05-02 | End: 2025-05-06

## 2025-05-02 RX ORDER — LORAZEPAM 2 MG/ML
1 INJECTION INTRAMUSCULAR
Status: DISCONTINUED | OUTPATIENT
Start: 2025-05-02 | End: 2025-05-02 | Stop reason: HOSPADM

## 2025-05-02 RX ORDER — OLANZAPINE 5 MG/1
2.5 TABLET, FILM COATED ORAL
Status: DISCONTINUED | OUTPATIENT
Start: 2025-05-02 | End: 2025-05-02 | Stop reason: HOSPADM

## 2025-05-02 RX ORDER — HYDROMORPHONE HCL/PF 1 MG/ML
0.2 SYRINGE (ML) INJECTION EVERY 4 HOURS PRN
Refills: 0 | Status: CANCELLED | OUTPATIENT
Start: 2025-05-02

## 2025-05-02 RX ORDER — OLANZAPINE 5 MG/1
5 TABLET, FILM COATED ORAL
Status: CANCELLED | OUTPATIENT
Start: 2025-05-02

## 2025-05-02 RX ORDER — ACETAMINOPHEN 325 MG/1
650 TABLET ORAL EVERY 4 HOURS PRN
Status: CANCELLED | OUTPATIENT
Start: 2025-05-02

## 2025-05-02 RX ORDER — CEFTRIAXONE 2 G/50ML
2000 INJECTION, SOLUTION INTRAVENOUS EVERY 24 HOURS
Status: DISCONTINUED | OUTPATIENT
Start: 2025-05-02 | End: 2025-05-02 | Stop reason: HOSPADM

## 2025-05-02 RX ORDER — HYDROMORPHONE HCL/PF 1 MG/ML
0.5 SYRINGE (ML) INJECTION EVERY 4 HOURS PRN
Status: DISCONTINUED | OUTPATIENT
Start: 2025-05-02 | End: 2025-05-02 | Stop reason: HOSPADM

## 2025-05-02 RX ORDER — POLYETHYLENE GLYCOL 3350 17 G/17G
17 POWDER, FOR SOLUTION ORAL DAILY PRN
Status: DISCONTINUED | OUTPATIENT
Start: 2025-05-02 | End: 2025-05-14 | Stop reason: HOSPADM

## 2025-05-02 RX ORDER — CLONAZEPAM 1 MG/1
1 TABLET ORAL 2 TIMES DAILY PRN
Status: DISCONTINUED | OUTPATIENT
Start: 2025-05-02 | End: 2025-05-02 | Stop reason: HOSPADM

## 2025-05-02 RX ORDER — HYDROMORPHONE HCL/PF 1 MG/ML
0.5 SYRINGE (ML) INJECTION EVERY 4 HOURS PRN
Refills: 0 | Status: CANCELLED | OUTPATIENT
Start: 2025-05-02

## 2025-05-02 RX ORDER — PANTOPRAZOLE SODIUM 40 MG/10ML
40 INJECTION, POWDER, LYOPHILIZED, FOR SOLUTION INTRAVENOUS
Status: CANCELLED | OUTPATIENT
Start: 2025-05-02

## 2025-05-02 RX ORDER — IBUPROFEN 400 MG/1
200 TABLET, FILM COATED ORAL EVERY 6 HOURS PRN
Status: CANCELLED | OUTPATIENT
Start: 2025-05-02

## 2025-05-02 RX ORDER — SODIUM CHLORIDE 9 MG/ML
75 INJECTION, SOLUTION INTRAVENOUS CONTINUOUS
Status: DISCONTINUED | OUTPATIENT
Start: 2025-05-02 | End: 2025-05-02 | Stop reason: HOSPADM

## 2025-05-02 RX ORDER — PANTOPRAZOLE SODIUM 40 MG/1
40 TABLET, DELAYED RELEASE ORAL
Status: DISCONTINUED | OUTPATIENT
Start: 2025-05-02 | End: 2025-05-02 | Stop reason: HOSPADM

## 2025-05-02 RX ORDER — GABAPENTIN 400 MG/1
400 CAPSULE ORAL 3 TIMES DAILY
Status: DISCONTINUED | OUTPATIENT
Start: 2025-05-02 | End: 2025-05-02 | Stop reason: HOSPADM

## 2025-05-02 RX ORDER — INSULIN LISPRO 100 [IU]/ML
1-6 INJECTION, SOLUTION INTRAVENOUS; SUBCUTANEOUS EVERY 6 HOURS SCHEDULED
Status: DISCONTINUED | OUTPATIENT
Start: 2025-05-02 | End: 2025-05-03

## 2025-05-02 RX ORDER — CEFTRIAXONE 1 G/50ML
1000 INJECTION, SOLUTION INTRAVENOUS EVERY 24 HOURS
Status: DISCONTINUED | OUTPATIENT
Start: 2025-05-03 | End: 2025-05-02

## 2025-05-02 RX ORDER — AMOXICILLIN 250 MG
1 CAPSULE ORAL DAILY PRN
Status: DISCONTINUED | OUTPATIENT
Start: 2025-05-02 | End: 2025-05-14 | Stop reason: HOSPADM

## 2025-05-02 RX ORDER — CEFTRIAXONE 2 G/50ML
2000 INJECTION, SOLUTION INTRAVENOUS EVERY 24 HOURS
Status: CANCELLED | OUTPATIENT
Start: 2025-05-02

## 2025-05-02 RX ORDER — SODIUM CHLORIDE 9 MG/ML
126 INJECTION, SOLUTION INTRAVENOUS CONTINUOUS
Status: DISCONTINUED | OUTPATIENT
Start: 2025-05-02 | End: 2025-05-02 | Stop reason: HOSPADM

## 2025-05-02 RX ORDER — CEFTRIAXONE 1 G/50ML
1000 INJECTION, SOLUTION INTRAVENOUS EVERY 24 HOURS
Status: DISCONTINUED | OUTPATIENT
Start: 2025-05-02 | End: 2025-05-02 | Stop reason: HOSPADM

## 2025-05-02 RX ORDER — OXYCODONE HYDROCHLORIDE 5 MG/1
5 TABLET ORAL EVERY 4 HOURS PRN
Refills: 0 | Status: DISCONTINUED | OUTPATIENT
Start: 2025-05-02 | End: 2025-05-02

## 2025-05-02 RX ORDER — OLANZAPINE 5 MG/1
10 TABLET, FILM COATED ORAL
Status: DISCONTINUED | OUTPATIENT
Start: 2025-05-02 | End: 2025-05-03

## 2025-05-02 RX ORDER — OLANZAPINE 2.5 MG/1
2.5 TABLET, FILM COATED ORAL
Status: CANCELLED | OUTPATIENT
Start: 2025-05-02

## 2025-05-02 RX ORDER — SODIUM CHLORIDE 9 MG/ML
126 INJECTION, SOLUTION INTRAVENOUS CONTINUOUS
Status: DISCONTINUED | OUTPATIENT
Start: 2025-05-02 | End: 2025-05-02

## 2025-05-02 RX ORDER — LORAZEPAM 1 MG/1
1 TABLET ORAL
Status: DISCONTINUED | OUTPATIENT
Start: 2025-05-02 | End: 2025-05-02 | Stop reason: HOSPADM

## 2025-05-02 RX ORDER — CLONAZEPAM 1 MG/1
1 TABLET ORAL 2 TIMES DAILY PRN
Status: CANCELLED | OUTPATIENT
Start: 2025-05-02

## 2025-05-02 RX ORDER — CEFTRIAXONE 2 G/50ML
2000 INJECTION, SOLUTION INTRAVENOUS EVERY 24 HOURS
Status: DISCONTINUED | OUTPATIENT
Start: 2025-05-03 | End: 2025-05-02

## 2025-05-02 RX ORDER — SODIUM CHLORIDE 9 MG/ML
125 INJECTION, SOLUTION INTRAVENOUS CONTINUOUS
Status: CANCELLED | OUTPATIENT
Start: 2025-05-02

## 2025-05-02 RX ORDER — ACETAMINOPHEN 325 MG/1
650 TABLET ORAL EVERY 6 HOURS PRN
Status: DISCONTINUED | OUTPATIENT
Start: 2025-05-02 | End: 2025-05-03

## 2025-05-02 RX ADMIN — PANTOPRAZOLE SODIUM 40 MG: 40 INJECTION, POWDER, FOR SOLUTION INTRAVENOUS at 10:36

## 2025-05-02 RX ADMIN — VASOPRESSIN 0.04 UNITS/MIN: 20 INJECTION INTRAVENOUS at 16:54

## 2025-05-02 RX ADMIN — SODIUM CHLORIDE, SODIUM LACTATE, POTASSIUM CHLORIDE, AND CALCIUM CHLORIDE 125 ML/HR: .6; .31; .03; .02 INJECTION, SOLUTION INTRAVENOUS at 10:46

## 2025-05-02 RX ADMIN — METRONIDAZOLE 500 MG: 500 INJECTION, SOLUTION INTRAVENOUS at 13:16

## 2025-05-02 RX ADMIN — MAGNESIUM OXIDE TAB 400 MG (241.3 MG ELEMENTAL MG) 800 MG: 400 (241.3 MG) TAB at 01:20

## 2025-05-02 RX ADMIN — NOREPINEPHRINE BITARTRATE 12 MCG/MIN: 1 INJECTION, SOLUTION, CONCENTRATE INTRAVENOUS at 14:01

## 2025-05-02 RX ADMIN — HYDROCORTISONE SODIUM SUCCINATE 100 MG: 100 INJECTION, POWDER, FOR SOLUTION INTRAMUSCULAR; INTRAVENOUS at 10:36

## 2025-05-02 RX ADMIN — SODIUM CHLORIDE, SODIUM GLUCONATE, SODIUM ACETATE, POTASSIUM CHLORIDE, MAGNESIUM CHLORIDE, SODIUM PHOSPHATE, DIBASIC, AND POTASSIUM PHOSPHATE 1000 ML: .53; .5; .37; .037; .03; .012; .00082 INJECTION, SOLUTION INTRAVENOUS at 05:16

## 2025-05-02 RX ADMIN — NOREPINEPHRINE BITARTRATE 20 MCG/MIN: 1 INJECTION, SOLUTION, CONCENTRATE INTRAVENOUS at 09:10

## 2025-05-02 RX ADMIN — METRONIDAZOLE 500 MG: 500 INJECTION, SOLUTION INTRAVENOUS at 05:13

## 2025-05-02 RX ADMIN — SODIUM CHLORIDE, SODIUM LACTATE, POTASSIUM CHLORIDE, AND CALCIUM CHLORIDE 125 ML/HR: .6; .31; .03; .02 INJECTION, SOLUTION INTRAVENOUS at 17:39

## 2025-05-02 RX ADMIN — IOHEXOL 100 ML: 350 INJECTION, SOLUTION INTRAVENOUS at 00:58

## 2025-05-02 RX ADMIN — INSULIN LISPRO 5 UNITS: 100 INJECTION, SOLUTION INTRAVENOUS; SUBCUTANEOUS at 12:01

## 2025-05-02 RX ADMIN — INSULIN LISPRO 4 UNITS: 100 INJECTION, SOLUTION INTRAVENOUS; SUBCUTANEOUS at 17:40

## 2025-05-02 RX ADMIN — SODIUM CHLORIDE 126 ML/HR: 0.9 INJECTION, SOLUTION INTRAVENOUS at 08:11

## 2025-05-02 RX ADMIN — NOREPINEPHRINE BITARTRATE 1 MCG/MIN: 1 INJECTION INTRAVENOUS at 07:24

## 2025-05-02 RX ADMIN — NOREPINEPHRINE BITARTRATE 20 MCG/MIN: 1 INJECTION, SOLUTION, CONCENTRATE INTRAVENOUS at 10:42

## 2025-05-02 RX ADMIN — SODIUM CHLORIDE, SODIUM GLUCONATE, SODIUM ACETATE, POTASSIUM CHLORIDE, MAGNESIUM CHLORIDE, SODIUM PHOSPHATE, DIBASIC, AND POTASSIUM PHOSPHATE 1000 ML: .53; .5; .37; .037; .03; .012; .00082 INJECTION, SOLUTION INTRAVENOUS at 06:16

## 2025-05-02 RX ADMIN — CEFEPIME 2000 MG: 2 INJECTION, POWDER, FOR SOLUTION INTRAVENOUS at 10:47

## 2025-05-02 RX ADMIN — SODIUM CHLORIDE, SODIUM GLUCONATE, SODIUM ACETATE, POTASSIUM CHLORIDE, MAGNESIUM CHLORIDE, SODIUM PHOSPHATE, DIBASIC, AND POTASSIUM PHOSPHATE 1000 ML: .53; .5; .37; .037; .03; .012; .00082 INJECTION, SOLUTION INTRAVENOUS at 07:37

## 2025-05-02 RX ADMIN — METRONIDAZOLE 500 MG: 500 INJECTION, SOLUTION INTRAVENOUS at 20:12

## 2025-05-02 RX ADMIN — MAGNESIUM SULFATE HEPTAHYDRATE 2 G: 40 INJECTION, SOLUTION INTRAVENOUS at 10:51

## 2025-05-02 RX ADMIN — IBUPROFEN 600 MG: 600 TABLET ORAL at 02:54

## 2025-05-02 RX ADMIN — SODIUM CHLORIDE, SODIUM GLUCONATE, SODIUM ACETATE, POTASSIUM CHLORIDE, MAGNESIUM CHLORIDE, SODIUM PHOSPHATE, DIBASIC, AND POTASSIUM PHOSPHATE 1000 ML: .53; .5; .37; .037; .03; .012; .00082 INJECTION, SOLUTION INTRAVENOUS at 04:11

## 2025-05-02 RX ADMIN — CHLORHEXIDINE GLUCONATE 15 ML: 1.2 SOLUTION ORAL at 10:36

## 2025-05-02 RX ADMIN — PANTOPRAZOLE SODIUM 40 MG: 40 INJECTION, POWDER, FOR SOLUTION INTRAVENOUS at 07:15

## 2025-05-02 RX ADMIN — VASOPRESSIN 0.04 UNITS/MIN: 20 INJECTION INTRAVENOUS at 10:43

## 2025-05-02 RX ADMIN — INSULIN LISPRO 2 UNITS: 100 INJECTION, SOLUTION INTRAVENOUS; SUBCUTANEOUS at 07:16

## 2025-05-02 RX ADMIN — POTASSIUM PHOSPHATE 30 MMOL: 236; 224 INJECTION, SOLUTION INTRAVENOUS at 10:47

## 2025-05-02 RX ADMIN — MAGNESIUM SULFATE HEPTAHYDRATE 2 G: 40 INJECTION, SOLUTION INTRAVENOUS at 07:10

## 2025-05-02 RX ADMIN — CEFEPIME 2000 MG: 2 INJECTION, POWDER, FOR SOLUTION INTRAVENOUS at 22:10

## 2025-05-02 RX ADMIN — CEFTRIAXONE 1000 MG: 1 INJECTION, SOLUTION INTRAVENOUS at 05:50

## 2025-05-02 RX ADMIN — ENOXAPARIN SODIUM 40 MG: 40 INJECTION SUBCUTANEOUS at 10:36

## 2025-05-02 NOTE — ASSESSMENT & PLAN NOTE
Lab Results   Component Value Date    HGBA1C 8.3 (A) 03/17/2025     Recent Labs     05/01/25  0752 05/01/25  1137 05/01/25  2136 05/02/25  0712   POCGLU 269* 196* 220* 210*     Blood Sugar Average: Last 72 hrs:    Continue with SSI q6  Hyperglycemia likely due to olanzapine

## 2025-05-02 NOTE — ANESTHESIA PREPROCEDURE EVALUATION
Procedure:  IR CHOLECYSTOSTOMY TUBE PLACEMENT    Relevant Problems   CARDIO   (+) Essential hypertension   (+) HLD (hyperlipidemia)      ENDO   (+) Hypothyroidism   (+) Type 2 diabetes mellitus, without long-term current use of insulin (HCC)      GI/HEPATIC   (+) GERD (gastroesophageal reflux disease)      /RENAL   (+) Chronic kidney disease, stage 2 (mild)      NEURO/PSYCH   (+) Chronic pain disorder   (+) Schizophrenia, unspecified type (HCC)      Digestive   (+) Acute cholecystitis      Surgery/Wound/Pain   (+) Septic shock (HCC)      Other   (+) H/O prolonged Q-T interval on ECG        Physical Exam    Airway    Mallampati score: II  TM Distance: >3 FB  Neck ROM: full     Dental    upper dentures and lower dentures    Cardiovascular  Rhythm: regular, Rate: normal, Cardiovascular exam normal    Pulmonary  Pulmonary exam normal Breath sounds clear to auscultation    Other Findings        Anesthesia Plan  ASA Score- 3 Emergent    Anesthesia Type- IV sedation with anesthesia with ASA Monitors.         Additional Monitors:     Airway Plan:            Plan Factors-Exercise tolerance (METS): >4 METS.    Chart reviewed. EKG reviewed.  Existing labs reviewed. Patient summary reviewed.    Patient is not a current smoker.              Induction- intravenous.    Postoperative Plan- Plan for postoperative opioid use.     Perioperative Resuscitation Plan - Level 1 - Full Code.       Informed Consent- Anesthetic plan and risks discussed with patient.        NPO Status:  No vitals data found for the desired time range.

## 2025-05-02 NOTE — ASSESSMENT & PLAN NOTE
Patient complained of right upper quadrant pain.  Abdomen U/S - cholelithiasis with no findings of acute cholecystitis.  CBD measures 6 mm.  5/2/2025 CT AP - acute cholecystitis with impacted gallstone within the cystic duct.   Transferred to [facility]    Plan:  General Surgery consulted  IR consulted  Emergent perc cholecystectomy by IR

## 2025-05-02 NOTE — EMTALA/ACUTE CARE TRANSFER
[facility] HEART TRANSFER UNIT  [address] [address]  [phone]  Dept: [phone]      ACUTE CARE TRANSFER CONSENT    NAME Solo Mack                                         1970                              MRN [id number]    I have been informed of my rights regarding examination, treatment, and transfer   by Dr. Elias Birch,     Benefits:   Critical Care services, Surgery, IR    Risks:  Delay in care, traffic accident and death      Consent for Transfer:  I acknowledge that my medical condition has been evaluated and explained to me by the treating physician or other qualified medical person and/or my attending physician, who has recommended that I be transferred to the service of    at  . The above potential benefits of such transfer, the potential risks associated with such transfer, and the probable risks of not being transferred have been explained to me, and I fully understand them.  The doctor has explained that, in my case, the benefits of transfer outweigh the risks.  I agree to be transferred.    I authorize the performance of emergency medical procedures and treatments upon me in both transit and upon arrival at the receiving facility.  Additionally, I authorize the release of any and all medical records to the receiving facility and request they be transported with me, if possible.  I understand that the safest mode of transportation during a medical emergency is an ambulance and that the Hospital advocates the use of this mode of transport. Risks of traveling to the receiving facility by car, including absence of medical control, life sustaining equipment, such as oxygen, and medical personnel has been explained to me and I fully understand them.    (ELIUD CORRECT BOX BELOW)  [  ]  I consent to the stated transfer and to be transported by ambulance/helicopter.  [  ]  I consent to the stated transfer, but refuse transportation by ambulance and  accept full responsibility for my transportation by car.  I understand the risks of non-ambulance transfers and I exonerate the Hospital and its staff from any deterioration in my condition that results from this refusal.    X___________________________________________    DATE  25  TIME________  Signature of patient or legally responsible individual signing on patient behalf           RELATIONSHIP TO PATIENT_________________________          Provider Certification    NAME Solo Mack                                         1970                              MRN [id number]    A medical screening exam was performed on the above named patient.  Based on the examination:    Condition Necessitating Transfer Sepsis secondary to acute cholecystitis    Patient Condition:  Critical     Reason for Transfer:  Critical care    Transfer Requirements: Facility     Space available and qualified personnel available for treatment as acknowledged by    Agreed to accept transfer and to provide appropriate medical treatment as acknowledged by          Appropriate medical records of the examination and treatment of the patient are provided at the time of transfer   STAFF INITIAL WHEN COMPLETED _______  Transfer will be performed by qualified personnel from    and appropriate transfer equipment as required, including the use of necessary and appropriate life support measures.    Provider Certification: I have examined the patient and explained the following risks and benefits of being transferred/refusing transfer to the patient/family:         Based on these reasonable risks and benefits to the patient and/or the unborn child(ruddy), and based upon the information available at the time of the patient’s examination, I certify that the medical benefits reasonably to be expected from the provision of appropriate medical treatments at another medical facility outweigh the increasing risks, if any, to the individual’s  medical condition, and in the case of labor to the unborn child, from effecting the transfer.    X____________________________________________ DATE 05/02/25        TIME_______      ORIGINAL - SEND TO MEDICAL RECORDS   COPY - SEND WITH PATIENT DURING TRANSFER

## 2025-05-02 NOTE — ASSESSMENT & PLAN NOTE
Sepsis in the setting of acute cholecystitis.  Initially had RUQ pain on 5/1.  4L isolyte given for volume resuscitation with no improvement.  Requiring vasopressor support with levophed and vasopressin.  Started on Rocephin and Flagyl.  Transferred from [place] to [facility] for ICU management.    Plan:  Switch Rocephin to cefepime.  Day 1  Continue Flagyl day 1.  Pressors - Levophed and vasopressin   Will start bicarb drip  LR for IV fluids.  Maintain MAP above 65  Consult general surgery and IR  A-line for blood pressure.  CVC line for pressors

## 2025-05-02 NOTE — ASSESSMENT & PLAN NOTE
Noted by fever and leukocytosis secondary to acute cholecystitis.   Blood cultures pending  Initiated on ceftriaxone and Flagyl, continue the same for now  Start Levophed, currently on 15 mcg  Add vasopressin due to marginal BP  Prior to admission on clonidine as well as below which are both on hold  Currently with 18-gauge access, will possibly need central line  IR consulted, case discussed with general surgery at [place], unstable for surgery

## 2025-05-02 NOTE — QUICK NOTE
Patient had 303 hearing for mental health commitment for [facility] at 10 AM.  Patient was in a procedure at this time and was not able to participate in hearing.  Court decided that given patient could not discuss a continuance or participate in mental health hearing, court would let 302 commitment  and request reevaluation by psychiatry once patient is medically stable.  Once medically stable, please reconsult psychiatry if there are ongoing psychiatric symptoms that require evaluation for potential 302 for new behaviors.

## 2025-05-02 NOTE — ED NOTES
Insurance Authorization for admission:   Phone call placed to [facility]  Phone number: [phone]  Spoke to [facility]   Level of care: inpatient mental health    Authorization # [id number]    Fax clinicals to [phone]

## 2025-05-02 NOTE — ASSESSMENT & PLAN NOTE
Patient transferred from [place] to [facility] ICU in profound septic shock, with imaging demonstrating gallstones, RUQ tenderness and profound leukocytosis.  He has responded to Levophed and Vasopressin. He is also receiving Cefepime + Flagyl after initially receiving Zosyn.  Decompressing the distended gallbladder with percutaneous cholecystostomy has been thwarted by the patient's refusal to have any procedures at this time beyond having a central line placed.  After long discussion with his wife, percutaneous cholecystostomy looks like less attractive option as the patient is likely to tear the drain out himself with disastrous consequences.  Internal stenting to decompress the gallbladder is unnecessarily complicated, and patient unlikely to further Endoscopy to maintain/exchange the stent, and ultimate cholecystectomy would also likely be difficult or impossible.      As the patient is responding well to IV fluids, antibiotics and pressors are being weaned, the best option likely to be proceeding with cholecystectomy as soon as feasible.

## 2025-05-02 NOTE — ASSESSMENT & PLAN NOTE
Lab Results   Component Value Date    EGFR 61 05/02/2025    EGFR 80 05/01/2025    EGFR 67 05/01/2025    CREATININE 1.29 05/02/2025    CREATININE 1.03 05/01/2025    CREATININE 1.20 05/01/2025   Creat stable  monitor

## 2025-05-02 NOTE — ASSESSMENT & PLAN NOTE
Lab Results   Component Value Date    EGFR 80 05/01/2025    EGFR 67 05/01/2025    EGFR 95 04/30/2025    CREATININE 1.03 05/01/2025    CREATININE 1.20 05/01/2025    CREATININE 0.90 04/30/2025   Creat stable  monitor

## 2025-05-02 NOTE — ED NOTES
ED Crisis received an Epic message from Lindy Vieira RN, [address]. She stated the patient arrived to [address]  on a 302 dated  from [facility] [place]. He was reportedly medically compromised and quickly transferred to [facility] ED. She received communication from a [facility] hearing coordinator that patient had been scheduled for a 303 hearing, currently in progress,  and they have no one to assist with patient's participation. However, the patient was no longer in the ED and was transferred to [facility] ICU, where he was in CT scan, and awaiting Interventional Radiology. Officer discussed with the doctor continuing the hearing. Given that the patient could not be present for his hearing due to work up for acute medical issues, that it is unclear when the patient will be medically cleared, and that we are approaching the close of business hours, it was determined that the best course of action would be to let the 302 . Psychiatry will be consulted and a new commitment will be pursued if recommended.  ED Crisis sent an email alert to Anastasia Roberts, [facility] ED [place] regarding the possibility of a future Crisis consult for this patient.

## 2025-05-02 NOTE — QUICK NOTE
Patient refused to respond to this examiners questions, as such, unable to complete Neuropsychological Exam

## 2025-05-02 NOTE — ASSESSMENT & PLAN NOTE
Patient d/c from [facility] on 5/1. Had RUQ US secondary to pain with Cholelithiasis without sonographic findings of acute cholecystitis.CBD measures 6 mm upper limits of normal. Patient met SIRS criteria and CT obtained w/ acute cholecystitis  Case discussed with Dr. Chawla as well as interventional radiology  Due to hypotension and concern for sepsis will place IR consultation for possible percutaneous cholecystectomy tube placement

## 2025-05-02 NOTE — ASSESSMENT & PLAN NOTE
Patient d/c from [facility] on 5/1. Had RUQ US secondary to pain with Cholelithiasis without sonographic findings of acute cholecystitis.CBD measures 6 mm upper limits of normal. Patient met SIRS criteria and CT obtained w/ acute cholecystitis  NPO  IVF  Pain control  IV zosyn  CT: Findings of acute cholecystitis with impacted gallstone within the cystic duct

## 2025-05-02 NOTE — HOSPITAL COURSE
55 y.o male with PMHx of bipolar disorder, schizophrenia, history of prolonged Qtc, T2DM, HTN, GERD who presents as a transfer from [place] with RUQ pain. He was recently admitted to [facility] for psychiatric evaluation  and was recommended for inpatient admission. During his admission at [facility] he complained of right upper quadrant pain on 5/1 and ultrasound showed cholelithiasis. CBD was measuring 6 mm.  Patient received around 90 cc of fluids and discharged to [place] with recommendations for outpatient surgery follow-up.  Yesterday at [facility], patient developed a fever of 101.9, tachycardic and met SIRS criteria.  Sepsis workup was initiated, he was started on IV fluids, IV ceftriaxone and Flagyl.  STAT CT abdomen pelvis showed acute cholecystitis with obstruction of cystic duct.  General surgery was consulted and they recommended to transfer patient to medical unit for IV antibiotics and surgery evaluation.  Patient was made n.p.o. for possible procedures.  Overnight he became hypotensive with BP of 60/40 and heart rate 101.  He was still alert and oriented x 3.  Patient was a rapid response.  He was given 4 L of Isolyte with no improvement of blood pressure and started on Levophed and vasopressin.

## 2025-05-02 NOTE — ASSESSMENT & PLAN NOTE
Lab Results   Component Value Date    HGBA1C 8.3 (A) 03/17/2025       Recent Labs     05/01/25  0752 05/01/25  1137 05/01/25  2136 05/02/25  0712   POCGLU 269* 196* 220* 210*       Blood Sugar Average: Last 72 hrs:  (P) 210

## 2025-05-02 NOTE — ASSESSMENT & PLAN NOTE
Lab Results   Component Value Date    EGFR 61 05/02/2025    EGFR 80 05/01/2025    EGFR 67 05/01/2025    CREATININE 1.29 05/02/2025    CREATININE 1.03 05/01/2025    CREATININE 1.20 05/01/2025

## 2025-05-02 NOTE — QUICK NOTE
Spoke to primary team, CL coordinator, and Dr. Thornton about this patient who was reported to have refused acute surgical intervention. Primary team was inquiring about patient's capacity to refuse surgical intervention. Recommended Neuropsychology consult to assess decision making capacity. Following the results of that evaluation, and once the patient is more medically stable, please re-consult Psychiatry if any acutely concerning behaviors are observed. Please see Dr. Thornton' documentation re: patients 302/303 expiring.

## 2025-05-02 NOTE — H&P
H&P - Hospitalist   Name: Solo Mack 55 y.o. male I MRN: [id number]  Unit/Bed#: 7T [facility] 703-01 I Date of Admission: 5/2/2025   Date of Service: 5/2/2025 I Hospital Day: 0     Assessment & Plan  Acute cholecystitis  Patient d/c from [facility] on 5/1. Had RUQ US secondary to pain with Cholelithiasis without sonographic findings of acute cholecystitis.CBD measures 6 mm upper limits of normal. Patient met SIRS criteria and CT obtained w/ acute cholecystitis  NPO  IVF  Pain control  IV zosyn  CT: Findings of acute cholecystitis with impacted gallstone within the cystic duct   Sepsis without acute organ dysfunction (HCC)  Noted by fever and leukocytosis secondary to acute cholecystitis. BP dropped to 60/40 when new vitals obtained 30 cc/kg fluids ordered. Nurse to call sepsis alert  Lactic acid 1.6  Procal 0.24, continue to trend  IVF  IV abx  Follow up Blood cultures   GERD (gastroesophageal reflux disease)  PPI  Essential hypertension  Continue home medication with hold parameters  Type 2 diabetes mellitus, without long-term current use of insulin (HCC)  Lab Results   Component Value Date    HGBA1C 8.3 (A) 03/17/2025       Recent Labs     04/30/25  2001 05/01/25  0752 05/01/25  1137 05/01/25  2136   POCGLU 168* 269* 196* 220*       Blood Sugar Average: Last 72 hrs:  Q6h blood sugars w/ accu-checks while NPO    Schizophrenia, unspecified type (HCC)  Resume medication if QT stable  Plan to return to [facility] will need psychiatry consult in future  Chronic kidney disease, stage 2 (mild)  Lab Results   Component Value Date    EGFR 80 05/01/2025    EGFR 67 05/01/2025    EGFR 95 04/30/2025    CREATININE 1.03 05/01/2025    CREATININE 1.20 05/01/2025    CREATININE 0.90 04/30/2025   Creat stable  monitor  H/O prolonged Q-T interval on ECG  Hx of prolonged QT  Obtain current EKG  Psych meds place on hold during d/c, follow up EKG  Hypomagnesemia  Replete and monitor  Bipolar affective disorder, current episode manic with  psychotic symptoms (HCC)  Resume medications if QTC stable  F/u U  Administrative Statements   VIRTUAL CARE DOCUMENTATION:     1. This service was provided via Telemedicine using Teams Virtual Rounding      2. Parties in the room with patient during teleconsult RN    3. Confidentiality My office door was closed     4. Participants No one else was in the room    5. Patient acknowledged consent and understanding of privacy and security of the  Telemedicine consult. I informed the patient that I have reviewed their record in Epic and presented the opportunity for them to ask any questions regarding the visit today.  The patient agreed to participate.    6. I have spent a total time of 75 minutes in caring for this patient on the day of the visit/encounter including Diagnostic results, Counseling / Coordination of care, Documenting in the medical record, Reviewing/placing orders in the medical record (including tests, medications, and/or procedures), and Obtaining or reviewing history  , not including the time spent for establishing the audio/video connection.        VTE Pharmacologic Prophylaxis: VTE Score: 4 Moderate Risk (Score 3-4) - Pharmacological DVT Prophylaxis Contraindicated. Sequential Compression Devices Ordered.  Code Status: Level 1 - Full Code   Discussion with patient    Anticipated Length of Stay: Patient will be admitted on an inpatient basis with an anticipated length of stay of greater than 2 midnights secondary to acute cholecystitis, IV abx, specialist input.    History of Present Illness   Chief Complaint: RUQ pain    Solo Mack is a 55 y.o. male with a PMH of bipolar disorder, schizophrenia, history of prolonged QTc, diabetes mellitus type 2 not on insulin with hypertension, GERD who presents with RUQ pain.  Patient discharged from [facility] on 5/1/2025 after admission for evaluation with psychiatry who recommended 303/inpatient psych treatment.  While there he did report right upper  quadrant pain and had a ultrasound performed showing cholelithiasis without sonographic findings of acute cholecystitis.  Common bile duct measuring 6 mm upper limits of normal.  Patient developed a fever and overnight provider was notified about meeting SIRS criteria.  Stat CT abdomen pelvis was obtained showing findings of acute cholecystitis with impacted gallstone within the cystic duct. Provider reviewed w/ on call surgery and the will see in AM.     Review of Systems   Constitutional:  Positive for fever. Negative for chills.   HENT:  Negative for rhinorrhea and sore throat.    Eyes:  Negative for pain and discharge.   Respiratory:  Negative for cough and shortness of breath.    Cardiovascular:  Negative for chest pain and leg swelling.   Gastrointestinal:  Positive for abdominal pain. Negative for diarrhea, nausea and vomiting.   Genitourinary:  Negative for dysuria and hematuria.   Musculoskeletal:  Positive for back pain. Negative for myalgias and neck pain.   Skin:  Negative for rash and wound.   Neurological:  Negative for dizziness, weakness and headaches.   Psychiatric/Behavioral:  Negative for agitation. The patient is not nervous/anxious.        Historical Information   Past Medical History:   Diagnosis Date    Alcohol withdrawal ([facility])     Alcoholism ([facility])     Anxiety     Back pain     Back pain, chronic     Bipolar 1 disorder ([facility])     Bipolar disorder, current episode mixed, moderate ([facility])     BPH (benign prostatic hyperplasia)     Cardiac disease     CHF (congestive heart failure) ([facility])     Chronic pain disorder     Chronic renal failure     Closed displaced fracture of neck of left scapula     Closed fracture of glenoid cavity and neck of left scapula 09/06/2020    Demyelinating disease ([facility])     Dental disorder     Depression     Diabetes mellitus (HCC)     Drug abuse ([facility])     Drug withdrawal ([facility])     ED (erectile dysfunction)     Edema     Encephalopathy     Encephalopathy     Fatigue      Fracture of left radius 09/06/2020    GERD (gastroesophageal reflux disease)     H/O prolonged Q-T interval on ECG 12/04/2024    Heart rate fast     Hepatitis     unspecified     Hyperlipidemia     Hypertension     Hypokalemia     Hypothyroidism 10/21/2024    Knee buckling     Left rib fracture 09/06/2020    MI (myocardial infarction) ([facility])     Morbid obesity with BMI of 40.0-44.9, adult ([facility])     NIDDY (non-insulin dependent diabetes mellitus in young) ([facility])     Obesity     Opiate dependence ([facility])     Opiate withdrawal ([facility])     Osteoarthritis     Pleural effusion     Pneumonia     Prolonged Q-T interval on ECG     Psychiatric disorder     Psychiatric illness     Psychosis ([facility])     Psychosis ([facility]) 12/17/2024    Renal disorder     SAH (subarachnoid hemorrhage) ([facility]) 09/06/2020    Schizo-affective schizophrenia ([facility])     Spinal stenosis, lumbar     Subdural hematoma ([facility]) 09/06/2020    Traumatic subdural hemorrhage with loss of consciousness of unspecified duration, initial encounter ([facility]) 12/29/2022    Ulcer of calf ([facility])     Ulnar neuritis, right     Ulnar neuropathy at elbow     Weight loss      Past Surgical History:   Procedure Laterality Date    BACK SURGERY      report thoracic surgery    LUMBAR FUSION  05/2006    L5/S1 [place]      Social History     Tobacco Use    Smoking status: Former     Passive exposure: Never    Smokeless tobacco: Never    Tobacco comments:     patient is a non - smoker   Vaping Use    Vaping status: Never Used   Substance and Sexual Activity    Alcohol use: Not Currently     Comment: beer here and there per pt    Drug use: Not Currently    Sexual activity: Not Currently     E-Cigarette/Vaping    E-Cigarette Use Never User      E-Cigarette/Vaping Substances    Nicotine No     THC No     CBD No     Flavoring No     Other No     Unknown No      Family History   Problem Relation Age of Onset    No Known Problems Mother     No Known Problems Father     No Known Problems Sister     No Known  Problems Brother     No Known Problems Maternal Aunt     No Known Problems Paternal Aunt     No Known Problems Maternal Uncle     No Known Problems Paternal Uncle     No Known Problems Maternal Grandfather     No Known Problems Maternal Grandmother     No Known Problems Paternal Grandfather     No Known Problems Paternal Grandmother     No Known Problems Cousin     ADD / ADHD Neg Hx     Alcohol abuse Neg Hx     Anxiety disorder Neg Hx     Bipolar disorder Neg Hx     Dementia Neg Hx     Depression Neg Hx     Drug abuse Neg Hx     OCD Neg Hx     Paranoid behavior Neg Hx     Schizophrenia Neg Hx     Seizures Neg Hx     Self-Injury Neg Hx     Suicide Attempts Neg Hx      Social History:  Marital Status: /Civil Union   Patient Pre-hospital Living Situation: Home  Patient Pre-hospital Level of Mobility: walks  Patient Pre-hospital Diet Restrictions: none    Meds/Allergies   I have reviewed home medications using recent Epic encounter.  Prior to Admission medications    Medication Sig Start Date End Date Taking? Authorizing Provider   clonazePAM (KlonoPIN) 1 mg tablet Take 1 mg by mouth 3 (three) times a day 2/28/25   Historical Provider, MD   cloNIDine (CATAPRES) 0.1 mg tablet Take 1 tablet (0.1 mg total) by mouth every 12 (twelve) hours 12/18/24   DELANO Gallo   Easy Comfort Pen Needles 33G X 5 MM MISC AS DIRECTED FOR ozempic EVERY WEEK 8/11/23   Historical Provider, MD   gabapentin (NEURONTIN) 400 mg capsule Take 1 capsule (400 mg total) by mouth 3 (three) times a day 12/18/24   DELANO Gallo   hydrOXYzine HCL (ATARAX) 50 mg tablet TAKE 1 TABLET BY MOUTH THREE TIMES A DAY 4/14/25   Florin Lindsay DO   metoprolol tartrate (LOPRESSOR) 25 mg tablet Take 0.5 tablets (12.5 mg total) by mouth every 12 (twelve) hours 3/17/25   Florin Lindsay DO   pantoprazole (PROTONIX) 40 mg tablet TAKE ONE TABLET BY MOUTH DAILY BEFORE BREAKFAST 8/21/23   Florin Lindsay DO   sildenafil (VIAGRA) 100 mg tablet  Take 1 tablet (100 mg total) by mouth daily as needed for erectile dysfunction 2/11/25   Florin Lindsay,    zolpidem (AMBIEN CR) 12.5 MG CR tablet Take 12.5 mg by mouth daily at bedtime 7/25/23   Historical Provider, MD     Allergies   Allergen Reactions    Haldol [Haloperidol]     Lexapro [Escitalopram Oxalate] Hives    Penicillins Other (See Comments)     Unknown, reaction as child       Objective :  Temp:  [97.9 °F (36.6 °C)-102.1 °F (38.9 °C)] 99.3 °F (37.4 °C)  HR:  [] 101  BP: ()/(40-98) 60/40  Resp:  [18-20] 20  SpO2:  [94 %-97 %] 94 %  O2 Device: None (Room air)    Physical Exam  Vitals and nursing note reviewed.   Constitutional:       Appearance: He is well-developed.   HENT:      Head: Normocephalic and atraumatic.      Mouth/Throat:      Mouth: Mucous membranes are dry.   Eyes:      General:         Right eye: No discharge.         Left eye: No discharge.      Conjunctiva/sclera: Conjunctivae normal.   Neck:      Trachea: No tracheal deviation.   Cardiovascular:      Rate and Rhythm: Normal rate.      Comments: Rate 90  Pulmonary:      Effort: Pulmonary effort is normal.      Comments: RR 20, 94% on RA, speaking in full sentences without difficulty  Abdominal:      Tenderness: There is abdominal tenderness in the right upper quadrant.   Musculoskeletal:         General: Normal range of motion.      Cervical back: Normal range of motion.   Skin:     Coloration: Skin is pale.   Neurological:      Mental Status: He is alert and oriented to person, place, and time. Mental status is at baseline.   Psychiatric:         Mood and Affect: Mood normal.         Behavior: Behavior normal.         Thought Content: Thought content normal.         Judgment: Judgment normal.       Lines/Drains:      Lab Results: I have reviewed the following results:  Results from last 7 days   Lab Units 05/01/25  2235   WBC Thousand/uL 14.70*   HEMOGLOBIN g/dL 14.7   HEMATOCRIT % 42.9   PLATELETS Thousands/uL 222   SEGS  PCT % 86*   LYMPHO PCT % 4*   MONO PCT % 8   EOS PCT % 0     Results from last 7 days   Lab Units 05/01/25  2235   SODIUM mmol/L 132*   POTASSIUM mmol/L 3.6   CHLORIDE mmol/L 92*   CO2 mmol/L 30   BUN mg/dL 7   CREATININE mg/dL 1.03   ANION GAP mmol/L 10   CALCIUM mg/dL 9.0   ALBUMIN g/dL 4.4   TOTAL BILIRUBIN mg/dL 1.18*   ALK PHOS U/L 77   ALT U/L 25   AST U/L 22   GLUCOSE RANDOM mg/dL 227*     Results from last 7 days   Lab Units 05/01/25  2235   INR  0.96     Results from last 7 days   Lab Units 05/01/25  2136 05/01/25  1137 05/01/25  0752 04/30/25 2001 04/30/25  1237 04/30/25  0632 04/29/25  1829   POC GLUCOSE mg/dl 220* 196* 269* 168* 215* 121 125     Lab Results   Component Value Date    HGBA1C 8.3 (A) 03/17/2025    HGBA1C 6.7 (H) 12/04/2024    HGBA1C 6.9 (A) 10/21/2024     Results from last 7 days   Lab Units 05/01/25  2235 04/29/25  1225 04/28/25  1508   LACTIC ACID mmol/L 1.6 1.9 0.9   PROCALCITONIN ng/ml 0.24  --  0.06       Imaging Results Review: I reviewed radiology reports from this admission including: CT abdomen/pelvis.  Other Study Results Review: No additional pertinent studies reviewed.    ** Please Note: This note has been constructed using a voice recognition system. **

## 2025-05-02 NOTE — QUICK NOTE
Attempted to proceed with urgent percutaneous cholecystostomy tube placement; however, patient declined intervention.  Capacity evaluation ongoing.    Discussed with wife, who has concerns about management options and follow up given his psychiatric issues and beliefs.  Please see addendum to my consult note earlier.

## 2025-05-02 NOTE — ASSESSMENT & PLAN NOTE
Lab Results   Component Value Date    HGBA1C 8.3 (A) 03/17/2025       Recent Labs     05/01/25  0752 05/01/25  1137 05/01/25  2136 05/02/25  0712   POCGLU 269* 196* 220* 210*         Blood Sugar Average: Last 72 hrs:  (P) 210  Home regimen reviewed. Hold Metformin if applicable.  Start SSI and Basal bolus protocol  Patient is n.p.o., continue every 6 hour Accu-Cheks

## 2025-05-02 NOTE — QUICK NOTE
Alerted via secure chat that patient is febrile. He is currently meeting SIRS criteria as evidenced by fever Tmax 101.9 and tachycardia (hr> 90).  Per nursing patient offers no acute complaints.  Has no infectious symptoms such as shortness of breath, nausea, vomiting, diarrhea, productive cough, dysuria, urinary frequency, or burning. Does have RUQ abdominal pain unchanged from prior. No tenderness to palpation. He is currently hemodynamically stable    Plan  Initiate sepsis work up   Obtain CXR, CBC, CMP, lactate, PT/INR, UA, blood cultures x 2, flu/COVID/RSV, procalcitonin  Trend CBC and monitor fever curve

## 2025-05-02 NOTE — NURSING NOTE
Patient was admitted to  today from [facility] Medical/Surgical unit. Patient became afebrile meeting SIRS Criteria with a temp of 101.9 and tachycardia, HR 90. Pt offered no acute complaints. He had no infectious symptoms such as sob, n/v, Diarrhea, productive cough, dysuria, urinary frequency, or burning. Pt c/o RUQ abdominal pain with no tenderness to palpitation. Pt underwent  septic workup, Ct of abdomen showed findings consistent with acute cholecystitis with impacted gallstones within the cystic duct. Orders were given to start pt on IV fluids, IV ceftriaxone, and IV Flagyl. General surgery recommended transfer to medical unit for IV antibiotics, and surgery evaluation. Pt made NPO for possible procedure. Report was given to 7T RN and pt was transferred to Tower, Room 703. Pts belongings were sent with patient.

## 2025-05-02 NOTE — QUICK NOTE
Patient was seen and evaluated with the attending at bedside.  We had a discussion with the patient about potential for surgical intervention tomorrow with a laparoscopic, possible open cholecystectomy.  Patient adamantly refused all surgical intervention. We did note risks of ongoing sepsis and potential for death, the patient said that he was understanding of these factors and still refused surgery.       Maximus Garrido, DO  Surgery PGY-3

## 2025-05-02 NOTE — DISCHARGE SUMMARY
Discharge Summary - Hospitalist   Name: Solo Mack 55 y.o. male I MRN: [id number]  Unit/Bed#: Transfer 01 I Date of Admission: 5/2/2025   Date of Service: 5/2/2025 I Hospital Day: 0         Admitting Provider:  Elias Birch DO  Discharge Provider:  Elias Birch DO  Admission Date: 5/2/2025       Discharge Date: 05/02/25   LOS: 0  Primary Care Physician at Discharge: Florin Lindsay [phone]    HOSPITAL COURSE:  Solo Mack is a 55 y.o. male who presented as a transfer from the behavioral health unit for abdominal pain.  The patient has a past medical history of bipolar disorder, schizophrenia, history of prolonged QTc, diabetes mellitus as well as GERD.  He had been discharged from [facility] on May 1, where he was transferred to [place] for inpatient psychiatry treatment.  While on the behavioral health unit he was noted to have right upper quadrant abdominal pain and an ultrasound showing cholelithiasis with concern for acute cholecystitis and stone at the cystic duct.  He developed fever overnight and met SIRS criteria.  CT scan performed by the overnight provider showed acute cholecystitis with impacted gallstone within the cystic duct.  Per the patient's wife the patient has had ongoing back pain and abdominal pain for quite some time, due to his mental health issues that this had prevented him from following up as indicated by his primary care provider Dr. Lindsay.  She had reported that he had stopped going to the doctor for quite some time, and has had intermittent issues with abdominal pain as well as back pain.    Acute care surgical consultation was obtained urgently.  The patient was given IV fluid at 30 mL/kg.  He unfortunately developed refractory hypotension.  He was started on vasopressor therapy with Levophed and the case was discussed with critical care.    The patient was transferred for critical care services to [facility].  The case was  discussed with general surgery as well as interventional radiology as the patient will likely require operative management either through percutaneous cholecystectomy tube or possible urgent surgery should he become stable.    At the time of discharge the patient was stabilzed all questions were answered the patient's and wife's satisfaction and they were in agreement with the transfer plan.    The patient, initially admitted to the hospital as inpatient, was discharged earlier than expected given the following: Transfer to critical care services.  DISCHARGE DIAGNOSES  Sepsis without acute organ dysfunction (HCC)  Assessment & Plan  Noted by fever and leukocytosis secondary to acute cholecystitis.   Blood cultures pending  Initiated on ceftriaxone and Flagyl, continue the same for now  Start Levophed, currently on 15 mcg  Add vasopressin due to marginal BP  Prior to admission on clonidine as well as below which are both on hold  Currently with 18-gauge access, will possibly need central line  IR consulted, case discussed with general surgery at [place], unstable for surgery        Bipolar affective disorder, current episode manic with psychotic symptoms (HCC)  Assessment & Plan  Resume medications if QTC stable  F/u BHU    Hypomagnesemia  Assessment & Plan  Replete and monitor    H/O prolonged Q-T interval on ECG  Assessment & Plan  Hx of prolonged QT  Obtain current EKG  Psych meds place on hold during d/c, follow up EKG    Chronic kidney disease, stage 2 (mild)  Assessment & Plan  Lab Results   Component Value Date    EGFR 61 05/02/2025    EGFR 80 05/01/2025    EGFR 67 05/01/2025    CREATININE 1.29 05/02/2025    CREATININE 1.03 05/01/2025    CREATININE 1.20 05/01/2025   Creat stable  monitor    Schizophrenia, unspecified type (HCC)  Assessment & Plan  Resume medication if QT stable  Plan to return to [facility] will need psychiatry consult in future    Type 2 diabetes mellitus, without long-term current use of  insulin (HCC)  Assessment & Plan  Lab Results   Component Value Date    HGBA1C 8.3 (A) 03/17/2025       Recent Labs     05/01/25  0752 05/01/25  1137 05/01/25  2136 05/02/25  0712   POCGLU 269* 196* 220* 210*         Blood Sugar Average: Last 72 hrs:  (P) 210  Home regimen reviewed. Hold Metformin if applicable.  Start SSI and Basal bolus protocol  Patient is n.p.o., continue every 6 hour Accu-Cheks      Essential hypertension  Assessment & Plan  Blood pressure medications in the setting of sepsis and hypotension    GERD (gastroesophageal reflux disease)  Assessment & Plan  Continue Protonix    * Acute cholecystitis  Assessment & Plan  Patient d/c from [facility] on 5/1. Had RUQ US secondary to pain with Cholelithiasis without sonographic findings of acute cholecystitis.CBD measures 6 mm upper limits of normal. Patient met SIRS criteria and CT obtained w/ acute cholecystitis  Case discussed with Dr. Chawla as well as interventional radiology  Due to hypotension and concern for sepsis will place IR consultation for possible percutaneous cholecystectomy tube placement        CONSULTING PROVIDERS   IP CONSULT FOR MEDICAL CLERANCE FOR  PATIENT  IP CONSULT TO ACUTE CARE SURGERY  INPATIENT CONSULT TO IR    PROCEDURES PERFORMED  * No surgery found *    RADIOLOGY RESULTS  CT abdomen pelvis w contrast  Result Date: 5/2/2025  Impression: 1.  Findings of acute cholecystitis with impacted gallstone within the cystic duct. 2.  Chronic tree-in-bud nodularity within the bilateral lung bases, favored to be inflammatory and/or postinflammatory in etiology. 3. The study was marked in EPIC for immediate notification. Workstation performed: [id number]       LABS  Results from last 7 days   Lab Units 05/02/25  0511 05/01/25  2235 05/01/25  0256 04/29/25  2050 04/28/25  1508 04/28/25  1012   WBC Thousand/uL 26.34* 14.70* 7.03 6.18  --  9.32   HEMOGLOBIN g/dL 13.0 14.7 14.2 12.7  --  16.4   HEMATOCRIT % 37.7 42.9 41.9 37.0  --  45.8    MCV fL 82 82 85 82  --  81*   TOTAL NEUT ABS Thousand/uL 23.97*  --   --   --   --   --    BANDS PCT % 8  --   --   --   --   --    PLATELETS Thousands/uL 183 222 235 208  --  248   INR   --  0.96  --   --  1.06  --      Results from last 7 days   Lab Units 05/02/25  0511 05/01/25  2235 05/01/25  0256 04/30/25  1241 04/29/25 2050 04/29/25  1225 04/28/25  1012   SODIUM mmol/L 128* 132* 142 142 138 138 136   POTASSIUM mmol/L 3.5 3.6 3.8 2.9* 3.1* 3.1* 3.0*   CHLORIDE mmol/L 90* 92* 108 115* 105 105 98   CO2 mmol/L 28 30 27 23 25 21 22   BUN mg/dL 10 7 7 6 12 16 11   CREATININE mg/dL 1.29 1.03 1.20 0.90 1.16 1.59* 1.19   CALCIUM mg/dL 7.6* 9.0 8.5 6.8* 8.3* 9.4 9.3   ALBUMIN g/dL 3.2* 4.4 3.9 2.9*  --   --  4.6   TOTAL BILIRUBIN mg/dL 1.49* 1.18* 0.37 0.45  --   --  0.99   ALK PHOS U/L 56 77 73 49  --   --  65   ALT U/L 26 25 19 15  --   --  16   AST U/L 24 22 19 18  --   --  26   EGFR ml/min/1.73sq m 61 80 67 95 70 47 67   GLUCOSE RANDOM mg/dL 226* 227* 198* 202* 255* 238* 152*     Results from last 7 days   Lab Units 04/28/25  1215 04/28/25  1012   HS TNI 0HR ng/L  --  6   HS TNI 2HR ng/L 6  --               Results from last 7 days   Lab Units 05/02/25  0712 05/01/25  2136 05/01/25  1137 05/01/25  0752 04/30/25 2001 04/30/25  1237 04/30/25  0632 04/29/25  1829   POC GLUCOSE mg/dl 210* 220* 196* 269* 168* 215* 121 125         Results from last 7 days   Lab Units 05/02/25  0511   TSH 3RD GENERATON uIU/mL 0.219*     Results from last 7 days   Lab Units 05/02/25  0511 05/01/25  2235 04/29/25  1225 04/28/25  1508   LACTIC ACID mmol/L 3.0* 1.6 1.9 0.9   PROCALCITONIN ng/ml 19.91* 0.24  --  0.06           Cultures:   Results from last 7 days   Lab Units 05/01/25  2235   COLOR UA  Straw   CLARITY UA  Clear   SPEC GRAV UA  1.010   PH UA  7.0   LEUKOCYTES UA  Negative   NITRITE UA  Negative   GLUCOSE UA mg/dl 250 (1/4%)*   KETONES UA mg/dl Negative   BILIRUBIN UA  Negative   BLOOD UA  10.0*      Results from last 7 days  "  Lab Units 05/01/25 2235   RBC UA /hpf 0-1   WBC UA /hpf 0-1   EPITHELIAL CELLS WET PREP /hpf None Seen   BACTERIA UA /hpf Occasional      Results from last 7 days   Lab Units 05/01/25 2235 04/28/25  1443   INFLUENZA A PCR  Negative Negative     Results from last 7 days   Lab Units 05/01/25 2235   SARS-COV-2  Negative   INFLUENZA A PCR  Negative   INFLUENZA B PCR  Negative   RSV PCR  Negative         PHYSICAL EXAM:  Vitals:   Blood Pressure: (!) 84/59 (05/02/25 0819)  Pulse: 83 (05/02/25 0819)  Temperature: 99.2 °F (37.3 °C) (05/02/25 0708)  Temp Source: Oral (05/02/25 0708)  Respirations: 16 (05/02/25 0819)  Height: 5' 7\" (170.2 cm) (05/02/25 0353)  Weight - Scale: 100 kg (221 lb 5.5 oz) (05/02/25 0353)  SpO2: 97 % (05/02/25 0819)      General: ill appearing, no acute distress  HEENT: atraumatic, PERRLA, moist mucosa, normal pharynx, normal tonsils and adenoids, normal tongue, no fluid in sinuses  Neck: Trachea midline, no carotid bruit, no masses  Respiratory: normal chest wall expansion, CTA B  Cardiovascular: RRR, no m/r/g, Normal S1 and S2  Abdomen: RUQ tenderness, non-distended, normal bowel sounds in all quadrants, no hepatosplenomegaly, no tympany  Rectal: deferred  Musculoskeletal: Moves all  Integumentary: warm, dry, and pink, with no visible rash, purpura, or petechia  Heme/Lymph: no lymphadenopathy, no bruises  Neurological: Cranial Nerves II-XII grossly intact  Psychiatric: cooperative with normal mood, affect, and cognition       Discharge Disposition: PRA - Acute Care-transferred to critical care services at [facility]    AM-PAC Basic Mobility:            HLM Goal listed above. Continue with ongoing physical therapy and encourage appropriate mobility to improve upon HLM goals.      Test Results Pending at Discharge:   Pending Labs       Order Current Status    Blood culture In process    Blood culture In process    Folate In process    T4, free In process    Vitamin B12 In process "    Vitamin D 25 hydroxy In process                Medications   Summary of Medication Adjustments made as a result of this hospitalization: See discharge summary and AVS for medication changes  Medication Dosing Tapers - Please refer to Discharge Medication List for details on any medication dosing tapers (if applicable to patient).  Discharge Medication List: See after visit summary for reconciled discharge medications.     Diet restrictions:         Diet Orders   (From admission, onward)                 Start     Ordered    05/02/25 0357  Diet NPO; Sips with meds  Diet effective now        References:    Adult Nutrition Support Algorithm    RD Therapeutic Diet Order Protocol   Question Answer Comment   Diet Type NPO    NPO Except: Sips with meds    Allow Registered Dietitian to adjust diet order per protocol Yes        05/02/25 0356                  Activity restrictions: No strenuous activity  Discharge Condition: serious    Outpatient Follow-Up and Discharge Instructions  See after visit summary section titled Discharge Instructions for information provided to patient and family.      Code Status: Level 1 - Full Code  Discharge Statement   I spent 86 minutes discharging the patient. This time was spent on the day of discharge. Greater than 50% of total time was spent with the patient and / or family counseling and / or coordination of care.    ** Please Note: This note was completed in part utilizing N[employer] Dragon Medical One Software.  Grammatical errors, random word insertions, spelling mistakes, and incomplete sentences may be an occasional consequence of this system secondary to software limitations, ambient noise, and hardware issues.  If you have any questions or concerns about the content, text, or information contained within the body of this dictation, please contact the provider for clarification.**

## 2025-05-02 NOTE — PROCEDURES
Arterial Line Insertion    Date/Time: 5/2/2025 10:34 AM    Performed by: Vasile Chaudhary DO  Authorized by: Vasile Chaudhary DO    Patient location:  ICU  Consent:     Consent obtained:  Written    Consent given by:  Patient    Risks discussed:  Bleeding, ischemia, repeat procedure, pain and infection  Universal protocol:     Procedure explained and questions answered to patient or proxy's satisfaction: yes      Relevant documents present and verified: yes      Test results available and properly labeled: yes      Radiology Images displayed and confirmed.  If images not available, report reviewed: yes      Required blood products, implants, devices, and special equipment available: yes      Site/side marked: yes      Immediately prior to procedure a time out was called: yes      Patient identity confirmed:  Arm band  Indications:     Indications: hemodynamic monitoring and frequent labs / infusion    Pre-procedure details:     Skin preparation:  Chlorhexidine    Preparation: Patient was prepped and draped in sterile fashion    Anesthesia (see MAR for exact dosages):     Anesthesia method:  Local infiltration    Local anesthetic:  Lidocaine 1% w/o epi  Procedure details:     Location / Tip of Catheter:  Radial    Laterality:  Left    Julio's test performed: yes      Julio's test abnormal: no      Needle gauge:  20 G    Placement technique:  Percutaneous    Number of attempts:  1    Successful placement: yes      Transducer: waveform confirmed    Post-procedure details:     Post-procedure:  Sterile dressing applied and sutured    CMS:  Normal    Patient tolerance of procedure:  Tolerated well, no immediate complications    Vasile Chaudhary D.O.  PGY-5, Pulmonary/Critical Care  [facility]

## 2025-05-02 NOTE — ASSESSMENT & PLAN NOTE
Noted by fever and leukocytosis secondary to acute cholecystitis. BP dropped to 60/40 when new vitals obtained 30 cc/kg fluids ordered. Nurse to call sepsis alert  Lactic acid 1.6  Procal 0.24, continue to trend  IVF  IV abx  Follow up Blood cultures

## 2025-05-02 NOTE — UTILIZATION REVIEW
NOTIFICATION OF ADMISSION DISCHARGE   This is a Notification of Discharge from [facility]. Please be advised that this patient has been discharge from our facility. Below you will find the admission and discharge date and time including the patient’s disposition.   UTILIZATION REVIEW CONTACT:  Utilization Review Assistants  Network Utilization Review Department  Phone: [phone] x carefully listen to the prompts. All voicemails are confidential.  Email: [email or web address]     ADMISSION INFORMATION  PRESENTATION DATE: 4/28/2025  9:08 AM  OBERVATION ADMISSION DATE: N/A  INPATIENT ADMISSION DATE: 4/30/25  3:03 PM   DISCHARGE DATE: 5/1/2025  2:32 PM   DISPOSITION:[facility] Utilization Review Department  ATTENTION: Please call with any questions or concerns to [phone] and carefully listen to the prompts so that you are directed to the right person. All voicemails are confidential.   For Discharge needs, contact Care Management DC Support Team at [phone] opt. 2  Send all requests for admission clinical reviews, approved or denied determinations and any other requests to dedicated fax number below belonging to the campus where the patient is receiving treatment. List of dedicated fax numbers for the Facilities:  FACILITY NAME UR FAX NUMBER   ADMISSION DENIALS (Administrative/Medical Necessity) [phone]   DISCHARGE SUPPORT TEAM ([facility]) [phone]   PARENT CHILD HEALTH (Maternity/NICU/Pediatrics) [phone]   [facility] [phone]   [facility] [phone]   Pending sale to [facility] [phone]   [facility] [phone]   [facility] [phone]   [facility] [phone]   [facility] [phone]   [facility] [phone]   [facility]  [facility] [phone]   [facility] [phone]   [facility] [phone]   [facility] [phone]

## 2025-05-02 NOTE — QUICK NOTE
Patient underwent septic workup CT abdomen and pelvis shows findings consistent with acute cholecystitis with impacted gallstone within the cystic duct.  Patient started on IV fluids, IV ceftriaxone, and IV Flagyl.  Patient does have reported allergy to penicillins however per chart review has tolerated cephalosporins.  Spoke to general surgery on-call who recommends transfer to medical unit for IV antibiotics and surgery evaluation.  Patient made n.p.o. for possible procedure.

## 2025-05-02 NOTE — CONSULTS
Addendum 2:    Discussed with Dr. Walls of Surgery.  Plan at this point given nature of his psychiatric illness with attendant risks of self-removal of a percutaneous cholecystostomy tube, in addition to improving hemodynamics/response to resuscitation and IV ABX, will be cholecystectomy.    We will sign off at this time; please notify us if we may be of further assistance.    MD Ari Champion  Interventional Radiology  3:02 PM      Addendum:    After re-evaluation in ICU, patient was felt stable for urgent rather than emergent intervention.  Patient was agreeable at the time to whatever recommendations were made by the pertinent specialists.    Further multidisciplinary discussions held between ICU, Surgery, Anesthesia, IR, GI regarding best management options.  He was not thought to be a current surgical candidate.      Given psychiatric history and concern for potential tube dislodgement during psychiatric episodes or care at facilities, endoscopic drainage was considered.  This has implications on complexity of future cholecystectomy, so the consensus was to proceed with perc juvenal.    We then arranged for the procedure at 12:30.  Upon arrival of IR team for transport to our suite, he indicated that he was unwilling to have any procedure.  This was also his statement to the Surgery team.    I discussed with the patient's wife Lo, who had concerns about management options for him given his paranoia and baseline delusions, particularly around beliefs that people are always filming him, tracking him, and hoping to place internal tracking devices etc in. She stated that due to his delusions, he does not trust her and does not want to go to appointments that do not involve him getting psychiatric medications, but also does not generally want to take them either. She had concerns regarding ongoing follow-up needs after any options, particularly whether he would present for needed juvenal tube  exchanges and whether he would self-remove the tube in light of his delusions/beliefs about invasive tracking.    Given this complex situation, capacity assessment is ongoing and surgical team will discuss further with the patient's spouse.  Treatment plan is still being developed.    MD Ari Champion  Interventional Radiology  1:14 PM      e-Consult (IPC)  - Interventional Radiology  Solo Mack 55 y.o. male MRN: [id number]  Unit/Bed#: ICU 14 Encounter: [id number]          Interventional Radiology has been consulted to evaluate Solo Mack    We were consulted by BALDOMERO concerning this patient with acute cholecystitis and septic shock with increasing pressor requirements, transferred from [facility] to [facility] for higher level of care.    Inpatient Consult to IR  Consult performed by: Arturo Manzo MD  Consult ordered by: Elias Birch DO        05/02/25    Assessment/Recommendation:   Multidisciplinary discussion with IM, Critical Care, Surgery, Anesthesia, and us prior to arrival, between 8AM to 840 AM.    Likely will need emergent intervention, whether percutaneous cholecystostomy or surgical cholecystectomy.    Arrangements have been made for perc juvenal pending further evaluation by critical care/surgical services.     21-30 minutes, >50% of the total time devoted to medical consultative verbal/EMR discussion between providers. Written report will be generated in the EMR.     Thank you for allowing Interventional Radiology to participate in the care of Solo Mack. Please don't hesitate to call or TigerText us with any questions.     Arturo Manzo MD

## 2025-05-02 NOTE — HOSPITAL COURSE
55 y.o male with PMHx of bipolar disorder, schizophrenia, history of prolonged Qtc, T2DM, HTN, GERD who presents as a transfer from [place] with RUQ pain. He was recently admitted to [facility] for psychiatric evaluation  and was recommended for inpatient admission. During his admission at [facility] he complained of right upper quadrant pain on 5/1 and ultrasound showed cholelithiasis. CBD was measuring 6 mm.  Patient received around 90 cc of fluids and discharged to [facility] with recommendations for outpatient surgery follow-up.  Yesterday at [place], patient developed a fever of 101.9, tachycardic and met SIRS criteria.  Sepsis workup was initiated, he was started on IV fluids, IV ceftriaxone and Flagyl.  STAT CT abdomen pelvis showed acute cholecystitis with obstruction of cystic duct.  General surgery was consulted and they recommended to transfer patient to medical unit for IV antibiotics and surgery evaluation.  Patient was made n.p.o. for possible procedures.  Overnight he became hypotensive with BP of 60/40 and heart rate 101.  He was still alert and oriented x 3.  Patient was a rapid response.  He was given 4 L of Isolyte with no improvement of blood pressure and started on Levophed and vasopressin.

## 2025-05-02 NOTE — ED NOTES
Patient belongings (shoes, socks, shorts, and t shirt) bagged and given to ambulance transport crew.     Olinda Campos  05/02/25 0828

## 2025-05-02 NOTE — ASSESSMENT & PLAN NOTE
Lab Results   Component Value Date    HGBA1C 8.3 (A) 03/17/2025       Recent Labs     05/01/25  1137 05/01/25  2136 05/02/25  0712 05/02/25  1139   POCGLU 196* 220* 210* 314*       Blood Sugar Average: Last 72 hrs:  (P) 314

## 2025-05-02 NOTE — PLAN OF CARE
Problem: PAIN - ADULT  Goal: Verbalizes/displays adequate comfort level or baseline comfort level  Description: Interventions:- Encourage patient to monitor pain and request assistance- Assess pain using appropriate pain scale- Administer analgesics based on type and severity of pain and evaluate response- Implement non-pharmacological measures as appropriate and evaluate response- Consider cultural and social influences on pain and pain management- Notify physician/advanced practitioner if interventions unsuccessful or patient reports new pain  Outcome: Progressing     Problem: INFECTION - ADULT  Goal: Absence or prevention of progression during hospitalization  Description: INTERVENTIONS:- Assess and monitor for signs and symptoms of infection- Monitor lab/diagnostic results- Monitor all insertion sites, i.e. indwelling lines, tubes, and drains- Monitor endotracheal if appropriate and nasal secretions for changes in amount and color- [place] appropriate cooling/warming therapies per order- Administer medications as ordered- Instruct and encourage patient and family to use good hand hygiene technique- Identify and instruct in appropriate isolation precautions for identified infection/condition  Outcome: Progressing  Goal: Absence of fever/infection during neutropenic period  Description: INTERVENTIONS:- Monitor WBC  Outcome: Progressing     Problem: SAFETY ADULT  Goal: Patient will remain free of falls  Description: INTERVENTIONS:- Educate patient/family on patient safety including physical limitations- Instruct patient to call for assistance with activity - Consult OT/PT to assist with strengthening/mobility - Keep Call bell within reach- Keep bed low and locked with side rails adjusted as appropriate- Keep care items and personal belongings within reach- Initiate and maintain comfort rounds- Make Fall Risk Sign visible to staff- Offer Toileting every  Hours, in advance of need- Initiate/Maintain alarm- Obtain  necessary fall risk management equipment: - Apply yellow socks and bracelet for high fall risk patients- Consider moving patient to room near nurses station  Outcome: Progressing  Goal: Maintain or return to baseline ADL function  Description: INTERVENTIONS:-  Assess patient's ability to carry out ADLs; assess patient's baseline for ADL function and identify physical deficits which impact ability to perform ADLs (bathing, care of mouth/teeth, toileting, grooming, dressing, etc.)- Assess/evaluate cause of self-care deficits - Assess range of motion- Assess patient's mobility; develop plan if impaired- Assess patient's need for assistive devices and provide as appropriate- Encourage maximum independence but intervene and supervise when necessary- Involve family in performance of ADLs- Assess for home care needs following discharge - Consider OT consult to assist with ADL evaluation and planning for discharge- Provide patient education as appropriate  Outcome: Progressing  Goal: Maintains/Returns to pre admission functional level  Description: INTERVENTIONS:- Perform AM-PAC 6 Click Basic Mobility/ Daily Activity assessment daily.- Set and communicate daily mobility goal to care team and patient/family/caregiver. - Collaborate with rehabilitation services on mobility goals if consulted- Perform Range of Motion  times a day.- Reposition patient every  hours.- Dangle patient  times a day- Stand patient  times a day- Ambulate patient  times a day- Out of bed to chair  times a day - Out of bed for meals times a day- Out of bed for toileting- Record patient progress and toleration of activity level   Outcome: Progressing     Problem: DISCHARGE PLANNING  Goal: Discharge to home or other facility with appropriate resources  Description: INTERVENTIONS:- Identify barriers to discharge w/patient and caregiver- Arrange for needed discharge resources and transportation as appropriate- Identify discharge learning needs (meds, wound  care, etc.)- Arrange for interpretive services to assist at discharge as needed- Refer to Case Management Department for coordinating discharge planning if the patient needs post-hospital services based on physician/advanced practitioner order or complex needs related to functional status, cognitive ability, or social support system  Outcome: Progressing     Problem: Knowledge Deficit  Goal: Patient/family/caregiver demonstrates understanding of disease process, treatment plan, medications, and discharge instructions  Description: Complete learning assessment and assess knowledge base.Interventions:- Provide teaching at level of understanding- Provide teaching via preferred learning methods  Outcome: Progressing

## 2025-05-02 NOTE — ASSESSMENT & PLAN NOTE
Lab Results   Component Value Date    HGBA1C 8.3 (A) 03/17/2025       Recent Labs     04/30/25 2001 05/01/25  0752 05/01/25  1137 05/01/25  2136   POCGLU 168* 269* 196* 220*       Blood Sugar Average: Last 72 hrs:  Q6h blood sugars w/ accu-checks while NPO

## 2025-05-02 NOTE — CONSULTS
Consultation - Surgery-General   Name: Solo Mack 55 y.o. male I MRN: [id number]  Unit/Bed#: ICU 14 I Date of Admission: 5/2/2025   Date of Service: 5/2/2025 I Hospital Day: 0   Inpatient consult to Acute Care Surgery  Consult performed by: Rodolfo Walls MD  Consult ordered by: Elias Birch DO        Physician Requesting Evaluation: Elias Nicholson*   Reason for Evaluation / Principal Problem: Biliary Sepsis    Assessment & Plan  Septic shock ([facility])  Patient transferred from [place] to [facility] ICU in profound septic shock, with imaging demonstrating gallstones, RUQ tenderness and profound leukocytosis.  He has responded to Levophed and Vasopressin. He is also receiving Cefepime + Flagyl after initially receiving Zosyn.  Decompressing the distended gallbladder with percutaneous cholecystostomy has been thwarted by the patient's refusal to have any procedures at this time beyond having a central line placed.  After long discussion with his wife, percutaneous cholecystostomy looks like less attractive option as the patient is likely to tear the drain out himself with disastrous consequences.  Internal stenting to decompress the gallbladder is unnecessarily complicated, and patient unlikely to further Endoscopy to maintain/exchange the stent, and ultimate cholecystectomy would also likely be difficult or impossible.      As the patient is responding well to IV fluids, antibiotics and pressors are being weaned, the best option likely to be proceeding with cholecystectomy as soon as feasible.   GERD (gastroesophageal reflux disease)    Essential hypertension    Type 2 diabetes mellitus, without long-term current use of insulin ([facility])  Lab Results   Component Value Date    HGBA1C 8.3 (A) 03/17/2025       Recent Labs     05/01/25  1137 05/01/25  2136 05/02/25  0712 05/02/25  1139   POCGLU 196* 220* 210* 314*       Blood Sugar Average: Last 72 hrs:  (P) 314    Schizophrenia, unspecified type  (HCC)    Hypothyroidism    Chronic kidney disease, stage 2 (mild)  Lab Results   Component Value Date    EGFR 53 05/02/2025    EGFR 61 05/02/2025    EGFR 80 05/01/2025    CREATININE 1.45 (H) 05/02/2025    CREATININE 1.29 05/02/2025    CREATININE 1.03 05/01/2025     Hypomagnesemia    Acute cholecystitis  See Septic Shock above.  Will plan for laparoscopic cholecystectomy as soon as feasible.   Hypophosphatemia        History of Present Illness   Solo Mack is a 55 y.o. male who presents with septic shock from acute calculus cholecystitis.  The patient had been admitted to the [facility] from the Adult Psych Unit at [place].  While at  he developed abdominal pain and ultimately hypotenstion.  Ultrasound shows distended GB with no wall thickening, subsequent CT demonstrated acute cholecystitis with gallstone lodged in cystic duct.  CBD is not dilated.  The patient remains tender to palpation in the RUQ, and is on Levophed at 15 mcg/min + Vasopressin.  Weaning of the pressors is underway.    Review of Systems   Constitutional:  Positive for appetite change and chills. Negative for fever.   HENT:  Negative for trouble swallowing.    Respiratory: Negative.     Cardiovascular: Negative.    Gastrointestinal:  Positive for abdominal pain and nausea. Negative for abdominal distention, blood in stool, constipation, diarrhea and vomiting.   Genitourinary: Negative.    Musculoskeletal: Negative.    Skin: Negative.    Neurological: Negative.    Psychiatric/Behavioral:  Positive for hallucinations. The patient is nervous/anxious.      Historical Information   Past Medical History:   Diagnosis Date    Alcohol withdrawal (HCC)     Alcoholism (HCC)     Anxiety     Back pain     Back pain, chronic     Bipolar 1 disorder (HCC)     Bipolar disorder, current episode mixed, moderate (HCC)     BPH (benign prostatic hyperplasia)     Cardiac disease     CHF (congestive heart failure) ([facility])     Chronic pain  disorder     Chronic renal failure     Closed displaced fracture of neck of left scapula     Closed fracture of glenoid cavity and neck of left scapula 09/06/2020    Demyelinating disease ([facility])     Dental disorder     Depression     Diabetes mellitus ([facility])     Drug abuse ([facility])     Drug withdrawal ([facility])     ED (erectile dysfunction)     Edema     Encephalopathy     Encephalopathy     Fatigue     Fracture of left radius 09/06/2020    GERD (gastroesophageal reflux disease)     H/O prolonged Q-T interval on ECG 12/04/2024    Heart rate fast     Hepatitis     unspecified     Hyperlipidemia     Hypertension     Hypokalemia     Hypothyroidism 10/21/2024    Knee buckling     Left rib fracture 09/06/2020    MI (myocardial infarction) ([facility])     Morbid obesity with BMI of 40.0-44.9, adult ([facility])     NIDDY (non-insulin dependent diabetes mellitus in young) ([facility])     Obesity     Opiate dependence ([facility])     Opiate withdrawal ([facility])     Osteoarthritis     Pleural effusion     Pneumonia     Prolonged Q-T interval on ECG     Psychiatric disorder     Psychiatric illness     Psychosis ([facility])     Psychosis ([facility]) 12/17/2024    Renal disorder     SAH (subarachnoid hemorrhage) ([facility]) 09/06/2020    Schizo-affective schizophrenia ([facility])     Spinal stenosis, lumbar     Subdural hematoma ([facility]) 09/06/2020    Traumatic subdural hemorrhage with loss of consciousness of unspecified duration, initial encounter ([facility]) 12/29/2022    Ulcer of calf ([facility])     Ulnar neuritis, right     Ulnar neuropathy at elbow     Weight loss      Past Surgical History:   Procedure Laterality Date    BACK SURGERY      report thoracic surgery    LUMBAR FUSION  05/2006    L5/S1 [place]      Social History     Tobacco Use    Smoking status: Former     Passive exposure: Never    Smokeless tobacco: Never    Tobacco comments:     patient is a non - smoker   Vaping Use    Vaping status: Never Used   Substance and Sexual Activity    Alcohol use: Not Currently     Comment: beer here  and there per pt    Drug use: Not Currently    Sexual activity: Not Currently     E-Cigarette/Vaping    E-Cigarette Use Never User      E-Cigarette/Vaping Substances    Nicotine No     THC No     CBD No     Flavoring No     Other No     Unknown No      Family history non-contributory  Social History     Tobacco Use    Smoking status: Former     Passive exposure: Never    Smokeless tobacco: Never    Tobacco comments:     patient is a non - smoker   Vaping Use    Vaping status: Never Used   Substance and Sexual Activity    Alcohol use: Not Currently     Comment: beer here and there per pt    Drug use: Not Currently    Sexual activity: Not Currently       Current Facility-Administered Medications:     acetaminophen (TYLENOL) tablet 650 mg, Q6H PRN    ceFEPime (MAXIPIME) 2,000 mg in dextrose 5 % 50 mL IVPB, Q12H, Last Rate: 2,000 mg (05/02/25 1047)    chlorhexidine (PERIDEX) 0.12 % oral rinse 15 mL, Q12H ROBER    clonazePAM (KlonoPIN) tablet 1 mg, BID PRN    enoxaparin (LOVENOX) subcutaneous injection 40 mg, Daily    [Held by provider] gabapentin (NEURONTIN) capsule 400 mg, TID    HYDROmorphone (DILAUDID) injection 0.5 mg, Q4H PRN    HYDROmorphone (DILAUDID) injection 1 mg, Q4H PRN    HYDROmorphone HCl (DILAUDID) injection 0.2 mg, Q4H PRN    hydrOXYzine HCL (ATARAX) tablet 25 mg, Q6H PRN Max 4/day    hydrOXYzine HCL (ATARAX) tablet 50 mg, Q6H PRN Max 4/day    insulin lispro (HumALOG/ADMELOG) 100 units/mL subcutaneous injection 1-6 Units, Q6H ROBER **AND** Fingerstick Glucose (POCT), Q6H    lactated ringers infusion, Continuous, Last Rate: 125 mL/hr (05/02/25 1046)    LORazepam (ATIVAN) injection 1 mg, Q6H PRN Max 3/day    LORazepam (ATIVAN) tablet 1 mg, Q6H PRN Max 3/day    metroNIDAZOLE (FLAGYL) IVPB (premix) 500 mg 100 mL, Q8H, Last Rate: 500 mg (05/02/25 1316)    nicotine polacrilex (NICORETTE) gum 2 mg, Q4H PRN    NOREPINEPHRINE 4 MG  ML NSS (CMPD ORDER) infusion, Titrated, Last Rate: 13 mcg/min (05/02/25 1317)     [Held by provider] OLANZapine (ZyPREXA) tablet 10 mg, HS    pantoprazole (PROTONIX) injection 40 mg, Q24H ROBER    polyethylene glycol (MIRALAX) packet 17 g, Daily PRN    potassium phosphates 30 mmol in sodium chloride 0.9 % 250 mL infusion, Once, Last Rate: 30 mmol (05/02/25 1047)    senna-docusate sodium (SENOKOT S) 8.6-50 mg per tablet 1 tablet, Daily PRN    trimethobenzamide (TIGAN) IM injection 200 mg, Q6H PRN    vasopressin (PITRESSIN) 20 Units in sodium chloride 0.9 % 100 mL infusion, Continuous, Last Rate: 0.04 Units/min (05/02/25 1043)  Prior to Admission Medications   Prescriptions Last Dose Informant Patient Reported? Taking?   Easy Comfort Pen Needles 33G X 5 MM [facility] [place]  Pharmacy (Specify) Yes No   Sig: AS DIRECTED FOR ozempic EVERY WEEK   cloNIDine (CATAPRES) 0.1 mg tablet  Pharmacy (Specify) No No   Sig: Take 1 tablet (0.1 mg total) by mouth every 12 (twelve) hours   clonazePAM (KlonoPIN) 1 mg tablet  Pharmacy (Specify) Yes No   Sig: Take 1 mg by mouth 3 (three) times a day   gabapentin (NEURONTIN) 400 mg capsule  Pharmacy (Specify) No No   Sig: Take 1 capsule (400 mg total) by mouth 3 (three) times a day   hydrOXYzine HCL (ATARAX) 50 mg tablet  Pharmacy (Specify) No No   Sig: TAKE 1 TABLET BY MOUTH THREE TIMES A DAY   metoprolol tartrate (LOPRESSOR) 25 mg tablet  Pharmacy (Specify) No No   Sig: Take 0.5 tablets (12.5 mg total) by mouth every 12 (twelve) hours   pantoprazole (PROTONIX) 40 mg tablet  Pharmacy (Specify) No No   Sig: TAKE ONE TABLET BY MOUTH DAILY BEFORE BREAKFAST   sildenafil (VIAGRA) 100 mg tablet  Pharmacy (Specify) No No   Sig: Take 1 tablet (100 mg total) by mouth daily as needed for erectile dysfunction   zolpidem (AMBIEN CR) 12.5 MG CR tablet  Pharmacy (Specify) Yes No   Sig: Take 12.5 mg by mouth daily at bedtime      Facility-Administered Medications: None     Haldol [haloperidol], Lexapro [escitalopram oxalate], and Penicillins    Objective :  Temp:  [97.8 °F (36.6 °C)-99.3 °F (37.4  °C)] 98.2 °F (36.8 °C)  HR:  [] 88  BP: ()/(13-78) 117/74  Resp:  [16-25] 21  SpO2:  [91 %-100 %] 98 %  O2 Device: Nasal cannula  Nasal Cannula O2 Flow Rate (L/min):  [2 L/min] 2 L/min    Lines/Drains/Airways       Active Status       Name Placement date Placement time Site Days    CVC Central Lines 05/02/25 Triple 16cm 05/02/25  1035  --  less than 1    Arterial Line 05/02/25 Radial 05/02/25  1034  Radial  less than 1                  Physical Exam  Constitutional:       General: He is not in acute distress.     Appearance: Normal appearance. He is not toxic-appearing.   HENT:      Head: Normocephalic and atraumatic.      Nose: Nose normal. No congestion.      Mouth/Throat:      Mouth: Mucous membranes are moist.   Eyes:      General: No scleral icterus.  Cardiovascular:      Rate and Rhythm: Normal rate and regular rhythm.      Heart sounds: No murmur heard.  Pulmonary:      Effort: Pulmonary effort is normal.      Breath sounds: No wheezing, rhonchi or rales.   Abdominal:      General: Bowel sounds are decreased.      Palpations: Abdomen is soft.      Tenderness: There is abdominal tenderness in the right upper quadrant.       Genitourinary:     Comments: deferred  Musculoskeletal:         General: No swelling. Normal range of motion.   Skin:     General: Skin is warm and dry.   Neurological:      General: No focal deficit present.   Psychiatric:      Comments: Clearly delusional statements regarding proposed procedures         Lab Results: I have reviewed the following results:  Recent Labs     05/01/25  2235 05/02/25  0511 05/02/25  0959   WBC 14.70* 26.34* 36.47*   HGB 14.7 13.0 12.5   HCT 42.9 37.7 35.5*    183 276   BANDSPCT  --  8  --    SODIUM 132* 128* 127*   K 3.6 3.5 4.3   CL 92* 90* 92*   CO2 30 28 25   BUN 7 10 13   CREATININE 1.03 1.29 1.45*   GLUC 227* 226* 280*   MG 1.3* 1.1*  --    PHOS  --  <1.0*  --    AST 22 24 27   ALT 25 26 30   ALB 4.4 3.2* 3.1*   TBILI 1.18* 1.49* 1.26*    ALKPHOS 77 56 63   INR 0.96  --   --    LACTICACID 1.6 3.0* 1.8       Imaging Results Review: I personally reviewed the following image studies in PACS and associated radiology reports: CT abdomen/pelvis and Ultrasound(s). My interpretation of the radiology images/reports is: the same as the radiologist, distended gallbladder with multiple calcified gallstones, .      VTE Pharmacologic Prophylaxis: Enoxaparin (Lovenox)  VTE Mechanical Prophylaxis: sequential compression device

## 2025-05-02 NOTE — PROCEDURES
Central Line Insertion    Date/Time: 5/2/2025 10:35 AM    Performed by: Vasile Chaudhary DO  Authorized by: Vasile Chaudhary DO    Patient location:  ICU  Consent:     Consent obtained:  Written    Consent given by:  Patient    Risks discussed:  Arterial puncture, incorrect placement, nerve damage, pneumothorax, infection and bleeding    Alternatives discussed:  No treatment  Universal protocol:     Procedure explained and questions answered to patient or proxy's satisfaction: yes      Relevant documents present and verified: yes      Test results available and properly labeled: yes      Radiology Images displayed and confirmed.  If images not available, report reviewed: yes      Required blood products, implants, devices, and special equipment available: yes      Site/side marked: yes      Immediately prior to procedure, a time out was called: yes      Patient identity confirmed:  Arm band  Pre-procedure details:     Hand hygiene: Hand hygiene performed prior to insertion      Sterile barrier technique: All elements of maximal sterile technique followed      Skin preparation:  2% chlorhexidine  Indications:     Central line indications: medications requiring central line    Anesthesia (see MAR for exact dosages):     Anesthesia method:  Local infiltration    Local anesthetic:  Lidocaine 1% w/o epi  Procedure details:     Location:  Right internal jugular    Vessel type: vein      Laterality:  Right    Approach: percutaneous technique used      Patient position:  Trendelenburg    Catheter type:  Triple lumen 16cm    Catheter size:  7.5 Fr    Landmarks identified: yes      Ultrasound guidance: yes      Ultrasound image availability:  Images available in PACS    Sterile ultrasound techniques: Sterile gel and sterile probe covers were used      Manometry confirmation: yes      Number of attempts:  1    Successful placement: yes      Catheter tip vessel location: atriocaval junction    Post-procedure details:      Post-procedure:  Dressing applied and line sutured    Assessment:  Blood return through all ports, no pneumothorax on x-ray and placement verified by x-ray    Post-procedure complications: none      Patient tolerance of procedure:  Tolerated well, no immediate complications    Vasile Chaudhary D.O.  PGY-5, Pulmonary/Critical Care  [facility]

## 2025-05-02 NOTE — NURSING NOTE
Pt. arrived to the unit at approximately 0340 from 6B. Provider informed that pt. was on the unit. Provider talked to the pt. via ipad at approximately 0343. Vitals obtained at 0353. Pt. was hypotensive. BP 60/40 manually . Alert and oriented x3. Provider informed. Provider asked this nurse to call a sepsis alert. This nurse informed the provider that she had to call the sepsis alert. Sepsis alert called at 0421. This nurse asked provider if should call a RRT. Provider informed this nurse that pt. just needs fluids. He was alert and mentating when she talked to him on the ipad. Fluids started, second IV was placed blood work obtained, Blood cultures and lactate obtained. Antibiotics started. Vitals obtained Q15 min. EKG obtained. After, the 2nd bag this nurse reached back out to provider to inform her that BP was still in the 60's. Pt. hard to arouse but was able to answer all questions. 0634 BP 60/48 and pt. More lethargic. RRT called. ED arrived and pt. transferred to ED.

## 2025-05-02 NOTE — ASSESSMENT & PLAN NOTE
Lab Results   Component Value Date    EGFR 53 05/02/2025    EGFR 61 05/02/2025    EGFR 80 05/01/2025    CREATININE 1.45 (H) 05/02/2025    CREATININE 1.29 05/02/2025    CREATININE 1.03 05/01/2025

## 2025-05-02 NOTE — ED NOTES
This worker attempted to complete insurance auth. Call placed to [employer] @ [phone]. This worker was not able to reach a worker. There was no option for behavioral health. This worker attempted to complete via another option but was told that someone would need to call 8-8, M-F, EST and speak to an  via electronic message.

## 2025-05-02 NOTE — H&P
H&P - Critical Care/ICU   Name: Solo Mack 55 y.o. male I MRN: [id number]  Unit/Bed#: ICU 14 I Date of Admission: 5/2/2025   Date of Service: 5/2/2025 I Hospital Day: 0       Assessment & Plan  Septic shock (HCC)  Sepsis in the setting of acute cholecystitis.  Initially had RUQ pain on 5/1.  4L isolyte given for volume resuscitation with no improvement.  Requiring vasopressor support with levophed and vasopressin.  Started on Rocephin and Flagyl.  Transferred from [place] to [facility] for ICU management.    Plan:  Switch Rocephin to cefepime.  Day 1  Continue Flagyl day 1.  Pressors - Levophed and vasopressin   Will start bicarb drip  LR for IV fluids.  Maintain MAP above 65  Consult general surgery and IR  A-line for blood pressure.  CVC line for pressors  Acute cholecystitis  Patient complained of right upper quadrant pain.  Abdomen U/S - cholelithiasis with no findings of acute cholecystitis.  CBD measures 6 mm.  5/2/2025 CT AP - acute cholecystitis with impacted gallstone within the cystic duct.   Transferred to [facility]    Plan:  General Surgery consulted  IR consulted  Emergent perc cholecystectomy by IR  Type 2 diabetes mellitus, without long-term current use of insulin (HCC)  Lab Results   Component Value Date    HGBA1C 8.3 (A) 03/17/2025     Recent Labs     05/01/25  0752 05/01/25  1137 05/01/25  2136 05/02/25  0712   POCGLU 269* 196* 220* 210*     Blood Sugar Average: Last 72 hrs:    Continue with SSI q6  Hyperglycemia likely due to olanzapine  Essential hypertension  Clonidine 0.1 q12  Lopressor 12.5 q12  GERD (gastroesophageal reflux disease)  PTA protonix 40 mg daily  Continue protonix 40 mg IV daily  Schizophrenia, unspecified type (HCC)  Klonopin 1 mg TID  Hypothyroidism  TSH 0.21  No prior history of hypothyroidism  Follow up free T4  Chronic kidney disease, stage 2 (mild)  Stable CKD  Baseline Creat 1.1-1.5  Hypomagnesemia  Magnesium 1.1   Replete PRN  Hypophosphatemia  Phosphorus  is <1.0  Repelet phos  Recheck at 3pm      Disposition: Critical care    History of Present Illness   Solo Mack is a 55 y.o. with PMHx of bipolar disorder, schizophrenia, history of prolonged Qtc, T2DM, HTN, GERD who presents as a transfer from [place] with RUQ pain. He was recently admitted to [facility] for psychiatric evaluation  and was recommended for inpatient admission. During his admission at [facility] he complained of right upper quadrant pain on 5/1 and ultrasound showed cholelithiasis. CBD was measuring 6 mm.  Patient received around 90 cc of fluids and discharged to [place] with recommendations for outpatient surgery follow-up.  Yesterday at [place], patient developed a fever of 101.9, tachycardic and met SIRS criteria.  Sepsis workup was initiated, he was started on IV fluids, IV ceftriaxone and Flagyl.  STAT CT abdomen pelvis showed acute cholecystitis with obstruction of cystic duct.  General surgery was consulted and they recommended to transfer patient to medical unit for IV antibiotics and surgery evaluation.  Patient was made n.p.o. for possible procedures.  Overnight he became hypotensive with BP of 60/40 and heart rate 101.  He was still alert and oriented x 3.  Patient was a rapid response.  He was given 4 L of Isolyte with no improvement of blood pressure and started on Levophed and vasopressin.    History obtained from chart review.      Historical Information   Past Medical History:  No date: Alcohol withdrawal ([facility])  No date: Alcoholism ([facility])  No date: Anxiety  No date: Back pain  No date: Back pain, chronic  No date: Bipolar 1 disorder ([facility])  No date: Bipolar disorder, current episode mixed, moderate ([facility])  No date: BPH (benign prostatic hyperplasia)  No date: Cardiac disease  No date: CHF (congestive heart failure) ([facility])  No date: Chronic pain disorder  No date: Chronic renal failure  No date: Closed displaced fracture of neck of left scapula  09/06/2020: Closed  fracture of glenoid cavity and neck of left scapula  No date: Demyelinating disease ([facility])  No date: Dental disorder  No date: Depression  No date: Diabetes mellitus ([facility])  No date: Drug abuse ([facility])  No date: Drug withdrawal ([facility])  No date: ED (erectile dysfunction)  No date: Edema  No date: Encephalopathy  No date: Encephalopathy  No date: Fatigue  09/06/2020: Fracture of left radius  No date: GERD (gastroesophageal reflux disease)  12/04/2024: H/O prolonged Q-T interval on ECG  No date: Heart rate fast  No date: Hepatitis      Comment:  unspecified   No date: Hyperlipidemia  No date: Hypertension  No date: Hypokalemia  10/21/2024: Hypothyroidism  No date: Knee buckling  09/06/2020: Left rib fracture  No date: MI (myocardial infarction) ([facility])  No date: Morbid obesity with BMI of 40.0-44.9, adult ([facility])  No date: NIDDY (non-insulin dependent diabetes mellitus in young)   ([facility])  No date: Obesity  No date: Opiate dependence ([facility])  No date: Opiate withdrawal ([facility])  No date: Osteoarthritis  No date: Pleural effusion  No date: Pneumonia  No date: Prolonged Q-T interval on ECG  No date: Psychiatric disorder  No date: Psychiatric illness  No date: Psychosis ([facility])  12/17/2024: Psychosis ([facility])  No date: Renal disorder  09/06/2020: SAH (subarachnoid hemorrhage) ([facility])  No date: Schizo-affective schizophrenia ([facility])  No date: Spinal stenosis, lumbar  09/06/2020: Subdural hematoma ([facility])  12/29/2022: Traumatic subdural hemorrhage with loss of consciousness   of unspecified duration, initial encounter ([facility])  No date: Ulcer of calf ([facility])  No date: Ulnar neuritis, right  No date: Ulnar neuropathy at elbow  No date: Weight loss Past Surgical History:  No date: BACK SURGERY      Comment:  report thoracic surgery  05/2006: LUMBAR FUSION      Comment:  L5/S1 Gratch    Current Outpatient Medications   Medication Instructions    clonazePAM (KLONOPIN) 1 mg, Oral, 3 times daily    cloNIDine (CATAPRES) 0.1 mg, Oral, Every 12 hours scheduled     Easy Comfort Pen Needles 33G X 5 MM MISC AS DIRECTED FOR ozempic EVERY WEEK    gabapentin (NEURONTIN) 400 mg, Oral, 3 times daily    hydrOXYzine HCL (ATARAX) 50 mg, Oral, 3 times daily    metoprolol tartrate (LOPRESSOR) 12.5 mg, Oral, Every 12 hours scheduled    pantoprazole (PROTONIX) 40 mg tablet TAKE ONE TABLET BY MOUTH DAILY BEFORE BREAKFAST    sildenafil (VIAGRA) 100 mg, Oral, Daily PRN    zolpidem (AMBIEN CR) 12.5 mg, Oral, Daily at bedtime    Allergies   Allergen Reactions    Haldol [Haloperidol]     Lexapro [Escitalopram Oxalate] Hives    Penicillins Other (See Comments)     Unknown, reaction as child      Social History     Tobacco Use    Smoking status: Former     Passive exposure: Never    Smokeless tobacco: Never    Tobacco comments:     patient is a non - smoker   Vaping Use    Vaping status: Never Used   Substance Use Topics    Alcohol use: Not Currently     Comment: beer here and there per pt    Drug use: Not Currently    Family History   Problem Relation Age of Onset    No Known Problems Mother     No Known Problems Father     No Known Problems Sister     No Known Problems Brother     No Known Problems Maternal Aunt     No Known Problems Paternal Aunt     No Known Problems Maternal Uncle     No Known Problems Paternal Uncle     No Known Problems Maternal Grandfather     No Known Problems Maternal Grandmother     No Known Problems Paternal Grandfather     No Known Problems Paternal Grandmother     No Known Problems Cousin     ADD / ADHD Neg Hx     Alcohol abuse Neg Hx     Anxiety disorder Neg Hx     Bipolar disorder Neg Hx     Dementia Neg Hx     Depression Neg Hx     Drug abuse Neg Hx     OCD Neg Hx     Paranoid behavior Neg Hx     Schizophrenia Neg Hx     Seizures Neg Hx     Self-Injury Neg Hx     Suicide Attempts Neg Hx           Objective :                   Vitals I/O      Most Recent Min/Max in 24hrs   Temp 97.8 °F (36.6 °C) Temp  Min: 97.8 °F (36.6 °C)  Max: 102.1 °F (38.9 °C)   Pulse 80 Pulse   Min: 77  Max: 101   Resp (!) 23 Resp  Min: 16  Max: 23   BP 92/66 BP  Min: 51/34  Max: 159/98   O2 Sat 98 % SpO2  Min: 91 %  Max: 98 %    No intake or output data in the 24 hours ending 05/02/25 0930    Diet NPO    Invasive Monitoring           Physical Exam   Physical Exam  Eyes:      Extraocular Movements: Extraocular movements intact.      Pupils: Pupils are equal, round, and reactive to light.   Skin:     General: Skin is warm and dry.      Coloration: Skin is pale.   Cardiovascular:      Rate and Rhythm: Normal rate.   Constitutional:       General: He is not in acute distress.     Appearance: He is ill-appearing and toxic-appearing.   Pulmonary:      Effort: Tachypnea present. No accessory muscle usage, respiratory distress or accessory muscle usage.      Breath sounds: No wheezing.   Secretions are normal.Neurological:      Mental Status: He is alert. He is calm and disoriented to place.          Diagnostic Studies        Lab Results: I have reviewed the following results:     Medications:  Scheduled PRN   chlorhexidine, 15 mL, Q12H ROBER  enoxaparin, 40 mg, Daily  [Held by provider] gabapentin, 400 mg, TID  insulin lispro, 1-6 Units, Q6H ROBER  magnesium sulfate, 2 g, Once  [Held by provider] OLANZapine, 10 mg, HS  pantoprazole, 40 mg, Q24H ROBER  piperacillin-tazobactam, 4.5 g, Once   Followed by  piperacillin-tazobactam, 4.5 g, Q8H  potassium phosphate, 30 mmol, Once      acetaminophen, 650 mg, Q6H PRN  clonazePAM, 1 mg, BID PRN  HYDROmorphone, 0.5 mg, Q4H PRN  HYDROmorphone, 1 mg, Q4H PRN  HYDROmorphone, 0.2 mg, Q4H PRN  hydrOXYzine HCL, 25 mg, Q6H PRN Max 4/day  hydrOXYzine HCL, 50 mg, Q6H PRN Max 4/day  LORazepam, 1 mg, Q6H PRN Max 3/day  LORazepam, 1 mg, Q6H PRN Max 3/day  nicotine polacrilex, 2 mg, Q4H PRN  polyethylene glycol, 17 g, Daily PRN  senna-docusate sodium, 1 tablet, Daily PRN  trimethobenzamide, 200 mg, Q6H PRN       Continuous    lactated ringers, 125 mL/hr  norepinephrine, 1-30 mcg/min, Last  Rate: 20 mcg/min (05/02/25 0910)  sodium bicarbonate 150 mEq in dextrose 5 % 1,000 mL infusion, 125 mL/hr  vasopressin, 0.04 Units/min         Labs:   CBC    Recent Labs     05/01/25 2235 05/02/25  0511   WBC 14.70* 26.34*   HGB 14.7 13.0   HCT 42.9 37.7    183   BANDSPCT  --  8     BMP    Recent Labs     05/01/25 2235 05/02/25  0511   SODIUM 132* 128*   K 3.6 3.5   CL 92* 90*   CO2 30 28   AGAP 10 10   BUN 7 10   CREATININE 1.03 1.29   CALCIUM 9.0 7.6*       Coags    Recent Labs     05/01/25 2235   INR 0.96        Additional Electrolytes  Recent Labs     05/01/25 2235 05/02/25 0511   MG 1.3* 1.1*   PHOS  --  <1.0*          Blood Gas    No recent results  No recent results LFTs  Recent Labs     05/01/25 2235 05/02/25  0511   ALT 25 26   AST 22 24   ALKPHOS 77 56   ALB 4.4 3.2*   TBILI 1.18* 1.49*       Infectious  Recent Labs     05/01/25 2235 05/02/25  0511   PROCALCITONI 0.24 19.91*     Glucose  Recent Labs     04/30/25  1241 05/01/25  0256 05/01/25 2235 05/02/25  0511   GLUC 202* 198* 227* 226*            Tobacco and Toxic Substance Assessment and Intervention:     Tobacco use screening performed      Other interventions: Former smoker

## 2025-05-02 NOTE — SEPSIS NOTE
"  Sepsis Note   Solo Mack 55 y.o. male MRN: [id number]  Unit/Bed#: 7T [facility] 703-01 Encounter: [id number]       Initial Sepsis Screening       Row Name 05/02/25 0414                Is the patient's history suggestive of a new or worsening infection? Yes (Proceed)  -AI        Suspected source of infection acute abdominal infection  -AI        Indicate SIRS criteria Hyperthemia > 38.3C (100.9F) OR Hypothermia <36C (96.8F);Tachycardia > 90 bpm  -AI        Are two or more of the above signs & symptoms of infection both present and new to the patient? Yes (Proceed)  -AI        Assess for evidence of organ dysfunction: Are any of the below criteria present within 6 hours of suspected infection and SIRS criteria that are NOT considered to be chronic conditions? SBP < 90  -AI        Date of presentation of septic shock 05/02/25  -AI        Time of presentation of septic shock 0415  -AI        Fluid Resuscitation: 30 ml/kg IV fluid bolus will be given based on actual body weight  -AI        Is the patient is persistently hypotensive in the hour after fluid bolus administration? If yes, patient meets criteria for vasopressor use. --        Sepsis Note: Click \"NEXT\" below (NOT \"close\") to generate sepsis note based on above information. --                  User Key  (r) = Recorded By, (t) = Taken By, (c) = Cosigned By      Initials Name Provider Type    JAIDA Hairston PA-C Physician Assistant                        Body mass index is 34.67 kg/m².  Wt Readings from Last 1 Encounters:   05/02/25 100 kg (221 lb 5.5 oz)     IBW (Ideal Body Weight): 66.1 kg    Ideal body weight: 66.1 kg (145 lb 11.6 oz)  Adjusted ideal body weight: 79.8 kg (175 lb 15.5 oz)    "

## 2025-05-03 LAB
ALBUMIN SERPL BCG-MCNC: 2.7 G/DL (ref 3.5–5)
ALP SERPL-CCNC: 63 U/L (ref 34–104)
ALT SERPL W P-5'-P-CCNC: 27 U/L (ref 7–52)
ANION GAP SERPL CALCULATED.3IONS-SCNC: 6 MMOL/L (ref 4–13)
AST SERPL W P-5'-P-CCNC: 18 U/L (ref 13–39)
BILIRUB SERPL-MCNC: 0.63 MG/DL (ref 0.2–1)
BUN SERPL-MCNC: 16 MG/DL (ref 5–25)
CALCIUM ALBUM COR SERPL-MCNC: 8.5 MG/DL (ref 8.3–10.1)
CALCIUM SERPL-MCNC: 7.5 MG/DL (ref 8.4–10.2)
CHLORIDE SERPL-SCNC: 100 MMOL/L (ref 96–108)
CO2 SERPL-SCNC: 28 MMOL/L (ref 21–32)
CREAT SERPL-MCNC: 1.05 MG/DL (ref 0.6–1.3)
ERYTHROCYTE [DISTWIDTH] IN BLOOD BY AUTOMATED COUNT: 12.3 % (ref 11.6–15.1)
GFR SERPL CREATININE-BSD FRML MDRD: 78 ML/MIN/1.73SQ M
GLUCOSE SERPL-MCNC: 129 MG/DL (ref 65–140)
GLUCOSE SERPL-MCNC: 153 MG/DL (ref 65–140)
GLUCOSE SERPL-MCNC: 154 MG/DL (ref 65–140)
GLUCOSE SERPL-MCNC: 174 MG/DL (ref 65–140)
HCT VFR BLD AUTO: 31.1 % (ref 36.5–49.3)
HGB BLD-MCNC: 11.1 G/DL (ref 12–17)
MAGNESIUM SERPL-MCNC: 2 MG/DL (ref 1.9–2.7)
MCH RBC QN AUTO: 29.1 PG (ref 26.8–34.3)
MCHC RBC AUTO-ENTMCNC: 35.7 G/DL (ref 31.4–37.4)
MCV RBC AUTO: 81 FL (ref 82–98)
PLATELET # BLD AUTO: 154 THOUSANDS/UL (ref 149–390)
PMV BLD AUTO: 10.6 FL (ref 8.9–12.7)
POTASSIUM SERPL-SCNC: 3.9 MMOL/L (ref 3.5–5.3)
PROT SERPL-MCNC: 5 G/DL (ref 6.4–8.4)
RBC # BLD AUTO: 3.82 MILLION/UL (ref 3.88–5.62)
SODIUM SERPL-SCNC: 134 MMOL/L (ref 135–147)
WBC # BLD AUTO: 17.92 THOUSAND/UL (ref 4.31–10.16)

## 2025-05-03 PROCEDURE — 80053 COMPREHEN METABOLIC PANEL: CPT | Performed by: INTERNAL MEDICINE

## 2025-05-03 PROCEDURE — 83735 ASSAY OF MAGNESIUM: CPT | Performed by: INTERNAL MEDICINE

## 2025-05-03 PROCEDURE — 85027 COMPLETE CBC AUTOMATED: CPT | Performed by: INTERNAL MEDICINE

## 2025-05-03 PROCEDURE — 99232 SBSQ HOSP IP/OBS MODERATE 35: CPT | Performed by: INTERNAL MEDICINE

## 2025-05-03 PROCEDURE — 99233 SBSQ HOSP IP/OBS HIGH 50: CPT | Performed by: SPECIALIST

## 2025-05-03 PROCEDURE — 82948 REAGENT STRIP/BLOOD GLUCOSE: CPT

## 2025-05-03 RX ORDER — INSULIN LISPRO 100 [IU]/ML
1-6 INJECTION, SOLUTION INTRAVENOUS; SUBCUTANEOUS
Status: DISCONTINUED | OUTPATIENT
Start: 2025-05-04 | End: 2025-05-14 | Stop reason: HOSPADM

## 2025-05-03 RX ORDER — INSULIN LISPRO 100 [IU]/ML
1-6 INJECTION, SOLUTION INTRAVENOUS; SUBCUTANEOUS
Status: DISCONTINUED | OUTPATIENT
Start: 2025-05-03 | End: 2025-05-14 | Stop reason: HOSPADM

## 2025-05-03 RX ORDER — OLANZAPINE 5 MG/1
10 TABLET, ORALLY DISINTEGRATING ORAL
Status: DISCONTINUED | OUTPATIENT
Start: 2025-05-03 | End: 2025-05-04

## 2025-05-03 RX ORDER — IBUPROFEN 400 MG/1
400 TABLET, FILM COATED ORAL EVERY 6 HOURS PRN
Status: DISCONTINUED | OUTPATIENT
Start: 2025-05-03 | End: 2025-05-14 | Stop reason: HOSPADM

## 2025-05-03 RX ADMIN — PANTOPRAZOLE SODIUM 40 MG: 40 INJECTION, POWDER, FOR SOLUTION INTRAVENOUS at 08:04

## 2025-05-03 RX ADMIN — INSULIN LISPRO 1 UNITS: 100 INJECTION, SOLUTION INTRAVENOUS; SUBCUTANEOUS at 22:22

## 2025-05-03 RX ADMIN — METRONIDAZOLE 500 MG: 500 INJECTION, SOLUTION INTRAVENOUS at 04:06

## 2025-05-03 RX ADMIN — ACETAMINOPHEN 650 MG: 325 TABLET, FILM COATED ORAL at 01:36

## 2025-05-03 RX ADMIN — ACETAMINOPHEN 650 MG: 325 TABLET, FILM COATED ORAL at 07:59

## 2025-05-03 RX ADMIN — METRONIDAZOLE 500 MG: 500 INJECTION, SOLUTION INTRAVENOUS at 12:05

## 2025-05-03 RX ADMIN — ENOXAPARIN SODIUM 40 MG: 40 INJECTION SUBCUTANEOUS at 08:04

## 2025-05-03 RX ADMIN — INSULIN LISPRO 1 UNITS: 100 INJECTION, SOLUTION INTRAVENOUS; SUBCUTANEOUS at 12:08

## 2025-05-03 RX ADMIN — CHLORHEXIDINE GLUCONATE 15 ML: 1.2 SOLUTION ORAL at 08:04

## 2025-05-03 RX ADMIN — SODIUM CHLORIDE, SODIUM LACTATE, POTASSIUM CHLORIDE, AND CALCIUM CHLORIDE 125 ML/HR: .6; .31; .03; .02 INJECTION, SOLUTION INTRAVENOUS at 17:51

## 2025-05-03 RX ADMIN — SODIUM CHLORIDE, SODIUM LACTATE, POTASSIUM CHLORIDE, AND CALCIUM CHLORIDE 125 ML/HR: .6; .31; .03; .02 INJECTION, SOLUTION INTRAVENOUS at 02:01

## 2025-05-03 RX ADMIN — SODIUM CHLORIDE, SODIUM LACTATE, POTASSIUM CHLORIDE, AND CALCIUM CHLORIDE 125 ML/HR: .6; .31; .03; .02 INJECTION, SOLUTION INTRAVENOUS at 09:41

## 2025-05-03 RX ADMIN — CEFEPIME 2000 MG: 2 INJECTION, POWDER, FOR SOLUTION INTRAVENOUS at 22:10

## 2025-05-03 RX ADMIN — INSULIN LISPRO 1 UNITS: 100 INJECTION, SOLUTION INTRAVENOUS; SUBCUTANEOUS at 00:31

## 2025-05-03 RX ADMIN — POTASSIUM PHOSPHATE, MONOBASIC POTASSIUM PHOSPHATE, DIBASIC 30 MMOL: 224; 236 INJECTION, SOLUTION, CONCENTRATE INTRAVENOUS at 00:29

## 2025-05-03 RX ADMIN — INSULIN LISPRO 1 UNITS: 100 INJECTION, SOLUTION INTRAVENOUS; SUBCUTANEOUS at 17:08

## 2025-05-03 RX ADMIN — CHLORHEXIDINE GLUCONATE 15 ML: 1.2 SOLUTION ORAL at 00:29

## 2025-05-03 RX ADMIN — CEFEPIME 2000 MG: 2 INJECTION, POWDER, FOR SOLUTION INTRAVENOUS at 09:45

## 2025-05-03 RX ADMIN — METRONIDAZOLE 500 MG: 500 INJECTION, SOLUTION INTRAVENOUS at 20:35

## 2025-05-03 NOTE — ASSESSMENT & PLAN NOTE
Phosphorus is improved from less than 1 on 2 May, 2025, now 2.7 on 3 May 2025  Repelet phos  Recheck daily

## 2025-05-03 NOTE — PROGRESS NOTES
Progress Note - Critical Care/ICU   Name: Solo Mack 55 y.o. male I MRN: [id number]  Unit/Bed#: ICU 14 I Date of Admission: 5/2/2025   Date of Service: 5/3/2025 I Hospital Day: 1      Assessment & Plan  Septic shock (HCC)  Sepsis in the setting of acute cholecystitis.  Initially had RUQ pain on 5/1.  4L isolyte given for volume resuscitation with no improvement.  Requiring vasopressor support with levophed and vasopressin.  Started on Rocephin and Flagyl.  Transferred from [place] to [facility] for ICU management.    Plan:  Switch Rocephin to cefepime.  Day 1  Continue Flagyl day 1.  Pressors - Levophed and vasopressin   Will start bicarb drip  LR for IV fluids.  Maintain MAP above 65  Consult general surgery and IR  A-line for blood pressure monitoring.  CVC line for pressors  Acute cholecystitis  Patient complained of right upper quadrant pain.  Abdomen U/S - cholelithiasis with no findings of acute cholecystitis.  CBD measures 6 mm.  5/2/2025 CT AP - acute cholecystitis with impacted gallstone within the cystic duct.   Transferred to [facility]    Plan:  General Surgery consulted  IR consulted  Patient refused emergent perc cholecystectomy by IR  Patient is refusing all medical care, attempted to remove his right IJ central venous catheter  Type 2 diabetes mellitus, without long-term current use of insulin ([facility])  Lab Results   Component Value Date    HGBA1C 8.3 (A) 03/17/2025     Recent Labs     05/02/25  0712 05/02/25  1139 05/02/25  1739 05/02/25  2355   POCGLU 210* 314* 293* 160*     Blood Sugar Average: Last 72 hrs:  (P) [id number]  Continue with SSI q6  Hyperglycemia likely due to olanzapine  Essential hypertension  Clonidine 0.1 q12  Lopressor 12.5 q12  Continuous cardiopulmonary monitoring  GERD (gastroesophageal reflux disease)  PTA protonix 40 mg daily  Continue protonix 40 mg IV daily  Schizophrenia, unspecified type (HCC)  Klonopin 1 mg TID  Hypothyroidism  TSH 0.21  No prior  history of hypothyroidism  Follow up free T4  Chronic kidney disease, stage 2 (mild)  Stable CKD  Baseline Creat 1.1-1.5  Monitor kidney indices  Avoid nephrotoxic medications  Hypomagnesemia  Magnesium 1.1   Replete PRN  Hypophosphatemia  Phosphorus is improved from less than 1 on 2 May, 2025, now 2.7 on 3 May 2025  Repelet phos  Recheck daily  Disposition: Critical care    ICU Core Measures     A: Assess, Prevent, and Manage Pain Has pain been assessed? Yes  Need for changes to pain regimen? No   B: Both SAT/SAT  N/A   C: Choice of Sedation RASS Goal: 0 Alert and Calm  Need for changes to sedation or analgesia regimen? No   D: Delirium CAM-ICU: Patient is schizophrenic and confused   E: Early Mobility  Plan for early mobility? No   F: Family Engagement Plan for family engagement today? Yes       Antibiotic Review: Awaiting culture results.     Review of Invasive Devices:      Central access plan: Medications requiring central line  [place] Plan: Keep arterial line for hemodynamic monitoring and frequent labs    Prophylaxis:  VTE VTE covered by:  enoxaparin, Subcutaneous, 40 mg at 05/02/25 1036       Stress Ulcer  covered bypantoprazole (PROTONIX) 40 mg tablet [[id number]] (Long-Term Med), pantoprazole (PROTONIX) injection 40 mg [[id number]]         24 Hour Events : Overnight patient tried to remove his central line.  He does not believe that he is getting medicine through his IV.  He does not trust medical staff.  He appears to be very delusional.  He still refuses any procedures or surgery.  He denies complaints of chest pain, shortness of breath, lower extremity cramps, abdominal pain.  He does complain of a headache and was requesting Tylenol.  Subjective   Review of Systems: See HPI for Review of Systems    Objective :                   Vitals I/O      Most Recent Min/Max in 24hrs   Temp 99.4 °F (37.4 °C) Temp  Min: 97.5 °F (36.4 °C)  Max: 99.4 °F (37.4 °C)   Pulse 82 Pulse  Min: 70  Max: 98   Resp 20 Resp  Min:  16  Max: 25   BP 94/64 BP  Min: 51/34  Max: 141/96   O2 Sat 97 % SpO2  Min: 91 %  Max: 100 %      Intake/Output Summary (Last 24 hours) at 5/3/2025 0636  Last data filed at 5/3/2025 0549  Gross per 24 hour   Intake 3329.65 ml   Output 3150 ml   Net 179.65 ml       Diet NPO; Ice chips    Invasive Monitoring   Arterial Line  [place] /76  Arterial Line BP  Min: 80/60  Max: 140/60   MAP 92 mmHg  Arterial Line MAP (mmHg)  Min: 54 mmHg  Max: 102 mmHg           Physical Exam   Physical Exam  Vitals reviewed.   Eyes:      Extraocular Movements: Extraocular movements intact.      Conjunctiva/sclera: Conjunctivae normal.      Pupils: Pupils are equal, round, and reactive to light.   Skin:     General: Skin is warm and dry.      Capillary Refill: Capillary refill takes less than 2 seconds.   HENT:      Head: Normocephalic and atraumatic.      Right Ear: Hearing normal.      Left Ear: Hearing normal.      Mouth/Throat:      Mouth: Mucous membranes are moist.   Neck:      Comments: Supple no JVD no lymphadenopathy trachea midline  Cardiovascular:      Rate and Rhythm: Normal rate and regular rhythm.      Pulses: Normal pulses.      Heart sounds: Normal heart sounds.   Musculoskeletal:         General: Normal range of motion.   Abdominal: General: Bowel sounds are normal.      Palpations: Abdomen is soft.   Constitutional:       Appearance: He is well-developed and well-nourished.   Pulmonary:      Effort: Pulmonary effort is normal.      Breath sounds: Normal breath sounds.   Neurological:      General: No focal deficit present.      Mental Status: He is alert. Mental status is at baseline. He is calm and disoriented to situation.      Motor: Strength full and intact in all extremities.        Corneal reflex present, cough reflex and gag reflex intact.          Diagnostic Studies        Lab Results: I have reviewed the following results:     Medications:  Scheduled PRN   cefepime, 2,000 mg, Q12H  chlorhexidine, 15 mL, Q12H  ROBER  cyanocobalamin, 1,000 mcg, Q30 Days  enoxaparin, 40 mg, Daily  [Held by provider] gabapentin, 400 mg, TID  insulin lispro, 1-6 Units, Q6H ROBER  metroNIDAZOLE, 500 mg, Q8H  [Held by provider] OLANZapine, 10 mg, HS  pantoprazole, 40 mg, Q24H ROBER      acetaminophen, 650 mg, Q6H PRN  clonazePAM, 1 mg, BID PRN  HYDROmorphone, 0.5 mg, Q4H PRN  HYDROmorphone, 1 mg, Q4H PRN  HYDROmorphone, 0.2 mg, Q4H PRN  hydrOXYzine HCL, 25 mg, Q6H PRN Max 4/day  hydrOXYzine HCL, 50 mg, Q6H PRN Max 4/day  LORazepam, 1 mg, Q6H PRN Max 3/day  LORazepam, 1 mg, Q6H PRN Max 3/day  nicotine polacrilex, 2 mg, Q4H PRN  polyethylene glycol, 17 g, Daily PRN  senna-docusate sodium, 1 tablet, Daily PRN  trimethobenzamide, 200 mg, Q6H PRN       Continuous    lactated ringers, 125 mL/hr, Last Rate: 125 mL/hr (05/03/25 0201)  norepinephrine, 1-30 mcg/min, Last Rate: 2 mcg/min (05/02/25 2213)         Labs:   CBC    Recent Labs     05/02/25  0511 05/02/25  0959 05/03/25  0532   WBC 26.34* 36.47* 17.92*   HGB 13.0 12.5 11.1*   HCT 37.7 35.5* 31.1*    276 154   BANDSPCT 8  --   --      BMP    Recent Labs     05/02/25 2306 05/03/25  0532   SODIUM 130* 134*   K 3.6 3.9   CL 96 100   CO2 28 28   AGAP 6 6   BUN 16 16   CREATININE 1.10 1.05   CALCIUM 7.2* 7.5*       Coags    Recent Labs     05/01/25  2235   INR 0.96        Additional Electrolytes  Recent Labs     05/02/25  0511 05/02/25  2306 05/03/25  0532   MG 1.1*  --  2.0   PHOS <1.0* 2.7  --           Blood Gas    Recent Labs     05/02/25  1004   PHART 7.462*   IEL0XTE 33.8*   PO2ART 68.6*   [id number] 23.6   BEART 0.4   SOURCE Line, Arterial     Recent Labs     05/02/25  1004   SOURCE Line, Arterial    LFTs  Recent Labs     05/02/25  0959 05/03/25  0532   ALT 30 27   AST 27 18   ALKPHOS 63 63   ALB 3.1* 2.7*   TBILI 1.26* 0.63       Infectious  Recent Labs     05/02/25  0511 05/02/25  0959   PROCALCITONI 19.91* 50.31*     Glucose  Recent Labs     05/02/25  0511 05/02/25  0959 05/02/25  2306  05/03/25  0532   GLUC 226* 280* 181* 129

## 2025-05-03 NOTE — PLAN OF CARE
Problem: PAIN - ADULT  Goal: Verbalizes/displays adequate comfort level or baseline comfort level  Description: Interventions:- Encourage patient to monitor pain and request assistance- Assess pain using appropriate pain scale- Administer analgesics based on type and severity of pain and evaluate response- Implement non-pharmacological measures as appropriate and evaluate response- Consider cultural and social influences on pain and pain management- Notify physician/advanced practitioner if interventions unsuccessful or patient reports new pain  5/3/2025 0152 by Marian Gould RN  Outcome: Progressing  5/3/2025 0151 by Marian Gould RN  Outcome: Progressing  5/3/2025 0150 by Marian Gould RN  Outcome: Progressing     Problem: INFECTION - ADULT  Goal: Absence or prevention of progression during hospitalization  Description: INTERVENTIONS:- Assess and monitor for signs and symptoms of infection- Monitor lab/diagnostic results- Monitor all insertion sites, i.e. indwelling lines, tubes, and drains- Monitor endotracheal if appropriate and nasal secretions for changes in amount and color- [place] appropriate cooling/warming therapies per order- Administer medications as ordered- Instruct and encourage patient and family to use good hand hygiene technique- Identify and instruct in appropriate isolation precautions for identified infection/condition  5/3/2025 0152 by Marian Gould RN  Outcome: Progressing  5/3/2025 0151 by Marian Gould RN  Outcome: Progressing  5/3/2025 0150 by Marian Goudl RN  Outcome: Progressing  Goal: Absence of fever/infection during neutropenic period  Description: INTERVENTIONS:- Monitor WBC  5/3/2025 0152 by Marian Gould RN  Outcome: Progressing  5/3/2025 0151 by Marian Gould RN  Outcome: Progressing  5/3/2025 0150 by Marian Gould RN  Outcome: Progressing     Problem: SAFETY ADULT  Goal: Patient will  remain free of falls  Description: INTERVENTIONS:- Educate patient/family on patient safety including physical limitations- Instruct patient to call for assistance with activity - Consult OT/PT to assist with strengthening/mobility - Keep Call bell within reach- Keep bed low and locked with side rails adjusted as appropriate- Keep care items and personal belongings within reach- Initiate and maintain comfort rounds- Make Fall Risk Sign visible to staff- Offer Toileting every bed/chair 2 Hours, in advance of need- Initiate/Maintain bed/chair alarm- Obtain necessary fall risk management equipment:  Apply yellow socks and bracelet for high fall risk patients- Consider moving patient to room near nurses station  5/3/2025 0152 by Marian Gould RN  Outcome: Progressing  5/3/2025 0151 by Marian Gould RN  Outcome: Progressing  5/3/2025 0150 by Marian Gould RN  Outcome: Progressing  Goal: Maintain or return to baseline ADL function  Description: INTERVENTIONS:-  Assess patient's ability to carry out ADLs; assess patient's baseline for ADL function and identify physical deficits which impact ability to perform ADLs (bathing, care of mouth/teeth, toileting, grooming, dressing, etc.)- Assess/evaluate cause of self-care deficits - Assess range of motion- Assess patient's mobility; develop plan if impaired- Assess patient's need for assistive devices and provide as appropriate- Encourage maximum independence but intervene and supervise when necessary- Involve family in performance of ADLs- Assess for home care needs following discharge - Consider OT consult to assist with ADL evaluation and planning for discharge- Provide patient education as appropriate  5/3/2025 0152 by Marian Gould RN  Outcome: Progressing  5/3/2025 0151 by Marian Gould RN  Outcome: Progressing  5/3/2025 0150 by Marian Gould RN  Outcome: Progressing  Goal: Maintains/Returns to pre admission  functional level  Description: INTERVENTIONS:- Perform AM-PAC 6 Click Basic Mobility/ Daily Activity assessment daily.- Set and communicate daily mobility goal to care team and patient/family/caregiver. - Collaborate with rehabilitation services on mobility goals if consulted-  Out of bed for toileting- Record patient progress and toleration of activity level   5/3/2025 0152 by Marian Gould RN  Outcome: Progressing  5/3/2025 0151 by Marian Gould RN  Outcome: Progressing  5/3/2025 0150 by Marian Gould RN  Outcome: Progressing     Problem: DISCHARGE PLANNING  Goal: Discharge to home or other facility with appropriate resources  Description: INTERVENTIONS:- Identify barriers to discharge w/patient and caregiver- Arrange for needed discharge resources and transportation as appropriate- Identify discharge learning needs (meds, wound care, etc.)- Arrange for interpretive services to assist at discharge as needed- Refer to Case Management Department for coordinating discharge planning if the patient needs post-hospital services based on physician/advanced practitioner order or complex needs related to functional status, cognitive ability, or social support system  5/3/2025 0152 by Marian Gould RN  Outcome: Progressing  5/3/2025 0151 by Marian Gould RN  Outcome: Progressing  5/3/2025 0150 by Marian Gould RN  Outcome: Progressing     Problem: Knowledge Deficit  Goal: Patient/family/caregiver demonstrates understanding of disease process, treatment plan, medications, and discharge instructions  Description: Complete learning assessment and assess knowledge base.Interventions:- Provide teaching at level of understanding- Provide teaching via preferred learning methods  5/3/2025 0152 by Marian Gould RN  Outcome: Not Progressing  5/3/2025 0151 by Marian Gould RN  Outcome: Not Progressing  5/3/2025 0150 by Marian Gould RN  Outcome:  Progressing     Problem: NEUROSENSORY - ADULT  Goal: Achieves stable or improved neurological status  Description: INTERVENTIONS- Monitor and report changes in neurological status- Monitor vital signs such as temperature, blood pressure, glucose, and any other labs ordered - Initiate measures to prevent increased intracranial pressure- Monitor for seizure activity and implement precautions if appropriate    5/3/2025 0152 by Marian Gould RN  Outcome: Progressing  5/3/2025 0151 by Marian Gould RN  Outcome: Progressing  5/3/2025 0150 by Marian Gould RN  Outcome: Progressing  Goal: Achieves maximal functionality and self care  Description: INTERVENTIONS- Monitor swallowing and airway patency with patient fatigue and changes in neurological status- Encourage and assist patient to increase activity and self care. - Encourage visually impaired, hearing impaired and aphasic patients to use assistive/communication devices  5/3/2025 0152 by Marian Gould RN  Outcome: Progressing  5/3/2025 0151 by Marian Gould RN  Outcome: Progressing  5/3/2025 0150 by Marian Gould RN  Outcome: Progressing     Problem: CARDIOVASCULAR - ADULT  Goal: Maintains optimal cardiac output and hemodynamic stability  Description: INTERVENTIONS:- Monitor I/O, vital signs and rhythm- Monitor for S/S and trends of decreased cardiac output- Administer and titrate ordered vasoactive medications to optimize hemodynamic stability- Assess quality of pulses, skin color and temperature- Assess for signs of decreased coronary artery perfusion- Instruct patient to report change in severity of symptoms  5/3/2025 0152 by Marian Gould RN  Outcome: Progressing  5/3/2025 0151 by Marian Gould RN  Outcome: Progressing  5/3/2025 0150 by Marian Gould RN  Outcome: Progressing  Goal: Absence of cardiac dysrhythmias or at baseline rhythm  Description: INTERVENTIONS:- Continuous  cardiac monitoring, vital signs, obtain 12 lead EKG if ordered- Administer antiarrhythmic and heart rate control medications as ordered- Monitor electrolytes and administer replacement therapy as ordered  5/3/2025 0152 by Marian Gould RN  Outcome: Progressing  5/3/2025 0151 by Marian Gould RN  Outcome: Progressing  5/3/2025 0150 by Marian Gould RN  Outcome: Progressing     Problem: RESPIRATORY - ADULT  Goal: Achieves optimal ventilation and oxygenation  Description: INTERVENTIONS:- Assess for changes in respiratory status- Assess for changes in mentation and behavior- Position to facilitate oxygenation and minimize respiratory effort- Oxygen administered by appropriate delivery if ordered- Initiate smoking cessation education as indicated- Encourage broncho-pulmonary hygiene including cough, deep breathe, Incentive Spirometry- Assess the need for suctioning and aspirate as needed- Assess and instruct to report SOB or any respiratory difficulty- Respiratory Therapy support as indicated  5/3/2025 0152 by Marian Gould RN  Outcome: Progressing  5/3/2025 0151 by Marian Gould RN  Outcome: Progressing  5/3/2025 0150 by Marian Gould RN  Outcome: Progressing     Problem: GASTROINTESTINAL - ADULT  Goal: Minimal or absence of nausea and/or vomiting  Description: INTERVENTIONS:- Administer IV fluids if ordered to ensure adequate hydration- Maintain NPO status until nausea and vomiting are resolved- Nasogastric tube if ordered- Administer ordered antiemetic medications as needed- Provide nonpharmacologic comfort measures as appropriate- Advance diet as tolerated, if ordered- Consider nutrition services referral to assist patient with adequate nutrition and appropriate food choices  5/3/2025 0152 by Marian Gould RN  Outcome: Progressing  5/3/2025 0151 by Marian Gould RN  Outcome: Progressing  5/3/2025 0150 by Marian Gould  RN  Outcome: Progressing  Goal: Maintains or returns to baseline bowel function  Description: INTERVENTIONS:- Assess bowel function- Encourage oral fluids to ensure adequate hydration- Administer IV fluids if ordered to ensure adequate hydration- Administer ordered medications as needed- Encourage mobilization and activity- Consider nutritional services referral to assist patient with adequate nutrition and appropriate food choices  5/3/2025 0152 by Marian Gould RN  Outcome: Progressing  5/3/2025 0151 by Marian Gould RN  Outcome: Progressing  5/3/2025 0150 by Marian Gould RN  Outcome: Progressing  Goal: Maintains adequate nutritional intake  Description: INTERVENTIONS:- Monitor percentage of each meal consumed- Identify factors contributing to decreased intake, treat as appropriate- Assist with meals as needed- Monitor I&O, weight, and lab values if indicated- Obtain nutrition services referral as needed  5/3/2025 0152 by Marian Gould RN  Outcome: Progressing  5/3/2025 0151 by Marian Gould RN  Outcome: Progressing  5/3/2025 0150 by Marian Gould RN  Outcome: Progressing  Goal: Oral mucous membranes remain intact  Description: INTERVENTIONS- Assess oral mucosa and hygiene practices- Implement preventative oral hygiene regimen- Implement oral medicated treatments as ordered- Initiate Nutrition services referral as needed  5/3/2025 0152 by Marian Gould RN  Outcome: Progressing  5/3/2025 0151 by Marian Gould RN  Outcome: Progressing  5/3/2025 0150 by Marian Gould RN  Outcome: Progressing     Problem: GENITOURINARY - ADULT  Goal: Maintains or returns to baseline urinary function  Description: INTERVENTIONS:- Assess urinary function- Encourage oral fluids to ensure adequate hydration if ordered- Administer IV fluids as ordered to ensure adequate hydration- Administer ordered medications as needed- Offer frequent toileting-  Follow urinary retention protocol if ordered  5/3/2025 0152 by Marian Gould RN  Outcome: Progressing  5/3/2025 0151 by Marian Gould RN  Outcome: Progressing  5/3/2025 0150 by Marian Gould RN  Outcome: Progressing  Goal: Absence of urinary retention  Description: INTERVENTIONS:- Assess patient’s ability to void and empty bladder- Monitor I/O- Bladder scan as needed- Discuss with physician/AP medications to alleviate retention as needed- Discuss catheterization for long term situations as appropriate  5/3/2025 0152 by Marian Gould RN  Outcome: Progressing  5/3/2025 0151 by Marian Gould RN  Outcome: Progressing  5/3/2025 0150 by Marian Gould RN  Outcome: Progressing     Problem: METABOLIC, FLUID AND ELECTROLYTES - ADULT  Goal: Electrolytes maintained within normal limits  Description: INTERVENTIONS:- Monitor labs and assess patient for signs and symptoms of electrolyte imbalances- Administer electrolyte replacement as ordered- Monitor response to electrolyte replacements, including repeat lab results as appropriate- Instruct patient on fluid and nutrition as appropriate  5/3/2025 0152 by Marian Gould RN  Outcome: Progressing  5/3/2025 0151 by Marian Gould RN  Outcome: Progressing  5/3/2025 0150 by Marian Gould RN  Outcome: Progressing  Goal: Fluid balance maintained  Description: INTERVENTIONS:- Monitor labs - Monitor I/O and WT- Instruct patient on fluid and nutrition as appropriate- Assess for signs & symptoms of volume excess or deficit  5/3/2025 0152 by Marian Gould RN  Outcome: Progressing  5/3/2025 0151 by Marian Gould RN  Outcome: Progressing  5/3/2025 0150 by Marian Gould RN  Outcome: Progressing  Goal: Glucose maintained within target range  Description: INTERVENTIONS:- Monitor Blood Glucose as ordered- Assess for signs and symptoms of hyperglycemia and hypoglycemia- Administer  ordered medications to maintain glucose within target range- Assess nutritional intake and initiate nutrition service referral as needed  5/3/2025 0152 by Marian Gould RN  Outcome: Progressing  5/3/2025 0151 by Marian Gould RN  Outcome: Progressing  5/3/2025 0150 by Marian Gould RN  Outcome: Progressing     Problem: SKIN/TISSUE INTEGRITY - ADULT  Goal: Skin Integrity remains intact(Skin Breakdown Prevention)  Description: Assess:-Perform Jag assessment every shift-Clean and moisturize skin every day-Inspect skin when repositioning, toileting, and assisting with ADLS-Assess under medical devices  -Assess extremities for adequate circulation and sensation Bed Management:-Have minimal linens on bed & keep smooth, unwrinkled-Change linens as needed when moist or perspiring-Avoid sitting or lying in one position for more than 2 hours while in bed-Keep HOB at 30 degrees Toileting:-Offer bedside commode-Assess for incontinence every 2 hours -Use incontinent care products after each incontinent episode Activity:-Consider consults to  interdisciplinary teams    5/3/2025 0152 by Marian Gould RN  Outcome: Progressing  5/3/2025 0151 by Marian Gould RN  Outcome: Progressing  5/3/2025 0150 by Marian Gould RN  Outcome: Progressing     Problem: HEMATOLOGIC - ADULT  Goal: Maintains hematologic stability  Description: INTERVENTIONS- Assess for signs and symptoms of bleeding or hemorrhage- Monitor labs- Administer supportive blood products/factors as ordered and appropriate  5/3/2025 0152 by Marian Gould RN  Outcome: Progressing  5/3/2025 0151 by Marian Gould RN  Outcome: Progressing  5/3/2025 0150 by Marian Gould RN  Outcome: Progressing     Problem: MUSCULOSKELETAL - ADULT  Goal: Maintain or return mobility to safest level of function  Description: INTERVENTIONS:- Assess patient's ability to carry out ADLs; assess patient's baseline  for ADL function and identify physical deficits which impact ability to perform ADLs (bathing, care of mouth/teeth, toileting, grooming, dressing, etc.)- Assess/evaluate cause of self-care deficits - Assess range of motion- Assess patient's mobility- Assess patient's need for assistive devices and provide as appropriate- Encourage maximum independence but intervene and supervise when necessary- Involve family in performance of ADLs- Assess for home care needs following discharge - Consider OT consult to assist with ADL evaluation and planning for discharge- Provide patient education as appropriate  5/3/2025 0152 by Marian Gould RN  Outcome: Progressing  5/3/2025 0151 by Marian Gould RN  Outcome: Progressing  5/3/2025 0150 by Marian Gould RN  Outcome: Progressing  Goal: Maintain proper alignment of affected body part  Description: INTERVENTIONS:- Support, maintain and protect limb and body alignment- Provide patient/ family with appropriate education  5/3/2025 0152 by Marian Gould RN  Outcome: Progressing  5/3/2025 0151 by Marian Gould RN  Outcome: Progressing  5/3/2025 0150 by Marian Gould RN  Outcome: Progressing     Problem: PSYCHOSIS  Goal: Will report no hallucinations or delusions  Description: Interventions:- Administer medication as  ordered- Every waking shifts and PRN assess for the presence of hallucinations and or delusions- Assist with reality testing to support increasing orientation- Assess if patient's hallucinations or delusions are encouraging self-harm or harm to others and intervene as appropriate  5/3/2025 0152 by Marian Gould RN  Outcome: Progressing  5/3/2025 0151 by Marian Gould RN  Outcome: Progressing  5/3/2025 0150 by Marian Gould RN  Outcome: Progressing     Problem: ANXIETY  Goal: Will report anxiety at manageable levels  Description: INTERVENTIONS:- Administer medication as ordered- Teach  and encourage coping skills- Provide emotional support- Assess patient/family for anxiety and ability to cope  5/3/2025 0152 by Marian Gould RN  Outcome: Progressing  5/3/2025 0151 by Marian Gould RN  Outcome: Progressing  5/3/2025 0150 by Marian Gould RN  Outcome: Progressing  Goal: By discharge: Patient will verbalize 2 strategies to deal with anxiety  Description: Interventions:- Identify any obvious source/trigger to anxiety- Staff will assist patient in applying identified coping technique/skills- Encourage attendance of scheduled groups and activities  Outcome: Progressing     Problem: SELF CARE DEFICIT  Goal: Return ADL status to a safe level of function  Description: INTERVENTIONS:- Administer medication as ordered- Assess ADL deficits and provide assistive devices as needed- Obtain PT/OT consults as needed- Assist and instruct patient to increase activity and self care as tolerated  5/3/2025 0152 by Marian Gould RN  Outcome: Progressing  5/3/2025 0151 by Marian Gould RN  Outcome: Progressing  5/3/2025 0150 by Marian Gould RN  Outcome: Progressing

## 2025-05-03 NOTE — ASSESSMENT & PLAN NOTE
Patient transferred from [place] to [facility] ICU in profound septic shock, with imaging demonstrating gallstones, RUQ tenderness and profound leukocytosis.     Plan  - Okay for CLD  - Continue IV ABX with cefepime Flagyl  - Follow-up blood culture results  - Recommend percutaneous cholecystostomy tube if patient is agreeable  - Analgesia and antiemetic as needed  - Monitor and trend endpoints    Please reach out to [facility] General Surgery resident role if there are any questions or concerns

## 2025-05-03 NOTE — NURSING NOTE
"Patient difficult to assess, refuses to answer questions. States \"you're not taking care of me, if you cared, you would give me water\" Attempted to pull our RIJ CVC, \"I don't want this, you think I don't know there's not really medicine in those tubes, you're lying to me\" Provider made aware, virtual 1:1 d/c and in-person 1:1 at bedside.  "

## 2025-05-03 NOTE — ASSESSMENT & PLAN NOTE
Patient complained of right upper quadrant pain.  Abdomen U/S - cholelithiasis with no findings of acute cholecystitis.  CBD measures 6 mm.  5/2/2025 CT AP - acute cholecystitis with impacted gallstone within the cystic duct.   Transferred to [facility]    Plan:  General Surgery consulted  IR consulted  Patient refused emergent perc cholecystectomy by IR  Patient is refusing all medical care, attempted to remove his right IJ central venous catheter

## 2025-05-03 NOTE — PROGRESS NOTES
Progress Note - Surgery-General   Name: Solo Mack 55 y.o. male I MRN: [id number]  Unit/Bed#: ICU 14 I Date of Admission: 5/2/2025   Date of Service: 5/3/2025 I Hospital Day: 1    Assessment & Plan  Septic shock (HCC)  Patient transferred from [place] to [facility] ICU in profound septic shock, with imaging demonstrating gallstones, RUQ tenderness and profound leukocytosis.     Plan  - Okay for CLD  - Continue IV ABX with cefepime Flagyl  - Follow-up blood culture results  - Recommend percutaneous cholecystostomy tube if patient is agreeable  - Analgesia and antiemetic as needed  - Monitor and trend endpoints    Please reach out to [facility] General Surgery resident role if there are any questions or concerns  Type 2 diabetes mellitus, without long-term current use of insulin ([facility])  Lab Results   Component Value Date    HGBA1C 8.3 (A) 03/17/2025       Recent Labs     05/02/25  1739 05/02/25  2355 05/03/25  1206 05/03/25  1706   POCGLU 293* 160* 153* 154*       Blood Sugar Average: Last 72 hrs:  (P) 214.8    Acute cholecystitis  See above plan      Subjective   Patient seen and examined.  NAEON.  Vasopressors held at this time.  Patient still refusing interventions.    Objective :  Temp:  [98.3 °F (36.8 °C)-99.4 °F (37.4 °C)] 98.6 °F (37 °C)  HR:  [] 120  BP: ()/(63-88) 132/83  Resp:  [17-35] 35  SpO2:  [92 %-99 %] 97 %  O2 Device: None (Room air)    I/O         05/01 0701 05/02 0700 05/02 0701  05/03 0700 05/03 0701  05/04 0700    P.O.   450    I.V. (mL/kg)  2844.7 (27.6) 1541.7 (15)    IV Piggyback  500 400    Total Intake(mL/kg)  3344.7 (32.5) 2391.7 (23.2)    Urine (mL/kg/hr)  3150 3475 (3.2)    Total Output  3150 3475    Net  +194.7 -1083.3                 Lines/Drains/Airways       Active Status       Name Placement date Placement time Site Days    CVC Central Lines 05/02/25 Triple 16cm 05/02/25  1035  --  1    Arterial Line 05/02/25 Radial 05/02/25  1034  Radial  1                  Physical  Exam  General - no acute distress  CV - warm, regular rate  Pulm - normal work of breathing, no respiratory distress  Abd -soft, mildly tender to RUQ, nondistended.  No signs of peritonitis.  Neuro - m/s grossly intact, cn grossly intact  Ext - moving all extremities       Lab Results: I have reviewed the following results:  Recent Labs     05/01/25 2235 05/01/25 2235 05/02/25  0511 05/02/25  0959 05/02/25  2306 05/03/25  0532   WBC 14.70*  --  26.34* 36.47*  --  17.92*   HGB 14.7  --  13.0 12.5  --  11.1*   HCT 42.9  --  37.7 35.5*  --  31.1*     --  183 276  --  154   BANDSPCT  --   --  8  --   --   --    SODIUM 132*  --  128* 127* 130* 134*   K 3.6  --  3.5 4.3 3.6 3.9   CL 92*  --  90* 92* 96 100   CO2 30  --  28 25 28 28   BUN 7  --  10 13 16 16   CREATININE 1.03  --  1.29 1.45* 1.10 1.05   GLUC 227*  --  226* 280* 181* 129   MG 1.3*  --  1.1*  --   --  2.0   PHOS  --    < > <1.0*  --  2.7  --    AST 22  --  24 27  --  18   ALT 25  --  26 30  --  27   ALB 4.4  --  3.2* 3.1*  --  2.7*   TBILI 1.18*  --  1.49* 1.26*  --  0.63   ALKPHOS 77  --  56 63  --  63   INR 0.96  --   --   --   --   --    LACTICACID 1.6  --  3.0* 1.8  --   --     < > = values in this interval not displayed.

## 2025-05-03 NOTE — ASSESSMENT & PLAN NOTE
Lab Results   Component Value Date    HGBA1C 8.3 (A) 03/17/2025       Recent Labs     05/02/25  1739 05/02/25  2355 05/03/25  1206 05/03/25  1706   POCGLU 293* 160* 153* 154*       Blood Sugar Average: Last 72 hrs:  (P) 214.8

## 2025-05-03 NOTE — ASSESSMENT & PLAN NOTE
Sepsis in the setting of acute cholecystitis.  Initially had RUQ pain on 5/1.  4L isolyte given for volume resuscitation with no improvement.  Requiring vasopressor support with levophed and vasopressin.  Started on Rocephin and Flagyl.  Transferred from [place] to [facility] for ICU management.    Plan:  Switch Rocephin to cefepime.  Day 1  Continue Flagyl day 1.  Pressors - Levophed and vasopressin   Will start bicarb drip  LR for IV fluids.  Maintain MAP above 65  Consult general surgery and IR  A-line for blood pressure monitoring.  CVC line for pressors

## 2025-05-03 NOTE — ASSESSMENT & PLAN NOTE
Lab Results   Component Value Date    HGBA1C 8.3 (A) 03/17/2025     Recent Labs     05/02/25  0712 05/02/25  1139 05/02/25  1739 05/02/25  2355   POCGLU 210* 314* 293* 160*     Blood Sugar Average: Last 72 hrs:  (P) [id number]  Continue with SSI q6  Hyperglycemia likely due to olanzapine

## 2025-05-04 LAB
ANION GAP SERPL CALCULATED.3IONS-SCNC: 8 MMOL/L (ref 4–13)
BACTERIA BLD CULT: NORMAL
BUN SERPL-MCNC: 9 MG/DL (ref 5–25)
CALCIUM SERPL-MCNC: 8.3 MG/DL (ref 8.4–10.2)
CHLORIDE SERPL-SCNC: 103 MMOL/L (ref 96–108)
CO2 SERPL-SCNC: 26 MMOL/L (ref 21–32)
CREAT SERPL-MCNC: 0.92 MG/DL (ref 0.6–1.3)
ERYTHROCYTE [DISTWIDTH] IN BLOOD BY AUTOMATED COUNT: 12.1 % (ref 11.6–15.1)
GFR SERPL CREATININE-BSD FRML MDRD: 92 ML/MIN/1.73SQ M
GLUCOSE SERPL-MCNC: 126 MG/DL (ref 65–140)
GLUCOSE SERPL-MCNC: 132 MG/DL (ref 65–140)
GLUCOSE SERPL-MCNC: 133 MG/DL (ref 65–140)
GLUCOSE SERPL-MCNC: 190 MG/DL (ref 65–140)
GLUCOSE SERPL-MCNC: 194 MG/DL (ref 65–140)
HCT VFR BLD AUTO: 34.7 % (ref 36.5–49.3)
HGB BLD-MCNC: 12.4 G/DL (ref 12–17)
MCH RBC QN AUTO: 29.4 PG (ref 26.8–34.3)
MCHC RBC AUTO-ENTMCNC: 35.7 G/DL (ref 31.4–37.4)
MCV RBC AUTO: 82 FL (ref 82–98)
PHOSPHATE SERPL-MCNC: 2.2 MG/DL (ref 2.7–4.5)
PLATELET # BLD AUTO: 159 THOUSANDS/UL (ref 149–390)
PMV BLD AUTO: 10.5 FL (ref 8.9–12.7)
POTASSIUM SERPL-SCNC: 3.4 MMOL/L (ref 3.5–5.3)
RBC # BLD AUTO: 4.22 MILLION/UL (ref 3.88–5.62)
SODIUM SERPL-SCNC: 137 MMOL/L (ref 135–147)
WBC # BLD AUTO: 13.07 THOUSAND/UL (ref 4.31–10.16)

## 2025-05-04 PROCEDURE — 84100 ASSAY OF PHOSPHORUS: CPT

## 2025-05-04 PROCEDURE — 82948 REAGENT STRIP/BLOOD GLUCOSE: CPT

## 2025-05-04 PROCEDURE — NC001 PR NO CHARGE: Performed by: SPECIALIST

## 2025-05-04 PROCEDURE — 80048 BASIC METABOLIC PNL TOTAL CA: CPT

## 2025-05-04 PROCEDURE — 85027 COMPLETE CBC AUTOMATED: CPT

## 2025-05-04 PROCEDURE — 99232 SBSQ HOSP IP/OBS MODERATE 35: CPT | Performed by: INTERNAL MEDICINE

## 2025-05-04 RX ORDER — LORAZEPAM 2 MG/ML
1 INJECTION INTRAMUSCULAR
Status: DISCONTINUED | OUTPATIENT
Start: 2025-05-04 | End: 2025-05-14 | Stop reason: HOSPADM

## 2025-05-04 RX ORDER — OLANZAPINE 10 MG/2ML
10 INJECTION, POWDER, FOR SOLUTION INTRAMUSCULAR
Status: DISCONTINUED | OUTPATIENT
Start: 2025-05-04 | End: 2025-05-10

## 2025-05-04 RX ORDER — ZIPRASIDONE HYDROCHLORIDE 20 MG/1
20 CAPSULE ORAL 2 TIMES DAILY WITH MEALS
Status: DISCONTINUED | OUTPATIENT
Start: 2025-05-04 | End: 2025-05-04

## 2025-05-04 RX ADMIN — INSULIN LISPRO 2 UNITS: 100 INJECTION, SOLUTION INTRAVENOUS; SUBCUTANEOUS at 12:57

## 2025-05-04 RX ADMIN — OLANZAPINE 10 MG: 10 INJECTION, POWDER, LYOPHILIZED, FOR SOLUTION INTRAMUSCULAR at 21:25

## 2025-05-04 RX ADMIN — PANTOPRAZOLE SODIUM 40 MG: 40 INJECTION, POWDER, FOR SOLUTION INTRAVENOUS at 09:46

## 2025-05-04 RX ADMIN — CEFEPIME 2000 MG: 2 INJECTION, POWDER, FOR SOLUTION INTRAVENOUS at 10:37

## 2025-05-04 RX ADMIN — METRONIDAZOLE 500 MG: 500 INJECTION, SOLUTION INTRAVENOUS at 05:26

## 2025-05-04 RX ADMIN — ENOXAPARIN SODIUM 40 MG: 40 INJECTION SUBCUTANEOUS at 09:46

## 2025-05-04 RX ADMIN — SODIUM CHLORIDE, SODIUM LACTATE, POTASSIUM CHLORIDE, AND CALCIUM CHLORIDE 100 ML/HR: .6; .31; .03; .02 INJECTION, SOLUTION INTRAVENOUS at 22:54

## 2025-05-04 RX ADMIN — SODIUM CHLORIDE, SODIUM LACTATE, POTASSIUM CHLORIDE, AND CALCIUM CHLORIDE 100 ML/HR: .6; .31; .03; .02 INJECTION, SOLUTION INTRAVENOUS at 03:43

## 2025-05-04 RX ADMIN — POTASSIUM PHOSPHATE, MONOBASIC POTASSIUM PHOSPHATE, DIBASIC 30 MMOL: 224; 236 INJECTION, SOLUTION, CONCENTRATE INTRAVENOUS at 06:15

## 2025-05-04 RX ADMIN — SODIUM CHLORIDE, SODIUM LACTATE, POTASSIUM CHLORIDE, AND CALCIUM CHLORIDE 100 ML/HR: .6; .31; .03; .02 INJECTION, SOLUTION INTRAVENOUS at 13:50

## 2025-05-04 RX ADMIN — METRONIDAZOLE 500 MG: 500 INJECTION, SOLUTION INTRAVENOUS at 21:26

## 2025-05-04 RX ADMIN — LORAZEPAM 1 MG: 2 INJECTION INTRAMUSCULAR; INTRAVENOUS at 05:32

## 2025-05-04 RX ADMIN — CHLORHEXIDINE GLUCONATE 15 ML: 1.2 SOLUTION ORAL at 21:24

## 2025-05-04 RX ADMIN — CEFEPIME 2000 MG: 2 INJECTION, POWDER, FOR SOLUTION INTRAVENOUS at 22:48

## 2025-05-04 RX ADMIN — INSULIN LISPRO 2 UNITS: 100 INJECTION, SOLUTION INTRAVENOUS; SUBCUTANEOUS at 21:24

## 2025-05-04 RX ADMIN — ZIPRASIDONE HCL 20 MG: 20 CAPSULE ORAL at 07:51

## 2025-05-04 RX ADMIN — METRONIDAZOLE 500 MG: 500 INJECTION, SOLUTION INTRAVENOUS at 13:03

## 2025-05-04 RX ADMIN — CHLORHEXIDINE GLUCONATE 15 ML: 1.2 SOLUTION ORAL at 09:46

## 2025-05-04 NOTE — UTILIZATION REVIEW
NOTIFICATION OF INPATIENT ADMISSION   AUTHORIZATION REQUEST   SERVICING FACILITY:   [place] [address]  Tax ID: [id number]  NPI: [id number] ATTENDING PROVIDER:  Attending Name and NPI#: Nico Sloan Md [[id number]]  Address: [address] [place] [address]  Phone: [phone]     ADMISSION INFORMATION:  Place of Service: Inpatient [facility]  Place of Service Code: 21  Inpatient Admission Date/Time: 5/2/25  8:47 AM  Discharge Date/Time: No discharge date for patient encounter.  Admitting Diagnosis Code/Description:  Acute cholecystitis [K81.0]     UTILIZATION REVIEW CONTACT:  Lynn Nichols Utilization   Network Utilization Review Department  Phone: [phone]  Fax [phone]  Email: [email or web address]  Contact for approvals/pending authorizations, clinical reviews, and discharge.     PHYSICIAN ADVISORY SERVICES:  Medical Necessity Denial & Egym-ay-Cqyc Review  Phone: [phone]  Fax: [phone]  Email: [email or web address]     DISCHARGE SUPPORT TEAM:  For Patients Discharge Needs & Updates  Phone: [phone] opt. 2 Fax: [phone]  Email: [email or web address]

## 2025-05-04 NOTE — ASSESSMENT & PLAN NOTE
Patient complained of right upper quadrant pain.  Abdomen U/S - cholelithiasis with no findings of acute cholecystitis.  CBD measures 6 mm.  5/2/2025 CT AP - acute cholecystitis with impacted gallstone within the cystic duct.   Transferred to [facility]    Plan:  General Surgery consulted  IR consulted  Patient refused emergent perc cholecystectomy by IR  Patient is refusing all medical care.

## 2025-05-04 NOTE — PROGRESS NOTES
Progress Note - Critical Care/ICU   Name: Solo Mack 55 y.o. male I MRN: [id number]  Unit/Bed#: ICU 03 I Date of Admission: 5/2/2025   Date of Service: 5/4/2025 I Hospital Day: 2      Assessment & Plan  Septic shock (HCC)  Sepsis in the setting of acute cholecystitis.  Initially had RUQ pain on 5/1.  Transferred from [place] to [facility] for ICU management.  A-line and CVC placed.  Patient with notable psych history.  Refusing neuropsych eval.  IR and Surgery consulted but patient is not agreeable to intervention at this time.  Wife is being updated.     Plan:  Continue Cefepime and flagyl day 3.  LR for IV fluids.  Maintain MAP above 65  Consult general surgery and IR  Acute cholecystitis  Patient complained of right upper quadrant pain.  Abdomen U/S - cholelithiasis with no findings of acute cholecystitis.  CBD measures 6 mm.  5/2/2025 CT AP - acute cholecystitis with impacted gallstone within the cystic duct.   Transferred to [facility]    Plan:  General Surgery consulted  IR consulted  Patient refused emergent perc cholecystectomy by IR  Patient is refusing all medical care.  Type 2 diabetes mellitus, without long-term current use of insulin ([facility])  Lab Results   Component Value Date    HGBA1C 8.3 (A) 03/17/2025     Recent Labs     05/02/25  2355 05/03/25  1206 05/03/25  1706 05/03/25  2213   POCGLU 160* 153* 154* 174*     Blood Sugar Average: Last 72 hrs:  (P) 208  Continue with SSI q6  Hyperglycemia likely due to olanzapine  Essential hypertension  Clonidine 0.1 q12  Lopressor 12.5 q12  Continuous cardiopulmonary monitoring  GERD (gastroesophageal reflux disease)  PTA protonix 40 mg daily  Continue protonix 40 mg IV daily  Schizophrenia, unspecified type (HCC)  Klonopin 1 mg TID  Hypothyroidism  TSH 0.21  Free T4 1.3  Could be hyperthyroid.  Chronic kidney disease, stage 2 (mild)  Stable CKD  Baseline Creat 1.1-1.5  Monitor kidney indices  Avoid nephrotoxic  medications  Hypomagnesemia  Magnesium 1.1   Replete PRN  Hypophosphatemia  Repelet phos  Recheck daily  Disposition: Critical care    ICU Core Measures     A: Assess, Prevent, and Manage Pain Has pain been assessed? Yes  Need for changes to pain regimen? No   B: Both SAT/SAT  N/A   C: Choice of Sedation RASS Goal: 0 Alert and Calm  Need for changes to sedation or analgesia regimen? No   D: Delirium CAM-ICU: Patient is schizophrenic and confused   E: Early Mobility  Plan for early mobility? No   F: Family Engagement Plan for family engagement today? Yes       Antibiotic Review: Awaiting culture results.     Review of Invasive Devices:      Central access plan: Medications requiring central line  [place] Plan: Keep arterial line for hemodynamic monitoring and frequent labs    Prophylaxis:  VTE VTE covered by:  enoxaparin, Subcutaneous, 40 mg at 05/03/25 0804       Stress Ulcer  covered bypantoprazole (PROTONIX) 40 mg tablet [[id number]] (Long-Term Med), pantoprazole (PROTONIX) injection 40 mg [[id number]]         24 Hour Events :   No events overnight.  Patient continues to be agitated requiring anxiolytics.  Continues to refuse treatment.     Subjective   Review of Systems: See HPI for Review of Systems    Objective :                   Vitals I/O      Most Recent Min/Max in 24hrs   Temp 100.3 °F (37.9 °C) Temp  Min: 98.5 °F (36.9 °C)  Max: 100.7 °F (38.2 °C)   Pulse (!) 106 Pulse  Min: 76  Max: 120   Resp (!) 41 Resp  Min: 12  Max: 41   BP (!) 173/101 BP  Min: 104/70  Max: 179/92   O2 Sat 98 % SpO2  Min: 96 %  Max: 99 %      Intake/Output Summary (Last 24 hours) at 5/4/2025 0637  Last data filed at 5/4/2025 0556  Gross per 24 hour   Intake 3643.34 ml   Output 8250 ml   Net -4606.66 ml       Diet Clear Liquid    Invasive Monitoring   Arterial Line  [place] /86  Arterial Line BP  Min: 98/60  Max: 148/116    mmHg  Arterial Line MAP (mmHg)  Min: 76 mmHg  Max: 126 mmHg           Physical Exam   Physical  Exam  Vitals reviewed.   Eyes:      Extraocular Movements: Extraocular movements intact.      Conjunctiva/sclera: Conjunctivae normal.      Pupils: Pupils are equal, round, and reactive to light.   Skin:     General: Skin is warm and dry.      Capillary Refill: Capillary refill takes less than 2 seconds.   HENT:      Head: Normocephalic and atraumatic.      Right Ear: Hearing normal.      Left Ear: Hearing normal.      Mouth/Throat:      Mouth: Mucous membranes are moist.   Neck:      Comments: Supple no JVD no lymphadenopathy trachea midline  Cardiovascular:      Rate and Rhythm: Normal rate and regular rhythm.      Pulses: Normal pulses.      Heart sounds: Normal heart sounds.   Musculoskeletal:         General: Normal range of motion.   Abdominal: General: Bowel sounds are normal.      Palpations: Abdomen is soft.   Constitutional:       Appearance: He is well-developed and well-nourished.   Pulmonary:      Effort: Pulmonary effort is normal.      Breath sounds: Normal breath sounds.   Neurological:      General: No focal deficit present.      Mental Status: He is alert. Mental status is at baseline. He is calm and disoriented to situation.      Motor: Strength full and intact in all extremities.        Corneal reflex present, cough reflex and gag reflex intact.          Diagnostic Studies        Lab Results: I have reviewed the following results:     Medications:  Scheduled PRN   cefepime, 2,000 mg, Q12H  chlorhexidine, 15 mL, Q12H ROBER  cyanocobalamin, 1,000 mcg, Q30 Days  enoxaparin, 40 mg, Daily  insulin lispro, 1-6 Units, TID AC  insulin lispro, 1-6 Units, HS  metroNIDAZOLE, 500 mg, Q8H  OLANZapine, 10 mg, HS  pantoprazole, 40 mg, Q24H ROBER  potassium phosphate, 30 mmol, Once      clonazePAM, 1 mg, BID PRN  HYDROmorphone, 0.2 mg, Q4H PRN  hydrOXYzine HCL, 25 mg, Q6H PRN Max 4/day  hydrOXYzine HCL, 50 mg, Q6H PRN Max 4/day  ibuprofen, 400 mg, Q6H PRN  LORazepam, 1 mg, Q6H PRN Max 3/day  LORazepam, 1 mg, Q6H PRN  Max 3/day  nicotine polacrilex, 2 mg, Q4H PRN  polyethylene glycol, 17 g, Daily PRN  senna-docusate sodium, 1 tablet, Daily PRN  trimethobenzamide, 200 mg, Q6H PRN       Continuous    lactated ringers, 100 mL/hr, Last Rate: 100 mL/hr (05/04/25 0343)         Labs:   CBC    Recent Labs     05/03/25  0532 05/04/25  0435   WBC 17.92* 13.07*   HGB 11.1* 12.4   HCT 31.1* 34.7*    159     BMP    Recent Labs     05/03/25  0532 05/04/25  0435   SODIUM 134* 137   K 3.9 3.4*    103   CO2 28 26   AGAP 6 8   BUN 16 9   CREATININE 1.05 0.92   CALCIUM 7.5* 8.3*       Coags    No recent results       Additional Electrolytes  Recent Labs     05/02/25 2306 05/03/25  0532 05/04/25  0435   MG  --  2.0  --    PHOS 2.7  --  2.2*          Blood Gas    Recent Labs     05/02/25  1004   PHART 7.462*   KZX2RXM 33.8*   PO2ART 68.6*   IBI2XLW 23.6   BEART 0.4   SOURCE Line, Arterial     Recent Labs     05/02/25  1004   SOURCE Line, Arterial    LFTs  Recent Labs     05/02/25  0959 05/03/25  0532   ALT 30 27   AST 27 18   ALKPHOS 63 63   ALB 3.1* 2.7*   TBILI 1.26* 0.63       Infectious  Recent Labs     05/02/25  0959   PROCALCITONI 50.31*     Glucose  Recent Labs     05/02/25  0959 05/02/25  2306 05/03/25  0532 05/04/25  0435   GLUC 280* 181* 129 132

## 2025-05-04 NOTE — ED NOTES
ED Crisis received an Epic message from Lindy Vieira RN, [address]. She stated the patient arrived to [address]  on a 302 dated  from [facility]. He was reportedly medically compromised and quickly transferred to [facility] ED. She received communication from a [facility] hearing coordinator that patient had been scheduled for a 303 hearing, currently in progress,  and they have no one to assist with patient's participation. However, the patient was no longer in the ED and was transferred to [facility] ICU, where he was in CT scan, and awaiting Interventional Radiology. Officer discussed with the doctor continuing the hearing. Given that the patient could not be present for his hearing due to work up for acute medical issues, that it is unclear when the patient will be medically cleared, and that we are approaching the close of business hours, it was determined that the best course of action would be to let the 302 . Psychiatry will be consulted and a new commitment will be pursued if recommended.  ED Crisis sent an email alert to Anastasia Roberts, [facility] ED Crisis regarding the possibility of a future Crisis consult for this patient

## 2025-05-04 NOTE — PLAN OF CARE
Problem: PAIN - ADULT  Goal: Verbalizes/displays adequate comfort level or baseline comfort level  Description: Interventions:- Encourage patient to monitor pain and request assistance- Assess pain using appropriate pain scale- Administer analgesics based on type and severity of pain and evaluate response- Implement non-pharmacological measures as appropriate and evaluate response- Consider cultural and social influences on pain and pain management- Notify physician/advanced practitioner if interventions unsuccessful or patient reports new pain  Outcome: Progressing     Problem: INFECTION - ADULT  Goal: Absence or prevention of progression during hospitalization  Description: INTERVENTIONS:- Assess and monitor for signs and symptoms of infection- Monitor lab/diagnostic results- Monitor all insertion sites, i.e. indwelling lines, tubes, and drains- Monitor endotracheal if appropriate and nasal secretions for changes in amount and color- [place] appropriate cooling/warming therapies per order- Administer medications as ordered- Instruct and encourage patient and family to use good hand hygiene technique- Identify and instruct in appropriate isolation precautions for identified infection/condition  Outcome: Progressing  Goal: Absence of fever/infection during neutropenic period  Description: INTERVENTIONS:- Monitor WBC  Outcome: Progressing     Problem: SAFETY ADULT  Goal: Patient will remain free of falls  Description: INTERVENTIONS:- Educate patient/family on patient safety including physical limitations- Instruct patient to call for assistance with activity - Consult OT/PT to assist with strengthening/mobility - Keep Call bell within reach- Keep bed low and locked with side rails adjusted as appropriate- Keep care items and personal belongings within reach- Initiate and maintain comfort rounds- Make Fall Risk Sign visible to staff- Offer Toileting every 2 Hours, in advance of need- Initiate/Maintain bed alarm-  Apply yellow socks and bracelet for high fall risk patients- Consider moving patient to room near nurses station  Outcome: Progressing  Goal: Maintain or return to baseline ADL function  Description: INTERVENTIONS:-  Assess patient's ability to carry out ADLs; assess patient's baseline for ADL function and identify physical deficits which impact ability to perform ADLs (bathing, care of mouth/teeth, toileting, grooming, dressing, etc.)- Assess/evaluate cause of self-care deficits - Assess range of motion- Assess patient's mobility; develop plan if impaired- Assess patient's need for assistive devices and provide as appropriate- Encourage maximum independence but intervene and supervise when necessary- Involve family in performance of ADLs- Assess for home care needs following discharge - Consider OT consult to assist with ADL evaluation and planning for discharge- Provide patient education as appropriate  Outcome: Progressing  Goal: Maintains/Returns to pre admission functional level  Description: INTERVENTIONS:- Perform AM-PAC 6 Click Basic Mobility/ Daily Activity assessment daily.- Set and communicate daily mobility goal to care team and patient/family/caregiver. - Collaborate with rehabilitation services on mobility goals if consulted- Perform Range of Motion 3 times a day.- - Out of bed to chair 3 times a day - Out of bed for meals 3 times a day- Out of bed for toileting- Record patient progress and toleration of activity level   Outcome: Progressing     Problem: DISCHARGE PLANNING  Goal: Discharge to home or other facility with appropriate resources  Description: INTERVENTIONS:- Identify barriers to discharge w/patient and caregiver- Arrange for needed discharge resources and transportation as appropriate- Identify discharge learning needs (meds, wound care, etc.)- Arrange for interpretive services to assist at discharge as needed- Refer to Case Management Department for coordinating discharge planning if the  patient needs post-hospital services based on physician/advanced practitioner order or complex needs related to functional status, cognitive ability, or social support system  Outcome: Progressing     Problem: Knowledge Deficit  Goal: Patient/family/caregiver demonstrates understanding of disease process, treatment plan, medications, and discharge instructions  Description: Complete learning assessment and assess knowledge base.Interventions:- Provide teaching at level of understanding- Provide teaching via preferred learning methods  Outcome: Progressing     Problem: NEUROSENSORY - ADULT  Goal: Achieves stable or improved neurological status  Description: INTERVENTIONS- Monitor and report changes in neurological status- Monitor vital signs such as temperature, blood pressure, glucose, and any other labs ordered - Initiate measures to prevent increased intracranial pressure- Monitor for seizure activity and implement precautions if appropriate    Outcome: Progressing  Goal: Achieves maximal functionality and self care  Description: INTERVENTIONS- Monitor swallowing and airway patency with patient fatigue and changes in neurological status- Encourage and assist patient to increase activity and self care. - Encourage visually impaired, hearing impaired and aphasic patients to use assistive/communication devices  Outcome: Progressing     Problem: CARDIOVASCULAR - ADULT  Goal: Maintains optimal cardiac output and hemodynamic stability  Description: INTERVENTIONS:- Monitor I/O, vital signs and rhythm- Monitor for S/S and trends of decreased cardiac output- Administer and titrate ordered vasoactive medications to optimize hemodynamic stability- Assess quality of pulses, skin color and temperature- Assess for signs of decreased coronary artery perfusion- Instruct patient to report change in severity of symptoms  Outcome: Progressing  Goal: Absence of cardiac dysrhythmias or at baseline rhythm  Description: INTERVENTIONS:-  Continuous cardiac monitoring, vital signs, obtain 12 lead EKG if ordered- Administer antiarrhythmic and heart rate control medications as ordered- Monitor electrolytes and administer replacement therapy as ordered  Outcome: Progressing     Problem: RESPIRATORY - ADULT  Goal: Achieves optimal ventilation and oxygenation  Description: INTERVENTIONS:- Assess for changes in respiratory status- Assess for changes in mentation and behavior- Position to facilitate oxygenation and minimize respiratory effort- Oxygen administered by appropriate delivery if ordered- Initiate smoking cessation education as indicated- Encourage broncho-pulmonary hygiene including cough, deep breathe, Incentive Spirometry- Assess the need for suctioning and aspirate as needed- Assess and instruct to report SOB or any respiratory difficulty- Respiratory Therapy support as indicated  Outcome: Progressing     Problem: GASTROINTESTINAL - ADULT  Goal: Minimal or absence of nausea and/or vomiting  Description: INTERVENTIONS:- Administer IV fluids if ordered to ensure adequate hydration- Maintain NPO status until nausea and vomiting are resolved- Nasogastric tube if ordered- Administer ordered antiemetic medications as needed- Provide nonpharmacologic comfort measures as appropriate- Advance diet as tolerated, if ordered- Consider nutrition services referral to assist patient with adequate nutrition and appropriate food choices  Outcome: Progressing  Goal: Maintains or returns to baseline bowel function  Description: INTERVENTIONS:- Assess bowel function- Encourage oral fluids to ensure adequate hydration- Administer IV fluids if ordered to ensure adequate hydration- Administer ordered medications as needed- Encourage mobilization and activity- Consider nutritional services referral to assist patient with adequate nutrition and appropriate food choices  Outcome: Progressing  Goal: Maintains adequate nutritional intake  Description: INTERVENTIONS:-  Monitor percentage of each meal consumed- Identify factors contributing to decreased intake, treat as appropriate- Assist with meals as needed- Monitor I&O, weight, and lab values if indicated- Obtain nutrition services referral as needed  Outcome: Progressing  Goal: Oral mucous membranes remain intact  Description: INTERVENTIONS- Assess oral mucosa and hygiene practices- Implement preventative oral hygiene regimen- Implement oral medicated treatments as ordered- Initiate Nutrition services referral as needed  Outcome: Progressing     Problem: GENITOURINARY - ADULT  Goal: Maintains or returns to baseline urinary function  Description: INTERVENTIONS:- Assess urinary function- Encourage oral fluids to ensure adequate hydration if ordered- Administer IV fluids as ordered to ensure adequate hydration- Administer ordered medications as needed- Offer frequent toileting- Follow urinary retention protocol if ordered  Outcome: Progressing  Goal: Absence of urinary retention  Description: INTERVENTIONS:- Assess patient’s ability to void and empty bladder- Monitor I/O- Bladder scan as needed- Discuss with physician/AP medications to alleviate retention as needed- Discuss catheterization for long term situations as appropriate  Outcome: Progressing     Problem: METABOLIC, FLUID AND ELECTROLYTES - ADULT  Goal: Electrolytes maintained within normal limits  Description: INTERVENTIONS:- Monitor labs and assess patient for signs and symptoms of electrolyte imbalances- Administer electrolyte replacement as ordered- Monitor response to electrolyte replacements, including repeat lab results as appropriate- Instruct patient on fluid and nutrition as appropriate  Outcome: Progressing  Goal: Fluid balance maintained  Description: INTERVENTIONS:- Monitor labs - Monitor I/O and WT- Instruct patient on fluid and nutrition as appropriate- Assess for signs & symptoms of volume excess or deficit  Outcome: Progressing  Goal: Glucose maintained  within target range  Description: INTERVENTIONS:- Monitor Blood Glucose as ordered- Assess for signs and symptoms of hyperglycemia and hypoglycemia- Administer ordered medications to maintain glucose within target range- Assess nutritional intake and initiate nutrition service referral as needed  Outcome: Progressing     Problem: SKIN/TISSUE INTEGRITY - ADULT  Goal: Skin Integrity remains intact(Skin Breakdown Prevention)  Description: Assess:-Perform Jag assessment every day-Clean and moisturize skin every day-Inspect skin when repositioning, toileting, and assisting with ADLS-:-Have minimal linens on bed & keep smooth, unwrinkled-Change linens as needed when moist or perspiring-Keep HOB at 30 degrees Toileting:-Offer bedside commode-Mobilize patient 3 times a day-Encourage activity and walks on unit-Encourage or provide ROM exercises -Skin Care:-Avoid use of baby powder, tape, friction and shearing, hot water or constrictive clothing-Relieve pressure over bony prominences using allyven-Do not massage red bony areasNext Steps:-Outcome: Progressing     Problem: HEMATOLOGIC - ADULT  Goal: Maintains hematologic stability  Description: INTERVENTIONS- Assess for signs and symptoms of bleeding or hemorrhage- Monitor labs- Administer supportive blood products/factors as ordered and appropriate  Outcome: Progressing     Problem: MUSCULOSKELETAL - ADULT  Goal: Maintain or return mobility to safest level of function  Description: INTERVENTIONS:- Assess patient's ability to carry out ADLs; assess patient's baseline for ADL function and identify physical deficits which impact ability to perform ADLs (bathing, care of mouth/teeth, toileting, grooming, dressing, etc.)- Assess/evaluate cause of self-care deficits - Assess range of motion- Assess patient's mobility- Assess patient's need for assistive devices and provide as appropriate- Encourage maximum independence but intervene and supervise when necessary- Involve family in  performance of ADLs- Assess for home care needs following discharge - Consider OT consult to assist with ADL evaluation and planning for discharge- Provide patient education as appropriate  Outcome: Progressing  Goal: Maintain proper alignment of affected body part  Description: INTERVENTIONS:- Support, maintain and protect limb and body alignment- Provide patient/ family with appropriate education  Outcome: Progressing     Problem: PSYCHOSIS  Goal: Will report no hallucinations or delusions  Description: Interventions:- Administer medication as  ordered- Every waking shifts and PRN assess for the presence of hallucinations and or delusions- Assist with reality testing to support increasing orientation- Assess if patient's hallucinations or delusions are encouraging self-harm or harm to others and intervene as appropriate  Outcome: Progressing     Problem: ANXIETY  Goal: Will report anxiety at manageable levels  Description: INTERVENTIONS:- Administer medication as ordered- Teach and encourage coping skills- Provide emotional support- Assess patient/family for anxiety and ability to cope  Outcome: Progressing  Goal: By discharge: Patient will verbalize 2 strategies to deal with anxiety  Description: Interventions:- Identify any obvious source/trigger to anxiety- Staff will assist patient in applying identified coping technique/skills- Encourage attendance of scheduled groups and activities  Outcome: Progressing     Problem: SELF CARE DEFICIT  Goal: Return ADL status to a safe level of function  Description: INTERVENTIONS:- Administer medication as ordered- Assess ADL deficits and provide assistive devices as needed- Obtain PT/OT consults as needed- Assist and instruct patient to increase activity and self care as tolerated  Outcome: Progressing     Problem: Prexisting or High Potential for Compromised Skin Integrity  Goal: Skin integrity is maintained or improved  Description: INTERVENTIONS:- Identify patients at  risk for skin breakdown- Assess and monitor skin integrity- Assess and monitor nutrition and hydration status- Monitor labs - Assess for incontinence - Turn and reposition patient- Assist with mobility/ambulation- Relieve pressure over bony prominences- Avoid friction and shearing- Provide appropriate hygiene as needed including keeping skin clean and dry- Evaluate need for skin moisturizer/barrier cream- Collaborate with interdisciplinary team - Patient/family teaching- Consider wound care consult   Outcome: Progressing     Problem: Nutrition/Hydration-ADULT  Goal: Nutrient/Hydration intake appropriate for improving, restoring or maintaining nutritional needs  Description: Monitor and assess patient's nutrition/hydration status for malnutrition. Collaborate with interdisciplinary team and initiate plan and interventions as ordered.  Monitor patient's weight and dietary intake as ordered or per policy. Utilize nutrition screening tool and intervene as necessary. Determine patient's food preferences and provide high-protein, high-caloric foods as appropriate. INTERVENTIONS:- Monitor oral intake, urinary output, labs, and treatment plans- Assess nutrition and hydration status and recommend course of action- Evaluate amount of meals eaten- Assist patient with eating if necessary - Allow adequate time for meals- Recommend/ encourage appropriate diets, oral nutritional supplements, and vitamin/mineral supplements- Order, calculate, and assess calorie counts as needed- Recommend, monitor, and adjust tube feedings and TPN/PPN based on assessed needs- Assess need for intravenous fluids- Provide specific nutrition/hydration education as appropriate- Include patient/family/caregiver in decisions related to nutrition  Outcome: Progressing

## 2025-05-04 NOTE — ASSESSMENT & PLAN NOTE
Lab Results   Component Value Date    HGBA1C 8.3 (A) 03/17/2025       Recent Labs     05/03/25  1206 05/03/25  1706 05/03/25  2213 05/04/25  0748   POCGLU 153* 154* 174* 133       Blood Sugar Average: Last 72 hrs:  (P) [id number]

## 2025-05-04 NOTE — PROGRESS NOTES
Progress Note - Surgery-General   Name: Solo Mack 55 y.o. male I MRN: [id number]  Unit/Bed#: ICU 03 I Date of Admission: 5/2/2025   Date of Service: 5/4/2025 I Hospital Day: 2    Assessment & Plan  Septic shock (HCC)  Patient transferred from [place] to [facility] ICU in profound septic shock, with imaging demonstrating gallstones, RUQ tenderness and profound leukocytosis.     Tmax of 100.3, otherwise afebrile and hemodynamically stable off vasopressors and on room air    WBC downtrending 13.7 from 17.9  Hb 12.4 from 11.1  Creatinine 0.92 from 1.05    Plan  - Okay for regular diet today, n.p.o. at midnight  - Continue IV ABX with cefepime Flagyl  - Discussion with patient today, states he may be agreeable to laparoscopic cholecystectomy versus percutaneous cholecystostomy tube, considering, will make n.p.o. at midnight for potential procedures tomorrow  - Analgesia and antiemetic as needed  - Monitor and trend endpoints    Please reach out to [facility] General Surgery resident role if there are any questions or concerns  Type 2 diabetes mellitus, without long-term current use of insulin ([facility])  Lab Results   Component Value Date    HGBA1C 8.3 (A) 03/17/2025       Recent Labs     05/03/25  1206 05/03/25  1706 05/03/25  2213 05/04/25  0748   POCGLU 153* 154* 174* 133       Blood Sugar Average: Last 72 hrs:  (P) [id number]    Acute cholecystitis  See above plan      Subjective   Patient seen and examined.  NAEON.  Patient more agreeable today, stating he is considering procedures and may be amenable.  Tolerating CLD.  Still having mild pain to RUQ but seems to be improving.    Objective :  Temp:  [98.5 °F (36.9 °C)-100.7 °F (38.2 °C)] 98.5 °F (36.9 °C)  HR:  [] 101  BP: (120-179)/() 140/89  Resp:  [12-41] 20  SpO2:  [96 %-99 %] 96 %  O2 Device: None (Room air)    I/O         05/01 0701 05/02 0700 05/02 0701 05/03 0700 05/03 0701 05/04 0700    P.O.   450    I.V. (mL/kg)  2844.7 (27.6) 1541.7 (15)     IV Piggyback  500 400    Total Intake(mL/kg)  3344.7 (32.5) 2391.7 (23.2)    Urine (mL/kg/hr)  3150 3475 (3.2)    Total Output  3150 3475    Net  +194.7 -1083.3                 Lines/Drains/Airways       Active Status       Name Placement date Placement time Site Days    CVC Central Lines 05/02/25 Triple 16cm 05/02/25  1035  --  1                  Physical Exam  General - no acute distress  CV - warm, regular rate  Pulm - normal work of breathing, no respiratory distress  Abd -soft, mildly tender to RUQ, nondistended.  No signs of peritonitis.  Neuro - m/s grossly intact, cn grossly intact  Ext - moving all extremities       Lab Results: I have reviewed the following results:  Recent Labs     05/01/25  2235 05/02/25  0511 05/02/25  0959 05/02/25  2306 05/03/25  0532 05/04/25  0435   WBC 14.70* 26.34* 36.47*  --  17.92* 13.07*   HGB 14.7 13.0 12.5  --  11.1* 12.4   HCT 42.9 37.7 35.5*  --  31.1* 34.7*    183 276  --  154 159   BANDSPCT  --  8  --   --   --   --    SODIUM 132* 128* 127*   < > 134* 137   K 3.6 3.5 4.3   < > 3.9 3.4*   CL 92* 90* 92*   < > 100 103   CO2 30 28 25   < > 28 26   BUN 7 10 13   < > 16 9   CREATININE 1.03 1.29 1.45*   < > 1.05 0.92   GLUC 227* 226* 280*   < > 129 132   MG 1.3* 1.1*  --   --  2.0  --    PHOS  --  <1.0*  --    < >  --  2.2*   AST 22 24 27  --  18  --    ALT 25 26 30  --  27  --    ALB 4.4 3.2* 3.1*  --  2.7*  --    TBILI 1.18* 1.49* 1.26*  --  0.63  --    ALKPHOS 77 56 63  --  63  --    INR 0.96  --   --   --   --   --    LACTICACID 1.6 3.0* 1.8  --   --   --     < > = values in this interval not displayed.

## 2025-05-04 NOTE — ASSESSMENT & PLAN NOTE
Sepsis in the setting of acute cholecystitis.  Initially had RUQ pain on 5/1.  Transferred from [place] to [facility] for ICU management.  A-line and CVC placed.  Patient with notable psych history.  Refusing neuropsych eval.  IR and Surgery consulted but patient is not agreeable to intervention at this time.  Wife is being updated.     Plan:  Continue Cefepime and flagyl day 3.  LR for IV fluids.  Maintain MAP above 65  Consult general surgery and IR

## 2025-05-04 NOTE — UTILIZATION REVIEW
Initial Clinical Review    Admission: Date/Time/Statement:   Admission Orders (From admission, onward)       Ordered        05/02/25 0849  INPATIENT ADMISSION  Once                          Orders Placed This Encounter   Procedures    INPATIENT ADMISSION     Standing Status:   Standing     Number of Occurrences:   1     Level of Care:   Critical Care [15]     Estimated length of stay:   More than 2 Midnights     Certification:   I certify that inpatient services are medically necessary for this patient for a duration of greater than two midnights. See H&P and MD Progress Notes for additional information about the patient's course of treatment.         Initial Presentation: 55 y.o. male admitted Inpatient to [place] ICU emergently d/t Septic shock.Sepsis in the setting of acute cholecystitis.  PMHx of bipolar disorder, schizophrenia, history of prolonged Qtc, T2DM, HTN, GERD who presents as a transfer from [place] with RUQ pain. He was recently admitted to [facility] for psychiatric evaluation and was recommended for inpatient admission. During his admission at [facility] he complained of right upper quadrant pain on 5/1 and ultrasound showed cholelithiasis. CBD was measuring 6 mm. Patient received around 90 cc of fluids and discharged to [facility] with recommendations for outpatient surgery follow-up. Yesterday at [facility], patient developed a fever of 101.9, tachycardic and met SIRS criteria. Sepsis workup was initiated, he was started on IV fluids, IV ceftriaxone and Flagyl. STAT CT abdomen pelvis showed acute cholecystitis with obstruction of cystic duct. General surgery was consulted and they recommended to transfer patient to medical unit for IV antibiotics and surgery evaluation. Patient was made n.p.o. for possible procedures. Overnight he became hypotensive with BP of 60/40 and heart rate 101. He was still alert and oriented x 3. Patient was a rapid response. He was given 4 L of Isolyte with no  improvement of blood pressure and started on Levophed and vasopressin. LA 3.. WBC 36.47. HCT 35.5. .CA 7.6. . CHLOR 92. TOTAL BILI 1.49.MG 1.1. PHOS <1.0. PROCALCITONIN 50.31. PH 7.462, CO2 33.8, O2 68.6. Blood cultures 5/1 and 5/2 negative thusfar. CXR 5/2/25 No definitive pneumothorax on limited supine imaging status post central line placement.Tree-in-bud nodularity in the bases on CT are likely obscured on this limited exam. No lobar consolidation.CT A/P 5/2/25 Findings of acute cholecystitis with impacted gallstone within the cystic duct.Chronic tree-in-bud nodularity within the bilateral lung bases, favored to be inflammatory and/or postinflammatory in etiology.Switch Rocephin to cefepime. Flagyl. Levophed and vasopressin. bicarb drip.IVF. SSI. Josette. CVC line. Consult general surgery and IR.    5/2 IR CONSULT:Plan at this point given nature of his psychiatric illness with attendant risks of self-removal of a percutaneous cholecystostomy tube, in addition to improving hemodynamics/response to resuscitation and IV ABX, will be cholecystectomy.We will sign off at this time.    5/2 SURGERY CONSULT: transferred from [place] to [facility] ICU in profound septic shock, with imaging demonstrating gallstones, RUQ tenderness and profound leukocytosis.  He has responded to Levophed and Vasopressin. He is also receiving Cefepime + Flagyl after initially receiving Zosyn.  Decompressing the distended gallbladder with percutaneous cholecystostomy has been thwarted by the patient's refusal to have any procedures at this time beyond having a central line placed.  After long discussion with his wife, percutaneous cholecystostomy looks like less attractive option as the patient is likely to tear the drain out himself with disastrous consequences.  Internal stenting to decompress the gallbladder is unnecessarily complicated, and patient unlikely to further Endoscopy to maintain/exchange the stent, and ultimate  cholecystectomy would also likely be difficult or impossible.  responding well to IV fluids, antibiotics and pressors are being weaned, the best option likely to be proceeding with cholecystectomy as soon as feasible.     Date: 5/3   Day 2:  awake and alert. He states he will be willing to have operation or IR tube placed but he is demanding to eat and drink today.  Will discuss with surgery  Psych requested neuropsych narayan yesterday- they saw patient but he did not engage with them so no evaluation left.ABD soft, mild RUQ tenderness. WBC 17.92. . CA 7.5. Levophed stopped at 6 AM. Currently on 125 ml/hr of LR.now off vasopressors.IV antibioitcs. Psych consult.     Date: 5/4  Day 3: Has surpassed a 2nd midnight with active treatments and services.Agitated overnight- got lorazepam overnight.insomnia.  tolerated clear liquid diet.Now agreeable to interveniton.VS T max 100.7.  Tc 98.5.Abdomen tender RUQ.No leg edema.Lungs clear to auscultation. WBC 13.07. HCT 34.7. K 3.4.CA 8.3. PHOS 2.2.      NPO  1:1 CONTINUAL OBSERVATION  SCD  HAILEY  NEURO CHECKS QSHIFT  DYSPHAGIA SCREEN  FALL PRECAUTIONS  OOB      Scheduled Medications:  cefepime, 2,000 mg, Intravenous, Q12H  chlorhexidine, 15 mL, Mouth/Throat, Q12H ROBER  cyanocobalamin, 1,000 mcg, Intramuscular, Q30 Days  enoxaparin, 40 mg, Subcutaneous, Daily  insulin lispro, 1-6 Units, Subcutaneous, TID AC  insulin lispro, 1-6 Units, Subcutaneous, HS  metroNIDAZOLE, 500 mg, Intravenous, Q8H  OLANZapine, 10 mg, Intramuscular, HS  pantoprazole, 40 mg, Intravenous, Q24H ROBER      Continuous IV Infusions:  lactated ringers, 100 mL/hr, Intravenous, Continuous                NOREPINEPHRINE 4 MG  ML NSS (CMPD ORDER) infusion  Rate: 3.8-112.5 mL/hr Dose: 1-30 mcg/min  Freq: Titrated Route: IV  Last Dose: Stopped (05/03/25 0600)  Start: 05/02/25 0900 End: 05/03/25 1819   Admin Instructions:              0910     1042 [C]     1105     1120     1200     1216     1317     1340      1401     1500     1545     1654     1743     2019 2102     2213      0600     1819-D/C'd       sodium bicarbonate 150 mEq in dextrose 5 % 1,000 mL infusion  Rate: 125 mL/hr Dose: 125 mL/hr  Freq: Continuous Route: IV  Start: 05/02/25 0945 End: 05/02/25 1033   Admin Instructions:              1033-D/C'd  (1039)          sodium chloride 0.9 % infusion  Rate: 126 mL/hr Dose: 126 mL/hr  Freq: Continuous Route: IV  Indications of Use: IV Resuscitation  Last Dose: Stopped (05/02/25 0944)  Start: 05/02/25 0900 End: 05/02/25 0850           0850-D/C'd  0944              vasopressin (PITRESSIN) 20 Units in sodium chloride 0.9 % 100 mL infusion  Rate: 12 mL/hr Dose: 0.04 Units/min  Freq: Continuous Route: IV  Last Dose: Stopped (05/02/25 1915)  Start: 05/02/25 0915 End: 05/02/25 2029   Admin Instructions:              1043 [C]     1654     1915 2029-D/C'd  2202 [C]          PRN Meds:  clonazePAM, 1 mg, Oral, BID PRN  HYDROmorphone, 0.2 mg, Intravenous, Q4H PRN  hydrOXYzine HCL, 25 mg, Oral, Q6H PRN Max 4/day  hydrOXYzine HCL, 50 mg, Oral, Q6H PRN Max 4/day  ibuprofen, 400 mg, Oral, Q6H PRN  LORazepam, 1 mg, Intravenous, Q6H PRN Max 3/day 5/4 X 1  LORazepam, 1 mg, Oral, Q6H PRN Max 3/day  melatonin, 6 mg, Oral, Once PRN  nicotine polacrilex, 2 mg, Oral, Q4H PRN  polyethylene glycol, 17 g, Oral, Daily PRN  senna-docusate sodium, 1 tablet, Oral, Daily PRN  trimethobenzamide, 200 mg, Intramuscular, Q6H PRN      ED Triage Vitals [05/02/25 0900]   Temperature Pulse Respirations Blood Pressure SpO2 Pain Score   97.8 °F (36.6 °C) 80 (!) 23 92/66 98 % No Pain     Weight (last 2 days)       Date/Time Weight    05/04/25 0556 102 (224.87)    05/03/25 0540 103 (227.52)    05/02/25 0900 100 (221.12)            Vital Signs (last 3 days)       Date/Time Temp Pulse Resp BP MAP (mmHg) Arterial Line BP MAP SpO2 Calculated FIO2 (%) - Nasal Cannula Nasal Cannula O2 Flow Rate (L/min) O2 Device Patient Position - Orthostatic VS CVP (mean)  Manuel Coma Scale Score Pain    05/04/25 1122 98.4 °F (36.9 °C) -- -- -- -- -- -- -- -- -- -- -- -- -- --    05/04/25 0900 -- 101 20 140/89 107 -- -- -- -- -- -- -- -- -- --    05/04/25 0800 98.5 °F (36.9 °C) -- -- -- -- -- -- -- -- -- -- -- -- 15 2    05/04/25 0700 -- 101 19 139/81 107 -- -- -- -- -- -- -- -- -- --    05/04/25 0600 -- 99 38 168/97 126 -- -- 96 % -- -- -- -- -- -- --    05/04/25 0437 100.3 °F (37.9 °C) -- -- -- -- -- -- -- -- -- -- -- -- -- --    05/04/25 0424 -- -- -- -- -- -- -- -- -- -- -- -- -- 15 --    05/04/25 0400 -- 106 41 173/101 128 -- -- 98 % -- -- -- -- -- -- --    05/04/25 0300 -- 94 12 179/92 130 -- -- 98 % -- -- -- -- -- -- --    05/04/25 0200 -- 89 24 164/90 119 -- -- 97 % -- -- -- -- -- -- --    05/04/25 0100 -- 88 21 173/102 131 -- -- 98 % -- -- -- -- -- -- --    05/04/25 0014 -- -- -- -- -- -- -- -- -- -- -- -- -- 15 --    05/04/25 0000 99.8 °F (37.7 °C) 98 30 175/99 129 -- -- 98 % -- -- -- -- -- -- --    05/03/25 2300 -- 100 33 170/100 131 -- -- 98 % -- -- -- -- -- -- --    05/03/25 2200 -- 92 27 154/99 121 -- -- 98 % -- -- -- -- -- -- --    05/03/25 2100 -- 94 34 155/89 116 -- -- 98 % -- -- -- -- -- -- --    05/03/25 2040 -- -- -- -- -- -- -- -- -- -- -- -- -- 15 No Pain    05/03/25 2033 100.7 °F (38.2 °C) 91 36 -- -- -- -- 96 % -- -- -- -- -- -- --    05/03/25 1900 -- 94 21 152/98 120 -- -- 98 % -- -- -- -- -- -- --    05/03/25 1800 -- 90 21 149/91 110 114/86 102 mmHg 99 % -- -- -- -- -- -- --    05/03/25 1700 -- 92 31 157/94 125 126/116 120 mmHg 99 % -- -- -- -- -- -- --    05/03/25 1600 -- 120 35 132/83 108 148/116 126 mmHg 97 % -- -- -- -- -- 15 --    05/03/25 1500 98.6 °F (37 °C) 82 20 123/85 100 124/86 102 mmHg 99 % -- -- None (Room air) Lying -- -- --    05/03/25 1400 -- 86 19 130/86 99 128/98 110 mmHg 99 % -- -- -- -- -- -- --    05/03/25 1200 -- 86 18 126/84 101 126/72 94 mmHg 98 % -- -- -- -- -- 15 No Pain    05/03/25 1100 -- 88 17 120/77 94 116/68 88 mmHg 97 % -- --  -- -- -- -- --    05/03/25 1000 -- 88 22 135/73 91 108/92 98 mmHg 98 % -- -- -- -- -- -- --    05/03/25 0900 -- 88 17 117/77 94 106/96 100 mmHg 98 % -- -- -- -- -- -- --    05/03/25 0800 -- 86 21 123/74 91 108/68 86 mmHg 98 % -- -- -- -- -- -- --    05/03/25 0759 -- -- -- -- -- -- -- -- -- -- -- -- -- -- 7    05/03/25 0730 -- 92 21 122/77 94 110/76 92 mmHg 98 % -- -- -- -- -- -- --    05/03/25 0725 -- -- -- -- -- -- -- -- -- -- -- -- -- 15 No Pain    05/03/25 0715 -- 82 20 105/70 83 98/60 76 mmHg 98 % -- -- -- -- -- -- --    05/03/25 0703 98.5 °F (36.9 °C) 92 23 107/72 82 116/88 98 mmHg 99 % -- -- None (Room air) -- -- -- --    05/03/25 0700 -- 80 19 -- -- 106/64 80 mmHg 98 % -- -- -- -- -- -- --    05/03/25 0645 -- 80 20 105/69 82 108/64 80 mmHg 98 % -- -- -- -- -- -- --    05/03/25 0642 98.7 °F (37.1 °C) 76 20 104/70 81 104/60 78 mmHg 98 % -- -- -- -- -- -- --    05/03/25 0630 -- 80 20 104/70 81 106/64 80 mmHg 98 % -- -- -- -- -- -- --    05/03/25 0615 -- 82 21 110/71 85 88/76 82 mmHg 98 % -- -- -- -- -- -- --    05/03/25 0600 -- 80 20 104/69 81 102/60 76 mmHg 98 % -- -- -- -- -- -- --    05/03/25 0545 -- 82 20 94/64 75 96/54 70 mmHg 97 % -- -- -- -- -- -- --    05/03/25 0530 -- 84 22 100/65 75 96/78 86 mmHg 98 % -- -- -- -- -- -- --    05/03/25 0515 -- 76 21 97/67 75 88/60 72 mmHg 97 % -- -- -- -- -- -- --    05/03/25 0500 -- 76 22 93/63 73 106/54 72 mmHg 97 % -- -- -- -- -- -- --    05/03/25 0445 -- 80 22 114/73 86 100/86 90 mmHg 98 % -- -- -- -- -- -- --    05/03/25 0430 -- 78 21 106/70 80 114/54 74 mmHg 97 % -- -- -- -- -- -- --    05/03/25 0415 -- 80 21 104/66 78 106/80 90 mmHg 97 % -- -- -- -- -- -- --    05/03/25 0400 -- 80 20 108/72 84 110/90 98 mmHg 97 % -- -- -- -- -- -- --    05/03/25 0345 -- 82 23 105/74 84 112/64 84 mmHg 98 % -- -- -- -- -- -- --    05/03/25 0330 -- 82 22 -- -- 92/62 76 mmHg 99 % -- -- -- -- -- -- --    05/03/25 0315 -- 82 20 -- -- 126/62 82 mmHg 97 % -- -- -- -- -- -- --    05/03/25  0300 -- 84 21 -- -- 124/58 78 mmHg 97 % -- -- -- -- -- -- --    05/03/25 0245 -- 82 22 -- -- 126/58 78 mmHg 97 % -- -- -- -- -- -- --    05/03/25 0230 -- 84 20 116/77 91 120/58 76 mmHg 96 % -- -- None (Room air) -- -- -- --    05/03/25 0215 -- 86 21 -- -- 80/60 72 mmHg 97 % -- -- -- -- -- -- --    05/03/25 0200 -- 92 20 -- -- 116/68 86 mmHg 96 % -- -- -- -- -- -- --    05/03/25 0145 -- 90 21 -- -- 104/84 92 mmHg 98 % -- -- -- -- -- -- --    05/03/25 0136 -- -- -- -- -- -- -- -- -- -- -- -- -- -- 9    05/03/25 0130 -- 88 21 -- -- 140/60 84 mmHg 98 % -- -- -- -- -- -- --    05/03/25 0115 -- 86 21 -- -- 138/60 82 mmHg 98 % -- -- -- -- -- -- --    05/03/25 0100 -- 84 21 -- -- 138/60 82 mmHg 97 % -- -- None (Room air) -- -- -- --    05/03/25 0045 -- 82 20 -- -- 138/60 82 mmHg 98 % -- -- -- -- -- -- --    05/03/25 0030 -- 84 21 -- -- 126/56 76 mmHg 96 % -- -- -- -- -- -- --    05/03/25 0015 -- 84 22 -- -- 126/58 76 mmHg 97 % -- -- None (Room air) -- -- -- --    05/03/25 0000 -- 84 21 -- -- 120/54 74 mmHg 97 % -- -- None (Room air) -- -- -- --    05/02/25 2352 99.4 °F (37.4 °C) -- -- -- -- -- -- -- -- -- -- -- -- -- --    05/02/25 2345 -- 82 22 -- -- 116/52 70 mmHg 97 % -- -- None (Room air) -- -- -- --    05/02/25 2330 -- 82 22 -- -- 116/54 72 mmHg 97 % -- -- -- -- -- -- No Pain    05/02/25 2315 -- 80 22 -- -- 118/56 76 mmHg 98 % -- -- -- -- -- -- --    05/02/25 2300 -- 74 23 -- -- 122/66 84 mmHg 96 % -- -- -- -- -- -- --    05/02/25 2245 -- 78 23 -- -- 122/68 84 mmHg 97 % -- -- -- -- -- -- --    05/02/25 2230 -- 78 21 -- -- 122/70 86 mmHg 97 % -- -- -- -- -- -- --    05/02/25 2215 -- 72 23 -- -- 130/68 86 mmHg 99 % -- -- -- -- -- -- --    05/02/25 2200 -- 80 22 -- -- 116/64 78 mmHg 97 % -- -- -- -- -- -- --    05/02/25 2145 -- 78 21 -- -- 112/62 78 mmHg 97 % -- -- -- -- -- -- --    05/02/25 2130 -- 82 21 -- -- 108/62 76 mmHg 97 % -- -- -- -- -- -- --    05/02/25 2115 -- 70 22 -- -- 88/54 64 mmHg 97 % -- -- -- -- -- -- --     05/02/25 2100 -- 78 22 -- -- 80/44 54 mmHg 92 % -- -- -- -- -- -- --    05/02/25 2045 -- 80 21 -- -- 84/46 56 mmHg 93 % -- -- -- -- -- -- --    05/02/25 2030 -- 82 23 -- -- 86/50 60 mmHg 93 % -- -- -- -- -- -- --    05/02/25 2015 -- 82 22 -- -- 102/58 72 mmHg 95 % -- -- -- -- -- -- --    05/02/25 2000 98.3 °F (36.8 °C) 82 21 -- -- 100/58 72 mmHg 95 % -- -- -- -- -- -- --    05/02/25 1945 -- 80 21 109/73 85 106/62 76 mmHg 96 % -- -- -- -- -- -- --    05/02/25 1933 -- -- -- -- -- -- -- -- -- -- -- -- -- -- No Pain    05/02/25 1930 -- 80 23 -- -- 108/64 80 mmHg 96 % -- -- -- -- -- -- --    05/02/25 1915 -- 78 21 119/85 96 118/70 88 mmHg 96 % -- -- -- -- -- -- --    05/02/25 1900 -- 76 22 120/88 100 118/70 88 mmHg 96 % -- -- -- -- -- -- --    05/02/25 1845 -- 70 24 98/75 82 100/64 76 mmHg 96 % -- -- -- -- -- -- --    05/02/25 1830 -- 72 23 -- -- 94/60 72 mmHg 96 % -- -- -- -- -- -- --    05/02/25 1815 -- 72 22 108/74 84 96/62 74 mmHg 96 % -- -- -- -- -- -- --    05/02/25 1800 -- 78 22 -- -- 102/66 78 mmHg 95 % -- -- -- -- -- -- --    05/02/25 1745 -- 78 22 116/83 93 112/70 86 mmHg 96 % -- -- -- -- -- -- --    05/02/25 1730 -- 76 22 -- -- 110/70 86 mmHg 97 % -- -- -- -- -- -- --    05/02/25 1715 -- 76 22 128/84 97 108/68 82 mmHg 95 % -- -- -- -- -- -- --    05/02/25 1700 -- 74 23 -- -- 100/64 78 mmHg 96 % -- -- -- -- -- -- --    05/02/25 1645 -- 82 22 -- -- 114/74 88 mmHg 96 % -- -- -- -- -- -- --    05/02/25 1630 -- 76 22 114/81 93 108/70 80 mmHg 97 % -- -- -- -- -- -- --    05/02/25 1615 -- 76 24 -- -- 110/70 86 mmHg 97 % -- -- -- -- -- -- --    05/02/25 1600 -- 80 24 115/80 89 112/74 90 mmHg 97 % -- -- -- -- -- 14 --    05/02/25 1545 97.5 °F (36.4 °C) 78 23 115/80 89 120/74 92 mmHg 96 % -- -- None (Room air) Lying -- -- No Pain    05/02/25 1530 -- 80 21 141/96 111 132/84 102 mmHg 97 % -- -- -- -- -- -- --    05/02/25 1515 -- 80 22 89/69 85 90/58 70 mmHg 95 % -- -- -- -- -- -- --    05/02/25 1500 -- 80 22 121/89 101  120/78 94 mmHg 97 % -- -- -- -- -- -- --    05/02/25 1445 -- 78 22 120/86 97 116/76 90 mmHg 97 % -- -- -- -- -- -- --    05/02/25 1430 -- 80 24 117/82 93 118/76 92 mmHg 97 % -- -- -- -- -- -- --    05/02/25 1415 -- 82 24 119/83 89 114/74 88 mmHg 97 % -- -- -- -- -- -- --    05/02/25 1400 -- 92 18 119/91 109 118/78 92 mmHg 92 % -- -- -- -- -- -- --    05/02/25 1345 -- 94 22 99/73 80 94/64 74 mmHg 95 % -- -- -- -- -- -- --    05/02/25 1330 -- 96 23 121/89 102 118/78 92 mmHg 97 % -- -- -- -- -- -- --    05/02/25 1315 -- 92 22 -- -- 114/76 90 mmHg 97 % -- -- -- -- -- -- --    05/02/25 1300 -- 90 22 109/84 93 112/74 88 mmHg 98 % -- -- -- -- -- -- --    05/02/25 1245 -- 88 23 106/66 84 108/70 84 mmHg 97 % -- -- -- -- -- -- --    05/02/25 1230 -- 96 22 112/79 89 106/70 84 mmHg 97 % -- -- -- -- -- -- --    05/02/25 1215 98.2 °F (36.8 °C) 88 21 117/74 92 116/74 88 mmHg 98 % -- -- -- -- -- -- --    05/02/25 1200 -- 98 24 121/75 91 120/76 92 mmHg 98 % -- -- -- -- -- 14 2    05/02/25 1145 -- 94 24 111/78 91 114/70 86 mmHg 98 % -- -- -- -- -- -- --    05/02/25 1130 -- 90 21 103/77 87 108/68 84 mmHg 98 % -- -- -- -- -- -- --    05/02/25 1115 -- 90 23 94/72 78 100/64 78 mmHg 97 % -- -- -- -- -- -- --    05/02/25 1100 98.9 °F (37.2 °C) 90 23 103/78 86 108/68 82 mmHg 98 % -- -- -- -- -- -- --    05/02/25 1045 -- 90 22 103/78 85 110/70 84 mmHg 98 % -- -- -- -- 14 mmHg -- --    05/02/25 1040 -- 92 22 -- -- 92/58 70 mmHg 97 % -- -- -- -- 8 mmHg -- --    05/02/25 1030 -- 90 23 93/76 81 88/54 66 mmHg 97 % -- -- -- -- -- -- --    05/02/25 1015 -- 92 21 80/70 75 98/64 76 mmHg 98 % -- -- -- -- -- -- --    05/02/25 1000 -- 84 21 80/61 67 90/58 70 mmHg 97 % -- -- -- -- -- -- --    05/02/25 0945 -- 84 20 89/67 75 86/56 66 mmHg 99 % -- -- -- -- -- -- --    05/02/25 0930 -- 80 19 82/63 70 -- -- 100 % -- -- -- -- -- 14 --    05/02/25 0915 -- 82 25 93/65 73 -- -- 99 % -- -- -- -- -- -- --    05/02/25 0910 -- -- -- -- 73 -- -- -- -- -- -- -- -- --  --    05/02/25 0900 97.8 °F (36.6 °C) 80 23 92/66 73 -- -- 98 % 28 2 L/min Nasal cannula Lying -- -- No Pain              Pertinent Labs/Diagnostic Test Results:   Radiology:  XR chest portable ICU   Final Interpretation by Oswaldo Chopra DO (05/02 1344)      No definitive pneumothorax on limited supine imaging status post central line placement.      Tree-in-bud nodularity in the bases on CT are likely obscured on this limited exam. No lobar consolidation.            Resident: Pasquale Ventura I, the attending radiologist, have reviewed the images and agree with the final report above.      Workstation performed: [id number]           Cardiology:  ECG 12 lead   Final Result by Tomasa Bass DO (05/02 1228)   Normal sinus rhythm   Nonspecific ST abnormality   When compared with ECG of 02-May-2025 07:02,   QT has shortened   Confirmed by Tomasa Bass ([id number]) on 5/2/2025 12:28:32 PM              Results from last 7 days   Lab Units 05/01/25 2235 04/28/25  1443   SARS-COV-2  Negative Negative     Results from last 7 days   Lab Units 05/04/25 0435 05/03/25  0532 05/02/25  0959 05/02/25  0511 05/01/25 2235 05/01/25  0256   WBC Thousand/uL 13.07* 17.92* 36.47* 26.34* 14.70* 7.03   HEMOGLOBIN g/dL 12.4 11.1* 12.5 13.0 14.7 14.2   HEMATOCRIT % 34.7* 31.1* 35.5* 37.7 42.9 41.9   PLATELETS Thousands/uL 159 154 276 183 222 235   TOTAL NEUT ABS Thousands/µL  --   --  31.08*  --  12.56* 4.56   BANDS PCT %  --   --   --  8  --   --          Results from last 7 days   Lab Units 05/04/25 0435 05/03/25  0532 05/02/25  2306 05/02/25  0959 05/02/25  0511 05/01/25 2235 04/30/25  1241 04/29/25 2050 04/29/25  1225   SODIUM mmol/L 137 134* 130* 127* 128* 132*   < > 138 138   POTASSIUM mmol/L 3.4* 3.9 3.6 4.3 3.5 3.6   < > 3.1* 3.1*   CHLORIDE mmol/L 103 100 96 92* 90* 92*   < > 105 105   CO2 mmol/L 26 28 28 25 28 30   < > 25 21   ANION GAP mmol/L 8 6 6 10 10 10   < > 8 12   BUN mg/dL 9 16 16 13 10 7   < > 12 16  "  CREATININE mg/dL 0.92 1.05 1.10 1.45* 1.29 1.03   < > 1.16 1.59*   EGFR ml/min/1.73sq m 92 78 74 53 61 80   < > 70 47   CALCIUM mg/dL 8.3* 7.5* 7.2* 7.4* 7.6* 9.0   < > 8.3* 9.4   MAGNESIUM mg/dL  --  2.0  --   --  1.1* 1.3*  --  2.3 1.8*   PHOSPHORUS mg/dL 2.2*  --  2.7  --  <1.0*  --   --   --   --     < > = values in this interval not displayed.     Results from last 7 days   Lab Units 05/03/25  0532 05/02/25  0959 05/02/25  0511 05/01/25  2235 05/01/25  0256 04/30/25  1241 04/28/25  1215   AST U/L 18 27 24 22 19   < >  --    ALT U/L 27 30 26 25 19   < >  --    ALK PHOS U/L 63 63 56 77 73   < >  --    TOTAL PROTEIN g/dL 5.0* 5.2* 5.4* 7.4 6.3*   < >  --    ALBUMIN g/dL 2.7* 3.1* 3.2* 4.4 3.9   < >  --    TOTAL BILIRUBIN mg/dL 0.63 1.26* 1.49* 1.18* 0.37   < >  --    BILIRUBIN DIRECT mg/dL  --   --   --   --  0.05  --   --    AMMONIA umol/L  --   --   --   --   --   --  21    < > = values in this interval not displayed.     Results from last 7 days   Lab Units 05/04/25  1132 05/04/25  0748 05/03/25  2213 05/03/25  1706 05/03/25  1206 05/02/25  2355 05/02/25  1739 05/02/25  1139 05/02/25  0712 05/01/25  2136 05/01/25  1137 05/01/25  0752   POC GLUCOSE mg/dl 194* 133 174* 154* 153* 160* 293* 314* 210* 220* 196* 269*     Results from last 7 days   Lab Units 05/04/25  0435 05/03/25  0532 05/02/25  2306 05/02/25  0959 05/02/25  0511 05/01/25  2235 05/01/25  0256 04/30/25  1241 04/29/25  2050 04/29/25  1225 04/28/25  1012   GLUCOSE RANDOM mg/dL 132 129 181* 280* 226* 227* 198* 202* 255* 238* 152*             No results found for: \"BETA-HYDROXYBUTYRATE\"   Results from last 7 days   Lab Units 05/02/25  1004   PH ART  7.462*   PCO2 ART mm Hg 33.8*   PO2 ART mm Hg 68.6*   HCO3 ART mmol/L 23.6   BASE EXC ART mmol/L 0.4   O2 CONTENT ART mL/dL 17.9   O2 HGB, ARTERIAL % 94.1   ABG SOURCE  Line, Arterial             Results from last 7 days   Lab Units 04/30/25  1241 04/30/25  0606 04/30/25  0325   CK TOTAL U/L 265 435* 385* "     Results from last 7 days   Lab Units 04/28/25  1215 04/28/25  1012   HS TNI 0HR ng/L  --  6   HS TNI 2HR ng/L 6  --    HSTNI D2 ng/L 0  --          Results from last 7 days   Lab Units 05/01/25 2235 04/28/25  1508   PROTIME seconds 13.1 14.3   INR  0.96 1.06   PTT seconds  --  28     Results from last 7 days   Lab Units 05/02/25  0511 04/28/25  1012   TSH 3RD GENERATON uIU/mL 0.219* 0.493     Results from last 7 days   Lab Units 05/02/25  0959 05/02/25  0511 05/01/25 2235 04/28/25  1508   PROCALCITONIN ng/ml 50.31* 19.91* 0.24 0.06     Results from last 7 days   Lab Units 05/02/25  0959 05/02/25  0511 05/01/25 2235 04/29/25  1225 04/28/25  1508   LACTIC ACID mmol/L 1.8 3.0* 1.6 1.9 0.9             Results from last 7 days   Lab Units 04/28/25  1012   BNP pg/mL 15       Results from last 7 days   Lab Units 05/01/25 2235 04/28/25  0918   CLARITY UA  Clear Clear   COLOR UA  Straw Yellow   SPEC GRAV UA  1.010 1.020   PH UA  7.0 6.0   GLUCOSE UA mg/dl 250 (1/4%)* Negative   KETONES UA mg/dl Negative 80 (3+)*   BLOOD UA  10.0* Trace*   PROTEIN UA mg/dl 15 (Trace)* Trace*   NITRITE UA  Negative Negative   BILIRUBIN UA  Negative Negative   UROBILINOGEN UA mg/dL Negative  --    UROBILINOGEN UA (BE) mg/dl  --  <2.0   LEUKOCYTES UA  Negative Negative   WBC UA /hpf 0-1 1-2   RBC UA /hpf 0-1 None Seen   BACTERIA UA /hpf Occasional Occasional   EPITHELIAL CELLS WET PREP /hpf None Seen Occasional   MUCUS THREADS   --  Occasional*     Results from last 7 days   Lab Units 05/01/25 2235 04/28/25  1443   INFLUENZA A PCR  Negative Negative   INFLUENZA B PCR  Negative Negative   RSV PCR  Negative Negative         Results from last 7 days   Lab Units 04/28/25  0918   AMPH/METH  Negative   BARBITURATE UR  Negative   BENZODIAZEPINE UR  Negative   COCAINE UR  Negative   METHADONE URINE  Negative   OPIATE UR  Negative   PCP UR  Negative   THC UR  Negative     Results from last 7 days   Lab Units 04/28/25  1012   ETHANOL LVL mg/dL  <10   ACETAMINOPHEN LVL ug/mL <2*   SALICYLATE LVL mg/dL <5                 Results from last 7 days   Lab Units 05/02/25  1034 05/02/25  1005 05/02/25  0512 05/01/25  2244   BLOOD CULTURE  No Growth at 24 hrs. No Growth at 24 hrs. No Growth at 48 hrs.  No Growth at 48 hrs. No Growth at 48 hrs.  No Growth at 48 hrs.       Past Medical History:   Diagnosis Date    Alcohol withdrawal ([facility])     Alcoholism ([facility])     Anxiety     Back pain     Back pain, chronic     Bipolar 1 disorder ([facility])     Bipolar disorder, current episode mixed, moderate ([facility])     BPH (benign prostatic hyperplasia)     Cardiac disease     CHF (congestive heart failure) ([facility])     Chronic pain disorder     Chronic renal failure     Closed displaced fracture of neck of left scapula     Closed fracture of glenoid cavity and neck of left scapula 09/06/2020    Demyelinating disease ([facility])     Dental disorder     Depression     Diabetes mellitus ([facility])     Drug abuse ([facility])     Drug withdrawal ([facility])     ED (erectile dysfunction)     Edema     Encephalopathy     Encephalopathy     Fatigue     Fracture of left radius 09/06/2020    GERD (gastroesophageal reflux disease)     H/O prolonged Q-T interval on ECG 12/04/2024    Heart rate fast     Hepatitis     unspecified     Hyperlipidemia     Hypertension     Hypokalemia     Hypothyroidism 10/21/2024    Knee buckling     Left rib fracture 09/06/2020    MI (myocardial infarction) ([facility])     Morbid obesity with BMI of 40.0-44.9, adult ([facility])     NIDDY (non-insulin dependent diabetes mellitus in young) ([facility])     Obesity     Opiate dependence ([facility])     Opiate withdrawal ([facility])     Osteoarthritis     Pleural effusion     Pneumonia     Prolonged Q-T interval on ECG     Psychiatric disorder     Psychiatric illness     Psychosis ([facility])     Psychosis ([facility]) 12/17/2024    Renal disorder     SAH (subarachnoid hemorrhage) ([facility]) 09/06/2020    Schizo-affective schizophrenia ([facility])     Spinal stenosis, lumbar     Subdural hematoma  ([facility]) 09/06/2020    Traumatic subdural hemorrhage with loss of consciousness of unspecified duration, initial encounter ([facility]) 12/29/2022    Ulcer of calf ([facility])     Ulnar neuritis, right     Ulnar neuropathy at elbow     Weight loss      Present on Admission:   Type 2 diabetes mellitus, without long-term current use of insulin ([facility])   Septic shock ([facility])   Schizophrenia, unspecified type ([facility])   GERD (gastroesophageal reflux disease)   Chronic kidney disease, stage 2 (mild)   Acute cholecystitis   Essential hypertension   Hypophosphatemia   Hypomagnesemia   Hypothyroidism      Admitting Diagnosis: Acute cholecystitis [K81.0]  Age/Sex: 55 y.o. male    Network Utilization Review Department  ATTENTION: Please call with any questions or concerns to [phone] and carefully listen to the prompts so that you are directed to the right person. All voicemails are confidential.   For Discharge needs, contact Care Management DC Support Team at [phone] opt. [phone]  Send all requests for admission clinical reviews, approved or denied determinations and any other requests to dedicated fax number below belonging to the [facility] where the patient is receiving treatment. List of dedicated fax numbers for the Facilities:  FACILITY NAME UR FAX NUMBER   ADMISSION DENIALS (Administrative/Medical Necessity) [phone]   DISCHARGE SUPPORT TEAM (NETWORK) [phone]   PARENT CHILD HEALTH (Maternity/NICU/Pediatrics) [phone]   [facility] [phone]   [facility] [phone]   [facility] [phone]   [facility] [phone]   [facility] [phone]   [facility] [phone]   [facility] [phone]   [facility] [phone]   [facility] [phone]   Replaced by [facility]  [facility] [phone]   [facility] [phone]   [facility] [phone]

## 2025-05-04 NOTE — ASSESSMENT & PLAN NOTE
Patient transferred from [facility] to [facility] ICU in profound septic shock, with imaging demonstrating gallstones, RUQ tenderness and profound leukocytosis.     Tmax of 100.3, otherwise afebrile and hemodynamically stable off vasopressors and on room air    WBC downtrending 13.7 from 17.9  Hb 12.4 from 11.1  Creatinine 0.92 from 1.05    Plan  - Okay for regular diet today, n.p.o. at midnight  - Continue IV ABX with cefepime Flagyl  - Discussion with patient today, states he may be agreeable to laparoscopic cholecystectomy versus percutaneous cholecystostomy tube, considering, will make n.p.o. at midnight for potential procedures tomorrow  - Analgesia and antiemetic as needed  - Monitor and trend endpoints    Please reach out to [facility] General Surgery resident role if there are any questions or concerns

## 2025-05-04 NOTE — ASSESSMENT & PLAN NOTE
Lab Results   Component Value Date    HGBA1C 8.3 (A) 03/17/2025     Recent Labs     05/02/25  2355 05/03/25  1206 05/03/25  1706 05/03/25  2213   POCGLU 160* 153* 154* 174*     Blood Sugar Average: Last 72 hrs:  (P) 208  Continue with SSI q6  Hyperglycemia likely due to olanzapine

## 2025-05-05 ENCOUNTER — TELEPHONE (OUTPATIENT)
Dept: PSYCHIATRY | Facility: CLINIC | Age: 55
End: 2025-05-05

## 2025-05-05 PROBLEM — R79.89 ABNORMAL TSH: Status: ACTIVE | Noted: 2024-10-21

## 2025-05-05 PROBLEM — E66.9 OBESITY (BMI 30-39.9): Status: ACTIVE | Noted: 2025-05-05

## 2025-05-05 PROBLEM — G47.00 INSOMNIA: Status: ACTIVE | Noted: 2025-05-05

## 2025-05-05 LAB
ANION GAP SERPL CALCULATED.3IONS-SCNC: 8 MMOL/L (ref 4–13)
BASOPHILS # BLD AUTO: 0.02 THOUSANDS/ÂΜL (ref 0–0.1)
BASOPHILS NFR BLD AUTO: 0 % (ref 0–1)
BUN SERPL-MCNC: 6 MG/DL (ref 5–25)
CALCIUM SERPL-MCNC: 8.4 MG/DL (ref 8.4–10.2)
CHLORIDE SERPL-SCNC: 103 MMOL/L (ref 96–108)
CO2 SERPL-SCNC: 27 MMOL/L (ref 21–32)
CREAT SERPL-MCNC: 0.96 MG/DL (ref 0.6–1.3)
EOSINOPHIL # BLD AUTO: 0.1 THOUSAND/ÂΜL (ref 0–0.61)
EOSINOPHIL NFR BLD AUTO: 1 % (ref 0–6)
ERYTHROCYTE [DISTWIDTH] IN BLOOD BY AUTOMATED COUNT: 11.9 % (ref 11.6–15.1)
GFR SERPL CREATININE-BSD FRML MDRD: 88 ML/MIN/1.73SQ M
GLUCOSE SERPL-MCNC: 147 MG/DL (ref 65–140)
GLUCOSE SERPL-MCNC: 149 MG/DL (ref 65–140)
GLUCOSE SERPL-MCNC: 158 MG/DL (ref 65–140)
GLUCOSE SERPL-MCNC: 166 MG/DL (ref 65–140)
GLUCOSE SERPL-MCNC: 175 MG/DL (ref 65–140)
HCT VFR BLD AUTO: 35.3 % (ref 36.5–49.3)
HGB BLD-MCNC: 12.5 G/DL (ref 12–17)
IMM GRANULOCYTES # BLD AUTO: 0.06 THOUSAND/UL (ref 0–0.2)
IMM GRANULOCYTES NFR BLD AUTO: 1 % (ref 0–2)
LYMPHOCYTES # BLD AUTO: 0.81 THOUSANDS/ÂΜL (ref 0.6–4.47)
LYMPHOCYTES NFR BLD AUTO: 7 % (ref 14–44)
MAGNESIUM SERPL-MCNC: 1.8 MG/DL (ref 1.9–2.7)
MCH RBC QN AUTO: 28.9 PG (ref 26.8–34.3)
MCHC RBC AUTO-ENTMCNC: 35.4 G/DL (ref 31.4–37.4)
MCV RBC AUTO: 82 FL (ref 82–98)
MONOCYTES # BLD AUTO: 0.9 THOUSAND/ÂΜL (ref 0.17–1.22)
MONOCYTES NFR BLD AUTO: 8 % (ref 4–12)
NEUTROPHILS # BLD AUTO: 9.13 THOUSANDS/ÂΜL (ref 1.85–7.62)
NEUTS SEG NFR BLD AUTO: 83 % (ref 43–75)
NRBC BLD AUTO-RTO: 0 /100 WBCS
PLATELET # BLD AUTO: 209 THOUSANDS/UL (ref 149–390)
PMV BLD AUTO: 10.3 FL (ref 8.9–12.7)
POTASSIUM SERPL-SCNC: 3.7 MMOL/L (ref 3.5–5.3)
RBC # BLD AUTO: 4.33 MILLION/UL (ref 3.88–5.62)
SODIUM SERPL-SCNC: 138 MMOL/L (ref 135–147)
WBC # BLD AUTO: 11.02 THOUSAND/UL (ref 4.31–10.16)

## 2025-05-05 PROCEDURE — 80048 BASIC METABOLIC PNL TOTAL CA: CPT

## 2025-05-05 PROCEDURE — 99232 SBSQ HOSP IP/OBS MODERATE 35: CPT | Performed by: INTERNAL MEDICINE

## 2025-05-05 PROCEDURE — 85025 COMPLETE CBC W/AUTO DIFF WBC: CPT

## 2025-05-05 PROCEDURE — NC001 PR NO CHARGE: Performed by: SPECIALIST

## 2025-05-05 PROCEDURE — 99447 NTRPROF PH1/NTRNET/EHR 11-20: CPT | Performed by: INTERNAL MEDICINE

## 2025-05-05 PROCEDURE — 83735 ASSAY OF MAGNESIUM: CPT

## 2025-05-05 PROCEDURE — 82948 REAGENT STRIP/BLOOD GLUCOSE: CPT

## 2025-05-05 RX ORDER — SODIUM CHLORIDE, SODIUM LACTATE, POTASSIUM CHLORIDE, CALCIUM CHLORIDE 600; 310; 30; 20 MG/100ML; MG/100ML; MG/100ML; MG/100ML
125 INJECTION, SOLUTION INTRAVENOUS CONTINUOUS
Status: DISCONTINUED | OUTPATIENT
Start: 2025-05-05 | End: 2025-05-06

## 2025-05-05 RX ORDER — METOPROLOL TARTRATE 1 MG/ML
2.5 INJECTION, SOLUTION INTRAVENOUS EVERY 6 HOURS PRN
Status: DISCONTINUED | OUTPATIENT
Start: 2025-05-05 | End: 2025-05-05

## 2025-05-05 RX ORDER — ACETAMINOPHEN 10 MG/ML
1000 INJECTION, SOLUTION INTRAVENOUS ONCE
Status: DISCONTINUED | OUTPATIENT
Start: 2025-05-05 | End: 2025-05-06 | Stop reason: HOSPADM

## 2025-05-05 RX ORDER — POTASSIUM CHLORIDE 14.9 MG/ML
20 INJECTION INTRAVENOUS ONCE
Status: COMPLETED | OUTPATIENT
Start: 2025-05-05 | End: 2025-05-05

## 2025-05-05 RX ORDER — OLANZAPINE 10 MG/2ML
2.5 INJECTION, POWDER, FOR SOLUTION INTRAMUSCULAR ONCE
Status: COMPLETED | OUTPATIENT
Start: 2025-05-05 | End: 2025-05-05

## 2025-05-05 RX ORDER — METOPROLOL TARTRATE 1 MG/ML
2.5 INJECTION, SOLUTION INTRAVENOUS EVERY 8 HOURS
Status: DISCONTINUED | OUTPATIENT
Start: 2025-05-05 | End: 2025-05-06

## 2025-05-05 RX ORDER — MAGNESIUM SULFATE HEPTAHYDRATE 40 MG/ML
2 INJECTION, SOLUTION INTRAVENOUS ONCE
Status: COMPLETED | OUTPATIENT
Start: 2025-05-05 | End: 2025-05-05

## 2025-05-05 RX ADMIN — INSULIN LISPRO 1 UNITS: 100 INJECTION, SOLUTION INTRAVENOUS; SUBCUTANEOUS at 12:51

## 2025-05-05 RX ADMIN — LORAZEPAM 1 MG: 2 INJECTION INTRAMUSCULAR; INTRAVENOUS at 02:01

## 2025-05-05 RX ADMIN — CHLORHEXIDINE GLUCONATE 15 ML: 1.2 SOLUTION ORAL at 08:08

## 2025-05-05 RX ADMIN — METRONIDAZOLE 500 MG: 500 INJECTION, SOLUTION INTRAVENOUS at 12:52

## 2025-05-05 RX ADMIN — SODIUM CHLORIDE, SODIUM LACTATE, POTASSIUM CHLORIDE, AND CALCIUM CHLORIDE 125 ML/HR: .6; .31; .03; .02 INJECTION, SOLUTION INTRAVENOUS at 10:42

## 2025-05-05 RX ADMIN — IBUPROFEN 400 MG: 400 TABLET, FILM COATED ORAL at 08:08

## 2025-05-05 RX ADMIN — IBUPROFEN 400 MG: 400 TABLET, FILM COATED ORAL at 23:51

## 2025-05-05 RX ADMIN — OLANZAPINE 2.5 MG: 10 INJECTION, POWDER, LYOPHILIZED, FOR SOLUTION INTRAMUSCULAR at 18:17

## 2025-05-05 RX ADMIN — INSULIN LISPRO 1 UNITS: 100 INJECTION, SOLUTION INTRAVENOUS; SUBCUTANEOUS at 23:29

## 2025-05-05 RX ADMIN — METOROPROLOL TARTRATE 2.5 MG: 5 INJECTION, SOLUTION INTRAVENOUS at 17:50

## 2025-05-05 RX ADMIN — PANTOPRAZOLE SODIUM 40 MG: 40 INJECTION, POWDER, FOR SOLUTION INTRAVENOUS at 08:09

## 2025-05-05 RX ADMIN — CEFEPIME 2000 MG: 2 INJECTION, POWDER, FOR SOLUTION INTRAVENOUS at 23:30

## 2025-05-05 RX ADMIN — Medication 6 MG: at 23:49

## 2025-05-05 RX ADMIN — LORAZEPAM 1 MG: 2 INJECTION INTRAMUSCULAR; INTRAVENOUS at 17:51

## 2025-05-05 RX ADMIN — MAGNESIUM SULFATE HEPTAHYDRATE 2 G: 40 INJECTION, SOLUTION INTRAVENOUS at 08:11

## 2025-05-05 RX ADMIN — METOROPROLOL TARTRATE 2.5 MG: 5 INJECTION, SOLUTION INTRAVENOUS at 10:43

## 2025-05-05 RX ADMIN — CEFEPIME 2000 MG: 2 INJECTION, POWDER, FOR SOLUTION INTRAVENOUS at 10:37

## 2025-05-05 RX ADMIN — INSULIN LISPRO 1 UNITS: 100 INJECTION, SOLUTION INTRAVENOUS; SUBCUTANEOUS at 08:08

## 2025-05-05 RX ADMIN — ENOXAPARIN SODIUM 40 MG: 40 INJECTION SUBCUTANEOUS at 08:09

## 2025-05-05 RX ADMIN — METRONIDAZOLE 500 MG: 500 INJECTION, SOLUTION INTRAVENOUS at 05:14

## 2025-05-05 RX ADMIN — POTASSIUM CHLORIDE 20 MEQ: 14.9 INJECTION, SOLUTION INTRAVENOUS at 10:37

## 2025-05-05 RX ADMIN — SODIUM CHLORIDE, SODIUM LACTATE, POTASSIUM CHLORIDE, AND CALCIUM CHLORIDE 125 ML/HR: .6; .31; .03; .02 INJECTION, SOLUTION INTRAVENOUS at 12:50

## 2025-05-05 NOTE — ASSESSMENT & PLAN NOTE
Tsh 0.21  Free t4 1.3  Labs reflect hyperthyroid  Check thyroid antibody panel  Consult endocrine  Start scheduled metoprolol iv q8 hours  Likely will start methimazole

## 2025-05-05 NOTE — UTILIZATION REVIEW
Please note, this is a transfer case.  Attempted to start on your portal, but it would not allow.    Use the attached Notification and Clinical to open an IP medical request for our [facility]

## 2025-05-05 NOTE — ASSESSMENT & PLAN NOTE
Lab Results   Component Value Date    HGBA1C 8.3 (A) 03/17/2025       Recent Labs     05/04/25  0748 05/04/25  1132 05/04/25  1608 05/04/25 2118   POCGLU 133 194* 126 190*       Blood Sugar Average: Last 72 hrs:  (P) 189.1

## 2025-05-05 NOTE — UTILIZATION REVIEW
Initial Clinical Review    Admission: Date/Time/Statement:   Admission Orders (From admission, onward)       Ordered        05/02/25 0339  Inpatient Admission  Once                          Orders Placed This Encounter   Procedures    Inpatient Admission     Standing Status:   Standing     Number of Occurrences:   1     Level of Care:   Med Surg [16]     Estimated length of stay:   More than 2 Midnights     Certification:   I certify that inpatient services are medically necessary for this patient for a duration of greater than two midnights. See H&P and MD Progress Notes for additional information about the patient's course of treatment.       Initial Presentation: 55 y.o. male  presented as a transfer from the behavioral health unit for abdominal pain.  PMHx: bipolar disorder, schizophrenia, T2DM, GERD.  Initially transferred from [facility] to [facility] for inpatient psychiatry treatment.  Noted w/ RUQ pain. US showed cholelithiasis with concern for acute cholecystitis and stone at the cystic duct.  Fever noted overnight on BHU.  CT: acute cholecystitis with impacted gallstone within the cystic duct. Family notes pt w/ ongoing back and abdominal pain, s/t mental health has difficulty following up with outpatient providers recently. General Surgery eval. Blood cultures obtained. NPO. SSI w/ BG checks q6h. Started IVF @ 30 mL/kg/hr. IV ABX, IVF bolus 4L. Developed refractory hypotension, started on IV levophed gtt and required vasopressin gtt. Hold anti-HTN. Pt transferred to [facility] for higher level of care in ICU as no ICU available at [facility]. Pt will require operative management.      Scheduled Medications:  ceftriaxone, 1,000 mg, Intravenous, Once  ceftriaxone, 2,000 mg, Intravenous, Q24H  insulin lispro, 1-6 units, SC, Q6H ROBER  metroNIDAZOLE, 500 mg, Intravenos, Q12H ROBER  OLANZapine, 10 mg, Oral, Daily HS  pantoprazole, 40 mg,  Intravenous, Daily    Continuous IV Infusions:  norepinephrine, 1-30 mcg/min, Intravenous, Titrated  sodium chloride 0.9 %, 126 mL/hr, Intravenous, Continuous  vasopressin, 0.04 units/min, Intravenous, Continuous    PRN Meds:  magnesium sulfate, 2 g, Intravenous; 5/2 x1  multi-electrolyte, 1,000 mL bolus, Intravenous, 5/2 x4      ED Triage Vitals   Temperature Pulse Respirations Blood Pressure SpO2 Pain Score   05/02/25 0353 05/02/25 0353 05/02/25 0353 05/02/25 0353 05/02/25 0700 --   99.3 °F (37.4 °C) 101 20 (!) 60/40 91 %      Weight (last 2 days) before discharge       Date/Time Weight    05/02/25 0353 100 (221.34)            Vital Signs (last 3 days) before discharge       Date/Time Temp Pulse Resp BP MAP (mmHg) SpO2 Patient Position - Orthostatic VS    05/02/25 0819 -- 83 16 84/59 -- 97 % --    05/02/25 0815 -- 81 16 79/54 62 97 % --    05/02/25 0809 -- 81 16 78/56 -- 97 % --    05/02/25 0805 -- 79 16 85/59 -- 97 % --    05/02/25 0758 -- 78 16 75/58 -- 98 % --    05/02/25 0753 -- 77 16 71/54 -- 98 % --    05/02/25 0748 -- 77 16 72/47 -- 98 % --    05/02/25 0744 -- 78 16 68/42 -- 98 % --    05/02/25 0739 -- 78 16 51/34 -- 97 % --    05/02/25 0732 -- 80 16 56/34 -- 97 % --    05/02/25 0724 -- 79 -- 63/13 -- -- --    05/02/25 0713 -- 80 16 82/52 -- -- --    05/02/25 0708 99.2 °F (37.3 °C) 86 16 78/59 -- 92 % Lying    05/02/25 0700 -- 83 17 63/40 47 91 % Lying    05/02/25 0630 -- 91 -- 61/48 53 -- Lying    05/02/25 0615 -- 86 -- 61/46 51 -- Lying    05/02/25 0600 -- 87 -- 66/48 53 -- Lying    05/02/25 0545 -- 92 -- 64/47 52 -- Lying    05/02/25 0530 -- 91 -- 65/50 55 -- Lying    05/02/25 0515 -- 98 -- 71/55 60 -- Lying    05/02/25 0501 -- 94 20 70/48 53 -- --    05/02/25 0434 -- 98 -- 66/52 57 -- --    05/02/25 0353 99.3 °F (37.4 °C) 101 20 60/40 -- -- Sitting              Pertinent Labs/Diagnostic Test Results:   Radiology:  CT abdomen pelvis w contrast  Result Date: 5/2/2025  Impression: 1.  Findings of acute  cholecystitis with impacted gallstone within the cystic duct. 2.  Chronic tree-in-bud nodularity within the bilateral lung bases, favored to be inflammatory and/or postinflammatory in etiology.    Cardiology:  ECG 12 lead   Final Result by Burton Stewart MD (05/02 1548)   Normal sinus rhythm   Prolonged QT   Abnormal ECG   When compared with ECG of 02-May-2025 05:44,   No significant change was found   Confirmed by Burton Stewart ([id number]) on 5/2/2025 3:48:17 PM      ECG 12 lead   Final Result by Archie Navas DO (05/02 0619)   Normal sinus rhythm   Prolonged QT   Abnormal ECG   When compared with ECG of 01-May-2025 22:00, (unconfirmed)   No significant change was found   Confirmed by Archie Navas ([id number]) on 5/2/2025 6:19:55 AM          Results from last 7 days   Lab Units 05/01/25 2235 04/28/25  1443   SARS-COV-2  Negative Negative     Results from last 7 days   Lab Units 05/05/25 0513 05/04/25  0435 05/03/25  0532 05/02/25  0959 05/02/25 0511 05/01/25 2235   WBC Thousand/uL 11.02* 13.07* 17.92* 36.47* 26.34* 14.70*   HEMOGLOBIN g/dL 12.5 12.4 11.1* 12.5 13.0 14.7   HEMATOCRIT % 35.3* 34.7* 31.1* 35.5* 37.7 42.9   PLATELETS Thousands/uL 209 159 154 276 183 222   TOTAL NEUT ABS Thousands/µL 9.13*  --   --  31.08*  --  12.56*   BANDS PCT %  --   --   --   --  8  --          Results from last 7 days   Lab Units 05/05/25  0513 05/04/25  0435 05/03/25  0532 05/02/25  2306 05/02/25  0959 05/02/25  0511 05/01/25 2235 04/30/25  1241 04/29/25  2050   SODIUM mmol/L 138 137 134* 130* 127* 128* 132*   < > 138   POTASSIUM mmol/L 3.7 3.4* 3.9 3.6 4.3 3.5 3.6   < > 3.1*   CHLORIDE mmol/L 103 103 100 96 92* 90* 92*   < > 105   CO2 mmol/L 27 26 28 28 25 28 30   < > 25   ANION GAP mmol/L 8 8 6 6 10 10 10   < > 8   BUN mg/dL 6 9 16 16 13 10 7   < > 12   CREATININE mg/dL 0.96 0.92 1.05 1.10 1.45* 1.29 1.03   < > 1.16   EGFR ml/min/1.73sq m 88 92 78 74 53 61 80   < > 70   CALCIUM mg/dL 8.4 8.3* 7.5* 7.2* 7.4* 7.6* 9.0   < >  8.3*   MAGNESIUM mg/dL 1.8*  --  2.0  --   --  1.1* 1.3*  --  2.3   PHOSPHORUS mg/dL  --  2.2*  --  2.7  --  <1.0*  --   --   --     < > = values in this interval not displayed.     Results from last 7 days   Lab Units 05/03/25  0532 05/02/25  0959 05/02/25  0511 05/01/25  2235 05/01/25  0256 04/30/25  1241 04/28/25  1215   AST U/L 18 27 24 22 19   < >  --    ALT U/L 27 30 26 25 19   < >  --    ALK PHOS U/L 63 63 56 77 73   < >  --    TOTAL PROTEIN g/dL 5.0* 5.2* 5.4* 7.4 6.3*   < >  --    ALBUMIN g/dL 2.7* 3.1* 3.2* 4.4 3.9   < >  --    TOTAL BILIRUBIN mg/dL 0.63 1.26* 1.49* 1.18* 0.37   < >  --    BILIRUBIN DIRECT mg/dL  --   --   --   --  0.05  --   --    AMMONIA umol/L  --   --   --   --   --   --  21    < > = values in this interval not displayed.     Results from last 7 days   Lab Units 05/05/25  0736 05/04/25  2118 05/04/25  1608 05/04/25  1132 05/04/25  0748 05/03/25  2213 05/03/25  1706 05/03/25  1206 05/02/25  2355 05/02/25  1739 05/02/25  1139 05/02/25  0712 05/01/25  2136 05/01/25  1137 05/01/25  0752   POC GLUCOSE mg/dl 166* 190* 126 194* 133 174* 154* 153* 160* 293* 314* 210* 220* 196* 269*     Results from last 7 days   Lab Units 05/05/25  0513 05/04/25  0435 05/03/25  0532 05/02/25  2306 05/02/25  0959 05/02/25  0511 05/01/25  2235 05/01/25  0256 04/30/25  1241 04/29/25  2050 04/29/25  1225 04/28/25  1012   GLUCOSE RANDOM mg/dL 149* 132 129 181* 280* 226* 227* 198* 202* 255* 238* 152*       Results from last 7 days   Lab Units 05/02/25  1004   PH ART  7.462*   PCO2 ART mm Hg 33.8*   PO2 ART mm Hg 68.6*   HCO3 ART mmol/L 23.6   BASE EXC ART mmol/L 0.4   O2 CONTENT ART mL/dL 17.9   O2 HGB, ARTERIAL % 94.1   ABG SOURCE  Line, Arterial     Results from last 7 days   Lab Units 05/02/25  0511 04/28/25  1012   TSH 3RD GENERATON uIU/mL 0.219* 0.493     Results from last 7 days   Lab Units 05/02/25  0959 05/02/25  0511 05/01/25  2235 04/28/25  1508   PROCALCITONIN ng/ml 50.31* 19.91* 0.24 0.06     Results  from last 7 days   Lab Units 05/02/25  0959 05/02/25  0511 05/01/25 2235 04/29/25  1225 04/28/25  1508   LACTIC ACID mmol/L 1.8 3.0* 1.6 1.9 0.9     Results from last 7 days   Lab Units 05/01/25 2235 04/28/25  0918   CLARITY UA  Clear Clear   COLOR UA  Straw Yellow   SPEC GRAV UA  1.010 1.020   PH UA  7.0 6.0   GLUCOSE UA mg/dl 250 (1/4%)* Negative   KETONES UA mg/dl Negative 80 (3+)*   BLOOD UA  10.0* Trace*   PROTEIN UA mg/dl 15 (Trace)* Trace*   NITRITE UA  Negative Negative   BILIRUBIN UA  Negative Negative   UROBILINOGEN UA mg/dL Negative  --    UROBILINOGEN UA (BE) mg/dl  --  <2.0   LEUKOCYTES UA  Negative Negative   WBC UA /hpf 0-1 1-2   RBC UA /hpf 0-1 None Seen   BACTERIA UA /hpf Occasional Occasional   EPITHELIAL CELLS WET PREP /hpf None Seen Occasional   MUCUS THREADS   --  Occasional*     Results from last 7 days   Lab Units 05/01/25 2235 04/28/25  1443   INFLUENZA A PCR  Negative Negative   INFLUENZA B PCR  Negative Negative   RSV PCR  Negative Negative     Results from last 7 days   Lab Units 05/02/25  1034 05/02/25  1005 05/02/25  0512 05/01/25  2244   BLOOD CULTURE  No Growth at 48 hrs. No Growth at 48 hrs. No Growth at 48 hrs.  No Growth at 48 hrs. No Growth at 48 hrs.  No Growth at 48 hrs.         Past Medical History:   Diagnosis Date    Alcohol withdrawal ([facility])     Alcoholism ([facility])     Anxiety     Back pain     Back pain, chronic     Bipolar 1 disorder ([facility])     Bipolar disorder, current episode mixed, moderate ([facility])     BPH (benign prostatic hyperplasia)     Cardiac disease     CHF (congestive heart failure) ([facility])     Chronic pain disorder     Chronic renal failure     Closed displaced fracture of neck of left scapula     Closed fracture of glenoid cavity and neck of left scapula 09/06/2020    Demyelinating disease ([facility])     Dental disorder     Depression     Diabetes mellitus ([facility])     Drug abuse ([facility])     Drug withdrawal ([facility])     ED (erectile dysfunction)     Edema     Encephalopathy      Encephalopathy     Fatigue     Fracture of left radius 09/06/2020    GERD (gastroesophageal reflux disease)     H/O prolonged Q-T interval on ECG 12/04/2024    Heart rate fast     Hepatitis     unspecified     Hyperlipidemia     Hypertension     Hypokalemia     Hypothyroidism 10/21/2024    Knee buckling     Left rib fracture 09/06/2020    MI (myocardial infarction) ([facility])     Morbid obesity with BMI of 40.0-44.9, adult ([facility])     NIDDY (non-insulin dependent diabetes mellitus in young) ([facility])     Obesity     Opiate dependence ([facility])     Opiate withdrawal ([facility])     Osteoarthritis     Pleural effusion     Pneumonia     Prolonged Q-T interval on ECG     Psychiatric disorder     Psychiatric illness     Psychosis ([facility])     Psychosis ([facility]) 12/17/2024    Renal disorder     SAH (subarachnoid hemorrhage) ([facility]) 09/06/2020    Schizo-affective schizophrenia ([facility])     Spinal stenosis, lumbar     Subdural hematoma ([facility]) 09/06/2020    Traumatic subdural hemorrhage with loss of consciousness of unspecified duration, initial encounter ([facility]) 12/29/2022    Ulcer of calf ([facility])     Ulnar neuritis, right     Ulnar neuropathy at elbow     Weight loss      Present on Admission:   Acute cholecystitis   Septic shock ([facility])   Hypomagnesemia   GERD (gastroesophageal reflux disease)   Essential hypertension   Type 2 diabetes mellitus, without long-term current use of insulin ([facility])   Schizophrenia, unspecified type ([facility])   Bipolar affective disorder, current episode manic with psychotic symptoms ([facility])   Chronic kidney disease, stage 2 (mild)      Admitting Diagnosis: Acute cholecystitis [K81.0]  Age/Sex: 55 y.o. male    Network Utilization Review Department  ATTENTION: Please call with any questions or concerns to [phone] and carefully listen to the prompts so that you are directed to the right person. All voicemails are confidential.   For Discharge needs, contact Care Management DC Support Team at [phone] opt. [phone]  Send all requests for  admission clinical reviews, approved or denied determinations and any other requests to dedicated fax number below belonging to the campus where the patient is receiving treatment. List of dedicated fax numbers for the Facilities:  FACILITY NAME UR FAX NUMBER   ADMISSION DENIALS (Administrative/Medical Necessity) [phone]   DISCHARGE SUPPORT TEAM (NETWORK) [phone]   PARENT CHILD HEALTH (Maternity/NICU/Pediatrics) [phone]   [facility] [phone]   [facility] [phone]   [facility] [phone]   [facility] [phone]   [facility] [phone]   [facility] [phone]   [facility] [phone]   [facility] [phone]   [facility] [phone]   [facility] [phone]   [facility] [phone]   [facility] [phone]

## 2025-05-05 NOTE — PLAN OF CARE
Problem: PAIN - ADULT  Goal: Verbalizes/displays adequate comfort level or baseline comfort level  Description: Interventions:- Encourage patient to monitor pain and request assistance- Assess pain using appropriate pain scale- Administer analgesics based on type and severity of pain and evaluate response- Implement non-pharmacological measures as appropriate and evaluate response- Consider cultural and social influences on pain and pain management- Notify physician/advanced practitioner if interventions unsuccessful or patient reports new pain  Outcome: Progressing     Problem: INFECTION - ADULT  Goal: Absence or prevention of progression during hospitalization  Description: INTERVENTIONS:- Assess and monitor for signs and symptoms of infection- Monitor lab/diagnostic results- Monitor all insertion sites, i.e. indwelling lines, tubes, and drains- Monitor endotracheal if appropriate and nasal secretions for changes in amount and color- [place] appropriate cooling/warming therapies per order- Administer medications as ordered- Instruct and encourage patient and family to use good hand hygiene technique- Identify and instruct in appropriate isolation precautions for identified infection/condition  Outcome: Progressing  Goal: Absence of fever/infection during neutropenic period  Description: INTERVENTIONS:- Monitor WBC  Outcome: Progressing     Problem: SAFETY ADULT  Goal: Patient will remain free of falls  Description: INTERVENTIONS:- Educate patient/family on patient safety including physical limitations- Instruct patient to call for assistance with activity - Consult OT/PT to assist with strengthening/mobility - Keep Call bell within reach- Keep bed low and locked with side rails adjusted as appropriate- Keep care items and personal belongings within reach- Initiate and maintain comfort rounds- Make Fall Risk Sign visible to staff- Offer Toileting every 2 Hours, in advance of need- Initiate/Maintain alarm-  Obtain necessary fall risk management equipment- Apply yellow socks and bracelet for high fall risk patients- Consider moving patient to room near nurses station  Outcome: Progressing  Goal: Maintain or return to baseline ADL function  Description: INTERVENTIONS:-  Assess patient's ability to carry out ADLs; assess patient's baseline for ADL function and identify physical deficits which impact ability to perform ADLs (bathing, care of mouth/teeth, toileting, grooming, dressing, etc.)- Assess/evaluate cause of self-care deficits - Assess range of motion- Assess patient's mobility; develop plan if impaired- Assess patient's need for assistive devices and provide as appropriate- Encourage maximum independence but intervene and supervise when necessary- Involve family in performance of ADLs- Assess for home care needs following discharge - Consider OT consult to assist with ADL evaluation and planning for discharge- Provide patient education as appropriate  Outcome: Progressing  Goal: Maintains/Returns to pre admission functional level  Description: INTERVENTIONS:- Perform AM-PAC 6 Click Basic Mobility/ Daily Activity assessment daily.- Set and communicate daily mobility goal to care team and patient/family/caregiver. - Collaborate with rehabilitation services on mobility goals if consulted- Perform Range of Motion 3 times a day.- Reposition patient every 2 hours.- Dangle patient 3 times a day- Stand patient 3 times a day- Ambulate patient 3 times a day- Out of bed to chair 3 times a day - Out of bed for meals 3 times a day- Out of bed for toileting- Record patient progress and toleration of activity level   Outcome: Progressing     Problem: DISCHARGE PLANNING  Goal: Discharge to home or other facility with appropriate resources  Description: INTERVENTIONS:- Identify barriers to discharge w/patient and caregiver- Arrange for needed discharge resources and transportation as appropriate- Identify discharge learning needs  (meds, wound care, etc.)- Arrange for interpretive services to assist at discharge as needed- Refer to Case Management Department for coordinating discharge planning if the patient needs post-hospital services based on physician/advanced practitioner order or complex needs related to functional status, cognitive ability, or social support system  Outcome: Progressing     Problem: Knowledge Deficit  Goal: Patient/family/caregiver demonstrates understanding of disease process, treatment plan, medications, and discharge instructions  Description: Complete learning assessment and assess knowledge base.Interventions:- Provide teaching at level of understanding- Provide teaching via preferred learning methods  Outcome: Progressing     Problem: NEUROSENSORY - ADULT  Goal: Achieves stable or improved neurological status  Description: INTERVENTIONS- Monitor and report changes in neurological status- Monitor vital signs such as temperature, blood pressure, glucose, and any other labs ordered - Initiate measures to prevent increased intracranial pressure- Monitor for seizure activity and implement precautions if appropriate    Outcome: Progressing  Goal: Achieves maximal functionality and self care  Description: INTERVENTIONS- Monitor swallowing and airway patency with patient fatigue and changes in neurological status- Encourage and assist patient to increase activity and self care. - Encourage visually impaired, hearing impaired and aphasic patients to use assistive/communication devices  Outcome: Progressing     Problem: CARDIOVASCULAR - ADULT  Goal: Maintains optimal cardiac output and hemodynamic stability  Description: INTERVENTIONS:- Monitor I/O, vital signs and rhythm- Monitor for S/S and trends of decreased cardiac output- Administer and titrate ordered vasoactive medications to optimize hemodynamic stability- Assess quality of pulses, skin color and temperature- Assess for signs of decreased coronary artery  perfusion- Instruct patient to report change in severity of symptoms  Outcome: Progressing  Goal: Absence of cardiac dysrhythmias or at baseline rhythm  Description: INTERVENTIONS:- Continuous cardiac monitoring, vital signs, obtain 12 lead EKG if ordered- Administer antiarrhythmic and heart rate control medications as ordered- Monitor electrolytes and administer replacement therapy as ordered  Outcome: Progressing     Problem: RESPIRATORY - ADULT  Goal: Achieves optimal ventilation and oxygenation  Description: INTERVENTIONS:- Assess for changes in respiratory status- Assess for changes in mentation and behavior- Position to facilitate oxygenation and minimize respiratory effort- Oxygen administered by appropriate delivery if ordered- Initiate smoking cessation education as indicated- Encourage broncho-pulmonary hygiene including cough, deep breathe, Incentive Spirometry- Assess the need for suctioning and aspirate as needed- Assess and instruct to report SOB or any respiratory difficulty- Respiratory Therapy support as indicated  Outcome: Progressing     Problem: GASTROINTESTINAL - ADULT  Goal: Minimal or absence of nausea and/or vomiting  Description: INTERVENTIONS:- Administer IV fluids if ordered to ensure adequate hydration- Maintain NPO status until nausea and vomiting are resolved- Nasogastric tube if ordered- Administer ordered antiemetic medications as needed- Provide nonpharmacologic comfort measures as appropriate- Advance diet as tolerated, if ordered- Consider nutrition services referral to assist patient with adequate nutrition and appropriate food choices  Outcome: Progressing  Goal: Maintains or returns to baseline bowel function  Description: INTERVENTIONS:- Assess bowel function- Encourage oral fluids to ensure adequate hydration- Administer IV fluids if ordered to ensure adequate hydration- Administer ordered medications as needed- Encourage mobilization and activity- Consider nutritional  services referral to assist patient with adequate nutrition and appropriate food choices  Outcome: Progressing  Goal: Maintains adequate nutritional intake  Description: INTERVENTIONS:- Monitor percentage of each meal consumed- Identify factors contributing to decreased intake, treat as appropriate- Assist with meals as needed- Monitor I&O, weight, and lab values if indicated- Obtain nutrition services referral as needed  Outcome: Progressing  Goal: Oral mucous membranes remain intact  Description: INTERVENTIONS- Assess oral mucosa and hygiene practices- Implement preventative oral hygiene regimen- Implement oral medicated treatments as ordered- Initiate Nutrition services referral as needed  Outcome: Progressing     Problem: GENITOURINARY - ADULT  Goal: Maintains or returns to baseline urinary function  Description: INTERVENTIONS:- Assess urinary function- Encourage oral fluids to ensure adequate hydration if ordered- Administer IV fluids as ordered to ensure adequate hydration- Administer ordered medications as needed- Offer frequent toileting- Follow urinary retention protocol if ordered  Outcome: Progressing  Goal: Absence of urinary retention  Description: INTERVENTIONS:- Assess patient’s ability to void and empty bladder- Monitor I/O- Bladder scan as needed- Discuss with physician/AP medications to alleviate retention as needed- Discuss catheterization for long term situations as appropriate  Outcome: Progressing     Problem: METABOLIC, FLUID AND ELECTROLYTES - ADULT  Goal: Electrolytes maintained within normal limits  Description: INTERVENTIONS:- Monitor labs and assess patient for signs and symptoms of electrolyte imbalances- Administer electrolyte replacement as ordered- Monitor response to electrolyte replacements, including repeat lab results as appropriate- Instruct patient on fluid and nutrition as appropriate  Outcome: Progressing  Goal: Fluid balance maintained  Description: INTERVENTIONS:- Monitor  labs - Monitor I/O and WT- Instruct patient on fluid and nutrition as appropriate- Assess for signs & symptoms of volume excess or deficit  Outcome: Progressing  Goal: Glucose maintained within target range  Description: INTERVENTIONS:- Monitor Blood Glucose as ordered- Assess for signs and symptoms of hyperglycemia and hypoglycemia- Administer ordered medications to maintain glucose within target range- Assess nutritional intake and initiate nutrition service referral as needed  Outcome: Progressing     Problem: SKIN/TISSUE INTEGRITY - ADULT  Goal: Skin Integrity remains intact(Skin Breakdown Prevention)  Description: Assess:-Perform Jag assessment-Clean and moisturize skin-Inspect skin when repositioning, toileting, and assisting with ADLS-Assess under medical devices-Assess extremities for adequate circulation and sensation Bed Management:-Have minimal linens on bed & keep smooth, unwrinkled-Change linens as needed when moist or perspiring-Avoid sitting or lying in one position for more than 2 hours while in bed-Keep HOB at 45 degrees Toileting:-Offer bedside commode-Assess for incontinence-Use incontinent care products after each incontinent episode Activity:-Mobilize patient 3 times a day-Encourage activity and walks on unit-Encourage or provide ROM exercises -Turn and reposition patient every 2 Hours-Use appropriate equipment to lift or move patient in bed-Instruct/ Assist with weight shifting when out of bed in chair-Consider limitation of chair time 2 hour intervalsSkin Care:-Avoid use of baby powder, tape, friction and shearing, hot water or constrictive clothing-Relieve pressure over bony prominences-Do not massage red bony areasNext Steps:-Teach patient strategies to minimize risks -Consider consults to  interdisciplinary teams  Outcome: Progressing     Problem: HEMATOLOGIC - ADULT  Goal: Maintains hematologic stability  Description: INTERVENTIONS- Assess for signs and symptoms of bleeding or  hemorrhage- Monitor labs- Administer supportive blood products/factors as ordered and appropriate  Outcome: Progressing     Problem: MUSCULOSKELETAL - ADULT  Goal: Maintain or return mobility to safest level of function  Description: INTERVENTIONS:- Assess patient's ability to carry out ADLs; assess patient's baseline for ADL function and identify physical deficits which impact ability to perform ADLs (bathing, care of mouth/teeth, toileting, grooming, dressing, etc.)- Assess/evaluate cause of self-care deficits - Assess range of motion- Assess patient's mobility- Assess patient's need for assistive devices and provide as appropriate- Encourage maximum independence but intervene and supervise when necessary- Involve family in performance of ADLs- Assess for home care needs following discharge - Consider OT consult to assist with ADL evaluation and planning for discharge- Provide patient education as appropriate  Outcome: Progressing  Goal: Maintain proper alignment of affected body part  Description: INTERVENTIONS:- Support, maintain and protect limb and body alignment- Provide patient/ family with appropriate education  Outcome: Progressing     Problem: PSYCHOSIS  Goal: Will report no hallucinations or delusions  Description: Interventions:- Administer medication as  ordered- Every waking shifts and PRN assess for the presence of hallucinations and or delusions- Assist with reality testing to support increasing orientation- Assess if patient's hallucinations or delusions are encouraging self-harm or harm to others and intervene as appropriate  Outcome: Progressing     Problem: ANXIETY  Goal: Will report anxiety at manageable levels  Description: INTERVENTIONS:- Administer medication as ordered- Teach and encourage coping skills- Provide emotional support- Assess patient/family for anxiety and ability to cope  Outcome: Progressing  Goal: By discharge: Patient will verbalize 2 strategies to deal with  anxiety  Description: Interventions:- Identify any obvious source/trigger to anxiety- Staff will assist patient in applying identified coping technique/skills- Encourage attendance of scheduled groups and activities  Outcome: Progressing     Problem: SELF CARE DEFICIT  Goal: Return ADL status to a safe level of function  Description: INTERVENTIONS:- Administer medication as ordered- Assess ADL deficits and provide assistive devices as needed- Obtain PT/OT consults as needed- Assist and instruct patient to increase activity and self care as tolerated  Outcome: Progressing     Problem: Prexisting or High Potential for Compromised Skin Integrity  Goal: Skin integrity is maintained or improved  Description: INTERVENTIONS:- Identify patients at risk for skin breakdown- Assess and monitor skin integrity- Assess and monitor nutrition and hydration status- Monitor labs - Assess for incontinence - Turn and reposition patient- Assist with mobility/ambulation- Relieve pressure over bony prominences- Avoid friction and shearing- Provide appropriate hygiene as needed including keeping skin clean and dry- Evaluate need for skin moisturizer/barrier cream- Collaborate with interdisciplinary team - Patient/family teaching- Consider wound care consult   Outcome: Progressing     Problem: Nutrition/Hydration-ADULT  Goal: Nutrient/Hydration intake appropriate for improving, restoring or maintaining nutritional needs  Description: Monitor and assess patient's nutrition/hydration status for malnutrition. Collaborate with interdisciplinary team and initiate plan and interventions as ordered.  Monitor patient's weight and dietary intake as ordered or per policy. Utilize nutrition screening tool and intervene as necessary. Determine patient's food preferences and provide high-protein, high-caloric foods as appropriate. INTERVENTIONS:- Monitor oral intake, urinary output, labs, and treatment plans- Assess nutrition and hydration status and  recommend course of action- Evaluate amount of meals eaten- Assist patient with eating if necessary - Allow adequate time for meals- Recommend/ encourage appropriate diets, oral nutritional supplements, and vitamin/mineral supplements- Order, calculate, and assess calorie counts as needed- Recommend, monitor, and adjust tube feedings and TPN/PPN based on assessed needs- Assess need for intravenous fluids- Provide specific nutrition/hydration education as appropriate- Include patient/family/caregiver in decisions related to nutrition  Monitor and assess patient's nutrition/hydration status for malnutrition. Collaborate with interdisciplinary team and initiate plan and interventions as ordered.  Monitor patient's weight and dietary intake as ordered or per policy. Utilize nutrition screening tool and intervene as necessary. Determine patient's food preferences and provide high-protein, high-caloric foods as appropriate. INTERVENTIONS:- Monitor oral intake, urinary output, labs, and treatment plans- Assess nutrition and hydration status and recommend course of action- Evaluate amount of meals eaten- Assist patient with eating if necessary - Allow adequate time for meals- Recommend/ encourage appropriate diets, oral nutritional supplements, and vitamin/mineral supplements- Order, calculate, and assess calorie counts as needed- Recommend, monitor, and adjust tube feedings and TPN/PPN based on assessed needs- Assess need for intravenous fluids- Provide specific nutrition/hydration education as appropriate- Include patient/family/caregiver in decisions related to nutrition  Outcome: Progressing

## 2025-05-05 NOTE — CONSULTS
e-Consult ([facility]) - Endocrinology   Name: Solo Mack 55 y.o. male I MRN: [id number]  Unit/Bed#: [place] [address] -01 I Date of Admission: 5/2/2025   Date of Service: 5/5/2025 I Hospital Day: 3   Inpatient consult to Endocrinology  Consult performed by: Isamar Oakley MD  Consult ordered by: Tasha Joseph DO        Physician Requesting Evaluation: Tasha Joseph DO   Reason for Evaluation / Principal Problem: abnormal TSH    55yom with no past thyroid history who presented on 5/2/25 for acute RUQ pain. CT with IV contrast showed acute cholecystitis. Cholecystectomy planned today.    TFTs were drawn a few hours after the iodinated contrast and showed a slightly decreased TSH and mildly elevated Free T4. He has had sever recent normal TSH values checked on previous presentations 4/28/25, 12/1725, 12/2/24, and 11/17/2024      ASSESSMENT:  55yom with abnormal TSH    RECOMMENDATIONS:  Abnormal TSH: I suspect the changes in labs were a result of the contrast load as he had a normal TSH values in recent months. Recommend repeating TSH and Free t4.     11-20 minutes, >50% of the total time devoted to medical consultative verbal/EMR discussion between providers. Written report will be generated in the EMR.

## 2025-05-05 NOTE — ASSESSMENT & PLAN NOTE
Appreciate psychiatry recommendations  Continue klonopin 1mg bid prn  Continue zyprexa 10mg qhs  Will reconsult psych after surgical procedure

## 2025-05-05 NOTE — PROGRESS NOTES
Progress Note - Hospitalist   Name: Solo Mack 55 y.o. male I MRN: [id number]  Unit/Bed#: [address] 225-01 I Date of Admission: 5/2/2025   Date of Service: 5/5/2025 I Hospital Day: 3    Assessment & Plan  Septic shock (HCC)  Pt was transferred from  to [facility] icu due to profound septic shock with imagining demonstrating gallstones, ruq tenderness, and profound leukocytosis  Currently treated with cefepime/flagyl  On levophed which had been weaned to off  Scheduled for or today  GERD (gastroesophageal reflux disease)  Continue ppi   Essential hypertension  Had been on clonidine and metoprolol outpt   Currently on hold   Add metoprolol iv prn   Type 2 diabetes mellitus, without long-term current use of insulin ([facility])  Lab Results   Component Value Date    HGBA1C 8.3 (A) 03/17/2025       Recent Labs     05/04/25  1132 05/04/25  1608 05/04/25  2118 05/05/25  0736   POCGLU 194* 126 190* 166*       Blood Sugar Average: Last 72 hrs:  (P) 187  Continue insulin sliding scale     Schizophrenia, unspecified type ([facility])  Appreciate psychiatry recommendations  Continue klonopin 1mg bid prn  Continue zyprexa 10mg qhs  Will reconsult psych after surgical procedure   Abnormal TSH  Tsh 0.21  Free t4 1.3  Labs reflect hyperthyroid  Check thyroid antibody panel  Consult endocrine  Start scheduled metoprolol iv q8 hours  Likely will start methimazole   Chronic kidney disease, stage 2 (mild)  Lab Results   Component Value Date    EGFR 88 05/05/2025    EGFR 92 05/04/2025    EGFR 78 05/03/2025    CREATININE 0.96 05/05/2025    CREATININE 0.92 05/04/2025    CREATININE 1.05 05/03/2025   Chauncey r/o  Creatinine currently at baseline which is 0.9-1.2   H/O prolonged Q-T interval on ECG  Qtc has improved  Replace mg. Keep potassium greater than 4.0    Hypomagnesemia  Continue replacement   Acute cholecystitis  Please see septic shock   Hypophosphatemia    Insomnia  Add scheduled melatonin  Continue zyprexa     VTE Pharmacologic Prophylaxis:    held for anticipated surgery     Mobility:   Basic Mobility Inpatient Raw Score: 21  -HLM Goal: 6: Walk 10 steps or more  JH-HLM Achieved: 6: Walk 10 steps or more      Patient Centered Rounds:  discussed in detail with his nurse       Education and Discussions with Family / Patient: Patient declined call to .     Current Length of Stay: 3 day(s)  Current Patient Status: Inpatient     Discharge Plan: Anticipate discharge in >72 hrs to tbd    Code Status: Level 1 - Full Code    Subjective   Pt seen and examined. Pt was pleasant and had eye contact. He answered all my questions appropriately. He knew of the surgery and understood why the surgery was needed. He has not been sleeping. He states it happens sometimes at home. He usually takes benadryl to help him or Ambien. He does at time still have hallucinations per nursing staff.     Objective :  Temp:  [98.1 °F (36.7 °C)-100.8 °F (38.2 °C)] 100.8 °F (38.2 °C)  HR:  [] 104  BP: (140-167)/() 155/100  Resp:  [16-20] 18  SpO2:  [94 %-97 %] 94 %  O2 Device: None (Room air)    Body mass index is 33.87 kg/m².     Input and Output Summary (last 24 hours):     Intake/Output Summary (Last 24 hours) at 5/5/2025 1003  Last data filed at 5/5/2025 0601  Gross per 24 hour   Intake 3872.92 ml   Output 5200 ml   Net -1327.08 ml       Physical Exam  Constitutional:       Appearance: Normal appearance.   HENT:      Head: Normocephalic and atraumatic.   Eyes:      Extraocular Movements: Extraocular movements intact.      Pupils: Pupils are equal, round, and reactive to light.   Cardiovascular:      Rate and Rhythm: Regular rhythm. Tachycardia present.      Heart sounds: No murmur heard.     No friction rub. No gallop.   Pulmonary:      Effort: Pulmonary effort is normal. No respiratory distress.      Breath sounds: Normal breath sounds. No wheezing, rhonchi or rales.   Abdominal:      General: Bowel sounds are normal. There is no distension.      Palpations:  Abdomen is soft.      Tenderness: There is abdominal tenderness. There is no guarding or rebound.   Musculoskeletal:      Right lower leg: No edema.      Left lower leg: No edema.   Neurological:      Mental Status: He is alert and oriented to person, place, and time.       Lines/Drains:      Lab Results: I have reviewed the following results:   Results from last 7 days   Lab Units 05/05/25  0513 05/02/25  0959 05/02/25  0511   WBC Thousand/uL 11.02*   < > 26.34*   HEMOGLOBIN g/dL 12.5   < > 13.0   HEMATOCRIT % 35.3*   < > 37.7   PLATELETS Thousands/uL 209   < > 183   BANDS PCT %  --   --  8   SEGS PCT % 83*   < >  --    LYMPHO PCT % 7*   < > 5*   MONO PCT % 8   < > 4   EOS PCT % 1   < > 0    < > = values in this interval not displayed.     Results from last 7 days   Lab Units 05/05/25  0513 05/04/25  0435 05/03/25  0532   SODIUM mmol/L 138   < > 134*   POTASSIUM mmol/L 3.7   < > 3.9   CHLORIDE mmol/L 103   < > 100   CO2 mmol/L 27   < > 28   BUN mg/dL 6   < > 16   CREATININE mg/dL 0.96   < > 1.05   ANION GAP mmol/L 8   < > 6   CALCIUM mg/dL 8.4   < > 7.5*   ALBUMIN g/dL  --   --  2.7*   TOTAL BILIRUBIN mg/dL  --   --  0.63   ALK PHOS U/L  --   --  63   ALT U/L  --   --  27   AST U/L  --   --  18   GLUCOSE RANDOM mg/dL 149*   < > 129    < > = values in this interval not displayed.     Results from last 7 days   Lab Units 05/01/25  2235   INR  0.96     Results from last 7 days   Lab Units 05/05/25  0736 05/04/25  2118 05/04/25  1608 05/04/25  1132 05/04/25  0748 05/03/25  2213 05/03/25  1706 05/03/25  1206 05/02/25  2355 05/02/25  1739 05/02/25  1139 05/02/25  0712   POC GLUCOSE mg/dl 166* 190* 126 194* 133 174* 154* 153* 160* 293* 314* 210*         Results from last 7 days   Lab Units 05/02/25  0959 05/02/25  0511 05/01/25  2235 04/29/25  1225 04/28/25  1508   LACTIC ACID mmol/L 1.8 3.0* 1.6 1.9 0.9   PROCALCITONIN ng/ml 50.31* 19.91* 0.24  --  0.06       Recent Cultures (last 7 days):   Results from last 7 days    Lab Units 05/02/25  1034 05/02/25  1005 05/02/25  0512 05/01/25  2244   BLOOD CULTURE  No Growth at 48 hrs. No Growth at 48 hrs. No Growth at 48 hrs.  No Growth at 48 hrs. No Growth at 48 hrs.  No Growth at 48 hrs.       Imaging Results Review: No pertinent imaging studies reviewed.  Other Study Results Review: No additional pertinent studies reviewed.    Last 24 Hours Medication List:     Current Facility-Administered Medications:     ceFEPime (MAXIPIME) 2,000 mg in dextrose 5 % 50 mL IVPB, Q12H, Last Rate: 2,000 mg (05/04/25 2248)    chlorhexidine (PERIDEX) 0.12 % oral rinse 15 mL, Q12H ROBER    clonazePAM (KlonoPIN) tablet 1 mg, BID PRN    cyanocobalamin injection 1,000 mcg, Q30 Days    enoxaparin (LOVENOX) subcutaneous injection 40 mg, Daily    HYDROmorphone HCl (DILAUDID) injection 0.2 mg, Q4H PRN    hydrOXYzine HCL (ATARAX) tablet 25 mg, Q6H PRN Max 4/day    hydrOXYzine HCL (ATARAX) tablet 50 mg, Q6H PRN Max 4/day    ibuprofen (MOTRIN) tablet 400 mg, Q6H PRN    insulin lispro (HumALOG/ADMELOG) 100 units/mL subcutaneous injection 1-6 Units, TID AC **AND** Fingerstick Glucose (POCT), TID AC    insulin lispro (HumALOG/ADMELOG) 100 units/mL subcutaneous injection 1-6 Units, HS    lactated ringers infusion, Continuous, Last Rate: 100 mL/hr (05/04/25 2254)    LORazepam (ATIVAN) injection 1 mg, Q6H PRN Max 3/day    LORazepam (ATIVAN) tablet 1 mg, Q6H PRN Max 3/day    magnesium sulfate 2 g/50 mL IVPB (premix) 2 g, Once, Last Rate: 2 g (05/05/25 0811)    melatonin tablet 6 mg, HS    metoprolol (LOPRESSOR) injection 2.5 mg, Q8H    metroNIDAZOLE (FLAGYL) IVPB (premix) 500 mg 100 mL, Q8H, Last Rate: 500 mg (05/05/25 0514)    nicotine polacrilex (NICORETTE) gum 2 mg, Q4H PRN    OLANZapine (ZyPREXA) IM injection 10 mg, HS    pantoprazole (PROTONIX) injection 40 mg, Q24H ROBER    polyethylene glycol (MIRALAX) packet 17 g, Daily PRN    senna-docusate sodium (SENOKOT S) 8.6-50 mg per tablet 1 tablet, Daily PRN     trimethobenzamide (TIGAN) IM injection 200 mg, Q6H PRN    Administrative Statements   Today, Patient Was Seen By: Tasha Joseph DO

## 2025-05-05 NOTE — QUICK NOTE
Pt will require re evaluation by psychiatry after surgical procedure. He will need to remain in the hospital for this evaluation and will not be able to leave ama if he requests to. If pt threatens to leave ama will consult psych at that time other wise psych will be consulted again tomorrow

## 2025-05-05 NOTE — PROGRESS NOTES
Progress Note - Surgery-General   Name: Solo Mack 55 y.o. male I MRN: [id number]  Unit/Bed#: [address] 225-01 I Date of Admission: 5/2/2025   Date of Service: 5/5/2025 I Hospital Day: 3    Assessment & Plan  Septic shock (HCC)  Patient transferred from [place] to [facility] ICU in profound septic shock, with imaging demonstrating gallstones, RUQ tenderness and profound leukocytosis.     Tmax of 100.8 this a.m., hypertensive at times, satting appropriately on room air    UOP 5.8L    WBC 11.0 from 13  Hb 12.5 from 12.4  Creatinine 0.96 from 0.92    Plan  - NPO for procedure  - Continue IV ABX with cefepime Flagyl  - Tentative OR today 5/5 for laparoscopic, possible open cholecystectomy; patient is consented with risks and benefits explained  - Analgesia and antiemetic as needed  - Monitor and trend endpoints    Please reach out to [facility] General Surgery resident role if there are any questions or concerns  Type 2 diabetes mellitus, without long-term current use of insulin ([facility])  Lab Results   Component Value Date    HGBA1C 8.3 (A) 03/17/2025       Recent Labs     05/04/25  0748 05/04/25  1132 05/04/25  1608 05/04/25  2118   POCGLU 133 194* 126 190*       Blood Sugar Average: Last 72 hrs:  (P) 189.1    Acute cholecystitis  See above plan      Subjective   Patient seen and examined.  NAEON.  Patient agreeable to surgery today.  States that abdominal pain is improving.  Denies N/V.  Having bowel function.    Objective :  Temp:  [98.1 °F (36.7 °C)-100.8 °F (38.2 °C)] 100.8 °F (38.2 °C)  HR:  [] 104  BP: (139-167)/() 155/100  Resp:  [16-20] 18  SpO2:  [94 %-97 %] 94 %  O2 Device: None (Room air)    I/O         05/01 0701 05/02 0700 05/02 0701 05/03 0700 05/03 0701  05/04 0700    P.O.   450    I.V. (mL/kg)  2844.7 (27.6) 1541.7 (15)    IV Piggyback  500 400    Total Intake(mL/kg)  3344.7 (32.5) 2391.7 (23.2)    Urine (mL/kg/hr)  3150 3475 (3.2)    Total Output  3150 3475    Net  +194.7 -1083.3                      Physical Exam  General - no acute distress  CV - warm, regular rate  Pulm - normal work of breathing, no respiratory distress  Abd -soft, mildly tender to RUQ, nondistended.  No signs of peritonitis.  Neuro - m/s grossly intact, cn grossly intact  Ext - moving all extremities       Lab Results: I have reviewed the following results:  Recent Labs     05/02/25  0959 05/02/25  2306 05/03/25  0532 05/04/25  0435 05/05/25  0513   WBC 36.47*  --  17.92* 13.07* 11.02*   HGB 12.5  --  11.1* 12.4 12.5   HCT 35.5*  --  31.1* 34.7* 35.3*     --  154 159 209   SODIUM 127*   < > 134* 137 138   K 4.3   < > 3.9 3.4* 3.7   CL 92*   < > 100 103 103   CO2 25   < > 28 26 27   BUN 13   < > 16 9 6   CREATININE 1.45*   < > 1.05 0.92 0.96   GLUC 280*   < > 129 132 149*   MG  --    < > 2.0  --  1.8*   PHOS  --    < >  --  2.2*  --    AST 27  --  18  --   --    ALT 30  --  27  --   --    ALB 3.1*  --  2.7*  --   --    TBILI 1.26*  --  0.63  --   --    ALKPHOS 63  --  63  --   --    LACTICACID 1.8  --   --   --   --     < > = values in this interval not displayed.
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
## 2025-05-05 NOTE — ASSESSMENT & PLAN NOTE
Lab Results   Component Value Date    HGBA1C 8.3 (A) 03/17/2025       Recent Labs     05/04/25  1132 05/04/25  1608 05/04/25  2118 05/05/25  0736   POCGLU 194* 126 190* 166*       Blood Sugar Average: Last 72 hrs:  (P) 187  Continue insulin sliding scale

## 2025-05-05 NOTE — ASSESSMENT & PLAN NOTE
Patient transferred from [place] to [facility] ICU in profound septic shock, with imaging demonstrating gallstones, RUQ tenderness and profound leukocytosis.     Tmax of 100.8 this a.m., hypertensive at times, satting appropriately on room air    UOP 5.8L    WBC 11.0 from 13  Hb 12.5 from 12.4  Creatinine 0.96 from 0.92    Plan  - NPO for procedure  - Continue IV ABX with cefepime Flagyl  - Tentative OR today 5/5 for laparoscopic, possible open cholecystectomy; patient is consented with risks and benefits explained  - Analgesia and antiemetic as needed  - Monitor and trend endpoints    Please reach out to [facility] General Surgery resident role if there are any questions or concerns

## 2025-05-05 NOTE — UTILIZATION REVIEW
NOTIFICATION OF INPATIENT MEDICAL ADMISSION   AUTHORIZATION REQUEST   SERVICING FACILITY:   [address] [address]  Tax ID: [id number]  NPI: [id number] ATTENDING PROVIDER:  Attending Name and NPI#: Elias Birch Do [[id number]]  Address: [address] [place] [address]  Phone: [phone]     ADMISSION INFORMATION:  Place of Service: Inpatient [facility]  Place of Service Code: 21  Inpatient Admission Date/Time: 5/2/25  3:32 AM  Discharge Date/Time: 5/2/2025  8:26 AM  Admitting Diagnosis Code/Description:  Acute cholecystitis [K81.0]     UTILIZATION REVIEW CONTACT:  Terra Mcnamara Utilization   Network Utilization Review Department  Phone: [phone] Fax: [phone]  Email: [email or web address]  Contact for approvals/pending authorizations, clinical reviews, and discharge.     PHYSICIAN ADVISORY SERVICES:  Medical Necessity Denial & Zksu-lp-Dvsr Review  Phone: [phone]  Fax: [phone]  Email: [email or web address]     DISCHARGE SUPPORT TEAM:  For Patients Discharge Needs & Updates  Phone: [phone] opt. 2 Fax: [phone]  Email: [email or web address]

## 2025-05-05 NOTE — ASSESSMENT & PLAN NOTE
Lab Results   Component Value Date    EGFR 88 05/05/2025    EGFR 92 05/04/2025    EGFR 78 05/03/2025    CREATININE 0.96 05/05/2025    CREATININE 0.92 05/04/2025    CREATININE 1.05 05/03/2025   Chauncey r/o  Creatinine currently at baseline which is 0.9-1.2

## 2025-05-05 NOTE — NURSING NOTE
RN called pt [facility] pharmacy in [place] and confirmed all meds PTA. See PTA med list for list of meds.

## 2025-05-05 NOTE — ASSESSMENT & PLAN NOTE
Pt was transferred from  to [facility] icu due to profound septic shock with imagining demonstrating gallstones, ruq tenderness, and profound leukocytosis  Currently treated with cefepime/flagyl  On levophed which had been weaned to off  Scheduled for or today

## 2025-05-05 NOTE — PLAN OF CARE
Problem: PAIN - ADULT  Goal: Verbalizes/displays adequate comfort level or baseline comfort level  Description: Interventions:- Encourage patient to monitor pain and request assistance- Assess pain using appropriate pain scale- Administer analgesics based on type and severity of pain and evaluate response- Implement non-pharmacological measures as appropriate and evaluate response- Consider cultural and social influences on pain and pain management- Notify physician/advanced practitioner if interventions unsuccessful or patient reports new pain  Outcome: Progressing     Problem: INFECTION - ADULT  Goal: Absence or prevention of progression during hospitalization  Description: INTERVENTIONS:- Assess and monitor for signs and symptoms of infection- Monitor lab/diagnostic results- Monitor all insertion sites, i.e. indwelling lines, tubes, and drains- Monitor endotracheal if appropriate and nasal secretions for changes in amount and color- [place] appropriate cooling/warming therapies per order- Administer medications as ordered- Instruct and encourage patient and family to use good hand hygiene technique- Identify and instruct in appropriate isolation precautions for identified infection/condition  Outcome: Progressing  Goal: Absence of fever/infection during neutropenic period  Description: INTERVENTIONS:- Monitor WBC  Outcome: Progressing     Problem: SAFETY ADULT  Goal: Patient will remain free of falls  Description: INTERVENTIONS:- Educate patient/family on patient safety including physical limitations- Instruct patient to call for assistance with activity - Consult OT/PT to assist with strengthening/mobility - Keep Call bell within reach- Keep bed low and locked with side rails adjusted as appropriate- Keep care items and personal belongings within reach- Initiate and maintain comfort rounds- Make Fall Risk Sign visible to staff- Offer Toileting every 2 Hours, in advance of need- Initiate/Maintain bed alarm-  Obtain necessary fall risk management equipment: - Apply yellow socks and bracelet for high fall risk patients- Consider moving patient to room near nurses station  Outcome: Progressing  Goal: Maintain or return to baseline ADL function  Description: INTERVENTIONS:-  Assess patient's ability to carry out ADLs; assess patient's baseline for ADL function and identify physical deficits which impact ability to perform ADLs (bathing, care of mouth/teeth, toileting, grooming, dressing, etc.)- Assess/evaluate cause of self-care deficits - Assess range of motion- Assess patient's mobility; develop plan if impaired- Assess patient's need for assistive devices and provide as appropriate- Encourage maximum independence but intervene and supervise when necessary- Involve family in performance of ADLs- Assess for home care needs following discharge - Consider OT consult to assist with ADL evaluation and planning for discharge- Provide patient education as appropriate  Outcome: Progressing  Goal: Maintains/Returns to pre admission functional level  Description: INTERVENTIONS:- Perform AM-PAC 6 Click Basic Mobility/ Daily Activity assessment daily.- Set and communicate daily mobility goal to care team and patient/family/caregiver. - Collaborate with rehabilitation services on mobility goals if consulted- Perform Range of Motion 6 times a day.- Reposition patient every 2 hours.- Dangle patient 3 times a day- Stand patient 3 times a day- Ambulate patient 3 times a day- Out of bed to chair 3 times a day - Out of bed for meals 3 times a day- Out of bed for toileting- Record patient progress and toleration of activity level   Outcome: Progressing     Problem: DISCHARGE PLANNING  Goal: Discharge to home or other facility with appropriate resources  Description: INTERVENTIONS:- Identify barriers to discharge w/patient and caregiver- Arrange for needed discharge resources and transportation as appropriate- Identify discharge learning  needs (meds, wound care, etc.)- Arrange for interpretive services to assist at discharge as needed- Refer to Case Management Department for coordinating discharge planning if the patient needs post-hospital services based on physician/advanced practitioner order or complex needs related to functional status, cognitive ability, or social support system  Outcome: Progressing     Problem: Knowledge Deficit  Goal: Patient/family/caregiver demonstrates understanding of disease process, treatment plan, medications, and discharge instructions  Description: Complete learning assessment and assess knowledge base.Interventions:- Provide teaching at level of understanding- Provide teaching via preferred learning methods  Outcome: Progressing     Problem: NEUROSENSORY - ADULT  Goal: Achieves stable or improved neurological status  Description: INTERVENTIONS- Monitor and report changes in neurological status- Monitor vital signs such as temperature, blood pressure, glucose, and any other labs ordered - Initiate measures to prevent increased intracranial pressure- Monitor for seizure activity and implement precautions if appropriate    Outcome: Progressing  Goal: Achieves maximal functionality and self care  Description: INTERVENTIONS- Monitor swallowing and airway patency with patient fatigue and changes in neurological status- Encourage and assist patient to increase activity and self care. - Encourage visually impaired, hearing impaired and aphasic patients to use assistive/communication devices  Outcome: Progressing     Problem: CARDIOVASCULAR - ADULT  Goal: Maintains optimal cardiac output and hemodynamic stability  Description: INTERVENTIONS:- Monitor I/O, vital signs and rhythm- Monitor for S/S and trends of decreased cardiac output- Administer and titrate ordered vasoactive medications to optimize hemodynamic stability- Assess quality of pulses, skin color and temperature- Assess for signs of decreased coronary artery  perfusion- Instruct patient to report change in severity of symptoms  Outcome: Progressing  Goal: Absence of cardiac dysrhythmias or at baseline rhythm  Description: INTERVENTIONS:- Continuous cardiac monitoring, vital signs, obtain 12 lead EKG if ordered- Administer antiarrhythmic and heart rate control medications as ordered- Monitor electrolytes and administer replacement therapy as ordered  Outcome: Progressing     Problem: RESPIRATORY - ADULT  Goal: Achieves optimal ventilation and oxygenation  Description: INTERVENTIONS:- Assess for changes in respiratory status- Assess for changes in mentation and behavior- Position to facilitate oxygenation and minimize respiratory effort- Oxygen administered by appropriate delivery if ordered- Initiate smoking cessation education as indicated- Encourage broncho-pulmonary hygiene including cough, deep breathe, Incentive Spirometry- Assess the need for suctioning and aspirate as needed- Assess and instruct to report SOB or any respiratory difficulty- Respiratory Therapy support as indicated  Outcome: Progressing     Problem: GASTROINTESTINAL - ADULT  Goal: Minimal or absence of nausea and/or vomiting  Description: INTERVENTIONS:- Administer IV fluids if ordered to ensure adequate hydration- Maintain NPO status until nausea and vomiting are resolved- Nasogastric tube if ordered- Administer ordered antiemetic medications as needed- Provide nonpharmacologic comfort measures as appropriate- Advance diet as tolerated, if ordered- Consider nutrition services referral to assist patient with adequate nutrition and appropriate food choices  Outcome: Progressing  Goal: Maintains or returns to baseline bowel function  Description: INTERVENTIONS:- Assess bowel function- Encourage oral fluids to ensure adequate hydration- Administer IV fluids if ordered to ensure adequate hydration- Administer ordered medications as needed- Encourage mobilization and activity- Consider nutritional  services referral to assist patient with adequate nutrition and appropriate food choices  Outcome: Progressing  Goal: Maintains adequate nutritional intake  Description: INTERVENTIONS:- Monitor percentage of each meal consumed- Identify factors contributing to decreased intake, treat as appropriate- Assist with meals as needed- Monitor I&O, weight, and lab values if indicated- Obtain nutrition services referral as needed  Outcome: Progressing  Goal: Oral mucous membranes remain intact  Description: INTERVENTIONS- Assess oral mucosa and hygiene practices- Implement preventative oral hygiene regimen- Implement oral medicated treatments as ordered- Initiate Nutrition services referral as needed  Outcome: Progressing     Problem: METABOLIC, FLUID AND ELECTROLYTES - ADULT  Goal: Electrolytes maintained within normal limits  Description: INTERVENTIONS:- Monitor labs and assess patient for signs and symptoms of electrolyte imbalances- Administer electrolyte replacement as ordered- Monitor response to electrolyte replacements, including repeat lab results as appropriate- Instruct patient on fluid and nutrition as appropriate  Outcome: Progressing  Goal: Fluid balance maintained  Description: INTERVENTIONS:- Monitor labs - Monitor I/O and WT- Instruct patient on fluid and nutrition as appropriate- Assess for signs & symptoms of volume excess or deficit  Outcome: Progressing  Goal: Glucose maintained within target range  Description: INTERVENTIONS:- Monitor Blood Glucose as ordered- Assess for signs and symptoms of hyperglycemia and hypoglycemia- Administer ordered medications to maintain glucose within target range- Assess nutritional intake and initiate nutrition service referral as needed  Outcome: Progressing     Problem: PSYCHOSIS  Goal: Will report no hallucinations or delusions  Description: Interventions:- Administer medication as  ordered- Every waking shifts and PRN assess for the presence of hallucinations and or  delusions- Assist with reality testing to support increasing orientation- Assess if patient's hallucinations or delusions are encouraging self-harm or harm to others and intervene as appropriate  Outcome: Progressing     Problem: ANXIETY  Goal: Will report anxiety at manageable levels  Description: INTERVENTIONS:- Administer medication as ordered- Teach and encourage coping skills- Provide emotional support- Assess patient/family for anxiety and ability to cope  Outcome: Progressing  Goal: By discharge: Patient will verbalize 2 strategies to deal with anxiety  Description: Interventions:- Identify any obvious source/trigger to anxiety- Staff will assist patient in applying identified coping technique/skills- Encourage attendance of scheduled groups and activities  Outcome: Progressing     Problem: SELF CARE DEFICIT  Goal: Return ADL status to a safe level of function  Description: INTERVENTIONS:- Administer medication as ordered- Assess ADL deficits and provide assistive devices as needed- Obtain PT/OT consults as needed- Assist and instruct patient to increase activity and self care as tolerated  Outcome: Progressing

## 2025-05-06 LAB
ALBUMIN SERPL BCG-MCNC: 2.9 G/DL (ref 3.5–5)
ALP SERPL-CCNC: 77 U/L (ref 34–104)
ALT SERPL W P-5'-P-CCNC: 12 U/L (ref 7–52)
ANION GAP SERPL CALCULATED.3IONS-SCNC: 6 MMOL/L (ref 4–13)
AST SERPL W P-5'-P-CCNC: 14 U/L (ref 13–39)
BILIRUB SERPL-MCNC: 0.45 MG/DL (ref 0.2–1)
BUN SERPL-MCNC: 11 MG/DL (ref 5–25)
CALCIUM ALBUM COR SERPL-MCNC: 8.6 MG/DL (ref 8.3–10.1)
CALCIUM SERPL-MCNC: 7.7 MG/DL (ref 8.4–10.2)
CHLORIDE SERPL-SCNC: 106 MMOL/L (ref 96–108)
CO2 SERPL-SCNC: 22 MMOL/L (ref 21–32)
CREAT SERPL-MCNC: 1.15 MG/DL (ref 0.6–1.3)
ERYTHROCYTE [DISTWIDTH] IN BLOOD BY AUTOMATED COUNT: 12.4 % (ref 11.6–15.1)
GFR SERPL CREATININE-BSD FRML MDRD: 70 ML/MIN/1.73SQ M
GLUCOSE SERPL-MCNC: 147 MG/DL (ref 65–140)
GLUCOSE SERPL-MCNC: 174 MG/DL (ref 65–140)
GLUCOSE SERPL-MCNC: 194 MG/DL (ref 65–140)
GLUCOSE SERPL-MCNC: 210 MG/DL (ref 65–140)
GLUCOSE SERPL-MCNC: 213 MG/DL (ref 65–140)
HCT VFR BLD AUTO: 35.4 % (ref 36.5–49.3)
HGB BLD-MCNC: 12 G/DL (ref 12–17)
MAGNESIUM SERPL-MCNC: 2.8 MG/DL (ref 1.9–2.7)
MCH RBC QN AUTO: 28.4 PG (ref 26.8–34.3)
MCHC RBC AUTO-ENTMCNC: 33.9 G/DL (ref 31.4–37.4)
MCV RBC AUTO: 84 FL (ref 82–98)
PLATELET # BLD AUTO: 213 THOUSANDS/UL (ref 149–390)
PMV BLD AUTO: 10.5 FL (ref 8.9–12.7)
POTASSIUM SERPL-SCNC: 4.4 MMOL/L (ref 3.5–5.3)
PROT SERPL-MCNC: 5.8 G/DL (ref 6.4–8.4)
RBC # BLD AUTO: 4.22 MILLION/UL (ref 3.88–5.62)
SODIUM SERPL-SCNC: 134 MMOL/L (ref 135–147)
WBC # BLD AUTO: 8.05 THOUSAND/UL (ref 4.31–10.16)

## 2025-05-06 PROCEDURE — 99232 SBSQ HOSP IP/OBS MODERATE 35: CPT | Performed by: INTERNAL MEDICINE

## 2025-05-06 PROCEDURE — 86800 THYROGLOBULIN ANTIBODY: CPT | Performed by: INTERNAL MEDICINE

## 2025-05-06 PROCEDURE — 99232 SBSQ HOSP IP/OBS MODERATE 35: CPT | Performed by: SPECIALIST

## 2025-05-06 PROCEDURE — 85027 COMPLETE CBC AUTOMATED: CPT | Performed by: INTERNAL MEDICINE

## 2025-05-06 PROCEDURE — 80053 COMPREHEN METABOLIC PANEL: CPT | Performed by: INTERNAL MEDICINE

## 2025-05-06 PROCEDURE — 99223 1ST HOSP IP/OBS HIGH 75: CPT | Performed by: PSYCHIATRY & NEUROLOGY

## 2025-05-06 PROCEDURE — 86376 MICROSOMAL ANTIBODY EACH: CPT | Performed by: INTERNAL MEDICINE

## 2025-05-06 PROCEDURE — 83735 ASSAY OF MAGNESIUM: CPT | Performed by: INTERNAL MEDICINE

## 2025-05-06 PROCEDURE — 82948 REAGENT STRIP/BLOOD GLUCOSE: CPT

## 2025-05-06 RX ADMIN — Medication 6 MG: at 22:36

## 2025-05-06 RX ADMIN — METRONIDAZOLE 500 MG: 500 INJECTION, SOLUTION INTRAVENOUS at 16:12

## 2025-05-06 RX ADMIN — ENOXAPARIN SODIUM 40 MG: 40 INJECTION SUBCUTANEOUS at 08:49

## 2025-05-06 RX ADMIN — INSULIN LISPRO 1 UNITS: 100 INJECTION, SOLUTION INTRAVENOUS; SUBCUTANEOUS at 08:49

## 2025-05-06 RX ADMIN — METRONIDAZOLE 500 MG: 500 INJECTION, SOLUTION INTRAVENOUS at 00:47

## 2025-05-06 RX ADMIN — METRONIDAZOLE 500 MG: 500 INJECTION, SOLUTION INTRAVENOUS at 08:48

## 2025-05-06 RX ADMIN — INSULIN LISPRO 2 UNITS: 100 INJECTION, SOLUTION INTRAVENOUS; SUBCUTANEOUS at 17:16

## 2025-05-06 RX ADMIN — CEFEPIME 2000 MG: 2 INJECTION, POWDER, FOR SOLUTION INTRAVENOUS at 22:36

## 2025-05-06 RX ADMIN — Medication 12.5 MG: at 22:35

## 2025-05-06 RX ADMIN — METOROPROLOL TARTRATE 2.5 MG: 5 INJECTION, SOLUTION INTRAVENOUS at 02:25

## 2025-05-06 RX ADMIN — SODIUM CHLORIDE, SODIUM LACTATE, POTASSIUM CHLORIDE, AND CALCIUM CHLORIDE 125 ML/HR: .6; .31; .03; .02 INJECTION, SOLUTION INTRAVENOUS at 03:47

## 2025-05-06 RX ADMIN — CHLORHEXIDINE GLUCONATE 15 ML: 1.2 SOLUTION ORAL at 09:29

## 2025-05-06 RX ADMIN — CEFEPIME 2000 MG: 2 INJECTION, POWDER, FOR SOLUTION INTRAVENOUS at 09:45

## 2025-05-06 RX ADMIN — PANTOPRAZOLE SODIUM 40 MG: 40 INJECTION, POWDER, FOR SOLUTION INTRAVENOUS at 08:48

## 2025-05-06 RX ADMIN — INSULIN LISPRO 2 UNITS: 100 INJECTION, SOLUTION INTRAVENOUS; SUBCUTANEOUS at 22:36

## 2025-05-06 RX ADMIN — METOROPROLOL TARTRATE 2.5 MG: 5 INJECTION, SOLUTION INTRAVENOUS at 09:29

## 2025-05-06 RX ADMIN — IBUPROFEN 400 MG: 400 TABLET, FILM COATED ORAL at 22:36

## 2025-05-06 NOTE — TELEMEDICINE
TeleConsultation - Behavioral Health   Name: Solo Mack 55 y.o. male I MRN: [id number]  Unit/Bed#: [place] [address] -01 I Date of Admission: 5/2/2025   Date of Service: 5/6/2025 I Hospital Day: 4    Inpatient consult to Psychiatry  Consult performed by: Tiffanie Garcia MD  Consult ordered by: Gabriele Agee MD        Physician Requesting Consult: Tasha Joseph DO  Principal Problem:Septic shock (HCC)  Reason for Consult: Psychotic symptoms with disorganized behavior prior to inpatient medical hospitalization     Assessment & Plan   Unspecified mood disorder  Unspecified psychotic disorder    TREATMENT PLAN RECOMMENDATIONS:  Medications: Continue with current psychotropic medications       Informed consent for the above medication has been obtained including discussion of the risks, benefits and alternatives: Yes    Disposition:  Reconsult when patient is medically clear    Legal Status Recommendation:  n/a    Multiple Antipsychotic Review: N/A    Psychotherapy/Psychoeducation: N/A to this case.    Other/Medical Work Up and/or treatment modality recommendations: N/A to this case.    Patient Caregiver/Family Education: N/A    Follow-up: Re-consult PRN    Report regarding the above Assessment and Treatment plan was provided to: Dr. Joseph      History of Present Illness      55-year-old male with a history of mood disorder with psychosis presents to the hospital with disorganized behavior and thought process in addition to psychotic symptoms of auditory hallucinations, internal preoccupation, responding to internal stimuli.  As per Dr. Joseph who is hoped to patient's wife, patient's wife reported that he has been increasingly having psychotic symptoms over the past few weeks with increased disorganized behavior, delusional thinking and poor self-care.  As per patient patient has been taking his psychotropic medication regularly.  He reported that he was diagnosed with bipolar disorder.  Today he appeared more calm  and control, no disorganized behavior or speech was observed, no agitation impulsivity was reported.  He reported he was able to sleep better last night.  As per nurse patient was more agitated yesterday.  He denied any significant depressive symptoms, no suicidal ideation was reported.  No homicidal ideation was reported.  He reported having difficulty falling and staying sleep but stated that it has been improved.        Psychiatric Review Of Systems:     Sleep changes: yes  appetite changes: yes  weight changes: no  anxiety/panic: no  roderick: no  guilty/hopeless: no  self injurious behavior/risky behavior: no    Historical Information     Past Psychiatric History:     Psychiatric Hospitalizations: Multiple past inpatient psychiatric admissions Outpatient Treatment History: current treatment with psychiatrist/Advanced Practitioner  Suicide Attempts:  No History of Suicidal attempt reported History of self-harm: No History of self injurious behavior was reported Violence History: No History of physically aggressive  behavior was reported    Substance Abuse History:    E-Cigarette/Vaping    E-Cigarette Use Never User           Social History       Tobacco History       Smoking Status  Former      Passive Exposure  Never      Smokeless Tobacco Use  Never      Tobacco Comments  patient is a non - smoker              Alcohol History       Alcohol Use Status  Not Currently Comment  beer here and there per pt              Drug Use       Drug Use Status  Not Currently              Sexual Activity       Sexually Active  Not Currently              Other Factors    Not Asked                 Additional Substance Use Detail       Questions Responses    Problems Due to Past Use of Alcohol? Yes    Problems Due to Past Use of Substances? No    Substance Use Assessment Denies substance use within the past 12 months    Alcohol Use Frequency Past abuse    Cannabis frequency Never used    Comment: Never used on 8/6/2018     Heroin  Frequency Denies use in past 12 months    Cocaine frequency Never used    Comment: Never used on 8/6/2018     Crack Cocaine Frequency Denies use in past 12 months    Methamphetamine Frequency Denies use in past 12 months    Narcotic Frequency Denies use in past 12 months    Benzodiazepine Frequency Denies use in past 12 months    Amphetamine frequency Denies use in past 12 months    Barbituate Frequency Denies use use in past 12 months    Inhalant frequency Never used    Comment: Never used on 8/6/2018     Hallucinogen frequency Never used    Comment: Never used on 8/6/2018     Ecstasy frequency Never used    Comment: Never used on 8/6/2018     Other drug frequency Never used    Comment: Never used on 8/6/2018     Opiate frequency Denies use in past 12 months    Not reviewed.                Social History:    Social History       Social History     Socioeconomic History    Marital status: /Civil Union     Spouse name: Not on file    Number of children: Not on file    Years of education: Not on file    Highest education level: Not on file   Occupational History    Not on file   Tobacco Use    Smoking status: Former     Passive exposure: Never    Smokeless tobacco: Never    Tobacco comments:     patient is a non - smoker   Vaping Use    Vaping status: Never Used   Substance and Sexual Activity    Alcohol use: Not Currently     Comment: beer here and there per pt    Drug use: Not Currently    Sexual activity: Not Currently   Other Topics Concern    Not on file   Social History Narrative    ** Merged History Encounter **          Social Drivers of Health     Financial Resource Strain: Low Risk  (12/4/2024)    Overall Financial Resource Strain (CARDIA)     Difficulty of Paying Living Expenses: Not very hard   Food Insecurity: No Food Insecurity (5/2/2025)    Nursing - Inadequate Food Risk Classification     Worried About Running Out of Food in the Last Year: Never true     Ran Out of Food in the Last Year: Never  true     Ran Out of Food in the Last Year: Never true   Transportation Needs: No Transportation Needs (2025)    Nursing - Transportation Risk Classification     Lack of Transportation: Not on file     Lack of Transportation: No   Physical Activity: Not on file   Stress: Not on file   Social Connections: Not on file   Intimate Partner Violence: Unknown (2025)    Nursing IPS     Feels Physically and Emotionally Safe: Not on file     Physically Hurt by Someone: Not on file     Humiliated or Emotionally Abused by Someone: Not on file     Physically Hurt by Someone: No     Hurt or Threatened by Someone: No   Housing Stability: Unknown (2025)    Nursing: Inadequate Housing Risk Classification     Has Housing: Not on file     Worried About Losing Housing: Not on file     Unable to Get Utilities: Not on file     Unable to Pay for Housing in the Last Year: No     Has Housin                   Past Medical History:    Past Medical History:   Diagnosis Date    Alcohol withdrawal (HCC)     Alcoholism (HCC)     Anxiety     Back pain     Back pain, chronic     Bipolar 1 disorder (HCC)     Bipolar disorder, current episode mixed, moderate (HCC)     BPH (benign prostatic hyperplasia)     Cardiac disease     CHF (congestive heart failure) (HCC)     Chronic pain disorder     Chronic renal failure     Closed displaced fracture of neck of left scapula     Closed fracture of glenoid cavity and neck of left scapula 2020    Demyelinating disease (HCC)     Dental disorder     Depression     Diabetes mellitus (HCC)     Drug abuse (HCC)     Drug withdrawal (HCC)     ED (erectile dysfunction)     Edema     Encephalopathy     Encephalopathy     Fatigue     Fracture of left radius 2020    GERD (gastroesophageal reflux disease)     H/O prolonged Q-T interval on ECG 2024    Heart rate fast     Hepatitis     unspecified     Hyperlipidemia     Hypertension     Hypokalemia     Hypothyroidism 10/21/2024    Knee  buckling     Left rib fracture 09/06/2020    MI (myocardial infarction) ([facility])     Morbid obesity with BMI of 40.0-44.9, adult ([facility]ly [facility])     NIDDY (non-insulin dependent diabetes mellitus in young) (Formerly [facility])     Obesity     Opiate dependence ([facility]ly [facility])     Opiate withdrawal ([facility])     Osteoarthritis     Pleural effusion     Pneumonia     Prolonged Q-T interval on ECG     Psychiatric disorder     Psychiatric illness     Psychosis ([facility])     Psychosis ([facility]) 12/17/2024    Renal disorder     SAH (subarachnoid hemorrhage) ([facility]ly [facility]) 09/06/2020    Schizo-affective schizophrenia ([facility]ly [facility])     Spinal stenosis, lumbar     Subdural hematoma ([facility]) 09/06/2020    Traumatic subdural hemorrhage with loss of consciousness of unspecified duration, initial encounter ([facility]) 12/29/2022    Ulcer of calf ([facility])     Ulnar neuritis, right     Ulnar neuropathy at elbow     Weight loss           Meds/Allergies     Allergies   Allergen Reactions    Haldol [Haloperidol]     Lexapro [Escitalopram Oxalate] Hives    Penicillins Other (See Comments)     Unknown, reaction as child     all current active meds have been reviewed    Medical Review Of Systems:    Review of Systems      Objective     Vital signs in last 24 hours:  Temp:  [99 °F (37.2 °C)-99.4 °F (37.4 °C)] 99.2 °F (37.3 °C)  HR:  [88-98] 98  BP: (131-138)/(87-93) 131/93  Resp:  [16-18] 18  SpO2:  [87 %-94 %] 87 %  O2 Device: None (Room air)      Intake/Output Summary (Last 24 hours) at 5/6/2025 0900  Last data filed at 5/6/2025 0545  Gross per 24 hour   Intake 3095 ml   Output 900 ml   Net 2195 ml       Mental Status Evaluation::    Appearance disheveled   Behavior cooperative, calm   Speech normal rate, normal volume, normal pitch   Mood normal   Affect normal range and intensity, appropriate   Thought Processes organized, goal directed   Associations intact associations   Thought Content no overt delusions   Perceptual Disturbances: no auditory hallucinations, no visual hallucinations   Abnormal Thoughts  Risk  Potential Suicidal ideation - None  Homicidal ideation - None  Potential for aggression - No   Orientation oriented to person, place, and time/date   Memory recent and remote memory grossly intact   Consciousness alert and awake   Attention Span Concentration Span attention span and concentration are age appropriate   Intellect appears to be of average intelligence   Insight intact   Judgement intact   Muscle Strength and  Gait No assessed   Motor Activity no abnormal movements   Language no difficulty naming common objects, no difficulty repeating a phrase, no difficulty writing a sentence   Fund of Knowledge adequate knowledge of current events  adequate fund of knowledge regarding past history  adequate fund of knowledge regarding vocabulary                Lab Results:  I have reviewed the following lab results:   .     05/06/25  0501   WBC 8.05   HGB 12.0   HCT 35.4*      SODIUM 134*   K 4.4      CO2 22   BUN 11   CREATININE 1.15   GLUC 194*   MG 2.8*   AST 14   ALT 12   ALB 2.9*   TBILI 0.45   ALKPHOS 77          Lipid Profile:   Lab Results   Component Value Date    CHOLESTEROL 124 12/04/2024    HDL 36 (L) 12/04/2024    TRIG 169 (H) 12/04/2024    LDLCALC 54 12/04/2024    NONHDLC 88 12/04/2024   Thyroid Studies:   Lab Results   Component Value Date    JLJ4QADVDLRG 0.219 (L) 05/02/2025    FREET4 1.30 (H) 05/02/2025     Ammonia:   Lab Results   Component Value Date    AMMONIA 21 04/28/2025     Drug Levels:   Lab Results   Component Value Date    LITHIUM <0.10 (L) 04/13/2024       Imaging Results Review: No pertinent imaging studies reviewed.  Other Study Results Review: No additional pertinent studies reviewed.    Code Status: Level 1 - Full Code  Advance Directive and Living Will:      Power of :    POLST:      Screenings:    1. Nutrition Screening  Nutrition Assessment (completed by Staff): Nutrition  Feeding: Able to feed self  Diet Type: NPO    2. Pain Screening  Pain Screening: Pain  Assessment  Pain Assessment Tool: 0-10  Pain Score: 0  Pain Location/Orientation: Orientation: Right, Location: Abdomen  Pain Onset/Description: Onset: Ongoing  Hospital Pain Intervention(s): Repositioned    3. Suicide Screening                                                         [place]-SUICIDE SEVERITY RATING SCALE (C-SSRS)                            1. In the last month have you wished you were dead or wished you could go to sleep and not wake up? No  2. In the last month, have you actually had thoughts about killing yourself? No  6. Have you done anything, started to do anything, or prepared to do anything to end your life in the last 3 months? No  Suicide Risk Level : Low     Administrative Statements   VIRTUAL CARE DOCUMENTATION:     1. This service was provided via Telemedicine using Teams Virtual Rounding      2. Parties in the room with patient during teleconsult Patient only    3. Confidentiality My office door was closed     4. Participants No one else was in the room    5. Patient acknowledged consent and understanding of privacy and security of the  Telemedicine consult. I informed the patient that I have reviewed their record in Epic and presented the opportunity for them to ask any questions regarding the visit today.  The patient agreed to participate.    6. I have spent a total time of 40 minutes in caring for this patient on the day of the visit/encounter including Diagnostic results, Counseling / Coordination of care, Documenting in the medical record, Reviewing/placing orders in the medical record (including tests, medications, and/or procedures), Obtaining or reviewing history  , and Communicating with other healthcare professionals , not including the time spent for establishing the audio/video connection.

## 2025-05-06 NOTE — QUICK NOTE
Attempted to transport Pt to OR for cholecystectomy.  Pt refused.  Attempted to restrain patient with security in order to sedate for journey to OR.  Pt strongly resisted to the extent that he couldn't be held still enough to even inject sedation into his IV port.  The situation became dangerous and all parties involved.  We released the patient before someone was injured.  Dr. Walls came to Pt's room and could not convince him to voluntarily go to the OR.  We decided the safest decision for all parties was to hold off on surgery tonight pending assistance from mental health services.

## 2025-05-06 NOTE — H&P
"H&P - Behavioral Health   Name: Solo Mack 55 y.o. male I MRN: [id number]  Unit/Bed#: [facility] 660-02 I Date of Admission: 5/1/2025   Date of Service: 5/6/2025 I Hospital Day: 1     Assessment & Plan    Patient was discharged to medical unit before evaluation    Current Medications:  No current facility-administered medications for this encounter.  No current facility-administered medications for this encounter.      Psychiatric Evaluation    History of Present Illness     Per ED crisis evaluation:  Patient was brought in via police ([employer]) who filed a [facility] 302. 302 was delegate to [facility] and Sergey Carrizales upheld the 302 with a warrant time of 9:48. Dr. Rosario upheld the 302 at 10:01am. Patient provided his 302 rights. Patient does not appear to understand his rights. Patient would not engage with writer when informing him about the 302.      302 statement:   \"This officers was dispatched to [facility] to [facility]. A male who was later identified as Solo Peña was walking around confused and washing his hands with coffee. Throughout my interaction with Yehuda he gave me several names and several wrong address. He doesn't know his date of birth or his wife's name. He said he walked from his home to the diner which is located directly on [address]. Several times Yehuda would walk away dazed and confused. I fear he is a danger to himself and not capable of caring for himself.       55 y.o male patient presented to the ED via Police. Police filed a [employer] 302.  This writer introduced himself as Crisis and completed the assessment. Pt was unable to make eye contact with this writer and appeared to be reacting to internal stimuli. Pt stated they did not know why he was in the ED. Crisis informed pt he was here on a 302 petition.  Pt stated he was cleared of the 302 and was going to be discharged. Pt was brought to the ED due to unsafe behaviors and unable to care for himself. " Pt appeared to be confused and was  observed washing his hands with coffee at a Diner this morning. Pt was confused when interacting with the Police given wrong names and addresses.  Pt denies any SI HI and AVH.  Pt has been diagnosed with bipolar and Schizophrenia, unspecified type.  Pt refused to answer if any fire arms where present in the home. Pt  has prior inpatient treatment at [facility].  Pt has no outpatient services.  Pt denies legal issue. Pt has a history of substance abuse. Pt's 302 was upheld by the Provider.     Per psychiatry consult completed by Dr. Hillary MD on 05/01/25:  55-year-old male with a history of mood disorder with psychosis presents to the hospital with disorganized behavior and thought process in addition to psychotic symptoms of auditory hallucinations, internal preoccupation, responding to internal stimuli. Significant agitation requiring restraints and numerous doses of IM antipsychotic and sedative medications. He is somewhat more organized than initial presentation and I believe this is because of his multiple doses of IM antipsychotic medications that he received for agitation in addition to p.o. doses of Zyprexa 10 mg. The patient has ongoing psychotic symptoms of internal preoccupation, responding to internal stimuli although he denies auditory or visual hallucinations and denies any suicidal or homicidal ideations. He denies any current depressed mood only endorsing anxiety. He meets criteria for current psychotic episode given his evident hallucinations, disorganized thought process and behavior, and paranoid ideations. He is no longer exhibiting significant agitation and behaviors but he lacks insight into his current mental health symptoms and does not believe he needs mental health treatment at this time. The patient is not willing to follow-up with outpatient psychiatry as he does not believe he has a mental health problem and lacks significant insight into his symptoms. Thus  the least restrictive venue of care is inpatient behavioral health hospitalization for safety and stabilization. The patient has significant laboratory evidence of inability to care for herself as evidenced by elevated CK and hypokalemia in the setting of likely dehydration and poor p.o. intake. The patient has poor awareness of recent events as evidenced by him not remembering why the police brought him to the hospital. Without inpatient behavioral health hospitalization, the patient is likely to continue to decompensate and a further psychotic episode and requires treatment for improvement in functional status. He lacks capacity to sign a 201 for voluntary psychiatric hospitalization. This is due to poor insight into his mental health symptoms and condition and need for treatment. Will proceed with 303 and inpatient psychiatric hospitalization. Patient should be referred for inpatient psychiatric hospitalization now that he is medically stable.     The patient was admitted to the behavioral health unit for psychiatric stabilization.  However, upon admission patient was sent to the emergency room at [facility] due to medical decompensation.  Per crisis worker note in the emergency room on 5/1/2025:  ED Crisis received an Epic message from Lindy Vieira RN, [address]. She stated the patient arrived to [address]  on a 302 dated 4/28 from [facility]. He was reportedly medically compromised and quickly transferred [facility]. She received communication from a [facility] hearing coordinator that patient had been scheduled for a 303 hearing, currently in progress,  and they have no one to assist with patient's participation. However, the patient was no longer in the ED and was transferred to [facility] ICU, where he was in CT scan, and awaiting Interventional Radiology. Officer discussed with the doctor continuing the hearing. Given that the patient could not be present for his hearing due to work up for acute medical  issues, that it is unclear when the patient will be medically cleared, and that we are approaching the close of business hours, it was determined that the best course of action would be to let the 302 . Psychiatry will be consulted and a new commitment will be pursued if recommended.  ED Crisis sent an email alert to [facility] regarding the possibility of a future Crisis consult for this patient.    The patient is currently being treated on the medical unit.  Psychiatry should be reconsulted as necessary.        Psychiatric Review Of Systems:  Not completed, patient not evaluated    Historical Information     Past Psychiatric History:   Not completed, patient not evaluated    Substance Abuse History:    Tobacco, Alcohol and Drug Use History     Tobacco Use    Smoking status: Former     Passive exposure: Never    Smokeless tobacco: Never    Tobacco comments:     patient is a non - smoker   Vaping Use    Vaping status: Never Used   Substance Use Topics    Alcohol use: Not Currently     Comment: beer here and there per pt    Drug use: Not Currently      Additional Substance Use Detail       Questions Responses    Problems Due to Past Use of Alcohol? Yes    Problems Due to Past Use of Substances? No    Substance Use Assessment Denies substance use within the past 12 months    Alcohol Use Frequency Past abuse    Cannabis frequency Never used    Comment: Never used on 2018     Heroin Frequency Denies use in past 12 months    Cocaine frequency Never used    Comment: Never used on 2018     Crack Cocaine Frequency Denies use in past 12 months    Methamphetamine Frequency Denies use in past 12 months    Narcotic Frequency Denies use in past 12 months    Benzodiazepine Frequency Denies use in past 12 months    Amphetamine frequency Denies use in past 12 months    Barbituate Frequency Denies use use in past 12 months    Inhalant frequency Never used    Comment: Never used on 2018      Hallucinogen frequency Never used    Comment: Never used on 8/6/2018     Ecstasy frequency Never used    Comment: Never used on 8/6/2018     Other drug frequency Never used    Comment: Never used on 8/6/2018     Opiate frequency Denies use in past 12 months    Last reviewed by Alcides Estrada RN on 5/1/2025             Family Psychiatric History:     Family History   Problem Relation Age of Onset    No Known Problems Mother     No Known Problems Father     No Known Problems Sister     No Known Problems Brother     No Known Problems Maternal Aunt     No Known Problems Paternal Aunt     No Known Problems Maternal Uncle     No Known Problems Paternal Uncle     No Known Problems Maternal Grandfather     No Known Problems Maternal Grandmother     No Known Problems Paternal Grandfather     No Known Problems Paternal Grandmother     No Known Problems Cousin     ADD / ADHD Neg Hx     Alcohol abuse Neg Hx     Anxiety disorder Neg Hx     Bipolar disorder Neg Hx     Dementia Neg Hx     Depression Neg Hx     Drug abuse Neg Hx     OCD Neg Hx     Paranoid behavior Neg Hx     Schizophrenia Neg Hx     Seizures Neg Hx     Self-Injury Neg Hx     Suicide Attempts Neg Hx        Social History:  Not completed, patient not evaluated    Traumatic History:   Not completed, patient not evaluated    Past Medical History      Past Medical History:   Diagnosis Date    Alcohol withdrawal ([facility])     Alcoholism ([facility])     Anxiety     Back pain     Back pain, chronic     Bipolar 1 disorder ([facility])     Bipolar disorder, current episode mixed, moderate ([facility])     BPH (benign prostatic hyperplasia)     Cardiac disease     CHF (congestive heart failure) ([facility])     Chronic pain disorder     Chronic renal failure     Closed displaced fracture of neck of left scapula     Closed fracture of glenoid cavity and neck of left scapula 09/06/2020    Demyelinating disease ([facility])     Dental disorder     Depression     Diabetes mellitus ([facility])     Drug abuse ([facility])     Drug  withdrawal ([facility])     ED (erectile dysfunction)     Edema     Encephalopathy     Encephalopathy     Fatigue     Fracture of left radius 09/06/2020    GERD (gastroesophageal reflux disease)     H/O prolonged Q-T interval on ECG 12/04/2024    Heart rate fast     Hepatitis     unspecified     Hyperlipidemia     Hypertension     Hypokalemia     Hypothyroidism 10/21/2024    Knee buckling     Left rib fracture 09/06/2020    MI (myocardial infarction) ([facility])     Morbid obesity with BMI of 40.0-44.9, adult ([facility])     NIDDY (non-insulin dependent diabetes mellitus in young) ([facility])     Obesity     Opiate dependence ([facility])     Opiate withdrawal ([facility])     Osteoarthritis     Pleural effusion     Pneumonia     Prolonged Q-T interval on ECG     Psychiatric disorder     Psychiatric illness     Psychosis ([facility])     Psychosis ([facility]) 12/17/2024    Renal disorder     SAH (subarachnoid hemorrhage) ([facility]) 09/06/2020    Schizo-affective schizophrenia ([facility])     Spinal stenosis, lumbar     Subdural hematoma ([facility]) 09/06/2020    Traumatic subdural hemorrhage with loss of consciousness of unspecified duration, initial encounter ([facility]) 12/29/2022    Ulcer of calf ([facility])     Ulnar neuritis, right     Ulnar neuropathy at elbow     Weight loss      Past Surgical History:   Procedure Laterality Date    BACK SURGERY      report thoracic surgery    LUMBAR FUSION  05/2006    L5/S1 Gratch      Meds/Allergies      Allergies   Allergen Reactions    Haldol [Haloperidol]     Lexapro [Escitalopram Oxalate] Hives    Penicillins Other (See Comments)     Unknown, reaction as child          Objective :  Temp:  [99.2 °F (37.3 °C)] 99.2 °F (37.3 °C)  HR:  [88-98] 98  BP: (124-137)/(74-93) 124/74  Resp:  [18] 18  SpO2:  [87 %] 87 %  O2 Device: None (Room air)    Mental Status Evaluation:  Not completed, patient not evaluated before discharge to medical unit    Code Status: Level 1 - Full Code  Advance Directive and Living Will: <no information>  Next of Kin: Extended  "Emergency Contact Information  Primary Emergency Contact: Lo Mack  Address: [address]           [place] [address] [place]  Mobile Phone: [phone]  Relation: Spouse    Administrative Statements     Portions of the record may have been created with voice recognition software. Occasional wrong word or \"sound a like\" substitutions may have occurred due to the inherent limitations of voice recognition software. Read the chart carefully and recognize, using context, where substitutions have occurred.    Zach Shea,  05/06/25  "

## 2025-05-06 NOTE — ASSESSMENT & PLAN NOTE
Appreciate psychiatry recommendations  Continue klonopin 1mg bid prn  Continue zyprexa 10mg qhs  Psych eval tomorrow if pt is medically cleared  Awaiting neuropsych eval but pt currently does not have capacity he is oriented x4 but refers to Present Trump telling him if he should do things. He also is having visual hallucinations of being able to see through walls.

## 2025-05-06 NOTE — ASSESSMENT & PLAN NOTE
Dr. Britton -        Patient had a full lab set completed on 1/9/2025.  Can you review and let me know if there are any you want repeated for patient's upcoming Medicare wellness with you.    Pt was transferred from  to [facility] icu due to profound septic shock with imagining demonstrating gallstones, ruq tenderness, and profound leukocytosis  Currently treated with cefepime/flagyl  On levophed which had been weaned to off  Pt was scheduled for or but decided later in evening decided he did not want to go forward with surgery   Surgery stated that clinically he is improved with conservative treatment  Want to continue conservative treatment- he still has some pain on the RUQ in which iv antibiotics will be continued today. Will change to po tomorrow if pt remains afebrile and wbc normalized  He will continue with po antibiotics for 2 weeks and will need to be scheduled for outpt juvenal in 6 weeks unless he decompensates before than   Continue current diet

## 2025-05-06 NOTE — ASSESSMENT & PLAN NOTE
Tsh 0.21  Free t4 1.3  Labs reflect hyperthyroid  Check thyroid antibody panel  Appreciate endocrine recommendations- possibly related to iv dye load. Will repeat tsh/free t4

## 2025-05-06 NOTE — PLAN OF CARE
Problem: PAIN - ADULT  Goal: Verbalizes/displays adequate comfort level or baseline comfort level  Description: Interventions:- Encourage patient to monitor pain and request assistance- Assess pain using appropriate pain scale- Administer analgesics based on type and severity of pain and evaluate response- Implement non-pharmacological measures as appropriate and evaluate response- Consider cultural and social influences on pain and pain management- Notify physician/advanced practitioner if interventions unsuccessful or patient reports new pain  Outcome: Progressing     Problem: INFECTION - ADULT  Goal: Absence or prevention of progression during hospitalization  Description: INTERVENTIONS:- Assess and monitor for signs and symptoms of infection- Monitor lab/diagnostic results- Monitor all insertion sites, i.e. indwelling lines, tubes, and drains- Monitor endotracheal if appropriate and nasal secretions for changes in amount and color- Paradox appropriate cooling/warming therapies per order- Administer medications as ordered- Instruct and encourage patient and family to use good hand hygiene technique- Identify and instruct in appropriate isolation precautions for identified infection/condition  Outcome: Progressing  Goal: Absence of fever/infection during neutropenic period  Description: INTERVENTIONS:- Monitor WBC  Outcome: Progressing     Problem: SAFETY ADULT  Goal: Patient will remain free of falls  Description: INTERVENTIONS:- Educate patient/family on patient safety including physical limitations- Instruct patient to call for assistance with activity - Consult OT/PT to assist with strengthening/mobility - Keep Call bell within reach- Keep bed low and locked with side rails adjusted as appropriate- Keep care items and personal belongings within reach- Initiate and maintain comfort rounds- Make Fall Risk Sign visible to staff- Offer Toileting every 2 Hours, in advance of need- Initiate/Maintain alarm-  Obtain necessary fall risk management equipment- Apply yellow socks and bracelet for high fall risk patients- Consider moving patient to room near nurses station  Outcome: Progressing  Goal: Maintain or return to baseline ADL function  Description: INTERVENTIONS:-  Assess patient's ability to carry out ADLs; assess patient's baseline for ADL function and identify physical deficits which impact ability to perform ADLs (bathing, care of mouth/teeth, toileting, grooming, dressing, etc.)- Assess/evaluate cause of self-care deficits - Assess range of motion- Assess patient's mobility; develop plan if impaired- Assess patient's need for assistive devices and provide as appropriate- Encourage maximum independence but intervene and supervise when necessary- Involve family in performance of ADLs- Assess for home care needs following discharge - Consider OT consult to assist with ADL evaluation and planning for discharge- Provide patient education as appropriate  Outcome: Progressing  Goal: Maintains/Returns to pre admission functional level  Description: INTERVENTIONS:- Perform AM-PAC 6 Click Basic Mobility/ Daily Activity assessment daily.- Set and communicate daily mobility goal to care team and patient/family/caregiver. - Collaborate with rehabilitation services on mobility goals if consulted- Perform Range of Motion 3 times a day.- Reposition patient every 2 hours.- Dangle patient 3 times a day- Stand patient 3 times a day- Ambulate patient 3 times a day- Out of bed to chair 3 times a day - Out of bed for meals 3 times a day- Out of bed for toileting- Record patient progress and toleration of activity level   Outcome: Progressing     Problem: DISCHARGE PLANNING  Goal: Discharge to home or other facility with appropriate resources  Description: INTERVENTIONS:- Identify barriers to discharge w/patient and caregiver- Arrange for needed discharge resources and transportation as appropriate- Identify discharge learning needs  (meds, wound care, etc.)- Arrange for interpretive services to assist at discharge as needed- Refer to Case Management Department for coordinating discharge planning if the patient needs post-hospital services based on physician/advanced practitioner order or complex needs related to functional status, cognitive ability, or social support system  Outcome: Progressing     Problem: Knowledge Deficit  Goal: Patient/family/caregiver demonstrates understanding of disease process, treatment plan, medications, and discharge instructions  Description: Complete learning assessment and assess knowledge base.Interventions:- Provide teaching at level of understanding- Provide teaching via preferred learning methods  Outcome: Progressing     Problem: NEUROSENSORY - ADULT  Goal: Achieves stable or improved neurological status  Description: INTERVENTIONS- Monitor and report changes in neurological status- Monitor vital signs such as temperature, blood pressure, glucose, and any other labs ordered - Initiate measures to prevent increased intracranial pressure- Monitor for seizure activity and implement precautions if appropriate    Outcome: Progressing  Goal: Achieves maximal functionality and self care  Description: INTERVENTIONS- Monitor swallowing and airway patency with patient fatigue and changes in neurological status- Encourage and assist patient to increase activity and self care. - Encourage visually impaired, hearing impaired and aphasic patients to use assistive/communication devices  Outcome: Progressing     Problem: CARDIOVASCULAR - ADULT  Goal: Maintains optimal cardiac output and hemodynamic stability  Description: INTERVENTIONS:- Monitor I/O, vital signs and rhythm- Monitor for S/S and trends of decreased cardiac output- Administer and titrate ordered vasoactive medications to optimize hemodynamic stability- Assess quality of pulses, skin color and temperature- Assess for signs of decreased coronary artery  perfusion- Instruct patient to report change in severity of symptoms  Outcome: Progressing  Goal: Absence of cardiac dysrhythmias or at baseline rhythm  Description: INTERVENTIONS:- Continuous cardiac monitoring, vital signs, obtain 12 lead EKG if ordered- Administer antiarrhythmic and heart rate control medications as ordered- Monitor electrolytes and administer replacement therapy as ordered  Outcome: Progressing     Problem: RESPIRATORY - ADULT  Goal: Achieves optimal ventilation and oxygenation  Description: INTERVENTIONS:- Assess for changes in respiratory status- Assess for changes in mentation and behavior- Position to facilitate oxygenation and minimize respiratory effort- Oxygen administered by appropriate delivery if ordered- Initiate smoking cessation education as indicated- Encourage broncho-pulmonary hygiene including cough, deep breathe, Incentive Spirometry- Assess the need for suctioning and aspirate as needed- Assess and instruct to report SOB or any respiratory difficulty- Respiratory Therapy support as indicated  Outcome: Progressing     Problem: GASTROINTESTINAL - ADULT  Goal: Minimal or absence of nausea and/or vomiting  Description: INTERVENTIONS:- Administer IV fluids if ordered to ensure adequate hydration- Maintain NPO status until nausea and vomiting are resolved- Nasogastric tube if ordered- Administer ordered antiemetic medications as needed- Provide nonpharmacologic comfort measures as appropriate- Advance diet as tolerated, if ordered- Consider nutrition services referral to assist patient with adequate nutrition and appropriate food choices  Outcome: Progressing  Goal: Maintains or returns to baseline bowel function  Description: INTERVENTIONS:- Assess bowel function- Encourage oral fluids to ensure adequate hydration- Administer IV fluids if ordered to ensure adequate hydration- Administer ordered medications as needed- Encourage mobilization and activity- Consider nutritional  services referral to assist patient with adequate nutrition and appropriate food choices  Outcome: Progressing  Goal: Maintains adequate nutritional intake  Description: INTERVENTIONS:- Monitor percentage of each meal consumed- Identify factors contributing to decreased intake, treat as appropriate- Assist with meals as needed- Monitor I&O, weight, and lab values if indicated- Obtain nutrition services referral as needed  Outcome: Progressing  Goal: Oral mucous membranes remain intact  Description: INTERVENTIONS- Assess oral mucosa and hygiene practices- Implement preventative oral hygiene regimen- Implement oral medicated treatments as ordered- Initiate Nutrition services referral as needed  Outcome: Progressing     Problem: GENITOURINARY - ADULT  Goal: Maintains or returns to baseline urinary function  Description: INTERVENTIONS:- Assess urinary function- Encourage oral fluids to ensure adequate hydration if ordered- Administer IV fluids as ordered to ensure adequate hydration- Administer ordered medications as needed- Offer frequent toileting- Follow urinary retention protocol if ordered  Outcome: Progressing  Goal: Absence of urinary retention  Description: INTERVENTIONS:- Assess patient’s ability to void and empty bladder- Monitor I/O- Bladder scan as needed- Discuss with physician/AP medications to alleviate retention as needed- Discuss catheterization for long term situations as appropriate  Outcome: Progressing     Problem: METABOLIC, FLUID AND ELECTROLYTES - ADULT  Goal: Electrolytes maintained within normal limits  Description: INTERVENTIONS:- Monitor labs and assess patient for signs and symptoms of electrolyte imbalances- Administer electrolyte replacement as ordered- Monitor response to electrolyte replacements, including repeat lab results as appropriate- Instruct patient on fluid and nutrition as appropriate  Outcome: Progressing  Goal: Fluid balance maintained  Description: INTERVENTIONS:- Monitor  labs - Monitor I/O and WT- Instruct patient on fluid and nutrition as appropriate- Assess for signs & symptoms of volume excess or deficit  Outcome: Progressing  Goal: Glucose maintained within target range  Description: INTERVENTIONS:- Monitor Blood Glucose as ordered- Assess for signs and symptoms of hyperglycemia and hypoglycemia- Administer ordered medications to maintain glucose within target range- Assess nutritional intake and initiate nutrition service referral as needed  Outcome: Progressing     Problem: SKIN/TISSUE INTEGRITY - ADULT  Goal: Skin Integrity remains intact(Skin Breakdown Prevention)  Description: Assess:-Perform Jag assessment-Clean and moisturize skin-Inspect skin when repositioning, toileting, and assisting with ADLS-Assess under medical devices-Assess extremities for adequate circulation and sensation Bed Management:-Have minimal linens on bed & keep smooth, unwrinkled-Change linens as needed when moist or perspiring-Avoid sitting or lying in one position for more than 2 hours while in bed-Keep HOB at 45 degrees Toileting:-Offer bedside commode-Assess for incontinence-Use incontinent care products after each incontinent episode Activity:-Mobilize patient 3 times a day-Encourage activity and walks on unit-Encourage or provide ROM exercises -Turn and reposition patient every 2 Hours-Use appropriate equipment to lift or move patient in bed-Instruct/ Assist with weight shifting when out of bed in chair-Consider limitation of chair time 2 hour intervalsSkin Care:-Avoid use of baby powder, tape, friction and shearing, hot water or constrictive clothing-Relieve pressure over bony prominences-Do not massage red bony areasNext Steps:-Teach patient strategies to minimize risks -Consider consults to  interdisciplinary teams  Outcome: Progressing     Problem: HEMATOLOGIC - ADULT  Goal: Maintains hematologic stability  Description: INTERVENTIONS- Assess for signs and symptoms of bleeding or  hemorrhage- Monitor labs- Administer supportive blood products/factors as ordered and appropriate  Outcome: Progressing     Problem: MUSCULOSKELETAL - ADULT  Goal: Maintain or return mobility to safest level of function  Description: INTERVENTIONS:- Assess patient's ability to carry out ADLs; assess patient's baseline for ADL function and identify physical deficits which impact ability to perform ADLs (bathing, care of mouth/teeth, toileting, grooming, dressing, etc.)- Assess/evaluate cause of self-care deficits - Assess range of motion- Assess patient's mobility- Assess patient's need for assistive devices and provide as appropriate- Encourage maximum independence but intervene and supervise when necessary- Involve family in performance of ADLs- Assess for home care needs following discharge - Consider OT consult to assist with ADL evaluation and planning for discharge- Provide patient education as appropriate  Outcome: Progressing  Goal: Maintain proper alignment of affected body part  Description: INTERVENTIONS:- Support, maintain and protect limb and body alignment- Provide patient/ family with appropriate education  Outcome: Progressing     Problem: PSYCHOSIS  Goal: Will report no hallucinations or delusions  Description: Interventions:- Administer medication as  ordered- Every waking shifts and PRN assess for the presence of hallucinations and or delusions- Assist with reality testing to support increasing orientation- Assess if patient's hallucinations or delusions are encouraging self-harm or harm to others and intervene as appropriate  Outcome: Progressing     Problem: ANXIETY  Goal: Will report anxiety at manageable levels  Description: INTERVENTIONS:- Administer medication as ordered- Teach and encourage coping skills- Provide emotional support- Assess patient/family for anxiety and ability to cope  Outcome: Progressing  Goal: By discharge: Patient will verbalize 2 strategies to deal with  anxiety  Description: Interventions:- Identify any obvious source/trigger to anxiety- Staff will assist patient in applying identified coping technique/skills- Encourage attendance of scheduled groups and activities  Outcome: Progressing     Problem: SELF CARE DEFICIT  Goal: Return ADL status to a safe level of function  Description: INTERVENTIONS:- Administer medication as ordered- Assess ADL deficits and provide assistive devices as needed- Obtain PT/OT consults as needed- Assist and instruct patient to increase activity and self care as tolerated  Outcome: Progressing     Problem: Prexisting or High Potential for Compromised Skin Integrity  Goal: Skin integrity is maintained or improved  Description: INTERVENTIONS:- Identify patients at risk for skin breakdown- Assess and monitor skin integrity- Assess and monitor nutrition and hydration status- Monitor labs - Assess for incontinence - Turn and reposition patient- Assist with mobility/ambulation- Relieve pressure over bony prominences- Avoid friction and shearing- Provide appropriate hygiene as needed including keeping skin clean and dry- Evaluate need for skin moisturizer/barrier cream- Collaborate with interdisciplinary team - Patient/family teaching- Consider wound care consult   Outcome: Progressing     Problem: Nutrition/Hydration-ADULT  Goal: Nutrient/Hydration intake appropriate for improving, restoring or maintaining nutritional needs  Description: Monitor and assess patient's nutrition/hydration status for malnutrition. Collaborate with interdisciplinary team and initiate plan and interventions as ordered.  Monitor patient's weight and dietary intake as ordered or per policy. Utilize nutrition screening tool and intervene as necessary. Determine patient's food preferences and provide high-protein, high-caloric foods as appropriate. INTERVENTIONS:- Monitor oral intake, urinary output, labs, and treatment plans- Assess nutrition and hydration status and  recommend course of action- Evaluate amount of meals eaten- Assist patient with eating if necessary - Allow adequate time for meals- Recommend/ encourage appropriate diets, oral nutritional supplements, and vitamin/mineral supplements- Order, calculate, and assess calorie counts as needed- Recommend, monitor, and adjust tube feedings and TPN/PPN based on assessed needs- Assess need for intravenous fluids- Provide specific nutrition/hydration education as appropriate- Include patient/family/caregiver in decisions related to nutrition  Monitor and assess patient's nutrition/hydration status for malnutrition. Collaborate with interdisciplinary team and initiate plan and interventions as ordered.  Monitor patient's weight and dietary intake as ordered or per policy. Utilize nutrition screening tool and intervene as necessary. Determine patient's food preferences and provide high-protein, high-caloric foods as appropriate. INTERVENTIONS:- Monitor oral intake, urinary output, labs, and treatment plans- Assess nutrition and hydration status and recommend course of action- Evaluate amount of meals eaten- Assist patient with eating if necessary - Allow adequate time for meals- Recommend/ encourage appropriate diets, oral nutritional supplements, and vitamin/mineral supplements- Order, calculate, and assess calorie counts as needed- Recommend, monitor, and adjust tube feedings and TPN/PPN based on assessed needs- Assess need for intravenous fluids- Provide specific nutrition/hydration education as appropriate- Include patient/family/caregiver in decisions related to nutrition  Outcome: Progressing

## 2025-05-06 NOTE — TELEPHONE ENCOUNTER
Consult placed on [facility] queue at 0835 to be seen by an [facility] psychiatrist.

## 2025-05-06 NOTE — ASSESSMENT & PLAN NOTE
Wt Readings from Last 3 Encounters:   05/05/25 98.1 kg (216 lb 4.3 oz)   05/02/25 100 kg (221 lb 5.5 oz)   03/17/25 106 kg (233 lb)

## 2025-05-06 NOTE — ASSESSMENT & PLAN NOTE
See above plan   Nuha Sterling  Phone: [phone]  Fax: (   )    -    Adi Yost  Phone: (   )    -  Fax: (   )    -

## 2025-05-06 NOTE — PROGRESS NOTES
"Progress Note - Surgery-General   Name: Solo Mack 55 y.o. male I MRN: [id number]  Unit/Bed#: [place] [address] -01 I Date of Admission: 5/2/2025   Date of Service: 5/6/2025 I Hospital Day: 4     Assessment & Plan  Septic shock (HCC)  55 year old male p/w cholecystitis. Unable to perform scheduled cholecystectomy secondary to acute agitation.    Plan  - Conservative management of cholecystitis d/t patient refusal of cholecystectomy   - Continue IV ABX with cefepime Flagyl; transition to PO abx for 2 weeks  - F/u outpatient cholecystectomy in 6 weeks  - Psychiatry and Neuropsychology consult: Appreciate recs  - Will plan to treat conservatively with medical therapy, antibiotics.  - Will need assistance/recommendations from psychiatry services if surgery is ever an option in the future.  - Analgesia and antiemetic as needed  - Monitor and trend endpoints  Please reach out to [facility] resident role if there are any questions or concerns  Acute cholecystitis  See above plan    24 Hour Events : No acute overnight events  Subjective : Patient feeling well this morning.  He describes 3 out of 10 pain in the right lower quadrant.  He endorses some nausea, but no vomiting.  He denies any fevers, chills, shortness of breath, or chest pain.    Objective :  Visit Vitals  /84   Pulse 87   Temp 98.5 °F (36.9 °C)   Resp 18   Ht 5' 7\" (1.702 m)   Wt 94.6 kg (208 lb 8.9 oz)   SpO2 95%   BMI 32.66 kg/m²   Smoking Status Former   BSA 2.06 m²        Physical Exam  Constitutional:       General: He is not in acute distress.     Appearance: Normal appearance. He is not ill-appearing, toxic-appearing or diaphoretic.   Cardiovascular:      Pulses: Normal pulses.      Heart sounds: Normal heart sounds. No murmur heard.     No gallop.   Pulmonary:      Effort: Pulmonary effort is normal. No respiratory distress.      Breath sounds: Normal breath sounds. No wheezing.   Abdominal:      General: Abdomen is flat. There is no " distension.      Palpations: Abdomen is soft. There is no mass.      Tenderness: There is abdominal tenderness (RLQ tenderness to deep palpation.  No rebound tenderness or guarding.). There is no guarding or rebound.      Hernia: No hernia is present.   Skin:     General: Skin is warm and dry.      Coloration: Skin is not jaundiced.   Neurological:      General: No focal deficit present.      Mental Status: He is alert and oriented to person, place, and time.       Lab Results: I have reviewed the following results:  Recent Labs     05/05/25  0513 05/06/25  0501 05/07/25  0500   WBC 11.02* 8.05 7.58   HGB 12.5 12.0 12.4    213 239   SODIUM 138 134* 136   K 3.7 4.4 3.8    106 105   CO2 27 22 25   BUN 6 11 12   CREATININE 0.96 1.15 1.00   GLUC 149* 194* 188*   CALCIUM 8.4 7.7* 7.9*   MG 1.8* 2.8*  --    AST  --  14 11*   ALT  --  12 14   ALKPHOS  --  77 80   TBILI  --  0.45 0.34          VTE Pharmacologic Prophylaxis: VTE covered by:  enoxaparin, Subcutaneous, 40 mg at 05/06/25 0849     VTE Mechanical Prophylaxis: sequential compression device       Normal rate, regular rhythm.  Heart sounds S1, S2.  No murmurs, rubs or gallops.

## 2025-05-06 NOTE — PROGRESS NOTES
Progress Note - Surgery-General   Name: Solo Mack 55 y.o. male I MRN: [id number]  Unit/Bed#: [place] [address] -01 I Date of Admission: 5/2/2025   Date of Service: 5/6/2025 I Hospital Day: 4    Assessment & Plan  Septic shock (HCC)  Patient transferred from [place] to [facility] ICU in profound septic shock, with imaging demonstrating gallstones, RUQ tenderness and profound leukocytosis.     Unable to perform scheduled cholecystectomy yesterday secondary to acute agitation.    Plan  - Low-fat diet  - Continue IV ABX with cefepime Flagyl  -Psychiatry and Neuropsychology consult: Appreciate recs  -Will plan to treat conservatively with medical therapy, antibiotics.  -Will need assistance/recommendations from psychiatry services if surgery is ever an option in the future.  - Analgesia and antiemetic as needed  - Monitor and trend endpoints    Please reach out to [facility] General Surgery resident role if there are any questions or concerns  Type 2 diabetes mellitus, without long-term current use of insulin ([facility])  Lab Results   Component Value Date    HGBA1C 8.3 (A) 03/17/2025       Recent Labs     05/05/25  0736 05/05/25  1110 05/05/25  1752 05/05/25 2059   POCGLU 166* 158* 147* 175*       Blood Sugar Average: Last 72 hrs:  (P) 160.8663611622515113    Acute cholecystitis  See above plan  H/O prolonged Q-T interval on ECG    Insomnia    Obesity (BMI 30-39.9)    CHF (congestive heart failure) ([facility])  Wt Readings from Last 3 Encounters:   05/05/25 98.1 kg (216 lb 4.3 oz)   05/02/25 100 kg (221 lb 5.5 oz)   03/17/25 106 kg (233 lb)                   Subjective   No acute events overnight, was preparing for surgery earlier in the evening which was unable to be done secondary to acute agitation.  Patient's nonredirectable, agitated, violent.  Afebrile over 24 hours, on room air.    Objective :  Temp:  [99 °F (37.2 °C)-100.8 °F (38.2 °C)] 99.2 °F (37.3 °C)  HR:  [] 98  BP: (131-155)/() 131/93  Resp:  [16-18] 18  SpO2:   [87 %-94 %] 87 %  O2 Device: None (Room air)    I/O         05/04 0701 05/05 0700 05/05 0701 05/06 0700    P.O. 720     I.V. (mL/kg) 3002.9 (30.6) 2545 (25.9)    IV Piggyback 150 300    Total Intake(mL/kg) 3872.9 (39.5) 2845 (29)    Urine (mL/kg/hr) 5850 (2.5) 900 (0.4)    Total Output 5850 900    Net -1977.1 +1945          Unmeasured Stool Occurrence  1 x            Physical Exam  General: NAD  HENT: NCAT MMM  Neck: supple, no JVD  CV: nl rate  Lungs: nl wob. No resp distress  ABD: Soft, mild RUQ tenderness, nondistended  Extrem: No CCE  Neuro: Motor or sensory intact      Lab Results: I have reviewed the following results:  Recent Labs     05/03/25  0532 05/04/25  0435 05/05/25  0513   WBC 17.92* 13.07* 11.02*   HGB 11.1* 12.4 12.5   HCT 31.1* 34.7* 35.3*    159 209   SODIUM 134* 137 138   K 3.9 3.4* 3.7    103 103   CO2 28 26 27   BUN 16 9 6   CREATININE 1.05 0.92 0.96   GLUC 129 132 149*   MG 2.0  --  1.8*   PHOS  --  2.2*  --    AST 18  --   --    ALT 27  --   --    ALB 2.7*  --   --    TBILI 0.63  --   --    ALKPHOS 63  --   --              VTE Pharmacologic Prophylaxis: VTE covered by:  enoxaparin, Subcutaneous, 40 mg at 05/05/25 0809     VTE Mechanical Prophylaxis: sequential compression device

## 2025-05-06 NOTE — ASSESSMENT & PLAN NOTE
Lab Results   Component Value Date    HGBA1C 8.3 (A) 03/17/2025       Recent Labs     05/05/25  0736 05/05/25  1110 05/05/25  1752 05/05/25 2059   POCGLU 166* 158* 147* 175*       Blood Sugar Average: Last 72 hrs:  (P) 160.6537287988137820

## 2025-05-06 NOTE — PROGRESS NOTES
Progress Note - Hospitalist   Name: Solo Mack 55 y.o. male I MRN: [id number]  Unit/Bed#: [place] [address] -01 I Date of Admission: 5/2/2025   Date of Service: 5/6/2025 I Hospital Day: 4    Assessment & Plan  Septic shock (HCC)  Pt was transferred from  to [facility] icu due to profound septic shock with imagining demonstrating gallstones, ruq tenderness, and profound leukocytosis  Currently treated with cefepime/flagyl  On levophed which had been weaned to off  Pt was scheduled for or but decided later in evening decided he did not want to go forward with surgery   Surgery stated that clinically he is improved with conservative treatment  Want to continue conservative treatment- he still has some pain on the RUQ in which iv antibiotics will be continued today. Will change to po tomorrow if pt remains afebrile and wbc normalized  He will continue with po antibiotics for 2 weeks and will need to be scheduled for outpt juvenal in 6 weeks unless he decompensates before than   Continue current diet   GERD (gastroesophageal reflux disease)  Continue ppi   Essential hypertension  Had been on clonidine and metoprolol outpt   Will restart metoprolol po   D/c scheduled iv   Type 2 diabetes mellitus, without long-term current use of insulin ([facility])  Lab Results   Component Value Date    HGBA1C 8.3 (A) 03/17/2025       Recent Labs     05/05/25  1752 05/05/25  2059 05/06/25  0745 05/06/25  1136   POCGLU 147* 175* 174* 147*       Blood Sugar Average: Last 72 hrs:  (P) 160.0948780031671351  Continue insulin sliding scale     Schizophrenia, unspecified type ([facility])  Appreciate psychiatry recommendations  Continue klonopin 1mg bid prn  Continue zyprexa 10mg qhs  Psych eval tomorrow if pt is medically cleared  Awaiting neuropsych eval but pt currently does not have capacity he is oriented x4 but refers to [employer] telling him if he should do things. He also is having visual hallucinations of being able to see through walls.   Abnormal  "TSH  Tsh 0.21  Free t4 1.3  Labs reflect hyperthyroid  Check thyroid antibody panel  Appreciate endocrine recommendations- possibly related to iv dye load. Will repeat tsh/free t4  Chronic kidney disease, stage 2 (mild)  Lab Results   Component Value Date    EGFR 70 05/06/2025    EGFR 88 05/05/2025    EGFR 92 05/04/2025    CREATININE 1.15 05/06/2025    CREATININE 0.96 05/05/2025    CREATININE 0.92 05/04/2025   Chauncey r/o  Creatinine currently at baseline which is 0.9-1.2   H/O prolonged Q-T interval on ECG  Qtc has improved  Replace mg. Keep potassium greater than 4.0    Hypomagnesemia  Continue replacement   Acute cholecystitis  Please see septic shock   Hypophosphatemia    Insomnia  Add scheduled melatonin  Continue zyprexa   Obesity (BMI 30-39.9)    CHF (congestive heart failure) ([facility])  Wt Readings from Last 3 Encounters:   05/06/25 94.6 kg (208 lb 8.9 oz)   05/02/25 100 kg (221 lb 5.5 oz)   03/17/25 106 kg (233 lb)     Monitor I/o and daily weights             VTE Pharmacologic Prophylaxis:   lovenox     Mobility:   Basic Mobility Inpatient Raw Score: 22  JH-HLM Goal: 7: Walk 25 feet or more  JH-HLM Achieved: 8: Walk 250 feet ot more      Patient Centered Rounds:  discussed with his nurse         Education and Discussions with Family / Patient: attempted to call his wife but no answer     Current Length of Stay: 4 day(s)  Current Patient Status: Inpatient     Discharge Plan: will require psych evaluation prior to discharge to see if he requires 302 for inpt psych     Code Status: Level 1 - Full Code    Subjective   Pt seen and examined. He is pleasant today. He is stating that he is not having hallucinations but says that president peter tells him to do things. He also states that he is able to see through walls \"the light enters the walls through a center angle that allows me to see in to buildings. Some buildings are close by some are far away\". He does know his gallbladder was ill. He does know he will need " surgery at some point. He does know that he refused surgery yesterday but he had not ate for several days and felt that he was too weak for surgery. He feels much better today with only minimal pain on the right side of his abd close to his ribs and his right shoulder at times     Objective :  Temp:  [99.2 °F (37.3 °C)-99.4 °F (37.4 °C)] 99.2 °F (37.3 °C)  HR:  [88-98] 98  BP: (124-138)/(74-93) 124/74  Resp:  [16-18] 18  SpO2:  [87 %-93 %] 87 %  O2 Device: None (Room air)    Body mass index is 32.66 kg/m².     Input and Output Summary (last 24 hours):     Intake/Output Summary (Last 24 hours) at 5/6/2025 1532  Last data filed at 5/6/2025 1206  Gross per 24 hour   Intake 2160 ml   Output 200 ml   Net 1960 ml       Physical Exam  Constitutional:       Appearance: Normal appearance.   HENT:      Head: Normocephalic and atraumatic.   Eyes:      Extraocular Movements: Extraocular movements intact.      Pupils: Pupils are equal, round, and reactive to light.   Cardiovascular:      Rate and Rhythm: Normal rate and regular rhythm.      Heart sounds: No murmur heard.     No friction rub. No gallop.   Pulmonary:      Effort: Pulmonary effort is normal. No respiratory distress.      Breath sounds: Normal breath sounds. No wheezing, rhonchi or rales.   Abdominal:      General: Bowel sounds are normal. There is no distension.      Palpations: Abdomen is soft.      Tenderness: There is no abdominal tenderness. There is no guarding or rebound.   Neurological:      Mental Status: He is alert and oriented to person, place, and time.   Psychiatric:      Comments: Hallucinations both auditory and visual        Lines/Drains:      Lab Results: I have reviewed the following results:   Results from last 7 days   Lab Units 05/06/25  0501 05/05/25  0513 05/02/25  0959 05/02/25  0511   WBC Thousand/uL 8.05 11.02*   < > 26.34*   HEMOGLOBIN g/dL 12.0 12.5   < > 13.0   HEMATOCRIT % 35.4* 35.3*   < > 37.7   PLATELETS Thousands/uL 213 209   < >  183   BANDS PCT %  --   --   --  8   SEGS PCT %  --  83*   < >  --    LYMPHO PCT %  --  7*   < > 5*   MONO PCT %  --  8   < > 4   EOS PCT %  --  1   < > 0    < > = values in this interval not displayed.     Results from last 7 days   Lab Units 05/06/25  0501   SODIUM mmol/L 134*   POTASSIUM mmol/L 4.4   CHLORIDE mmol/L 106   CO2 mmol/L 22   BUN mg/dL 11   CREATININE mg/dL 1.15   ANION GAP mmol/L 6   CALCIUM mg/dL 7.7*   ALBUMIN g/dL 2.9*   TOTAL BILIRUBIN mg/dL 0.45   ALK PHOS U/L 77   ALT U/L 12   AST U/L 14   GLUCOSE RANDOM mg/dL 194*     Results from last 7 days   Lab Units 05/01/25  2235   INR  0.96     Results from last 7 days   Lab Units 05/06/25  1136 05/06/25  0745 05/05/25  2059 05/05/25  1752 05/05/25  1110 05/05/25  0736 05/04/25  2118 05/04/25  1608 05/04/25  1132 05/04/25  0748 05/03/25  2213 05/03/25  1706   POC GLUCOSE mg/dl 147* 174* 175* 147* 158* 166* 190* 126 194* 133 174* 154*         Results from last 7 days   Lab Units 05/02/25  0959 05/02/25  0511 05/01/25  2235   LACTIC ACID mmol/L 1.8 3.0* 1.6   PROCALCITONIN ng/ml 50.31* 19.91* 0.24       Recent Cultures (last 7 days):   Results from last 7 days   Lab Units 05/02/25  1034 05/02/25  1005 05/02/25  0512 05/01/25  2244   BLOOD CULTURE  No Growth at 72 hrs. No Growth at 72 hrs. No Growth After 4 Days.  No Growth After 4 Days. No Growth After 4 Days.  No Growth After 4 Days.       Imaging Results Review: No pertinent imaging studies reviewed.  Other Study Results Review: No additional pertinent studies reviewed.    Last 24 Hours Medication List:     Current Facility-Administered Medications:     acetaminophen (Ofirmev) injection 1,000 mg, Once    ceFEPime (MAXIPIME) 2,000 mg in dextrose 5 % 50 mL IVPB, Q12H, Last Rate: 2,000 mg (05/06/25 2684)    clonazePAM (KlonoPIN) tablet 1 mg, BID PRN    cyanocobalamin injection 1,000 mcg, Q30 Days    enoxaparin (LOVENOX) subcutaneous injection 40 mg, Daily    HYDROmorphone HCl (DILAUDID) injection 0.2 mg,  Q4H PRN    hydrOXYzine HCL (ATARAX) tablet 25 mg, Q6H PRN Max 4/day    hydrOXYzine HCL (ATARAX) tablet 50 mg, Q6H PRN Max 4/day    ibuprofen (MOTRIN) tablet 400 mg, Q6H PRN    insulin lispro (HumALOG/ADMELOG) 100 units/mL subcutaneous injection 1-6 Units, TID AC **AND** Fingerstick Glucose (POCT), TID AC    insulin lispro (HumALOG/ADMELOG) 100 units/mL subcutaneous injection 1-6 Units, HS    LORazepam (ATIVAN) injection 1 mg, Q6H PRN Max 3/day    LORazepam (ATIVAN) tablet 1 mg, Q6H PRN Max 3/day    melatonin tablet 6 mg, HS    metoprolol tartrate (LOPRESSOR) partial tablet 12.5 mg, Q12H ROBER    metroNIDAZOLE (FLAGYL) IVPB (premix) 500 mg 100 mL, Q8H, Last Rate: 500 mg (05/06/25 0848)    nicotine polacrilex (NICORETTE) gum 2 mg, Q4H PRN    OLANZapine (ZyPREXA) IM injection 10 mg, HS    pantoprazole (PROTONIX) injection 40 mg, Q24H ROBER    polyethylene glycol (MIRALAX) packet 17 g, Daily PRN    senna-docusate sodium (SENOKOT S) 8.6-50 mg per tablet 1 tablet, Daily PRN    trimethobenzamide (TIGAN) IM injection 200 mg, Q6H PRN    Administrative Statements   Today, Patient Was Seen By: Tasha Joseph DO

## 2025-05-06 NOTE — ASSESSMENT & PLAN NOTE
Patient transferred from [place] to [facility] ICU in profound septic shock, with imaging demonstrating gallstones, RUQ tenderness and profound leukocytosis.     Unable to perform scheduled cholecystectomy yesterday secondary to acute agitation.    Plan  - Low-fat diet  - Continue IV ABX with cefepime Flagyl  -Psychiatry and Neuropsychology consult: Appreciate recs  -Will plan to treat conservatively with medical therapy, antibiotics.  -Will need assistance/recommendations from psychiatry services if surgery is ever an option in the future.  - Analgesia and antiemetic as needed  - Monitor and trend endpoints    Please reach out to [facility] General Surgery resident role if there are any questions or concerns

## 2025-05-06 NOTE — ASSESSMENT & PLAN NOTE
Lab Results   Component Value Date    HGBA1C 8.3 (A) 03/17/2025       Recent Labs     05/05/25  1752 05/05/25 2059 05/06/25  0745 05/06/25  1136   POCGLU 147* 175* 174* 147*       Blood Sugar Average: Last 72 hrs:  (P) 160.1934815763875544  Continue insulin sliding scale

## 2025-05-06 NOTE — ASSESSMENT & PLAN NOTE
55 year old male p/w cholecystitis. Unable to perform scheduled cholecystectomy secondary to acute agitation.    Plan  - Conservative management of cholecystitis d/t patient refusal of cholecystectomy   - Continue IV ABX with cefepime Flagyl; transition to PO abx for 2 weeks  - F/u outpatient cholecystectomy in 6 weeks  - Psychiatry and Neuropsychology consult: Appreciate recs  - Will plan to treat conservatively with medical therapy, antibiotics.  - Will need assistance/recommendations from psychiatry services if surgery is ever an option in the future.  - Analgesia and antiemetic as needed  - Monitor and trend endpoints  Please reach out to [facility] General Surgery resident role if there are any questions or concerns

## 2025-05-06 NOTE — ASSESSMENT & PLAN NOTE
Lab Results   Component Value Date    EGFR 70 05/06/2025    EGFR 88 05/05/2025    EGFR 92 05/04/2025    CREATININE 1.15 05/06/2025    CREATININE 0.96 05/05/2025    CREATININE 0.92 05/04/2025   Chauncey r/o  Creatinine currently at baseline which is 0.9-1.2

## 2025-05-06 NOTE — NURSING NOTE
Alerted by 1:1 PCA staff that pt is becoming agitated, asking to leave and wants his IV out. RN assessed pt and pt continues to adamantly want his IV taken out and to leave.     Dr. Joseph with [facility] notified. This RN gave pt PRN IV ativan 1 mg and 1x dose of 2.5 mg IM zyprexa. Pt sitting up in bed, rocking, playing with IVF tubing.     IVF disconnected to eliminate pt removing IV site.    Security alerted d/t pt unable to leave AMA until eval by psychiatry per [facility].  Security at room side to assist.    1:1 remains in place for pt safety. All safety precautions in place.

## 2025-05-06 NOTE — ASSESSMENT & PLAN NOTE
Wt Readings from Last 3 Encounters:   05/06/25 94.6 kg (208 lb 8.9 oz)   05/02/25 100 kg (221 lb 5.5 oz)   03/17/25 106 kg (233 lb)     Monitor I/o and daily weights

## 2025-05-07 PROBLEM — E83.42 HYPOMAGNESEMIA: Status: RESOLVED | Noted: 2025-04-29 | Resolved: 2025-05-07

## 2025-05-07 LAB
ALBUMIN SERPL BCG-MCNC: 3 G/DL (ref 3.5–5)
ALP SERPL-CCNC: 80 U/L (ref 34–104)
ALT SERPL W P-5'-P-CCNC: 14 U/L (ref 7–52)
ANION GAP SERPL CALCULATED.3IONS-SCNC: 6 MMOL/L (ref 4–13)
AST SERPL W P-5'-P-CCNC: 11 U/L (ref 13–39)
BACTERIA BLD CULT: NORMAL
BILIRUB SERPL-MCNC: 0.34 MG/DL (ref 0.2–1)
BUN SERPL-MCNC: 12 MG/DL (ref 5–25)
CALCIUM ALBUM COR SERPL-MCNC: 8.7 MG/DL (ref 8.3–10.1)
CALCIUM SERPL-MCNC: 7.9 MG/DL (ref 8.4–10.2)
CHLORIDE SERPL-SCNC: 105 MMOL/L (ref 96–108)
CO2 SERPL-SCNC: 25 MMOL/L (ref 21–32)
CREAT SERPL-MCNC: 1 MG/DL (ref 0.6–1.3)
ERYTHROCYTE [DISTWIDTH] IN BLOOD BY AUTOMATED COUNT: 12.2 % (ref 11.6–15.1)
GFR SERPL CREATININE-BSD FRML MDRD: 83 ML/MIN/1.73SQ M
GLUCOSE SERPL-MCNC: 170 MG/DL (ref 65–140)
GLUCOSE SERPL-MCNC: 184 MG/DL (ref 65–140)
GLUCOSE SERPL-MCNC: 188 MG/DL (ref 65–140)
GLUCOSE SERPL-MCNC: 248 MG/DL (ref 65–140)
GLUCOSE SERPL-MCNC: 257 MG/DL (ref 65–140)
HCT VFR BLD AUTO: 35.9 % (ref 36.5–49.3)
HGB BLD-MCNC: 12.4 G/DL (ref 12–17)
MCH RBC QN AUTO: 28.4 PG (ref 26.8–34.3)
MCHC RBC AUTO-ENTMCNC: 34.5 G/DL (ref 31.4–37.4)
MCV RBC AUTO: 82 FL (ref 82–98)
PLATELET # BLD AUTO: 239 THOUSANDS/UL (ref 149–390)
PMV BLD AUTO: 10.4 FL (ref 8.9–12.7)
POTASSIUM SERPL-SCNC: 3.8 MMOL/L (ref 3.5–5.3)
PROT SERPL-MCNC: 5.9 G/DL (ref 6.4–8.4)
RBC # BLD AUTO: 4.36 MILLION/UL (ref 3.88–5.62)
SODIUM SERPL-SCNC: 136 MMOL/L (ref 135–147)
THYROGLOB AB SERPL-ACNC: <1 IU/ML (ref 0–0.9)
THYROPEROXIDASE AB SERPL-ACNC: 14 IU/ML (ref 0–34)
TSH SERPL DL<=0.05 MIU/L-ACNC: 2.46 UIU/ML (ref 0.45–4.5)
WBC # BLD AUTO: 7.58 THOUSAND/UL (ref 4.31–10.16)

## 2025-05-07 PROCEDURE — 82948 REAGENT STRIP/BLOOD GLUCOSE: CPT

## 2025-05-07 PROCEDURE — 85027 COMPLETE CBC AUTOMATED: CPT | Performed by: INTERNAL MEDICINE

## 2025-05-07 PROCEDURE — 80053 COMPREHEN METABOLIC PANEL: CPT | Performed by: INTERNAL MEDICINE

## 2025-05-07 PROCEDURE — 99232 SBSQ HOSP IP/OBS MODERATE 35: CPT

## 2025-05-07 PROCEDURE — 99232 SBSQ HOSP IP/OBS MODERATE 35: CPT | Performed by: INTERNAL MEDICINE

## 2025-05-07 PROCEDURE — 84443 ASSAY THYROID STIM HORMONE: CPT | Performed by: INTERNAL MEDICINE

## 2025-05-07 RX ADMIN — ENOXAPARIN SODIUM 40 MG: 40 INJECTION SUBCUTANEOUS at 08:50

## 2025-05-07 RX ADMIN — CEFEPIME 2000 MG: 2 INJECTION, POWDER, FOR SOLUTION INTRAVENOUS at 10:37

## 2025-05-07 RX ADMIN — OLANZAPINE 10 MG: 10 INJECTION, POWDER, LYOPHILIZED, FOR SOLUTION INTRAMUSCULAR at 21:48

## 2025-05-07 RX ADMIN — INSULIN LISPRO 1 UNITS: 100 INJECTION, SOLUTION INTRAVENOUS; SUBCUTANEOUS at 11:59

## 2025-05-07 RX ADMIN — METRONIDAZOLE 500 MG: 500 INJECTION, SOLUTION INTRAVENOUS at 16:40

## 2025-05-07 RX ADMIN — METRONIDAZOLE 500 MG: 500 INJECTION, SOLUTION INTRAVENOUS at 23:47

## 2025-05-07 RX ADMIN — METRONIDAZOLE 500 MG: 500 INJECTION, SOLUTION INTRAVENOUS at 00:11

## 2025-05-07 RX ADMIN — Medication 12.5 MG: at 08:55

## 2025-05-07 RX ADMIN — INSULIN LISPRO 3 UNITS: 100 INJECTION, SOLUTION INTRAVENOUS; SUBCUTANEOUS at 16:40

## 2025-05-07 RX ADMIN — INSULIN LISPRO 1 UNITS: 100 INJECTION, SOLUTION INTRAVENOUS; SUBCUTANEOUS at 21:48

## 2025-05-07 RX ADMIN — Medication 12.5 MG: at 21:47

## 2025-05-07 RX ADMIN — METRONIDAZOLE 500 MG: 500 INJECTION, SOLUTION INTRAVENOUS at 08:56

## 2025-05-07 RX ADMIN — INSULIN LISPRO 3 UNITS: 100 INJECTION, SOLUTION INTRAVENOUS; SUBCUTANEOUS at 08:49

## 2025-05-07 RX ADMIN — PANTOPRAZOLE SODIUM 40 MG: 40 INJECTION, POWDER, FOR SOLUTION INTRAVENOUS at 08:50

## 2025-05-07 RX ADMIN — CEFEPIME 2000 MG: 2 INJECTION, POWDER, FOR SOLUTION INTRAVENOUS at 21:53

## 2025-05-07 RX ADMIN — Medication 6 MG: at 21:47

## 2025-05-07 NOTE — ASSESSMENT & PLAN NOTE
Lab Results   Component Value Date    HGBA1C 8.3 (A) 03/17/2025       Recent Labs     05/06/25  1651 05/06/25  2120 05/07/25  0728 05/07/25  1157   POCGLU 213* 210* 257* 170*       Blood Sugar Average: Last 72 hrs:  (P) 175.8415848530264983  Continue insulin sliding scale

## 2025-05-07 NOTE — PLAN OF CARE
Problem: PAIN - ADULT  Goal: Verbalizes/displays adequate comfort level or baseline comfort level  Description: Interventions:- Encourage patient to monitor pain and request assistance- Assess pain using appropriate pain scale- Administer analgesics based on type and severity of pain and evaluate response- Implement non-pharmacological measures as appropriate and evaluate response- Consider cultural and social influences on pain and pain management- Notify physician/advanced practitioner if interventions unsuccessful or patient reports new pain  Outcome: Progressing     Problem: INFECTION - ADULT  Goal: Absence or prevention of progression during hospitalization  Description: INTERVENTIONS:- Assess and monitor for signs and symptoms of infection- Monitor lab/diagnostic results- Monitor all insertion sites, i.e. indwelling lines, tubes, and drains- Monitor endotracheal if appropriate and nasal secretions for changes in amount and color- [place] appropriate cooling/warming therapies per order- Administer medications as ordered- Instruct and encourage patient and family to use good hand hygiene technique- Identify and instruct in appropriate isolation precautions for identified infection/condition  Outcome: Progressing  Goal: Absence of fever/infection during neutropenic period  Description: INTERVENTIONS:- Monitor WBC  Outcome: Progressing     Problem: SAFETY ADULT  Goal: Patient will remain free of falls  Description: INTERVENTIONS:- Educate patient/family on patient safety including physical limitations- Instruct patient to call for assistance with activity - Consult OT/PT to assist with strengthening/mobility - Keep Call bell within reach- Keep bed low and locked with side rails adjusted as appropriate- Keep care items and personal belongings within reach- Initiate and maintain comfort rounds- Make Fall Risk Sign visible to staff- Offer Toileting every 2 Hours, in advance of need- Initiate/Maintain alarm-  Obtain necessary fall risk management equipment- Apply yellow socks and bracelet for high fall risk patients- Consider moving patient to room near nurses station  Outcome: Progressing  Goal: Maintain or return to baseline ADL function  Description: INTERVENTIONS:-  Assess patient's ability to carry out ADLs; assess patient's baseline for ADL function and identify physical deficits which impact ability to perform ADLs (bathing, care of mouth/teeth, toileting, grooming, dressing, etc.)- Assess/evaluate cause of self-care deficits - Assess range of motion- Assess patient's mobility; develop plan if impaired- Assess patient's need for assistive devices and provide as appropriate- Encourage maximum independence but intervene and supervise when necessary- Involve family in performance of ADLs- Assess for home care needs following discharge - Consider OT consult to assist with ADL evaluation and planning for discharge- Provide patient education as appropriate  Outcome: Progressing  Goal: Maintains/Returns to pre admission functional level  Description: INTERVENTIONS:- Perform AM-PAC 6 Click Basic Mobility/ Daily Activity assessment daily.- Set and communicate daily mobility goal to care team and patient/family/caregiver. - Collaborate with rehabilitation services on mobility goals if consulted- Perform Range of Motion 3 times a day.- Reposition patient every 2 hours.- Dangle patient 3 times a day- Stand patient 3 times a day- Ambulate patient 3 times a day- Out of bed to chair 3 times a day - Out of bed for meals 3 times a day- Out of bed for toileting- Record patient progress and toleration of activity level   Outcome: Progressing     Problem: DISCHARGE PLANNING  Goal: Discharge to home or other facility with appropriate resources  Description: INTERVENTIONS:- Identify barriers to discharge w/patient and caregiver- Arrange for needed discharge resources and transportation as appropriate- Identify discharge learning needs  (meds, wound care, etc.)- Arrange for interpretive services to assist at discharge as needed- Refer to Case Management Department for coordinating discharge planning if the patient needs post-hospital services based on physician/advanced practitioner order or complex needs related to functional status, cognitive ability, or social support system  Outcome: Progressing     Problem: Knowledge Deficit  Goal: Patient/family/caregiver demonstrates understanding of disease process, treatment plan, medications, and discharge instructions  Description: Complete learning assessment and assess knowledge base.Interventions:- Provide teaching at level of understanding- Provide teaching via preferred learning methods  Outcome: Progressing     Problem: NEUROSENSORY - ADULT  Goal: Achieves stable or improved neurological status  Description: INTERVENTIONS- Monitor and report changes in neurological status- Monitor vital signs such as temperature, blood pressure, glucose, and any other labs ordered - Initiate measures to prevent increased intracranial pressure- Monitor for seizure activity and implement precautions if appropriate    Outcome: Progressing  Goal: Achieves maximal functionality and self care  Description: INTERVENTIONS- Monitor swallowing and airway patency with patient fatigue and changes in neurological status- Encourage and assist patient to increase activity and self care. - Encourage visually impaired, hearing impaired and aphasic patients to use assistive/communication devices  Outcome: Progressing     Problem: CARDIOVASCULAR - ADULT  Goal: Maintains optimal cardiac output and hemodynamic stability  Description: INTERVENTIONS:- Monitor I/O, vital signs and rhythm- Monitor for S/S and trends of decreased cardiac output- Administer and titrate ordered vasoactive medications to optimize hemodynamic stability- Assess quality of pulses, skin color and temperature- Assess for signs of decreased coronary artery  perfusion- Instruct patient to report change in severity of symptoms  Outcome: Progressing  Goal: Absence of cardiac dysrhythmias or at baseline rhythm  Description: INTERVENTIONS:- Continuous cardiac monitoring, vital signs, obtain 12 lead EKG if ordered- Administer antiarrhythmic and heart rate control medications as ordered- Monitor electrolytes and administer replacement therapy as ordered  Outcome: Progressing     Problem: RESPIRATORY - ADULT  Goal: Achieves optimal ventilation and oxygenation  Description: INTERVENTIONS:- Assess for changes in respiratory status- Assess for changes in mentation and behavior- Position to facilitate oxygenation and minimize respiratory effort- Oxygen administered by appropriate delivery if ordered- Initiate smoking cessation education as indicated- Encourage broncho-pulmonary hygiene including cough, deep breathe, Incentive Spirometry- Assess the need for suctioning and aspirate as needed- Assess and instruct to report SOB or any respiratory difficulty- Respiratory Therapy support as indicated  Outcome: Progressing     Problem: GASTROINTESTINAL - ADULT  Goal: Minimal or absence of nausea and/or vomiting  Description: INTERVENTIONS:- Administer IV fluids if ordered to ensure adequate hydration- Maintain NPO status until nausea and vomiting are resolved- Nasogastric tube if ordered- Administer ordered antiemetic medications as needed- Provide nonpharmacologic comfort measures as appropriate- Advance diet as tolerated, if ordered- Consider nutrition services referral to assist patient with adequate nutrition and appropriate food choices  Outcome: Progressing  Goal: Maintains or returns to baseline bowel function  Description: INTERVENTIONS:- Assess bowel function- Encourage oral fluids to ensure adequate hydration- Administer IV fluids if ordered to ensure adequate hydration- Administer ordered medications as needed- Encourage mobilization and activity- Consider nutritional  services referral to assist patient with adequate nutrition and appropriate food choices  Outcome: Progressing  Goal: Maintains adequate nutritional intake  Description: INTERVENTIONS:- Monitor percentage of each meal consumed- Identify factors contributing to decreased intake, treat as appropriate- Assist with meals as needed- Monitor I&O, weight, and lab values if indicated- Obtain nutrition services referral as needed  Outcome: Progressing  Goal: Oral mucous membranes remain intact  Description: INTERVENTIONS- Assess oral mucosa and hygiene practices- Implement preventative oral hygiene regimen- Implement oral medicated treatments as ordered- Initiate Nutrition services referral as needed  Outcome: Progressing     Problem: GENITOURINARY - ADULT  Goal: Maintains or returns to baseline urinary function  Description: INTERVENTIONS:- Assess urinary function- Encourage oral fluids to ensure adequate hydration if ordered- Administer IV fluids as ordered to ensure adequate hydration- Administer ordered medications as needed- Offer frequent toileting- Follow urinary retention protocol if ordered  Outcome: Progressing  Goal: Absence of urinary retention  Description: INTERVENTIONS:- Assess patient’s ability to void and empty bladder- Monitor I/O- Bladder scan as needed- Discuss with physician/AP medications to alleviate retention as needed- Discuss catheterization for long term situations as appropriate  Outcome: Progressing     Problem: METABOLIC, FLUID AND ELECTROLYTES - ADULT  Goal: Electrolytes maintained within normal limits  Description: INTERVENTIONS:- Monitor labs and assess patient for signs and symptoms of electrolyte imbalances- Administer electrolyte replacement as ordered- Monitor response to electrolyte replacements, including repeat lab results as appropriate- Instruct patient on fluid and nutrition as appropriate  Outcome: Progressing  Goal: Fluid balance maintained  Description: INTERVENTIONS:- Monitor  labs - Monitor I/O and WT- Instruct patient on fluid and nutrition as appropriate- Assess for signs & symptoms of volume excess or deficit  Outcome: Progressing  Goal: Glucose maintained within target range  Description: INTERVENTIONS:- Monitor Blood Glucose as ordered- Assess for signs and symptoms of hyperglycemia and hypoglycemia- Administer ordered medications to maintain glucose within target range- Assess nutritional intake and initiate nutrition service referral as needed  Outcome: Progressing     Problem: SKIN/TISSUE INTEGRITY - ADULT  Goal: Skin Integrity remains intact(Skin Breakdown Prevention)  Description: Assess:-Perform Jag assessment-Clean and moisturize skin-Inspect skin when repositioning, toileting, and assisting with ADLS-Assess under medical devices-Assess extremities for adequate circulation and sensation Bed Management:-Have minimal linens on bed & keep smooth, unwrinkled-Change linens as needed when moist or perspiring-Avoid sitting or lying in one position for more than 2 hours while in bed-Keep HOB at 45 degrees Toileting:-Offer bedside commode-Assess for incontinence-Use incontinent care products after each incontinent episode Activity:-Mobilize patient 3 times a day-Encourage activity and walks on unit-Encourage or provide ROM exercises -Turn and reposition patient every 2 Hours-Use appropriate equipment to lift or move patient in bed-Instruct/ Assist with weight shifting when out of bed in chair-Consider limitation of chair time 2 hour intervalsSkin Care:-Avoid use of baby powder, tape, friction and shearing, hot water or constrictive clothing-Relieve pressure over bony prominences-Do not massage red bony areasNext Steps:-Teach patient strategies to minimize risks -Consider consults to  interdisciplinary teams  Outcome: Progressing     Problem: HEMATOLOGIC - ADULT  Goal: Maintains hematologic stability  Description: INTERVENTIONS- Assess for signs and symptoms of bleeding or  hemorrhage- Monitor labs- Administer supportive blood products/factors as ordered and appropriate  Outcome: Progressing     Problem: MUSCULOSKELETAL - ADULT  Goal: Maintain or return mobility to safest level of function  Description: INTERVENTIONS:- Assess patient's ability to carry out ADLs; assess patient's baseline for ADL function and identify physical deficits which impact ability to perform ADLs (bathing, care of mouth/teeth, toileting, grooming, dressing, etc.)- Assess/evaluate cause of self-care deficits - Assess range of motion- Assess patient's mobility- Assess patient's need for assistive devices and provide as appropriate- Encourage maximum independence but intervene and supervise when necessary- Involve family in performance of ADLs- Assess for home care needs following discharge - Consider OT consult to assist with ADL evaluation and planning for discharge- Provide patient education as appropriate  Outcome: Progressing  Goal: Maintain proper alignment of affected body part  Description: INTERVENTIONS:- Support, maintain and protect limb and body alignment- Provide patient/ family with appropriate education  Outcome: Progressing     Problem: PSYCHOSIS  Goal: Will report no hallucinations or delusions  Description: Interventions:- Administer medication as  ordered- Every waking shifts and PRN assess for the presence of hallucinations and or delusions- Assist with reality testing to support increasing orientation- Assess if patient's hallucinations or delusions are encouraging self-harm or harm to others and intervene as appropriate  Outcome: Progressing     Problem: ANXIETY  Goal: Will report anxiety at manageable levels  Description: INTERVENTIONS:- Administer medication as ordered- Teach and encourage coping skills- Provide emotional support- Assess patient/family for anxiety and ability to cope  Outcome: Progressing  Goal: By discharge: Patient will verbalize 2 strategies to deal with  anxiety  Description: Interventions:- Identify any obvious source/trigger to anxiety- Staff will assist patient in applying identified coping technique/skills- Encourage attendance of scheduled groups and activities  Outcome: Progressing     Problem: SELF CARE DEFICIT  Goal: Return ADL status to a safe level of function  Description: INTERVENTIONS:- Administer medication as ordered- Assess ADL deficits and provide assistive devices as needed- Obtain PT/OT consults as needed- Assist and instruct patient to increase activity and self care as tolerated  Outcome: Progressing     Problem: Prexisting or High Potential for Compromised Skin Integrity  Goal: Skin integrity is maintained or improved  Description: INTERVENTIONS:- Identify patients at risk for skin breakdown- Assess and monitor skin integrity- Assess and monitor nutrition and hydration status- Monitor labs - Assess for incontinence - Turn and reposition patient- Assist with mobility/ambulation- Relieve pressure over bony prominences- Avoid friction and shearing- Provide appropriate hygiene as needed including keeping skin clean and dry- Evaluate need for skin moisturizer/barrier cream- Collaborate with interdisciplinary team - Patient/family teaching- Consider wound care consult   Outcome: Progressing     Problem: Nutrition/Hydration-ADULT  Goal: Nutrient/Hydration intake appropriate for improving, restoring or maintaining nutritional needs  Description: Monitor and assess patient's nutrition/hydration status for malnutrition. Collaborate with interdisciplinary team and initiate plan and interventions as ordered.  Monitor patient's weight and dietary intake as ordered or per policy. Utilize nutrition screening tool and intervene as necessary. Determine patient's food preferences and provide high-protein, high-caloric foods as appropriate. INTERVENTIONS:- Monitor oral intake, urinary output, labs, and treatment plans- Assess nutrition and hydration status and  recommend course of action- Evaluate amount of meals eaten- Assist patient with eating if necessary - Allow adequate time for meals- Recommend/ encourage appropriate diets, oral nutritional supplements, and vitamin/mineral supplements- Order, calculate, and assess calorie counts as needed- Recommend, monitor, and adjust tube feedings and TPN/PPN based on assessed needs- Assess need for intravenous fluids- Provide specific nutrition/hydration education as appropriate- Include patient/family/caregiver in decisions related to nutrition  Monitor and assess patient's nutrition/hydration status for malnutrition. Collaborate with interdisciplinary team and initiate plan and interventions as ordered.  Monitor patient's weight and dietary intake as ordered or per policy. Utilize nutrition screening tool and intervene as necessary. Determine patient's food preferences and provide high-protein, high-caloric foods as appropriate. INTERVENTIONS:- Monitor oral intake, urinary output, labs, and treatment plans- Assess nutrition and hydration status and recommend course of action- Evaluate amount of meals eaten- Assist patient with eating if necessary - Allow adequate time for meals- Recommend/ encourage appropriate diets, oral nutritional supplements, and vitamin/mineral supplements- Order, calculate, and assess calorie counts as needed- Recommend, monitor, and adjust tube feedings and TPN/PPN based on assessed needs- Assess need for intravenous fluids- Provide specific nutrition/hydration education as appropriate- Include patient/family/caregiver in decisions related to nutrition  Outcome: Progressing

## 2025-05-07 NOTE — ASSESSMENT & PLAN NOTE
Tsh 0.21  Free t4 1.3  Labs reflect hyperthyroid  Check thyroid antibody panel  Appreciate endocrine recommendations- possibly related to iv dye load.   Repeated tsh and free t4 are negative   No further treatment recommendations

## 2025-05-07 NOTE — PROGRESS NOTES
Progress Note - Hospitalist   Name: Solo Mack 55 y.o. male I MRN: [id number]  Unit/Bed#: [place] [address] -01 I Date of Admission: 5/2/2025   Date of Service: 5/7/2025 I Hospital Day: 5    Assessment & Plan  Septic shock (HCC)  Pt was transferred from  to [facility] icu due to profound septic shock with imagining demonstrating gallstones, ruq tenderness, and profound leukocytosis  Currently treated with cefepime/flagyl  On levophed which had been weaned to off  Pt was scheduled for or but decided later in evening decided he did not want to go forward with surgery   Surgery stated to continue conservative treatment with iv antibiotics and change to po at discharge for 2 weeks however he has increased RUQ abd pain  Will consult gi  Discussed with surgery and currently no or time available  He is not a good candidate for perc. Miriam tube  GERD (gastroesophageal reflux disease)  Continue ppi   Essential hypertension  Had been on clonidine and metoprolol outpt   Continue metoprolol po   Type 2 diabetes mellitus, without long-term current use of insulin ([facility])  Lab Results   Component Value Date    HGBA1C 8.3 (A) 03/17/2025       Recent Labs     05/06/25  1651 05/06/25  2120 05/07/25  0728 05/07/25  1157   POCGLU 213* 210* 257* 170*       Blood Sugar Average: Last 72 hrs:  (P) [id number]  Continue insulin sliding scale     Schizophrenia, unspecified type ([facility])  Appreciate psychiatry recommendations  Continue klonopin 1mg bid prn  Continue zyprexa 10mg qhs  Psych eval when pt is medically cleared  Neuropsych deemed pt to not have capacity   He still believes that president peter tells him to do things and that he can see through walls   Abnormal TSH  Tsh 0.21  Free t4 1.3  Labs reflect hyperthyroid  Check thyroid antibody panel  Appreciate endocrine recommendations- possibly related to iv dye load.   Repeated tsh and free t4 are negative   No further treatment recommendations  Chronic kidney disease, stage 2  (mild)  Lab Results   Component Value Date    EGFR 83 05/07/2025    EGFR 70 05/06/2025    EGFR 88 05/05/2025    CREATININE 1.00 05/07/2025    CREATININE 1.15 05/06/2025    CREATININE 0.96 05/05/2025   Chauncey r/o  Creatinine currently at baseline which is 0.9-1.2   H/O prolonged Q-T interval on ECG  Qtc has improved  Replace mg. Keep potassium greater than 4.0    Hypomagnesemia (Resolved: 5/7/2025)  Continue replacement   Acute cholecystitis  Please see septic shock   Hypophosphatemia    Insomnia  continue melatonin  Continue zyprexa   Obesity (BMI 30-39.9)    CHF (congestive heart failure) ([facility])  Wt Readings from Last 3 Encounters:   05/07/25 98 kg (216 lb 0.8 oz)   05/02/25 100 kg (221 lb 5.5 oz)   03/17/25 106 kg (233 lb)     Monitor I/o and daily weights             VTE Pharmacologic Prophylaxis:   lovenox     Mobility:   Basic Mobility Inpatient Raw Score: 24  JH-HLM Goal: 8: Walk 250 feet or more  JH-HLM Achieved: 8: Walk 250 feet ot more      Patient Centered Rounds:  discussed with his nurse     Education and Discussions with Family / Patient: Updated  (wife) via phone.    Current Length of Stay: 5 day(s)  Current Patient Status: Inpatient     Discharge Plan: Anticipate discharge in 24-48 hrs to d    Code Status: Level 1 - Full Code    Subjective   Pt seen and examined. He would like to proceed with surgery to remove his gb. He is having increased pain in ruq. No f/c no nausea. Spoke with his wife at length. He doesn't take his pills at home. He will be difficult to get to go back to the hospital once discharged for any outpt procedures. She is also worried to have him go home in the mental state he is. Did discuss psychiatry re eval prior to discharge     Objective :  Temp:  [98.5 °F (36.9 °C)] 98.5 °F (36.9 °C)  HR:  [71-87] 71  BP: (145-152)/(84-95) 152/95  Resp:  [20] 20  SpO2:  [95 %] 95 %  O2 Device: None (Room air)    Body mass index is 33.84 kg/m².     Input and Output Summary (last 24  hours):     Intake/Output Summary (Last 24 hours) at 5/7/2025 1337  Last data filed at 5/7/2025 0852  Gross per 24 hour   Intake 240 ml   Output --   Net 240 ml       Physical Exam  Constitutional:       Appearance: Normal appearance.   HENT:      Head: Normocephalic and atraumatic.   Eyes:      Extraocular Movements: Extraocular movements intact.      Pupils: Pupils are equal, round, and reactive to light.   Cardiovascular:      Rate and Rhythm: Normal rate and regular rhythm.      Heart sounds: No murmur heard.     No friction rub. No gallop.   Pulmonary:      Effort: Pulmonary effort is normal. No respiratory distress.      Breath sounds: Normal breath sounds. No wheezing, rhonchi or rales.   Abdominal:      General: Bowel sounds are normal.      Palpations: Abdomen is soft.      Tenderness: There is abdominal tenderness. There is guarding.      Comments: Ruq abdominal pan    Neurological:      Mental Status: He is alert and oriented to person, place, and time.      Comments: With delusions and hallucinations        Lines/Drains:      Lab Results: I have reviewed the following results:   Results from last 7 days   Lab Units 05/07/25  0500 05/06/25  0501 05/05/25  0513 05/02/25  0959 05/02/25  0511   WBC Thousand/uL 7.58   < > 11.02*   < > 26.34*   HEMOGLOBIN g/dL 12.4   < > 12.5   < > 13.0   HEMATOCRIT % 35.9*   < > 35.3*   < > 37.7   PLATELETS Thousands/uL 239   < > 209   < > 183   BANDS PCT %  --   --   --   --  8   SEGS PCT %  --   --  83*   < >  --    LYMPHO PCT %  --   --  7*   < > 5*   MONO PCT %  --   --  8   < > 4   EOS PCT %  --   --  1   < > 0    < > = values in this interval not displayed.     Results from last 7 days   Lab Units 05/07/25  0500   SODIUM mmol/L 136   POTASSIUM mmol/L 3.8   CHLORIDE mmol/L 105   CO2 mmol/L 25   BUN mg/dL 12   CREATININE mg/dL 1.00   ANION GAP mmol/L 6   CALCIUM mg/dL 7.9*   ALBUMIN g/dL 3.0*   TOTAL BILIRUBIN mg/dL 0.34   ALK PHOS U/L 80   ALT U/L 14   AST U/L 11*    GLUCOSE RANDOM mg/dL 188*     Results from last 7 days   Lab Units 05/01/25  2235   INR  0.96     Results from last 7 days   Lab Units 05/07/25  1157 05/07/25  0728 05/06/25  2120 05/06/25  1651 05/06/25  1136 05/06/25  0745 05/05/25  2059 05/05/25  1752 05/05/25  1110 05/05/25  0736 05/04/25  2118 05/04/25  1608   POC GLUCOSE mg/dl 170* 257* 210* 213* 147* 174* 175* 147* 158* 166* 190* 126         Results from last 7 days   Lab Units 05/02/25  0959 05/02/25  0511 05/01/25  2235   LACTIC ACID mmol/L 1.8 3.0* 1.6   PROCALCITONIN ng/ml 50.31* 19.91* 0.24       Recent Cultures (last 7 days):   Results from last 7 days   Lab Units 05/02/25  1034 05/02/25  1005 05/02/25  0512 05/01/25  2244   BLOOD CULTURE  No Growth After 4 Days. No Growth After 4 Days. No Growth After 5 Days.  No Growth After 5 Days. No Growth After 5 Days.  No Growth After 5 Days.       Imaging Results Review: No pertinent imaging studies reviewed.  Other Study Results Review: No additional pertinent studies reviewed.    Last 24 Hours Medication List:     Current Facility-Administered Medications:     ceFEPime (MAXIPIME) 2,000 mg in dextrose 5 % 50 mL IVPB, Q12H, Last Rate: 2,000 mg (05/07/25 1037)    clonazePAM (KlonoPIN) tablet 1 mg, BID PRN    cyanocobalamin injection 1,000 mcg, Q30 Days    enoxaparin (LOVENOX) subcutaneous injection 40 mg, Daily    HYDROmorphone HCl (DILAUDID) injection 0.2 mg, Q4H PRN    hydrOXYzine HCL (ATARAX) tablet 25 mg, Q6H PRN Max 4/day    hydrOXYzine HCL (ATARAX) tablet 50 mg, Q6H PRN Max 4/day    ibuprofen (MOTRIN) tablet 400 mg, Q6H PRN    insulin lispro (HumALOG/ADMELOG) 100 units/mL subcutaneous injection 1-6 Units, TID AC **AND** Fingerstick Glucose (POCT), TID AC    insulin lispro (HumALOG/ADMELOG) 100 units/mL subcutaneous injection 1-6 Units, HS    LORazepam (ATIVAN) injection 1 mg, Q6H PRN Max 3/day    LORazepam (ATIVAN) tablet 1 mg, Q6H PRN Max 3/day    melatonin tablet 6 mg, HS    metoprolol tartrate  (LOPRESSOR) partial tablet 12.5 mg, Q12H ROBER    metroNIDAZOLE (FLAGYL) IVPB (premix) 500 mg 100 mL, Q8H, Last Rate: 500 mg (05/07/25 0856)    nicotine polacrilex (NICORETTE) gum 2 mg, Q4H PRN    OLANZapine (ZyPREXA) IM injection 10 mg, HS    pantoprazole (PROTONIX) injection 40 mg, Q24H ROBER    polyethylene glycol (MIRALAX) packet 17 g, Daily PRN    senna-docusate sodium (SENOKOT S) 8.6-50 mg per tablet 1 tablet, Daily PRN    trimethobenzamide (TIGAN) IM injection 200 mg, Q6H PRN    Administrative Statements   Today, Patient Was Seen By: Tasha Joseph DO

## 2025-05-07 NOTE — ASSESSMENT & PLAN NOTE
Appreciate psychiatry recommendations  Continue klonopin 1mg bid prn  Continue zyprexa 10mg qhs  Psych eval when pt is medically cleared  Neuropsych deemed pt to not have capacity   He still believes that president peter tells him to do things and that he can see through walls

## 2025-05-07 NOTE — ASSESSMENT & PLAN NOTE
Lab Results   Component Value Date    EGFR 83 05/07/2025    EGFR 70 05/06/2025    EGFR 88 05/05/2025    CREATININE 1.00 05/07/2025    CREATININE 1.15 05/06/2025    CREATININE 0.96 05/05/2025   Chauncey r/o  Creatinine currently at baseline which is 0.9-1.2

## 2025-05-07 NOTE — ASSESSMENT & PLAN NOTE
Wt Readings from Last 3 Encounters:   05/07/25 98 kg (216 lb 0.8 oz)   05/02/25 100 kg (221 lb 5.5 oz)   03/17/25 106 kg (233 lb)     Monitor I/o and daily weights

## 2025-05-07 NOTE — PLAN OF CARE
Problem: PAIN - ADULT  Goal: Verbalizes/displays adequate comfort level or baseline comfort level  Description: Interventions:- Encourage patient to monitor pain and request assistance- Assess pain using appropriate pain scale- Administer analgesics based on type and severity of pain and evaluate response- Implement non-pharmacological measures as appropriate and evaluate response- Consider cultural and social influences on pain and pain management- Notify physician/advanced practitioner if interventions unsuccessful or patient reports new pain  Outcome: Progressing     Problem: INFECTION - ADULT  Goal: Absence or prevention of progression during hospitalization  Description: INTERVENTIONS:- Assess and monitor for signs and symptoms of infection- Monitor lab/diagnostic results- Monitor all insertion sites, i.e. indwelling lines, tubes, and drains- Monitor endotracheal if appropriate and nasal secretions for changes in amount and color- [place] appropriate cooling/warming therapies per order- Administer medications as ordered- Instruct and encourage patient and family to use good hand hygiene technique- Identify and instruct in appropriate isolation precautions for identified infection/condition  Outcome: Progressing  Goal: Absence of fever/infection during neutropenic period  Description: INTERVENTIONS:- Monitor WBC  Outcome: Progressing     Problem: SAFETY ADULT  Goal: Patient will remain free of falls  Description: INTERVENTIONS:- Educate patient/family on patient safety including physical limitations- Instruct patient to call for assistance with activity - Consult OT/PT to assist with strengthening/mobility - Keep Call bell within reach- Keep bed low and locked with side rails adjusted as appropriate- Keep care items and personal belongings within reach- Initiate and maintain comfort rounds- Make Fall Risk Sign visible to staff- Offer Toileting every 2 Hours, in advance of need- Initiate/Maintain alarm-  Obtain necessary fall risk management equipment- Apply yellow socks and bracelet for high fall risk patients- Consider moving patient to room near nurses station  Outcome: Progressing  Goal: Maintain or return to baseline ADL function  Description: INTERVENTIONS:-  Assess patient's ability to carry out ADLs; assess patient's baseline for ADL function and identify physical deficits which impact ability to perform ADLs (bathing, care of mouth/teeth, toileting, grooming, dressing, etc.)- Assess/evaluate cause of self-care deficits - Assess range of motion- Assess patient's mobility; develop plan if impaired- Assess patient's need for assistive devices and provide as appropriate- Encourage maximum independence but intervene and supervise when necessary- Involve family in performance of ADLs- Assess for home care needs following discharge - Consider OT consult to assist with ADL evaluation and planning for discharge- Provide patient education as appropriate  Outcome: Progressing  Goal: Maintains/Returns to pre admission functional level  Description: INTERVENTIONS:- Perform AM-PAC 6 Click Basic Mobility/ Daily Activity assessment daily.- Set and communicate daily mobility goal to care team and patient/family/caregiver. - Collaborate with rehabilitation services on mobility goals if consulted- Perform Range of Motion 3 times a day.- Reposition patient every 2 hours.- Dangle patient 3 times a day- Stand patient 3 times a day- Ambulate patient 3 times a day- Out of bed to chair 3 times a day - Out of bed for meals 3 times a day- Out of bed for toileting- Record patient progress and toleration of activity level   Outcome: Progressing     Problem: DISCHARGE PLANNING  Goal: Discharge to home or other facility with appropriate resources  Description: INTERVENTIONS:- Identify barriers to discharge w/patient and caregiver- Arrange for needed discharge resources and transportation as appropriate- Identify discharge learning needs  (meds, wound care, etc.)- Arrange for interpretive services to assist at discharge as needed- Refer to Case Management Department for coordinating discharge planning if the patient needs post-hospital services based on physician/advanced practitioner order or complex needs related to functional status, cognitive ability, or social support system  Outcome: Progressing     Problem: Knowledge Deficit  Goal: Patient/family/caregiver demonstrates understanding of disease process, treatment plan, medications, and discharge instructions  Description: Complete learning assessment and assess knowledge base.Interventions:- Provide teaching at level of understanding- Provide teaching via preferred learning methods  Outcome: Progressing     Problem: NEUROSENSORY - ADULT  Goal: Achieves stable or improved neurological status  Description: INTERVENTIONS- Monitor and report changes in neurological status- Monitor vital signs such as temperature, blood pressure, glucose, and any other labs ordered - Initiate measures to prevent increased intracranial pressure- Monitor for seizure activity and implement precautions if appropriate    Outcome: Progressing  Goal: Achieves maximal functionality and self care  Description: INTERVENTIONS- Monitor swallowing and airway patency with patient fatigue and changes in neurological status- Encourage and assist patient to increase activity and self care. - Encourage visually impaired, hearing impaired and aphasic patients to use assistive/communication devices  Outcome: Progressing     Problem: CARDIOVASCULAR - ADULT  Goal: Maintains optimal cardiac output and hemodynamic stability  Description: INTERVENTIONS:- Monitor I/O, vital signs and rhythm- Monitor for S/S and trends of decreased cardiac output- Administer and titrate ordered vasoactive medications to optimize hemodynamic stability- Assess quality of pulses, skin color and temperature- Assess for signs of decreased coronary artery  perfusion- Instruct patient to report change in severity of symptoms  Outcome: Progressing  Goal: Absence of cardiac dysrhythmias or at baseline rhythm  Description: INTERVENTIONS:- Continuous cardiac monitoring, vital signs, obtain 12 lead EKG if ordered- Administer antiarrhythmic and heart rate control medications as ordered- Monitor electrolytes and administer replacement therapy as ordered  Outcome: Progressing     Problem: RESPIRATORY - ADULT  Goal: Achieves optimal ventilation and oxygenation  Description: INTERVENTIONS:- Assess for changes in respiratory status- Assess for changes in mentation and behavior- Position to facilitate oxygenation and minimize respiratory effort- Oxygen administered by appropriate delivery if ordered- Initiate smoking cessation education as indicated- Encourage broncho-pulmonary hygiene including cough, deep breathe, Incentive Spirometry- Assess the need for suctioning and aspirate as needed- Assess and instruct to report SOB or any respiratory difficulty- Respiratory Therapy support as indicated  Outcome: Progressing     Problem: GASTROINTESTINAL - ADULT  Goal: Minimal or absence of nausea and/or vomiting  Description: INTERVENTIONS:- Administer IV fluids if ordered to ensure adequate hydration- Maintain NPO status until nausea and vomiting are resolved- Nasogastric tube if ordered- Administer ordered antiemetic medications as needed- Provide nonpharmacologic comfort measures as appropriate- Advance diet as tolerated, if ordered- Consider nutrition services referral to assist patient with adequate nutrition and appropriate food choices  Outcome: Progressing  Goal: Maintains or returns to baseline bowel function  Description: INTERVENTIONS:- Assess bowel function- Encourage oral fluids to ensure adequate hydration- Administer IV fluids if ordered to ensure adequate hydration- Administer ordered medications as needed- Encourage mobilization and activity- Consider nutritional  services referral to assist patient with adequate nutrition and appropriate food choices  Outcome: Progressing  Goal: Maintains adequate nutritional intake  Description: INTERVENTIONS:- Monitor percentage of each meal consumed- Identify factors contributing to decreased intake, treat as appropriate- Assist with meals as needed- Monitor I&O, weight, and lab values if indicated- Obtain nutrition services referral as needed  Outcome: Progressing  Goal: Oral mucous membranes remain intact  Description: INTERVENTIONS- Assess oral mucosa and hygiene practices- Implement preventative oral hygiene regimen- Implement oral medicated treatments as ordered- Initiate Nutrition services referral as needed  Outcome: Progressing     Problem: GENITOURINARY - ADULT  Goal: Maintains or returns to baseline urinary function  Description: INTERVENTIONS:- Assess urinary function- Encourage oral fluids to ensure adequate hydration if ordered- Administer IV fluids as ordered to ensure adequate hydration- Administer ordered medications as needed- Offer frequent toileting- Follow urinary retention protocol if ordered  Outcome: Progressing  Goal: Absence of urinary retention  Description: INTERVENTIONS:- Assess patient’s ability to void and empty bladder- Monitor I/O- Bladder scan as needed- Discuss with physician/AP medications to alleviate retention as needed- Discuss catheterization for long term situations as appropriate  Outcome: Progressing     Problem: METABOLIC, FLUID AND ELECTROLYTES - ADULT  Goal: Electrolytes maintained within normal limits  Description: INTERVENTIONS:- Monitor labs and assess patient for signs and symptoms of electrolyte imbalances- Administer electrolyte replacement as ordered- Monitor response to electrolyte replacements, including repeat lab results as appropriate- Instruct patient on fluid and nutrition as appropriate  Outcome: Progressing  Goal: Fluid balance maintained  Description: INTERVENTIONS:- Monitor  labs - Monitor I/O and WT- Instruct patient on fluid and nutrition as appropriate- Assess for signs & symptoms of volume excess or deficit  Outcome: Progressing  Goal: Glucose maintained within target range  Description: INTERVENTIONS:- Monitor Blood Glucose as ordered- Assess for signs and symptoms of hyperglycemia and hypoglycemia- Administer ordered medications to maintain glucose within target range- Assess nutritional intake and initiate nutrition service referral as needed  Outcome: Progressing     Problem: SKIN/TISSUE INTEGRITY - ADULT  Goal: Skin Integrity remains intact(Skin Breakdown Prevention)  Description: Assess:-Perform Jag assessment-Clean and moisturize skin-Inspect skin when repositioning, toileting, and assisting with ADLS-Assess under medical devices-Assess extremities for adequate circulation and sensation Bed Management:-Have minimal linens on bed & keep smooth, unwrinkled-Change linens as needed when moist or perspiring-Avoid sitting or lying in one position for more than 2 hours while in bed-Keep HOB at 45 degrees Toileting:-Offer bedside commode-Assess for incontinence-Use incontinent care products after each incontinent episode Activity:-Mobilize patient 3 times a day-Encourage activity and walks on unit-Encourage or provide ROM exercises -Turn and reposition patient every 2 Hours-Use appropriate equipment to lift or move patient in bed-Instruct/ Assist with weight shifting when out of bed in chair-Consider limitation of chair time 2 hour intervalsSkin Care:-Avoid use of baby powder, tape, friction and shearing, hot water or constrictive clothing-Relieve pressure over bony prominences-Do not massage red bony areasNext Steps:-Teach patient strategies to minimize risks -Consider consults to  interdisciplinary teams  Outcome: Progressing     Problem: HEMATOLOGIC - ADULT  Goal: Maintains hematologic stability  Description: INTERVENTIONS- Assess for signs and symptoms of bleeding or  hemorrhage- Monitor labs- Administer supportive blood products/factors as ordered and appropriate  Outcome: Progressing     Problem: MUSCULOSKELETAL - ADULT  Goal: Maintain or return mobility to safest level of function  Description: INTERVENTIONS:- Assess patient's ability to carry out ADLs; assess patient's baseline for ADL function and identify physical deficits which impact ability to perform ADLs (bathing, care of mouth/teeth, toileting, grooming, dressing, etc.)- Assess/evaluate cause of self-care deficits - Assess range of motion- Assess patient's mobility- Assess patient's need for assistive devices and provide as appropriate- Encourage maximum independence but intervene and supervise when necessary- Involve family in performance of ADLs- Assess for home care needs following discharge - Consider OT consult to assist with ADL evaluation and planning for discharge- Provide patient education as appropriate  Outcome: Progressing  Goal: Maintain proper alignment of affected body part  Description: INTERVENTIONS:- Support, maintain and protect limb and body alignment- Provide patient/ family with appropriate education  Outcome: Progressing     Problem: PSYCHOSIS  Goal: Will report no hallucinations or delusions  Description: Interventions:- Administer medication as  ordered- Every waking shifts and PRN assess for the presence of hallucinations and or delusions- Assist with reality testing to support increasing orientation- Assess if patient's hallucinations or delusions are encouraging self-harm or harm to others and intervene as appropriate  Outcome: Progressing     Problem: ANXIETY  Goal: Will report anxiety at manageable levels  Description: INTERVENTIONS:- Administer medication as ordered- Teach and encourage coping skills- Provide emotional support- Assess patient/family for anxiety and ability to cope  Outcome: Progressing  Goal: By discharge: Patient will verbalize 2 strategies to deal with  anxiety  Description: Interventions:- Identify any obvious source/trigger to anxiety- Staff will assist patient in applying identified coping technique/skills- Encourage attendance of scheduled groups and activities  Outcome: Progressing     Problem: SELF CARE DEFICIT  Goal: Return ADL status to a safe level of function  Description: INTERVENTIONS:- Administer medication as ordered- Assess ADL deficits and provide assistive devices as needed- Obtain PT/OT consults as needed- Assist and instruct patient to increase activity and self care as tolerated  Outcome: Progressing     Problem: Prexisting or High Potential for Compromised Skin Integrity  Goal: Skin integrity is maintained or improved  Description: INTERVENTIONS:- Identify patients at risk for skin breakdown- Assess and monitor skin integrity- Assess and monitor nutrition and hydration status- Monitor labs - Assess for incontinence - Turn and reposition patient- Assist with mobility/ambulation- Relieve pressure over bony prominences- Avoid friction and shearing- Provide appropriate hygiene as needed including keeping skin clean and dry- Evaluate need for skin moisturizer/barrier cream- Collaborate with interdisciplinary team - Patient/family teaching- Consider wound care consult   Outcome: Progressing     Problem: Nutrition/Hydration-ADULT  Goal: Nutrient/Hydration intake appropriate for improving, restoring or maintaining nutritional needs  Description: Monitor and assess patient's nutrition/hydration status for malnutrition. Collaborate with interdisciplinary team and initiate plan and interventions as ordered.  Monitor patient's weight and dietary intake as ordered or per policy. Utilize nutrition screening tool and intervene as necessary. Determine patient's food preferences and provide high-protein, high-caloric foods as appropriate. INTERVENTIONS:- Monitor oral intake, urinary output, labs, and treatment plans- Assess nutrition and hydration status and  recommend course of action- Evaluate amount of meals eaten- Assist patient with eating if necessary - Allow adequate time for meals- Recommend/ encourage appropriate diets, oral nutritional supplements, and vitamin/mineral supplements- Order, calculate, and assess calorie counts as needed- Recommend, monitor, and adjust tube feedings and TPN/PPN based on assessed needs- Assess need for intravenous fluids- Provide specific nutrition/hydration education as appropriate- Include patient/family/caregiver in decisions related to nutrition  Monitor and assess patient's nutrition/hydration status for malnutrition. Collaborate with interdisciplinary team and initiate plan and interventions as ordered.  Monitor patient's weight and dietary intake as ordered or per policy. Utilize nutrition screening tool and intervene as necessary. Determine patient's food preferences and provide high-protein, high-caloric foods as appropriate. INTERVENTIONS:- Monitor oral intake, urinary output, labs, and treatment plans- Assess nutrition and hydration status and recommend course of action- Evaluate amount of meals eaten- Assist patient with eating if necessary - Allow adequate time for meals- Recommend/ encourage appropriate diets, oral nutritional supplements, and vitamin/mineral supplements- Order, calculate, and assess calorie counts as needed- Recommend, monitor, and adjust tube feedings and TPN/PPN based on assessed needs- Assess need for intravenous fluids- Provide specific nutrition/hydration education as appropriate- Include patient/family/caregiver in decisions related to nutrition  Outcome: Progressing

## 2025-05-07 NOTE — ASSESSMENT & PLAN NOTE
Pt was transferred from  to [facility] icu due to profound septic shock with imagining demonstrating gallstones, ruq tenderness, and profound leukocytosis  Currently treated with cefepime/flagyl  On levophed which had been weaned to off  Pt was scheduled for or but decided later in evening decided he did not want to go forward with surgery   Surgery stated to continue conservative treatment with iv antibiotics and change to po at discharge for 2 weeks however he has increased RUQ abd pain  Will consult gi  Discussed with surgery and currently no or time available  He is not a good candidate for perc. Miriam tube

## 2025-05-07 NOTE — CONSULTS
Consultation - Neuropsychology/Psychology Department  Solo Mack 55 y.o. male MRN: [id number]  Unit/Bed#: [place] [address] -01 Encounter: [id number]        Reason for Consultation:  Solo Mack is a 55 y.o. year old male who was referred for a Neuropsychological Exam to assess cognitive functioning and comment on capacity to make informed medical decisions.      History of Present Illness  Septic Shock    Physician Requesting Consult: Tasha Joseph DO    PROBLEM LIST:  Patient Active Problem List   Diagnosis    Chronic pain disorder    GERD (gastroesophageal reflux disease)    Essential hypertension    Drug abuse and dependence ([facility])    White matter abnormality on MRI of brain    Type 2 diabetes mellitus, without long-term current use of insulin ([facility])    HLD (hyperlipidemia)    Schizophrenia, unspecified type ([facility])    Spinal stenosis, lumbar    Stutter    Erectile dysfunction    Bipolar affective disorder, mixed, in full remission ([facility])    Abnormal TSH    Chronic kidney disease, stage 2 (mild)    H/O prolonged Q-T interval on ECG    Hypomagnesemia    Bipolar affective disorder, current episode manic with psychotic symptoms ([facility])    Acute cholecystitis    Septic shock ([facility])    Hypophosphatemia    Insomnia    Obesity (BMI 30-39.9)    CHF (congestive heart failure) ([facility])         Historical Information   Past Medical History:   Diagnosis Date    Alcohol withdrawal ([facility])     Alcoholism ([facility])     Anxiety     Back pain     Back pain, chronic     Bipolar 1 disorder ([facility])     Bipolar disorder, current episode mixed, moderate ([facility])     BPH (benign prostatic hyperplasia)     Cardiac disease     CHF (congestive heart failure) ([facility])     Chronic pain disorder     Chronic renal failure     Closed displaced fracture of neck of left scapula     Closed fracture of glenoid cavity and neck of left scapula 09/06/2020    Demyelinating disease ([facility])     Dental disorder     Depression     Diabetes mellitus ([facility])     Drug abuse  ([facility])     Drug withdrawal ([facility])     ED (erectile dysfunction)     Edema     Encephalopathy     Encephalopathy     Fatigue     Fracture of left radius 09/06/2020    GERD (gastroesophageal reflux disease)     H/O prolonged Q-T interval on ECG 12/04/2024    Heart rate fast     Hepatitis     unspecified     Hyperlipidemia     Hypertension     Hypokalemia     Hypothyroidism 10/21/2024    Knee buckling     Left rib fracture 09/06/2020    MI (myocardial infarction) ([facility])     Morbid obesity with BMI of 40.0-44.9, adult ([facility])     NIDDY (non-insulin dependent diabetes mellitus in young) ([facility])     Obesity     Opiate dependence ([facility])     Opiate withdrawal ([facility])     Osteoarthritis     Pleural effusion     Pneumonia     Prolonged Q-T interval on ECG     Psychiatric disorder     Psychiatric illness     Psychosis ([facility])     Psychosis ([facility]) 12/17/2024    Renal disorder     SAH (subarachnoid hemorrhage) ([facility]) 09/06/2020    Schizo-affective schizophrenia ([facility])     Spinal stenosis, lumbar     Subdural hematoma ([facility]) 09/06/2020    Traumatic subdural hemorrhage with loss of consciousness of unspecified duration, initial encounter ([facility]) 12/29/2022    Ulcer of calf ([facility])     Ulnar neuritis, right     Ulnar neuropathy at elbow     Weight loss      Past Surgical History:   Procedure Laterality Date    BACK SURGERY      report thoracic surgery    LUMBAR FUSION  05/2006    L5/S1 [place]      Social History   Social History     Substance and Sexual Activity   Alcohol Use Not Currently    Comment: beer here and there per pt     Social History     Substance and Sexual Activity   Drug Use Not Currently     Social History     Tobacco Use   Smoking Status Former    Passive exposure: Never   Smokeless Tobacco Never   Tobacco Comments    patient is a non - smoker     Family History:   Family History   Problem Relation Age of Onset    No Known Problems Mother     No Known Problems Father     No Known Problems Sister     No Known Problems Brother     No  Known Problems Maternal Aunt     No Known Problems Paternal Aunt     No Known Problems Maternal Uncle     No Known Problems Paternal Uncle     No Known Problems Maternal Grandfather     No Known Problems Maternal Grandmother     No Known Problems Paternal Grandfather     No Known Problems Paternal Grandmother     No Known Problems Cousin     ADD / ADHD Neg Hx     Alcohol abuse Neg Hx     Anxiety disorder Neg Hx     Bipolar disorder Neg Hx     Dementia Neg Hx     Depression Neg Hx     Drug abuse Neg Hx     OCD Neg Hx     Paranoid behavior Neg Hx     Schizophrenia Neg Hx     Seizures Neg Hx     Self-Injury Neg Hx     Suicide Attempts Neg Hx        Meds/Allergies   current meds:   Current Facility-Administered Medications:     ceFEPime (MAXIPIME) 2,000 mg in dextrose 5 % 50 mL IVPB, Q12H, Last Rate: 2,000 mg (05/07/25 1037)    clonazePAM (KlonoPIN) tablet 1 mg, BID PRN    cyanocobalamin injection 1,000 mcg, Q30 Days    enoxaparin (LOVENOX) subcutaneous injection 40 mg, Daily    HYDROmorphone HCl (DILAUDID) injection 0.2 mg, Q4H PRN    hydrOXYzine HCL (ATARAX) tablet 25 mg, Q6H PRN Max 4/day    hydrOXYzine HCL (ATARAX) tablet 50 mg, Q6H PRN Max 4/day    ibuprofen (MOTRIN) tablet 400 mg, Q6H PRN    insulin lispro (HumALOG/ADMELOG) 100 units/mL subcutaneous injection 1-6 Units, TID AC **AND** Fingerstick Glucose (POCT), TID AC    insulin lispro (HumALOG/ADMELOG) 100 units/mL subcutaneous injection 1-6 Units, HS    LORazepam (ATIVAN) injection 1 mg, Q6H PRN Max 3/day    LORazepam (ATIVAN) tablet 1 mg, Q6H PRN Max 3/day    melatonin tablet 6 mg, HS    metoprolol tartrate (LOPRESSOR) partial tablet 12.5 mg, Q12H ROBER    metroNIDAZOLE (FLAGYL) IVPB (premix) 500 mg 100 mL, Q8H, Last Rate: 500 mg (05/07/25 0856)    nicotine polacrilex (NICORETTE) gum 2 mg, Q4H PRN    OLANZapine (ZyPREXA) IM injection 10 mg, HS    pantoprazole (PROTONIX) injection 40 mg, Q24H ROBER    polyethylene glycol (MIRALAX) packet 17 g, Daily PRN     senna-docusate sodium (SENOKOT S) 8.6-50 mg per tablet 1 tablet, Daily PRN    trimethobenzamide (TIGAN) IM injection 200 mg, Q6H PRN    Allergies   Allergen Reactions    Haldol [Haloperidol]     Lexapro [Escitalopram Oxalate] Hives    Penicillins Other (See Comments)     Unknown, reaction as child         Family and Social Support:   No data recorded    Behavioral Observations: Patient was alert, oriented except for season of year, affect appeared appropriate to context and patient admitted to anxiety and denied depressed mood; patient has been experiencing hallucinations marked by speaking to other in room that are not present, believes President is telling him to do things, and does not believe his spouse is actually his spouse    Cognitive Examination    General Cognitive Functioning MMSE = [id number];     Attention/Concentration Auditory Selective Attention = Within Normal Limits; ; Auditory Vigilance = Within Normal Limits; Information Processing Speed = Within Normal Limits    Frontal Systems/Executive Functioning Mental Flexibility/Cognitive Control = Within Normal Limits; Working Memory = Impaired Abstract Reasoning = Impaired; Generative Ability = Within Normal Limits,     Language Functioning Confrontation naming = Within Normal Limits, Phonemic Fluency = Within Normal Limits; Semantic Retrieval = Within Normal Limits; Comprehension of Complex Ideational Material = Impaired;  Praxis = Within Normal Limits; Repetition = Within Normal Limits; Basic Reading = Within Normal Limits;  Following Commands = Within Normal Limits    Memory Functioning Narrative Recall - Short Delay = Impaired; Long Delay Narrative Recall = Impaired;     Visuo-Spatial Abilities Not Assessed    Functional Knowledge  Health & Safety Knowledge = Impaired;     Summary/Impression:  Results of Neuropsychological exam revealed cognitive deficits in declarative memory, working memory, abstract reasoning ability, comprehension and on a  measure assessing awareness of personal health status and ability to evaluate health problems, handle medical emergencies and take safety precautions, patient performed in the IMPAIRED range of functioning. During this encounter, patient does not appear to have capacity to make fully informed medical decisions. Schizoaffective Disorder, Bipolar type

## 2025-05-08 ENCOUNTER — ANESTHESIA EVENT (INPATIENT)
Dept: PERIOP | Facility: HOSPITAL | Age: 55
DRG: 853 | End: 2025-05-08
Payer: COMMERCIAL

## 2025-05-08 LAB
ALBUMIN SERPL BCG-MCNC: 3.3 G/DL (ref 3.5–5)
ALP SERPL-CCNC: 82 U/L (ref 34–104)
ALT SERPL W P-5'-P-CCNC: 22 U/L (ref 7–52)
ANION GAP SERPL CALCULATED.3IONS-SCNC: 8 MMOL/L (ref 4–13)
AST SERPL W P-5'-P-CCNC: 28 U/L (ref 13–39)
BILIRUB SERPL-MCNC: 0.41 MG/DL (ref 0.2–1)
BUN SERPL-MCNC: 10 MG/DL (ref 5–25)
CALCIUM ALBUM COR SERPL-MCNC: 9.3 MG/DL (ref 8.3–10.1)
CALCIUM SERPL-MCNC: 8.7 MG/DL (ref 8.4–10.2)
CHLORIDE SERPL-SCNC: 101 MMOL/L (ref 96–108)
CO2 SERPL-SCNC: 27 MMOL/L (ref 21–32)
CREAT SERPL-MCNC: 1 MG/DL (ref 0.6–1.3)
ERYTHROCYTE [DISTWIDTH] IN BLOOD BY AUTOMATED COUNT: 12.2 % (ref 11.6–15.1)
GFR SERPL CREATININE-BSD FRML MDRD: 83 ML/MIN/1.73SQ M
GLUCOSE SERPL-MCNC: 149 MG/DL (ref 65–140)
GLUCOSE SERPL-MCNC: 166 MG/DL (ref 65–140)
GLUCOSE SERPL-MCNC: 168 MG/DL (ref 65–140)
GLUCOSE SERPL-MCNC: 170 MG/DL (ref 65–140)
GLUCOSE SERPL-MCNC: 201 MG/DL (ref 65–140)
GLUCOSE SERPL-MCNC: 212 MG/DL (ref 65–140)
GLUCOSE SERPL-MCNC: 233 MG/DL (ref 65–140)
HCT VFR BLD AUTO: 41.9 % (ref 36.5–49.3)
HGB BLD-MCNC: 14.1 G/DL (ref 12–17)
MCH RBC QN AUTO: 28.3 PG (ref 26.8–34.3)
MCHC RBC AUTO-ENTMCNC: 33.7 G/DL (ref 31.4–37.4)
MCV RBC AUTO: 84 FL (ref 82–98)
PLATELET # BLD AUTO: 310 THOUSANDS/UL (ref 149–390)
PMV BLD AUTO: 9.7 FL (ref 8.9–12.7)
POTASSIUM SERPL-SCNC: 4 MMOL/L (ref 3.5–5.3)
PROT SERPL-MCNC: 6.6 G/DL (ref 6.4–8.4)
RBC # BLD AUTO: 4.99 MILLION/UL (ref 3.88–5.62)
SODIUM SERPL-SCNC: 136 MMOL/L (ref 135–147)
WBC # BLD AUTO: 8.3 THOUSAND/UL (ref 4.31–10.16)

## 2025-05-08 PROCEDURE — 99232 SBSQ HOSP IP/OBS MODERATE 35: CPT | Performed by: INTERNAL MEDICINE

## 2025-05-08 PROCEDURE — 99222 1ST HOSP IP/OBS MODERATE 55: CPT | Performed by: STUDENT IN AN ORGANIZED HEALTH CARE EDUCATION/TRAINING PROGRAM

## 2025-05-08 PROCEDURE — 82948 REAGENT STRIP/BLOOD GLUCOSE: CPT

## 2025-05-08 PROCEDURE — 80053 COMPREHEN METABOLIC PANEL: CPT | Performed by: INTERNAL MEDICINE

## 2025-05-08 PROCEDURE — 85027 COMPLETE CBC AUTOMATED: CPT | Performed by: INTERNAL MEDICINE

## 2025-05-08 RX ORDER — SODIUM CHLORIDE, SODIUM LACTATE, POTASSIUM CHLORIDE, CALCIUM CHLORIDE 600; 310; 30; 20 MG/100ML; MG/100ML; MG/100ML; MG/100ML
125 INJECTION, SOLUTION INTRAVENOUS CONTINUOUS
Status: DISCONTINUED | OUTPATIENT
Start: 2025-05-09 | End: 2025-05-09

## 2025-05-08 RX ORDER — DEXTROSE MONOHYDRATE, SODIUM CHLORIDE, AND POTASSIUM CHLORIDE 50; 1.49; 4.5 G/1000ML; G/1000ML; G/1000ML
125 INJECTION, SOLUTION INTRAVENOUS CONTINUOUS
Status: DISCONTINUED | OUTPATIENT
Start: 2025-05-09 | End: 2025-05-08

## 2025-05-08 RX ADMIN — INSULIN LISPRO 2 UNITS: 100 INJECTION, SOLUTION INTRAVENOUS; SUBCUTANEOUS at 21:08

## 2025-05-08 RX ADMIN — INSULIN LISPRO 3 UNITS: 100 INJECTION, SOLUTION INTRAVENOUS; SUBCUTANEOUS at 17:26

## 2025-05-08 RX ADMIN — ENOXAPARIN SODIUM 40 MG: 40 INJECTION SUBCUTANEOUS at 09:09

## 2025-05-08 RX ADMIN — Medication 12.5 MG: at 21:07

## 2025-05-08 RX ADMIN — Medication 12.5 MG: at 09:16

## 2025-05-08 RX ADMIN — INSULIN LISPRO 2 UNITS: 100 INJECTION, SOLUTION INTRAVENOUS; SUBCUTANEOUS at 12:22

## 2025-05-08 RX ADMIN — PANTOPRAZOLE SODIUM 40 MG: 40 INJECTION, POWDER, FOR SOLUTION INTRAVENOUS at 09:09

## 2025-05-08 RX ADMIN — METRONIDAZOLE 500 MG: 500 INJECTION, SOLUTION INTRAVENOUS at 09:16

## 2025-05-08 RX ADMIN — OLANZAPINE 10 MG: 10 INJECTION, POWDER, LYOPHILIZED, FOR SOLUTION INTRAMUSCULAR at 21:08

## 2025-05-08 RX ADMIN — INSULIN LISPRO 1 UNITS: 100 INJECTION, SOLUTION INTRAVENOUS; SUBCUTANEOUS at 09:21

## 2025-05-08 RX ADMIN — Medication 6 MG: at 21:07

## 2025-05-08 RX ADMIN — METRONIDAZOLE 500 MG: 500 INJECTION, SOLUTION INTRAVENOUS at 17:19

## 2025-05-08 RX ADMIN — CEFEPIME 2000 MG: 2 INJECTION, POWDER, FOR SOLUTION INTRAVENOUS at 10:19

## 2025-05-08 RX ADMIN — CEFEPIME 2000 MG: 2 INJECTION, POWDER, FOR SOLUTION INTRAVENOUS at 22:34

## 2025-05-08 NOTE — PROGRESS NOTES
Progress Note - Hospitalist   Name: Solo Mack 55 y.o. male I MRN: [id number]  Unit/Bed#: [place] [address] -01 I Date of Admission: 5/2/2025   Date of Service: 5/8/2025 I Hospital Day: 6    Assessment & Plan  Septic shock (HCC)  Pt was transferred from  to [facility] icu due to profound septic shock with imagining demonstrating gallstones, ruq tenderness, and profound leukocytosis  Currently treated with cefepime/flagyl  On levophed which had been weaned to off  Pt was scheduled for or but decided later in evening decided he did not want to go forward with surgery   Surgery stated to continue conservative treatment with iv antibiotics and change to po at discharge for 2 weeks however he has increased RUQ abd pain  Appreciate gi recommendations- pt is not a candidate for stenting as it will impair surgery  Spoke with surgery who recommended perc. Miriam tube- he is not a good candidate for this given his psychiatric history   Pt has improved but continues to have ruq pain at times. Concern is that he will become septic again without intervention. Long discussions about the appropriate treatment for him given his complex psychosocial situation. It was concluded that cholecystectomy might be the safest mode of treatment. Pt is willing to go forward with surgery     GERD (gastroesophageal reflux disease)  Continue ppi   Essential hypertension  Had been on clonidine and metoprolol outpt   Continue metoprolol po   Type 2 diabetes mellitus, without long-term current use of insulin (HCC)  Lab Results   Component Value Date    HGBA1C 8.3 (A) 03/17/2025       Recent Labs     05/07/25  2124 05/08/25  0550 05/08/25  0909 05/08/25  1136   POCGLU 184* 166* 170* 201*       Blood Sugar Average: Last 72 hrs:  (P) [id number]  Continue insulin sliding scale     Schizophrenia, unspecified type (HCC)  Appreciate psychiatry recommendations  Continue klonopin 1mg bid prn  Continue zyprexa 10mg qhs  Psych eval when pt is medically  cleared  Neuropsych deemed pt to not have capacity   He still believes that president peter tells him to do things and that he can see through walls   Abnormal TSH  Tsh 0.21  Free t4 1.3  Labs reflect hyperthyroid  Check thyroid antibody panel  Appreciate endocrine recommendations- possibly related to iv dye load.   Repeated tsh and free t4 are negative   No further treatment recommendations  Chronic kidney disease, stage 2 (mild)  Lab Results   Component Value Date    EGFR 83 05/08/2025    EGFR 83 05/07/2025    EGFR 70 05/06/2025    CREATININE 1.00 05/08/2025    CREATININE 1.00 05/07/2025    CREATININE 1.15 05/06/2025   Chauncey r/o  Creatinine currently at baseline which is 0.9-1.2   H/O prolonged Q-T interval on ECG  Qtc has improved  Replace mg. Keep potassium greater than 4.0    Acute cholecystitis  Please see septic shock   Hypophosphatemia    Insomnia  continue melatonin  Continue zyprexa   Obesity (BMI 30-39.9)    CHF (congestive heart failure) ([facility])  Wt Readings from Last 3 Encounters:   05/07/25 98 kg (216 lb 0.8 oz)   05/02/25 100 kg (221 lb 5.5 oz)   03/17/25 106 kg (233 lb)     Monitor I/o and daily weights             VTE Pharmacologic Prophylaxis:   lovenox     Mobility:   Basic Mobility Inpatient Raw Score: 24  -HLM Goal: 8: Walk 250 feet or more  JH-HLM Achieved: 8: Walk 250 feet ot more    Patient Centered Rounds: I performed bedside rounds with nursing staff today.     Updated his wife: she is agreeable with the surgery. She is worried about his current state of mind and that he would be a threat to her if he goes home. He gets aggressive at times and she states he is good at pretending he is not having hallucinations and he has done that for years but he is has active hallucinations daily     Current Length of Stay: 6 day(s)  Current Patient Status: Inpatient     Discharge Plan: Anticipate discharge in 48-72 hrs to he will require psych eval prior to discharge     Code Status: Level 1 - Full  Code    Subjective   Pt seen and examined. Pt states his abd pain is there but a little better than yesterday. He is still agreeable to surgery.     Objective :  Temp:  [98.4 °F (36.9 °C)-98.8 °F (37.1 °C)] 98.8 °F (37.1 °C)  HR:  [84-90] 84  BP: (144-147)/() 144/93  Resp:  [16-19] 16  SpO2:  [94 %-95 %] 94 %  O2 Device: None (Room air)    Body mass index is 33.84 kg/m².     Input and Output Summary (last 24 hours):     Intake/Output Summary (Last 24 hours) at 5/8/2025 1705  Last data filed at 5/8/2025 0536  Gross per 24 hour   Intake 130 ml   Output 400 ml   Net -270 ml       Physical Exam  Constitutional:       Appearance: Normal appearance.   HENT:      Head: Normocephalic and atraumatic.   Eyes:      Extraocular Movements: Extraocular movements intact.      Pupils: Pupils are equal, round, and reactive to light.   Cardiovascular:      Rate and Rhythm: Normal rate and regular rhythm.      Heart sounds: No murmur heard.     No friction rub. No gallop.   Pulmonary:      Effort: Pulmonary effort is normal. No respiratory distress.      Breath sounds: Normal breath sounds. No wheezing, rhonchi or rales.   Abdominal:      General: There is no distension.      Palpations: Abdomen is soft.      Tenderness: There is abdominal tenderness. There is no guarding or rebound.      Comments: +ttp ruq    Neurological:      Mental Status: He is alert and oriented to person, place, and time.       Lines/Drains:      Lab Results: I have reviewed the following results:   Results from last 7 days   Lab Units 05/08/25  0500 05/06/25  0501 05/05/25  0513 05/02/25  0959 05/02/25  0511   WBC Thousand/uL 8.30   < > 11.02*   < > 26.34*   HEMOGLOBIN g/dL 14.1   < > 12.5   < > 13.0   HEMATOCRIT % 41.9   < > 35.3*   < > 37.7   PLATELETS Thousands/uL 310   < > 209   < > 183   BANDS PCT %  --   --   --   --  8   SEGS PCT %  --   --  83*   < >  --    LYMPHO PCT %  --   --  7*   < > 5*   MONO PCT %  --   --  8   < > 4   EOS PCT %  --   --   1   < > 0    < > = values in this interval not displayed.     Results from last 7 days   Lab Units 05/08/25  0500   SODIUM mmol/L 136   POTASSIUM mmol/L 4.0   CHLORIDE mmol/L 101   CO2 mmol/L 27   BUN mg/dL 10   CREATININE mg/dL 1.00   ANION GAP mmol/L 8   CALCIUM mg/dL 8.7   ALBUMIN g/dL 3.3*   TOTAL BILIRUBIN mg/dL 0.41   ALK PHOS U/L 82   ALT U/L 22   AST U/L 28   GLUCOSE RANDOM mg/dL 149*     Results from last 7 days   Lab Units 05/01/25  2235   INR  0.96     Results from last 7 days   Lab Units 05/08/25  1136 05/08/25  0909 05/08/25  0550 05/07/25  2124 05/07/25  1630 05/07/25  1157 05/07/25  0728 05/06/25  2120 05/06/25  1651 05/06/25  1136 05/06/25  0745 05/05/25  2059   POC GLUCOSE mg/dl 201* 170* 166* 184* 248* 170* 257* 210* 213* 147* 174* 175*         Results from last 7 days   Lab Units 05/02/25  0959 05/02/25  0511 05/01/25  2235   LACTIC ACID mmol/L 1.8 3.0* 1.6   PROCALCITONIN ng/ml 50.31* 19.91* 0.24       Recent Cultures (last 7 days):   Results from last 7 days   Lab Units 05/02/25  1034 05/02/25  1005 05/02/25  0512 05/01/25  2244   BLOOD CULTURE  No Growth After 5 Days. No Growth After 5 Days. No Growth After 5 Days.  No Growth After 5 Days. No Growth After 5 Days.  No Growth After 5 Days.       Imaging Results Review: No pertinent imaging studies reviewed.  Other Study Results Review: No additional pertinent studies reviewed.    Last 24 Hours Medication List:     Current Facility-Administered Medications:     ceFEPime (MAXIPIME) 2,000 mg in dextrose 5 % 50 mL IVPB, Q12H, Last Rate: 2,000 mg (05/08/25 1019)    clonazePAM (KlonoPIN) tablet 1 mg, BID PRN    cyanocobalamin injection 1,000 mcg, Q30 Days    enoxaparin (LOVENOX) subcutaneous injection 40 mg, Daily    HYDROmorphone HCl (DILAUDID) injection 0.2 mg, Q4H PRN    hydrOXYzine HCL (ATARAX) tablet 25 mg, Q6H PRN Max 4/day    hydrOXYzine HCL (ATARAX) tablet 50 mg, Q6H PRN Max 4/day    ibuprofen (MOTRIN) tablet 400 mg, Q6H PRN    insulin lispro  (HumALOG/ADMELOG) 100 units/mL subcutaneous injection 1-6 Units, TID AC **AND** Fingerstick Glucose (POCT), TID AC    insulin lispro (HumALOG/ADMELOG) 100 units/mL subcutaneous injection 1-6 Units, HS    [START ON 5/9/2025] lactated ringers infusion, Continuous    LORazepam (ATIVAN) injection 1 mg, Q6H PRN Max 3/day    LORazepam (ATIVAN) tablet 1 mg, Q6H PRN Max 3/day    melatonin tablet 6 mg, HS    metoprolol tartrate (LOPRESSOR) partial tablet 12.5 mg, Q12H ROBER    metroNIDAZOLE (FLAGYL) IVPB (premix) 500 mg 100 mL, Q8H, Last Rate: 500 mg (05/08/25 0916)    nicotine polacrilex (NICORETTE) gum 2 mg, Q4H PRN    OLANZapine (ZyPREXA) IM injection 10 mg, HS    pantoprazole (PROTONIX) injection 40 mg, Q24H ROBER    polyethylene glycol (MIRALAX) packet 17 g, Daily PRN    senna-docusate sodium (SENOKOT S) 8.6-50 mg per tablet 1 tablet, Daily PRN    trimethobenzamide (TIGAN) IM injection 200 mg, Q6H PRN    Administrative Statements   Today, Patient Was Seen By: Tasha Joseph DO

## 2025-05-08 NOTE — PROGRESS NOTES
Patient refused CHG wipes and daily weight this morning. Attempted x 3 with no success. Will try at a later time.

## 2025-05-08 NOTE — PLAN OF CARE
Problem: PAIN - ADULT  Goal: Verbalizes/displays adequate comfort level or baseline comfort level  Description: Interventions:- Encourage patient to monitor pain and request assistance- Assess pain using appropriate pain scale- Administer analgesics based on type and severity of pain and evaluate response- Implement non-pharmacological measures as appropriate and evaluate response- Consider cultural and social influences on pain and pain management- Notify physician/advanced practitioner if interventions unsuccessful or patient reports new pain  Outcome: Progressing     Problem: INFECTION - ADULT  Goal: Absence or prevention of progression during hospitalization  Description: INTERVENTIONS:- Assess and monitor for signs and symptoms of infection- Monitor lab/diagnostic results- Monitor all insertion sites, i.e. indwelling lines, tubes, and drains- Monitor endotracheal if appropriate and nasal secretions for changes in amount and color- [place] appropriate cooling/warming therapies per order- Administer medications as ordered- Instruct and encourage patient and family to use good hand hygiene technique- Identify and instruct in appropriate isolation precautions for identified infection/condition  Outcome: Progressing  Goal: Absence of fever/infection during neutropenic period  Description: INTERVENTIONS:- Monitor WBC  Outcome: Progressing     Problem: SAFETY ADULT  Goal: Patient will remain free of falls  Description: INTERVENTIONS:- Educate patient/family on patient safety including physical limitations- Instruct patient to call for assistance with activity - Consult OT/PT to assist with strengthening/mobility - Keep Call bell within reach- Keep bed low and locked with side rails adjusted as appropriate- Keep care items and personal belongings within reach- Initiate and maintain comfort rounds- Make Fall Risk Sign visible to staff- Offer Toileting every 2 Hours, in advance of need- Initiate/Maintain alarm-  Obtain necessary fall risk management equipment- Apply yellow socks and bracelet for high fall risk patients- Consider moving patient to room near nurses station  Outcome: Progressing  Goal: Maintain or return to baseline ADL function  Description: INTERVENTIONS:-  Assess patient's ability to carry out ADLs; assess patient's baseline for ADL function and identify physical deficits which impact ability to perform ADLs (bathing, care of mouth/teeth, toileting, grooming, dressing, etc.)- Assess/evaluate cause of self-care deficits - Assess range of motion- Assess patient's mobility; develop plan if impaired- Assess patient's need for assistive devices and provide as appropriate- Encourage maximum independence but intervene and supervise when necessary- Involve family in performance of ADLs- Assess for home care needs following discharge - Consider OT consult to assist with ADL evaluation and planning for discharge- Provide patient education as appropriate  Outcome: Progressing  Goal: Maintains/Returns to pre admission functional level  Description: INTERVENTIONS:- Perform AM-PAC 6 Click Basic Mobility/ Daily Activity assessment daily.- Set and communicate daily mobility goal to care team and patient/family/caregiver. - Collaborate with rehabilitation services on mobility goals if consulted- Perform Range of Motion 3 times a day.- Reposition patient every 2 hours.- Dangle patient 3 times a day- Stand patient 3 times a day- Ambulate patient 3 times a day- Out of bed to chair 3 times a day - Out of bed for meals 3 times a day- Out of bed for toileting- Record patient progress and toleration of activity level   Outcome: Progressing     Problem: DISCHARGE PLANNING  Goal: Discharge to home or other facility with appropriate resources  Description: INTERVENTIONS:- Identify barriers to discharge w/patient and caregiver- Arrange for needed discharge resources and transportation as appropriate- Identify discharge learning needs  (meds, wound care, etc.)- Arrange for interpretive services to assist at discharge as needed- Refer to Case Management Department for coordinating discharge planning if the patient needs post-hospital services based on physician/advanced practitioner order or complex needs related to functional status, cognitive ability, or social support system  Outcome: Progressing     Problem: Knowledge Deficit  Goal: Patient/family/caregiver demonstrates understanding of disease process, treatment plan, medications, and discharge instructions  Description: Complete learning assessment and assess knowledge base.Interventions:- Provide teaching at level of understanding- Provide teaching via preferred learning methods  Outcome: Progressing     Problem: NEUROSENSORY - ADULT  Goal: Achieves stable or improved neurological status  Description: INTERVENTIONS- Monitor and report changes in neurological status- Monitor vital signs such as temperature, blood pressure, glucose, and any other labs ordered - Initiate measures to prevent increased intracranial pressure- Monitor for seizure activity and implement precautions if appropriate    Outcome: Progressing  Goal: Achieves maximal functionality and self care  Description: INTERVENTIONS- Monitor swallowing and airway patency with patient fatigue and changes in neurological status- Encourage and assist patient to increase activity and self care. - Encourage visually impaired, hearing impaired and aphasic patients to use assistive/communication devices  Outcome: Progressing     Problem: CARDIOVASCULAR - ADULT  Goal: Maintains optimal cardiac output and hemodynamic stability  Description: INTERVENTIONS:- Monitor I/O, vital signs and rhythm- Monitor for S/S and trends of decreased cardiac output- Administer and titrate ordered vasoactive medications to optimize hemodynamic stability- Assess quality of pulses, skin color and temperature- Assess for signs of decreased coronary artery  perfusion- Instruct patient to report change in severity of symptoms  Outcome: Progressing  Goal: Absence of cardiac dysrhythmias or at baseline rhythm  Description: INTERVENTIONS:- Continuous cardiac monitoring, vital signs, obtain 12 lead EKG if ordered- Administer antiarrhythmic and heart rate control medications as ordered- Monitor electrolytes and administer replacement therapy as ordered  Outcome: Progressing     Problem: RESPIRATORY - ADULT  Goal: Achieves optimal ventilation and oxygenation  Description: INTERVENTIONS:- Assess for changes in respiratory status- Assess for changes in mentation and behavior- Position to facilitate oxygenation and minimize respiratory effort- Oxygen administered by appropriate delivery if ordered- Initiate smoking cessation education as indicated- Encourage broncho-pulmonary hygiene including cough, deep breathe, Incentive Spirometry- Assess the need for suctioning and aspirate as needed- Assess and instruct to report SOB or any respiratory difficulty- Respiratory Therapy support as indicated  Outcome: Progressing     Problem: GASTROINTESTINAL - ADULT  Goal: Minimal or absence of nausea and/or vomiting  Description: INTERVENTIONS:- Administer IV fluids if ordered to ensure adequate hydration- Maintain NPO status until nausea and vomiting are resolved- Nasogastric tube if ordered- Administer ordered antiemetic medications as needed- Provide nonpharmacologic comfort measures as appropriate- Advance diet as tolerated, if ordered- Consider nutrition services referral to assist patient with adequate nutrition and appropriate food choices  Outcome: Progressing  Goal: Maintains or returns to baseline bowel function  Description: INTERVENTIONS:- Assess bowel function- Encourage oral fluids to ensure adequate hydration- Administer IV fluids if ordered to ensure adequate hydration- Administer ordered medications as needed- Encourage mobilization and activity- Consider nutritional  services referral to assist patient with adequate nutrition and appropriate food choices  Outcome: Progressing  Goal: Maintains adequate nutritional intake  Description: INTERVENTIONS:- Monitor percentage of each meal consumed- Identify factors contributing to decreased intake, treat as appropriate- Assist with meals as needed- Monitor I&O, weight, and lab values if indicated- Obtain nutrition services referral as needed  Outcome: Progressing  Goal: Oral mucous membranes remain intact  Description: INTERVENTIONS- Assess oral mucosa and hygiene practices- Implement preventative oral hygiene regimen- Implement oral medicated treatments as ordered- Initiate Nutrition services referral as needed  Outcome: Progressing     Problem: GENITOURINARY - ADULT  Goal: Maintains or returns to baseline urinary function  Description: INTERVENTIONS:- Assess urinary function- Encourage oral fluids to ensure adequate hydration if ordered- Administer IV fluids as ordered to ensure adequate hydration- Administer ordered medications as needed- Offer frequent toileting- Follow urinary retention protocol if ordered  Outcome: Progressing  Goal: Absence of urinary retention  Description: INTERVENTIONS:- Assess patient’s ability to void and empty bladder- Monitor I/O- Bladder scan as needed- Discuss with physician/AP medications to alleviate retention as needed- Discuss catheterization for long term situations as appropriate  Outcome: Progressing     Problem: METABOLIC, FLUID AND ELECTROLYTES - ADULT  Goal: Electrolytes maintained within normal limits  Description: INTERVENTIONS:- Monitor labs and assess patient for signs and symptoms of electrolyte imbalances- Administer electrolyte replacement as ordered- Monitor response to electrolyte replacements, including repeat lab results as appropriate- Instruct patient on fluid and nutrition as appropriate  Outcome: Progressing  Goal: Fluid balance maintained  Description: INTERVENTIONS:- Monitor  labs - Monitor I/O and WT- Instruct patient on fluid and nutrition as appropriate- Assess for signs & symptoms of volume excess or deficit  Outcome: Progressing  Goal: Glucose maintained within target range  Description: INTERVENTIONS:- Monitor Blood Glucose as ordered- Assess for signs and symptoms of hyperglycemia and hypoglycemia- Administer ordered medications to maintain glucose within target range- Assess nutritional intake and initiate nutrition service referral as needed  Outcome: Progressing     Problem: SKIN/TISSUE INTEGRITY - ADULT  Goal: Skin Integrity remains intact(Skin Breakdown Prevention)  Description: Assess:-Perform Jag assessment-Clean and moisturize skin-Inspect skin when repositioning, toileting, and assisting with ADLS-Assess under medical devices-Assess extremities for adequate circulation and sensation Bed Management:-Have minimal linens on bed & keep smooth, unwrinkled-Change linens as needed when moist or perspiring-Avoid sitting or lying in one position for more than 2 hours while in bed-Keep HOB at 45 degrees Toileting:-Offer bedside commode-Assess for incontinence-Use incontinent care products after each incontinent episode Activity:-Mobilize patient 3 times a day-Encourage activity and walks on unit-Encourage or provide ROM exercises -Turn and reposition patient every 2 Hours-Use appropriate equipment to lift or move patient in bed-Instruct/ Assist with weight shifting when out of bed in chair-Consider limitation of chair time 2 hour intervalsSkin Care:-Avoid use of baby powder, tape, friction and shearing, hot water or constrictive clothing-Relieve pressure over bony prominences-Do not massage red bony areasNext Steps:-Teach patient strategies to minimize risks -Consider consults to  interdisciplinary teams  Outcome: Progressing     Problem: HEMATOLOGIC - ADULT  Goal: Maintains hematologic stability  Description: INTERVENTIONS- Assess for signs and symptoms of bleeding or  hemorrhage- Monitor labs- Administer supportive blood products/factors as ordered and appropriate  Outcome: Progressing     Problem: MUSCULOSKELETAL - ADULT  Goal: Maintain or return mobility to safest level of function  Description: INTERVENTIONS:- Assess patient's ability to carry out ADLs; assess patient's baseline for ADL function and identify physical deficits which impact ability to perform ADLs (bathing, care of mouth/teeth, toileting, grooming, dressing, etc.)- Assess/evaluate cause of self-care deficits - Assess range of motion- Assess patient's mobility- Assess patient's need for assistive devices and provide as appropriate- Encourage maximum independence but intervene and supervise when necessary- Involve family in performance of ADLs- Assess for home care needs following discharge - Consider OT consult to assist with ADL evaluation and planning for discharge- Provide patient education as appropriate  Outcome: Progressing  Goal: Maintain proper alignment of affected body part  Description: INTERVENTIONS:- Support, maintain and protect limb and body alignment- Provide patient/ family with appropriate education  Outcome: Progressing     Problem: PSYCHOSIS  Goal: Will report no hallucinations or delusions  Description: Interventions:- Administer medication as  ordered- Every waking shifts and PRN assess for the presence of hallucinations and or delusions- Assist with reality testing to support increasing orientation- Assess if patient's hallucinations or delusions are encouraging self-harm or harm to others and intervene as appropriate  Outcome: Progressing     Problem: ANXIETY  Goal: Will report anxiety at manageable levels  Description: INTERVENTIONS:- Administer medication as ordered- Teach and encourage coping skills- Provide emotional support- Assess patient/family for anxiety and ability to cope  Outcome: Progressing  Goal: By discharge: Patient will verbalize 2 strategies to deal with  anxiety  Description: Interventions:- Identify any obvious source/trigger to anxiety- Staff will assist patient in applying identified coping technique/skills- Encourage attendance of scheduled groups and activities  Outcome: Progressing     Problem: SELF CARE DEFICIT  Goal: Return ADL status to a safe level of function  Description: INTERVENTIONS:- Administer medication as ordered- Assess ADL deficits and provide assistive devices as needed- Obtain PT/OT consults as needed- Assist and instruct patient to increase activity and self care as tolerated  Outcome: Progressing     Problem: Prexisting or High Potential for Compromised Skin Integrity  Goal: Skin integrity is maintained or improved  Description: INTERVENTIONS:- Identify patients at risk for skin breakdown- Assess and monitor skin integrity- Assess and monitor nutrition and hydration status- Monitor labs - Assess for incontinence - Turn and reposition patient- Assist with mobility/ambulation- Relieve pressure over bony prominences- Avoid friction and shearing- Provide appropriate hygiene as needed including keeping skin clean and dry- Evaluate need for skin moisturizer/barrier cream- Collaborate with interdisciplinary team - Patient/family teaching- Consider wound care consult   Outcome: Progressing     Problem: Nutrition/Hydration-ADULT  Goal: Nutrient/Hydration intake appropriate for improving, restoring or maintaining nutritional needs  Description: Monitor and assess patient's nutrition/hydration status for malnutrition. Collaborate with interdisciplinary team and initiate plan and interventions as ordered.  Monitor patient's weight and dietary intake as ordered or per policy. Utilize nutrition screening tool and intervene as necessary. Determine patient's food preferences and provide high-protein, high-caloric foods as appropriate. INTERVENTIONS:- Monitor oral intake, urinary output, labs, and treatment plans- Assess nutrition and hydration status and  recommend course of action- Evaluate amount of meals eaten- Assist patient with eating if necessary - Allow adequate time for meals- Recommend/ encourage appropriate diets, oral nutritional supplements, and vitamin/mineral supplements- Order, calculate, and assess calorie counts as needed- Recommend, monitor, and adjust tube feedings and TPN/PPN based on assessed needs- Assess need for intravenous fluids- Provide specific nutrition/hydration education as appropriate- Include patient/family/caregiver in decisions related to nutrition  Monitor and assess patient's nutrition/hydration status for malnutrition. Collaborate with interdisciplinary team and initiate plan and interventions as ordered.  Monitor patient's weight and dietary intake as ordered or per policy. Utilize nutrition screening tool and intervene as necessary. Determine patient's food preferences and provide high-protein, high-caloric foods as appropriate. INTERVENTIONS:- Monitor oral intake, urinary output, labs, and treatment plans- Assess nutrition and hydration status and recommend course of action- Evaluate amount of meals eaten- Assist patient with eating if necessary - Allow adequate time for meals- Recommend/ encourage appropriate diets, oral nutritional supplements, and vitamin/mineral supplements- Order, calculate, and assess calorie counts as needed- Recommend, monitor, and adjust tube feedings and TPN/PPN based on assessed needs- Assess need for intravenous fluids- Provide specific nutrition/hydration education as appropriate- Include patient/family/caregiver in decisions related to nutrition  Outcome: Progressing

## 2025-05-08 NOTE — ASSESSMENT & PLAN NOTE
Pt was transferred from  to [facility] icu due to profound septic shock with imagining demonstrating gallstones, ruq tenderness, and profound leukocytosis  Currently treated with cefepime/flagyl  On levophed which had been weaned to off  Pt was scheduled for or but decided later in evening decided he did not want to go forward with surgery   Surgery stated to continue conservative treatment with iv antibiotics and change to po at discharge for 2 weeks however he has increased RUQ abd pain  Appreciate gi recommendations- pt is not a candidate for stenting as it will impair surgery  Spoke with surgery who recommended perc. Miriam tube- he is not a good candidate for this given his psychiatric history   Pt has improved but continues to have ruq pain at times. Concern is that he will become septic again without intervention. Long discussions about the appropriate treatment for him given his complex psychosocial situation. It was concluded that cholecystectomy might be the safest mode of treatment. Pt is willing to go forward with surgery

## 2025-05-08 NOTE — ASSESSMENT & PLAN NOTE
Lab Results   Component Value Date    HGBA1C 8.3 (A) 03/17/2025       Recent Labs     05/07/25  2124 05/08/25  0550 05/08/25  0909 05/08/25  1136   POCGLU 184* 166* 170* 201*       Blood Sugar Average: Last 72 hrs:  (P) [id number]  Continue insulin sliding scale

## 2025-05-08 NOTE — PLAN OF CARE
Problem: PAIN - ADULT  Goal: Verbalizes/displays adequate comfort level or baseline comfort level  Description: Interventions:- Encourage patient to monitor pain and request assistance- Assess pain using appropriate pain scale- Administer analgesics based on type and severity of pain and evaluate response- Implement non-pharmacological measures as appropriate and evaluate response- Consider cultural and social influences on pain and pain management- Notify physician/advanced practitioner if interventions unsuccessful or patient reports new pain  Outcome: Progressing     Problem: INFECTION - ADULT  Goal: Absence or prevention of progression during hospitalization  Description: INTERVENTIONS:- Assess and monitor for signs and symptoms of infection- Monitor lab/diagnostic results- Monitor all insertion sites, i.e. indwelling lines, tubes, and drains- Monitor endotracheal if appropriate and nasal secretions for changes in amount and color- [place] appropriate cooling/warming therapies per order- Administer medications as ordered- Instruct and encourage patient and family to use good hand hygiene technique- Identify and instruct in appropriate isolation precautions for identified infection/condition  Outcome: Progressing  Goal: Absence of fever/infection during neutropenic period  Description: INTERVENTIONS:- Monitor WBC  Outcome: Progressing     Problem: SAFETY ADULT  Goal: Patient will remain free of falls  Description: INTERVENTIONS:- Educate patient/family on patient safety including physical limitations- Instruct patient to call for assistance with activity - Consult OT/PT to assist with strengthening/mobility - Keep Call bell within reach- Keep bed low and locked with side rails adjusted as appropriate- Keep care items and personal belongings within reach- Initiate and maintain comfort rounds- Make Fall Risk Sign visible to staff- Offer Toileting every 2 Hours, in advance of need- Initiate/Maintain alarm-  Obtain necessary fall risk management equipment- Apply yellow socks and bracelet for high fall risk patients- Consider moving patient to room near nurses station  Outcome: Progressing  Goal: Maintain or return to baseline ADL function  Description: INTERVENTIONS:-  Assess patient's ability to carry out ADLs; assess patient's baseline for ADL function and identify physical deficits which impact ability to perform ADLs (bathing, care of mouth/teeth, toileting, grooming, dressing, etc.)- Assess/evaluate cause of self-care deficits - Assess range of motion- Assess patient's mobility; develop plan if impaired- Assess patient's need for assistive devices and provide as appropriate- Encourage maximum independence but intervene and supervise when necessary- Involve family in performance of ADLs- Assess for home care needs following discharge - Consider OT consult to assist with ADL evaluation and planning for discharge- Provide patient education as appropriate  Outcome: Progressing  Goal: Maintains/Returns to pre admission functional level  Description: INTERVENTIONS:- Perform AM-PAC 6 Click Basic Mobility/ Daily Activity assessment daily.- Set and communicate daily mobility goal to care team and patient/family/caregiver. - Collaborate with rehabilitation services on mobility goals if consulted- Perform Range of Motion 3 times a day.- Reposition patient every 2 hours.- Dangle patient 3 times a day- Stand patient 3 times a day- Ambulate patient 3 times a day- Out of bed to chair 3 times a day - Out of bed for meals 3 times a day- Out of bed for toileting- Record patient progress and toleration of activity level   Outcome: Progressing     Problem: DISCHARGE PLANNING  Goal: Discharge to home or other facility with appropriate resources  Description: INTERVENTIONS:- Identify barriers to discharge w/patient and caregiver- Arrange for needed discharge resources and transportation as appropriate- Identify discharge learning needs  (meds, wound care, etc.)- Arrange for interpretive services to assist at discharge as needed- Refer to Case Management Department for coordinating discharge planning if the patient needs post-hospital services based on physician/advanced practitioner order or complex needs related to functional status, cognitive ability, or social support system  Outcome: Progressing     Problem: Knowledge Deficit  Goal: Patient/family/caregiver demonstrates understanding of disease process, treatment plan, medications, and discharge instructions  Description: Complete learning assessment and assess knowledge base.Interventions:- Provide teaching at level of understanding- Provide teaching via preferred learning methods  Outcome: Progressing     Problem: NEUROSENSORY - ADULT  Goal: Achieves stable or improved neurological status  Description: INTERVENTIONS- Monitor and report changes in neurological status- Monitor vital signs such as temperature, blood pressure, glucose, and any other labs ordered - Initiate measures to prevent increased intracranial pressure- Monitor for seizure activity and implement precautions if appropriate    Outcome: Progressing  Goal: Achieves maximal functionality and self care  Description: INTERVENTIONS- Monitor swallowing and airway patency with patient fatigue and changes in neurological status- Encourage and assist patient to increase activity and self care. - Encourage visually impaired, hearing impaired and aphasic patients to use assistive/communication devices  Outcome: Progressing     Problem: CARDIOVASCULAR - ADULT  Goal: Maintains optimal cardiac output and hemodynamic stability  Description: INTERVENTIONS:- Monitor I/O, vital signs and rhythm- Monitor for S/S and trends of decreased cardiac output- Administer and titrate ordered vasoactive medications to optimize hemodynamic stability- Assess quality of pulses, skin color and temperature- Assess for signs of decreased coronary artery  perfusion- Instruct patient to report change in severity of symptoms  Outcome: Progressing  Goal: Absence of cardiac dysrhythmias or at baseline rhythm  Description: INTERVENTIONS:- Continuous cardiac monitoring, vital signs, obtain 12 lead EKG if ordered- Administer antiarrhythmic and heart rate control medications as ordered- Monitor electrolytes and administer replacement therapy as ordered  Outcome: Progressing     Problem: RESPIRATORY - ADULT  Goal: Achieves optimal ventilation and oxygenation  Description: INTERVENTIONS:- Assess for changes in respiratory status- Assess for changes in mentation and behavior- Position to facilitate oxygenation and minimize respiratory effort- Oxygen administered by appropriate delivery if ordered- Initiate smoking cessation education as indicated- Encourage broncho-pulmonary hygiene including cough, deep breathe, Incentive Spirometry- Assess the need for suctioning and aspirate as needed- Assess and instruct to report SOB or any respiratory difficulty- Respiratory Therapy support as indicated  Outcome: Progressing     Problem: GASTROINTESTINAL - ADULT  Goal: Minimal or absence of nausea and/or vomiting  Description: INTERVENTIONS:- Administer IV fluids if ordered to ensure adequate hydration- Maintain NPO status until nausea and vomiting are resolved- Nasogastric tube if ordered- Administer ordered antiemetic medications as needed- Provide nonpharmacologic comfort measures as appropriate- Advance diet as tolerated, if ordered- Consider nutrition services referral to assist patient with adequate nutrition and appropriate food choices  Outcome: Progressing  Goal: Maintains or returns to baseline bowel function  Description: INTERVENTIONS:- Assess bowel function- Encourage oral fluids to ensure adequate hydration- Administer IV fluids if ordered to ensure adequate hydration- Administer ordered medications as needed- Encourage mobilization and activity- Consider nutritional  services referral to assist patient with adequate nutrition and appropriate food choices  Outcome: Progressing  Goal: Maintains adequate nutritional intake  Description: INTERVENTIONS:- Monitor percentage of each meal consumed- Identify factors contributing to decreased intake, treat as appropriate- Assist with meals as needed- Monitor I&O, weight, and lab values if indicated- Obtain nutrition services referral as needed  Outcome: Progressing  Goal: Oral mucous membranes remain intact  Description: INTERVENTIONS- Assess oral mucosa and hygiene practices- Implement preventative oral hygiene regimen- Implement oral medicated treatments as ordered- Initiate Nutrition services referral as needed  Outcome: Progressing     Problem: GENITOURINARY - ADULT  Goal: Maintains or returns to baseline urinary function  Description: INTERVENTIONS:- Assess urinary function- Encourage oral fluids to ensure adequate hydration if ordered- Administer IV fluids as ordered to ensure adequate hydration- Administer ordered medications as needed- Offer frequent toileting- Follow urinary retention protocol if ordered  Outcome: Progressing  Goal: Absence of urinary retention  Description: INTERVENTIONS:- Assess patient’s ability to void and empty bladder- Monitor I/O- Bladder scan as needed- Discuss with physician/AP medications to alleviate retention as needed- Discuss catheterization for long term situations as appropriate  Outcome: Progressing     Problem: METABOLIC, FLUID AND ELECTROLYTES - ADULT  Goal: Electrolytes maintained within normal limits  Description: INTERVENTIONS:- Monitor labs and assess patient for signs and symptoms of electrolyte imbalances- Administer electrolyte replacement as ordered- Monitor response to electrolyte replacements, including repeat lab results as appropriate- Instruct patient on fluid and nutrition as appropriate  Outcome: Progressing  Goal: Fluid balance maintained  Description: INTERVENTIONS:- Monitor  labs - Monitor I/O and WT- Instruct patient on fluid and nutrition as appropriate- Assess for signs & symptoms of volume excess or deficit  Outcome: Progressing  Goal: Glucose maintained within target range  Description: INTERVENTIONS:- Monitor Blood Glucose as ordered- Assess for signs and symptoms of hyperglycemia and hypoglycemia- Administer ordered medications to maintain glucose within target range- Assess nutritional intake and initiate nutrition service referral as needed  Outcome: Progressing     Problem: SKIN/TISSUE INTEGRITY - ADULT  Goal: Skin Integrity remains intact(Skin Breakdown Prevention)  Description: Assess:-Perform Jag assessment-Clean and moisturize skin-Inspect skin when repositioning, toileting, and assisting with ADLS-Assess under medical devices-Assess extremities for adequate circulation and sensation Bed Management:-Have minimal linens on bed & keep smooth, unwrinkled-Change linens as needed when moist or perspiring-Avoid sitting or lying in one position for more than 2 hours while in bed-Keep HOB at 45 degrees Toileting:-Offer bedside commode-Assess for incontinence-Use incontinent care products after each incontinent episode Activity:-Mobilize patient 3 times a day-Encourage activity and walks on unit-Encourage or provide ROM exercises -Turn and reposition patient every 2 Hours-Use appropriate equipment to lift or move patient in bed-Instruct/ Assist with weight shifting when out of bed in chair-Consider limitation of chair time 2 hour intervalsSkin Care:-Avoid use of baby powder, tape, friction and shearing, hot water or constrictive clothing-Relieve pressure over bony prominences-Do not massage red bony areasNext Steps:-Teach patient strategies to minimize risks -Consider consults to  interdisciplinary teams  Outcome: Progressing     Problem: HEMATOLOGIC - ADULT  Goal: Maintains hematologic stability  Description: INTERVENTIONS- Assess for signs and symptoms of bleeding or  hemorrhage- Monitor labs- Administer supportive blood products/factors as ordered and appropriate  Outcome: Progressing     Problem: MUSCULOSKELETAL - ADULT  Goal: Maintain or return mobility to safest level of function  Description: INTERVENTIONS:- Assess patient's ability to carry out ADLs; assess patient's baseline for ADL function and identify physical deficits which impact ability to perform ADLs (bathing, care of mouth/teeth, toileting, grooming, dressing, etc.)- Assess/evaluate cause of self-care deficits - Assess range of motion- Assess patient's mobility- Assess patient's need for assistive devices and provide as appropriate- Encourage maximum independence but intervene and supervise when necessary- Involve family in performance of ADLs- Assess for home care needs following discharge - Consider OT consult to assist with ADL evaluation and planning for discharge- Provide patient education as appropriate  Outcome: Progressing  Goal: Maintain proper alignment of affected body part  Description: INTERVENTIONS:- Support, maintain and protect limb and body alignment- Provide patient/ family with appropriate education  Outcome: Progressing     Problem: PSYCHOSIS  Goal: Will report no hallucinations or delusions  Description: Interventions:- Administer medication as  ordered- Every waking shifts and PRN assess for the presence of hallucinations and or delusions- Assist with reality testing to support increasing orientation- Assess if patient's hallucinations or delusions are encouraging self-harm or harm to others and intervene as appropriate  Outcome: Progressing     Problem: ANXIETY  Goal: Will report anxiety at manageable levels  Description: INTERVENTIONS:- Administer medication as ordered- Teach and encourage coping skills- Provide emotional support- Assess patient/family for anxiety and ability to cope  Outcome: Progressing  Goal: By discharge: Patient will verbalize 2 strategies to deal with  anxiety  Description: Interventions:- Identify any obvious source/trigger to anxiety- Staff will assist patient in applying identified coping technique/skills- Encourage attendance of scheduled groups and activities  Outcome: Progressing     Problem: SELF CARE DEFICIT  Goal: Return ADL status to a safe level of function  Description: INTERVENTIONS:- Administer medication as ordered- Assess ADL deficits and provide assistive devices as needed- Obtain PT/OT consults as needed- Assist and instruct patient to increase activity and self care as tolerated  Outcome: Progressing     Problem: Prexisting or High Potential for Compromised Skin Integrity  Goal: Skin integrity is maintained or improved  Description: INTERVENTIONS:- Identify patients at risk for skin breakdown- Assess and monitor skin integrity- Assess and monitor nutrition and hydration status- Monitor labs - Assess for incontinence - Turn and reposition patient- Assist with mobility/ambulation- Relieve pressure over bony prominences- Avoid friction and shearing- Provide appropriate hygiene as needed including keeping skin clean and dry- Evaluate need for skin moisturizer/barrier cream- Collaborate with interdisciplinary team - Patient/family teaching- Consider wound care consult   Outcome: Progressing     Problem: Nutrition/Hydration-ADULT  Goal: Nutrient/Hydration intake appropriate for improving, restoring or maintaining nutritional needs  Description: Monitor and assess patient's nutrition/hydration status for malnutrition. Collaborate with interdisciplinary team and initiate plan and interventions as ordered.  Monitor patient's weight and dietary intake as ordered or per policy. Utilize nutrition screening tool and intervene as necessary. Determine patient's food preferences and provide high-protein, high-caloric foods as appropriate. INTERVENTIONS:- Monitor oral intake, urinary output, labs, and treatment plans- Assess nutrition and hydration status and  recommend course of action- Evaluate amount of meals eaten- Assist patient with eating if necessary - Allow adequate time for meals- Recommend/ encourage appropriate diets, oral nutritional supplements, and vitamin/mineral supplements- Order, calculate, and assess calorie counts as needed- Recommend, monitor, and adjust tube feedings and TPN/PPN based on assessed needs- Assess need for intravenous fluids- Provide specific nutrition/hydration education as appropriate- Include patient/family/caregiver in decisions related to nutrition  Monitor and assess patient's nutrition/hydration status for malnutrition. Collaborate with interdisciplinary team and initiate plan and interventions as ordered.  Monitor patient's weight and dietary intake as ordered or per policy. Utilize nutrition screening tool and intervene as necessary. Determine patient's food preferences and provide high-protein, high-caloric foods as appropriate. INTERVENTIONS:- Monitor oral intake, urinary output, labs, and treatment plans- Assess nutrition and hydration status and recommend course of action- Evaluate amount of meals eaten- Assist patient with eating if necessary - Allow adequate time for meals- Recommend/ encourage appropriate diets, oral nutritional supplements, and vitamin/mineral supplements- Order, calculate, and assess calorie counts as needed- Recommend, monitor, and adjust tube feedings and TPN/PPN based on assessed needs- Assess need for intravenous fluids- Provide specific nutrition/hydration education as appropriate- Include patient/family/caregiver in decisions related to nutrition  Outcome: Progressing

## 2025-05-08 NOTE — ASSESSMENT & PLAN NOTE
- we had a lengthy discussion with surgical and IR teams when he first presented to [facility] regarding management of his acute cholecystitis. He was acutely agitated due to underlying psych issues and was not an ideal surgical candidate. Endoscopic management for acute juvenal would include transpapillary drainage and transluminal stenting. The former will not be feasible given the impacted cystic duct stone, and the latter would complicate a future CCY. As such, the decision was made to proceed with a perc juvenal. Of course when he was down in IR he became acutely agitated and combative so the procedure was aborted. He has since been on Abx and his sepsis markers have improved overall. Now he is agreeable for surgery but OR time is limited   - agree with non-procedural/operative management in the interim with Abx.    - CCY in the future per surgery, to be done as outpatient

## 2025-05-08 NOTE — CONSULTS
Consultation - Gastroenterology   Name: Solo Mack 55 y.o. male I MRN: [id number]  Unit/Bed#: [place] [address] -01 I Date of Admission: 5/2/2025   Date of Service: 5/8/2025 I Hospital Day: 6       Inpatient consult to gastroenterology     Date/Time  5/8/2025 7:30 AM     Performed by  Issac Yo MD   Authorized by  Tasha Joseph DO           Physician Requesting Evaluation: Tasha Joseph DO   Reason for Evaluation / Principal Problem: acute juvenal    Assessment & Plan  Acute cholecystitis   - we had a lengthy discussion with surgical and IR teams when he first presented to [facility] regarding management of his acute cholecystitis. He was acutely agitated due to underlying psych issues and was not an ideal surgical candidate. Endoscopic management for acute juvenal would include transpapillary drainage and transluminal stenting. The former will not be feasible given the impacted cystic duct stone, and the latter would complicate a future CCY. As such, the decision was made to proceed with a perc juvenal. Of course when he was down in IR he became acutely agitated and combative so the procedure was aborted. He has since been on Abx and his sepsis markers have improved overall. Now he is agreeable for surgery but OR time is limited   - agree with non-procedural/operative management in the interim with Abx.    - CCY in the future per surgery, to be done as outpatient  Schizophrenia, unspecified type (HCC)      History of Present Illness   HPI:  Solo Mack is a 55 y.o. male w/ a PMH of MDD/TELLO, bipolar disorder, CHF, HTN, HLD, DM2 who presents from the  behavioral unit for RUQ pain, fever and tachycardia.    He was admitted here w/ septic shock and was in the ICU. Imaging showed acute cholecystitis due to an impcated cystic duct stone. Last week given his significant agitation, we had a multidisciplinary discussion with surgical and IR teams regarding how to best proceed. Thankfully he improved from a sepsis  perspective with Abx and IVF. Given that he could be a surgical candidate in the future, decision was made to hold off on endoscopic transluminal stenting and proceed with percutaneous drainage. Unfortunately he became acutely combative in the IR suite and the procedure was aborted.    He has since been continued on Abx and improving. He reports his pain is somewhat better compared to last week. He seems calm and answering questions appropriately.    Per discussions with nursing he appears to sundown.    Review of Systems   Constitutional:  Negative for appetite change, chills, fatigue and fever.   Eyes:  Negative for photophobia.   Respiratory:  Negative for cough and shortness of breath.    Cardiovascular:  Negative for chest pain.   Gastrointestinal:  Positive for abdominal pain. Negative for constipation, diarrhea, nausea and vomiting.   Endocrine: Negative.    Genitourinary:  Negative for dysuria and frequency.   Musculoskeletal:  Negative for myalgias.   Skin:  Negative for pallor.   Neurological:  Negative for weakness.   Hematological: Negative.    Psychiatric/Behavioral: Negative.       Medical History Review: I have reviewed the patient's PMH, PSH, Social History, Family History, Meds, and Allergies     Objective :  HR:  [71-90] 90  BP: (147-152)/() 147/100  Resp:  [19-20] 19  SpO2:  [95 %] 95 %  O2 Device: None (Room air)    Physical Exam  Constitutional:       General: He is not in acute distress.     Appearance: Normal appearance.   HENT:      Head: Normocephalic and atraumatic.      Nose: Nose normal.      Mouth/Throat:      Mouth: Mucous membranes are moist.   Eyes:      General: No scleral icterus.     Extraocular Movements: Extraocular movements intact.      Conjunctiva/sclera: Conjunctivae normal.      Pupils: Pupils are equal, round, and reactive to light.   Cardiovascular:      Rate and Rhythm: Normal rate and regular rhythm.   Pulmonary:      Effort: Pulmonary effort is normal.    Abdominal:      General: Abdomen is flat. There is no distension.      Palpations: There is no mass.      Tenderness: There is abdominal tenderness in the right upper quadrant.   Musculoskeletal:         General: No swelling. Normal range of motion.   Skin:     General: Skin is warm and dry.      Capillary Refill: Capillary refill takes less than 2 seconds.   Neurological:      General: No focal deficit present.      Mental Status: He is alert.   Psychiatric:         Mood and Affect: Mood normal.       Result Review: I have reviewed all relevant labs, imaging and procedure notes/images.

## 2025-05-08 NOTE — ASSESSMENT & PLAN NOTE
Lab Results   Component Value Date    EGFR 83 05/08/2025    EGFR 83 05/07/2025    EGFR 70 05/06/2025    CREATININE 1.00 05/08/2025    CREATININE 1.00 05/07/2025    CREATININE 1.15 05/06/2025   [place] r/o  Creatinine currently at baseline which is 0.9-1.2

## 2025-05-09 ENCOUNTER — ANESTHESIA (INPATIENT)
Dept: PERIOP | Facility: HOSPITAL | Age: 55
DRG: 853 | End: 2025-05-09
Payer: COMMERCIAL

## 2025-05-09 LAB
ANION GAP SERPL CALCULATED.3IONS-SCNC: 8 MMOL/L (ref 4–13)
ANISOCYTOSIS BLD QL SMEAR: PRESENT
BASOPHILS # BLD MANUAL: 0.08 THOUSAND/UL (ref 0–0.1)
BASOPHILS NFR MAR MANUAL: 1 % (ref 0–1)
BUN SERPL-MCNC: 11 MG/DL (ref 5–25)
BURR CELLS BLD QL SMEAR: PRESENT
CALCIUM SERPL-MCNC: 8.6 MG/DL (ref 8.4–10.2)
CHLORIDE SERPL-SCNC: 101 MMOL/L (ref 96–108)
CO2 SERPL-SCNC: 24 MMOL/L (ref 21–32)
CREAT SERPL-MCNC: 1.1 MG/DL (ref 0.6–1.3)
EOSINOPHIL # BLD MANUAL: 0.33 THOUSAND/UL (ref 0–0.4)
EOSINOPHIL NFR BLD MANUAL: 4 % (ref 0–6)
ERYTHROCYTE [DISTWIDTH] IN BLOOD BY AUTOMATED COUNT: 12.1 % (ref 11.6–15.1)
GFR SERPL CREATININE-BSD FRML MDRD: 74 ML/MIN/1.73SQ M
GLUCOSE SERPL-MCNC: 168 MG/DL (ref 65–140)
GLUCOSE SERPL-MCNC: 194 MG/DL (ref 65–140)
GLUCOSE SERPL-MCNC: 245 MG/DL (ref 65–140)
GLUCOSE SERPL-MCNC: 248 MG/DL (ref 65–140)
GLUCOSE SERPL-MCNC: 249 MG/DL (ref 65–140)
GLUCOSE SERPL-MCNC: 255 MG/DL (ref 65–140)
HCT VFR BLD AUTO: 43.9 % (ref 36.5–49.3)
HGB BLD-MCNC: 14.7 G/DL (ref 12–17)
HIV 1+2 AB+HIV1 P24 AG SERPL QL IA: NORMAL
HIV1 P24 AG SER QL: NORMAL
LYMPHOCYTES # BLD AUTO: 3.16 THOUSAND/UL (ref 0.6–4.47)
LYMPHOCYTES # BLD AUTO: 36 % (ref 14–44)
MAGNESIUM SERPL-MCNC: 2 MG/DL (ref 1.9–2.7)
MCH RBC QN AUTO: 28 PG (ref 26.8–34.3)
MCHC RBC AUTO-ENTMCNC: 33.5 G/DL (ref 31.4–37.4)
MCV RBC AUTO: 84 FL (ref 82–98)
MONOCYTES # BLD AUTO: 0.66 THOUSAND/UL (ref 0–1.22)
MONOCYTES NFR BLD: 8 % (ref 4–12)
NEUTROPHILS # BLD MANUAL: 4.07 THOUSAND/UL (ref 1.85–7.62)
NEUTS BAND NFR BLD MANUAL: 2 % (ref 0–8)
NEUTS SEG NFR BLD AUTO: 47 % (ref 43–75)
PHOSPHATE SERPL-MCNC: 2.3 MG/DL (ref 2.7–4.5)
PLATELET # BLD AUTO: 305 THOUSANDS/UL (ref 149–390)
PLATELET BLD QL SMEAR: ADEQUATE
PMV BLD AUTO: 10 FL (ref 8.9–12.7)
POTASSIUM SERPL-SCNC: 4.1 MMOL/L (ref 3.5–5.3)
RBC # BLD AUTO: 5.25 MILLION/UL (ref 3.88–5.62)
RBC MORPH BLD: PRESENT
SODIUM SERPL-SCNC: 133 MMOL/L (ref 135–147)
VARIANT LYMPHS # BLD AUTO: 2 %
WBC # BLD AUTO: 8.31 THOUSAND/UL (ref 4.31–10.16)

## 2025-05-09 PROCEDURE — 99232 SBSQ HOSP IP/OBS MODERATE 35: CPT | Performed by: INTERNAL MEDICINE

## 2025-05-09 PROCEDURE — 99024 POSTOP FOLLOW-UP VISIT: CPT | Performed by: SPECIALIST

## 2025-05-09 PROCEDURE — 80048 BASIC METABOLIC PNL TOTAL CA: CPT

## 2025-05-09 PROCEDURE — 83735 ASSAY OF MAGNESIUM: CPT

## 2025-05-09 PROCEDURE — 85027 COMPLETE CBC AUTOMATED: CPT

## 2025-05-09 PROCEDURE — 87806 HIV AG W/HIV1&2 ANTB W/OPTIC: CPT

## 2025-05-09 PROCEDURE — 88304 TISSUE EXAM BY PATHOLOGIST: CPT | Performed by: STUDENT IN AN ORGANIZED HEALTH CARE EDUCATION/TRAINING PROGRAM

## 2025-05-09 PROCEDURE — 85007 BL SMEAR W/DIFF WBC COUNT: CPT

## 2025-05-09 PROCEDURE — 84100 ASSAY OF PHOSPHORUS: CPT

## 2025-05-09 PROCEDURE — 86803 HEPATITIS C AB TEST: CPT

## 2025-05-09 PROCEDURE — 87340 HEPATITIS B SURFACE AG IA: CPT

## 2025-05-09 PROCEDURE — 87341 HEP B SURFACE AG NEUTRLZJ IA: CPT

## 2025-05-09 PROCEDURE — 82948 REAGENT STRIP/BLOOD GLUCOSE: CPT

## 2025-05-09 PROCEDURE — 47562 LAPAROSCOPIC CHOLECYSTECTOMY: CPT | Performed by: SPECIALIST

## 2025-05-09 PROCEDURE — 47562 LAPAROSCOPIC CHOLECYSTECTOMY: CPT | Performed by: PHYSICIAN ASSISTANT

## 2025-05-09 PROCEDURE — 0FT44ZZ RESECTION OF GALLBLADDER, PERCUTANEOUS ENDOSCOPIC APPROACH: ICD-10-PCS | Performed by: SPECIALIST

## 2025-05-09 RX ORDER — ACETAMINOPHEN 325 MG/1
325 TABLET ORAL EVERY 6 HOURS PRN
Status: DISCONTINUED | OUTPATIENT
Start: 2025-05-09 | End: 2025-05-14 | Stop reason: HOSPADM

## 2025-05-09 RX ORDER — BUPIVACAINE HYDROCHLORIDE AND EPINEPHRINE 2.5; 5 MG/ML; UG/ML
INJECTION, SOLUTION EPIDURAL; INFILTRATION; INTRACAUDAL; PERINEURAL AS NEEDED
Status: DISCONTINUED | OUTPATIENT
Start: 2025-05-09 | End: 2025-05-09 | Stop reason: HOSPADM

## 2025-05-09 RX ORDER — PROMETHAZINE HYDROCHLORIDE 25 MG/ML
12.5 INJECTION, SOLUTION INTRAMUSCULAR; INTRAVENOUS ONCE AS NEEDED
Status: DISCONTINUED | OUTPATIENT
Start: 2025-05-09 | End: 2025-05-09 | Stop reason: HOSPADM

## 2025-05-09 RX ORDER — ONDANSETRON 2 MG/ML
4 INJECTION INTRAMUSCULAR; INTRAVENOUS ONCE AS NEEDED
Status: COMPLETED | OUTPATIENT
Start: 2025-05-09 | End: 2025-05-09

## 2025-05-09 RX ORDER — DEXAMETHASONE SODIUM PHOSPHATE 10 MG/ML
INJECTION, SOLUTION INTRAMUSCULAR; INTRAVENOUS AS NEEDED
Status: DISCONTINUED | OUTPATIENT
Start: 2025-05-09 | End: 2025-05-09

## 2025-05-09 RX ORDER — FENTANYL CITRATE/PF 50 MCG/ML
25 SYRINGE (ML) INJECTION
Status: DISCONTINUED | OUTPATIENT
Start: 2025-05-09 | End: 2025-05-09 | Stop reason: HOSPADM

## 2025-05-09 RX ORDER — SUCCINYLCHOLINE/SOD CL,ISO/PF 100 MG/5ML
SYRINGE (ML) INTRAVENOUS AS NEEDED
Status: DISCONTINUED | OUTPATIENT
Start: 2025-05-09 | End: 2025-05-09

## 2025-05-09 RX ORDER — INSULIN GLARGINE 100 [IU]/ML
5 INJECTION, SOLUTION SUBCUTANEOUS
Status: DISCONTINUED | OUTPATIENT
Start: 2025-05-09 | End: 2025-05-10

## 2025-05-09 RX ORDER — OXYCODONE HYDROCHLORIDE 5 MG/1
5 TABLET ORAL EVERY 4 HOURS PRN
Refills: 0 | Status: DISCONTINUED | OUTPATIENT
Start: 2025-05-09 | End: 2025-05-14 | Stop reason: HOSPADM

## 2025-05-09 RX ORDER — FENTANYL CITRATE 50 UG/ML
INJECTION, SOLUTION INTRAMUSCULAR; INTRAVENOUS AS NEEDED
Status: DISCONTINUED | OUTPATIENT
Start: 2025-05-09 | End: 2025-05-09

## 2025-05-09 RX ORDER — ROCURONIUM BROMIDE 10 MG/ML
INJECTION, SOLUTION INTRAVENOUS AS NEEDED
Status: DISCONTINUED | OUTPATIENT
Start: 2025-05-09 | End: 2025-05-09

## 2025-05-09 RX ORDER — METRONIDAZOLE 500 MG/1
500 TABLET ORAL EVERY 12 HOURS SCHEDULED
Status: COMPLETED | OUTPATIENT
Start: 2025-05-09 | End: 2025-05-12

## 2025-05-09 RX ORDER — LIDOCAINE HYDROCHLORIDE 10 MG/ML
INJECTION, SOLUTION EPIDURAL; INFILTRATION; INTRACAUDAL; PERINEURAL AS NEEDED
Status: DISCONTINUED | OUTPATIENT
Start: 2025-05-09 | End: 2025-05-09

## 2025-05-09 RX ORDER — PHENYLEPHRINE HCL IN 0.9% NACL 1 MG/10 ML
SYRINGE (ML) INTRAVENOUS AS NEEDED
Status: DISCONTINUED | OUTPATIENT
Start: 2025-05-09 | End: 2025-05-09

## 2025-05-09 RX ORDER — MIDAZOLAM HYDROCHLORIDE 2 MG/2ML
INJECTION, SOLUTION INTRAMUSCULAR; INTRAVENOUS AS NEEDED
Status: DISCONTINUED | OUTPATIENT
Start: 2025-05-09 | End: 2025-05-09

## 2025-05-09 RX ORDER — SODIUM CHLORIDE, SODIUM LACTATE, POTASSIUM CHLORIDE, CALCIUM CHLORIDE 600; 310; 30; 20 MG/100ML; MG/100ML; MG/100ML; MG/100ML
125 INJECTION, SOLUTION INTRAVENOUS CONTINUOUS
Status: DISCONTINUED | OUTPATIENT
Start: 2025-05-09 | End: 2025-05-09

## 2025-05-09 RX ORDER — ONDANSETRON 2 MG/ML
INJECTION INTRAMUSCULAR; INTRAVENOUS AS NEEDED
Status: DISCONTINUED | OUTPATIENT
Start: 2025-05-09 | End: 2025-05-09

## 2025-05-09 RX ORDER — CEFPODOXIME PROXETIL 200 MG/1
400 TABLET, FILM COATED ORAL 2 TIMES DAILY WITH MEALS
Status: COMPLETED | OUTPATIENT
Start: 2025-05-09 | End: 2025-05-13

## 2025-05-09 RX ORDER — MAGNESIUM HYDROXIDE 1200 MG/15ML
LIQUID ORAL AS NEEDED
Status: DISCONTINUED | OUTPATIENT
Start: 2025-05-09 | End: 2025-05-09 | Stop reason: HOSPADM

## 2025-05-09 RX ORDER — HYDROMORPHONE HCL/PF 1 MG/ML
SYRINGE (ML) INJECTION AS NEEDED
Status: DISCONTINUED | OUTPATIENT
Start: 2025-05-09 | End: 2025-05-09

## 2025-05-09 RX ORDER — ALBUTEROL SULFATE 0.83 MG/ML
2.5 SOLUTION RESPIRATORY (INHALATION) ONCE AS NEEDED
Status: DISCONTINUED | OUTPATIENT
Start: 2025-05-09 | End: 2025-05-09 | Stop reason: HOSPADM

## 2025-05-09 RX ORDER — PROPOFOL 10 MG/ML
INJECTION, EMULSION INTRAVENOUS AS NEEDED
Status: DISCONTINUED | OUTPATIENT
Start: 2025-05-09 | End: 2025-05-09

## 2025-05-09 RX ORDER — ALBUMIN HUMAN 50 G/1000ML
SOLUTION INTRAVENOUS CONTINUOUS PRN
Status: DISCONTINUED | OUTPATIENT
Start: 2025-05-09 | End: 2025-05-09

## 2025-05-09 RX ORDER — HYDROMORPHONE HCL/PF 1 MG/ML
0.5 SYRINGE (ML) INJECTION
Status: DISCONTINUED | OUTPATIENT
Start: 2025-05-09 | End: 2025-05-09 | Stop reason: HOSPADM

## 2025-05-09 RX ADMIN — PHENYLEPHRINE HYDROCHLORIDE 50 MCG/MIN: 10 INJECTION INTRAVENOUS at 09:49

## 2025-05-09 RX ADMIN — Medication 100 MCG: at 11:46

## 2025-05-09 RX ADMIN — DEXMEDETOMIDINE HYDROCHLORIDE 8 MCG: 100 INJECTION, SOLUTION INTRAVENOUS at 11:59

## 2025-05-09 RX ADMIN — INSULIN HUMAN 4 UNITS: 100 INJECTION, SOLUTION PARENTERAL at 12:51

## 2025-05-09 RX ADMIN — METRONIDAZOLE 500 MG: 500 TABLET ORAL at 21:59

## 2025-05-09 RX ADMIN — ROCURONIUM 20 MG: 50 INJECTION, SOLUTION INTRAVENOUS at 10:24

## 2025-05-09 RX ADMIN — Medication 50 MCG: at 10:01

## 2025-05-09 RX ADMIN — Medication 12.5 MG: at 07:54

## 2025-05-09 RX ADMIN — DEXMEDETOMIDINE HYDROCHLORIDE 8 MCG: 100 INJECTION, SOLUTION INTRAVENOUS at 12:03

## 2025-05-09 RX ADMIN — METRONIDAZOLE 500 MG: 500 INJECTION, SOLUTION INTRAVENOUS at 00:24

## 2025-05-09 RX ADMIN — SUGAMMADEX 200 MG: 100 INJECTION, SOLUTION INTRAVENOUS at 12:11

## 2025-05-09 RX ADMIN — Medication 100 MCG: at 09:54

## 2025-05-09 RX ADMIN — ONDANSETRON 4 MG: 2 INJECTION INTRAMUSCULAR; INTRAVENOUS at 09:53

## 2025-05-09 RX ADMIN — Medication 150 MCG: at 10:14

## 2025-05-09 RX ADMIN — DEXMEDETOMIDINE HYDROCHLORIDE 8 MCG: 100 INJECTION, SOLUTION INTRAVENOUS at 09:42

## 2025-05-09 RX ADMIN — LORAZEPAM 1 MG: 2 INJECTION INTRAMUSCULAR; INTRAVENOUS at 08:25

## 2025-05-09 RX ADMIN — ROCURONIUM 20 MG: 50 INJECTION, SOLUTION INTRAVENOUS at 11:22

## 2025-05-09 RX ADMIN — HYDROMORPHONE HYDROCHLORIDE 0.3 MG: 1 INJECTION, SOLUTION INTRAMUSCULAR; INTRAVENOUS; SUBCUTANEOUS at 11:03

## 2025-05-09 RX ADMIN — METRONIDAZOLE 500 MG: 500 INJECTION, SOLUTION INTRAVENOUS at 07:54

## 2025-05-09 RX ADMIN — ALBUMIN (HUMAN): 12.5 INJECTION, SOLUTION INTRAVENOUS at 10:21

## 2025-05-09 RX ADMIN — ENOXAPARIN SODIUM 40 MG: 40 INJECTION SUBCUTANEOUS at 07:54

## 2025-05-09 RX ADMIN — SODIUM CHLORIDE, SODIUM LACTATE, POTASSIUM CHLORIDE, AND CALCIUM CHLORIDE 125 ML/HR: .6; .31; .03; .02 INJECTION, SOLUTION INTRAVENOUS at 00:23

## 2025-05-09 RX ADMIN — LIDOCAINE HYDROCHLORIDE 60 MG: 10 INJECTION, SOLUTION EPIDURAL; INFILTRATION; INTRACAUDAL; PERINEURAL at 09:48

## 2025-05-09 RX ADMIN — DEXMEDETOMIDINE HYDROCHLORIDE 8 MCG: 100 INJECTION, SOLUTION INTRAVENOUS at 10:49

## 2025-05-09 RX ADMIN — ROCURONIUM 20 MG: 50 INJECTION, SOLUTION INTRAVENOUS at 10:51

## 2025-05-09 RX ADMIN — DEXMEDETOMIDINE HYDROCHLORIDE 8 MCG: 100 INJECTION, SOLUTION INTRAVENOUS at 12:10

## 2025-05-09 RX ADMIN — DEXMEDETOMIDINE HYDROCHLORIDE 4 MCG: 100 INJECTION, SOLUTION INTRAVENOUS at 10:03

## 2025-05-09 RX ADMIN — ACETAMINOPHEN 325 MG: 325 TABLET, FILM COATED ORAL at 15:47

## 2025-05-09 RX ADMIN — CEFPODOXIME PROXETIL 400 MG: 200 TABLET, FILM COATED ORAL at 16:35

## 2025-05-09 RX ADMIN — FENTANYL CITRATE 25 MCG: 50 INJECTION INTRAMUSCULAR; INTRAVENOUS at 12:10

## 2025-05-09 RX ADMIN — PROPOFOL 200 MG: 10 INJECTION, EMULSION INTRAVENOUS at 09:48

## 2025-05-09 RX ADMIN — OXYCODONE 5 MG: 5 TABLET ORAL at 23:07

## 2025-05-09 RX ADMIN — DEXMEDETOMIDINE HYDROCHLORIDE 4 MCG: 100 INJECTION, SOLUTION INTRAVENOUS at 10:27

## 2025-05-09 RX ADMIN — DEXAMETHASONE SODIUM PHOSPHATE 10 MG: 10 INJECTION, SOLUTION INTRAMUSCULAR; INTRAVENOUS at 09:50

## 2025-05-09 RX ADMIN — Medication 100 MG: at 09:49

## 2025-05-09 RX ADMIN — Medication 100 MCG: at 09:49

## 2025-05-09 RX ADMIN — ROCURONIUM 25 MG: 50 INJECTION, SOLUTION INTRAVENOUS at 09:53

## 2025-05-09 RX ADMIN — INSULIN LISPRO 3 UNITS: 100 INJECTION, SOLUTION INTRAVENOUS; SUBCUTANEOUS at 16:17

## 2025-05-09 RX ADMIN — Medication 12.5 MG: at 22:00

## 2025-05-09 RX ADMIN — FENTANYL CITRATE 50 MCG: 50 INJECTION INTRAMUSCULAR; INTRAVENOUS at 09:48

## 2025-05-09 RX ADMIN — FENTANYL CITRATE 25 MCG: 50 INJECTION INTRAMUSCULAR; INTRAVENOUS at 10:01

## 2025-05-09 RX ADMIN — DEXMEDETOMIDINE HYDROCHLORIDE 8 MCG: 100 INJECTION, SOLUTION INTRAVENOUS at 10:38

## 2025-05-09 RX ADMIN — HYDROMORPHONE HYDROCHLORIDE 0.2 MG: 1 INJECTION, SOLUTION INTRAMUSCULAR; INTRAVENOUS; SUBCUTANEOUS at 10:53

## 2025-05-09 RX ADMIN — ONDANSETRON 4 MG: 2 INJECTION INTRAMUSCULAR; INTRAVENOUS at 12:33

## 2025-05-09 RX ADMIN — Medication 200 MCG: at 10:30

## 2025-05-09 RX ADMIN — Medication 100 MCG: at 11:33

## 2025-05-09 RX ADMIN — DEXMEDETOMIDINE HYDROCHLORIDE 8 MCG: 100 INJECTION, SOLUTION INTRAVENOUS at 10:17

## 2025-05-09 RX ADMIN — SODIUM CHLORIDE, SODIUM LACTATE, POTASSIUM CHLORIDE, AND CALCIUM CHLORIDE 125 ML/HR: .6; .31; .03; .02 INJECTION, SOLUTION INTRAVENOUS at 07:59

## 2025-05-09 RX ADMIN — OLANZAPINE 10 MG: 10 INJECTION, POWDER, LYOPHILIZED, FOR SOLUTION INTRAMUSCULAR at 22:00

## 2025-05-09 RX ADMIN — Medication 100 MCG: at 11:48

## 2025-05-09 RX ADMIN — PANTOPRAZOLE SODIUM 40 MG: 40 INJECTION, POWDER, FOR SOLUTION INTRAVENOUS at 07:54

## 2025-05-09 RX ADMIN — ROCURONIUM 5 MG: 50 INJECTION, SOLUTION INTRAVENOUS at 09:48

## 2025-05-09 RX ADMIN — DEXMEDETOMIDINE HYDROCHLORIDE 8 MCG: 100 INJECTION, SOLUTION INTRAVENOUS at 12:05

## 2025-05-09 RX ADMIN — METRONIDAZOLE 500 MG: 500 TABLET ORAL at 14:30

## 2025-05-09 RX ADMIN — Medication 100 MCG: at 10:16

## 2025-05-09 RX ADMIN — Medication 6 MG: at 21:58

## 2025-05-09 RX ADMIN — MIDAZOLAM 2 MG: 1 INJECTION INTRAMUSCULAR; INTRAVENOUS at 09:38

## 2025-05-09 NOTE — PLAN OF CARE
Problem: PAIN - ADULT  Goal: Verbalizes/displays adequate comfort level or baseline comfort level  Description: Interventions:- Encourage patient to monitor pain and request assistance- Assess pain using appropriate pain scale- Administer analgesics based on type and severity of pain and evaluate response- Implement non-pharmacological measures as appropriate and evaluate response- Consider cultural and social influences on pain and pain management- Notify physician/advanced practitioner if interventions unsuccessful or patient reports new pain  Outcome: Progressing     Problem: INFECTION - ADULT  Goal: Absence or prevention of progression during hospitalization  Description: INTERVENTIONS:- Assess and monitor for signs and symptoms of infection- Monitor lab/diagnostic results- Monitor all insertion sites, i.e. indwelling lines, tubes, and drains- Monitor endotracheal if appropriate and nasal secretions for changes in amount and color- [place] appropriate cooling/warming therapies per order- Administer medications as ordered- Instruct and encourage patient and family to use good hand hygiene technique- Identify and instruct in appropriate isolation precautions for identified infection/condition  Outcome: Progressing  Goal: Absence of fever/infection during neutropenic period  Description: INTERVENTIONS:- Monitor WBC  Outcome: Progressing     Problem: SAFETY ADULT  Goal: Patient will remain free of falls  Description: INTERVENTIONS:- Educate patient/family on patient safety including physical limitations- Instruct patient to call for assistance with activity - Consult OT/PT to assist with strengthening/mobility - Keep Call bell within reach- Keep bed low and locked with side rails adjusted as appropriate- Keep care items and personal belongings within reach- Initiate and maintain comfort rounds- Make Fall Risk Sign visible to staff- Offer Toileting every 2 Hours, in advance of need- Initiate/Maintain alarm-  Obtain necessary fall risk management equipment- Apply yellow socks and bracelet for high fall risk patients- Consider moving patient to room near nurses station  Outcome: Progressing  Goal: Maintain or return to baseline ADL function  Description: INTERVENTIONS:-  Assess patient's ability to carry out ADLs; assess patient's baseline for ADL function and identify physical deficits which impact ability to perform ADLs (bathing, care of mouth/teeth, toileting, grooming, dressing, etc.)- Assess/evaluate cause of self-care deficits - Assess range of motion- Assess patient's mobility; develop plan if impaired- Assess patient's need for assistive devices and provide as appropriate- Encourage maximum independence but intervene and supervise when necessary- Involve family in performance of ADLs- Assess for home care needs following discharge - Consider OT consult to assist with ADL evaluation and planning for discharge- Provide patient education as appropriate  Outcome: Progressing  Goal: Maintains/Returns to pre admission functional level  Description: INTERVENTIONS:- Perform AM-PAC 6 Click Basic Mobility/ Daily Activity assessment daily.- Set and communicate daily mobility goal to care team and patient/family/caregiver. - Collaborate with rehabilitation services on mobility goals if consulted- Perform Range of Motion 3 times a day.- Reposition patient every 2 hours.- Dangle patient 3 times a day- Stand patient 3 times a day- Ambulate patient 3 times a day- Out of bed to chair 3 times a day - Out of bed for meals 3 times a day- Out of bed for toileting- Record patient progress and toleration of activity level   Outcome: Progressing     Problem: DISCHARGE PLANNING  Goal: Discharge to home or other facility with appropriate resources  Description: INTERVENTIONS:- Identify barriers to discharge w/patient and caregiver- Arrange for needed discharge resources and transportation as appropriate- Identify discharge learning needs  (meds, wound care, etc.)- Arrange for interpretive services to assist at discharge as needed- Refer to Case Management Department for coordinating discharge planning if the patient needs post-hospital services based on physician/advanced practitioner order or complex needs related to functional status, cognitive ability, or social support system  Outcome: Progressing     Problem: Knowledge Deficit  Goal: Patient/family/caregiver demonstrates understanding of disease process, treatment plan, medications, and discharge instructions  Description: Complete learning assessment and assess knowledge base.Interventions:- Provide teaching at level of understanding- Provide teaching via preferred learning methods  Outcome: Progressing     Problem: NEUROSENSORY - ADULT  Goal: Achieves stable or improved neurological status  Description: INTERVENTIONS- Monitor and report changes in neurological status- Monitor vital signs such as temperature, blood pressure, glucose, and any other labs ordered - Initiate measures to prevent increased intracranial pressure- Monitor for seizure activity and implement precautions if appropriate    Outcome: Progressing  Goal: Achieves maximal functionality and self care  Description: INTERVENTIONS- Monitor swallowing and airway patency with patient fatigue and changes in neurological status- Encourage and assist patient to increase activity and self care. - Encourage visually impaired, hearing impaired and aphasic patients to use assistive/communication devices  Outcome: Progressing     Problem: CARDIOVASCULAR - ADULT  Goal: Maintains optimal cardiac output and hemodynamic stability  Description: INTERVENTIONS:- Monitor I/O, vital signs and rhythm- Monitor for S/S and trends of decreased cardiac output- Administer and titrate ordered vasoactive medications to optimize hemodynamic stability- Assess quality of pulses, skin color and temperature- Assess for signs of decreased coronary artery  perfusion- Instruct patient to report change in severity of symptoms  Outcome: Progressing  Goal: Absence of cardiac dysrhythmias or at baseline rhythm  Description: INTERVENTIONS:- Continuous cardiac monitoring, vital signs, obtain 12 lead EKG if ordered- Administer antiarrhythmic and heart rate control medications as ordered- Monitor electrolytes and administer replacement therapy as ordered  Outcome: Progressing     Problem: RESPIRATORY - ADULT  Goal: Achieves optimal ventilation and oxygenation  Description: INTERVENTIONS:- Assess for changes in respiratory status- Assess for changes in mentation and behavior- Position to facilitate oxygenation and minimize respiratory effort- Oxygen administered by appropriate delivery if ordered- Initiate smoking cessation education as indicated- Encourage broncho-pulmonary hygiene including cough, deep breathe, Incentive Spirometry- Assess the need for suctioning and aspirate as needed- Assess and instruct to report SOB or any respiratory difficulty- Respiratory Therapy support as indicated  Outcome: Progressing     Problem: GASTROINTESTINAL - ADULT  Goal: Minimal or absence of nausea and/or vomiting  Description: INTERVENTIONS:- Administer IV fluids if ordered to ensure adequate hydration- Maintain NPO status until nausea and vomiting are resolved- Nasogastric tube if ordered- Administer ordered antiemetic medications as needed- Provide nonpharmacologic comfort measures as appropriate- Advance diet as tolerated, if ordered- Consider nutrition services referral to assist patient with adequate nutrition and appropriate food choices  Outcome: Progressing  Goal: Maintains or returns to baseline bowel function  Description: INTERVENTIONS:- Assess bowel function- Encourage oral fluids to ensure adequate hydration- Administer IV fluids if ordered to ensure adequate hydration- Administer ordered medications as needed- Encourage mobilization and activity- Consider nutritional  services referral to assist patient with adequate nutrition and appropriate food choices  Outcome: Progressing  Goal: Maintains adequate nutritional intake  Description: INTERVENTIONS:- Monitor percentage of each meal consumed- Identify factors contributing to decreased intake, treat as appropriate- Assist with meals as needed- Monitor I&O, weight, and lab values if indicated- Obtain nutrition services referral as needed  Outcome: Progressing  Goal: Oral mucous membranes remain intact  Description: INTERVENTIONS- Assess oral mucosa and hygiene practices- Implement preventative oral hygiene regimen- Implement oral medicated treatments as ordered- Initiate Nutrition services referral as needed  Outcome: Progressing     Problem: GENITOURINARY - ADULT  Goal: Maintains or returns to baseline urinary function  Description: INTERVENTIONS:- Assess urinary function- Encourage oral fluids to ensure adequate hydration if ordered- Administer IV fluids as ordered to ensure adequate hydration- Administer ordered medications as needed- Offer frequent toileting- Follow urinary retention protocol if ordered  Outcome: Progressing  Goal: Absence of urinary retention  Description: INTERVENTIONS:- Assess patient’s ability to void and empty bladder- Monitor I/O- Bladder scan as needed- Discuss with physician/AP medications to alleviate retention as needed- Discuss catheterization for long term situations as appropriate  Outcome: Progressing     Problem: METABOLIC, FLUID AND ELECTROLYTES - ADULT  Goal: Electrolytes maintained within normal limits  Description: INTERVENTIONS:- Monitor labs and assess patient for signs and symptoms of electrolyte imbalances- Administer electrolyte replacement as ordered- Monitor response to electrolyte replacements, including repeat lab results as appropriate- Instruct patient on fluid and nutrition as appropriate  Outcome: Progressing  Goal: Fluid balance maintained  Description: INTERVENTIONS:- Monitor  labs - Monitor I/O and WT- Instruct patient on fluid and nutrition as appropriate- Assess for signs & symptoms of volume excess or deficit  Outcome: Progressing  Goal: Glucose maintained within target range  Description: INTERVENTIONS:- Monitor Blood Glucose as ordered- Assess for signs and symptoms of hyperglycemia and hypoglycemia- Administer ordered medications to maintain glucose within target range- Assess nutritional intake and initiate nutrition service referral as needed  Outcome: Progressing     Problem: SKIN/TISSUE INTEGRITY - ADULT  Goal: Skin Integrity remains intact(Skin Breakdown Prevention)  Description: Assess:-Perform Jag assessment-Clean and moisturize skin-Inspect skin when repositioning, toileting, and assisting with ADLS-Assess under medical devices-Assess extremities for adequate circulation and sensation Bed Management:-Have minimal linens on bed & keep smooth, unwrinkled-Change linens as needed when moist or perspiring-Avoid sitting or lying in one position for more than 2 hours while in bed-Keep HOB at 45 degrees Toileting:-Offer bedside commode-Assess for incontinence-Use incontinent care products after each incontinent episode Activity:-Mobilize patient 3 times a day-Encourage activity and walks on unit-Encourage or provide ROM exercises -Turn and reposition patient every 2 Hours-Use appropriate equipment to lift or move patient in bed-Instruct/ Assist with weight shifting when out of bed in chair-Consider limitation of chair time 2 hour intervalsSkin Care:-Avoid use of baby powder, tape, friction and shearing, hot water or constrictive clothing-Relieve pressure over bony prominences-Do not massage red bony areasNext Steps:-Teach patient strategies to minimize risks -Consider consults to  interdisciplinary teams  Outcome: Progressing     Problem: HEMATOLOGIC - ADULT  Goal: Maintains hematologic stability  Description: INTERVENTIONS- Assess for signs and symptoms of bleeding or  hemorrhage- Monitor labs- Administer supportive blood products/factors as ordered and appropriate  Outcome: Progressing     Problem: MUSCULOSKELETAL - ADULT  Goal: Maintain or return mobility to safest level of function  Description: INTERVENTIONS:- Assess patient's ability to carry out ADLs; assess patient's baseline for ADL function and identify physical deficits which impact ability to perform ADLs (bathing, care of mouth/teeth, toileting, grooming, dressing, etc.)- Assess/evaluate cause of self-care deficits - Assess range of motion- Assess patient's mobility- Assess patient's need for assistive devices and provide as appropriate- Encourage maximum independence but intervene and supervise when necessary- Involve family in performance of ADLs- Assess for home care needs following discharge - Consider OT consult to assist with ADL evaluation and planning for discharge- Provide patient education as appropriate  Outcome: Progressing  Goal: Maintain proper alignment of affected body part  Description: INTERVENTIONS:- Support, maintain and protect limb and body alignment- Provide patient/ family with appropriate education  Outcome: Progressing     Problem: PSYCHOSIS  Goal: Will report no hallucinations or delusions  Description: Interventions:- Administer medication as  ordered- Every waking shifts and PRN assess for the presence of hallucinations and or delusions- Assist with reality testing to support increasing orientation- Assess if patient's hallucinations or delusions are encouraging self-harm or harm to others and intervene as appropriate  Outcome: Progressing     Problem: ANXIETY  Goal: Will report anxiety at manageable levels  Description: INTERVENTIONS:- Administer medication as ordered- Teach and encourage coping skills- Provide emotional support- Assess patient/family for anxiety and ability to cope  Outcome: Progressing  Goal: By discharge: Patient will verbalize 2 strategies to deal with  anxiety  Description: Interventions:- Identify any obvious source/trigger to anxiety- Staff will assist patient in applying identified coping technique/skills- Encourage attendance of scheduled groups and activities  Outcome: Progressing     Problem: SELF CARE DEFICIT  Goal: Return ADL status to a safe level of function  Description: INTERVENTIONS:- Administer medication as ordered- Assess ADL deficits and provide assistive devices as needed- Obtain PT/OT consults as needed- Assist and instruct patient to increase activity and self care as tolerated  Outcome: Progressing     Problem: Prexisting or High Potential for Compromised Skin Integrity  Goal: Skin integrity is maintained or improved  Description: INTERVENTIONS:- Identify patients at risk for skin breakdown- Assess and monitor skin integrity- Assess and monitor nutrition and hydration status- Monitor labs - Assess for incontinence - Turn and reposition patient- Assist with mobility/ambulation- Relieve pressure over bony prominences- Avoid friction and shearing- Provide appropriate hygiene as needed including keeping skin clean and dry- Evaluate need for skin moisturizer/barrier cream- Collaborate with interdisciplinary team - Patient/family teaching- Consider wound care consult   Outcome: Progressing     Problem: Nutrition/Hydration-ADULT  Goal: Nutrient/Hydration intake appropriate for improving, restoring or maintaining nutritional needs  Description: Monitor and assess patient's nutrition/hydration status for malnutrition. Collaborate with interdisciplinary team and initiate plan and interventions as ordered.  Monitor patient's weight and dietary intake as ordered or per policy. Utilize nutrition screening tool and intervene as necessary. Determine patient's food preferences and provide high-protein, high-caloric foods as appropriate. INTERVENTIONS:- Monitor oral intake, urinary output, labs, and treatment plans- Assess nutrition and hydration status and  recommend course of action- Evaluate amount of meals eaten- Assist patient with eating if necessary - Allow adequate time for meals- Recommend/ encourage appropriate diets, oral nutritional supplements, and vitamin/mineral supplements- Order, calculate, and assess calorie counts as needed- Recommend, monitor, and adjust tube feedings and TPN/PPN based on assessed needs- Assess need for intravenous fluids- Provide specific nutrition/hydration education as appropriate- Include patient/family/caregiver in decisions related to nutrition  Monitor and assess patient's nutrition/hydration status for malnutrition. Collaborate with interdisciplinary team and initiate plan and interventions as ordered.  Monitor patient's weight and dietary intake as ordered or per policy. Utilize nutrition screening tool and intervene as necessary. Determine patient's food preferences and provide high-protein, high-caloric foods as appropriate. INTERVENTIONS:- Monitor oral intake, urinary output, labs, and treatment plans- Assess nutrition and hydration status and recommend course of action- Evaluate amount of meals eaten- Assist patient with eating if necessary - Allow adequate time for meals- Recommend/ encourage appropriate diets, oral nutritional supplements, and vitamin/mineral supplements- Order, calculate, and assess calorie counts as needed- Recommend, monitor, and adjust tube feedings and TPN/PPN based on assessed needs- Assess need for intravenous fluids- Provide specific nutrition/hydration education as appropriate- Include patient/family/caregiver in decisions related to nutrition  Outcome: Progressing

## 2025-05-09 NOTE — PLAN OF CARE
Problem: PAIN - ADULT  Goal: Verbalizes/displays adequate comfort level or baseline comfort level  Description: Interventions:- Encourage patient to monitor pain and request assistance- Assess pain using appropriate pain scale- Administer analgesics based on type and severity of pain and evaluate response- Implement non-pharmacological measures as appropriate and evaluate response- Consider cultural and social influences on pain and pain management- Notify physician/advanced practitioner if interventions unsuccessful or patient reports new pain  Outcome: Progressing     Problem: INFECTION - ADULT  Goal: Absence or prevention of progression during hospitalization  Description: INTERVENTIONS:- Assess and monitor for signs and symptoms of infection- Monitor lab/diagnostic results- Monitor all insertion sites, i.e. indwelling lines, tubes, and drains- Monitor endotracheal if appropriate and nasal secretions for changes in amount and color- [place] appropriate cooling/warming therapies per order- Administer medications as ordered- Instruct and encourage patient and family to use good hand hygiene technique- Identify and instruct in appropriate isolation precautions for identified infection/condition  Outcome: Progressing  Goal: Absence of fever/infection during neutropenic period  Description: INTERVENTIONS:- Monitor WBC  Outcome: Progressing     Problem: SAFETY ADULT  Goal: Patient will remain free of falls  Description: INTERVENTIONS:- Educate patient/family on patient safety including physical limitations- Instruct patient to call for assistance with activity - Consult OT/PT to assist with strengthening/mobility - Keep Call bell within reach- Keep bed low and locked with side rails adjusted as appropriate- Keep care items and personal belongings within reach- Initiate and maintain comfort rounds- Make Fall Risk Sign visible to staff- Offer Toileting every 2 Hours, in advance of need- Initiate/Maintain alarm-  Obtain necessary fall risk management equipment- Apply yellow socks and bracelet for high fall risk patients- Consider moving patient to room near nurses station  Outcome: Progressing  Goal: Maintain or return to baseline ADL function  Description: INTERVENTIONS:-  Assess patient's ability to carry out ADLs; assess patient's baseline for ADL function and identify physical deficits which impact ability to perform ADLs (bathing, care of mouth/teeth, toileting, grooming, dressing, etc.)- Assess/evaluate cause of self-care deficits - Assess range of motion- Assess patient's mobility; develop plan if impaired- Assess patient's need for assistive devices and provide as appropriate- Encourage maximum independence but intervene and supervise when necessary- Involve family in performance of ADLs- Assess for home care needs following discharge - Consider OT consult to assist with ADL evaluation and planning for discharge- Provide patient education as appropriate  Outcome: Progressing  Goal: Maintains/Returns to pre admission functional level  Description: INTERVENTIONS:- Perform AM-PAC 6 Click Basic Mobility/ Daily Activity assessment daily.- Set and communicate daily mobility goal to care team and patient/family/caregiver. - Collaborate with rehabilitation services on mobility goals if consulted- Perform Range of Motion 3 times a day.- Reposition patient every 2 hours.- Dangle patient 3 times a day- Stand patient 3 times a day- Ambulate patient 3 times a day- Out of bed to chair 3 times a day - Out of bed for meals 3 times a day- Out of bed for toileting- Record patient progress and toleration of activity level   Outcome: Progressing     Problem: DISCHARGE PLANNING  Goal: Discharge to home or other facility with appropriate resources  Description: INTERVENTIONS:- Identify barriers to discharge w/patient and caregiver- Arrange for needed discharge resources and transportation as appropriate- Identify discharge learning needs  (meds, wound care, etc.)- Arrange for interpretive services to assist at discharge as needed- Refer to Case Management Department for coordinating discharge planning if the patient needs post-hospital services based on physician/advanced practitioner order or complex needs related to functional status, cognitive ability, or social support system  Outcome: Progressing     Problem: Knowledge Deficit  Goal: Patient/family/caregiver demonstrates understanding of disease process, treatment plan, medications, and discharge instructions  Description: Complete learning assessment and assess knowledge base.Interventions:- Provide teaching at level of understanding- Provide teaching via preferred learning methods  Outcome: Progressing     Problem: NEUROSENSORY - ADULT  Goal: Achieves stable or improved neurological status  Description: INTERVENTIONS- Monitor and report changes in neurological status- Monitor vital signs such as temperature, blood pressure, glucose, and any other labs ordered - Initiate measures to prevent increased intracranial pressure- Monitor for seizure activity and implement precautions if appropriate    Outcome: Progressing  Goal: Achieves maximal functionality and self care  Description: INTERVENTIONS- Monitor swallowing and airway patency with patient fatigue and changes in neurological status- Encourage and assist patient to increase activity and self care. - Encourage visually impaired, hearing impaired and aphasic patients to use assistive/communication devices  Outcome: Progressing     Problem: CARDIOVASCULAR - ADULT  Goal: Maintains optimal cardiac output and hemodynamic stability  Description: INTERVENTIONS:- Monitor I/O, vital signs and rhythm- Monitor for S/S and trends of decreased cardiac output- Administer and titrate ordered vasoactive medications to optimize hemodynamic stability- Assess quality of pulses, skin color and temperature- Assess for signs of decreased coronary artery  perfusion- Instruct patient to report change in severity of symptoms  Outcome: Progressing  Goal: Absence of cardiac dysrhythmias or at baseline rhythm  Description: INTERVENTIONS:- Continuous cardiac monitoring, vital signs, obtain 12 lead EKG if ordered- Administer antiarrhythmic and heart rate control medications as ordered- Monitor electrolytes and administer replacement therapy as ordered  Outcome: Progressing     Problem: RESPIRATORY - ADULT  Goal: Achieves optimal ventilation and oxygenation  Description: INTERVENTIONS:- Assess for changes in respiratory status- Assess for changes in mentation and behavior- Position to facilitate oxygenation and minimize respiratory effort- Oxygen administered by appropriate delivery if ordered- Initiate smoking cessation education as indicated- Encourage broncho-pulmonary hygiene including cough, deep breathe, Incentive Spirometry- Assess the need for suctioning and aspirate as needed- Assess and instruct to report SOB or any respiratory difficulty- Respiratory Therapy support as indicated  Outcome: Progressing     Problem: GASTROINTESTINAL - ADULT  Goal: Minimal or absence of nausea and/or vomiting  Description: INTERVENTIONS:- Administer IV fluids if ordered to ensure adequate hydration- Maintain NPO status until nausea and vomiting are resolved- Nasogastric tube if ordered- Administer ordered antiemetic medications as needed- Provide nonpharmacologic comfort measures as appropriate- Advance diet as tolerated, if ordered- Consider nutrition services referral to assist patient with adequate nutrition and appropriate food choices  Outcome: Progressing  Goal: Maintains or returns to baseline bowel function  Description: INTERVENTIONS:- Assess bowel function- Encourage oral fluids to ensure adequate hydration- Administer IV fluids if ordered to ensure adequate hydration- Administer ordered medications as needed- Encourage mobilization and activity- Consider nutritional  services referral to assist patient with adequate nutrition and appropriate food choices  Outcome: Progressing  Goal: Maintains adequate nutritional intake  Description: INTERVENTIONS:- Monitor percentage of each meal consumed- Identify factors contributing to decreased intake, treat as appropriate- Assist with meals as needed- Monitor I&O, weight, and lab values if indicated- Obtain nutrition services referral as needed  Outcome: Progressing  Goal: Oral mucous membranes remain intact  Description: INTERVENTIONS- Assess oral mucosa and hygiene practices- Implement preventative oral hygiene regimen- Implement oral medicated treatments as ordered- Initiate Nutrition services referral as needed  Outcome: Progressing     Problem: GENITOURINARY - ADULT  Goal: Maintains or returns to baseline urinary function  Description: INTERVENTIONS:- Assess urinary function- Encourage oral fluids to ensure adequate hydration if ordered- Administer IV fluids as ordered to ensure adequate hydration- Administer ordered medications as needed- Offer frequent toileting- Follow urinary retention protocol if ordered  Outcome: Progressing  Goal: Absence of urinary retention  Description: INTERVENTIONS:- Assess patient’s ability to void and empty bladder- Monitor I/O- Bladder scan as needed- Discuss with physician/AP medications to alleviate retention as needed- Discuss catheterization for long term situations as appropriate  Outcome: Progressing     Problem: METABOLIC, FLUID AND ELECTROLYTES - ADULT  Goal: Electrolytes maintained within normal limits  Description: INTERVENTIONS:- Monitor labs and assess patient for signs and symptoms of electrolyte imbalances- Administer electrolyte replacement as ordered- Monitor response to electrolyte replacements, including repeat lab results as appropriate- Instruct patient on fluid and nutrition as appropriate  Outcome: Progressing  Goal: Fluid balance maintained  Description: INTERVENTIONS:- Monitor  labs - Monitor I/O and WT- Instruct patient on fluid and nutrition as appropriate- Assess for signs & symptoms of volume excess or deficit  Outcome: Progressing  Goal: Glucose maintained within target range  Description: INTERVENTIONS:- Monitor Blood Glucose as ordered- Assess for signs and symptoms of hyperglycemia and hypoglycemia- Administer ordered medications to maintain glucose within target range- Assess nutritional intake and initiate nutrition service referral as needed  Outcome: Progressing     Problem: SKIN/TISSUE INTEGRITY - ADULT  Goal: Skin Integrity remains intact(Skin Breakdown Prevention)  Description: Assess:-Perform Jag assessment-Clean and moisturize skin-Inspect skin when repositioning, toileting, and assisting with ADLS-Assess under medical devices-Assess extremities for adequate circulation and sensation Bed Management:-Have minimal linens on bed & keep smooth, unwrinkled-Change linens as needed when moist or perspiring-Avoid sitting or lying in one position for more than 2 hours while in bed-Keep HOB at 45 degrees Toileting:-Offer bedside commode-Assess for incontinence-Use incontinent care products after each incontinent episode Activity:-Mobilize patient 3 times a day-Encourage activity and walks on unit-Encourage or provide ROM exercises -Turn and reposition patient every 2 Hours-Use appropriate equipment to lift or move patient in bed-Instruct/ Assist with weight shifting when out of bed in chair-Consider limitation of chair time 2 hour intervalsSkin Care:-Avoid use of baby powder, tape, friction and shearing, hot water or constrictive clothing-Relieve pressure over bony prominences-Do not massage red bony areasNext Steps:-Teach patient strategies to minimize risks -Consider consults to  interdisciplinary teams  Outcome: Progressing     Problem: HEMATOLOGIC - ADULT  Goal: Maintains hematologic stability  Description: INTERVENTIONS- Assess for signs and symptoms of bleeding or  hemorrhage- Monitor labs- Administer supportive blood products/factors as ordered and appropriate  Outcome: Progressing     Problem: MUSCULOSKELETAL - ADULT  Goal: Maintain or return mobility to safest level of function  Description: INTERVENTIONS:- Assess patient's ability to carry out ADLs; assess patient's baseline for ADL function and identify physical deficits which impact ability to perform ADLs (bathing, care of mouth/teeth, toileting, grooming, dressing, etc.)- Assess/evaluate cause of self-care deficits - Assess range of motion- Assess patient's mobility- Assess patient's need for assistive devices and provide as appropriate- Encourage maximum independence but intervene and supervise when necessary- Involve family in performance of ADLs- Assess for home care needs following discharge - Consider OT consult to assist with ADL evaluation and planning for discharge- Provide patient education as appropriate  Outcome: Progressing  Goal: Maintain proper alignment of affected body part  Description: INTERVENTIONS:- Support, maintain and protect limb and body alignment- Provide patient/ family with appropriate education  Outcome: Progressing     Problem: PSYCHOSIS  Goal: Will report no hallucinations or delusions  Description: Interventions:- Administer medication as  ordered- Every waking shifts and PRN assess for the presence of hallucinations and or delusions- Assist with reality testing to support increasing orientation- Assess if patient's hallucinations or delusions are encouraging self-harm or harm to others and intervene as appropriate  Outcome: Progressing     Problem: ANXIETY  Goal: Will report anxiety at manageable levels  Description: INTERVENTIONS:- Administer medication as ordered- Teach and encourage coping skills- Provide emotional support- Assess patient/family for anxiety and ability to cope  Outcome: Progressing  Goal: By discharge: Patient will verbalize 2 strategies to deal with  anxiety  Description: Interventions:- Identify any obvious source/trigger to anxiety- Staff will assist patient in applying identified coping technique/skills- Encourage attendance of scheduled groups and activities  Outcome: Progressing     Problem: SELF CARE DEFICIT  Goal: Return ADL status to a safe level of function  Description: INTERVENTIONS:- Administer medication as ordered- Assess ADL deficits and provide assistive devices as needed- Obtain PT/OT consults as needed- Assist and instruct patient to increase activity and self care as tolerated  Outcome: Progressing     Problem: Prexisting or High Potential for Compromised Skin Integrity  Goal: Skin integrity is maintained or improved  Description: INTERVENTIONS:- Identify patients at risk for skin breakdown- Assess and monitor skin integrity- Assess and monitor nutrition and hydration status- Monitor labs - Assess for incontinence - Turn and reposition patient- Assist with mobility/ambulation- Relieve pressure over bony prominences- Avoid friction and shearing- Provide appropriate hygiene as needed including keeping skin clean and dry- Evaluate need for skin moisturizer/barrier cream- Collaborate with interdisciplinary team - Patient/family teaching- Consider wound care consult   Outcome: Progressing     Problem: Nutrition/Hydration-ADULT  Goal: Nutrient/Hydration intake appropriate for improving, restoring or maintaining nutritional needs  Description: Monitor and assess patient's nutrition/hydration status for malnutrition. Collaborate with interdisciplinary team and initiate plan and interventions as ordered.  Monitor patient's weight and dietary intake as ordered or per policy. Utilize nutrition screening tool and intervene as necessary. Determine patient's food preferences and provide high-protein, high-caloric foods as appropriate. INTERVENTIONS:- Monitor oral intake, urinary output, labs, and treatment plans- Assess nutrition and hydration status and  recommend course of action- Evaluate amount of meals eaten- Assist patient with eating if necessary - Allow adequate time for meals- Recommend/ encourage appropriate diets, oral nutritional supplements, and vitamin/mineral supplements- Order, calculate, and assess calorie counts as needed- Recommend, monitor, and adjust tube feedings and TPN/PPN based on assessed needs- Assess need for intravenous fluids- Provide specific nutrition/hydration education as appropriate- Include patient/family/caregiver in decisions related to nutrition  Monitor and assess patient's nutrition/hydration status for malnutrition. Collaborate with interdisciplinary team and initiate plan and interventions as ordered.  Monitor patient's weight and dietary intake as ordered or per policy. Utilize nutrition screening tool and intervene as necessary. Determine patient's food preferences and provide high-protein, high-caloric foods as appropriate. INTERVENTIONS:- Monitor oral intake, urinary output, labs, and treatment plans- Assess nutrition and hydration status and recommend course of action- Evaluate amount of meals eaten- Assist patient with eating if necessary - Allow adequate time for meals- Recommend/ encourage appropriate diets, oral nutritional supplements, and vitamin/mineral supplements- Order, calculate, and assess calorie counts as needed- Recommend, monitor, and adjust tube feedings and TPN/PPN based on assessed needs- Assess need for intravenous fluids- Provide specific nutrition/hydration education as appropriate- Include patient/family/caregiver in decisions related to nutrition  Outcome: Progressing     Problem: Decreased Cardiac Output  Goal: Cardiac output adequate for individual needs  Description: INTERVENTIONS: Monitor for signs and symptoms of decreased cardiac output - Monitor for dyspnea with exertion and at rest- Monitor for orthopnea- Monitor for signs of tachycardia. Place patient on telemetry monitoring.- Assess  patient for jugular vein distention- Assess patient for lower extremity edema and poor peripheral perfusion - Auscultate lung sound for Fine bibasilar crackles - Monitor for cardiac arrythmias - Administer beta blockers, antiarrhythmic, and blood pressure medications as ordered  Outcome: Progressing     Problem: Impaired Gas Exchange  Goal: Optimize oxygenation and ensure adequate ventilation  Description: INTERVENTIONS: Monitor for signs and symptoms of respiratory distress              - Elevate HOB or use high fowlers to promote lung expansion              - Administer oxygen as ordered to maintain adequate oxygenation              - Encourage use of IS to promote lung expansion and prevent PN              - Monitor ABGs to assess oxygenation status              - Monitor blood oxygen level to maintain adequate oxygenation              - Encourage cough and deep breathing exercises to promote lung expansion              - Monitor patient's mental status for increased confusion  Outcome: Progressing     Problem: Excess Fluid Volume  Goal: Patient is able to achieve and maintain homeostasis  Description: INTERVENTIONS: Monitor for sign and symptoms of fluid overload- Evaluate LE edema every shift- Elevate LE to prevent dependent edema- Apply SUKUMAR stockings as ordered - Monitor ankle circumference daily- Assess for jugular vein distention- Evaluate provider orders for the CHF diuretic algorithm. Administer diuretics as ordered- Weigh the patient daily at 0600 and report a weight gain of five pounds or more - Strict intake and output- Monitor fluid intake and adhere to fluid restrictions- Assess lung sounds every shift and as needed- Monitor vital signs and lab values (CBC, chem, BUN, BNP)- Measure and document urine output  Outcome: Progressing     Problem: Activity Intolerance  Goal: Patient is able to perform activities within their limitations  Description: INTERVENTIONS:                     -   Alternate periods  of activity with periods of rest               -   Patients is able to maintain normal vitals heart rhythm during activity               -   Gradually increase activity and exercise as patient can tolerate               -   Monitor blood pressure and heart before and after exercise                -   Monitor blood oxygen saturation during activity and apply oxygen as needed  Outcome: Progressing     Problem: Knowledge Deficit  Goal: Patient is able to verbalize understanding of Heart Failure after education  Description: INTERVENTIONS:- Educate the patient and family on signs and symptoms of HF- Provide the patient with HF education and HF zone tool- Educate on the importance of daily weight in the AM and reporting a weight gain             of 3 or more pounds to their primary care physician- Monitor for SOB- Maintain and sodium and fluid restriction- Educate the patient on the importance of medications such as: diuretics, betablockers,             antiarrhythmics and their purpose, dose, route, side effects and labs             if they are needed  Outcome: Progressing

## 2025-05-09 NOTE — ASSESSMENT & PLAN NOTE
Lab Results   Component Value Date    HGBA1C 8.3 (A) 03/17/2025       Recent Labs     05/08/25  1136 05/08/25  1719 05/08/25 2054 05/09/25  0617   POCGLU 201* 233* 212* 194*       Blood Sugar Average: Last 72 hrs:  (P) [id number]  Continue insulin sliding scale   May require lantus if blood glucose remains over 200

## 2025-05-09 NOTE — ASSESSMENT & PLAN NOTE
55 year old male p/w cholecystitis. Original scheduled cholecystectomy delayed secondary to acute agitation. Now s/p laparoscopic cholecystectomy on 5/9. Progressing well, post-operatively    Plan  -Low fat CCD diet  -dc IVF  -PO antibiotics 4 days  -MISSY appropriate to be removed before discharge  -Rest of care per primary team

## 2025-05-09 NOTE — ASSESSMENT & PLAN NOTE
55 year old male p/w cholecystitis. Original scheduled cholecystectomy delayed secondary to acute agitation. Now s/p laparoscopic cholecystectomy on 5/9.     Plan  - Low fat diet  - IV fluids  - PO antibiotics 4 days  - continue to monitor MISSY drain, to be removed tomorrow  - Rest of care per primary team

## 2025-05-09 NOTE — ASSESSMENT & PLAN NOTE
Lab Results   Component Value Date    EGFR 74 05/09/2025    EGFR 83 05/08/2025    EGFR 83 05/07/2025    CREATININE 1.10 05/09/2025    CREATININE 1.00 05/08/2025    CREATININE 1.00 05/07/2025   Chauncey r/o  Creatinine currently at baseline which is 0.9-1.2

## 2025-05-09 NOTE — QUICK NOTE
"Acute Care Surgery   Post-Op Check Progress Note   Solo Mack 55 y.o. male MRN: [id number]  Unit/Bed#: [address] 225-01 Encounter: [id number]    Assessment & Plan  Septic shock (HCC)  55 year old male p/w cholecystitis. Original scheduled cholecystectomy delayed secondary to acute agitation. Now s/p laparoscopic cholecystectomy on 5/9.     Plan  - Low fat diet  - IV fluids  - PO antibiotics 4 days  - continue to monitor MISSY drain, to be removed tomorrow  - Rest of care per primary team  Acute cholecystitis  See above plan        Subjective/Objective     Subjective:  AVSS on room air. Patient reports pain is controlled. Tolerating diet. Voiding spontaneously and not yet having bowel function. Denies fever, chills, nausea, vomiting.       Objective:     Blood pressure 111/74, pulse 94, temperature 98.6 °F (37 °C), resp. rate 18, height 5' 7\" (1.702 m), weight 98 kg (216 lb 0.8 oz), SpO2 97%.,Body mass index is 33.84 kg/m².      Intake/Output Summary (Last 24 hours) at 5/9/2025 1640  Last data filed at 5/9/2025 1356  Gross per 24 hour   Intake 2360 ml   Output 1770 ml   Net 590 ml       Invasive Devices       Peripheral Intravenous Line  Duration             Peripheral IV 05/08/25 Dorsal (posterior);Left Forearm 1 day              Drain  Duration             Closed/Suction Drain RLQ Bulb 19 Fr. <1 day                    Physical Exam:    General: NAD  HENT: NCAT MMM  Neck: supple, no JVD  CV: nl rate  Lungs: nl wob. No resp distress  ABD: Soft, appropriately tender, nondistended. Incisions c/d/I. MISSY with serosang output  Extrem: No CCE  Neuro: AAOx3        Manuel Padilla MD  5/9/2025    "

## 2025-05-09 NOTE — PROGRESS NOTES
Patient refusing daily weight and second CHG wipes at this time. RN explained indication of wipes to which patient continues to decline.

## 2025-05-09 NOTE — PLAN OF CARE
Problem: Decreased Cardiac Output  Goal: Cardiac output adequate for individual needs  Description: INTERVENTIONS: Monitor for signs and symptoms of decreased cardiac output - Monitor for dyspnea with exertion and at rest- Monitor for orthopnea- Monitor for signs of tachycardia. Place patient on telemetry monitoring.- Assess patient for jugular vein distention- Assess patient for lower extremity edema and poor peripheral perfusion - Auscultate lung sound for Fine bibasilar crackles - Monitor for cardiac arrythmias - Administer beta blockers, antiarrhythmic, and blood pressure medications as ordered  Outcome: Progressing     Problem: Impaired Gas Exchange  Goal: Optimize oxygenation and ensure adequate ventilation  Description: INTERVENTIONS: Monitor for signs and symptoms of respiratory distress              - Elevate HOB or use high fowlers to promote lung expansion              - Administer oxygen as ordered to maintain adequate oxygenation              - Encourage use of IS to promote lung expansion and prevent PN              - Monitor ABGs to assess oxygenation status              - Monitor blood oxygen level to maintain adequate oxygenation              - Encourage cough and deep breathing exercises to promote lung expansion              - Monitor patient's mental status for increased confusion  Outcome: Progressing     Problem: Excess Fluid Volume  Goal: Patient is able to achieve and maintain homeostasis  Description: INTERVENTIONS: Monitor for sign and symptoms of fluid overload- Evaluate LE edema every shift- Elevate LE to prevent dependent edema- Apply SUKUMAR stockings as ordered - Monitor ankle circumference daily- Assess for jugular vein distention- Evaluate provider orders for the CHF diuretic algorithm. Administer diuretics as ordered- Weigh the patient daily at 0600 and report a weight gain of five pounds or more - Strict intake and output- Monitor fluid intake and adhere to fluid restrictions- Assess  lung sounds every shift and as needed- Monitor vital signs and lab values (CBC, chem, BUN, BNP)- Measure and document urine output  Outcome: Progressing     Problem: Activity Intolerance  Goal: Patient is able to perform activities within their limitations  Description: INTERVENTIONS:                     -   Alternate periods of activity with periods of rest               -   Patients is able to maintain normal vitals heart rhythm during activity               -   Gradually increase activity and exercise as patient can tolerate               -   Monitor blood pressure and heart before and after exercise                -   Monitor blood oxygen saturation during activity and apply oxygen as needed  Outcome: Progressing     Problem: Knowledge Deficit  Goal: Patient is able to verbalize understanding of Heart Failure after education  Description: INTERVENTIONS:- Educate the patient and family on signs and symptoms of HF- Provide the patient with HF education and HF zone tool- Educate on the importance of daily weight in the AM and reporting a weight gain             of 3 or more pounds to their primary care physician- Monitor for SOB- Maintain and sodium and fluid restriction- Educate the patient on the importance of medications such as: diuretics, betablockers,             antiarrhythmics and their purpose, dose, route, side effects and labs             if they are needed  Outcome: Progressing      Mohs Rapid Report Verbiage: The area of clinically evident tumor was marked with skin marking ink and appropriately hatched.  The initial incision was made following the Mohs approach through the skin.  The specimen was taken to the lab, divided into the necessary number of pieces, chromacoded and processed according to the Mohs protocol.  This was repeated in successive stages until a tumor free defect was achieved.

## 2025-05-09 NOTE — ANESTHESIA PREPROCEDURE EVALUATION
Procedure:  CHOLECYSTECTOMY LAPAROSCOPIC (Abdomen)    Relevant Problems   CARDIO   (+) CHF (congestive heart failure) (HCC)   (+) Essential hypertension   (+) HLD (hyperlipidemia)      ENDO   (+) Type 2 diabetes mellitus, without long-term current use of insulin (HCC)      GI/HEPATIC   (+) GERD (gastroesophageal reflux disease)      /RENAL   (+) Chronic kidney disease, stage 2 (mild)      NEURO/PSYCH   (+) Chronic pain disorder   (+) Schizophrenia, unspecified type (HCC)      Digestive   (+) Acute cholecystitis      Behavioral Health   (+) Drug abuse and dependence (HCC)      Neurology/Sleep   (+) White matter abnormality on MRI of brain        Physical Exam    Airway    Mallampati score: II  TM Distance: >3 FB  Neck ROM: full     Dental   Comment: Edentulous     Cardiovascular  Rhythm: regular, Rate: normal, Cardiovascular exam normal    Pulmonary  Pulmonary exam normal Breath sounds clear to auscultation    Other Findings        Anesthesia Plan  ASA Score- 3     Anesthesia Type- general with ASA Monitors.         Additional Monitors:     Airway Plan:            Plan Factors-    Chart reviewed.   Existing labs reviewed. Patient summary reviewed.                  Induction- intravenous.    Postoperative Plan- Plan for postoperative opioid use.     Perioperative Resuscitation Plan - Level 1 - Full Code.       Informed Consent- Anesthetic plan and risks discussed with patient.  I personally reviewed this patient with the CRNA. Discussed and agreed on the Anesthesia Plan with the CRNA..      NPO Status:  Vitals Value Taken Time   Date of last liquid 05/09/25 05/09/25 0926   Time of last liquid 0754 05/09/25 0926   Date of last solid 05/08/25 05/09/25 0926   Time of last solid 1400 05/09/25 0926

## 2025-05-09 NOTE — ASSESSMENT & PLAN NOTE
Pt was transferred from  to [facility] icu due to profound septic shock with imagining demonstrating gallstones, ruq tenderness, and profound leukocytosis  Currently treated with cefepime/flagyl  On levophed which had been weaned to off  Pt was scheduled for or but decided later in evening decided he did not want to go forward with surgery   Surgery stated to continue conservative treatment with iv antibiotics and change to po at discharge for 2 weeks however he has increased RUQ abd pain  Appreciate gi recommendations- pt is not a candidate for stenting as it will impair surgery  Spoke with surgery who recommended perc. Miriam tube- he is not a good candidate for this given his psychiatric history   Pt has improved but continues to have ruq pain at times. Concern is that he will become septic again without intervention. Long discussions about the appropriate treatment for him given his complex psychosocial situation. It was concluded that cholecystectomy might be the safest mode of treatment. Pt is willing to go forward with surgery   Scheduled for surgery today

## 2025-05-09 NOTE — ANESTHESIA POSTPROCEDURE EVALUATION
Post-Op Assessment Note    CV Status:  Stable    Pain management: adequate       Mental Status:  Alert and awake   Hydration Status:  Euvolemic   PONV Controlled:  Controlled   Airway Patency:  Patent     Post Op Vitals Reviewed: Yes    No anethesia notable event occurred.    Staff: Anesthesiologist           Last Filed PACU Vitals:  Vitals Value Taken Time   Temp 97.8 °F (36.6 °C) 05/09/25 1422   Pulse 80 05/09/25 1427   /71 05/09/25 1426   Resp 14 05/09/25 1354   SpO2 91 % 05/09/25 1427   Vitals shown include unfiled device data.    Modified Kirk:     Vitals Value Taken Time   Activity 2 05/09/25 1354   Respiration 2 05/09/25 1354   Circulation 2 05/09/25 1354   Consciousness 2 05/09/25 1354   Oxygen Saturation 1 05/09/25 1354     Modified Kirk Score: 9

## 2025-05-09 NOTE — PROGRESS NOTES
Progress Note - Surgery-General   Name: Solo Mack 55 y.o. male I MRN: [id number]  Unit/Bed#: [address] 225-01 I Date of Admission: 5/2/2025   Date of Service: 5/10/2025 I Hospital Day: 8    Assessment & Plan  Septic shock (HCC)  55 year old male p/w cholecystitis. Original scheduled cholecystectomy delayed secondary to acute agitation. Now s/p laparoscopic cholecystectomy on 5/9. Progressing well, post-operatively    Plan  -Low fat CCD diet  -dc IVF  -PO antibiotics 4 days  -MISSY appropriate to be removed before discharge  -Rest of care per primary team  Acute cholecystitis  See above plan    Surgery-General service will follow.    Wiliam Dhaliwal MD  General Surgery  05/10/25  7:32 AM      Subjective   NAEON. AVSS however tachycardic & hypertensive overnight which has resolved. Patient tolerating oral diet w/o nausea/vomiting. Reports appropriate pain control on current regimen. Voiding. +Flatus. -BM.    UOP: 2075cc  RLQ drain: 65cc serosanguinous    WBC: 16.2 (from 8.3)  HGB: 13.7 (from 14.7)  Cr: 1.22 (from 1.10)    Objective :  Temp:  [97.1 °F (36.2 °C)-99.3 °F (37.4 °C)] 98.2 °F (36.8 °C)  HR:  [] 107  BP: ()/() 150/85  Resp:  [14-24] 18  SpO2:  [91 %-99 %] 92 %  O2 Device: Nasal cannula  Nasal Cannula O2 Flow Rate (L/min):  [2 L/min-3 L/min] 2 L/min    I/O         05/07 0701  05/08 0700 05/08 0701  05/09 0700 05/09 0701  05/10 0700    P.O. 360 100     I.V. (mL/kg) 10 (0.1) 10 (0.1) 1900 (19.4)    IV Piggyback   350    Total Intake(mL/kg) 370 (3.8) 110 (1.1) 2250 (23)    Urine (mL/kg/hr) 400 (0.2) 1100 (0.5) 650 (0.6)    Drains   20    Stool  0     Total Output 400 1100 670    Net -30 -990 +1580           Unmeasured Urine Occurrence 2 x 4 x     Unmeasured Stool Occurrence  1 x           Lines/Drains/Airways       Active Status       Name Placement date Placement time Site Days    Closed/Suction Drain RLQ Bulb 19 Fr. 05/09/25  1156  RLQ  less than 1                  Physical  Exam:  GENERAL APPEARANCE: well developed, well nourished, obese male in NAD.  HEENT: NCAT; EOMI; normal external nose & ears; MMM  NECK: Supple, normal ROM.  CARDIOVASCULAR: Regular rate. Grossly well perfused.  LUNGS/CHEST: Symmetric chest rise/fall with respirations.  ABD: Soft; protuberant abdomen, non-distended; appropriately-tender RUQ. Laparoscopic incisions c/d/i. RLQ drain with serosang drainage without surrounding induration, bleeding, or leakage onto dressings.  EXT: Normal ROM. No observable deformities in 4/4 extremities. No edema.  NEURO: AAOx4. No focal neurologic deficits. Distally neurovascularly intact.  SKIN: Warm, dry and well perfused; no rash; no jaundice.      Lab Results: I have reviewed the following results:  Recent Labs     05/08/25  0500 05/08/25  0500 05/09/25  0508 05/10/25  0623   WBC 8.30  --  8.31 16.23*   HGB 14.1  --  14.7 13.7   HCT 41.9  --  43.9 42.0     --  305 367   BANDSPCT  --   --  2  --    SODIUM 136  --  133* 133*   K 4.0  --  4.1 4.2     --  101 99   CO2 27  --  24 25   BUN 10  --  11 15   CREATININE 1.00  --  1.10 1.22   GLUC 149*  --  168* 281*   MG  --    < > 2.0 2.0   PHOS  --    < > 2.3* 2.3*   AST 28  --   --   --    ALT 22  --   --   --    ALB 3.3*  --   --   --    TBILI 0.41  --   --   --    ALKPHOS 82  --   --   --     < > = values in this interval not displayed.     Imaging Results Review: No pertinent imaging studies reviewed.  Other Study Results Review: No additional pertinent studies reviewed.    VTE Pharmacologic Prophylaxis: VTE covered by:  enoxaparin, Subcutaneous, 40 mg at 05/09/25 0754    VTE Mechanical Prophylaxis: sequential compression device

## 2025-05-09 NOTE — OP NOTE
OPERATIVE REPORT  PATIENT NAME: Solo Mack    :  1970  MRN: [id number]  Pt Location: [facility] OR ROOM 07    SURGERY DATE: 2025    Surgeons and Role:     * Rodolfo Walls MD - Primary     * Alexandr Ahuja PA-C - Assisting    Preop Diagnosis:  Acute cholecystitis K81.0    Post-Op Diagnosis Codes:     * Acute cholecystitis due to biliary calculus [K80.00]    Procedure(s):  CHOLECYSTECTOMY LAPAROSCOPIC    Specimen(s):  ID Type Source Tests Collected by Time Destination   1 : Gallblader and gallstone Tissue Gallbladder TISSUE EXAM Rodolfo Walls MD 2025 1101        Estimated Blood Loss:   125 cc    Drains:  Closed/Suction Drain RLQ Bulb 19 Fr. (Active)   Number of days: 0       Anesthesia Type:   General    Operative Indications:  Acute cholecystitis K81.0      Operative Findings:  Extensive Phlegmon  Partially Intrahepatic gallbladder  Frankly purulent GB fluid  Large cholesterol stones      Complications:   None    Procedure and Technique:  The patient was identified by myself taken to the operating room and placed in a supine position on the operating table.  After induction of satisfactory general endotracheal anesthesia he was prepped and draped in the usual sterile fashion using ChloraPrep solution.  A timeout was conducted the patient identified a Solo mayer, IV antibiotics were confirmed is given, sequential compression devices were on and functioning, all parties agreed this was the appropriate patient for the proposed procedure.  Local anesthetic consisting of quarter percent Marcaine with epinephrine was infiltrated following which a curvilinear infraumbilical incision was performed using a #15 scalpel and deepened through the skin and subcutaneous tissue.  The fascia of the anterior abdominal wall was tented between perforating towel clips and a Veress needle was introduced.  After performing the saline drop test the abdomen was insufflated with carbon dioxide.  After  achieving satisfactory pneumoperitoneum the Veress needle was removed and an 11 mm optical cannula with a 5 mm 0 degree scope within was introduced.  After breaching the peritoneum the trocar portion was removed and the scope reintroduced.  There was no evidence of trauma to the viscera from Veress needle or port placement attention was turned to the right upper quadrant where the liver edge was completely obscured by adherent omentum.  Under laparoscopic direction an epigastric 5 mm port and 2 right subcostal 5 mm ports were placed.  The patient was placed in reverse Trendelenburg position and rolled to the left side.  An atraumatic grasper and Maryland dissector were introduced and Maryland dissector was used to find the plane of adhesions to the gallbladder.  Subsequently a perforating a toothed grasper was introduced via the lateral subcostal port this was used to grasp the fundus and directed cephalad.  This demonstrated adhesions to the gallbladder which were lysed using monopolar electrocautery and stripped down.  As the infundibulum of the gallbladder came into view this was grasped via the medial subcostal port and directed laterally.  This demonstrated extensive adhesions to the medial edge of the gallbladder and also large lymph node in the triangle of Jung.  The suction  was used to perform hydrodissection.  The tooth grasper was moved little bit lower in the gallbladder, unfortunately in the process the gallbladder tore and frankly purulent fluid leaked onto the field.  This was controlled with the suction  and the operation proceeded.  The 5 mm scope was exchanged for a 10 mm - 30 degree scope for better visualization of the field.  The suction  was used to define the lateral edge of the gallbladder and the junction with the neck of the gallbladder and the cystic duct.  This was carried around to the medial side where the dissection in the triangle of Calot defined the  medial edge of the cystic duct and a large vascular structure which was determined to be the right hepatic artery.  Further dissection showed a short cystic artery taking off from the right hepatic.  The gallbladder bed was visible and with the critical view established the cystic duct was skeletonized clipped and divided.  The cystic artery likewiseSkeletonized clipped and divided, and the gallbladder was peeled off of the right hepatic artery and this was performed for the most part with hydodissection with judicious use of monopolar electrocautery.  As the dissection proceeded the gallbladder began to tear and ultimately it was decided to proceed from a dome down approach, the fundus of the gallbladder was found to be partially intrahepatic making this quite tedious.  Due to the inflammation around the fundus of the gallbladder the plane between the gallbladder fossa and liver was not demonstrable, and there was some bleeding from the liver bed which was controlled with electrocautery.  Eventually the dome down and the proximal to distal dissection merged and the gallbladder was able to be moved out of the way.  The 10 mm 30 degree scope was exchanged for a 5 mm 0 degree scope which was introduced via the epigastric port and an Endo Catch bag introduced via the umbilical port.  The individual stones as well as the gallbladder were placed within the Endo Catch bag.  The scope was then moved back to the epigastric port and the field was irrigated with the suction .  Hemostasis from the gallbladder fossawas obtained using electrocautery  and a 19 Cambodian Amadeo drain was introduced via the lateral subcostal port, the tip directed into the gallbladder fossa pointing toward the omi hepatis.  This was connected to bulb suction and secured to the skin with a 2-0 nylon drain stitch.  The patient was returned to a neutral position.  The gallbladder was retrieved in the process of removing the 11 mm port in the  Endo Catch bag.  The pneumoperitoneum was allowed to dissipate and remaining ports were removed.  The fascia at the umbilical port site was repaired using interrupted figure-of-eight 0 Vicryl suture.  Generous amounts of local anesthetic were infiltrated in all the port sites.  The skin was closed using subcuticular 4-0 Monocryl followed by Exofin.  A drain sponge was applied to the right upper quadrant drain followed by an abdominal binder.  The patient made an uneventful recovery from his anesthetic was extubated in the operating room moved to his waiting stretcher and taken to the recovery room in good condition having tolerated the procedure well.   I was present for the entire procedure., A qualified resident physician was not available., and A physician assistant was required during the procedure for retraction, tissue handling, dissection and suturing.    Patient Disposition:  PACU  and extubated and stable             SIGNATURE: Rodolfo Walls MD  DATE: May 9, 2025  TIME: 12:17 PM

## 2025-05-09 NOTE — PROGRESS NOTES
Progress Note - Surgery-General   Name: Solo Mack 55 y.o. male I MRN: [id number]  Unit/Bed#: [place] [address] -01 I Date of Admission: 5/2/2025   Date of Service: 5/9/2025 I Hospital Day: 7     Assessment & Plan  Septic shock (HCC)  55 year old male p/w cholecystitis. Unable to perform scheduled cholecystectomy secondary to acute agitation.    Plan  - N.p.o.  - OR today for laparoscopic cholecystectomy  - IV fluids  - IV antibiotics  - Rest of care per primary team  Acute cholecystitis  See above plan      Subjective   Patient seen and examined at bedside.  Patient agreeable to OR today.  Patient declining abdominal exam. all questions and concerns were addressed    Objective :  Temp:  [98.4 °F (36.9 °C)-99.6 °F (37.6 °C)] 99.3 °F (37.4 °C)  HR:  [84-95] 92  BP: (124-144)/(83-97) 124/83  Resp:  [16-18] 18  SpO2:  [91 %-94 %] 91 %  O2 Device: None (Room air)    I/O         05/07 0701  05/08 0700 05/08 0701  05/09 0700 05/09 0701  05/10 0700    P.O. 360 100     I.V. (mL/kg) 10 (0.1) 10 (0.1)     Total Intake(mL/kg) 370 (3.8) 110 (1.1)     Urine (mL/kg/hr) 400 (0.2) 1100 (0.5) 250 (1.6)    Stool  0     Total Output 400 1100 250    Net -30 -990 -250           Unmeasured Urine Occurrence 2 x 4 x     Unmeasured Stool Occurrence  1 x             Physical Exam  Vitals and nursing note reviewed.   Abdominal:      Comments: Patient declined abdominal exam   Neurological:      Mental Status: He is alert.         Lab Results: I have reviewed the following results:  Recent Labs     05/08/25  0500 05/09/25  0508   WBC 8.30 8.31   HGB 14.1 14.7   HCT 41.9 43.9    305   BANDSPCT  --  2   SODIUM 136 133*   K 4.0 4.1    101   CO2 27 24   BUN 10 11   CREATININE 1.00 1.10   GLUC 149* 168*   MG  --  2.0   PHOS  --  2.3*   AST 28  --    ALT 22  --    ALB 3.3*  --    TBILI 0.41  --    ALKPHOS 82  --        Imaging Results Review: No pertinent imaging studies reviewed.  Other Study Results Review: No additional pertinent  studies reviewed.    VTE Pharmacologic Prophylaxis: VTE covered by:  enoxaparin, Subcutaneous, 40 mg at 05/09/25 0754     VTE Mechanical Prophylaxis: sequential compression device

## 2025-05-09 NOTE — ASSESSMENT & PLAN NOTE
55 year old male p/w cholecystitis. Unable to perform scheduled cholecystectomy secondary to acute agitation.    Plan  - N.p.o.  - OR today for laparoscopic cholecystectomy  - IV fluids  - IV antibiotics  - Rest of care per primary team

## 2025-05-09 NOTE — PROGRESS NOTES
Progress Note - Hospitalist   Name: Solo Mack 55 y.o. male I MRN: [id number]  Unit/Bed#: OR POOL I Date of Admission: 5/2/2025   Date of Service: 5/9/2025 I Hospital Day: 7    Assessment & Plan  Septic shock (HCC)  Pt was transferred from  to [facility] icu due to profound septic shock with imagining demonstrating gallstones, ruq tenderness, and profound leukocytosis  Currently treated with cefepime/flagyl  On levophed which had been weaned to off  Pt was scheduled for or but decided later in evening decided he did not want to go forward with surgery   Surgery stated to continue conservative treatment with iv antibiotics and change to po at discharge for 2 weeks however he has increased RUQ abd pain  Appreciate gi recommendations- pt is not a candidate for stenting as it will impair surgery  Spoke with surgery who recommended perc. Miriam tube- he is not a good candidate for this given his psychiatric history   Pt has improved but continues to have ruq pain at times. Concern is that he will become septic again without intervention. Long discussions about the appropriate treatment for him given his complex psychosocial situation. It was concluded that cholecystectomy might be the safest mode of treatment. Pt is willing to go forward with surgery   Scheduled for surgery today     GERD (gastroesophageal reflux disease)  Continue ppi   Essential hypertension  Had been on clonidine and metoprolol outpt   Continue metoprolol po   Type 2 diabetes mellitus, without long-term current use of insulin (HCC)  Lab Results   Component Value Date    HGBA1C 8.3 (A) 03/17/2025       Recent Labs     05/08/25  1136 05/08/25  1719 05/08/25  2054 05/09/25  0617   POCGLU 201* 233* 212* 194*       Blood Sugar Average: Last 72 hrs:  (P) [id number]  Continue insulin sliding scale   May require lantus if blood glucose remains over 200  Schizophrenia, unspecified type (HCC)  Appreciate psychiatry recommendations  Continue klonopin  1mg bid prn  Continue zyprexa 10mg qhs  Psych eval when pt is medically cleared  Neuropsych deemed pt to not have capacity   He still believes that president peter tells him to do things and that he can see through walls   Abnormal TSH  Tsh 0.21  Free t4 1.3  Labs reflect hyperthyroid  Check thyroid antibody panel  Appreciate endocrine recommendations- possibly related to iv dye load.   Repeated tsh and free t4 are negative   No further treatment recommendations  Chronic kidney disease, stage 2 (mild)  Lab Results   Component Value Date    EGFR 74 05/09/2025    EGFR 83 05/08/2025    EGFR 83 05/07/2025    CREATININE 1.10 05/09/2025    CREATININE 1.00 05/08/2025    CREATININE 1.00 05/07/2025   [place]y r/o  Creatinine currently at baseline which is 0.9-1.2   H/O prolonged Q-T interval on ECG  Qtc has improved  Replace mg. Keep potassium greater than 4.0    Acute cholecystitis  Please see septic shock   Hypophosphatemia    Insomnia  continue melatonin  Continue zyprexa   Obesity (BMI 30-39.9)    CHF (congestive heart failure) ([facility])  Wt Readings from Last 3 Encounters:   05/07/25 98 kg (216 lb 0.8 oz)   05/02/25 100 kg (221 lb 5.5 oz)   03/17/25 106 kg (233 lb)     Monitor I/o and daily weights             VTE Pharmacologic Prophylaxis:   lovenox =    Mobility:   Basic Mobility Inpatient Raw Score: 24  JH-HLM Goal: 8: Walk 250 feet or more  JH-HLM Achieved: 8: Walk 250 feet ot more      Patient Centered Rounds:  discussed with his nurse       Education and Discussions with Family / Patient: wife aware of plan     Current Length of Stay: 7 day(s)  Current Patient Status: Inpatient     Discharge Plan: needs to have psych eval once medically stable     Code Status: Level 1 - Full Code    Subjective   Pt seen and examined this am at 8:15. He states he feels like his heart is racing and he is nauseated. He continues to have right upper quadrant abd pain.     Objective :  Temp:  [98.8 °F (37.1 °C)-99.6 °F (37.6 °C)] 99.3 °F (37.4  °C)  HR:  [92-95] 92  BP: (124-144)/(83-97) 124/83  Resp:  [16-18] 18  SpO2:  [91 %-94 %] 91 %  O2 Device: None (Room air)    Body mass index is 33.84 kg/m².     Input and Output Summary (last 24 hours):     Intake/Output Summary (Last 24 hours) at 5/9/2025 1208  Last data filed at 5/9/2025 1101  Gross per 24 hour   Intake 1460 ml   Output 1750 ml   Net -290 ml       Physical Exam  Constitutional:       Appearance: Normal appearance.   HENT:      Head: Normocephalic and atraumatic.   Eyes:      Extraocular Movements: Extraocular movements intact.      Pupils: Pupils are equal, round, and reactive to light.   Neurological:      Mental Status: He is alert and oriented to person, place, and time.         He declined rest of the exam    Lines/Drains:  Lines/Drains/Airways       Active Status       Name Placement date Placement time Site Days    Closed/Suction Drain RLQ Bulb 19 Fr. 05/09/25  1156  RLQ  less than 1                            Lab Results: I have reviewed the following results:   Results from last 7 days   Lab Units 05/09/25  0508 05/06/25  0501 05/05/25  0513   WBC Thousand/uL 8.31   < > 11.02*   HEMOGLOBIN g/dL 14.7   < > 12.5   HEMATOCRIT % 43.9   < > 35.3*   PLATELETS Thousands/uL 305   < > 209   BANDS PCT % 2  --   --    SEGS PCT %  --   --  83*   LYMPHO PCT % 36  --  7*   MONO PCT % 8  --  8   EOS PCT % 4  --  1    < > = values in this interval not displayed.     Results from last 7 days   Lab Units 05/09/25  0508 05/08/25  0500   SODIUM mmol/L 133* 136   POTASSIUM mmol/L 4.1 4.0   CHLORIDE mmol/L 101 101   CO2 mmol/L 24 27   BUN mg/dL 11 10   CREATININE mg/dL 1.10 1.00   ANION GAP mmol/L 8 8   CALCIUM mg/dL 8.6 8.7   ALBUMIN g/dL  --  3.3*   TOTAL BILIRUBIN mg/dL  --  0.41   ALK PHOS U/L  --  82   ALT U/L  --  22   AST U/L  --  28   GLUCOSE RANDOM mg/dL 168* 149*         Results from last 7 days   Lab Units 05/09/25  0617 05/08/25  2054 05/08/25  1719 05/08/25  1136 05/08/25  0909 05/08/25  0550  05/07/25  2124 05/07/25  1630 05/07/25  1157 05/07/25  1104 05/07/25  0728 05/06/25  2120   POC GLUCOSE mg/dl 194* 212* 233* 201* 170* 166* 184* 248* 170* 168* 257* 210*               Recent Cultures (last 7 days):         Imaging Results Review: No pertinent imaging studies reviewed.  Other Study Results Review: No additional pertinent studies reviewed.    Last 24 Hours Medication List:     Current Facility-Administered Medications:     [Transfer Hold] ceFEPime (MAXIPIME) 2,000 mg in dextrose 5 % 50 mL IVPB, Q12H, Last Rate: 2,000 mg (05/08/25 2234)    [Transfer Hold] clonazePAM (KlonoPIN) tablet 1 mg, BID PRN    [Transfer Hold] cyanocobalamin injection 1,000 mcg, Q30 Days    [Transfer Hold] enoxaparin (LOVENOX) subcutaneous injection 40 mg, Daily    [Transfer Hold] HYDROmorphone HCl (DILAUDID) injection 0.2 mg, Q4H PRN    [Transfer Hold] hydrOXYzine HCL (ATARAX) tablet 25 mg, Q6H PRN Max 4/day    [Transfer Hold] hydrOXYzine HCL (ATARAX) tablet 50 mg, Q6H PRN Max 4/day    [Transfer Hold] ibuprofen (MOTRIN) tablet 400 mg, Q6H PRN    [Transfer Hold] insulin lispro (HumALOG/ADMELOG) 100 units/mL subcutaneous injection 1-6 Units, TID AC **AND** Fingerstick Glucose (POCT), TID AC    [Transfer Hold] insulin lispro (HumALOG/ADMELOG) 100 units/mL subcutaneous injection 1-6 Units, HS    lactated ringers infusion, Continuous, Last Rate: 125 mL/hr (05/09/25 0759)    [Transfer Hold] LORazepam (ATIVAN) injection 1 mg, Q6H PRN Max 3/day    [Transfer Hold] LORazepam (ATIVAN) tablet 1 mg, Q6H PRN Max 3/day    [Transfer Hold] melatonin tablet 6 mg, HS    [Transfer Hold] metoprolol tartrate (LOPRESSOR) partial tablet 12.5 mg, Q12H ROBER    [Transfer Hold] metroNIDAZOLE (FLAGYL) IVPB (premix) 500 mg 100 mL, Q8H, Last Rate: Stopped (05/09/25 1002)    [Transfer Hold] nicotine polacrilex (NICORETTE) gum 2 mg, Q4H PRN    [Transfer Hold] OLANZapine (ZyPREXA) IM injection 10 mg, HS    [Transfer Hold] pantoprazole (PROTONIX) injection 40 mg,  Q24H ROBER    [Transfer Hold] polyethylene glycol (MIRALAX) packet 17 g, Daily PRN    [Transfer Hold] senna-docusate sodium (SENOKOT S) 8.6-50 mg per tablet 1 tablet, Daily PRN    sodium chloride 0.9 % irrigation solution, PRN    [Transfer Hold] trimethobenzamide (TIGAN) IM injection 200 mg, Q6H PRN    Facility-Administered Medications Ordered in Other Encounters:     albumin human (FLEXBUMIN) 5 % injection, Continuous PRN, Last Rate: Stopped (05/09/25 1046)    dexamethasone (PF) (DECADRON) injection, PRN    dexmedeTOMIDine (Precedex) 200 mcg in sodium chloride 0.9 % 50 mL infusion, Continuous PRN    fentaNYL injection, PRN    HYDROmorphone (DILAUDID) injection, PRN    lidocaine (PF) (XYLOCAINE-MPF) 1 % injection, PRN    midazolam (VERSED) injection, PRN    ondansetron (ZOFRAN) injection, PRN    phenylephrine (EMILY-SYNEPHRINE) 50 mg (STANDARD CONCENTRATION) in sodium chloride 0.9% 250 mL, Continuous PRN, Last Rate: 30 mcg/min (05/09/25 1206)    phenylephrine 1,000 mcg/10 mL prefilled syringe, PRN    propofol (DIPRIVAN) 200 MG/20ML bolus injection, PRN    ROCuronium (ZEMURON) injection, PRN    Succinylcholine Chloride 100 mg/5 mL syringe, PRN    Administrative Statements   Today, Patient Was Seen By: Tasha Joseph DO

## 2025-05-10 LAB
ANION GAP SERPL CALCULATED.3IONS-SCNC: 9 MMOL/L (ref 4–13)
BASOPHILS # BLD AUTO: 0.06 THOUSANDS/ÂΜL (ref 0–0.1)
BASOPHILS NFR BLD AUTO: 0 % (ref 0–1)
BUN SERPL-MCNC: 15 MG/DL (ref 5–25)
CALCIUM SERPL-MCNC: 8.5 MG/DL (ref 8.4–10.2)
CHLORIDE SERPL-SCNC: 99 MMOL/L (ref 96–108)
CO2 SERPL-SCNC: 25 MMOL/L (ref 21–32)
CREAT SERPL-MCNC: 1.22 MG/DL (ref 0.6–1.3)
EOSINOPHIL # BLD AUTO: 0.01 THOUSAND/ÂΜL (ref 0–0.61)
EOSINOPHIL NFR BLD AUTO: 0 % (ref 0–6)
ERYTHROCYTE [DISTWIDTH] IN BLOOD BY AUTOMATED COUNT: 12.3 % (ref 11.6–15.1)
GFR SERPL CREATININE-BSD FRML MDRD: 65 ML/MIN/1.73SQ M
GLUCOSE SERPL-MCNC: 255 MG/DL (ref 65–140)
GLUCOSE SERPL-MCNC: 281 MG/DL (ref 65–140)
GLUCOSE SERPL-MCNC: 291 MG/DL (ref 65–140)
GLUCOSE SERPL-MCNC: 355 MG/DL (ref 65–140)
HCT VFR BLD AUTO: 42 % (ref 36.5–49.3)
HCV AB SER QL: NORMAL
HGB BLD-MCNC: 13.7 G/DL (ref 12–17)
IMM GRANULOCYTES # BLD AUTO: 0.45 THOUSAND/UL (ref 0–0.2)
IMM GRANULOCYTES NFR BLD AUTO: 3 % (ref 0–2)
LYMPHOCYTES # BLD AUTO: 2.04 THOUSANDS/ÂΜL (ref 0.6–4.47)
LYMPHOCYTES NFR BLD AUTO: 13 % (ref 14–44)
MAGNESIUM SERPL-MCNC: 2 MG/DL (ref 1.9–2.7)
MCH RBC QN AUTO: 28.2 PG (ref 26.8–34.3)
MCHC RBC AUTO-ENTMCNC: 32.6 G/DL (ref 31.4–37.4)
MCV RBC AUTO: 86 FL (ref 82–98)
MONOCYTES # BLD AUTO: 1.57 THOUSAND/ÂΜL (ref 0.17–1.22)
MONOCYTES NFR BLD AUTO: 10 % (ref 4–12)
NEUTROPHILS # BLD AUTO: 12.1 THOUSANDS/ÂΜL (ref 1.85–7.62)
NEUTS SEG NFR BLD AUTO: 74 % (ref 43–75)
NRBC BLD AUTO-RTO: 0 /100 WBCS
PHOSPHATE SERPL-MCNC: 2.3 MG/DL (ref 2.7–4.5)
PLATELET # BLD AUTO: 367 THOUSANDS/UL (ref 149–390)
PMV BLD AUTO: 9.5 FL (ref 8.9–12.7)
POTASSIUM SERPL-SCNC: 4.2 MMOL/L (ref 3.5–5.3)
RBC # BLD AUTO: 4.86 MILLION/UL (ref 3.88–5.62)
SODIUM SERPL-SCNC: 133 MMOL/L (ref 135–147)
WBC # BLD AUTO: 16.23 THOUSAND/UL (ref 4.31–10.16)

## 2025-05-10 PROCEDURE — 84100 ASSAY OF PHOSPHORUS: CPT

## 2025-05-10 PROCEDURE — 99024 POSTOP FOLLOW-UP VISIT: CPT | Performed by: STUDENT IN AN ORGANIZED HEALTH CARE EDUCATION/TRAINING PROGRAM

## 2025-05-10 PROCEDURE — 99232 SBSQ HOSP IP/OBS MODERATE 35: CPT | Performed by: INTERNAL MEDICINE

## 2025-05-10 PROCEDURE — 83735 ASSAY OF MAGNESIUM: CPT

## 2025-05-10 PROCEDURE — 80048 BASIC METABOLIC PNL TOTAL CA: CPT

## 2025-05-10 PROCEDURE — 82948 REAGENT STRIP/BLOOD GLUCOSE: CPT

## 2025-05-10 PROCEDURE — 85025 COMPLETE CBC W/AUTO DIFF WBC: CPT

## 2025-05-10 PROCEDURE — 99233 SBSQ HOSP IP/OBS HIGH 50: CPT | Performed by: PSYCHIATRY & NEUROLOGY

## 2025-05-10 RX ORDER — PANTOPRAZOLE SODIUM 40 MG/1
40 TABLET, DELAYED RELEASE ORAL
Status: DISCONTINUED | OUTPATIENT
Start: 2025-05-11 | End: 2025-05-10

## 2025-05-10 RX ORDER — INSULIN GLARGINE 100 [IU]/ML
10 INJECTION, SOLUTION SUBCUTANEOUS
Status: DISCONTINUED | OUTPATIENT
Start: 2025-05-10 | End: 2025-05-11

## 2025-05-10 RX ORDER — PANTOPRAZOLE SODIUM 40 MG/1
40 TABLET, DELAYED RELEASE ORAL
Status: DISCONTINUED | OUTPATIENT
Start: 2025-05-11 | End: 2025-05-14 | Stop reason: HOSPADM

## 2025-05-10 RX ORDER — CLONIDINE HYDROCHLORIDE 0.1 MG/1
0.1 TABLET ORAL EVERY 12 HOURS SCHEDULED
Status: DISCONTINUED | OUTPATIENT
Start: 2025-05-10 | End: 2025-05-13

## 2025-05-10 RX ORDER — OLANZAPINE 5 MG/1
10 TABLET, FILM COATED ORAL
Status: DISCONTINUED | OUTPATIENT
Start: 2025-05-10 | End: 2025-05-14 | Stop reason: HOSPADM

## 2025-05-10 RX ADMIN — INSULIN LISPRO 3 UNITS: 100 INJECTION, SOLUTION INTRAVENOUS; SUBCUTANEOUS at 12:25

## 2025-05-10 RX ADMIN — INSULIN LISPRO 6 UNITS: 100 INJECTION, SOLUTION INTRAVENOUS; SUBCUTANEOUS at 17:16

## 2025-05-10 RX ADMIN — CLONAZEPAM 1 MG: 1 TABLET ORAL at 16:07

## 2025-05-10 RX ADMIN — CEFPODOXIME PROXETIL 400 MG: 200 TABLET, FILM COATED ORAL at 08:04

## 2025-05-10 RX ADMIN — CEFPODOXIME PROXETIL 400 MG: 200 TABLET, FILM COATED ORAL at 16:07

## 2025-05-10 RX ADMIN — CLONIDINE HYDROCHLORIDE 0.1 MG: 0.1 TABLET ORAL at 13:25

## 2025-05-10 RX ADMIN — Medication 12.5 MG: at 21:46

## 2025-05-10 RX ADMIN — OLANZAPINE 10 MG: 5 TABLET, FILM COATED ORAL at 21:45

## 2025-05-10 RX ADMIN — Medication 12.5 MG: at 08:05

## 2025-05-10 RX ADMIN — HYDROXYZINE HYDROCHLORIDE 50 MG: 25 TABLET, FILM COATED ORAL at 20:20

## 2025-05-10 RX ADMIN — CLONIDINE HYDROCHLORIDE 0.1 MG: 0.1 TABLET ORAL at 21:47

## 2025-05-10 RX ADMIN — OXYCODONE 5 MG: 5 TABLET ORAL at 20:18

## 2025-05-10 RX ADMIN — PANTOPRAZOLE SODIUM 40 MG: 40 INJECTION, POWDER, FOR SOLUTION INTRAVENOUS at 08:05

## 2025-05-10 RX ADMIN — METRONIDAZOLE 500 MG: 500 TABLET ORAL at 21:46

## 2025-05-10 RX ADMIN — METRONIDAZOLE 500 MG: 500 TABLET ORAL at 08:04

## 2025-05-10 RX ADMIN — INSULIN LISPRO 3 UNITS: 100 INJECTION, SOLUTION INTRAVENOUS; SUBCUTANEOUS at 08:09

## 2025-05-10 RX ADMIN — INSULIN GLARGINE 10 UNITS: 100 INJECTION, SOLUTION SUBCUTANEOUS at 21:47

## 2025-05-10 RX ADMIN — INSULIN LISPRO 4 UNITS: 100 INJECTION, SOLUTION INTRAVENOUS; SUBCUTANEOUS at 21:48

## 2025-05-10 RX ADMIN — ACETAMINOPHEN 325 MG: 325 TABLET, FILM COATED ORAL at 20:57

## 2025-05-10 RX ADMIN — Medication 6 MG: at 21:45

## 2025-05-10 RX ADMIN — ENOXAPARIN SODIUM 40 MG: 40 INJECTION SUBCUTANEOUS at 08:05

## 2025-05-10 NOTE — ASSESSMENT & PLAN NOTE
Lab Results   Component Value Date    EGFR 65 05/10/2025    EGFR 74 05/09/2025    EGFR 83 05/08/2025    CREATININE 1.22 05/10/2025    CREATININE 1.10 05/09/2025    CREATININE 1.00 05/08/2025   Chauncey r/o  Creatinine currently at baseline which is 0.9-1.2

## 2025-05-10 NOTE — ASSESSMENT & PLAN NOTE
Appreciate psychiatry recommendations  Continue klonopin 1mg bid prn  Continue zyprexa 10mg qhs  Psych eval when pt is medically cleared  Neuropsych deemed pt to not have capacity   Will have neuropsych eval again as pt seemed to understand about his gallbladder and surgery  Appreciate psych recommendations  He will require inpt psych- recommending 201 but he will require 302 if he still lacks capacity

## 2025-05-10 NOTE — PLAN OF CARE
Problem: PAIN - ADULT  Goal: Verbalizes/displays adequate comfort level or baseline comfort level  Description: Interventions:- Encourage patient to monitor pain and request assistance- Assess pain using appropriate pain scale- Administer analgesics based on type and severity of pain and evaluate response- Implement non-pharmacological measures as appropriate and evaluate response- Consider cultural and social influences on pain and pain management- Notify physician/advanced practitioner if interventions unsuccessful or patient reports new pain  Outcome: Progressing     Problem: INFECTION - ADULT  Goal: Absence or prevention of progression during hospitalization  Description: INTERVENTIONS:- Assess and monitor for signs and symptoms of infection- Monitor lab/diagnostic results- Monitor all insertion sites, i.e. indwelling lines, tubes, and drains- Monitor endotracheal if appropriate and nasal secretions for changes in amount and color- [place] appropriate cooling/warming therapies per order- Administer medications as ordered- Instruct and encourage patient and family to use good hand hygiene technique- Identify and instruct in appropriate isolation precautions for identified infection/condition  Outcome: Progressing  Goal: Absence of fever/infection during neutropenic period  Description: INTERVENTIONS:- Monitor WBC  Outcome: Progressing     Problem: SAFETY ADULT  Goal: Patient will remain free of falls  Description: INTERVENTIONS:- Educate patient/family on patient safety including physical limitations- Instruct patient to call for assistance with activity - Consult OT/PT to assist with strengthening/mobility - Keep Call bell within reach- Keep bed low and locked with side rails adjusted as appropriate- Keep care items and personal belongings within reach- Initiate and maintain comfort rounds- Make Fall Risk Sign visible to staff- Offer Toileting every 2 Hours, in advance of need- Initiate/Maintain alarm-  Obtain necessary fall risk management equipment- Apply yellow socks and bracelet for high fall risk patients- Consider moving patient to room near nurses station  Outcome: Progressing  Goal: Maintain or return to baseline ADL function  Description: INTERVENTIONS:-  Assess patient's ability to carry out ADLs; assess patient's baseline for ADL function and identify physical deficits which impact ability to perform ADLs (bathing, care of mouth/teeth, toileting, grooming, dressing, etc.)- Assess/evaluate cause of self-care deficits - Assess range of motion- Assess patient's mobility; develop plan if impaired- Assess patient's need for assistive devices and provide as appropriate- Encourage maximum independence but intervene and supervise when necessary- Involve family in performance of ADLs- Assess for home care needs following discharge - Consider OT consult to assist with ADL evaluation and planning for discharge- Provide patient education as appropriate  Outcome: Progressing  Goal: Maintains/Returns to pre admission functional level  Description: INTERVENTIONS:- Perform AM-PAC 6 Click Basic Mobility/ Daily Activity assessment daily.- Set and communicate daily mobility goal to care team and patient/family/caregiver. - Collaborate with rehabilitation services on mobility goals if consulted- Perform Range of Motion 3 times a day.- Reposition patient every 2 hours.- Dangle patient 3 times a day- Stand patient 3 times a day- Ambulate patient 3 times a day- Out of bed to chair 3 times a day - Out of bed for meals 3 times a day- Out of bed for toileting- Record patient progress and toleration of activity level   Outcome: Progressing     Problem: Knowledge Deficit  Goal: Patient/family/caregiver demonstrates understanding of disease process, treatment plan, medications, and discharge instructions  Description: Complete learning assessment and assess knowledge base.Interventions:- Provide teaching at level of understanding-  Provide teaching via preferred learning methods  Outcome: Progressing     Problem: NEUROSENSORY - ADULT  Goal: Achieves stable or improved neurological status  Description: INTERVENTIONS- Monitor and report changes in neurological status- Monitor vital signs such as temperature, blood pressure, glucose, and any other labs ordered - Initiate measures to prevent increased intracranial pressure- Monitor for seizure activity and implement precautions if appropriate    Outcome: Progressing  Goal: Achieves maximal functionality and self care  Description: INTERVENTIONS- Monitor swallowing and airway patency with patient fatigue and changes in neurological status- Encourage and assist patient to increase activity and self care. - Encourage visually impaired, hearing impaired and aphasic patients to use assistive/communication devices  Outcome: Progressing     Problem: CARDIOVASCULAR - ADULT  Goal: Maintains optimal cardiac output and hemodynamic stability  Description: INTERVENTIONS:- Monitor I/O, vital signs and rhythm- Monitor for S/S and trends of decreased cardiac output- Administer and titrate ordered vasoactive medications to optimize hemodynamic stability- Assess quality of pulses, skin color and temperature- Assess for signs of decreased coronary artery perfusion- Instruct patient to report change in severity of symptoms  Outcome: Progressing  Goal: Absence of cardiac dysrhythmias or at baseline rhythm  Description: INTERVENTIONS:- Continuous cardiac monitoring, vital signs, obtain 12 lead EKG if ordered- Administer antiarrhythmic and heart rate control medications as ordered- Monitor electrolytes and administer replacement therapy as ordered  Outcome: Progressing     Problem: GASTROINTESTINAL - ADULT  Goal: Minimal or absence of nausea and/or vomiting  Description: INTERVENTIONS:- Administer IV fluids if ordered to ensure adequate hydration- Maintain NPO status until nausea and vomiting are resolved- Nasogastric  tube if ordered- Administer ordered antiemetic medications as needed- Provide nonpharmacologic comfort measures as appropriate- Advance diet as tolerated, if ordered- Consider nutrition services referral to assist patient with adequate nutrition and appropriate food choices  Outcome: Progressing  Goal: Maintains or returns to baseline bowel function  Description: INTERVENTIONS:- Assess bowel function- Encourage oral fluids to ensure adequate hydration- Administer IV fluids if ordered to ensure adequate hydration- Administer ordered medications as needed- Encourage mobilization and activity- Consider nutritional services referral to assist patient with adequate nutrition and appropriate food choices  Outcome: Progressing  Goal: Maintains adequate nutritional intake  Description: INTERVENTIONS:- Monitor percentage of each meal consumed- Identify factors contributing to decreased intake, treat as appropriate- Assist with meals as needed- Monitor I&O, weight, and lab values if indicated- Obtain nutrition services referral as needed  Outcome: Progressing  Goal: Oral mucous membranes remain intact  Description: INTERVENTIONS- Assess oral mucosa and hygiene practices- Implement preventative oral hygiene regimen- Implement oral medicated treatments as ordered- Initiate Nutrition services referral as needed  Outcome: Progressing     Problem: GENITOURINARY - ADULT  Goal: Maintains or returns to baseline urinary function  Description: INTERVENTIONS:- Assess urinary function- Encourage oral fluids to ensure adequate hydration if ordered- Administer IV fluids as ordered to ensure adequate hydration- Administer ordered medications as needed- Offer frequent toileting- Follow urinary retention protocol if ordered  Outcome: Progressing  Goal: Absence of urinary retention  Description: INTERVENTIONS:- Assess patient’s ability to void and empty bladder- Monitor I/O- Bladder scan as needed- Discuss with physician/AP medications to  alleviate retention as needed- Discuss catheterization for long term situations as appropriate  Outcome: Progressing     Problem: METABOLIC, FLUID AND ELECTROLYTES - ADULT  Goal: Electrolytes maintained within normal limits  Description: INTERVENTIONS:- Monitor labs and assess patient for signs and symptoms of electrolyte imbalances- Administer electrolyte replacement as ordered- Monitor response to electrolyte replacements, including repeat lab results as appropriate- Instruct patient on fluid and nutrition as appropriate  Outcome: Progressing  Goal: Fluid balance maintained  Description: INTERVENTIONS:- Monitor labs - Monitor I/O and WT- Instruct patient on fluid and nutrition as appropriate- Assess for signs & symptoms of volume excess or deficit  Outcome: Progressing  Goal: Glucose maintained within target range  Description: INTERVENTIONS:- Monitor Blood Glucose as ordered- Assess for signs and symptoms of hyperglycemia and hypoglycemia- Administer ordered medications to maintain glucose within target range- Assess nutritional intake and initiate nutrition service referral as needed  Outcome: Progressing     Problem: HEMATOLOGIC - ADULT  Goal: Maintains hematologic stability  Description: INTERVENTIONS- Assess for signs and symptoms of bleeding or hemorrhage- Monitor labs- Administer supportive blood products/factors as ordered and appropriate  Outcome: Progressing     Problem: MUSCULOSKELETAL - ADULT  Goal: Maintain or return mobility to safest level of function  Description: INTERVENTIONS:- Assess patient's ability to carry out ADLs; assess patient's baseline for ADL function and identify physical deficits which impact ability to perform ADLs (bathing, care of mouth/teeth, toileting, grooming, dressing, etc.)- Assess/evaluate cause of self-care deficits - Assess range of motion- Assess patient's mobility- Assess patient's need for assistive devices and provide as appropriate- Encourage maximum independence  but intervene and supervise when necessary- Involve family in performance of ADLs- Assess for home care needs following discharge - Consider OT consult to assist with ADL evaluation and planning for discharge- Provide patient education as appropriate  Outcome: Progressing  Goal: Maintain proper alignment of affected body part  Description: INTERVENTIONS:- Support, maintain and protect limb and body alignment- Provide patient/ family with appropriate education  Outcome: Progressing     Problem: SELF CARE DEFICIT  Goal: Return ADL status to a safe level of function  Description: INTERVENTIONS:- Administer medication as ordered- Assess ADL deficits and provide assistive devices as needed- Obtain PT/OT consults as needed- Assist and instruct patient to increase activity and self care as tolerated  Outcome: Progressing     Problem: Prexisting or High Potential for Compromised Skin Integrity  Goal: Skin integrity is maintained or improved  Description: INTERVENTIONS:- Identify patients at risk for skin breakdown- Assess and monitor skin integrity- Assess and monitor nutrition and hydration status- Monitor labs - Assess for incontinence - Turn and reposition patient- Assist with mobility/ambulation- Relieve pressure over bony prominences- Avoid friction and shearing- Provide appropriate hygiene as needed including keeping skin clean and dry- Evaluate need for skin moisturizer/barrier cream- Collaborate with interdisciplinary team - Patient/family teaching- Consider wound care consult   Outcome: Progressing     Problem: Impaired Gas Exchange  Goal: Optimize oxygenation and ensure adequate ventilation  Description: INTERVENTIONS: Monitor for signs and symptoms of respiratory distress              - Elevate HOB or use high fowlers to promote lung expansion              - Administer oxygen as ordered to maintain adequate oxygenation              - Encourage use of IS to promote lung expansion and prevent PN              -  Monitor ABGs to assess oxygenation status              - Monitor blood oxygen level to maintain adequate oxygenation              - Encourage cough and deep breathing exercises to promote lung expansion              - Monitor patient's mental status for increased confusion  Outcome: Progressing     Problem: Excess Fluid Volume  Goal: Patient is able to achieve and maintain homeostasis  Description: INTERVENTIONS: Monitor for sign and symptoms of fluid overload- Evaluate LE edema every shift- Elevate LE to prevent dependent edema- Apply SUKUMAR stockings as ordered - Monitor ankle circumference daily- Assess for jugular vein distention- Evaluate provider orders for the CHF diuretic algorithm. Administer diuretics as ordered- Weigh the patient daily at 0600 and report a weight gain of five pounds or more - Strict intake and output- Monitor fluid intake and adhere to fluid restrictions- Assess lung sounds every shift and as needed- Monitor vital signs and lab values (CBC, chem, BUN, BNP)- Measure and document urine output  Outcome: Progressing     Problem: Activity Intolerance  Goal: Patient is able to perform activities within their limitations  Description: INTERVENTIONS:                     -   Alternate periods of activity with periods of rest               -   Patients is able to maintain normal vitals heart rhythm during activity               -   Gradually increase activity and exercise as patient can tolerate               -   Monitor blood pressure and heart before and after exercise                -   Monitor blood oxygen saturation during activity and apply oxygen as needed  Outcome: Progressing     Problem: Knowledge Deficit  Goal: Patient is able to verbalize understanding of Heart Failure after education  Description: INTERVENTIONS:- Educate the patient and family on signs and symptoms of HF- Provide the patient with HF education and HF zone tool- Educate on the importance of daily weight in the AM and  reporting a weight gain             of 3 or more pounds to their primary care physician- Monitor for SOB- Maintain and sodium and fluid restriction- Educate the patient on the importance of medications such as: diuretics, betablockers,             antiarrhythmics and their purpose, dose, route, side effects and labs             if they are needed  Outcome: Progressing

## 2025-05-10 NOTE — PLAN OF CARE
Problem: PAIN - ADULT  Goal: Verbalizes/displays adequate comfort level or baseline comfort level  Description: Interventions:- Encourage patient to monitor pain and request assistance- Assess pain using appropriate pain scale- Administer analgesics based on type and severity of pain and evaluate response- Implement non-pharmacological measures as appropriate and evaluate response- Consider cultural and social influences on pain and pain management- Notify physician/advanced practitioner if interventions unsuccessful or patient reports new pain  Outcome: Progressing     Problem: INFECTION - ADULT  Goal: Absence or prevention of progression during hospitalization  Description: INTERVENTIONS:- Assess and monitor for signs and symptoms of infection- Monitor lab/diagnostic results- Monitor all insertion sites, i.e. indwelling lines, tubes, and drains- Monitor endotracheal if appropriate and nasal secretions for changes in amount and color- [place] appropriate cooling/warming therapies per order- Administer medications as ordered- Instruct and encourage patient and family to use good hand hygiene technique- Identify and instruct in appropriate isolation precautions for identified infection/condition  Outcome: Progressing  Goal: Absence of fever/infection during neutropenic period  Description: INTERVENTIONS:- Monitor WBC  Outcome: Progressing     Problem: SAFETY ADULT  Goal: Patient will remain free of falls  Description: INTERVENTIONS:- Educate patient/family on patient safety including physical limitations- Instruct patient to call for assistance with activity - Consult OT/PT to assist with strengthening/mobility - Keep Call bell within reach- Keep bed low and locked with side rails adjusted as appropriate- Keep care items and personal belongings within reach- Initiate and maintain comfort rounds- Make Fall Risk Sign visible to staff- Offer Toileting every 2 Hours, in advance of need- Initiate/Maintain alarm-  Obtain necessary fall risk management equipment- Apply yellow socks and bracelet for high fall risk patients- Consider moving patient to room near nurses station  Outcome: Progressing  Goal: Maintain or return to baseline ADL function  Description: INTERVENTIONS:-  Assess patient's ability to carry out ADLs; assess patient's baseline for ADL function and identify physical deficits which impact ability to perform ADLs (bathing, care of mouth/teeth, toileting, grooming, dressing, etc.)- Assess/evaluate cause of self-care deficits - Assess range of motion- Assess patient's mobility; develop plan if impaired- Assess patient's need for assistive devices and provide as appropriate- Encourage maximum independence but intervene and supervise when necessary- Involve family in performance of ADLs- Assess for home care needs following discharge - Consider OT consult to assist with ADL evaluation and planning for discharge- Provide patient education as appropriate  Outcome: Progressing  Goal: Maintains/Returns to pre admission functional level  Description: INTERVENTIONS:- Perform AM-PAC 6 Click Basic Mobility/ Daily Activity assessment daily.- Set and communicate daily mobility goal to care team and patient/family/caregiver. - Collaborate with rehabilitation services on mobility goals if consulted- Perform Range of Motion 3 times a day.- Reposition patient every 2 hours.- Dangle patient 3 times a day- Stand patient 3 times a day- Ambulate patient 3 times a day- Out of bed to chair 3 times a day - Out of bed for meals 3 times a day- Out of bed for toileting- Record patient progress and toleration of activity level   Outcome: Progressing     Problem: DISCHARGE PLANNING  Goal: Discharge to home or other facility with appropriate resources  Description: INTERVENTIONS:- Identify barriers to discharge w/patient and caregiver- Arrange for needed discharge resources and transportation as appropriate- Identify discharge learning needs  (meds, wound care, etc.)- Arrange for interpretive services to assist at discharge as needed- Refer to Case Management Department for coordinating discharge planning if the patient needs post-hospital services based on physician/advanced practitioner order or complex needs related to functional status, cognitive ability, or social support system  Outcome: Progressing     Problem: Knowledge Deficit  Goal: Patient/family/caregiver demonstrates understanding of disease process, treatment plan, medications, and discharge instructions  Description: Complete learning assessment and assess knowledge base.Interventions:- Provide teaching at level of understanding- Provide teaching via preferred learning methods  Outcome: Progressing     Problem: NEUROSENSORY - ADULT  Goal: Achieves stable or improved neurological status  Description: INTERVENTIONS- Monitor and report changes in neurological status- Monitor vital signs such as temperature, blood pressure, glucose, and any other labs ordered - Initiate measures to prevent increased intracranial pressure- Monitor for seizure activity and implement precautions if appropriate    Outcome: Progressing  Goal: Achieves maximal functionality and self care  Description: INTERVENTIONS- Monitor swallowing and airway patency with patient fatigue and changes in neurological status- Encourage and assist patient to increase activity and self care. - Encourage visually impaired, hearing impaired and aphasic patients to use assistive/communication devices  Outcome: Progressing     Problem: CARDIOVASCULAR - ADULT  Goal: Maintains optimal cardiac output and hemodynamic stability  Description: INTERVENTIONS:- Monitor I/O, vital signs and rhythm- Monitor for S/S and trends of decreased cardiac output- Administer and titrate ordered vasoactive medications to optimize hemodynamic stability- Assess quality of pulses, skin color and temperature- Assess for signs of decreased coronary artery  perfusion- Instruct patient to report change in severity of symptoms  Outcome: Progressing  Goal: Absence of cardiac dysrhythmias or at baseline rhythm  Description: INTERVENTIONS:- Continuous cardiac monitoring, vital signs, obtain 12 lead EKG if ordered- Administer antiarrhythmic and heart rate control medications as ordered- Monitor electrolytes and administer replacement therapy as ordered  Outcome: Progressing     Problem: RESPIRATORY - ADULT  Goal: Achieves optimal ventilation and oxygenation  Description: INTERVENTIONS:- Assess for changes in respiratory status- Assess for changes in mentation and behavior- Position to facilitate oxygenation and minimize respiratory effort- Oxygen administered by appropriate delivery if ordered- Initiate smoking cessation education as indicated- Encourage broncho-pulmonary hygiene including cough, deep breathe, Incentive Spirometry- Assess the need for suctioning and aspirate as needed- Assess and instruct to report SOB or any respiratory difficulty- Respiratory Therapy support as indicated  Outcome: Progressing     Problem: GASTROINTESTINAL - ADULT  Goal: Minimal or absence of nausea and/or vomiting  Description: INTERVENTIONS:- Administer IV fluids if ordered to ensure adequate hydration- Maintain NPO status until nausea and vomiting are resolved- Nasogastric tube if ordered- Administer ordered antiemetic medications as needed- Provide nonpharmacologic comfort measures as appropriate- Advance diet as tolerated, if ordered- Consider nutrition services referral to assist patient with adequate nutrition and appropriate food choices  Outcome: Progressing  Goal: Maintains or returns to baseline bowel function  Description: INTERVENTIONS:- Assess bowel function- Encourage oral fluids to ensure adequate hydration- Administer IV fluids if ordered to ensure adequate hydration- Administer ordered medications as needed- Encourage mobilization and activity- Consider nutritional  services referral to assist patient with adequate nutrition and appropriate food choices  Outcome: Progressing  Goal: Maintains adequate nutritional intake  Description: INTERVENTIONS:- Monitor percentage of each meal consumed- Identify factors contributing to decreased intake, treat as appropriate- Assist with meals as needed- Monitor I&O, weight, and lab values if indicated- Obtain nutrition services referral as needed  Outcome: Progressing  Goal: Oral mucous membranes remain intact  Description: INTERVENTIONS- Assess oral mucosa and hygiene practices- Implement preventative oral hygiene regimen- Implement oral medicated treatments as ordered- Initiate Nutrition services referral as needed  Outcome: Progressing     Problem: GENITOURINARY - ADULT  Goal: Maintains or returns to baseline urinary function  Description: INTERVENTIONS:- Assess urinary function- Encourage oral fluids to ensure adequate hydration if ordered- Administer IV fluids as ordered to ensure adequate hydration- Administer ordered medications as needed- Offer frequent toileting- Follow urinary retention protocol if ordered  Outcome: Progressing  Goal: Absence of urinary retention  Description: INTERVENTIONS:- Assess patient’s ability to void and empty bladder- Monitor I/O- Bladder scan as needed- Discuss with physician/AP medications to alleviate retention as needed- Discuss catheterization for long term situations as appropriate  Outcome: Progressing     Problem: METABOLIC, FLUID AND ELECTROLYTES - ADULT  Goal: Electrolytes maintained within normal limits  Description: INTERVENTIONS:- Monitor labs and assess patient for signs and symptoms of electrolyte imbalances- Administer electrolyte replacement as ordered- Monitor response to electrolyte replacements, including repeat lab results as appropriate- Instruct patient on fluid and nutrition as appropriate  Outcome: Progressing  Goal: Fluid balance maintained  Description: INTERVENTIONS:- Monitor  labs - Monitor I/O and WT- Instruct patient on fluid and nutrition as appropriate- Assess for signs & symptoms of volume excess or deficit  Outcome: Progressing  Goal: Glucose maintained within target range  Description: INTERVENTIONS:- Monitor Blood Glucose as ordered- Assess for signs and symptoms of hyperglycemia and hypoglycemia- Administer ordered medications to maintain glucose within target range- Assess nutritional intake and initiate nutrition service referral as needed  Outcome: Progressing     Problem: SKIN/TISSUE INTEGRITY - ADULT  Goal: Skin Integrity remains intact(Skin Breakdown Prevention)  Description: Assess:-Perform Jag assessment-Clean and moisturize skin-Inspect skin when repositioning, toileting, and assisting with ADLS-Assess under medical devices-Assess extremities for adequate circulation and sensation Bed Management:-Have minimal linens on bed & keep smooth, unwrinkled-Change linens as needed when moist or perspiring-Avoid sitting or lying in one position for more than 2 hours while in bed-Keep HOB at 45 degrees Toileting:-Offer bedside commode-Assess for incontinence-Use incontinent care products after each incontinent episode Activity:-Mobilize patient 3 times a day-Encourage activity and walks on unit-Encourage or provide ROM exercises -Turn and reposition patient every 2 Hours-Use appropriate equipment to lift or move patient in bed-Instruct/ Assist with weight shifting when out of bed in chair-Consider limitation of chair time 2 hour intervalsSkin Care:-Avoid use of baby powder, tape, friction and shearing, hot water or constrictive clothing-Relieve pressure over bony prominences-Do not massage red bony areasNext Steps:-Teach patient strategies to minimize risks -Consider consults to  interdisciplinary teams  Outcome: Progressing     Problem: HEMATOLOGIC - ADULT  Goal: Maintains hematologic stability  Description: INTERVENTIONS- Assess for signs and symptoms of bleeding or  hemorrhage- Monitor labs- Administer supportive blood products/factors as ordered and appropriate  Outcome: Progressing     Problem: MUSCULOSKELETAL - ADULT  Goal: Maintain or return mobility to safest level of function  Description: INTERVENTIONS:- Assess patient's ability to carry out ADLs; assess patient's baseline for ADL function and identify physical deficits which impact ability to perform ADLs (bathing, care of mouth/teeth, toileting, grooming, dressing, etc.)- Assess/evaluate cause of self-care deficits - Assess range of motion- Assess patient's mobility- Assess patient's need for assistive devices and provide as appropriate- Encourage maximum independence but intervene and supervise when necessary- Involve family in performance of ADLs- Assess for home care needs following discharge - Consider OT consult to assist with ADL evaluation and planning for discharge- Provide patient education as appropriate  Outcome: Progressing  Goal: Maintain proper alignment of affected body part  Description: INTERVENTIONS:- Support, maintain and protect limb and body alignment- Provide patient/ family with appropriate education  Outcome: Progressing     Problem: PSYCHOSIS  Goal: Will report no hallucinations or delusions  Description: Interventions:- Administer medication as  ordered- Every waking shifts and PRN assess for the presence of hallucinations and or delusions- Assist with reality testing to support increasing orientation- Assess if patient's hallucinations or delusions are encouraging self-harm or harm to others and intervene as appropriate  Outcome: Progressing     Problem: ANXIETY  Goal: Will report anxiety at manageable levels  Description: INTERVENTIONS:- Administer medication as ordered- Teach and encourage coping skills- Provide emotional support- Assess patient/family for anxiety and ability to cope  Outcome: Progressing  Goal: By discharge: Patient will verbalize 2 strategies to deal with  anxiety  Description: Interventions:- Identify any obvious source/trigger to anxiety- Staff will assist patient in applying identified coping technique/skills- Encourage attendance of scheduled groups and activities  Outcome: Progressing     Problem: SELF CARE DEFICIT  Goal: Return ADL status to a safe level of function  Description: INTERVENTIONS:- Administer medication as ordered- Assess ADL deficits and provide assistive devices as needed- Obtain PT/OT consults as needed- Assist and instruct patient to increase activity and self care as tolerated  Outcome: Progressing     Problem: Prexisting or High Potential for Compromised Skin Integrity  Goal: Skin integrity is maintained or improved  Description: INTERVENTIONS:- Identify patients at risk for skin breakdown- Assess and monitor skin integrity- Assess and monitor nutrition and hydration status- Monitor labs - Assess for incontinence - Turn and reposition patient- Assist with mobility/ambulation- Relieve pressure over bony prominences- Avoid friction and shearing- Provide appropriate hygiene as needed including keeping skin clean and dry- Evaluate need for skin moisturizer/barrier cream- Collaborate with interdisciplinary team - Patient/family teaching- Consider wound care consult   Outcome: Progressing     Problem: Nutrition/Hydration-ADULT  Goal: Nutrient/Hydration intake appropriate for improving, restoring or maintaining nutritional needs  Description: Monitor and assess patient's nutrition/hydration status for malnutrition. Collaborate with interdisciplinary team and initiate plan and interventions as ordered.  Monitor patient's weight and dietary intake as ordered or per policy. Utilize nutrition screening tool and intervene as necessary. Determine patient's food preferences and provide high-protein, high-caloric foods as appropriate. INTERVENTIONS:- Monitor oral intake, urinary output, labs, and treatment plans- Assess nutrition and hydration status and  recommend course of action- Evaluate amount of meals eaten- Assist patient with eating if necessary - Allow adequate time for meals- Recommend/ encourage appropriate diets, oral nutritional supplements, and vitamin/mineral supplements- Order, calculate, and assess calorie counts as needed- Recommend, monitor, and adjust tube feedings and TPN/PPN based on assessed needs- Assess need for intravenous fluids- Provide specific nutrition/hydration education as appropriate- Include patient/family/caregiver in decisions related to nutrition  Monitor and assess patient's nutrition/hydration status for malnutrition. Collaborate with interdisciplinary team and initiate plan and interventions as ordered.  Monitor patient's weight and dietary intake as ordered or per policy. Utilize nutrition screening tool and intervene as necessary. Determine patient's food preferences and provide high-protein, high-caloric foods as appropriate. INTERVENTIONS:- Monitor oral intake, urinary output, labs, and treatment plans- Assess nutrition and hydration status and recommend course of action- Evaluate amount of meals eaten- Assist patient with eating if necessary - Allow adequate time for meals- Recommend/ encourage appropriate diets, oral nutritional supplements, and vitamin/mineral supplements- Order, calculate, and assess calorie counts as needed- Recommend, monitor, and adjust tube feedings and TPN/PPN based on assessed needs- Assess need for intravenous fluids- Provide specific nutrition/hydration education as appropriate- Include patient/family/caregiver in decisions related to nutrition  Outcome: Progressing     Problem: Decreased Cardiac Output  Goal: Cardiac output adequate for individual needs  Description: INTERVENTIONS: Monitor for signs and symptoms of decreased cardiac output - Monitor for dyspnea with exertion and at rest- Monitor for orthopnea- Monitor for signs of tachycardia. Place patient on telemetry monitoring.- Assess  patient for jugular vein distention- Assess patient for lower extremity edema and poor peripheral perfusion - Auscultate lung sound for Fine bibasilar crackles - Monitor for cardiac arrythmias - Administer beta blockers, antiarrhythmic, and blood pressure medications as ordered  Outcome: Progressing     Problem: Impaired Gas Exchange  Goal: Optimize oxygenation and ensure adequate ventilation  Description: INTERVENTIONS: Monitor for signs and symptoms of respiratory distress              - Elevate HOB or use high fowlers to promote lung expansion              - Administer oxygen as ordered to maintain adequate oxygenation              - Encourage use of IS to promote lung expansion and prevent PN              - Monitor ABGs to assess oxygenation status              - Monitor blood oxygen level to maintain adequate oxygenation              - Encourage cough and deep breathing exercises to promote lung expansion              - Monitor patient's mental status for increased confusion  Outcome: Progressing     Problem: Excess Fluid Volume  Goal: Patient is able to achieve and maintain homeostasis  Description: INTERVENTIONS: Monitor for sign and symptoms of fluid overload- Evaluate LE edema every shift- Elevate LE to prevent dependent edema- Apply SUKUMAR stockings as ordered - Monitor ankle circumference daily- Assess for jugular vein distention- Evaluate provider orders for the CHF diuretic algorithm. Administer diuretics as ordered- Weigh the patient daily at 0600 and report a weight gain of five pounds or more - Strict intake and output- Monitor fluid intake and adhere to fluid restrictions- Assess lung sounds every shift and as needed- Monitor vital signs and lab values (CBC, chem, BUN, BNP)- Measure and document urine output  Outcome: Progressing     Problem: Activity Intolerance  Goal: Patient is able to perform activities within their limitations  Description: INTERVENTIONS:                     -   Alternate periods  of activity with periods of rest               -   Patients is able to maintain normal vitals heart rhythm during activity               -   Gradually increase activity and exercise as patient can tolerate               -   Monitor blood pressure and heart before and after exercise                -   Monitor blood oxygen saturation during activity and apply oxygen as needed  Outcome: Progressing     Problem: Knowledge Deficit  Goal: Patient is able to verbalize understanding of Heart Failure after education  Description: INTERVENTIONS:- Educate the patient and family on signs and symptoms of HF- Provide the patient with HF education and HF zone tool- Educate on the importance of daily weight in the AM and reporting a weight gain             of 3 or more pounds to their primary care physician- Monitor for SOB- Maintain and sodium and fluid restriction- Educate the patient on the importance of medications such as: diuretics, betablockers,             antiarrhythmics and their purpose, dose, route, side effects and labs             if they are needed  Outcome: Progressing

## 2025-05-10 NOTE — ASSESSMENT & PLAN NOTE
Pt was transferred from  to [facility] icu due to profound septic shock with imagining demonstrating gallstones, ruq tenderness, and profound leukocytosis  Currently treated with cefepime/flagyl  On levophed which had been weaned to off  Pt was scheduled for or but decided later in evening decided he did not want to go forward with surgery   Surgery stated to continue conservative treatment with iv antibiotics and change to po at discharge for 2 weeks however he has increased RUQ abd pain  Appreciate gi recommendations- pt is not a candidate for stenting as it will impair surgery  Spoke with surgery who recommended perc. Miriam tube- he is not a good candidate for this given his psychiatric history   Pt has improved but continues to have ruq pain at times. Concern is that he will become septic again without intervention. Long discussions about the appropriate treatment for him given his complex psychosocial situation. It was concluded that cholecystectomy might be the safest mode of treatment. Pt is willing to go forward with surgery   S/p lap cholecystectomy with kimberly drain in place. Stones were removed. Doubt needs further imagining but will discuss with gi

## 2025-05-10 NOTE — ASSESSMENT & PLAN NOTE
Lab Results   Component Value Date    HGBA1C 8.3 (A) 03/17/2025       Recent Labs     05/09/25  1350 05/09/25  1601 05/09/25  2125 05/10/25  1201   POCGLU 255* 249* 248* 255*       Blood Sugar Average: Last 72 hrs:  (P) 215.9327

## 2025-05-10 NOTE — PROGRESS NOTES
The pantoprazole has been converted to Oral per [facility] IV-to-PO Auto-Conversion Protocol for Adults as approved by the Pharmacy and Therapeutics Committee. The patient met all eligible criteria:  1) Age = 18 years old   2) Received at least one dose of the IV form   3) Receiving at least one other scheduled oral/enteral medication   4) Tolerating an oral/enteral diet   and did not have any exclusions:   1) Critical care patient   2) Active GI bleed (IF assessing H2RAs or PPIs)   3) Continuous tube feeding (IF assessing cipro, doxycycline, levofloxacin, minocycline, rifampin, or voriconazole)   4) Receiving PO vancomycin (IF assessing metronidazole)   5) Persistent nausea and/or vomiting   6) Ileus or gastrointestinal obstruction   7) Marcellus/nasogastric tube set for continuous suction   8) Specific order not to automatically convert to PO (in the order's comments or if discussed in the most recent Infectious Disease or primary team's progress notes).     Jeane Middleton, PharmD, BCCCP  Critical Care and Internal Medicine Clinical Pharmacist  [phone] or via Secure Chat

## 2025-05-10 NOTE — ASSESSMENT & PLAN NOTE
Wt Readings from Last 3 Encounters:   05/07/25 98 kg (216 lb 0.8 oz)   05/02/25 100 kg (221 lb 5.5 oz)   03/17/25 106 kg (233 lb)

## 2025-05-10 NOTE — PROGRESS NOTES
Progress Note - Hospitalist   Name: oSlo Mack 55 y.o. male I MRN: [id number]  Unit/Bed#: [place] [address] -01 I Date of Admission: 5/2/2025   Date of Service: 5/10/2025 I Hospital Day: 8    Assessment & Plan  Septic shock ([facility])  Pt was transferred from  to [facility] icu due to profound septic shock with imagining demonstrating gallstones, ruq tenderness, and profound leukocytosis  Currently treated with cefepime/flagyl  On levophed which had been weaned to off  Pt was scheduled for or but decided later in evening decided he did not want to go forward with surgery   Surgery stated to continue conservative treatment with iv antibiotics and change to po at discharge for 2 weeks however he has increased RUQ abd pain  Appreciate gi recommendations- pt is not a candidate for stenting as it will impair surgery  Spoke with surgery who recommended perc. Miriam tube- he is not a good candidate for this given his psychiatric history   Pt has improved but continues to have ruq pain at times. Concern is that he will become septic again without intervention. Long discussions about the appropriate treatment for him given his complex psychosocial situation. It was concluded that cholecystectomy might be the safest mode of treatment. Pt is willing to go forward with surgery   S/p lap cholecystectomy with kimberly drain in place. Stones were removed. Doubt needs further imagining but will discuss with gi     GERD (gastroesophageal reflux disease)  Continue ppi   Essential hypertension  Had been on clonidine and metoprolol outpt   Continue metoprolol po  Restart clonidine   Type 2 diabetes mellitus, without long-term current use of insulin ([facility])  Lab Results   Component Value Date    HGBA1C 8.3 (A) 03/17/2025       Recent Labs     05/09/25  1350 05/09/25  1601 05/09/25  2125 05/10/25  1201   POCGLU 255* 249* 248* 255*       Blood Sugar Average: Last 72 hrs:  (P) 215.9375  Continue insulin sliding scale   Continue lantus will increase it to  10 units  Schizophrenia, unspecified type (HCC)  Appreciate psychiatry recommendations  Continue klonopin 1mg bid prn  Continue zyprexa 10mg qhs  Psych eval when pt is medically cleared  Neuropsych deemed pt to not have capacity   Will have neuropsych eval again as pt seemed to understand about his gallbladder and surgery  Appreciate psych recommendations  He will require inpt psych- recommending 201 but he will require 302 if he still lacks capacity   Abnormal TSH  Tsh 0.21  Free t4 1.3  Labs reflect hyperthyroid  Check thyroid antibody panel  Appreciate endocrine recommendations- possibly related to iv dye load.   Repeated tsh and free t4 are negative   No further treatment recommendations  Chronic kidney disease, stage 2 (mild)  Lab Results   Component Value Date    EGFR 65 05/10/2025    EGFR 74 05/09/2025    EGFR 83 05/08/2025    CREATININE 1.22 05/10/2025    CREATININE 1.10 05/09/2025    CREATININE 1.00 05/08/2025   Chauncey r/o  Creatinine currently at baseline which is 0.9-1.2   H/O prolonged Q-T interval on ECG  Qtc has improved  Replace mg. Keep potassium greater than 4.0    Acute cholecystitis  Please see septic shock   Hypophosphatemia    Insomnia  continue melatonin  Continue zyprexa   Obesity (BMI 30-39.9)    CHF (congestive heart failure) ([facility])  Wt Readings from Last 3 Encounters:   05/07/25 98 kg (216 lb 0.8 oz)   05/02/25 100 kg (221 lb 5.5 oz)   03/17/25 106 kg (233 lb)     Monitor I/o and daily weights             VTE Pharmacologic Prophylaxis:   lovenox     Mobility:   Basic Mobility Inpatient Raw Score: 24  JH-HLM Goal: 8: Walk 250 feet or more  JH-HLM Achieved: 7: Walk 25 feet or more      Patient Centered Rounds: I performed bedside rounds with nursing staff today.     Education and Discussions with Family / Patient:  will update his wife .     Current Length of Stay: 8 day(s)  Current Patient Status: Inpatient     Discharge Plan: Anticipate discharge in >72 hrs to inpt psych     Code Status: Level  1 - Full Code    Subjective   Pt seen and examined. Pt doing well after surgery. Pain controlled. Tolerating po. He states he needs sleep and declined evaluation     Objective :  Temp:  [97.5 °F (36.4 °C)-99.1 °F (37.3 °C)] 98.2 °F (36.8 °C)  HR:  [] 107  BP: (109-167)/() 150/85  Resp:  [14-24] 18  SpO2:  [91 %-99 %] 92 %  O2 Device: Nasal cannula  Nasal Cannula O2 Flow Rate (L/min):  [2 L/min-3 L/min] 2 L/min    Body mass index is 33.84 kg/m².     Input and Output Summary (last 24 hours):     Intake/Output Summary (Last 24 hours) at 5/10/2025 1321  Last data filed at 5/10/2025 1313  Gross per 24 hour   Intake 1440 ml   Output 2090 ml   Net -650 ml       Physical Exam  Awake alert oriented x3  Not in acute distress  Declined eval today     Lines/Drains:  Lines/Drains/Airways       Active Status       Name Placement date Placement time Site Days    Closed/Suction Drain RLQ Bulb 19 Fr. 05/09/25  1156  RLQ  1                            Lab Results: I have reviewed the following results:   Results from last 7 days   Lab Units 05/10/25  0623 05/09/25  0508   WBC Thousand/uL 16.23* 8.31   HEMOGLOBIN g/dL 13.7 14.7   HEMATOCRIT % 42.0 43.9   PLATELETS Thousands/uL 367 305   BANDS PCT %  --  2   SEGS PCT % 74  --    LYMPHO PCT % 13* 36   MONO PCT % 10 8   EOS PCT % 0 4     Results from last 7 days   Lab Units 05/10/25  0623 05/09/25  0508 05/08/25  0500   SODIUM mmol/L 133*   < > 136   POTASSIUM mmol/L 4.2   < > 4.0   CHLORIDE mmol/L 99   < > 101   CO2 mmol/L 25   < > 27   BUN mg/dL 15   < > 10   CREATININE mg/dL 1.22   < > 1.00   ANION GAP mmol/L 9   < > 8   CALCIUM mg/dL 8.5   < > 8.7   ALBUMIN g/dL  --   --  3.3*   TOTAL BILIRUBIN mg/dL  --   --  0.41   ALK PHOS U/L  --   --  82   ALT U/L  --   --  22   AST U/L  --   --  28   GLUCOSE RANDOM mg/dL 281*   < > 149*    < > = values in this interval not displayed.         Results from last 7 days   Lab Units 05/10/25  1201 05/09/25  4865 05/09/25  5294  05/09/25  1350 05/09/25  1241 05/09/25  0617 05/08/25  2054 05/08/25  1719 05/08/25  1136 05/08/25  0909 05/08/25  0550 05/07/25  2124   POC GLUCOSE mg/dl 255* 248* 249* 255* 245* 194* 212* 233* 201* 170* 166* 184*               Recent Cultures (last 7 days):         Imaging Results Review: No pertinent imaging studies reviewed.  Other Study Results Review: No additional pertinent studies reviewed.    Last 24 Hours Medication List:     Current Facility-Administered Medications:     acetaminophen (TYLENOL) tablet 325 mg, Q6H PRN    cefpodoxime (VANTIN) tablet 400 mg, BID With Meals    clonazePAM (KlonoPIN) tablet 1 mg, BID PRN    cloNIDine (CATAPRES) tablet 0.1 mg, Q12H ROBER    cyanocobalamin injection 1,000 mcg, Q30 Days    enoxaparin (LOVENOX) subcutaneous injection 40 mg, Daily    HYDROmorphone HCl (DILAUDID) injection 0.2 mg, Q4H PRN    hydrOXYzine HCL (ATARAX) tablet 25 mg, Q6H PRN Max 4/day    hydrOXYzine HCL (ATARAX) tablet 50 mg, Q6H PRN Max 4/day    ibuprofen (MOTRIN) tablet 400 mg, Q6H PRN    insulin glargine (LANTUS) subcutaneous injection 10 Units 0.1 mL, HS    insulin lispro (HumALOG/ADMELOG) 100 units/mL subcutaneous injection 1-6 Units, TID AC **AND** Fingerstick Glucose (POCT), TID AC    insulin lispro (HumALOG/ADMELOG) 100 units/mL subcutaneous injection 1-6 Units, HS    LORazepam (ATIVAN) injection 1 mg, Q6H PRN Max 3/day    LORazepam (ATIVAN) tablet 1 mg, Q6H PRN Max 3/day    melatonin tablet 6 mg, HS    metoprolol tartrate (LOPRESSOR) partial tablet 12.5 mg, Q12H ROBER    metroNIDAZOLE (FLAGYL) tablet 500 mg, Q12H ROBER    nicotine polacrilex (NICORETTE) gum 2 mg, Q4H PRN    OLANZapine (ZyPREXA) tablet 10 mg, HS    oxyCODONE (ROXICODONE) IR tablet 5 mg, Q4H PRN    [START ON 5/11/2025] pantoprazole (PROTONIX) EC tablet 40 mg, Early Morning    polyethylene glycol (MIRALAX) packet 17 g, Daily PRN    senna-docusate sodium (SENOKOT S) 8.6-50 mg per tablet 1 tablet, Daily PRN    trimethobenzamide (TIGAN) IM  injection 200 mg, Q6H PRN    Administrative Statements   Today, Patient Was Seen By: Tasha Joseph DO

## 2025-05-10 NOTE — ASSESSMENT & PLAN NOTE
Lab Results   Component Value Date    EGFR 65 05/10/2025    EGFR 74 05/09/2025    EGFR 83 05/08/2025    CREATININE 1.22 05/10/2025    CREATININE 1.10 05/09/2025    CREATININE 1.00 05/08/2025

## 2025-05-10 NOTE — ASSESSMENT & PLAN NOTE
Lab Results   Component Value Date    HGBA1C 8.3 (A) 03/17/2025       Recent Labs     05/09/25  1350 05/09/25  1601 05/09/25  2125 05/10/25  1201   POCGLU 255* 249* 248* 255*       Blood Sugar Average: Last 72 hrs:  (P) 215.9375  Continue insulin sliding scale   Continue lantus will increase it to 10 units

## 2025-05-10 NOTE — TELEMEDICINE
Tele-Consultation - Behavioral Health   Name: Solo Mack 55 y.o. male I MRN: [id number]  Unit/Bed#: [place] [address] -01 I Date of Admission: 5/2/2025   Date of Service: 5/10/2025 I Hospital Day: 8   Inpatient consult to Psychiatry  Consult performed by: Pasquale Jurado MD  Consult ordered by: Tasha Joseph DO        Physician Requesting Evaluation: Tasha Joseph DO   Reason for Evaluation / Principal Problem: Schizophrenia unspecified type    Assessment & Plan  Septic shock (HCC)    GERD (gastroesophageal reflux disease)    Essential hypertension    Type 2 diabetes mellitus, without long-term current use of insulin ([facility])  Lab Results   Component Value Date    HGBA1C 8.3 (A) 03/17/2025       Recent Labs     05/09/25  1350 05/09/25  1601 05/09/25  2125 05/10/25  1201   POCGLU 255* 249* 248* 255*       Blood Sugar Average: Last 72 hrs:  (P) 215.9375    Schizophrenia, unspecified type ([facility])    Abnormal TSH    Chronic kidney disease, stage 2 (mild)  Lab Results   Component Value Date    EGFR 65 05/10/2025    EGFR 74 05/09/2025    EGFR 83 05/08/2025    CREATININE 1.22 05/10/2025    CREATININE 1.10 05/09/2025    CREATININE 1.00 05/08/2025     H/O prolonged Q-T interval on ECG    Acute cholecystitis    Hypophosphatemia    Insomnia    Obesity (BMI 30-39.9)    CHF (congestive heart failure) ([facility])  Wt Readings from Last 3 Encounters:   05/07/25 98 kg (216 lb 0.8 oz)   05/02/25 100 kg (221 lb 5.5 oz)   03/17/25 106 kg (233 lb)               Differential Diagnoses    Schizophrenia, Unspecified Type (Primary Diagnosis): Chronic history of auditory hallucinations, grandiose and paranoid delusions (e.g., richest man, men trying to kill him), and disorganized behavior prior to admission, with multiple hospitalizations, support this diagnosis. Current vague reports of “hearing things” and observed talking to the ceiling suggest persistent symptoms, despite partial improvement.    Schizoaffective Disorder, Bipolar Type:  Considered due to history of bipolar I disorder and psychotic symptoms. However, lack of current mood symptoms makes this less likely.    Bipolar I Disorder with Psychotic Features: Historical diagnosis, but no recent manic or depressive episodes documented, reducing likelihood as primary diagnosis.    Delirium Secondary to Medical Illness: Ruled out, as patient is alert, oriented, and without fluctuating mental status, despite recent septic shock and ICU stay.    Substance-Induced Psychotic Disorder: Unlikely, given negative drug screen and reported sobriety from alcohol (11 years) and opioids (6-7 years).    Major Depressive Disorder with Psychotic Features: Considered due to history of depression, but no current depressive symptoms reported.    Neurocognitive Disorder: Unlikely, as memory and cognition are intact, and no progressive decline noted.    Assessment  Solo Mack is a 55-year-old male with a history of schizophrenia, bipolar disorder, and recent psychotic symptoms (auditory hallucinations, grandiose and paranoid delusions, disorganized behavior) prior to admission for septic shock secondary to acute cholecystitis. He is now medically stable following a laparoscopic cholecystectomy on 2025, with a RLQ drain and PO antibiotics. Psychiatrically, he presents as cooperative and euthymic, denying SI/HI, but exhibits ongoing grandiose and paranoid delusions and vaguely reports occasional auditory hallucinations. Nursing notes frequent talking to the ceiling and mentioning “Trump,” consistent with persistent psychotic symptoms. He is maintained on olanzapine 10 mg QHS and clonazepam 1 mg BID PRN, with reported adherence, but his impaired insight and evasive responses suggest risk for decompensation. The C-SSRS indicates low acute suicide risk, and nursing reports no imminent risk of harm to self or others. A prior 302 commitment , and he does not currently meet criteria for involuntary  hospitalization. His medical comorbidities (type 2 diabetes, hypertension, CKD, prolonged QTc) and history of substance abuse (alcohol, opioids) complicate management. Voluntary inpatient psychiatric treatment is recommended upon medical clearance to optimize stabilization and prevent relapse.      Recommendations    Disposition:  Recommend voluntary inpatient psychiatric treatment upon medical clearance for further psychiatric stabilization, given persistent delusions and hallucinations.    The patient does not currently meet criteria for involuntary hospitalization (302/303), as he poses no imminent risk of harm to self or others. However, if new information or status changes indicate risk (e.g., SI/HI, agitation), pursue involuntary psychiatric hospitalization if he declines voluntary admission.    No suicide precautions are indicated at this time, per C-SSRS and nursing observations.    Medication Management:  Continue Current Regimen:  Olanzapine 10 mg QHS (antipsychotic for schizophrenia, mood stabilization).    Clonazepam 1 mg BID PRN (for anxiety, agitation; max 2 mg/day to avoid oversedation).    Melatonin 6 mg QHS (for insomnia).    PRN Medications for Agitation:  Lorazepam 1 mg IV/PO q6h PRN (max 3 mg/day) for acute agitation.    Hydroxyzine 25-50 mg PO q6h PRN (max 200 mg/day) for anxiety or insomnia.    Avoid haloperidol due to allergy and QTc risk.    Safety Measures:  Continue monitoring for “sundowning” or agitation, with 1:1 observation if needed.  Suicide precautions are indicated at this time.      SAFE-T Suicide Risk Assessment  Risk Factors: Chronic schizophrenia, persistent delusions and hallucinations, history of substance abuse, recent medical stressors, impaired insight.    Access to Lethal Means: None identified in hospital setting.    Protective Factors: Supportive wife and sons, current medication adherence, stable housing.    Suicide Inquiry: Denies SI/HI; C-SSRS indicates low  risk.    Risk Level: Low, but ongoing monitoring is needed due to psychiatric history and persistent psychotic symptoms.    [place]-Suicide Severity Rating Scale (C-SSRS)  Q1: In the last month, have you wished you were dead or wished you could go to sleep and not wake up? No.    Q2: In the last month, have you actually had thoughts about killing yourself? No.    Q6: Have you done anything, started to do anything, or prepared to do anything to end your life in the last 3 months? No.    Suicide Risk Level: Low, no acute risk for suicide at this time.          I have discussed the above management plan in detail with the primary service.     Current Medications:    Current Facility-Administered Medications:     acetaminophen (TYLENOL) tablet 325 mg, Q6H PRN    cefpodoxime (VANTIN) tablet 400 mg, BID With Meals    clonazePAM (KlonoPIN) tablet 1 mg, BID PRN    cyanocobalamin injection 1,000 mcg, Q30 Days    enoxaparin (LOVENOX) subcutaneous injection 40 mg, Daily    HYDROmorphone HCl (DILAUDID) injection 0.2 mg, Q4H PRN    hydrOXYzine HCL (ATARAX) tablet 25 mg, Q6H PRN Max 4/day    hydrOXYzine HCL (ATARAX) tablet 50 mg, Q6H PRN Max 4/day    ibuprofen (MOTRIN) tablet 400 mg, Q6H PRN    insulin glargine (LANTUS) subcutaneous injection 5 Units 0.05 mL, HS    insulin lispro (HumALOG/ADMELOG) 100 units/mL subcutaneous injection 1-6 Units, TID AC **AND** Fingerstick Glucose (POCT), TID AC    insulin lispro (HumALOG/ADMELOG) 100 units/mL subcutaneous injection 1-6 Units, HS    LORazepam (ATIVAN) injection 1 mg, Q6H PRN Max 3/day    LORazepam (ATIVAN) tablet 1 mg, Q6H PRN Max 3/day    melatonin tablet 6 mg, HS    metoprolol tartrate (LOPRESSOR) partial tablet 12.5 mg, Q12H ROBER    metroNIDAZOLE (FLAGYL) tablet 500 mg, Q12H ROBER    nicotine polacrilex (NICORETTE) gum 2 mg, Q4H PRN    OLANZapine (ZyPREXA) IM injection 10 mg, HS    oxyCODONE (ROXICODONE) IR tablet 5 mg, Q4H PRN    pantoprazole (PROTONIX) injection 40 mg, Q24H ROBER     polyethylene glycol (MIRALAX) packet 17 g, Daily PRN    senna-docusate sodium (SENOKOT S) 8.6-50 mg per tablet 1 tablet, Daily PRN    trimethobenzamide (TIGAN) IM injection 200 mg, Q6H PRN     Recommendations/Risks/Benefits of Treatment    Medication changes/recommendations as above in Assessment & Plan Section.  Risks, benefits, and possible side effects of medications explained to patient and patient verbalizes understanding.    Legal Status: No criteria for an involuntary psychiatric commitment at this time.  Observation Recommendations: No need for continual 1:1 observation at this time.    History of Present Illness      Chief Complaint  Patient is a 55-year-old male admitted for septic shock secondary to acute cholecystitis, now status post laparoscopic cholecystectomy, with a history of schizophrenia and recent psychotic symptoms, including reported auditory hallucinations and delusional thinking.    Reason for Consultation  This psychiatry consult was requested to evaluate a 55-year-old male with a history of schizophrenia, bipolar disorder, and recent psychotic symptoms for management recommendations and disposition planning. The patient is currently medically stable following a laparoscopic cholecystectomy on 05/09/2025 and is on the medical-surgical unit.    History of Present Illness  Solo Mack is a 55-year-old male with a complex psychiatric history, including schizophrenia, bipolar disorder, and multiple prior psychiatric hospitalizations, who was admitted on 05/02/2025 for septic shock secondary to acute cholecystitis. He was transferred from [facility] behavioral unit to [facility] for ICU management due to hypotension, fever, and tachycardia. A CT scan confirmed acute cholecystitis with an impacted cystic duct stone. He improved with antibiotics (cefepime, metronidazole) and IV fluids, and on 05/09/2025, he underwent a successful laparoscopic cholecystectomy. He is currently  medically stable, tolerating a low-fat diet, and progressing well post-operatively with a RLQ drain in place, though he reports occasional right upper quadrant pain.  Prior to admission, the patient’s wife reported worsening psychotic symptoms over several weeks, including auditory hallucinations, disorganized behavior, and delusional thinking. Nursing reports that the patient is frequently observed talking to the ceiling and mentioning “Trump’s name” but has not expressed suicidal ideation (SI) or homicidal ideation (HI). Nursing notes no behaviors indicating imminent risk of harm to self or others. In a prior psychiatry consult on 2025, the patient was calmer, denying SI/HI, with improved sleep and reported adherence to olanzapine 10 mg QHS and clonazepam 1 mg BID PRN. A neuropsychology evaluation on 2025 was incomplete due to patient refusal, and a prior 302 commitment  due to medical instability.    In the current evaluation, the patient denied psychiatric complaints, stating, “I’m doing well,” but reported feeling “taken aback” at times, denying depression. He described two men he believes have been trying to kill him for years but denied current feelings of danger. He was evasive about his psychiatric history, vaguely acknowledging a schizophrenia diagnosis but providing no details. He mentioned a recent admission to [place] but focused on the two men rather than the reason for admission. He was unaware he was transferred for cholecystitis. He reported occasional “hearing things” but was vague, providing no specific details about hallucinations. He exhibited grandiose delusions (e.g., claiming to be the “richest man” and on the cover of [employer] magazine) and paranoid delusions (e.g., men trying to kill him). He denied SI/HI, and nursing confirms no recent agitation, though “sundowning” has been noted. His history of prolonged QTc (improved, current QTc 431) and medical comorbidities (type  2 diabetes, hypertension, CKD) complicates psychiatric management.    This psychiatrist attempted to reach the patient's wife by following for additional collateral information however there was no answer.    Past Psychiatric History  Diagnoses: Schizophrenia (unspecified type), schizoaffective disorder, bipolar I disorder, unspecified mood disorder, unspecified psychotic disorder, anxiety, depression.    Hospitalizations: Multiple inpatient psychiatric admissions, most recently at [facility] behavioral unit prior to this admission.    Suicide Attempts/Self-Harm: None reported.    Violence History: None reported.    Prior Medications: Olanzapine, clonazepam, lithium (discontinued, last level <0.10 on 04/13/2024).    Outpatient Treatment: Followed by a psychiatrist and advanced practitioner.    Social History  Marital Status: Reportedly  per chart, but patient states, “not at this time.”    Children: Two adult sons living independently.    Education/Occupation: Not documented; patient claims to be on the cover of [employer] and the “richest man,” suggesting grandiose delusions.    Living Situation: Lives in a trailer home, presumed with wife.    Substance Use:  Tobacco: Former smoker.    Alcohol: History of abuse, clean for 11 years.    Drugs: History of opioid abuse (all kinds) for 20 years, clean for 6-7 years; denies other substance use.    Legal History: None reported.    Social Support: Wife and adult sons; extent of involvement unclear.    Family History  Psychiatric History: Patient reports paternal grandfather with schizophrenia.    Other: No known medical or psychiatric issues in immediate family (mother, father, siblings).    Psychiatric Review of Systems  Sleep Changes: Denies current issues; previously reported difficulty but improved.    Appetite Changes: Denies.    Weight Changes: Denies.    Anxiety/Panic: Reports feeling “taken aback” at times but denies significant  anxiety.    [place]/Depression: Denies.    Self-Injurious/Risky Behavior: Denies.    Hallucinations: Vaguely reports “hearing things” occasionally.    Delusions: Grandiose and paranoid delusions noted (see HPI).    Mental Status Examination  Appearance: Appropriately groomed and hygienic, lying supine in bed.    Behavior: Cooperative, enjoys talking.    Eye Contact: Appropriate.    Speech: Normal rate, volume, and pitch.    Mood: Reports “doing well,” appears euthymic.    Affect: Pleasant, euthymic, appropriate.    Thought Process: Logical, linear.    Thought Content: Grandiose delusions (e.g., richest man, [employer] magazine cover) and paranoid delusions (e.g., two men trying to kill him for years); denies SI/HI.    Perceptual Disturbances: Vaguely reports occasional “hearing things,” likely auditory hallucinations, but provides no details.    Orientation: Oriented to person, place, and time.    Sensorium: Clear.    Memory: Recent and remote memory grossly intact.    Consciousness: Alert and awake.    Attention/Concentration: Age-appropriate.    Intellect: Appears average based on vocabulary, fund of knowledge, sentence structure, and syntax.    Insight: Impaired due to grandiose and paranoid delusions.    Judgment: Fair, no current risky behaviors noted.    Associations: Tight.    Motor Activity: No abnormal movements.    Clinical Screens  [place]-Suicide Severity Rating Scale (C-SSRS):  Q1: In the last month, have you wished you were dead or wished you could go to sleep and not wake up? No.    Q2: In the last month, have you actually had thoughts about killing yourself? No.    Q6: Have you done anything, started to do anything, or prepared to do anything to end your life in the last 3 months? No.    Suicide Risk Level: Low, no acute risk for suicide at this time.    Pain Score: 0 (05/06/2025), though reports occasional RUQ pain.    Nutrition Screening: Good appetite, tolerating consistent carbohydrate  diet.    Laboratory and Diagnostic Results  Labs (05/10/2025): WBC 16.23 (H), Hgb 13.7, creatinine 1.22, glucose 281 (H), sodium 133 (L), phosphorus 2.3 (L), magnesium 2.0, eGFR 65.    Historical Labs: HgbA1c 8.3 (03/17/2025), TSH 0.219 (L), free T4 1.30 (H) (05/02/2025), lithium <0.10 (04/13/2024).    ECG (05/02/2025): Normal sinus rhythm, nonspecific ST abnormality, QTc 431 (improved from 528).    Cultures: No recent positive results.    Imaging: CT abdomen/pelvis (05/02/2025) showed acute cholecystitis; no recent psychiatric-relevant imaging.        Substance Abuse History:    Tobacco, Alcohol and Drug Use History     Tobacco Use    Smoking status: Former     Passive exposure: Never    Smokeless tobacco: Never    Tobacco comments:     patient is a non - smoker   Vaping Use    Vaping status: Never Used   Substance Use Topics    Alcohol use: Not Currently     Comment: beer here and there per pt    Drug use: Not Currently          I have assessed this patient for substance use within the past 12 months    Family Psychiatric History:     Family History   Problem Relation Age of Onset    No Known Problems Mother     No Known Problems Father     No Known Problems Sister     No Known Problems Brother     No Known Problems Maternal Aunt     No Known Problems Paternal Aunt     No Known Problems Maternal Uncle     No Known Problems Paternal Uncle     No Known Problems Maternal Grandfather     No Known Problems Maternal Grandmother     No Known Problems Paternal Grandfather     No Known Problems Paternal Grandmother     No Known Problems Cousin     ADD / ADHD Neg Hx     Alcohol abuse Neg Hx     Anxiety disorder Neg Hx     Bipolar disorder Neg Hx     Dementia Neg Hx     Depression Neg Hx     Drug abuse Neg Hx     OCD Neg Hx     Paranoid behavior Neg Hx     Schizophrenia Neg Hx     Seizures Neg Hx     Self-Injury Neg Hx     Suicide Attempts Neg Hx        Social History:    Social History     Socioeconomic History    Marital  status: /Civil Union     Spouse name: Not on file    Number of children: Not on file    Years of education: Not on file    Highest education level: Not on file   Occupational History    Not on file   Other Topics Concern    Not on file   Social History Narrative    ** Merged History Encounter **               Past Medical History:   Diagnosis Date    Alcohol withdrawal ([facility])     Alcoholism ([facility])     Anxiety     Back pain     Back pain, chronic     Bipolar 1 disorder ([facility])     Bipolar disorder, current episode mixed, moderate ([facility])     BPH (benign prostatic hyperplasia)     Cardiac disease     CHF (congestive heart failure) ([facility])     Chronic pain disorder     Chronic renal failure     Closed displaced fracture of neck of left scapula     Closed fracture of glenoid cavity and neck of left scapula 09/06/2020    Demyelinating disease ([facility])     Dental disorder     Depression     Diabetes mellitus ([facility])     Drug abuse ([facility])     Drug withdrawal ([facility])     ED (erectile dysfunction)     Edema     Encephalopathy     Encephalopathy     Fatigue     Fracture of left radius 09/06/2020    GERD (gastroesophageal reflux disease)     H/O prolonged Q-T interval on ECG 12/04/2024    Heart rate fast     Hepatitis     unspecified     Hyperlipidemia     Hypertension     Hypokalemia     Hypothyroidism 10/21/2024    Knee buckling     Left rib fracture 09/06/2020    MI (myocardial infarction) ([facility])     Morbid obesity with BMI of 40.0-44.9, adult ([facility])     NIDDY (non-insulin dependent diabetes mellitus in young) ([facility])     Obesity     Opiate dependence ([facility])     Opiate withdrawal ([facility])     Osteoarthritis     Pleural effusion     Pneumonia     Prolonged Q-T interval on ECG     Psychiatric disorder     Psychiatric illness     Psychosis ([facility])     Psychosis ([facility]) 12/17/2024    Renal disorder     SAH (subarachnoid hemorrhage) ([facility]) 09/06/2020    Schizo-affective schizophrenia ([facility])     Spinal stenosis, lumbar     Subdural hematoma ([facility])  09/06/2020    Traumatic subdural hemorrhage with loss of consciousness of unspecified duration, initial encounter ([facility]) 12/29/2022    Ulcer of calf ([facility])     Ulnar neuritis, right     Ulnar neuropathy at elbow     Weight loss      Past Surgical History:   Procedure Laterality Date    BACK SURGERY      report thoracic surgery    LUMBAR FUSION  05/2006    L5/S1 [place]      Meds/Allergies     Allergies   Allergen Reactions    Haldol [Haloperidol]     Lexapro [Escitalopram Oxalate] Hives    Penicillins Other (See Comments)     Unknown, reaction as child       Current Facility-Administered Medications:     acetaminophen (TYLENOL) tablet 325 mg, Q6H PRN    cefpodoxime (VANTIN) tablet 400 mg, BID With Meals    clonazePAM (KlonoPIN) tablet 1 mg, BID PRN    cyanocobalamin injection 1,000 mcg, Q30 Days    enoxaparin (LOVENOX) subcutaneous injection 40 mg, Daily    HYDROmorphone HCl (DILAUDID) injection 0.2 mg, Q4H PRN    hydrOXYzine HCL (ATARAX) tablet 25 mg, Q6H PRN Max 4/day    hydrOXYzine HCL (ATARAX) tablet 50 mg, Q6H PRN Max 4/day    ibuprofen (MOTRIN) tablet 400 mg, Q6H PRN    insulin glargine (LANTUS) subcutaneous injection 5 Units 0.05 mL, HS    insulin lispro (HumALOG/ADMELOG) 100 units/mL subcutaneous injection 1-6 Units, TID AC **AND** Fingerstick Glucose (POCT), TID AC    insulin lispro (HumALOG/ADMELOG) 100 units/mL subcutaneous injection 1-6 Units, HS    LORazepam (ATIVAN) injection 1 mg, Q6H PRN Max 3/day    LORazepam (ATIVAN) tablet 1 mg, Q6H PRN Max 3/day    melatonin tablet 6 mg, HS    metoprolol tartrate (LOPRESSOR) partial tablet 12.5 mg, Q12H ROBER    metroNIDAZOLE (FLAGYL) tablet 500 mg, Q12H ROBER    nicotine polacrilex (NICORETTE) gum 2 mg, Q4H PRN    OLANZapine (ZyPREXA) IM injection 10 mg, HS    oxyCODONE (ROXICODONE) IR tablet 5 mg, Q4H PRN    pantoprazole (PROTONIX) injection 40 mg, Q24H ROBER    polyethylene glycol (MIRALAX) packet 17 g, Daily PRN    senna-docusate sodium (SENOKOT S) 8.6-50 mg per  tablet 1 tablet, Daily PRN    trimethobenzamide (TIGAN) IM injection 200 mg, Q6H PRN  Prior to Admission Medications   Prescriptions Last Dose Informant Patient Reported? Taking?   Easy Comfort Pen Needles 33G X 5 MM MISC Unknown Pharmacy (Specify) Yes No   Sig: AS DIRECTED FOR ozempic EVERY WEEK   cloNIDine (CATAPRES) 0.1 mg tablet Unknown Pharmacy (Specify) No No   Sig: Take 1 tablet (0.1 mg total) by mouth every 12 (twelve) hours   clonazePAM (KlonoPIN) 1 mg tablet Unknown Pharmacy (Specify) Yes No   Sig: Take 1 mg by mouth 3 (three) times a day   gabapentin (NEURONTIN) 400 mg capsule Not Taking Pharmacy (Specify) No No   Sig: Take 1 capsule (400 mg total) by mouth 3 (three) times a day   Patient not taking: Reported on 5/5/2025   hydrOXYzine HCL (ATARAX) 50 mg tablet Unknown Pharmacy (Specify) No No   Sig: TAKE 1 TABLET BY MOUTH THREE TIMES A DAY   metoprolol tartrate (LOPRESSOR) 25 mg tablet Unknown Pharmacy (Specify) No No   Sig: Take 0.5 tablets (12.5 mg total) by mouth every 12 (twelve) hours   pantoprazole (PROTONIX) 40 mg tablet Not Taking Pharmacy (Specify) No No   Sig: TAKE ONE TABLET BY MOUTH DAILY BEFORE BREAKFAST   Patient not taking: Reported on 5/5/2025   semaglutide, 0.25 or 0.5 mg/dose, (Ozempic) 2 mg/1.5 mL injection pen Unknown  Yes No   Sig: Inject 0.25 mg under the skin every 7 days   sildenafil (VIAGRA) 100 mg tablet Not Taking Pharmacy (Specify) No No   Sig: Take 1 tablet (100 mg total) by mouth daily as needed for erectile dysfunction   Patient not taking: Reported on 5/5/2025   zolpidem (AMBIEN CR) 12.5 MG CR tablet Unknown Pharmacy (Specify) Yes No   Sig: Take 12.5 mg by mouth daily at bedtime      Facility-Administered Medications: None       Objective :  Temp:  [97.1 °F (36.2 °C)-99.1 °F (37.3 °C)] 98.2 °F (36.8 °C)  HR:  [] 107  BP: ()/() 150/85  Resp:  [14-24] 18  SpO2:  [91 %-99 %] 92 %  O2 Device: Nasal cannula  Nasal Cannula O2 Flow Rate (L/min):  [2 L/min-3  L/min] 2 L/min          Lab Results: I have reviewed the following results:  Results from the past 24 hours:   Recent Results (from the past 24 hours)   Fingerstick Glucose (POCT)    Collection Time: 05/09/25 12:41 PM   Result Value Ref Range    POC Glucose 245 (H) 65 - 140 mg/dl   Fingerstick Glucose (POCT)    Collection Time: 05/09/25  1:50 PM   Result Value Ref Range    POC Glucose 255 (H) 65 - 140 mg/dl   Hepatitis C antibody    Collection Time: 05/09/25  2:52 PM   Result Value Ref Range    Hepatitis C Ab Non-reactive Non-Reactive   Rapid HIV 1/2 AB-AG Combo for 12 yr old and above    Collection Time: 05/09/25  2:52 PM   Result Value Ref Range    Rapid HIV 1 AND 2 Non-Reactive Non-Reactive, Invalid    HIV-1 P24 Ag Screen Non-Reactive Non-Reactive   Fingerstick Glucose (POCT)    Collection Time: 05/09/25  4:01 PM   Result Value Ref Range    POC Glucose 249 (H) 65 - 140 mg/dl   Fingerstick Glucose (POCT)    Collection Time: 05/09/25  9:25 PM   Result Value Ref Range    POC Glucose 248 (H) 65 - 140 mg/dl   CBC and differential    Collection Time: 05/10/25  6:23 AM   Result Value Ref Range    WBC 16.23 (H) 4.31 - 10.16 Thousand/uL    RBC 4.86 3.88 - 5.62 Million/uL    Hemoglobin 13.7 12.0 - 17.0 g/dL    Hematocrit 42.0 36.5 - 49.3 %    MCV 86 82 - 98 fL    MCH 28.2 26.8 - 34.3 pg    MCHC 32.6 31.4 - 37.4 g/dL    RDW 12.3 11.6 - 15.1 %    MPV 9.5 8.9 - 12.7 fL    Platelets 367 149 - 390 Thousands/uL    nRBC 0 /100 WBCs    Segmented % 74 43 - 75 %    Immature Grans % 3 (H) 0 - 2 %    Lymphocytes % 13 (L) 14 - 44 %    Monocytes % 10 4 - 12 %    Eosinophils Relative 0 0 - 6 %    Basophils Relative 0 0 - 1 %    Absolute Neutrophils 12.10 (H) 1.85 - 7.62 Thousands/µL    Absolute Immature Grans 0.45 (H) 0.00 - 0.20 Thousand/uL    Absolute Lymphocytes 2.04 0.60 - 4.47 Thousands/µL    Absolute Monocytes 1.57 (H) 0.17 - 1.22 Thousand/µL    Eosinophils Absolute 0.01 0.00 - 0.61 Thousand/µL    Basophils Absolute 0.06 0.00 -  0.10 Thousands/µL   Basic metabolic panel    Collection Time: 05/10/25  6:23 AM   Result Value Ref Range    Sodium 133 (L) 135 - 147 mmol/L    Potassium 4.2 3.5 - 5.3 mmol/L    Chloride 99 96 - 108 mmol/L    CO2 25 21 - 32 mmol/L    ANION GAP 9 4 - 13 mmol/L    BUN 15 5 - 25 mg/dL    Creatinine 1.22 0.60 - 1.30 mg/dL    Glucose 281 (H) 65 - 140 mg/dL    Calcium 8.5 8.4 - 10.2 mg/dL    eGFR 65 ml/min/1.73sq m   Magnesium    Collection Time: 05/10/25  6:23 AM   Result Value Ref Range    Magnesium 2.0 1.9 - 2.7 mg/dL   Phosphorus    Collection Time: 05/10/25  6:23 AM   Result Value Ref Range    Phosphorus 2.3 (L) 2.7 - 4.5 mg/dL   Fingerstick Glucose (POCT)    Collection Time: 05/10/25 12:01 PM   Result Value Ref Range    POC Glucose 255 (H) 65 - 140 mg/dl     Most Recent Labs:   Lab Results   Component Value Date    WBC 16.23 (H) 05/10/2025    RBC 4.86 05/10/2025    HGB 13.7 05/10/2025    HCT 42.0 05/10/2025     05/10/2025    RDW 12.3 05/10/2025    NEUTROABS 12.10 (H) 05/10/2025    TOTANEUTABS 4.07 05/09/2025    SODIUM 133 (L) 05/10/2025    K 4.2 05/10/2025    CL 99 05/10/2025    CO2 25 05/10/2025    BUN 15 05/10/2025    CREATININE 1.22 05/10/2025    GLUC 281 (H) 05/10/2025    CALCIUM 8.5 05/10/2025    AST 28 05/08/2025    ALT 22 05/08/2025    ALKPHOS 82 05/08/2025    TP 6.6 05/08/2025    ALB 3.3 (L) 05/08/2025    TBILI 0.41 05/08/2025    CHOLESTEROL 124 12/04/2024    HDL 36 (L) 12/04/2024    TRIG 169 (H) 12/04/2024    LDLCALC 54 12/04/2024    NONHDLC 88 12/04/2024    LITHIUM <0.10 (L) 04/13/2024    AMMONIA 21 04/28/2025    AXE1CJIATZYF 2.457 05/07/2025    FREET4 1.30 (H) 05/02/2025    HGBA1C 8.3 (A) 03/17/2025     12/04/2024       Imaging Results Review: No pertinent imaging studies reviewed.  Other Study Results Review: EKG was reviewed.     Code Status: Level 1 - Full Code  Advance Directive and Living Will:      Power of :    POLST:      Portions of the record may have been created with voice  "recognition software. Occasional wrong word or \"sound a like\" substitutions may have occurred due to the inherent limitations of voice recognition software. Read the chart carefully and recognize, using context, where substitutions have occurred.    Pasquale Jurado MD 05/10/25    Administrative Statements   VIRTUAL CARE DOCUMENTATION:     1. This service was provided via Telemedicine using Teams [place]      2. Parties in the room with patient during teleconsult Patient only    3. Confidentiality My office door was closed     4. Participants No one else was in the room    5. Patient acknowledged consent and understanding of privacy and security of the  Telemedicine consult. I informed the patient that I have reviewed their record in Epic and presented the opportunity for them to ask any questions regarding the visit today.  The patient agreed to participate.    6. I have spent a total time of 50 minutes in caring for this patient on the day of the visit/encounter including Diagnostic results, Prognosis, Risks and benefits of tx options, Instructions for management, Patient and family education, Importance of tx compliance, Risk factor reductions, Impressions, Counseling / Coordination of care, Documenting in the medical record, Reviewing/placing orders in the medical record (including tests, medications, and/or procedures), Obtaining or reviewing history  , and Communicating with other healthcare professionals , not including the time spent for establishing the audio/video connection.     "

## 2025-05-11 ENCOUNTER — APPOINTMENT (INPATIENT)
Dept: RADIOLOGY | Facility: HOSPITAL | Age: 55
DRG: 853 | End: 2025-05-11
Payer: COMMERCIAL

## 2025-05-11 PROBLEM — R79.89 ABNORMAL TSH: Status: RESOLVED | Noted: 2024-10-21 | Resolved: 2025-05-11

## 2025-05-11 LAB
GLUCOSE SERPL-MCNC: 172 MG/DL (ref 65–140)
GLUCOSE SERPL-MCNC: 213 MG/DL (ref 65–140)
GLUCOSE SERPL-MCNC: 237 MG/DL (ref 65–140)
GLUCOSE SERPL-MCNC: 240 MG/DL (ref 65–140)

## 2025-05-11 PROCEDURE — 82948 REAGENT STRIP/BLOOD GLUCOSE: CPT

## 2025-05-11 PROCEDURE — 71045 X-RAY EXAM CHEST 1 VIEW: CPT

## 2025-05-11 PROCEDURE — 99232 SBSQ HOSP IP/OBS MODERATE 35: CPT | Performed by: INTERNAL MEDICINE

## 2025-05-11 PROCEDURE — 99024 POSTOP FOLLOW-UP VISIT: CPT | Performed by: STUDENT IN AN ORGANIZED HEALTH CARE EDUCATION/TRAINING PROGRAM

## 2025-05-11 RX ORDER — INSULIN GLARGINE 100 [IU]/ML
15 INJECTION, SOLUTION SUBCUTANEOUS
Status: DISCONTINUED | OUTPATIENT
Start: 2025-05-11 | End: 2025-05-14 | Stop reason: HOSPADM

## 2025-05-11 RX ORDER — ALBUTEROL SULFATE 0.83 MG/ML
2.5 SOLUTION RESPIRATORY (INHALATION)
Status: DISCONTINUED | OUTPATIENT
Start: 2025-05-11 | End: 2025-05-11

## 2025-05-11 RX ORDER — ALBUTEROL SULFATE 0.83 MG/ML
2.5 SOLUTION RESPIRATORY (INHALATION) EVERY 8 HOURS PRN
Status: DISCONTINUED | OUTPATIENT
Start: 2025-05-11 | End: 2025-05-14 | Stop reason: HOSPADM

## 2025-05-11 RX ADMIN — OLANZAPINE 10 MG: 5 TABLET, FILM COATED ORAL at 21:34

## 2025-05-11 RX ADMIN — Medication 6 MG: at 21:34

## 2025-05-11 RX ADMIN — METRONIDAZOLE 500 MG: 500 TABLET ORAL at 08:29

## 2025-05-11 RX ADMIN — INSULIN GLARGINE 15 UNITS: 100 INJECTION, SOLUTION SUBCUTANEOUS at 21:38

## 2025-05-11 RX ADMIN — CEFPODOXIME PROXETIL 400 MG: 200 TABLET, FILM COATED ORAL at 17:04

## 2025-05-11 RX ADMIN — CEFPODOXIME PROXETIL 400 MG: 200 TABLET, FILM COATED ORAL at 08:29

## 2025-05-11 RX ADMIN — Medication 12.5 MG: at 08:31

## 2025-05-11 RX ADMIN — INSULIN LISPRO 3 UNITS: 100 INJECTION, SOLUTION INTRAVENOUS; SUBCUTANEOUS at 12:35

## 2025-05-11 RX ADMIN — IBUPROFEN 400 MG: 400 TABLET, FILM COATED ORAL at 08:42

## 2025-05-11 RX ADMIN — PANTOPRAZOLE SODIUM 40 MG: 40 TABLET, DELAYED RELEASE ORAL at 05:32

## 2025-05-11 RX ADMIN — INSULIN LISPRO 1 UNITS: 100 INJECTION, SOLUTION INTRAVENOUS; SUBCUTANEOUS at 21:36

## 2025-05-11 RX ADMIN — ENOXAPARIN SODIUM 40 MG: 40 INJECTION SUBCUTANEOUS at 08:29

## 2025-05-11 RX ADMIN — INSULIN LISPRO 3 UNITS: 100 INJECTION, SOLUTION INTRAVENOUS; SUBCUTANEOUS at 17:04

## 2025-05-11 RX ADMIN — METRONIDAZOLE 500 MG: 500 TABLET ORAL at 21:35

## 2025-05-11 RX ADMIN — OXYCODONE 5 MG: 5 TABLET ORAL at 05:32

## 2025-05-11 RX ADMIN — INSULIN LISPRO 2 UNITS: 100 INJECTION, SOLUTION INTRAVENOUS; SUBCUTANEOUS at 08:29

## 2025-05-11 RX ADMIN — CLONIDINE HYDROCHLORIDE 0.1 MG: 0.1 TABLET ORAL at 08:29

## 2025-05-11 NOTE — PROGRESS NOTES
Progress Note - Hospitalist   Name: Solo Mack 55 y.o. male I MRN: [id number]  Unit/Bed#: [place] [address] -01 I Date of Admission: 5/2/2025   Date of Service: 5/11/2025 I Hospital Day: 9    Assessment & Plan  Septic shock (HCC)  Pt was transferred from  to [facility] icu due to profound septic shock with imagining demonstrating gallstones, ruq tenderness, and profound leukocytosis  Currently treated with cefepime/flagyl  On levophed which had been weaned to off  Pt was scheduled for or but decided later in evening decided he did not want to go forward with surgery   Surgery stated to continue conservative treatment with iv antibiotics and change to po at discharge for 2 weeks however he has increased RUQ abd pain  Appreciate gi recommendations- pt is not a candidate for stenting as it will impair surgery  Spoke with surgery who recommended perc. Miriam tube- he is not a good candidate for this given his psychiatric history   Pt has improved but continues to have ruq pain at times. Concern is that he will become septic again without intervention. Long discussions about the appropriate treatment for him given his complex psychosocial situation. It was concluded that cholecystectomy might be the safest mode of treatment. Pt is willing to go forward with surgery   S/p lap cholecystectomy with kimberly drain in place. Stones were removed. Discussed with gi. No further intervention is required   Surgery following kimberly drain. Likely will be removed prior to discharge     GERD (gastroesophageal reflux disease)  Continue ppi   Essential hypertension  Had been on clonidine and metoprolol outpt   Continue metoprolol and clonidine   Type 2 diabetes mellitus, without long-term current use of insulin ([facility])  Lab Results   Component Value Date    HGBA1C 8.3 (A) 03/17/2025       Recent Labs     05/10/25  1201 05/10/25  1622 05/10/25  2140 05/11/25  0605   POCGLU 255* 355* 291* 213*       Blood Sugar Average: Last 72 hrs:  (P)  [id number]  Continue insulin sliding scale   Continue lantus will increase it to 15 units  Schizophrenia, unspecified type (HCC)  Appreciate psychiatry recommendations  Continue klonopin 1mg bid prn  Continue zyprexa 10mg qhs  Psych eval when pt is medically cleared  Neuropsych deemed pt to not have capacity   Will have neuropsych eval again as pt seemed to understand about his gallbladder and surgery  Appreciate psych recommendations  He will require inpt psych- recommending 201 but he will require 302 if he still lacks capacity   Chronic kidney disease, stage 2 (mild)  Lab Results   Component Value Date    EGFR 65 05/10/2025    EGFR 74 05/09/2025    EGFR 83 05/08/2025    CREATININE 1.22 05/10/2025    CREATININE 1.10 05/09/2025    CREATININE 1.00 05/08/2025   Chauncey r/o  Creatinine currently at baseline which is 0.9-1.2   Repeat labs in am.   H/O prolonged Q-T interval on ECG  Qtc has improved  Replace mg. Keep potassium greater than 4.0    Acute cholecystitis  Please see septic shock   Hypophosphatemia    Insomnia  continue melatonin  Continue zyprexa   Obesity (BMI 30-39.9)    CHF (congestive heart failure) (HCC)  Wt Readings from Last 3 Encounters:   05/11/25 95.6 kg (210 lb 12.2 oz)   05/02/25 100 kg (221 lb 5.5 oz)   03/17/25 106 kg (233 lb)     Monitor I/o and daily weights   He does have lower lobe rales but weight is decreased and negative on I/o.   Could be atelectasis and will add incentive spirometry.   Will attempt to get a portable cxr   Check bnp in am.             VTE Pharmacologic Prophylaxis:   lovenox     Mobility:   Basic Mobility Inpatient Raw Score: 24  JH-HLM Goal: 8: Walk 250 feet or more  JH-HLM Achieved: 7: Walk 25 feet or more      Patient Centered Rounds: I performed bedside rounds with nursing staff today.       Education and Discussions with Family / Patient: Updated  (wife) via phone.    Current Length of Stay: 9 day(s)  Current Patient Status: Inpatient      Discharge Plan:     Code Status: Level 1 - Full Code    Subjective   Pt seen and examined. Pt was pleasant on exam. Says his pain is fairly controlled. No f/c no cp no sob no n/v/d     Objective :  Temp:  [98.6 °F (37 °C)-99.6 °F (37.6 °C)] 99.6 °F (37.6 °C)  HR:  [82] 82  BP: (114-151)/(85-88) 114/88  Resp:  [17-18] 17  SpO2:  [93 %-96 %] 93 %    Body mass index is 33.01 kg/m².     Input and Output Summary (last 24 hours):     Intake/Output Summary (Last 24 hours) at 5/11/2025 0943  Last data filed at 5/11/2025 0501  Gross per 24 hour   Intake 1260 ml   Output 890 ml   Net 370 ml       Physical Exam  Constitutional:       Appearance: Normal appearance.   HENT:      Head: Normocephalic and atraumatic.   Eyes:      Extraocular Movements: Extraocular movements intact.      Pupils: Pupils are equal, round, and reactive to light.   Cardiovascular:      Rate and Rhythm: Normal rate and regular rhythm.      Heart sounds: No murmur heard.     No friction rub. No gallop.   Pulmonary:      Effort: Pulmonary effort is normal. No respiratory distress.      Breath sounds: Rales present. No wheezing or rhonchi.   Abdominal:      General: Bowel sounds are normal. There is no distension.      Palpations: Abdomen is soft.      Tenderness: There is abdominal tenderness. There is no guarding or rebound.      Comments: Kyle drain present    Neurological:      Mental Status: He is alert and oriented to person, place, and time.       Lines/Drains:  Lines/Drains/Airways       Active Status       Name Placement date Placement time Site Days    Closed/Suction Drain RLQ Bulb 19 Fr. 05/09/25  1156  RLQ  1                    Lab Results: I have reviewed the following results:   Results from last 7 days   Lab Units 05/10/25  0623 05/09/25  0508   WBC Thousand/uL 16.23* 8.31   HEMOGLOBIN g/dL 13.7 14.7   HEMATOCRIT % 42.0 43.9   PLATELETS Thousands/uL 367 305   BANDS PCT %  --  2   SEGS PCT % 74  --    LYMPHO PCT % 13* 36   MONO PCT % 10 8    EOS PCT % 0 4     Results from last 7 days   Lab Units 05/10/25  0623 05/09/25  0508 05/08/25  0500   SODIUM mmol/L 133*   < > 136   POTASSIUM mmol/L 4.2   < > 4.0   CHLORIDE mmol/L 99   < > 101   CO2 mmol/L 25   < > 27   BUN mg/dL 15   < > 10   CREATININE mg/dL 1.22   < > 1.00   ANION GAP mmol/L 9   < > 8   CALCIUM mg/dL 8.5   < > 8.7   ALBUMIN g/dL  --   --  3.3*   TOTAL BILIRUBIN mg/dL  --   --  0.41   ALK PHOS U/L  --   --  82   ALT U/L  --   --  22   AST U/L  --   --  28   GLUCOSE RANDOM mg/dL 281*   < > 149*    < > = values in this interval not displayed.         Results from last 7 days   Lab Units 05/11/25  0605 05/10/25  2140 05/10/25  1622 05/10/25  1201 05/09/25  2125 05/09/25  1601 05/09/25  1350 05/09/25  1241 05/09/25  0617 05/08/25  2054 05/08/25  1719 05/08/25  1136   POC GLUCOSE mg/dl 213* 291* 355* 255* 248* 249* 255* 245* 194* 212* 233* 201*               Recent Cultures (last 7 days):         Imaging Results Review: No pertinent imaging studies reviewed.  Other Study Results Review: No additional pertinent studies reviewed.    Last 24 Hours Medication List:     Current Facility-Administered Medications:     acetaminophen (TYLENOL) tablet 325 mg, Q6H PRN    albuterol inhalation solution 2.5 mg, TID    cefpodoxime (VANTIN) tablet 400 mg, BID With Meals    clonazePAM (KlonoPIN) tablet 1 mg, BID PRN    cloNIDine (CATAPRES) tablet 0.1 mg, Q12H ROBER    cyanocobalamin injection 1,000 mcg, Q30 Days    enoxaparin (LOVENOX) subcutaneous injection 40 mg, Daily    HYDROmorphone HCl (DILAUDID) injection 0.2 mg, Q4H PRN    hydrOXYzine HCL (ATARAX) tablet 25 mg, Q6H PRN Max 4/day    hydrOXYzine HCL (ATARAX) tablet 50 mg, Q6H PRN Max 4/day    ibuprofen (MOTRIN) tablet 400 mg, Q6H PRN    insulin glargine (LANTUS) subcutaneous injection 15 Units 0.15 mL, HS    insulin lispro (HumALOG/ADMELOG) 100 units/mL subcutaneous injection 1-6 Units, TID AC **AND** Fingerstick Glucose (POCT), TID AC    insulin lispro  (HumALOG/ADMELOG) 100 units/mL subcutaneous injection 1-6 Units, HS    LORazepam (ATIVAN) injection 1 mg, Q6H PRN Max 3/day    LORazepam (ATIVAN) tablet 1 mg, Q6H PRN Max 3/day    melatonin tablet 6 mg, HS    metoprolol tartrate (LOPRESSOR) partial tablet 12.5 mg, Q12H ROBER    metroNIDAZOLE (FLAGYL) tablet 500 mg, Q12H ROBER    nicotine polacrilex (NICORETTE) gum 2 mg, Q4H PRN    OLANZapine (ZyPREXA) tablet 10 mg, HS    oxyCODONE (ROXICODONE) IR tablet 5 mg, Q4H PRN    pantoprazole (PROTONIX) EC tablet 40 mg, Early Morning    polyethylene glycol (MIRALAX) packet 17 g, Daily PRN    senna-docusate sodium (SENOKOT S) 8.6-50 mg per tablet 1 tablet, Daily PRN    trimethobenzamide (TIGAN) IM injection 200 mg, Q6H PRN    Administrative Statements   Today, Patient Was Seen By: Tasha Joseph DO

## 2025-05-11 NOTE — ASSESSMENT & PLAN NOTE
55 year old male p/w cholecystitis. Original scheduled cholecystectomy delayed secondary to acute agitation. Now s/p laparoscopic cholecystectomy on 5/9. Progressing well, post-operatively    AVSS on room air  UOP a 50 cc plus one-time unmeasured  MISSY 40 cc serosanguineous    Plan  -Low fat CCD diet  -PO antibiotics 4 days, end date 5/13  - Monitor MISSY output  -General Surgery will follow peripherally and remove MISSY prior to discharge.  -Rest of care per primary team

## 2025-05-11 NOTE — PLAN OF CARE
Problem: PAIN - ADULT  Goal: Verbalizes/displays adequate comfort level or baseline comfort level  Description: Interventions:- Encourage patient to monitor pain and request assistance- Assess pain using appropriate pain scale- Administer analgesics based on type and severity of pain and evaluate response- Implement non-pharmacological measures as appropriate and evaluate response- Consider cultural and social influences on pain and pain management- Notify physician/advanced practitioner if interventions unsuccessful or patient reports new pain  Outcome: Progressing     Problem: INFECTION - ADULT  Goal: Absence or prevention of progression during hospitalization  Description: INTERVENTIONS:- Assess and monitor for signs and symptoms of infection- Monitor lab/diagnostic results- Monitor all insertion sites, i.e. indwelling lines, tubes, and drains- Monitor endotracheal if appropriate and nasal secretions for changes in amount and color- [place] appropriate cooling/warming therapies per order- Administer medications as ordered- Instruct and encourage patient and family to use good hand hygiene technique- Identify and instruct in appropriate isolation precautions for identified infection/condition  Outcome: Progressing  Goal: Absence of fever/infection during neutropenic period  Description: INTERVENTIONS:- Monitor WBC  Outcome: Progressing     Problem: SAFETY ADULT  Goal: Patient will remain free of falls  Description: INTERVENTIONS:- Educate patient/family on patient safety including physical limitations- Instruct patient to call for assistance with activity - Consult OT/PT to assist with strengthening/mobility - Keep Call bell within reach- Keep bed low and locked with side rails adjusted as appropriate- Keep care items and personal belongings within reach- Initiate and maintain comfort rounds- Make Fall Risk Sign visible to staff- Offer Toileting every 2 Hours, in advance of need- Initiate/Maintain alarm-  Obtain necessary fall risk management equipment- Apply yellow socks and bracelet for high fall risk patients- Consider moving patient to room near nurses station  Outcome: Progressing  Goal: Maintain or return to baseline ADL function  Description: INTERVENTIONS:-  Assess patient's ability to carry out ADLs; assess patient's baseline for ADL function and identify physical deficits which impact ability to perform ADLs (bathing, care of mouth/teeth, toileting, grooming, dressing, etc.)- Assess/evaluate cause of self-care deficits - Assess range of motion- Assess patient's mobility; develop plan if impaired- Assess patient's need for assistive devices and provide as appropriate- Encourage maximum independence but intervene and supervise when necessary- Involve family in performance of ADLs- Assess for home care needs following discharge - Consider OT consult to assist with ADL evaluation and planning for discharge- Provide patient education as appropriate  Outcome: Progressing  Goal: Maintains/Returns to pre admission functional level  Description: INTERVENTIONS:- Perform AM-PAC 6 Click Basic Mobility/ Daily Activity assessment daily.- Set and communicate daily mobility goal to care team and patient/family/caregiver. - Collaborate with rehabilitation services on mobility goals if consulted- Perform Range of Motion 3 times a day.- Reposition patient every 2 hours.- Dangle patient 3 times a day- Stand patient 3 times a day- Ambulate patient 3 times a day- Out of bed to chair 3 times a day - Out of bed for meals 3 times a day- Out of bed for toileting- Record patient progress and toleration of activity level   Outcome: Progressing     Problem: DISCHARGE PLANNING  Goal: Discharge to home or other facility with appropriate resources  Description: INTERVENTIONS:- Identify barriers to discharge w/patient and caregiver- Arrange for needed discharge resources and transportation as appropriate- Identify discharge learning needs  (meds, wound care, etc.)- Arrange for interpretive services to assist at discharge as needed- Refer to Case Management Department for coordinating discharge planning if the patient needs post-hospital services based on physician/advanced practitioner order or complex needs related to functional status, cognitive ability, or social support system  Outcome: Progressing     Problem: Knowledge Deficit  Goal: Patient/family/caregiver demonstrates understanding of disease process, treatment plan, medications, and discharge instructions  Description: Complete learning assessment and assess knowledge base.Interventions:- Provide teaching at level of understanding- Provide teaching via preferred learning methods  Outcome: Progressing     Problem: NEUROSENSORY - ADULT  Goal: Achieves stable or improved neurological status  Description: INTERVENTIONS- Monitor and report changes in neurological status- Monitor vital signs such as temperature, blood pressure, glucose, and any other labs ordered - Initiate measures to prevent increased intracranial pressure- Monitor for seizure activity and implement precautions if appropriate    Outcome: Progressing  Goal: Achieves maximal functionality and self care  Description: INTERVENTIONS- Monitor swallowing and airway patency with patient fatigue and changes in neurological status- Encourage and assist patient to increase activity and self care. - Encourage visually impaired, hearing impaired and aphasic patients to use assistive/communication devices  Outcome: Progressing     Problem: CARDIOVASCULAR - ADULT  Goal: Maintains optimal cardiac output and hemodynamic stability  Description: INTERVENTIONS:- Monitor I/O, vital signs and rhythm- Monitor for S/S and trends of decreased cardiac output- Administer and titrate ordered vasoactive medications to optimize hemodynamic stability- Assess quality of pulses, skin color and temperature- Assess for signs of decreased coronary artery  perfusion- Instruct patient to report change in severity of symptoms  Outcome: Progressing  Goal: Absence of cardiac dysrhythmias or at baseline rhythm  Description: INTERVENTIONS:- Continuous cardiac monitoring, vital signs, obtain 12 lead EKG if ordered- Administer antiarrhythmic and heart rate control medications as ordered- Monitor electrolytes and administer replacement therapy as ordered  Outcome: Progressing     Problem: RESPIRATORY - ADULT  Goal: Achieves optimal ventilation and oxygenation  Description: INTERVENTIONS:- Assess for changes in respiratory status- Assess for changes in mentation and behavior- Position to facilitate oxygenation and minimize respiratory effort- Oxygen administered by appropriate delivery if ordered- Initiate smoking cessation education as indicated- Encourage broncho-pulmonary hygiene including cough, deep breathe, Incentive Spirometry- Assess the need for suctioning and aspirate as needed- Assess and instruct to report SOB or any respiratory difficulty- Respiratory Therapy support as indicated  Outcome: Progressing     Problem: GASTROINTESTINAL - ADULT  Goal: Minimal or absence of nausea and/or vomiting  Description: INTERVENTIONS:- Administer IV fluids if ordered to ensure adequate hydration- Maintain NPO status until nausea and vomiting are resolved- Nasogastric tube if ordered- Administer ordered antiemetic medications as needed- Provide nonpharmacologic comfort measures as appropriate- Advance diet as tolerated, if ordered- Consider nutrition services referral to assist patient with adequate nutrition and appropriate food choices  Outcome: Progressing  Goal: Maintains or returns to baseline bowel function  Description: INTERVENTIONS:- Assess bowel function- Encourage oral fluids to ensure adequate hydration- Administer IV fluids if ordered to ensure adequate hydration- Administer ordered medications as needed- Encourage mobilization and activity- Consider nutritional  services referral to assist patient with adequate nutrition and appropriate food choices  Outcome: Progressing  Goal: Maintains adequate nutritional intake  Description: INTERVENTIONS:- Monitor percentage of each meal consumed- Identify factors contributing to decreased intake, treat as appropriate- Assist with meals as needed- Monitor I&O, weight, and lab values if indicated- Obtain nutrition services referral as needed  Outcome: Progressing  Goal: Oral mucous membranes remain intact  Description: INTERVENTIONS- Assess oral mucosa and hygiene practices- Implement preventative oral hygiene regimen- Implement oral medicated treatments as ordered- Initiate Nutrition services referral as needed  Outcome: Progressing     Problem: GENITOURINARY - ADULT  Goal: Maintains or returns to baseline urinary function  Description: INTERVENTIONS:- Assess urinary function- Encourage oral fluids to ensure adequate hydration if ordered- Administer IV fluids as ordered to ensure adequate hydration- Administer ordered medications as needed- Offer frequent toileting- Follow urinary retention protocol if ordered  Outcome: Progressing  Goal: Absence of urinary retention  Description: INTERVENTIONS:- Assess patient’s ability to void and empty bladder- Monitor I/O- Bladder scan as needed- Discuss with physician/AP medications to alleviate retention as needed- Discuss catheterization for long term situations as appropriate  Outcome: Progressing     Problem: METABOLIC, FLUID AND ELECTROLYTES - ADULT  Goal: Electrolytes maintained within normal limits  Description: INTERVENTIONS:- Monitor labs and assess patient for signs and symptoms of electrolyte imbalances- Administer electrolyte replacement as ordered- Monitor response to electrolyte replacements, including repeat lab results as appropriate- Instruct patient on fluid and nutrition as appropriate  Outcome: Progressing  Goal: Fluid balance maintained  Description: INTERVENTIONS:- Monitor  labs - Monitor I/O and WT- Instruct patient on fluid and nutrition as appropriate- Assess for signs & symptoms of volume excess or deficit  Outcome: Progressing  Goal: Glucose maintained within target range  Description: INTERVENTIONS:- Monitor Blood Glucose as ordered- Assess for signs and symptoms of hyperglycemia and hypoglycemia- Administer ordered medications to maintain glucose within target range- Assess nutritional intake and initiate nutrition service referral as needed  Outcome: Progressing     Problem: SKIN/TISSUE INTEGRITY - ADULT  Goal: Skin Integrity remains intact(Skin Breakdown Prevention)  Description: Assess:-Perform Jag assessment-Clean and moisturize skin-Inspect skin when repositioning, toileting, and assisting with ADLS-Assess under medical devices-Assess extremities for adequate circulation and sensation Bed Management:-Have minimal linens on bed & keep smooth, unwrinkled-Change linens as needed when moist or perspiring-Avoid sitting or lying in one position for more than 2 hours while in bed-Keep HOB at 45 degrees Toileting:-Offer bedside commode-Assess for incontinence-Use incontinent care products after each incontinent episode Activity:-Mobilize patient 3 times a day-Encourage activity and walks on unit-Encourage or provide ROM exercises -Turn and reposition patient every 2 Hours-Use appropriate equipment to lift or move patient in bed-Instruct/ Assist with weight shifting when out of bed in chair-Consider limitation of chair time 2 hour intervalsSkin Care:-Avoid use of baby powder, tape, friction and shearing, hot water or constrictive clothing-Relieve pressure over bony prominences-Do not massage red bony areasNext Steps:-Teach patient strategies to minimize risks -Consider consults to  interdisciplinary teams  Outcome: Progressing     Problem: HEMATOLOGIC - ADULT  Goal: Maintains hematologic stability  Description: INTERVENTIONS- Assess for signs and symptoms of bleeding or  hemorrhage- Monitor labs- Administer supportive blood products/factors as ordered and appropriate  Outcome: Progressing     Problem: MUSCULOSKELETAL - ADULT  Goal: Maintain or return mobility to safest level of function  Description: INTERVENTIONS:- Assess patient's ability to carry out ADLs; assess patient's baseline for ADL function and identify physical deficits which impact ability to perform ADLs (bathing, care of mouth/teeth, toileting, grooming, dressing, etc.)- Assess/evaluate cause of self-care deficits - Assess range of motion- Assess patient's mobility- Assess patient's need for assistive devices and provide as appropriate- Encourage maximum independence but intervene and supervise when necessary- Involve family in performance of ADLs- Assess for home care needs following discharge - Consider OT consult to assist with ADL evaluation and planning for discharge- Provide patient education as appropriate  Outcome: Progressing  Goal: Maintain proper alignment of affected body part  Description: INTERVENTIONS:- Support, maintain and protect limb and body alignment- Provide patient/ family with appropriate education  Outcome: Progressing     Problem: PSYCHOSIS  Goal: Will report no hallucinations or delusions  Description: Interventions:- Administer medication as  ordered- Every waking shifts and PRN assess for the presence of hallucinations and or delusions- Assist with reality testing to support increasing orientation- Assess if patient's hallucinations or delusions are encouraging self-harm or harm to others and intervene as appropriate  Outcome: Progressing     Problem: ANXIETY  Goal: Will report anxiety at manageable levels  Description: INTERVENTIONS:- Administer medication as ordered- Teach and encourage coping skills- Provide emotional support- Assess patient/family for anxiety and ability to cope  Outcome: Progressing  Goal: By discharge: Patient will verbalize 2 strategies to deal with  anxiety  Description: Interventions:- Identify any obvious source/trigger to anxiety- Staff will assist patient in applying identified coping technique/skills- Encourage attendance of scheduled groups and activities  Outcome: Progressing     Problem: SELF CARE DEFICIT  Goal: Return ADL status to a safe level of function  Description: INTERVENTIONS:- Administer medication as ordered- Assess ADL deficits and provide assistive devices as needed- Obtain PT/OT consults as needed- Assist and instruct patient to increase activity and self care as tolerated  Outcome: Progressing     Problem: Prexisting or High Potential for Compromised Skin Integrity  Goal: Skin integrity is maintained or improved  Description: INTERVENTIONS:- Identify patients at risk for skin breakdown- Assess and monitor skin integrity- Assess and monitor nutrition and hydration status- Monitor labs - Assess for incontinence - Turn and reposition patient- Assist with mobility/ambulation- Relieve pressure over bony prominences- Avoid friction and shearing- Provide appropriate hygiene as needed including keeping skin clean and dry- Evaluate need for skin moisturizer/barrier cream- Collaborate with interdisciplinary team - Patient/family teaching- Consider wound care consult   Outcome: Progressing     Problem: Nutrition/Hydration-ADULT  Goal: Nutrient/Hydration intake appropriate for improving, restoring or maintaining nutritional needs  Description: Monitor and assess patient's nutrition/hydration status for malnutrition. Collaborate with interdisciplinary team and initiate plan and interventions as ordered.  Monitor patient's weight and dietary intake as ordered or per policy. Utilize nutrition screening tool and intervene as necessary. Determine patient's food preferences and provide high-protein, high-caloric foods as appropriate. INTERVENTIONS:- Monitor oral intake, urinary output, labs, and treatment plans- Assess nutrition and hydration status and  recommend course of action- Evaluate amount of meals eaten- Assist patient with eating if necessary - Allow adequate time for meals- Recommend/ encourage appropriate diets, oral nutritional supplements, and vitamin/mineral supplements- Order, calculate, and assess calorie counts as needed- Recommend, monitor, and adjust tube feedings and TPN/PPN based on assessed needs- Assess need for intravenous fluids- Provide specific nutrition/hydration education as appropriate- Include patient/family/caregiver in decisions related to nutrition  Monitor and assess patient's nutrition/hydration status for malnutrition. Collaborate with interdisciplinary team and initiate plan and interventions as ordered.  Monitor patient's weight and dietary intake as ordered or per policy. Utilize nutrition screening tool and intervene as necessary. Determine patient's food preferences and provide high-protein, high-caloric foods as appropriate. INTERVENTIONS:- Monitor oral intake, urinary output, labs, and treatment plans- Assess nutrition and hydration status and recommend course of action- Evaluate amount of meals eaten- Assist patient with eating if necessary - Allow adequate time for meals- Recommend/ encourage appropriate diets, oral nutritional supplements, and vitamin/mineral supplements- Order, calculate, and assess calorie counts as needed- Recommend, monitor, and adjust tube feedings and TPN/PPN based on assessed needs- Assess need for intravenous fluids- Provide specific nutrition/hydration education as appropriate- Include patient/family/caregiver in decisions related to nutrition  Outcome: Progressing     Problem: Decreased Cardiac Output  Goal: Cardiac output adequate for individual needs  Description: INTERVENTIONS: Monitor for signs and symptoms of decreased cardiac output - Monitor for dyspnea with exertion and at rest- Monitor for orthopnea- Monitor for signs of tachycardia. Place patient on telemetry monitoring.- Assess  patient for jugular vein distention- Assess patient for lower extremity edema and poor peripheral perfusion - Auscultate lung sound for Fine bibasilar crackles - Monitor for cardiac arrythmias - Administer beta blockers, antiarrhythmic, and blood pressure medications as ordered  Outcome: Progressing     Problem: Impaired Gas Exchange  Goal: Optimize oxygenation and ensure adequate ventilation  Description: INTERVENTIONS: Monitor for signs and symptoms of respiratory distress              - Elevate HOB or use high fowlers to promote lung expansion              - Administer oxygen as ordered to maintain adequate oxygenation              - Encourage use of IS to promote lung expansion and prevent PN              - Monitor ABGs to assess oxygenation status              - Monitor blood oxygen level to maintain adequate oxygenation              - Encourage cough and deep breathing exercises to promote lung expansion              - Monitor patient's mental status for increased confusion  Outcome: Progressing     Problem: Excess Fluid Volume  Goal: Patient is able to achieve and maintain homeostasis  Description: INTERVENTIONS: Monitor for sign and symptoms of fluid overload- Evaluate LE edema every shift- Elevate LE to prevent dependent edema- Apply SUKUMAR stockings as ordered - Monitor ankle circumference daily- Assess for jugular vein distention- Evaluate provider orders for the CHF diuretic algorithm. Administer diuretics as ordered- Weigh the patient daily at 0600 and report a weight gain of five pounds or more - Strict intake and output- Monitor fluid intake and adhere to fluid restrictions- Assess lung sounds every shift and as needed- Monitor vital signs and lab values (CBC, chem, BUN, BNP)- Measure and document urine output  Outcome: Progressing     Problem: Activity Intolerance  Goal: Patient is able to perform activities within their limitations  Description: INTERVENTIONS:                     -   Alternate periods  of activity with periods of rest               -   Patients is able to maintain normal vitals heart rhythm during activity               -   Gradually increase activity and exercise as patient can tolerate               -   Monitor blood pressure and heart before and after exercise                -   Monitor blood oxygen saturation during activity and apply oxygen as needed  Outcome: Progressing     Problem: Knowledge Deficit  Goal: Patient is able to verbalize understanding of Heart Failure after education  Description: INTERVENTIONS:- Educate the patient and family on signs and symptoms of HF- Provide the patient with HF education and HF zone tool- Educate on the importance of daily weight in the AM and reporting a weight gain             of 3 or more pounds to their primary care physician- Monitor for SOB- Maintain and sodium and fluid restriction- Educate the patient on the importance of medications such as: diuretics, betablockers,             antiarrhythmics and their purpose, dose, route, side effects and labs             if they are needed  Outcome: Progressing

## 2025-05-11 NOTE — ASSESSMENT & PLAN NOTE
Lab Results   Component Value Date    HGBA1C 8.3 (A) 03/17/2025       Recent Labs     05/10/25  1201 05/10/25  1622 05/10/25  2140 05/11/25  0605   POCGLU 255* 355* 291* 213*       Blood Sugar Average: Last 72 hrs:  (P) [id number]  Continue insulin sliding scale   Continue lantus will increase it to 15 units

## 2025-05-11 NOTE — PROGRESS NOTES
Progress Note - Surgery-General   Name: Solo Mack 55 y.o. male I MRN: [id number]  Unit/Bed#: [place] [address] -01 I Date of Admission: 5/2/2025   Date of Service: 5/11/2025 I Hospital Day: 9    Assessment & Plan  Septic shock (HCC)  55 year old male p/w cholecystitis. Original scheduled cholecystectomy delayed secondary to acute agitation. Now s/p laparoscopic cholecystectomy on 5/9. Progressing well, post-operatively    AVSS on room air  UOP a 50 cc plus one-time unmeasured  MISSY 40 cc serosanguineous    Plan  -Low fat CCD diet  -PO antibiotics 4 days, end date 5/13  - Monitor MISSY output  -General Surgery will follow peripherally and remove MISSY prior to discharge.  -Rest of care per primary team  Acute cholecystitis  See above plan    Please contact the SecureChat role for the Surgery-General service with any questions/concerns.    Subjective   NAEON. AVSS on room air. Patient reports pain is controlled. Tolerating diet. Voiding spontaneously and passing flatus. Denies fever, chills, nausea, vomiting.     Objective :  Temp:  [98.2 °F (36.8 °C)-98.6 °F (37 °C)] 98.6 °F (37 °C)  HR:  [107] 107  BP: (138-151)/(85-86) 138/85  Resp:  [18] 18  SpO2:  [92 %-96 %] 96 %    I/O         05/09 0701  05/10 0700 05/10 0701  05/11 0700    P.O. 600 840    I.V. (mL/kg) 1900 (19.4)     IV Piggyback 350     Total Intake(mL/kg) 2850 (29.1) 840 (8.6)    Urine (mL/kg/hr) 2675 (1.1) 200 (0.1)    Drains 65 20    Stool      Total Output 2740 220    Net +110 +620          Unmeasured Urine Occurrence  1 x          Lines/Drains/Airways       Active Status       Name Placement date Placement time Site Days    Closed/Suction Drain RLQ Bulb 19 Fr. 05/09/25  1156  RLQ  1                  Physical Exam  General: NAD  HENT: NCAT MMM  Neck: supple, no JVD  CV: nl rate  Lungs: nl wob. No resp distress  ABD: Soft, appropriately tender, nondistended. Incisions c/d/I.  MISSY drain with light serosanguineous output.  Extrem: No CCE  Neuro: AAOx3      Lab  Results: I have reviewed the following results:  Recent Labs     05/09/25  0508 05/10/25  0623   WBC 8.31 16.23*   HGB 14.7 13.7   HCT 43.9 42.0    367   BANDSPCT 2  --    SODIUM 133* 133*   K 4.1 4.2    99   CO2 24 25   BUN 11 15   CREATININE 1.10 1.22   GLUC 168* 281*   MG 2.0 2.0   PHOS 2.3* 2.3*       VTE Pharmacologic Prophylaxis: VTE covered by:  enoxaparin, Subcutaneous, 40 mg at 05/10/25 0805      VTE Mechanical Prophylaxis: sequential compression device    Manuel Padilla MD  General Surgery Resident

## 2025-05-11 NOTE — PLAN OF CARE
Problem: PAIN - ADULT  Goal: Verbalizes/displays adequate comfort level or baseline comfort level  Description: Interventions:- Encourage patient to monitor pain and request assistance- Assess pain using appropriate pain scale- Administer analgesics based on type and severity of pain and evaluate response- Implement non-pharmacological measures as appropriate and evaluate response- Consider cultural and social influences on pain and pain management- Notify physician/advanced practitioner if interventions unsuccessful or patient reports new pain  Outcome: Progressing     Problem: INFECTION - ADULT  Goal: Absence or prevention of progression during hospitalization  Description: INTERVENTIONS:- Assess and monitor for signs and symptoms of infection- Monitor lab/diagnostic results- Monitor all insertion sites, i.e. indwelling lines, tubes, and drains- Monitor endotracheal if appropriate and nasal secretions for changes in amount and color- [place] appropriate cooling/warming therapies per order- Administer medications as ordered- Instruct and encourage patient and family to use good hand hygiene technique- Identify and instruct in appropriate isolation precautions for identified infection/condition  Outcome: Progressing  Goal: Absence of fever/infection during neutropenic period  Description: INTERVENTIONS:- Monitor WBC  Outcome: Progressing     Problem: SAFETY ADULT  Goal: Patient will remain free of falls  Description: INTERVENTIONS:- Educate patient/family on patient safety including physical limitations- Instruct patient to call for assistance with activity - Consult OT/PT to assist with strengthening/mobility - Keep Call bell within reach- Keep bed low and locked with side rails adjusted as appropriate- Keep care items and personal belongings within reach- Initiate and maintain comfort rounds- Make Fall Risk Sign visible to staff- Offer Toileting every 2 Hours, in advance of need- Initiate/Maintain alarm-  Obtain necessary fall risk management equipment- Apply yellow socks and bracelet for high fall risk patients- Consider moving patient to room near nurses station  Outcome: Progressing  Goal: Maintain or return to baseline ADL function  Description: INTERVENTIONS:-  Assess patient's ability to carry out ADLs; assess patient's baseline for ADL function and identify physical deficits which impact ability to perform ADLs (bathing, care of mouth/teeth, toileting, grooming, dressing, etc.)- Assess/evaluate cause of self-care deficits - Assess range of motion- Assess patient's mobility; develop plan if impaired- Assess patient's need for assistive devices and provide as appropriate- Encourage maximum independence but intervene and supervise when necessary- Involve family in performance of ADLs- Assess for home care needs following discharge - Consider OT consult to assist with ADL evaluation and planning for discharge- Provide patient education as appropriate  Outcome: Progressing  Goal: Maintains/Returns to pre admission functional level  Description: INTERVENTIONS:- Perform AM-PAC 6 Click Basic Mobility/ Daily Activity assessment daily.- Set and communicate daily mobility goal to care team and patient/family/caregiver. - Collaborate with rehabilitation services on mobility goals if consulted- Perform Range of Motion 3 times a day.- Reposition patient every 2 hours.- Dangle patient 3 times a day- Stand patient 3 times a day- Ambulate patient 3 times a day- Out of bed to chair 3 times a day - Out of bed for meals 3 times a day- Out of bed for toileting- Record patient progress and toleration of activity level   Outcome: Progressing     Problem: DISCHARGE PLANNING  Goal: Discharge to home or other facility with appropriate resources  Description: INTERVENTIONS:- Identify barriers to discharge w/patient and caregiver- Arrange for needed discharge resources and transportation as appropriate- Identify discharge learning needs  (meds, wound care, etc.)- Arrange for interpretive services to assist at discharge as needed- Refer to Case Management Department for coordinating discharge planning if the patient needs post-hospital services based on physician/advanced practitioner order or complex needs related to functional status, cognitive ability, or social support system  Outcome: Progressing     Problem: Knowledge Deficit  Goal: Patient/family/caregiver demonstrates understanding of disease process, treatment plan, medications, and discharge instructions  Description: Complete learning assessment and assess knowledge base.Interventions:- Provide teaching at level of understanding- Provide teaching via preferred learning methods  Outcome: Progressing     Problem: NEUROSENSORY - ADULT  Goal: Achieves stable or improved neurological status  Description: INTERVENTIONS- Monitor and report changes in neurological status- Monitor vital signs such as temperature, blood pressure, glucose, and any other labs ordered - Initiate measures to prevent increased intracranial pressure- Monitor for seizure activity and implement precautions if appropriate    Outcome: Progressing  Goal: Achieves maximal functionality and self care  Description: INTERVENTIONS- Monitor swallowing and airway patency with patient fatigue and changes in neurological status- Encourage and assist patient to increase activity and self care. - Encourage visually impaired, hearing impaired and aphasic patients to use assistive/communication devices  Outcome: Progressing     Problem: CARDIOVASCULAR - ADULT  Goal: Maintains optimal cardiac output and hemodynamic stability  Description: INTERVENTIONS:- Monitor I/O, vital signs and rhythm- Monitor for S/S and trends of decreased cardiac output- Administer and titrate ordered vasoactive medications to optimize hemodynamic stability- Assess quality of pulses, skin color and temperature- Assess for signs of decreased coronary artery  perfusion- Instruct patient to report change in severity of symptoms  Outcome: Progressing  Goal: Absence of cardiac dysrhythmias or at baseline rhythm  Description: INTERVENTIONS:- Continuous cardiac monitoring, vital signs, obtain 12 lead EKG if ordered- Administer antiarrhythmic and heart rate control medications as ordered- Monitor electrolytes and administer replacement therapy as ordered  Outcome: Progressing     Problem: RESPIRATORY - ADULT  Goal: Achieves optimal ventilation and oxygenation  Description: INTERVENTIONS:- Assess for changes in respiratory status- Assess for changes in mentation and behavior- Position to facilitate oxygenation and minimize respiratory effort- Oxygen administered by appropriate delivery if ordered- Initiate smoking cessation education as indicated- Encourage broncho-pulmonary hygiene including cough, deep breathe, Incentive Spirometry- Assess the need for suctioning and aspirate as needed- Assess and instruct to report SOB or any respiratory difficulty- Respiratory Therapy support as indicated  Outcome: Progressing     Problem: GASTROINTESTINAL - ADULT  Goal: Minimal or absence of nausea and/or vomiting  Description: INTERVENTIONS:- Administer IV fluids if ordered to ensure adequate hydration- Maintain NPO status until nausea and vomiting are resolved- Nasogastric tube if ordered- Administer ordered antiemetic medications as needed- Provide nonpharmacologic comfort measures as appropriate- Advance diet as tolerated, if ordered- Consider nutrition services referral to assist patient with adequate nutrition and appropriate food choices  Outcome: Progressing  Goal: Maintains or returns to baseline bowel function  Description: INTERVENTIONS:- Assess bowel function- Encourage oral fluids to ensure adequate hydration- Administer IV fluids if ordered to ensure adequate hydration- Administer ordered medications as needed- Encourage mobilization and activity- Consider nutritional  services referral to assist patient with adequate nutrition and appropriate food choices  Outcome: Progressing  Goal: Maintains adequate nutritional intake  Description: INTERVENTIONS:- Monitor percentage of each meal consumed- Identify factors contributing to decreased intake, treat as appropriate- Assist with meals as needed- Monitor I&O, weight, and lab values if indicated- Obtain nutrition services referral as needed  Outcome: Progressing  Goal: Oral mucous membranes remain intact  Description: INTERVENTIONS- Assess oral mucosa and hygiene practices- Implement preventative oral hygiene regimen- Implement oral medicated treatments as ordered- Initiate Nutrition services referral as needed  Outcome: Progressing     Problem: GENITOURINARY - ADULT  Goal: Maintains or returns to baseline urinary function  Description: INTERVENTIONS:- Assess urinary function- Encourage oral fluids to ensure adequate hydration if ordered- Administer IV fluids as ordered to ensure adequate hydration- Administer ordered medications as needed- Offer frequent toileting- Follow urinary retention protocol if ordered  Outcome: Progressing  Goal: Absence of urinary retention  Description: INTERVENTIONS:- Assess patient’s ability to void and empty bladder- Monitor I/O- Bladder scan as needed- Discuss with physician/AP medications to alleviate retention as needed- Discuss catheterization for long term situations as appropriate  Outcome: Progressing     Problem: METABOLIC, FLUID AND ELECTROLYTES - ADULT  Goal: Electrolytes maintained within normal limits  Description: INTERVENTIONS:- Monitor labs and assess patient for signs and symptoms of electrolyte imbalances- Administer electrolyte replacement as ordered- Monitor response to electrolyte replacements, including repeat lab results as appropriate- Instruct patient on fluid and nutrition as appropriate  Outcome: Progressing  Goal: Fluid balance maintained  Description: INTERVENTIONS:- Monitor  labs - Monitor I/O and WT- Instruct patient on fluid and nutrition as appropriate- Assess for signs & symptoms of volume excess or deficit  Outcome: Progressing  Goal: Glucose maintained within target range  Description: INTERVENTIONS:- Monitor Blood Glucose as ordered- Assess for signs and symptoms of hyperglycemia and hypoglycemia- Administer ordered medications to maintain glucose within target range- Assess nutritional intake and initiate nutrition service referral as needed  Outcome: Progressing     Problem: SKIN/TISSUE INTEGRITY - ADULT  Goal: Skin Integrity remains intact(Skin Breakdown Prevention)  Description: Assess:-Perform Jag assessment-Clean and moisturize skin-Inspect skin when repositioning, toileting, and assisting with ADLS-Assess under medical devices-Assess extremities for adequate circulation and sensation Bed Management:-Have minimal linens on bed & keep smooth, unwrinkled-Change linens as needed when moist or perspiring-Avoid sitting or lying in one position for more than 2 hours while in bed-Keep HOB at 45 degrees Toileting:-Offer bedside commode-Assess for incontinence-Use incontinent care products after each incontinent episode Activity:-Mobilize patient 3 times a day-Encourage activity and walks on unit-Encourage or provide ROM exercises -Turn and reposition patient every 2 Hours-Use appropriate equipment to lift or move patient in bed-Instruct/ Assist with weight shifting when out of bed in chair-Consider limitation of chair time 2 hour intervalsSkin Care:-Avoid use of baby powder, tape, friction and shearing, hot water or constrictive clothing-Relieve pressure over bony prominences-Do not massage red bony areasNext Steps:-Teach patient strategies to minimize risks -Consider consults to  interdisciplinary teams  Outcome: Progressing     Problem: HEMATOLOGIC - ADULT  Goal: Maintains hematologic stability  Description: INTERVENTIONS- Assess for signs and symptoms of bleeding or  hemorrhage- Monitor labs- Administer supportive blood products/factors as ordered and appropriate  Outcome: Progressing     Problem: MUSCULOSKELETAL - ADULT  Goal: Maintain or return mobility to safest level of function  Description: INTERVENTIONS:- Assess patient's ability to carry out ADLs; assess patient's baseline for ADL function and identify physical deficits which impact ability to perform ADLs (bathing, care of mouth/teeth, toileting, grooming, dressing, etc.)- Assess/evaluate cause of self-care deficits - Assess range of motion- Assess patient's mobility- Assess patient's need for assistive devices and provide as appropriate- Encourage maximum independence but intervene and supervise when necessary- Involve family in performance of ADLs- Assess for home care needs following discharge - Consider OT consult to assist with ADL evaluation and planning for discharge- Provide patient education as appropriate  Outcome: Progressing  Goal: Maintain proper alignment of affected body part  Description: INTERVENTIONS:- Support, maintain and protect limb and body alignment- Provide patient/ family with appropriate education  Outcome: Progressing     Problem: ANXIETY  Goal: Will report anxiety at manageable levels  Description: INTERVENTIONS:- Administer medication as ordered- Teach and encourage coping skills- Provide emotional support- Assess patient/family for anxiety and ability to cope  Outcome: Progressing  Goal: By discharge: Patient will verbalize 2 strategies to deal with anxiety  Description: Interventions:- Identify any obvious source/trigger to anxiety- Staff will assist patient in applying identified coping technique/skills- Encourage attendance of scheduled groups and activities  Outcome: Progressing     Problem: SELF CARE DEFICIT  Goal: Return ADL status to a safe level of function  Description: INTERVENTIONS:- Administer medication as ordered- Assess ADL deficits and provide assistive devices as needed-  Obtain PT/OT consults as needed- Assist and instruct patient to increase activity and self care as tolerated  Outcome: Progressing     Problem: Prexisting or High Potential for Compromised Skin Integrity  Goal: Skin integrity is maintained or improved  Description: INTERVENTIONS:- Identify patients at risk for skin breakdown- Assess and monitor skin integrity- Assess and monitor nutrition and hydration status- Monitor labs - Assess for incontinence - Turn and reposition patient- Assist with mobility/ambulation- Relieve pressure over bony prominences- Avoid friction and shearing- Provide appropriate hygiene as needed including keeping skin clean and dry- Evaluate need for skin moisturizer/barrier cream- Collaborate with interdisciplinary team - Patient/family teaching- Consider wound care consult   Outcome: Progressing     Problem: Excess Fluid Volume  Goal: Patient is able to achieve and maintain homeostasis  Description: INTERVENTIONS: Monitor for sign and symptoms of fluid overload- Evaluate LE edema every shift- Elevate LE to prevent dependent edema- Apply SUKUMAR stockings as ordered - Monitor ankle circumference daily- Assess for jugular vein distention- Evaluate provider orders for the CHF diuretic algorithm. Administer diuretics as ordered- Weigh the patient daily at 0600 and report a weight gain of five pounds or more - Strict intake and output- Monitor fluid intake and adhere to fluid restrictions- Assess lung sounds every shift and as needed- Monitor vital signs and lab values (CBC, chem, BUN, BNP)- Measure and document urine output  Outcome: Progressing     Problem: Activity Intolerance  Goal: Patient is able to perform activities within their limitations  Description: INTERVENTIONS:                     -   Alternate periods of activity with periods of rest               -   Patients is able to maintain normal vitals heart rhythm during activity               -   Gradually increase activity and exercise as  patient can tolerate               -   Monitor blood pressure and heart before and after exercise                -   Monitor blood oxygen saturation during activity and apply oxygen as needed  Outcome: Progressing

## 2025-05-11 NOTE — ASSESSMENT & PLAN NOTE
Pt was transferred from  to [facility] icu due to profound septic shock with imagining demonstrating gallstones, ruq tenderness, and profound leukocytosis  Currently treated with cefepime/flagyl  On levophed which had been weaned to off  Pt was scheduled for or but decided later in evening decided he did not want to go forward with surgery   Surgery stated to continue conservative treatment with iv antibiotics and change to po at discharge for 2 weeks however he has increased RUQ abd pain  Appreciate gi recommendations- pt is not a candidate for stenting as it will impair surgery  Spoke with surgery who recommended perc. Miriam tube- he is not a good candidate for this given his psychiatric history   Pt has improved but continues to have ruq pain at times. Concern is that he will become septic again without intervention. Long discussions about the appropriate treatment for him given his complex psychosocial situation. It was concluded that cholecystectomy might be the safest mode of treatment. Pt is willing to go forward with surgery   S/p lap cholecystectomy with kimberly drain in place. Stones were removed. Discussed with gi. No further intervention is required   Surgery following kimberly drain. Likely will be removed prior to discharge

## 2025-05-11 NOTE — ASSESSMENT & PLAN NOTE
Lab Results   Component Value Date    EGFR 65 05/10/2025    EGFR 74 05/09/2025    EGFR 83 05/08/2025    CREATININE 1.22 05/10/2025    CREATININE 1.10 05/09/2025    CREATININE 1.00 05/08/2025   Chauncey r/o  Creatinine currently at baseline which is 0.9-1.2   Repeat labs in am.

## 2025-05-11 NOTE — ASSESSMENT & PLAN NOTE
Wt Readings from Last 3 Encounters:   05/11/25 95.6 kg (210 lb 12.2 oz)   05/02/25 100 kg (221 lb 5.5 oz)   03/17/25 106 kg (233 lb)     Monitor I/o and daily weights   He does have lower lobe rales but weight is decreased and negative on I/o.   Could be atelectasis and will add incentive spirometry.   Will attempt to get a portable cxr   Check bnp in am.

## 2025-05-12 LAB
ANION GAP SERPL CALCULATED.3IONS-SCNC: 8 MMOL/L (ref 4–13)
BNP SERPL-MCNC: 53 PG/ML (ref 0–100)
BUN SERPL-MCNC: 14 MG/DL (ref 5–25)
CALCIUM SERPL-MCNC: 9 MG/DL (ref 8.4–10.2)
CHLORIDE SERPL-SCNC: 100 MMOL/L (ref 96–108)
CO2 SERPL-SCNC: 26 MMOL/L (ref 21–32)
CREAT SERPL-MCNC: 1.1 MG/DL (ref 0.6–1.3)
ERYTHROCYTE [DISTWIDTH] IN BLOOD BY AUTOMATED COUNT: 12.8 % (ref 11.6–15.1)
GFR SERPL CREATININE-BSD FRML MDRD: 74 ML/MIN/1.73SQ M
GLUCOSE SERPL-MCNC: 146 MG/DL (ref 65–140)
GLUCOSE SERPL-MCNC: 162 MG/DL (ref 65–140)
GLUCOSE SERPL-MCNC: 186 MG/DL (ref 65–140)
GLUCOSE SERPL-MCNC: 199 MG/DL (ref 65–140)
GLUCOSE SERPL-MCNC: 276 MG/DL (ref 65–140)
HBV SURFACE AG SER QL: REACTIVE
HBV SURFACE AG SERPL QL NT: NORMAL
HCT VFR BLD AUTO: 42.8 % (ref 36.5–49.3)
HGB BLD-MCNC: 14.2 G/DL (ref 12–17)
MCH RBC QN AUTO: 28.3 PG (ref 26.8–34.3)
MCHC RBC AUTO-ENTMCNC: 33.2 G/DL (ref 31.4–37.4)
MCV RBC AUTO: 85 FL (ref 82–98)
PLATELET # BLD AUTO: 301 THOUSANDS/UL (ref 149–390)
PMV BLD AUTO: 9.8 FL (ref 8.9–12.7)
POTASSIUM SERPL-SCNC: 4.6 MMOL/L (ref 3.5–5.3)
RBC # BLD AUTO: 5.02 MILLION/UL (ref 3.88–5.62)
SODIUM SERPL-SCNC: 134 MMOL/L (ref 135–147)
WBC # BLD AUTO: 10.64 THOUSAND/UL (ref 4.31–10.16)

## 2025-05-12 PROCEDURE — 83880 ASSAY OF NATRIURETIC PEPTIDE: CPT | Performed by: INTERNAL MEDICINE

## 2025-05-12 PROCEDURE — 99232 SBSQ HOSP IP/OBS MODERATE 35: CPT | Performed by: INTERNAL MEDICINE

## 2025-05-12 PROCEDURE — 82948 REAGENT STRIP/BLOOD GLUCOSE: CPT

## 2025-05-12 PROCEDURE — 80048 BASIC METABOLIC PNL TOTAL CA: CPT | Performed by: INTERNAL MEDICINE

## 2025-05-12 PROCEDURE — 85027 COMPLETE CBC AUTOMATED: CPT | Performed by: INTERNAL MEDICINE

## 2025-05-12 RX ADMIN — Medication 12.5 MG: at 08:09

## 2025-05-12 RX ADMIN — INSULIN LISPRO 2 UNITS: 100 INJECTION, SOLUTION INTRAVENOUS; SUBCUTANEOUS at 21:49

## 2025-05-12 RX ADMIN — Medication 6 MG: at 21:49

## 2025-05-12 RX ADMIN — INSULIN LISPRO 4 UNITS: 100 INJECTION, SOLUTION INTRAVENOUS; SUBCUTANEOUS at 12:33

## 2025-05-12 RX ADMIN — METRONIDAZOLE 500 MG: 500 TABLET ORAL at 21:48

## 2025-05-12 RX ADMIN — CLONIDINE HYDROCHLORIDE 0.1 MG: 0.1 TABLET ORAL at 21:49

## 2025-05-12 RX ADMIN — INSULIN LISPRO 1 UNITS: 100 INJECTION, SOLUTION INTRAVENOUS; SUBCUTANEOUS at 08:08

## 2025-05-12 RX ADMIN — INSULIN LISPRO 1 UNITS: 100 INJECTION, SOLUTION INTRAVENOUS; SUBCUTANEOUS at 17:26

## 2025-05-12 RX ADMIN — CEFPODOXIME PROXETIL 400 MG: 200 TABLET, FILM COATED ORAL at 15:39

## 2025-05-12 RX ADMIN — METRONIDAZOLE 500 MG: 500 TABLET ORAL at 08:09

## 2025-05-12 RX ADMIN — OXYCODONE 5 MG: 5 TABLET ORAL at 21:49

## 2025-05-12 RX ADMIN — ENOXAPARIN SODIUM 40 MG: 40 INJECTION SUBCUTANEOUS at 08:08

## 2025-05-12 RX ADMIN — INSULIN GLARGINE 15 UNITS: 100 INJECTION, SOLUTION SUBCUTANEOUS at 21:49

## 2025-05-12 RX ADMIN — OLANZAPINE 10 MG: 5 TABLET, FILM COATED ORAL at 21:49

## 2025-05-12 RX ADMIN — PANTOPRAZOLE SODIUM 40 MG: 40 TABLET, DELAYED RELEASE ORAL at 06:07

## 2025-05-12 RX ADMIN — CLONIDINE HYDROCHLORIDE 0.1 MG: 0.1 TABLET ORAL at 08:09

## 2025-05-12 RX ADMIN — Medication 12.5 MG: at 21:49

## 2025-05-12 RX ADMIN — CEFPODOXIME PROXETIL 400 MG: 200 TABLET, FILM COATED ORAL at 08:08

## 2025-05-12 NOTE — ASSESSMENT & PLAN NOTE
Lab Results   Component Value Date    EGFR 74 05/12/2025    EGFR 65 05/10/2025    EGFR 74 05/09/2025    CREATININE 1.10 05/12/2025    CREATININE 1.22 05/10/2025    CREATININE 1.10 05/09/2025   Chauncey r/o  Creatinine currently at baseline which is 0.9-1.2   Repeat labs in am.

## 2025-05-12 NOTE — PLAN OF CARE
Problem: PAIN - ADULT  Goal: Verbalizes/displays adequate comfort level or baseline comfort level  Description: Interventions:- Encourage patient to monitor pain and request assistance- Assess pain using appropriate pain scale- Administer analgesics based on type and severity of pain and evaluate response- Implement non-pharmacological measures as appropriate and evaluate response- Consider cultural and social influences on pain and pain management- Notify physician/advanced practitioner if interventions unsuccessful or patient reports new pain  Outcome: Progressing     Problem: INFECTION - ADULT  Goal: Absence or prevention of progression during hospitalization  Description: INTERVENTIONS:- Assess and monitor for signs and symptoms of infection- Monitor lab/diagnostic results- Monitor all insertion sites, i.e. indwelling lines, tubes, and drains- Monitor endotracheal if appropriate and nasal secretions for changes in amount and color- Hinkle appropriate cooling/warming therapies per order- Administer medications as ordered- Instruct and encourage patient and family to use good hand hygiene technique- Identify and instruct in appropriate isolation precautions for identified infection/condition  Outcome: Progressing  Goal: Absence of fever/infection during neutropenic period  Description: INTERVENTIONS:- Monitor WBC  Outcome: Progressing     Problem: SAFETY ADULT  Goal: Patient will remain free of falls  Description: INTERVENTIONS:- Educate patient/family on patient safety including physical limitations- Instruct patient to call for assistance with activity - Consult OT/PT to assist with strengthening/mobility - Keep Call bell within reach- Keep bed low and locked with side rails adjusted as appropriate- Keep care items and personal belongings within reach- Initiate and maintain comfort rounds- Make Fall Risk Sign visible to staff- Offer Toileting every 2 Hours, in advance of need- Initiate/Maintain alarm-  Obtain necessary fall risk management equipment- Apply yellow socks and bracelet for high fall risk patients- Consider moving patient to room near nurses station  Outcome: Progressing  Goal: Maintain or return to baseline ADL function  Description: INTERVENTIONS:-  Assess patient's ability to carry out ADLs; assess patient's baseline for ADL function and identify physical deficits which impact ability to perform ADLs (bathing, care of mouth/teeth, toileting, grooming, dressing, etc.)- Assess/evaluate cause of self-care deficits - Assess range of motion- Assess patient's mobility; develop plan if impaired- Assess patient's need for assistive devices and provide as appropriate- Encourage maximum independence but intervene and supervise when necessary- Involve family in performance of ADLs- Assess for home care needs following discharge - Consider OT consult to assist with ADL evaluation and planning for discharge- Provide patient education as appropriate  Outcome: Progressing  Goal: Maintains/Returns to pre admission functional level  Description: INTERVENTIONS:- Perform AM-PAC 6 Click Basic Mobility/ Daily Activity assessment daily.- Set and communicate daily mobility goal to care team and patient/family/caregiver. - Collaborate with rehabilitation services on mobility goals if consulted- Perform Range of Motion 3 times a day.- Reposition patient every 2 hours.- Dangle patient 3 times a day- Stand patient 3 times a day- Ambulate patient 3 times a day- Out of bed to chair 3 times a day - Out of bed for meals 3 times a day- Out of bed for toileting- Record patient progress and toleration of activity level   Outcome: Progressing     Problem: DISCHARGE PLANNING  Goal: Discharge to home or other facility with appropriate resources  Description: INTERVENTIONS:- Identify barriers to discharge w/patient and caregiver- Arrange for needed discharge resources and transportation as appropriate- Identify discharge learning needs  (meds, wound care, etc.)- Arrange for interpretive services to assist at discharge as needed- Refer to Case Management Department for coordinating discharge planning if the patient needs post-hospital services based on physician/advanced practitioner order or complex needs related to functional status, cognitive ability, or social support system  Outcome: Progressing     Problem: Knowledge Deficit  Goal: Patient/family/caregiver demonstrates understanding of disease process, treatment plan, medications, and discharge instructions  Description: Complete learning assessment and assess knowledge base.Interventions:- Provide teaching at level of understanding- Provide teaching via preferred learning methods  Outcome: Progressing     Problem: NEUROSENSORY - ADULT  Goal: Achieves stable or improved neurological status  Description: INTERVENTIONS- Monitor and report changes in neurological status- Monitor vital signs such as temperature, blood pressure, glucose, and any other labs ordered - Initiate measures to prevent increased intracranial pressure- Monitor for seizure activity and implement precautions if appropriate    Outcome: Progressing  Goal: Achieves maximal functionality and self care  Description: INTERVENTIONS- Monitor swallowing and airway patency with patient fatigue and changes in neurological status- Encourage and assist patient to increase activity and self care. - Encourage visually impaired, hearing impaired and aphasic patients to use assistive/communication devices  Outcome: Progressing     Problem: GASTROINTESTINAL - ADULT  Goal: Maintains or returns to baseline bowel function  Description: INTERVENTIONS:- Assess bowel function- Encourage oral fluids to ensure adequate hydration- Administer IV fluids if ordered to ensure adequate hydration- Administer ordered medications as needed- Encourage mobilization and activity- Consider nutritional services referral to assist patient with adequate nutrition and  appropriate food choices  Outcome: Progressing     Problem: GENITOURINARY - ADULT  Goal: Maintains or returns to baseline urinary function  Description: INTERVENTIONS:- Assess urinary function- Encourage oral fluids to ensure adequate hydration if ordered- Administer IV fluids as ordered to ensure adequate hydration- Administer ordered medications as needed- Offer frequent toileting- Follow urinary retention protocol if ordered  Outcome: Progressing  Goal: Absence of urinary retention  Description: INTERVENTIONS:- Assess patient’s ability to void and empty bladder- Monitor I/O- Bladder scan as needed- Discuss with physician/AP medications to alleviate retention as needed- Discuss catheterization for long term situations as appropriate  Outcome: Progressing     Problem: METABOLIC, FLUID AND ELECTROLYTES - ADULT  Goal: Electrolytes maintained within normal limits  Description: INTERVENTIONS:- Monitor labs and assess patient for signs and symptoms of electrolyte imbalances- Administer electrolyte replacement as ordered- Monitor response to electrolyte replacements, including repeat lab results as appropriate- Instruct patient on fluid and nutrition as appropriate  Outcome: Progressing  Goal: Fluid balance maintained  Description: INTERVENTIONS:- Monitor labs - Monitor I/O and WT- Instruct patient on fluid and nutrition as appropriate- Assess for signs & symptoms of volume excess or deficit  Outcome: Progressing  Goal: Glucose maintained within target range  Description: INTERVENTIONS:- Monitor Blood Glucose as ordered- Assess for signs and symptoms of hyperglycemia and hypoglycemia- Administer ordered medications to maintain glucose within target range- Assess nutritional intake and initiate nutrition service referral as needed  Outcome: Progressing     Problem: HEMATOLOGIC - ADULT  Goal: Maintains hematologic stability  Description: INTERVENTIONS- Assess for signs and symptoms of bleeding or hemorrhage- Monitor labs-  Administer supportive blood products/factors as ordered and appropriate  Outcome: Progressing     Problem: MUSCULOSKELETAL - ADULT  Goal: Maintain or return mobility to safest level of function  Description: INTERVENTIONS:- Assess patient's ability to carry out ADLs; assess patient's baseline for ADL function and identify physical deficits which impact ability to perform ADLs (bathing, care of mouth/teeth, toileting, grooming, dressing, etc.)- Assess/evaluate cause of self-care deficits - Assess range of motion- Assess patient's mobility- Assess patient's need for assistive devices and provide as appropriate- Encourage maximum independence but intervene and supervise when necessary- Involve family in performance of ADLs- Assess for home care needs following discharge - Consider OT consult to assist with ADL evaluation and planning for discharge- Provide patient education as appropriate  Outcome: Progressing  Goal: Maintain proper alignment of affected body part  Description: INTERVENTIONS:- Support, maintain and protect limb and body alignment- Provide patient/ family with appropriate education  Outcome: Progressing     Problem: PSYCHOSIS  Goal: Will report no hallucinations or delusions  Description: Interventions:- Administer medication as  ordered- Every waking shifts and PRN assess for the presence of hallucinations and or delusions- Assist with reality testing to support increasing orientation- Assess if patient's hallucinations or delusions are encouraging self-harm or harm to others and intervene as appropriate  Outcome: Progressing     Problem: SELF CARE DEFICIT  Goal: Return ADL status to a safe level of function  Description: INTERVENTIONS:- Administer medication as ordered- Assess ADL deficits and provide assistive devices as needed- Obtain PT/OT consults as needed- Assist and instruct patient to increase activity and self care as tolerated  Outcome: Progressing     Problem: Prexisting or High Potential  for Compromised Skin Integrity  Goal: Skin integrity is maintained or improved  Description: INTERVENTIONS:- Identify patients at risk for skin breakdown- Assess and monitor skin integrity- Assess and monitor nutrition and hydration status- Monitor labs - Assess for incontinence - Turn and reposition patient- Assist with mobility/ambulation- Relieve pressure over bony prominences- Avoid friction and shearing- Provide appropriate hygiene as needed including keeping skin clean and dry- Evaluate need for skin moisturizer/barrier cream- Collaborate with interdisciplinary team - Patient/family teaching- Consider wound care consult   Outcome: Progressing     Problem: Impaired Gas Exchange  Goal: Optimize oxygenation and ensure adequate ventilation  Description: INTERVENTIONS: Monitor for signs and symptoms of respiratory distress              - Elevate HOB or use high fowlers to promote lung expansion              - Administer oxygen as ordered to maintain adequate oxygenation              - Encourage use of IS to promote lung expansion and prevent PN              - Monitor ABGs to assess oxygenation status              - Monitor blood oxygen level to maintain adequate oxygenation              - Encourage cough and deep breathing exercises to promote lung expansion              - Monitor patient's mental status for increased confusion  Outcome: Progressing     Problem: Excess Fluid Volume  Goal: Patient is able to achieve and maintain homeostasis  Description: INTERVENTIONS: Monitor for sign and symptoms of fluid overload- Evaluate LE edema every shift- Elevate LE to prevent dependent edema- Apply SUKUMAR stockings as ordered - Monitor ankle circumference daily- Assess for jugular vein distention- Evaluate provider orders for the CHF diuretic algorithm. Administer diuretics as ordered- Weigh the patient daily at 0600 and report a weight gain of five pounds or more - Strict intake and output- Monitor fluid intake and adhere to  fluid restrictions- Assess lung sounds every shift and as needed- Monitor vital signs and lab values (CBC, chem, BUN, BNP)- Measure and document urine output  Outcome: Progressing     Problem: Knowledge Deficit  Goal: Patient is able to verbalize understanding of Heart Failure after education  Description: INTERVENTIONS:- Educate the patient and family on signs and symptoms of HF- Provide the patient with HF education and HF zone tool- Educate on the importance of daily weight in the AM and reporting a weight gain             of 3 or more pounds to their primary care physician- Monitor for SOB- Maintain and sodium and fluid restriction- Educate the patient on the importance of medications such as: diuretics, betablockers,             antiarrhythmics and their purpose, dose, route, side effects and labs             if they are needed  Outcome: Progressing

## 2025-05-12 NOTE — CONSULTS
Consultation - Neuropsychology/Psychology Department  Solo Mack 55 y.o. male MRN: [id number]  Unit/Bed#: [place] [id number] [id number] Encounter: [id number]        Reason for Consultation:  Solo Mack is a 55 y.o. year old male who was referred for a Neuropsychological Exam to assess cognitive functioning and comment on capacity to make informed medical decisions.      History of Present Illness  Septic Shock  Physician Requesting Consult: Tasha Joseph DO    PROBLEM LIST:  Patient Active Problem List   Diagnosis    Chronic pain disorder    GERD (gastroesophageal reflux disease)    Essential hypertension    Drug abuse and dependence ([facility])    White matter abnormality on MRI of brain    Type 2 diabetes mellitus, without long-term current use of insulin ([facility])    HLD (hyperlipidemia)    Schizophrenia, unspecified type ([facility])    Spinal stenosis, lumbar    Stutter    Erectile dysfunction    Bipolar affective disorder, mixed, in full remission ([facility])    Chronic kidney disease, stage 2 (mild)    H/O prolonged Q-T interval on ECG    Bipolar affective disorder, current episode manic with psychotic symptoms ([facility])    Acute cholecystitis    Septic shock ([facility])    Hypophosphatemia    Insomnia    Obesity (BMI 30-39.9)    CHF (congestive heart failure) ([facility])         Historical Information   Past Medical History:   Diagnosis Date    Alcohol withdrawal ([facility])     Alcoholism ([facility])     Anxiety     Back pain     Back pain, chronic     Bipolar 1 disorder ([facility])     Bipolar disorder, current episode mixed, moderate ([facility])     BPH (benign prostatic hyperplasia)     Cardiac disease     CHF (congestive heart failure) ([facility])     Chronic pain disorder     Chronic renal failure     Closed displaced fracture of neck of left scapula     Closed fracture of glenoid cavity and neck of left scapula 09/06/2020    Demyelinating disease ([facility])     Dental disorder     Depression     Diabetes mellitus ([facility])     Drug abuse ([facility])     Drug withdrawal ([facility])      ED (erectile dysfunction)     Edema     Encephalopathy     Encephalopathy     Fatigue     Fracture of left radius 09/06/2020    GERD (gastroesophageal reflux disease)     H/O prolonged Q-T interval on ECG 12/04/2024    Heart rate fast     Hepatitis     unspecified     Hyperlipidemia     Hypertension     Hypokalemia     Hypothyroidism 10/21/2024    Knee buckling     Left rib fracture 09/06/2020    MI (myocardial infarction) ([facility])     Morbid obesity with BMI of 40.0-44.9, adult ([facility])     NIDDY (non-insulin dependent diabetes mellitus in young) ([facility])     Obesity     Opiate dependence ([facility])     Opiate withdrawal ([facility])     Osteoarthritis     Pleural effusion     Pneumonia     Prolonged Q-T interval on ECG     Psychiatric disorder     Psychiatric illness     Psychosis ([facility])     Psychosis ([facility]) 12/17/2024    Renal disorder     SAH (subarachnoid hemorrhage) ([facility]) 09/06/2020    Schizo-affective schizophrenia ([facility])     Spinal stenosis, lumbar     Subdural hematoma ([facility]) 09/06/2020    Traumatic subdural hemorrhage with loss of consciousness of unspecified duration, initial encounter ([facility]) 12/29/2022    Ulcer of calf ([facility])     Ulnar neuritis, right     Ulnar neuropathy at elbow     Weight loss      Past Surgical History:   Procedure Laterality Date    BACK SURGERY      report thoracic surgery    CHOLECYSTECTOMY LAPAROSCOPIC N/A 5/9/2025    Procedure: CHOLECYSTECTOMY LAPAROSCOPIC;  Surgeon: Rodolfo Walls MD;  Location: [facility];  Service: General    LUMBAR FUSION  05/2006    L5/S1 [place]      Social History   Social History     Substance and Sexual Activity   Alcohol Use Not Currently    Comment: beer here and there per pt     Social History     Substance and Sexual Activity   Drug Use Not Currently     Social History     Tobacco Use   Smoking Status Former    Passive exposure: Never   Smokeless Tobacco Never   Tobacco Comments    patient is a non - smoker     Family History:   Family History   Problem Relation Age of  Onset    No Known Problems Mother     No Known Problems Father     No Known Problems Sister     No Known Problems Brother     No Known Problems Maternal Aunt     No Known Problems Paternal Aunt     No Known Problems Maternal Uncle     No Known Problems Paternal Uncle     No Known Problems Maternal Grandfather     No Known Problems Maternal Grandmother     No Known Problems Paternal Grandfather     No Known Problems Paternal Grandmother     No Known Problems Cousin     ADD / ADHD Neg Hx     Alcohol abuse Neg Hx     Anxiety disorder Neg Hx     Bipolar disorder Neg Hx     Dementia Neg Hx     Depression Neg Hx     Drug abuse Neg Hx     OCD Neg Hx     Paranoid behavior Neg Hx     Schizophrenia Neg Hx     Seizures Neg Hx     Self-Injury Neg Hx     Suicide Attempts Neg Hx        Meds/Allergies   current meds:   Current Facility-Administered Medications:     acetaminophen (TYLENOL) tablet 325 mg, Q6H PRN    albuterol inhalation solution 2.5 mg, Q8H PRN    cefpodoxime (VANTIN) tablet 400 mg, BID With Meals    clonazePAM (KlonoPIN) tablet 1 mg, BID PRN    cloNIDine (CATAPRES) tablet 0.1 mg, Q12H ROBER    cyanocobalamin injection 1,000 mcg, Q30 Days    enoxaparin (LOVENOX) subcutaneous injection 40 mg, Daily    HYDROmorphone HCl (DILAUDID) injection 0.2 mg, Q4H PRN    hydrOXYzine HCL (ATARAX) tablet 25 mg, Q6H PRN Max 4/day    hydrOXYzine HCL (ATARAX) tablet 50 mg, Q6H PRN Max 4/day    ibuprofen (MOTRIN) tablet 400 mg, Q6H PRN    insulin glargine (LANTUS) subcutaneous injection 15 Units 0.15 mL, HS    insulin lispro (HumALOG/ADMELOG) 100 units/mL subcutaneous injection 1-6 Units, TID AC **AND** Fingerstick Glucose (POCT), TID AC    insulin lispro (HumALOG/ADMELOG) 100 units/mL subcutaneous injection 1-6 Units, HS    LORazepam (ATIVAN) injection 1 mg, Q6H PRN Max 3/day    LORazepam (ATIVAN) tablet 1 mg, Q6H PRN Max 3/day    melatonin tablet 6 mg, HS    metoprolol tartrate (LOPRESSOR) partial tablet 12.5 mg, Q12H ROBER     metroNIDAZOLE (FLAGYL) tablet 500 mg, Q12H ROBER    nicotine polacrilex (NICORETTE) gum 2 mg, Q4H PRN    OLANZapine (ZyPREXA) tablet 10 mg, HS    oxyCODONE (ROXICODONE) IR tablet 5 mg, Q4H PRN    pantoprazole (PROTONIX) EC tablet 40 mg, Early Morning    polyethylene glycol (MIRALAX) packet 17 g, Daily PRN    senna-docusate sodium (SENOKOT S) 8.6-50 mg per tablet 1 tablet, Daily PRN    trimethobenzamide (TIGAN) IM injection 200 mg, Q6H PRN    Allergies   Allergen Reactions    Haldol [Haloperidol]     Lexapro [Escitalopram Oxalate] Hives    Penicillins Other (See Comments)     Unknown, reaction as child         Family and Social Support:   No data recorded    Behavioral Observations: Patient was alert, oriented x 3 and cooperative with pleasant affect; no overt evidence of  psychotic process a this time     Cognitive Examination    General Cognitive Functioning MMSE = Within Normal [id number];     Attention/Concentration Auditory Selective Attention = Average;  Auditory Vigilance = Within Normal Limits; Information Processing Speed = Within Normal Limits    Frontal Systems/Executive Functioning Mental Flexibility/Cognitive Control = Average; Working Memory = Average Abstract Reasoning = Average; Generative Ability = Average,     Language Functioning Confrontation naming = Within Normal Limits, Phonemic Fluency = Average; Semantic Retrieval = Average; Comprehension of Complex Ideational Material = Average; Praxis = Within Normal Limits; Repetition = Within Normal Limits; Basic Reading = Within Normal Limits;  Following Commands = Within Normal Limits    Memory Functioning Narrative Recall - Short Delay = Average; Long Delay Narrative Recall = Average;     Visuo-Spatial Abilities Not Assessed    Functional Knowledge  Health & Safety Knowledge = Within Normal Limits;     Summary/Impression:  Results of Neuropsychological Exam revealed adequate cognitive functioning and on a measure assessing awareness of personal health  status and ability to evaluate health problems, handle medica emergencies and take safety precautions, patient performed within normal limits. During this encounter, patient appears to have capacity to make informed medical decisions

## 2025-05-12 NOTE — ASSESSMENT & PLAN NOTE
Lab Results   Component Value Date    HGBA1C 8.3 (A) 03/17/2025       Recent Labs     05/11/25  2123 05/12/25  0721 05/12/25  1124 05/12/25  1617   POCGLU 172* 162* 276* 186*       Blood Sugar Average: Last 72 hrs:  (P) [id number]  Continue insulin sliding scale   Continue lantus at 15 units

## 2025-05-12 NOTE — ASSESSMENT & PLAN NOTE
Wt Readings from Last 3 Encounters:   05/12/25 96.1 kg (211 lb 13.8 oz)   05/02/25 100 kg (221 lb 5.5 oz)   03/17/25 106 kg (233 lb)     Monitor I/o and daily weights   He does have lower lobe rales but weight is decreased and negative on I/o.   Could be atelectasis and will add incentive spirometry.   Cxr negative for pulmonary edema  Bnp was normal

## 2025-05-12 NOTE — ASSESSMENT & PLAN NOTE
Appreciate psychiatry recommendations  Continue klonopin 1mg bid prn  Continue zyprexa 10mg qhs  Psych eval when pt is medically cleared  Neuropsych deemed pt to not have capacity   Will have neuropsych eval again as pt seemed to understand about his gallbladder and surgery  Appreciate psych recommendations  He will require inpt psych- recommending 201  He did have capacity today.   Will re consult psych again to evaluate him further to see if he needs 302.

## 2025-05-12 NOTE — QUICK NOTE
Patient seen bedside for removal of MISSY drain.  Minimal light serosanguineous drainage noted in tubing and bulb.  MISSY removed from normal fashion.  And dressing placed over prior site.  Patient tolerated without issue.  Reviewed with the patient ability to shower, to keep area clean and dry and to follow-up in approximately 2 weeks for postop check in, patient voiced understanding.  Patient to finish oral antibiotics for a total of 4 days ABX from surgery date.  Otherwise stable for discharge from surgery perspective, discharge when appropriate per primary team.    Manuel Padilla MD  General Surgery Resident

## 2025-05-12 NOTE — PLAN OF CARE
Problem: PAIN - ADULT  Goal: Verbalizes/displays adequate comfort level or baseline comfort level  Description: Interventions:- Encourage patient to monitor pain and request assistance- Assess pain using appropriate pain scale- Administer analgesics based on type and severity of pain and evaluate response- Implement non-pharmacological measures as appropriate and evaluate response- Consider cultural and social influences on pain and pain management- Notify physician/advanced practitioner if interventions unsuccessful or patient reports new pain  Outcome: Progressing     Problem: INFECTION - ADULT  Goal: Absence or prevention of progression during hospitalization  Description: INTERVENTIONS:- Assess and monitor for signs and symptoms of infection- Monitor lab/diagnostic results- Monitor all insertion sites, i.e. indwelling lines, tubes, and drains- Monitor endotracheal if appropriate and nasal secretions for changes in amount and color- [place] appropriate cooling/warming therapies per order- Administer medications as ordered- Instruct and encourage patient and family to use good hand hygiene technique- Identify and instruct in appropriate isolation precautions for identified infection/condition  Outcome: Progressing  Goal: Absence of fever/infection during neutropenic period  Description: INTERVENTIONS:- Monitor WBC  Outcome: Progressing     Problem: SAFETY ADULT  Goal: Patient will remain free of falls  Description: INTERVENTIONS:- Educate patient/family on patient safety including physical limitations- Instruct patient to call for assistance with activity - Consult OT/PT to assist with strengthening/mobility - Keep Call bell within reach- Keep bed low and locked with side rails adjusted as appropriate- Keep care items and personal belongings within reach- Initiate and maintain comfort rounds- Make Fall Risk Sign visible to staff- Offer Toileting every 2 Hours, in advance of need- Initiate/Maintain alarm-  Obtain necessary fall risk management equipment- Apply yellow socks and bracelet for high fall risk patients- Consider moving patient to room near nurses station  Outcome: Progressing  Goal: Maintain or return to baseline ADL function  Description: INTERVENTIONS:-  Assess patient's ability to carry out ADLs; assess patient's baseline for ADL function and identify physical deficits which impact ability to perform ADLs (bathing, care of mouth/teeth, toileting, grooming, dressing, etc.)- Assess/evaluate cause of self-care deficits - Assess range of motion- Assess patient's mobility; develop plan if impaired- Assess patient's need for assistive devices and provide as appropriate- Encourage maximum independence but intervene and supervise when necessary- Involve family in performance of ADLs- Assess for home care needs following discharge - Consider OT consult to assist with ADL evaluation and planning for discharge- Provide patient education as appropriate  Outcome: Progressing  Goal: Maintains/Returns to pre admission functional level  Description: INTERVENTIONS:- Perform AM-PAC 6 Click Basic Mobility/ Daily Activity assessment daily.- Set and communicate daily mobility goal to care team and patient/family/caregiver. - Collaborate with rehabilitation services on mobility goals if consulted- Perform Range of Motion 3 times a day.- Reposition patient every 2 hours.- Dangle patient 3 times a day- Stand patient 3 times a day- Ambulate patient 3 times a day- Out of bed to chair 3 times a day - Out of bed for meals 3 times a day- Out of bed for toileting- Record patient progress and toleration of activity level   Outcome: Progressing     Problem: DISCHARGE PLANNING  Goal: Discharge to home or other facility with appropriate resources  Description: INTERVENTIONS:- Identify barriers to discharge w/patient and caregiver- Arrange for needed discharge resources and transportation as appropriate- Identify discharge learning needs  (meds, wound care, etc.)- Arrange for interpretive services to assist at discharge as needed- Refer to Case Management Department for coordinating discharge planning if the patient needs post-hospital services based on physician/advanced practitioner order or complex needs related to functional status, cognitive ability, or social support system  Outcome: Progressing     Problem: Knowledge Deficit  Goal: Patient/family/caregiver demonstrates understanding of disease process, treatment plan, medications, and discharge instructions  Description: Complete learning assessment and assess knowledge base.Interventions:- Provide teaching at level of understanding- Provide teaching via preferred learning methods  Outcome: Progressing     Problem: NEUROSENSORY - ADULT  Goal: Achieves stable or improved neurological status  Description: INTERVENTIONS- Monitor and report changes in neurological status- Monitor vital signs such as temperature, blood pressure, glucose, and any other labs ordered - Initiate measures to prevent increased intracranial pressure- Monitor for seizure activity and implement precautions if appropriate    Outcome: Progressing  Goal: Achieves maximal functionality and self care  Description: INTERVENTIONS- Monitor swallowing and airway patency with patient fatigue and changes in neurological status- Encourage and assist patient to increase activity and self care. - Encourage visually impaired, hearing impaired and aphasic patients to use assistive/communication devices  Outcome: Progressing     Problem: CARDIOVASCULAR - ADULT  Goal: Maintains optimal cardiac output and hemodynamic stability  Description: INTERVENTIONS:- Monitor I/O, vital signs and rhythm- Monitor for S/S and trends of decreased cardiac output- Administer and titrate ordered vasoactive medications to optimize hemodynamic stability- Assess quality of pulses, skin color and temperature- Assess for signs of decreased coronary artery  perfusion- Instruct patient to report change in severity of symptoms  Outcome: Progressing  Goal: Absence of cardiac dysrhythmias or at baseline rhythm  Description: INTERVENTIONS:- Continuous cardiac monitoring, vital signs, obtain 12 lead EKG if ordered- Administer antiarrhythmic and heart rate control medications as ordered- Monitor electrolytes and administer replacement therapy as ordered  Outcome: Progressing     Problem: RESPIRATORY - ADULT  Goal: Achieves optimal ventilation and oxygenation  Description: INTERVENTIONS:- Assess for changes in respiratory status- Assess for changes in mentation and behavior- Position to facilitate oxygenation and minimize respiratory effort- Oxygen administered by appropriate delivery if ordered- Initiate smoking cessation education as indicated- Encourage broncho-pulmonary hygiene including cough, deep breathe, Incentive Spirometry- Assess the need for suctioning and aspirate as needed- Assess and instruct to report SOB or any respiratory difficulty- Respiratory Therapy support as indicated  Outcome: Progressing     Problem: GASTROINTESTINAL - ADULT  Goal: Minimal or absence of nausea and/or vomiting  Description: INTERVENTIONS:- Administer IV fluids if ordered to ensure adequate hydration- Maintain NPO status until nausea and vomiting are resolved- Nasogastric tube if ordered- Administer ordered antiemetic medications as needed- Provide nonpharmacologic comfort measures as appropriate- Advance diet as tolerated, if ordered- Consider nutrition services referral to assist patient with adequate nutrition and appropriate food choices  Outcome: Progressing  Goal: Maintains or returns to baseline bowel function  Description: INTERVENTIONS:- Assess bowel function- Encourage oral fluids to ensure adequate hydration- Administer IV fluids if ordered to ensure adequate hydration- Administer ordered medications as needed- Encourage mobilization and activity- Consider nutritional  services referral to assist patient with adequate nutrition and appropriate food choices  Outcome: Progressing  Goal: Maintains adequate nutritional intake  Description: INTERVENTIONS:- Monitor percentage of each meal consumed- Identify factors contributing to decreased intake, treat as appropriate- Assist with meals as needed- Monitor I&O, weight, and lab values if indicated- Obtain nutrition services referral as needed  Outcome: Progressing  Goal: Oral mucous membranes remain intact  Description: INTERVENTIONS- Assess oral mucosa and hygiene practices- Implement preventative oral hygiene regimen- Implement oral medicated treatments as ordered- Initiate Nutrition services referral as needed  Outcome: Progressing     Problem: GENITOURINARY - ADULT  Goal: Maintains or returns to baseline urinary function  Description: INTERVENTIONS:- Assess urinary function- Encourage oral fluids to ensure adequate hydration if ordered- Administer IV fluids as ordered to ensure adequate hydration- Administer ordered medications as needed- Offer frequent toileting- Follow urinary retention protocol if ordered  Outcome: Progressing  Goal: Absence of urinary retention  Description: INTERVENTIONS:- Assess patient’s ability to void and empty bladder- Monitor I/O- Bladder scan as needed- Discuss with physician/AP medications to alleviate retention as needed- Discuss catheterization for long term situations as appropriate  Outcome: Progressing     Problem: METABOLIC, FLUID AND ELECTROLYTES - ADULT  Goal: Electrolytes maintained within normal limits  Description: INTERVENTIONS:- Monitor labs and assess patient for signs and symptoms of electrolyte imbalances- Administer electrolyte replacement as ordered- Monitor response to electrolyte replacements, including repeat lab results as appropriate- Instruct patient on fluid and nutrition as appropriate  Outcome: Progressing  Goal: Fluid balance maintained  Description: INTERVENTIONS:- Monitor  labs - Monitor I/O and WT- Instruct patient on fluid and nutrition as appropriate- Assess for signs & symptoms of volume excess or deficit  Outcome: Progressing  Goal: Glucose maintained within target range  Description: INTERVENTIONS:- Monitor Blood Glucose as ordered- Assess for signs and symptoms of hyperglycemia and hypoglycemia- Administer ordered medications to maintain glucose within target range- Assess nutritional intake and initiate nutrition service referral as needed  Outcome: Progressing     Problem: SKIN/TISSUE INTEGRITY - ADULT  Goal: Skin Integrity remains intact(Skin Breakdown Prevention)  Description: Assess:-Perform Jag assessment-Clean and moisturize skin-Inspect skin when repositioning, toileting, and assisting with ADLS-Assess under medical devices-Assess extremities for adequate circulation and sensation Bed Management:-Have minimal linens on bed & keep smooth, unwrinkled-Change linens as needed when moist or perspiring-Avoid sitting or lying in one position for more than 2 hours while in bed-Keep HOB at 45 degrees Toileting:-Offer bedside commode-Assess for incontinence-Use incontinent care products after each incontinent episode Activity:-Mobilize patient 3 times a day-Encourage activity and walks on unit-Encourage or provide ROM exercises -Turn and reposition patient every 2 Hours-Use appropriate equipment to lift or move patient in bed-Instruct/ Assist with weight shifting when out of bed in chair-Consider limitation of chair time 2 hour intervalsSkin Care:-Avoid use of baby powder, tape, friction and shearing, hot water or constrictive clothing-Relieve pressure over bony prominences-Do not massage red bony areasNext Steps:-Teach patient strategies to minimize risks -Consider consults to  interdisciplinary teams  Outcome: Progressing     Problem: HEMATOLOGIC - ADULT  Goal: Maintains hematologic stability  Description: INTERVENTIONS- Assess for signs and symptoms of bleeding or  hemorrhage- Monitor labs- Administer supportive blood products/factors as ordered and appropriate  Outcome: Progressing     Problem: MUSCULOSKELETAL - ADULT  Goal: Maintain or return mobility to safest level of function  Description: INTERVENTIONS:- Assess patient's ability to carry out ADLs; assess patient's baseline for ADL function and identify physical deficits which impact ability to perform ADLs (bathing, care of mouth/teeth, toileting, grooming, dressing, etc.)- Assess/evaluate cause of self-care deficits - Assess range of motion- Assess patient's mobility- Assess patient's need for assistive devices and provide as appropriate- Encourage maximum independence but intervene and supervise when necessary- Involve family in performance of ADLs- Assess for home care needs following discharge - Consider OT consult to assist with ADL evaluation and planning for discharge- Provide patient education as appropriate  Outcome: Progressing  Goal: Maintain proper alignment of affected body part  Description: INTERVENTIONS:- Support, maintain and protect limb and body alignment- Provide patient/ family with appropriate education  Outcome: Progressing     Problem: PSYCHOSIS  Goal: Will report no hallucinations or delusions  Description: Interventions:- Administer medication as  ordered- Every waking shifts and PRN assess for the presence of hallucinations and or delusions- Assist with reality testing to support increasing orientation- Assess if patient's hallucinations or delusions are encouraging self-harm or harm to others and intervene as appropriate  Outcome: Progressing     Problem: ANXIETY  Goal: Will report anxiety at manageable levels  Description: INTERVENTIONS:- Administer medication as ordered- Teach and encourage coping skills- Provide emotional support- Assess patient/family for anxiety and ability to cope  Outcome: Progressing  Goal: By discharge: Patient will verbalize 2 strategies to deal with  anxiety  Description: Interventions:- Identify any obvious source/trigger to anxiety- Staff will assist patient in applying identified coping technique/skills- Encourage attendance of scheduled groups and activities  Outcome: Progressing     Problem: SELF CARE DEFICIT  Goal: Return ADL status to a safe level of function  Description: INTERVENTIONS:- Administer medication as ordered- Assess ADL deficits and provide assistive devices as needed- Obtain PT/OT consults as needed- Assist and instruct patient to increase activity and self care as tolerated  Outcome: Progressing     Problem: Prexisting or High Potential for Compromised Skin Integrity  Goal: Skin integrity is maintained or improved  Description: INTERVENTIONS:- Identify patients at risk for skin breakdown- Assess and monitor skin integrity- Assess and monitor nutrition and hydration status- Monitor labs - Assess for incontinence - Turn and reposition patient- Assist with mobility/ambulation- Relieve pressure over bony prominences- Avoid friction and shearing- Provide appropriate hygiene as needed including keeping skin clean and dry- Evaluate need for skin moisturizer/barrier cream- Collaborate with interdisciplinary team - Patient/family teaching- Consider wound care consult   Outcome: Progressing     Problem: Nutrition/Hydration-ADULT  Goal: Nutrient/Hydration intake appropriate for improving, restoring or maintaining nutritional needs  Description: Monitor and assess patient's nutrition/hydration status for malnutrition. Collaborate with interdisciplinary team and initiate plan and interventions as ordered.  Monitor patient's weight and dietary intake as ordered or per policy. Utilize nutrition screening tool and intervene as necessary. Determine patient's food preferences and provide high-protein, high-caloric foods as appropriate. INTERVENTIONS:- Monitor oral intake, urinary output, labs, and treatment plans- Assess nutrition and hydration status and  recommend course of action- Evaluate amount of meals eaten- Assist patient with eating if necessary - Allow adequate time for meals- Recommend/ encourage appropriate diets, oral nutritional supplements, and vitamin/mineral supplements- Order, calculate, and assess calorie counts as needed- Recommend, monitor, and adjust tube feedings and TPN/PPN based on assessed needs- Assess need for intravenous fluids- Provide specific nutrition/hydration education as appropriate- Include patient/family/caregiver in decisions related to nutrition  Monitor and assess patient's nutrition/hydration status for malnutrition. Collaborate with interdisciplinary team and initiate plan and interventions as ordered.  Monitor patient's weight and dietary intake as ordered or per policy. Utilize nutrition screening tool and intervene as necessary. Determine patient's food preferences and provide high-protein, high-caloric foods as appropriate. INTERVENTIONS:- Monitor oral intake, urinary output, labs, and treatment plans- Assess nutrition and hydration status and recommend course of action- Evaluate amount of meals eaten- Assist patient with eating if necessary - Allow adequate time for meals- Recommend/ encourage appropriate diets, oral nutritional supplements, and vitamin/mineral supplements- Order, calculate, and assess calorie counts as needed- Recommend, monitor, and adjust tube feedings and TPN/PPN based on assessed needs- Assess need for intravenous fluids- Provide specific nutrition/hydration education as appropriate- Include patient/family/caregiver in decisions related to nutrition  Outcome: Progressing     Problem: Decreased Cardiac Output  Goal: Cardiac output adequate for individual needs  Description: INTERVENTIONS: Monitor for signs and symptoms of decreased cardiac output - Monitor for dyspnea with exertion and at rest- Monitor for orthopnea- Monitor for signs of tachycardia. Place patient on telemetry monitoring.- Assess  patient for jugular vein distention- Assess patient for lower extremity edema and poor peripheral perfusion - Auscultate lung sound for Fine bibasilar crackles - Monitor for cardiac arrythmias - Administer beta blockers, antiarrhythmic, and blood pressure medications as ordered  Outcome: Progressing     Problem: Impaired Gas Exchange  Goal: Optimize oxygenation and ensure adequate ventilation  Description: INTERVENTIONS: Monitor for signs and symptoms of respiratory distress              - Elevate HOB or use high fowlers to promote lung expansion              - Administer oxygen as ordered to maintain adequate oxygenation              - Encourage use of IS to promote lung expansion and prevent PN              - Monitor ABGs to assess oxygenation status              - Monitor blood oxygen level to maintain adequate oxygenation              - Encourage cough and deep breathing exercises to promote lung expansion              - Monitor patient's mental status for increased confusion  Outcome: Progressing     Problem: Excess Fluid Volume  Goal: Patient is able to achieve and maintain homeostasis  Description: INTERVENTIONS: Monitor for sign and symptoms of fluid overload- Evaluate LE edema every shift- Elevate LE to prevent dependent edema- Apply SUKUMAR stockings as ordered - Monitor ankle circumference daily- Assess for jugular vein distention- Evaluate provider orders for the CHF diuretic algorithm. Administer diuretics as ordered- Weigh the patient daily at 0600 and report a weight gain of five pounds or more - Strict intake and output- Monitor fluid intake and adhere to fluid restrictions- Assess lung sounds every shift and as needed- Monitor vital signs and lab values (CBC, chem, BUN, BNP)- Measure and document urine output  Outcome: Progressing     Problem: Activity Intolerance  Goal: Patient is able to perform activities within their limitations  Description: INTERVENTIONS:                     -   Alternate periods  of activity with periods of rest               -   Patients is able to maintain normal vitals heart rhythm during activity               -   Gradually increase activity and exercise as patient can tolerate               -   Monitor blood pressure and heart before and after exercise                -   Monitor blood oxygen saturation during activity and apply oxygen as needed  Outcome: Progressing     Problem: Knowledge Deficit  Goal: Patient is able to verbalize understanding of Heart Failure after education  Description: INTERVENTIONS:- Educate the patient and family on signs and symptoms of HF- Provide the patient with HF education and HF zone tool- Educate on the importance of daily weight in the AM and reporting a weight gain             of 3 or more pounds to their primary care physician- Monitor for SOB- Maintain and sodium and fluid restriction- Educate the patient on the importance of medications such as: diuretics, betablockers,             antiarrhythmics and their purpose, dose, route, side effects and labs             if they are needed  Outcome: Progressing

## 2025-05-12 NOTE — PROGRESS NOTES
Progress Note - Hospitalist   Name: Solo Mack 55 y.o. male I MRN: [id number]  Unit/Bed#: [place] [address] -01 I Date of Admission: 5/2/2025   Date of Service: 5/12/2025 I Hospital Day: 10    Assessment & Plan  Septic shock (HCC)  Pt was transferred from  to [facility] icu due to profound septic shock with imagining demonstrating gallstones, ruq tenderness, and profound leukocytosis  Currently treated with cefepime/flagyl  On levophed which had been weaned to off  Pt was scheduled for or but decided later in evening decided he did not want to go forward with surgery   Surgery stated to continue conservative treatment with iv antibiotics and change to po at discharge for 2 weeks however he has increased RUQ abd pain  Appreciate gi recommendations- pt is not a candidate for stenting as it will impair surgery  Spoke with surgery who recommended perc. Miriam tube- he is not a good candidate for this given his psychiatric history   Pt has improved but continues to have ruq pain at times. Concern is that he will become septic again without intervention. Long discussions about the appropriate treatment for him given his complex psychosocial situation. It was concluded that cholecystectomy might be the safest mode of treatment. Pt is willing to go forward with surgery   S/p lap cholecystectomy with kimberly drain in place. Stones were removed. Discussed with gi. No further intervention is required   S/p kimberly drain removal  Monitor for 24 hours     GERD (gastroesophageal reflux disease)  Continue ppi   Essential hypertension  Had been on clonidine and metoprolol outpt   Continue metoprolol and clonidine   Type 2 diabetes mellitus, without long-term current use of insulin ([facility])  Lab Results   Component Value Date    HGBA1C 8.3 (A) 03/17/2025       Recent Labs     05/11/25  2123 05/12/25  0721 05/12/25  1124 05/12/25  1617   POCGLU 172* 162* 276* 186*       Blood Sugar Average: Last 72 hrs:  (P) [id number]  Continue insulin  sliding scale   Continue lantus at 15 units   Schizophrenia, unspecified type (HCC)  Appreciate psychiatry recommendations  Continue klonopin 1mg bid prn  Continue zyprexa 10mg qhs  Psych eval when pt is medically cleared  Neuropsych deemed pt to not have capacity   Will have neuropsych eval again as pt seemed to understand about his gallbladder and surgery  Appreciate psych recommendations  He will require inpt psych- recommending 201  He did have capacity today.   Will re consult psych again to evaluate him further to see if he needs 302.   Chronic kidney disease, stage 2 (mild)  Lab Results   Component Value Date    EGFR 74 05/12/2025    EGFR 65 05/10/2025    EGFR 74 05/09/2025    CREATININE 1.10 05/12/2025    CREATININE 1.22 05/10/2025    CREATININE 1.10 05/09/2025   Chauncey r/o  Creatinine currently at baseline which is 0.9-1.2   Repeat labs in am.   H/O prolonged Q-T interval on ECG  Qtc has improved  Replace mg. Keep potassium greater than 4.0    Acute cholecystitis  Please see septic shock   Hypophosphatemia    Insomnia  continue melatonin  Continue zyprexa   Obesity (BMI 30-39.9)    CHF (congestive heart failure) ([facility])  Wt Readings from Last 3 Encounters:   05/12/25 96.1 kg (211 lb 13.8 oz)   05/02/25 100 kg (221 lb 5.5 oz)   03/17/25 106 kg (233 lb)     Monitor I/o and daily weights   He does have lower lobe rales but weight is decreased and negative on I/o.   Could be atelectasis and will add incentive spirometry.   Cxr negative for pulmonary edema  Bnp was normal             VTE Pharmacologic Prophylaxis:   lovenox     Mobility:   Basic Mobility Inpatient Raw Score: 24  JH-HLM Goal: 8: Walk 250 feet or more  JH-HLM Achieved: 8: Walk 250 feet ot more    Patient Centered Rounds:  discussed with his nurse       Education and Discussions with Family / Patient: attempted to call wife but no answer     Current Length of Stay: 10 day(s)  Current Patient Status: Inpatient     Discharge Plan: psych eval tomorrow      Code Status: Level 1 - Full Code    Subjective   Pt seen and examined. He declined to sign the 201. He states otherwise he feels well. He knows he hallucinates but people in the bible had the same problem and they called it visions.     Objective :  Temp:  [97.8 °F (36.6 °C)-99 °F (37.2 °C)] 99 °F (37.2 °C)  HR:  [72-87] 87  BP: ()/(58-75) 101/69  Resp:  [14-18] 14  SpO2:  [94 %] 94 %    Body mass index is 33.18 kg/m².     Input and Output Summary (last 24 hours):     Intake/Output Summary (Last 24 hours) at 5/12/2025 1802  Last data filed at 5/12/2025 1320  Gross per 24 hour   Intake 840 ml   Output 900 ml   Net -60 ml       Physical Exam  Constitutional:       Appearance: Normal appearance.   Neurological:      Mental Status: He is alert and oriented to person, place, and time.     He was eating lunch and declined eval       Lines/Drains:              Lab Results: I have reviewed the following results:   Results from last 7 days   Lab Units 05/12/25  0646 05/10/25  0623 05/09/25  0508   WBC Thousand/uL 10.64* 16.23* 8.31   HEMOGLOBIN g/dL 14.2 13.7 14.7   HEMATOCRIT % 42.8 42.0 43.9   PLATELETS Thousands/uL 301 367 305   BANDS PCT %  --   --  2   SEGS PCT %  --  74  --    LYMPHO PCT %  --  13* 36   MONO PCT %  --  10 8   EOS PCT %  --  0 4     Results from last 7 days   Lab Units 05/12/25  0646 05/09/25  0508 05/08/25  0500   SODIUM mmol/L 134*   < > 136   POTASSIUM mmol/L 4.6   < > 4.0   CHLORIDE mmol/L 100   < > 101   CO2 mmol/L 26   < > 27   BUN mg/dL 14   < > 10   CREATININE mg/dL 1.10   < > 1.00   ANION GAP mmol/L 8   < > 8   CALCIUM mg/dL 9.0   < > 8.7   ALBUMIN g/dL  --   --  3.3*   TOTAL BILIRUBIN mg/dL  --   --  0.41   ALK PHOS U/L  --   --  82   ALT U/L  --   --  22   AST U/L  --   --  28   GLUCOSE RANDOM mg/dL 146*   < > 149*    < > = values in this interval not displayed.         Results from last 7 days   Lab Units 05/12/25  1617 05/12/25  1124 05/12/25  0721 05/11/25  2123 05/11/25  1626  05/11/25  1138 05/11/25  0605 05/10/25  2140 05/10/25  1622 05/10/25  1201 05/09/25  2125 05/09/25  1601   POC GLUCOSE mg/dl 186* 276* 162* 172* 237* 240* 213* 291* 355* 255* 248* 249*               Recent Cultures (last 7 days):         Imaging Results Review: No pertinent imaging studies reviewed.  Other Study Results Review: No additional pertinent studies reviewed.    Last 24 Hours Medication List:     Current Facility-Administered Medications:     acetaminophen (TYLENOL) tablet 325 mg, Q6H PRN    albuterol inhalation solution 2.5 mg, Q8H PRN    cefpodoxime (VANTIN) tablet 400 mg, BID With Meals    clonazePAM (KlonoPIN) tablet 1 mg, BID PRN    cloNIDine (CATAPRES) tablet 0.1 mg, Q12H ROBER    cyanocobalamin injection 1,000 mcg, Q30 Days    enoxaparin (LOVENOX) subcutaneous injection 40 mg, Daily    HYDROmorphone HCl (DILAUDID) injection 0.2 mg, Q4H PRN    hydrOXYzine HCL (ATARAX) tablet 25 mg, Q6H PRN Max 4/day    hydrOXYzine HCL (ATARAX) tablet 50 mg, Q6H PRN Max 4/day    ibuprofen (MOTRIN) tablet 400 mg, Q6H PRN    insulin glargine (LANTUS) subcutaneous injection 15 Units 0.15 mL, HS    insulin lispro (HumALOG/ADMELOG) 100 units/mL subcutaneous injection 1-6 Units, TID AC **AND** Fingerstick Glucose (POCT), TID AC    insulin lispro (HumALOG/ADMELOG) 100 units/mL subcutaneous injection 1-6 Units, HS    LORazepam (ATIVAN) injection 1 mg, Q6H PRN Max 3/day    LORazepam (ATIVAN) tablet 1 mg, Q6H PRN Max 3/day    melatonin tablet 6 mg, HS    metoprolol tartrate (LOPRESSOR) partial tablet 12.5 mg, Q12H ROBER    metroNIDAZOLE (FLAGYL) tablet 500 mg, Q12H ROBER    nicotine polacrilex (NICORETTE) gum 2 mg, Q4H PRN    OLANZapine (ZyPREXA) tablet 10 mg, HS    oxyCODONE (ROXICODONE) IR tablet 5 mg, Q4H PRN    pantoprazole (PROTONIX) EC tablet 40 mg, Early Morning    polyethylene glycol (MIRALAX) packet 17 g, Daily PRN    senna-docusate sodium (SENOKOT S) 8.6-50 mg per tablet 1 tablet, Daily PRN    trimethobenzamide (TIGAN) IM  injection 200 mg, Q6H PRN    Administrative Statements   Today, Patient Was Seen By: Tasha Joseph DO

## 2025-05-13 ENCOUNTER — TELEPHONE (OUTPATIENT)
Dept: PSYCHIATRY | Facility: CLINIC | Age: 55
End: 2025-05-13

## 2025-05-13 LAB
GLUCOSE SERPL-MCNC: 171 MG/DL (ref 65–140)
GLUCOSE SERPL-MCNC: 181 MG/DL (ref 65–140)
GLUCOSE SERPL-MCNC: 243 MG/DL (ref 65–140)
GLUCOSE SERPL-MCNC: 303 MG/DL (ref 65–140)

## 2025-05-13 PROCEDURE — 99232 SBSQ HOSP IP/OBS MODERATE 35: CPT | Performed by: INTERNAL MEDICINE

## 2025-05-13 PROCEDURE — 88304 TISSUE EXAM BY PATHOLOGIST: CPT | Performed by: STUDENT IN AN ORGANIZED HEALTH CARE EDUCATION/TRAINING PROGRAM

## 2025-05-13 PROCEDURE — 99232 SBSQ HOSP IP/OBS MODERATE 35: CPT | Performed by: PSYCHIATRY & NEUROLOGY

## 2025-05-13 PROCEDURE — 82948 REAGENT STRIP/BLOOD GLUCOSE: CPT

## 2025-05-13 RX ORDER — HYDROXYZINE HYDROCHLORIDE 50 MG/1
50 TABLET, FILM COATED ORAL 3 TIMES DAILY
Qty: 90 TABLET | Refills: 0 | Status: SHIPPED | OUTPATIENT
Start: 2025-05-13

## 2025-05-13 RX ORDER — GLUCOSAMINE HCL/CHONDROITIN SU 500-400 MG
CAPSULE ORAL
Qty: 100 EACH | Refills: 0 | Status: SHIPPED | OUTPATIENT
Start: 2025-05-13

## 2025-05-13 RX ORDER — INSULIN GLARGINE 100 [IU]/ML
15 INJECTION, SOLUTION SUBCUTANEOUS
Qty: 10 ML | Refills: 0 | Status: SHIPPED | OUTPATIENT
Start: 2025-05-13

## 2025-05-13 RX ORDER — METOPROLOL TARTRATE 25 MG/1
12.5 TABLET, FILM COATED ORAL EVERY 12 HOURS SCHEDULED
Qty: 30 TABLET | Refills: 0 | Status: SHIPPED | OUTPATIENT
Start: 2025-05-13 | End: 2025-05-16

## 2025-05-13 RX ORDER — LANCETS 33 GAUGE
EACH MISCELLANEOUS
Qty: 100 EACH | Refills: 0 | Status: SHIPPED | OUTPATIENT
Start: 2025-05-13

## 2025-05-13 RX ORDER — PEN NEEDLE, DIABETIC 32GX 5/32"
NEEDLE, DISPOSABLE MISCELLANEOUS
Qty: 100 EACH | Refills: 0 | Status: SHIPPED | OUTPATIENT
Start: 2025-05-13

## 2025-05-13 RX ORDER — CLONIDINE HYDROCHLORIDE 0.1 MG/1
0.1 TABLET ORAL EVERY 12 HOURS SCHEDULED
Qty: 60 TABLET | Refills: 0 | Status: SHIPPED | OUTPATIENT
Start: 2025-05-13 | End: 2025-05-13

## 2025-05-13 RX ORDER — BLOOD-GLUCOSE METER
KIT MISCELLANEOUS
Qty: 1 KIT | Refills: 0 | Status: SHIPPED | OUTPATIENT
Start: 2025-05-13

## 2025-05-13 RX ORDER — CALCIUM CARBONATE/VITAMIN D3 600 MG-10
250 TABLET ORAL DAILY
Qty: 30 TABLET | Refills: 0 | Status: SHIPPED | OUTPATIENT
Start: 2025-05-13 | End: 2025-06-12

## 2025-05-13 RX ORDER — CLONAZEPAM 1 MG/1
1 TABLET ORAL 2 TIMES DAILY PRN
Start: 2025-05-13 | End: 2025-05-23

## 2025-05-13 RX ORDER — PANTOPRAZOLE SODIUM 40 MG/1
40 TABLET, DELAYED RELEASE ORAL DAILY
Qty: 30 TABLET | Refills: 0 | Status: SHIPPED | OUTPATIENT
Start: 2025-05-13

## 2025-05-13 RX ORDER — BLOOD SUGAR DIAGNOSTIC
STRIP MISCELLANEOUS
Qty: 100 EACH | Refills: 0 | Status: SHIPPED | OUTPATIENT
Start: 2025-05-13

## 2025-05-13 RX ORDER — OLANZAPINE 10 MG/1
10 TABLET, FILM COATED ORAL
Qty: 30 TABLET | Refills: 0 | Status: SHIPPED | OUTPATIENT
Start: 2025-05-13 | End: 2025-06-12

## 2025-05-13 RX ADMIN — HYDROXYZINE HYDROCHLORIDE 25 MG: 25 TABLET, FILM COATED ORAL at 19:08

## 2025-05-13 RX ADMIN — OLANZAPINE 10 MG: 5 TABLET, FILM COATED ORAL at 21:14

## 2025-05-13 RX ADMIN — INSULIN LISPRO 1 UNITS: 100 INJECTION, SOLUTION INTRAVENOUS; SUBCUTANEOUS at 08:42

## 2025-05-13 RX ADMIN — ACETAMINOPHEN 325 MG: 325 TABLET, FILM COATED ORAL at 22:35

## 2025-05-13 RX ADMIN — CLONAZEPAM 1 MG: 1 TABLET ORAL at 21:14

## 2025-05-13 RX ADMIN — INSULIN LISPRO 4 UNITS: 100 INJECTION, SOLUTION INTRAVENOUS; SUBCUTANEOUS at 11:14

## 2025-05-13 RX ADMIN — OXYCODONE 5 MG: 5 TABLET ORAL at 21:14

## 2025-05-13 RX ADMIN — Medication 12.5 MG: at 21:14

## 2025-05-13 RX ADMIN — ACETAMINOPHEN 325 MG: 325 TABLET, FILM COATED ORAL at 16:38

## 2025-05-13 RX ADMIN — INSULIN GLARGINE 15 UNITS: 100 INJECTION, SOLUTION SUBCUTANEOUS at 21:09

## 2025-05-13 RX ADMIN — INSULIN LISPRO 1 UNITS: 100 INJECTION, SOLUTION INTRAVENOUS; SUBCUTANEOUS at 21:13

## 2025-05-13 RX ADMIN — PANTOPRAZOLE SODIUM 40 MG: 40 TABLET, DELAYED RELEASE ORAL at 06:48

## 2025-05-13 RX ADMIN — ENOXAPARIN SODIUM 40 MG: 40 INJECTION SUBCUTANEOUS at 08:42

## 2025-05-13 RX ADMIN — CEFPODOXIME PROXETIL 400 MG: 200 TABLET, FILM COATED ORAL at 08:42

## 2025-05-13 RX ADMIN — Medication 6 MG: at 21:14

## 2025-05-13 RX ADMIN — INSULIN LISPRO 3 UNITS: 100 INJECTION, SOLUTION INTRAVENOUS; SUBCUTANEOUS at 16:22

## 2025-05-13 NOTE — PLAN OF CARE
Problem: PAIN - ADULT  Goal: Verbalizes/displays adequate comfort level or baseline comfort level  Description: Interventions:- Encourage patient to monitor pain and request assistance- Assess pain using appropriate pain scale- Administer analgesics based on type and severity of pain and evaluate response- Implement non-pharmacological measures as appropriate and evaluate response- Consider cultural and social influences on pain and pain management- Notify physician/advanced practitioner if interventions unsuccessful or patient reports new pain  Outcome: Progressing     Problem: INFECTION - ADULT  Goal: Absence or prevention of progression during hospitalization  Description: INTERVENTIONS:- Assess and monitor for signs and symptoms of infection- Monitor lab/diagnostic results- Monitor all insertion sites, i.e. indwelling lines, tubes, and drains- Monitor endotracheal if appropriate and nasal secretions for changes in amount and color- [place] appropriate cooling/warming therapies per order- Administer medications as ordered- Instruct and encourage patient and family to use good hand hygiene technique- Identify and instruct in appropriate isolation precautions for identified infection/condition  Outcome: Progressing  Goal: Absence of fever/infection during neutropenic period  Description: INTERVENTIONS:- Monitor WBC  Outcome: Progressing     Problem: SAFETY ADULT  Goal: Patient will remain free of falls  Description: INTERVENTIONS:- Educate patient/family on patient safety including physical limitations- Instruct patient to call for assistance with activity - Consult OT/PT to assist with strengthening/mobility - Keep Call bell within reach- Keep bed low and locked with side rails adjusted as appropriate- Keep care items and personal belongings within reach- Initiate and maintain comfort rounds- Make Fall Risk Sign visible to staff- Offer Toileting every 2 Hours, in advance of need- Initiate/Maintain alarm-  Obtain necessary fall risk management equipment- Apply yellow socks and bracelet for high fall risk patients- Consider moving patient to room near nurses station  Outcome: Progressing  Goal: Maintain or return to baseline ADL function  Description: INTERVENTIONS:-  Assess patient's ability to carry out ADLs; assess patient's baseline for ADL function and identify physical deficits which impact ability to perform ADLs (bathing, care of mouth/teeth, toileting, grooming, dressing, etc.)- Assess/evaluate cause of self-care deficits - Assess range of motion- Assess patient's mobility; develop plan if impaired- Assess patient's need for assistive devices and provide as appropriate- Encourage maximum independence but intervene and supervise when necessary- Involve family in performance of ADLs- Assess for home care needs following discharge - Consider OT consult to assist with ADL evaluation and planning for discharge- Provide patient education as appropriate  Outcome: Progressing  Goal: Maintains/Returns to pre admission functional level  Description: INTERVENTIONS:- Perform AM-PAC 6 Click Basic Mobility/ Daily Activity assessment daily.- Set and communicate daily mobility goal to care team and patient/family/caregiver. - Collaborate with rehabilitation services on mobility goals if consulted- Perform Range of Motion 3 times a day.- Reposition patient every 2 hours.- Dangle patient 3 times a day- Stand patient 3 times a day- Ambulate patient 3 times a day- Out of bed to chair 3 times a day - Out of bed for meals 3 times a day- Out of bed for toileting- Record patient progress and toleration of activity level   Outcome: Progressing     Problem: DISCHARGE PLANNING  Goal: Discharge to home or other facility with appropriate resources  Description: INTERVENTIONS:- Identify barriers to discharge w/patient and caregiver- Arrange for needed discharge resources and transportation as appropriate- Identify discharge learning needs  (meds, wound care, etc.)- Arrange for interpretive services to assist at discharge as needed- Refer to Case Management Department for coordinating discharge planning if the patient needs post-hospital services based on physician/advanced practitioner order or complex needs related to functional status, cognitive ability, or social support system  Outcome: Progressing     Problem: Knowledge Deficit  Goal: Patient/family/caregiver demonstrates understanding of disease process, treatment plan, medications, and discharge instructions  Description: Complete learning assessment and assess knowledge base.Interventions:- Provide teaching at level of understanding- Provide teaching via preferred learning methods  Outcome: Progressing     Problem: NEUROSENSORY - ADULT  Goal: Achieves stable or improved neurological status  Description: INTERVENTIONS- Monitor and report changes in neurological status- Monitor vital signs such as temperature, blood pressure, glucose, and any other labs ordered - Initiate measures to prevent increased intracranial pressure- Monitor for seizure activity and implement precautions if appropriate    Outcome: Progressing  Goal: Achieves maximal functionality and self care  Description: INTERVENTIONS- Monitor swallowing and airway patency with patient fatigue and changes in neurological status- Encourage and assist patient to increase activity and self care. - Encourage visually impaired, hearing impaired and aphasic patients to use assistive/communication devices  Outcome: Progressing     Problem: CARDIOVASCULAR - ADULT  Goal: Maintains optimal cardiac output and hemodynamic stability  Description: INTERVENTIONS:- Monitor I/O, vital signs and rhythm- Monitor for S/S and trends of decreased cardiac output- Administer and titrate ordered vasoactive medications to optimize hemodynamic stability- Assess quality of pulses, skin color and temperature- Assess for signs of decreased coronary artery  perfusion- Instruct patient to report change in severity of symptoms  Outcome: Progressing  Goal: Absence of cardiac dysrhythmias or at baseline rhythm  Description: INTERVENTIONS:- Continuous cardiac monitoring, vital signs, obtain 12 lead EKG if ordered- Administer antiarrhythmic and heart rate control medications as ordered- Monitor electrolytes and administer replacement therapy as ordered  Outcome: Progressing     Problem: RESPIRATORY - ADULT  Goal: Achieves optimal ventilation and oxygenation  Description: INTERVENTIONS:- Assess for changes in respiratory status- Assess for changes in mentation and behavior- Position to facilitate oxygenation and minimize respiratory effort- Oxygen administered by appropriate delivery if ordered- Initiate smoking cessation education as indicated- Encourage broncho-pulmonary hygiene including cough, deep breathe, Incentive Spirometry- Assess the need for suctioning and aspirate as needed- Assess and instruct to report SOB or any respiratory difficulty- Respiratory Therapy support as indicated  Outcome: Progressing     Problem: GASTROINTESTINAL - ADULT  Goal: Minimal or absence of nausea and/or vomiting  Description: INTERVENTIONS:- Administer IV fluids if ordered to ensure adequate hydration- Maintain NPO status until nausea and vomiting are resolved- Nasogastric tube if ordered- Administer ordered antiemetic medications as needed- Provide nonpharmacologic comfort measures as appropriate- Advance diet as tolerated, if ordered- Consider nutrition services referral to assist patient with adequate nutrition and appropriate food choices  Outcome: Progressing  Goal: Maintains or returns to baseline bowel function  Description: INTERVENTIONS:- Assess bowel function- Encourage oral fluids to ensure adequate hydration- Administer IV fluids if ordered to ensure adequate hydration- Administer ordered medications as needed- Encourage mobilization and activity- Consider nutritional  services referral to assist patient with adequate nutrition and appropriate food choices  Outcome: Progressing  Goal: Maintains adequate nutritional intake  Description: INTERVENTIONS:- Monitor percentage of each meal consumed- Identify factors contributing to decreased intake, treat as appropriate- Assist with meals as needed- Monitor I&O, weight, and lab values if indicated- Obtain nutrition services referral as needed  Outcome: Progressing  Goal: Oral mucous membranes remain intact  Description: INTERVENTIONS- Assess oral mucosa and hygiene practices- Implement preventative oral hygiene regimen- Implement oral medicated treatments as ordered- Initiate Nutrition services referral as needed  Outcome: Progressing     Problem: GENITOURINARY - ADULT  Goal: Maintains or returns to baseline urinary function  Description: INTERVENTIONS:- Assess urinary function- Encourage oral fluids to ensure adequate hydration if ordered- Administer IV fluids as ordered to ensure adequate hydration- Administer ordered medications as needed- Offer frequent toileting- Follow urinary retention protocol if ordered  Outcome: Progressing  Goal: Absence of urinary retention  Description: INTERVENTIONS:- Assess patient’s ability to void and empty bladder- Monitor I/O- Bladder scan as needed- Discuss with physician/AP medications to alleviate retention as needed- Discuss catheterization for long term situations as appropriate  Outcome: Progressing     Problem: METABOLIC, FLUID AND ELECTROLYTES - ADULT  Goal: Electrolytes maintained within normal limits  Description: INTERVENTIONS:- Monitor labs and assess patient for signs and symptoms of electrolyte imbalances- Administer electrolyte replacement as ordered- Monitor response to electrolyte replacements, including repeat lab results as appropriate- Instruct patient on fluid and nutrition as appropriate  Outcome: Progressing  Goal: Fluid balance maintained  Description: INTERVENTIONS:- Monitor  labs - Monitor I/O and WT- Instruct patient on fluid and nutrition as appropriate- Assess for signs & symptoms of volume excess or deficit  Outcome: Progressing  Goal: Glucose maintained within target range  Description: INTERVENTIONS:- Monitor Blood Glucose as ordered- Assess for signs and symptoms of hyperglycemia and hypoglycemia- Administer ordered medications to maintain glucose within target range- Assess nutritional intake and initiate nutrition service referral as needed  Outcome: Progressing     Problem: SKIN/TISSUE INTEGRITY - ADULT  Goal: Skin Integrity remains intact(Skin Breakdown Prevention)  Description: Assess:-Perform Jag assessment-Clean and moisturize skin-Inspect skin when repositioning, toileting, and assisting with ADLS-Assess under medical devices-Assess extremities for adequate circulation and sensation Bed Management:-Have minimal linens on bed & keep smooth, unwrinkled-Change linens as needed when moist or perspiring-Avoid sitting or lying in one position for more than 2 hours while in bed-Keep HOB at 45 degrees Toileting:-Offer bedside commode-Assess for incontinence-Use incontinent care products after each incontinent episode Activity:-Mobilize patient 3 times a day-Encourage activity and walks on unit-Encourage or provide ROM exercises -Turn and reposition patient every 2 Hours-Use appropriate equipment to lift or move patient in bed-Instruct/ Assist with weight shifting when out of bed in chair-Consider limitation of chair time 2 hour intervalsSkin Care:-Avoid use of baby powder, tape, friction and shearing, hot water or constrictive clothing-Relieve pressure over bony prominences-Do not massage red bony areasNext Steps:-Teach patient strategies to minimize risks -Consider consults to  interdisciplinary teams  Outcome: Progressing     Problem: HEMATOLOGIC - ADULT  Goal: Maintains hematologic stability  Description: INTERVENTIONS- Assess for signs and symptoms of bleeding or  hemorrhage- Monitor labs- Administer supportive blood products/factors as ordered and appropriate  Outcome: Progressing     Problem: MUSCULOSKELETAL - ADULT  Goal: Maintain or return mobility to safest level of function  Description: INTERVENTIONS:- Assess patient's ability to carry out ADLs; assess patient's baseline for ADL function and identify physical deficits which impact ability to perform ADLs (bathing, care of mouth/teeth, toileting, grooming, dressing, etc.)- Assess/evaluate cause of self-care deficits - Assess range of motion- Assess patient's mobility- Assess patient's need for assistive devices and provide as appropriate- Encourage maximum independence but intervene and supervise when necessary- Involve family in performance of ADLs- Assess for home care needs following discharge - Consider OT consult to assist with ADL evaluation and planning for discharge- Provide patient education as appropriate  Outcome: Progressing  Goal: Maintain proper alignment of affected body part  Description: INTERVENTIONS:- Support, maintain and protect limb and body alignment- Provide patient/ family with appropriate education  Outcome: Progressing     Problem: PSYCHOSIS  Goal: Will report no hallucinations or delusions  Description: Interventions:- Administer medication as  ordered- Every waking shifts and PRN assess for the presence of hallucinations and or delusions- Assist with reality testing to support increasing orientation- Assess if patient's hallucinations or delusions are encouraging self-harm or harm to others and intervene as appropriate  Outcome: Progressing     Problem: ANXIETY  Goal: Will report anxiety at manageable levels  Description: INTERVENTIONS:- Administer medication as ordered- Teach and encourage coping skills- Provide emotional support- Assess patient/family for anxiety and ability to cope  Outcome: Progressing  Goal: By discharge: Patient will verbalize 2 strategies to deal with  anxiety  Description: Interventions:- Identify any obvious source/trigger to anxiety- Staff will assist patient in applying identified coping technique/skills- Encourage attendance of scheduled groups and activities  Outcome: Progressing     Problem: SELF CARE DEFICIT  Goal: Return ADL status to a safe level of function  Description: INTERVENTIONS:- Administer medication as ordered- Assess ADL deficits and provide assistive devices as needed- Obtain PT/OT consults as needed- Assist and instruct patient to increase activity and self care as tolerated  Outcome: Progressing     Problem: Prexisting or High Potential for Compromised Skin Integrity  Goal: Skin integrity is maintained or improved  Description: INTERVENTIONS:- Identify patients at risk for skin breakdown- Assess and monitor skin integrity- Assess and monitor nutrition and hydration status- Monitor labs - Assess for incontinence - Turn and reposition patient- Assist with mobility/ambulation- Relieve pressure over bony prominences- Avoid friction and shearing- Provide appropriate hygiene as needed including keeping skin clean and dry- Evaluate need for skin moisturizer/barrier cream- Collaborate with interdisciplinary team - Patient/family teaching- Consider wound care consult   Outcome: Progressing     Problem: Nutrition/Hydration-ADULT  Goal: Nutrient/Hydration intake appropriate for improving, restoring or maintaining nutritional needs  Description: Monitor and assess patient's nutrition/hydration status for malnutrition. Collaborate with interdisciplinary team and initiate plan and interventions as ordered.  Monitor patient's weight and dietary intake as ordered or per policy. Utilize nutrition screening tool and intervene as necessary. Determine patient's food preferences and provide high-protein, high-caloric foods as appropriate. INTERVENTIONS:- Monitor oral intake, urinary output, labs, and treatment plans- Assess nutrition and hydration status and  recommend course of action- Evaluate amount of meals eaten- Assist patient with eating if necessary - Allow adequate time for meals- Recommend/ encourage appropriate diets, oral nutritional supplements, and vitamin/mineral supplements- Order, calculate, and assess calorie counts as needed- Recommend, monitor, and adjust tube feedings and TPN/PPN based on assessed needs- Assess need for intravenous fluids- Provide specific nutrition/hydration education as appropriate- Include patient/family/caregiver in decisions related to nutrition  Monitor and assess patient's nutrition/hydration status for malnutrition. Collaborate with interdisciplinary team and initiate plan and interventions as ordered.  Monitor patient's weight and dietary intake as ordered or per policy. Utilize nutrition screening tool and intervene as necessary. Determine patient's food preferences and provide high-protein, high-caloric foods as appropriate. INTERVENTIONS:- Monitor oral intake, urinary output, labs, and treatment plans- Assess nutrition and hydration status and recommend course of action- Evaluate amount of meals eaten- Assist patient with eating if necessary - Allow adequate time for meals- Recommend/ encourage appropriate diets, oral nutritional supplements, and vitamin/mineral supplements- Order, calculate, and assess calorie counts as needed- Recommend, monitor, and adjust tube feedings and TPN/PPN based on assessed needs- Assess need for intravenous fluids- Provide specific nutrition/hydration education as appropriate- Include patient/family/caregiver in decisions related to nutrition  Outcome: Progressing     Problem: Decreased Cardiac Output  Goal: Cardiac output adequate for individual needs  Description: INTERVENTIONS: Monitor for signs and symptoms of decreased cardiac output - Monitor for dyspnea with exertion and at rest- Monitor for orthopnea- Monitor for signs of tachycardia. Place patient on telemetry monitoring.- Assess  patient for jugular vein distention- Assess patient for lower extremity edema and poor peripheral perfusion - Auscultate lung sound for Fine bibasilar crackles - Monitor for cardiac arrythmias - Administer beta blockers, antiarrhythmic, and blood pressure medications as ordered  Outcome: Progressing     Problem: Impaired Gas Exchange  Goal: Optimize oxygenation and ensure adequate ventilation  Description: INTERVENTIONS: Monitor for signs and symptoms of respiratory distress              - Elevate HOB or use high fowlers to promote lung expansion              - Administer oxygen as ordered to maintain adequate oxygenation              - Encourage use of IS to promote lung expansion and prevent PN              - Monitor ABGs to assess oxygenation status              - Monitor blood oxygen level to maintain adequate oxygenation              - Encourage cough and deep breathing exercises to promote lung expansion              - Monitor patient's mental status for increased confusion  Outcome: Progressing     Problem: Excess Fluid Volume  Goal: Patient is able to achieve and maintain homeostasis  Description: INTERVENTIONS: Monitor for sign and symptoms of fluid overload- Evaluate LE edema every shift- Elevate LE to prevent dependent edema- Apply SUKUMAR stockings as ordered - Monitor ankle circumference daily- Assess for jugular vein distention- Evaluate provider orders for the CHF diuretic algorithm. Administer diuretics as ordered- Weigh the patient daily at 0600 and report a weight gain of five pounds or more - Strict intake and output- Monitor fluid intake and adhere to fluid restrictions- Assess lung sounds every shift and as needed- Monitor vital signs and lab values (CBC, chem, BUN, BNP)- Measure and document urine output  Outcome: Progressing     Problem: Activity Intolerance  Goal: Patient is able to perform activities within their limitations  Description: INTERVENTIONS:                     -   Alternate periods  of activity with periods of rest               -   Patients is able to maintain normal vitals heart rhythm during activity               -   Gradually increase activity and exercise as patient can tolerate               -   Monitor blood pressure and heart before and after exercise                -   Monitor blood oxygen saturation during activity and apply oxygen as needed  Outcome: Progressing     Problem: Knowledge Deficit  Goal: Patient is able to verbalize understanding of Heart Failure after education  Description: INTERVENTIONS:- Educate the patient and family on signs and symptoms of HF- Provide the patient with HF education and HF zone tool- Educate on the importance of daily weight in the AM and reporting a weight gain             of 3 or more pounds to their primary care physician- Monitor for SOB- Maintain and sodium and fluid restriction- Educate the patient on the importance of medications such as: diuretics, betablockers,             antiarrhythmics and their purpose, dose, route, side effects and labs             if they are needed  Outcome: Progressing

## 2025-05-13 NOTE — ASSESSMENT & PLAN NOTE
Had been on clonidine and metoprolol outpt   Bp has been running lower   D/c clonidine  Continue only metoprolol

## 2025-05-13 NOTE — ED NOTES
"Spoke to patient and offered him a 201 in which he declined.  We spoke about the inpatient stay and what that would look like.  He said \"I already know, I have been there about 40 times.\"  Patient refused again.  Alerted nurse and Attending.   "

## 2025-05-13 NOTE — PLAN OF CARE
Problem: PAIN - ADULT  Goal: Verbalizes/displays adequate comfort level or baseline comfort level  Description: Interventions:- Encourage patient to monitor pain and request assistance- Assess pain using appropriate pain scale- Administer analgesics based on type and severity of pain and evaluate response- Implement non-pharmacological measures as appropriate and evaluate response- Consider cultural and social influences on pain and pain management- Notify physician/advanced practitioner if interventions unsuccessful or patient reports new pain  Outcome: Progressing     Problem: INFECTION - ADULT  Goal: Absence or prevention of progression during hospitalization  Description: INTERVENTIONS:- Assess and monitor for signs and symptoms of infection- Monitor lab/diagnostic results- Monitor all insertion sites, i.e. indwelling lines, tubes, and drains- Monitor endotracheal if appropriate and nasal secretions for changes in amount and color- [place] appropriate cooling/warming therapies per order- Administer medications as ordered- Instruct and encourage patient and family to use good hand hygiene technique- Identify and instruct in appropriate isolation precautions for identified infection/condition  Outcome: Progressing  Goal: Absence of fever/infection during neutropenic period  Description: INTERVENTIONS:- Monitor WBC  Outcome: Progressing     Problem: SAFETY ADULT  Goal: Patient will remain free of falls  Description: INTERVENTIONS:- Educate patient/family on patient safety including physical limitations- Instruct patient to call for assistance with activity - Consult OT/PT to assist with strengthening/mobility - Keep Call bell within reach- Keep bed low and locked with side rails adjusted as appropriate- Keep care items and personal belongings within reach- Initiate and maintain comfort rounds- Make Fall Risk Sign visible to staff- Offer Toileting every 2 Hours, in advance of need- Initiate/Maintain alarm-  Obtain necessary fall risk management equipment- Apply yellow socks and bracelet for high fall risk patients- Consider moving patient to room near nurses station  Outcome: Progressing  Goal: Maintain or return to baseline ADL function  Description: INTERVENTIONS:-  Assess patient's ability to carry out ADLs; assess patient's baseline for ADL function and identify physical deficits which impact ability to perform ADLs (bathing, care of mouth/teeth, toileting, grooming, dressing, etc.)- Assess/evaluate cause of self-care deficits - Assess range of motion- Assess patient's mobility; develop plan if impaired- Assess patient's need for assistive devices and provide as appropriate- Encourage maximum independence but intervene and supervise when necessary- Involve family in performance of ADLs- Assess for home care needs following discharge - Consider OT consult to assist with ADL evaluation and planning for discharge- Provide patient education as appropriate  Outcome: Progressing  Goal: Maintains/Returns to pre admission functional level  Description: INTERVENTIONS:- Perform AM-PAC 6 Click Basic Mobility/ Daily Activity assessment daily.- Set and communicate daily mobility goal to care team and patient/family/caregiver. - Collaborate with rehabilitation services on mobility goals if consulted- Perform Range of Motion 3 times a day.- Reposition patient every 2 hours.- Dangle patient 3 times a day- Stand patient 3 times a day- Ambulate patient 3 times a day- Out of bed to chair 3 times a day - Out of bed for meals 3 times a day- Out of bed for toileting- Record patient progress and toleration of activity level   Outcome: Progressing     Problem: DISCHARGE PLANNING  Goal: Discharge to home or other facility with appropriate resources  Description: INTERVENTIONS:- Identify barriers to discharge w/patient and caregiver- Arrange for needed discharge resources and transportation as appropriate- Identify discharge learning needs  (meds, wound care, etc.)- Arrange for interpretive services to assist at discharge as needed- Refer to Case Management Department for coordinating discharge planning if the patient needs post-hospital services based on physician/advanced practitioner order or complex needs related to functional status, cognitive ability, or social support system  Outcome: Progressing     Problem: Knowledge Deficit  Goal: Patient/family/caregiver demonstrates understanding of disease process, treatment plan, medications, and discharge instructions  Description: Complete learning assessment and assess knowledge base.Interventions:- Provide teaching at level of understanding- Provide teaching via preferred learning methods  Outcome: Progressing     Problem: NEUROSENSORY - ADULT  Goal: Achieves stable or improved neurological status  Description: INTERVENTIONS- Monitor and report changes in neurological status- Monitor vital signs such as temperature, blood pressure, glucose, and any other labs ordered - Initiate measures to prevent increased intracranial pressure- Monitor for seizure activity and implement precautions if appropriate    Outcome: Progressing  Goal: Achieves maximal functionality and self care  Description: INTERVENTIONS- Monitor swallowing and airway patency with patient fatigue and changes in neurological status- Encourage and assist patient to increase activity and self care. - Encourage visually impaired, hearing impaired and aphasic patients to use assistive/communication devices  Outcome: Progressing     Problem: CARDIOVASCULAR - ADULT  Goal: Maintains optimal cardiac output and hemodynamic stability  Description: INTERVENTIONS:- Monitor I/O, vital signs and rhythm- Monitor for S/S and trends of decreased cardiac output- Administer and titrate ordered vasoactive medications to optimize hemodynamic stability- Assess quality of pulses, skin color and temperature- Assess for signs of decreased coronary artery  perfusion- Instruct patient to report change in severity of symptoms  Outcome: Progressing  Goal: Absence of cardiac dysrhythmias or at baseline rhythm  Description: INTERVENTIONS:- Continuous cardiac monitoring, vital signs, obtain 12 lead EKG if ordered- Administer antiarrhythmic and heart rate control medications as ordered- Monitor electrolytes and administer replacement therapy as ordered  Outcome: Progressing     Problem: RESPIRATORY - ADULT  Goal: Achieves optimal ventilation and oxygenation  Description: INTERVENTIONS:- Assess for changes in respiratory status- Assess for changes in mentation and behavior- Position to facilitate oxygenation and minimize respiratory effort- Oxygen administered by appropriate delivery if ordered- Initiate smoking cessation education as indicated- Encourage broncho-pulmonary hygiene including cough, deep breathe, Incentive Spirometry- Assess the need for suctioning and aspirate as needed- Assess and instruct to report SOB or any respiratory difficulty- Respiratory Therapy support as indicated  Outcome: Progressing     Problem: GASTROINTESTINAL - ADULT  Goal: Minimal or absence of nausea and/or vomiting  Description: INTERVENTIONS:- Administer IV fluids if ordered to ensure adequate hydration- Maintain NPO status until nausea and vomiting are resolved- Nasogastric tube if ordered- Administer ordered antiemetic medications as needed- Provide nonpharmacologic comfort measures as appropriate- Advance diet as tolerated, if ordered- Consider nutrition services referral to assist patient with adequate nutrition and appropriate food choices  Outcome: Progressing  Goal: Maintains or returns to baseline bowel function  Description: INTERVENTIONS:- Assess bowel function- Encourage oral fluids to ensure adequate hydration- Administer IV fluids if ordered to ensure adequate hydration- Administer ordered medications as needed- Encourage mobilization and activity- Consider nutritional  services referral to assist patient with adequate nutrition and appropriate food choices  Outcome: Progressing  Goal: Maintains adequate nutritional intake  Description: INTERVENTIONS:- Monitor percentage of each meal consumed- Identify factors contributing to decreased intake, treat as appropriate- Assist with meals as needed- Monitor I&O, weight, and lab values if indicated- Obtain nutrition services referral as needed  Outcome: Progressing  Goal: Oral mucous membranes remain intact  Description: INTERVENTIONS- Assess oral mucosa and hygiene practices- Implement preventative oral hygiene regimen- Implement oral medicated treatments as ordered- Initiate Nutrition services referral as needed  Outcome: Progressing     Problem: GENITOURINARY - ADULT  Goal: Maintains or returns to baseline urinary function  Description: INTERVENTIONS:- Assess urinary function- Encourage oral fluids to ensure adequate hydration if ordered- Administer IV fluids as ordered to ensure adequate hydration- Administer ordered medications as needed- Offer frequent toileting- Follow urinary retention protocol if ordered  Outcome: Progressing  Goal: Absence of urinary retention  Description: INTERVENTIONS:- Assess patient’s ability to void and empty bladder- Monitor I/O- Bladder scan as needed- Discuss with physician/AP medications to alleviate retention as needed- Discuss catheterization for long term situations as appropriate  Outcome: Progressing     Problem: METABOLIC, FLUID AND ELECTROLYTES - ADULT  Goal: Electrolytes maintained within normal limits  Description: INTERVENTIONS:- Monitor labs and assess patient for signs and symptoms of electrolyte imbalances- Administer electrolyte replacement as ordered- Monitor response to electrolyte replacements, including repeat lab results as appropriate- Instruct patient on fluid and nutrition as appropriate  Outcome: Progressing  Goal: Fluid balance maintained  Description: INTERVENTIONS:- Monitor  labs - Monitor I/O and WT- Instruct patient on fluid and nutrition as appropriate- Assess for signs & symptoms of volume excess or deficit  Outcome: Progressing  Goal: Glucose maintained within target range  Description: INTERVENTIONS:- Monitor Blood Glucose as ordered- Assess for signs and symptoms of hyperglycemia and hypoglycemia- Administer ordered medications to maintain glucose within target range- Assess nutritional intake and initiate nutrition service referral as needed  Outcome: Progressing     Problem: SKIN/TISSUE INTEGRITY - ADULT  Goal: Skin Integrity remains intact(Skin Breakdown Prevention)  Description: Assess:-Perform Jag assessment-Clean and moisturize skin-Inspect skin when repositioning, toileting, and assisting with ADLS-Assess under medical devices-Assess extremities for adequate circulation and sensation Bed Management:-Have minimal linens on bed & keep smooth, unwrinkled-Change linens as needed when moist or perspiring-Avoid sitting or lying in one position for more than 2 hours while in bed-Keep HOB at 45 degrees Toileting:-Offer bedside commode-Assess for incontinence-Use incontinent care products after each incontinent episode Activity:-Mobilize patient 3 times a day-Encourage activity and walks on unit-Encourage or provide ROM exercises -Turn and reposition patient every 2 Hours-Use appropriate equipment to lift or move patient in bed-Instruct/ Assist with weight shifting when out of bed in chair-Consider limitation of chair time 2 hour intervalsSkin Care:-Avoid use of baby powder, tape, friction and shearing, hot water or constrictive clothing-Relieve pressure over bony prominences-Do not massage red bony areasNext Steps:-Teach patient strategies to minimize risks -Consider consults to  interdisciplinary teams  Outcome: Progressing     Problem: HEMATOLOGIC - ADULT  Goal: Maintains hematologic stability  Description: INTERVENTIONS- Assess for signs and symptoms of bleeding or  hemorrhage- Monitor labs- Administer supportive blood products/factors as ordered and appropriate  Outcome: Progressing     Problem: MUSCULOSKELETAL - ADULT  Goal: Maintain or return mobility to safest level of function  Description: INTERVENTIONS:- Assess patient's ability to carry out ADLs; assess patient's baseline for ADL function and identify physical deficits which impact ability to perform ADLs (bathing, care of mouth/teeth, toileting, grooming, dressing, etc.)- Assess/evaluate cause of self-care deficits - Assess range of motion- Assess patient's mobility- Assess patient's need for assistive devices and provide as appropriate- Encourage maximum independence but intervene and supervise when necessary- Involve family in performance of ADLs- Assess for home care needs following discharge - Consider OT consult to assist with ADL evaluation and planning for discharge- Provide patient education as appropriate  Outcome: Progressing  Goal: Maintain proper alignment of affected body part  Description: INTERVENTIONS:- Support, maintain and protect limb and body alignment- Provide patient/ family with appropriate education  Outcome: Progressing     Problem: PSYCHOSIS  Goal: Will report no hallucinations or delusions  Description: Interventions:- Administer medication as  ordered- Every waking shifts and PRN assess for the presence of hallucinations and or delusions- Assist with reality testing to support increasing orientation- Assess if patient's hallucinations or delusions are encouraging self-harm or harm to others and intervene as appropriate  Outcome: Progressing     Problem: ANXIETY  Goal: Will report anxiety at manageable levels  Description: INTERVENTIONS:- Administer medication as ordered- Teach and encourage coping skills- Provide emotional support- Assess patient/family for anxiety and ability to cope  Outcome: Progressing  Goal: By discharge: Patient will verbalize 2 strategies to deal with  anxiety  Description: Interventions:- Identify any obvious source/trigger to anxiety- Staff will assist patient in applying identified coping technique/skills- Encourage attendance of scheduled groups and activities  Outcome: Progressing     Problem: SELF CARE DEFICIT  Goal: Return ADL status to a safe level of function  Description: INTERVENTIONS:- Administer medication as ordered- Assess ADL deficits and provide assistive devices as needed- Obtain PT/OT consults as needed- Assist and instruct patient to increase activity and self care as tolerated  Outcome: Progressing     Problem: Prexisting or High Potential for Compromised Skin Integrity  Goal: Skin integrity is maintained or improved  Description: INTERVENTIONS:- Identify patients at risk for skin breakdown- Assess and monitor skin integrity- Assess and monitor nutrition and hydration status- Monitor labs - Assess for incontinence - Turn and reposition patient- Assist with mobility/ambulation- Relieve pressure over bony prominences- Avoid friction and shearing- Provide appropriate hygiene as needed including keeping skin clean and dry- Evaluate need for skin moisturizer/barrier cream- Collaborate with interdisciplinary team - Patient/family teaching- Consider wound care consult   Outcome: Progressing     Problem: Nutrition/Hydration-ADULT  Goal: Nutrient/Hydration intake appropriate for improving, restoring or maintaining nutritional needs  Description: Monitor and assess patient's nutrition/hydration status for malnutrition. Collaborate with interdisciplinary team and initiate plan and interventions as ordered.  Monitor patient's weight and dietary intake as ordered or per policy. Utilize nutrition screening tool and intervene as necessary. Determine patient's food preferences and provide high-protein, high-caloric foods as appropriate. INTERVENTIONS:- Monitor oral intake, urinary output, labs, and treatment plans- Assess nutrition and hydration status and  recommend course of action- Evaluate amount of meals eaten- Assist patient with eating if necessary - Allow adequate time for meals- Recommend/ encourage appropriate diets, oral nutritional supplements, and vitamin/mineral supplements- Order, calculate, and assess calorie counts as needed- Recommend, monitor, and adjust tube feedings and TPN/PPN based on assessed needs- Assess need for intravenous fluids- Provide specific nutrition/hydration education as appropriate- Include patient/family/caregiver in decisions related to nutrition  Monitor and assess patient's nutrition/hydration status for malnutrition. Collaborate with interdisciplinary team and initiate plan and interventions as ordered.  Monitor patient's weight and dietary intake as ordered or per policy. Utilize nutrition screening tool and intervene as necessary. Determine patient's food preferences and provide high-protein, high-caloric foods as appropriate. INTERVENTIONS:- Monitor oral intake, urinary output, labs, and treatment plans- Assess nutrition and hydration status and recommend course of action- Evaluate amount of meals eaten- Assist patient with eating if necessary - Allow adequate time for meals- Recommend/ encourage appropriate diets, oral nutritional supplements, and vitamin/mineral supplements- Order, calculate, and assess calorie counts as needed- Recommend, monitor, and adjust tube feedings and TPN/PPN based on assessed needs- Assess need for intravenous fluids- Provide specific nutrition/hydration education as appropriate- Include patient/family/caregiver in decisions related to nutrition  Outcome: Progressing     Problem: Decreased Cardiac Output  Goal: Cardiac output adequate for individual needs  Description: INTERVENTIONS: Monitor for signs and symptoms of decreased cardiac output - Monitor for dyspnea with exertion and at rest- Monitor for orthopnea- Monitor for signs of tachycardia. Place patient on telemetry monitoring.- Assess  patient for jugular vein distention- Assess patient for lower extremity edema and poor peripheral perfusion - Auscultate lung sound for Fine bibasilar crackles - Monitor for cardiac arrythmias - Administer beta blockers, antiarrhythmic, and blood pressure medications as ordered  Outcome: Progressing     Problem: Impaired Gas Exchange  Goal: Optimize oxygenation and ensure adequate ventilation  Description: INTERVENTIONS: Monitor for signs and symptoms of respiratory distress              - Elevate HOB or use high fowlers to promote lung expansion              - Administer oxygen as ordered to maintain adequate oxygenation              - Encourage use of IS to promote lung expansion and prevent PN              - Monitor ABGs to assess oxygenation status              - Monitor blood oxygen level to maintain adequate oxygenation              - Encourage cough and deep breathing exercises to promote lung expansion              - Monitor patient's mental status for increased confusion  Outcome: Progressing     Problem: Excess Fluid Volume  Goal: Patient is able to achieve and maintain homeostasis  Description: INTERVENTIONS: Monitor for sign and symptoms of fluid overload- Evaluate LE edema every shift- Elevate LE to prevent dependent edema- Apply SUKUMAR stockings as ordered - Monitor ankle circumference daily- Assess for jugular vein distention- Evaluate provider orders for the CHF diuretic algorithm. Administer diuretics as ordered- Weigh the patient daily at 0600 and report a weight gain of five pounds or more - Strict intake and output- Monitor fluid intake and adhere to fluid restrictions- Assess lung sounds every shift and as needed- Monitor vital signs and lab values (CBC, chem, BUN, BNP)- Measure and document urine output  Outcome: Progressing     Problem: Activity Intolerance  Goal: Patient is able to perform activities within their limitations  Description: INTERVENTIONS:                     -   Alternate periods  of activity with periods of rest               -   Patients is able to maintain normal vitals heart rhythm during activity               -   Gradually increase activity and exercise as patient can tolerate               -   Monitor blood pressure and heart before and after exercise                -   Monitor blood oxygen saturation during activity and apply oxygen as needed  Outcome: Progressing     Problem: Knowledge Deficit  Goal: Patient is able to verbalize understanding of Heart Failure after education  Description: INTERVENTIONS:- Educate the patient and family on signs and symptoms of HF- Provide the patient with HF education and HF zone tool- Educate on the importance of daily weight in the AM and reporting a weight gain             of 3 or more pounds to their primary care physician- Monitor for SOB- Maintain and sodium and fluid restriction- Educate the patient on the importance of medications such as: diuretics, betablockers,             antiarrhythmics and their purpose, dose, route, side effects and labs             if they are needed  Outcome: Progressing

## 2025-05-13 NOTE — PROGRESS NOTES
Progress Note - Hospitalist   Name: Solo Mack 55 y.o. male I MRN: [id number]  Unit/Bed#: [place] [address] -01 I Date of Admission: 5/2/2025   Date of Service: 5/13/2025 I Hospital Day: 11    Assessment & Plan  Septic shock ([facility])  Pt was transferred from  to [facility] icu due to profound septic shock with imagining demonstrating gallstones, ruq tenderness, and profound leukocytosis  Currently treated with cefepime/flagyl  On levophed which had been weaned to off  Pt was scheduled for or but decided later in evening decided he did not want to go forward with surgery   Surgery stated to continue conservative treatment with iv antibiotics and change to po at discharge for 2 weeks however he has increased RUQ abd pain  Appreciate gi recommendations- pt is not a candidate for stenting as it will impair surgery  Spoke with surgery who recommended perc. Miriam tube- he is not a good candidate for this given his psychiatric history   Pt has improved but continues to have ruq pain at times. Concern is that he will become septic again without intervention. Long discussions about the appropriate treatment for him given his complex psychosocial situation. It was concluded that cholecystectomy might be the safest mode of treatment. Pt is willing to go forward with surgery   S/p lap cholecystectomy with kimberly drain in place. Stones were removed. Discussed with gi. No further intervention is required   S/p kimberly drain removal  Anticipate d/c tomorrow after medications are delivered     GERD (gastroesophageal reflux disease)  Continue ppi   Essential hypertension  Had been on clonidine and metoprolol outpt   Bp has been running lower   D/c clonidine  Continue only metoprolol   Type 2 diabetes mellitus, without long-term current use of insulin ([facility])  Lab Results   Component Value Date    HGBA1C 8.3 (A) 03/17/2025       Recent Labs     05/12/25  1617 05/12/25  2105 05/13/25  0740 05/13/25  1101   POCGLU 186* 199* 181* 303*       Blood Sugar  Average: Last 72 hrs:  (P) [id number]  D/c ozempic  Continue insulin sliding scale   Continue lantus at 15 units   Insulin administration teaching   Schizophrenia, unspecified type ([facility])  Appreciate psychiatry recommendations  Continue klonopin 1mg bid prn  Continue zyprexa 10mg qhs  Psych eval when pt is medically cleared  Neuropsych deemed pt to not have capacity   Will have neuropsych eval again as pt seemed to understand about his gallbladder and surgery  Appreciate psych recommendations  He will require inpt psych- recommending 201  Neuropsych did seem again yesterday and he had capacity  Re consult of psych stated he can sign a 201 or follow up outpt. No need for 302. Pt declined 201  Working on getting his medications for him to reduce the barrier of him not taking meds at home     Chronic kidney disease, stage 2 (mild)  Lab Results   Component Value Date    EGFR 74 05/12/2025    EGFR 65 05/10/2025    EGFR 74 05/09/2025    CREATININE 1.10 05/12/2025    CREATININE 1.22 05/10/2025    CREATININE 1.10 05/09/2025   Chauncey r/o  Creatinine currently at baseline which is 0.9-1.2   Repeat labs in am.   H/O prolonged Q-T interval on ECG  Qtc has improved  Replace mg. Keep potassium greater than 4.0    Acute cholecystitis  Please see septic shock   Hypophosphatemia    Insomnia  continue melatonin  Continue zyprexa   Obesity (BMI 30-39.9)    CHF (congestive heart failure) ([facility])  Wt Readings from Last 3 Encounters:   05/13/25 95.8 kg (211 lb 3.2 oz)   05/02/25 100 kg (221 lb 5.5 oz)   03/17/25 106 kg (233 lb)     Monitor I/o and daily weights   He does have lower lobe rales but weight is decreased and negative on I/o.   Could be atelectasis and will add incentive spirometry.   Cxr negative for pulmonary edema  Bnp was normal             VTE Pharmacologic Prophylaxis:   lovenox     Mobility:   Basic Mobility Inpatient Raw Score: 24  JH-HLM Goal: 8: Walk 250 feet or more  JH-HLM Achieved: 8: Walk 250 feet ot  more      Patient Centered Rounds: I performed bedside rounds with nursing staff today.     Education and Discussions with Family / Patient: Updated  (wife) via phone.    Current Length of Stay: 11 day(s)  Current Patient Status: Inpatient     Discharge Plan: anticipate d/c to home with outpt psych tomorrow     Code Status: Level 1 - Full Code    Subjective   Pt seen and examined. Pt doing well. He is a little sleepy today. No worsening abd pain. No f/c no cp no sob no n/v/d    Objective :  Temp:  [98.1 °F (36.7 °C)-99 °F (37.2 °C)] 98.1 °F (36.7 °C)  HR:  [70-87] 70  BP: ()/(65-95) 106/71  Resp:  [14-20] 18  SpO2:  [92 %-94 %] 92 %  O2 Device: None (Room air)    Body mass index is 33.08 kg/m².     Input and Output Summary (last 24 hours):     Intake/Output Summary (Last 24 hours) at 5/13/2025 1320  Last data filed at 5/13/2025 1238  Gross per 24 hour   Intake 960 ml   Output 1725 ml   Net -765 ml       Physical Exam  Constitutional:       Appearance: Normal appearance.   HENT:      Head: Normocephalic and atraumatic.   Eyes:      Extraocular Movements: Extraocular movements intact.      Pupils: Pupils are equal, round, and reactive to light.   Cardiovascular:      Rate and Rhythm: Normal rate and regular rhythm.      Heart sounds: No murmur heard.     No friction rub. No gallop.   Pulmonary:      Effort: Pulmonary effort is normal. No respiratory distress.      Breath sounds: Normal breath sounds. No wheezing, rhonchi or rales.   Abdominal:      General: Bowel sounds are normal. There is no distension.      Palpations: Abdomen is soft.      Tenderness: There is no abdominal tenderness. There is no guarding or rebound.   Neurological:      Mental Status: He is alert and oriented to person, place, and time.       Lines/Drains:      Lab Results: I have reviewed the following results:   Results from last 7 days   Lab Units 05/12/25  0646 05/10/25  0623 05/09/25  0508   WBC Thousand/uL 10.64* 16.23*  8.31   HEMOGLOBIN g/dL 14.2 13.7 14.7   HEMATOCRIT % 42.8 42.0 43.9   PLATELETS Thousands/uL 301 367 305   BANDS PCT %  --   --  2   SEGS PCT %  --  74  --    LYMPHO PCT %  --  13* 36   MONO PCT %  --  10 8   EOS PCT %  --  0 4     Results from last 7 days   Lab Units 05/12/25  0646 05/09/25  0508 05/08/25  0500   SODIUM mmol/L 134*   < > 136   POTASSIUM mmol/L 4.6   < > 4.0   CHLORIDE mmol/L 100   < > 101   CO2 mmol/L 26   < > 27   BUN mg/dL 14   < > 10   CREATININE mg/dL 1.10   < > 1.00   ANION GAP mmol/L 8   < > 8   CALCIUM mg/dL 9.0   < > 8.7   ALBUMIN g/dL  --   --  3.3*   TOTAL BILIRUBIN mg/dL  --   --  0.41   ALK PHOS U/L  --   --  82   ALT U/L  --   --  22   AST U/L  --   --  28   GLUCOSE RANDOM mg/dL 146*   < > 149*    < > = values in this interval not displayed.         Results from last 7 days   Lab Units 05/13/25  1101 05/13/25  0740 05/12/25  2105 05/12/25  1617 05/12/25  1124 05/12/25  0721 05/11/25  2123 05/11/25  1626 05/11/25  1138 05/11/25  0605 05/10/25  2140 05/10/25  1622   POC GLUCOSE mg/dl 303* 181* 199* 186* 276* 162* 172* 237* 240* 213* 291* 355*               Recent Cultures (last 7 days):         Imaging Results Review: No pertinent imaging studies reviewed.  Other Study Results Review: No additional pertinent studies reviewed.    Last 24 Hours Medication List:     Current Facility-Administered Medications:     acetaminophen (TYLENOL) tablet 325 mg, Q6H PRN    albuterol inhalation solution 2.5 mg, Q8H PRN    clonazePAM (KlonoPIN) tablet 1 mg, BID PRN    cyanocobalamin injection 1,000 mcg, Q30 Days    enoxaparin (LOVENOX) subcutaneous injection 40 mg, Daily    HYDROmorphone HCl (DILAUDID) injection 0.2 mg, Q4H PRN    hydrOXYzine HCL (ATARAX) tablet 25 mg, Q6H PRN Max 4/day    hydrOXYzine HCL (ATARAX) tablet 50 mg, Q6H PRN Max 4/day    ibuprofen (MOTRIN) tablet 400 mg, Q6H PRN    insulin glargine (LANTUS) subcutaneous injection 15 Units 0.15 mL, HS    insulin lispro (HumALOG/ADMELOG) 100  units/mL subcutaneous injection 1-6 Units, TID AC **AND** Fingerstick Glucose (POCT), TID AC    insulin lispro (HumALOG/ADMELOG) 100 units/mL subcutaneous injection 1-6 Units, HS    LORazepam (ATIVAN) injection 1 mg, Q6H PRN Max 3/day    LORazepam (ATIVAN) tablet 1 mg, Q6H PRN Max 3/day    melatonin tablet 6 mg, HS    metoprolol tartrate (LOPRESSOR) partial tablet 12.5 mg, Q12H ROBER    nicotine polacrilex (NICORETTE) gum 2 mg, Q4H PRN    OLANZapine (ZyPREXA) tablet 10 mg, HS    oxyCODONE (ROXICODONE) IR tablet 5 mg, Q4H PRN    pantoprazole (PROTONIX) EC tablet 40 mg, Early Morning    polyethylene glycol (MIRALAX) packet 17 g, Daily PRN    senna-docusate sodium (SENOKOT S) 8.6-50 mg per tablet 1 tablet, Daily PRN    trimethobenzamide (TIGAN) IM injection 200 mg, Q6H PRN    Administrative Statements   Today, Patient Was Seen By: Tasha Joseph DO

## 2025-05-13 NOTE — ASSESSMENT & PLAN NOTE
Appreciate psychiatry recommendations  Continue klonopin 1mg bid prn  Continue zyprexa 10mg qhs  Psych eval when pt is medically cleared  Neuropsych deemed pt to not have capacity   Will have neuropsych eval again as pt seemed to understand about his gallbladder and surgery  Appreciate psych recommendations  He will require inpt psych- recommending 201  Neuropsych did seem again yesterday and he had capacity  Re consult of psych stated he can sign a 201 or follow up outpt. No need for 302. Pt declined 201  Working on getting his medications for him to reduce the barrier of him not taking meds at home

## 2025-05-13 NOTE — CASE MANAGEMENT
Case Management Discharge Planning Note    Patient name Solo Mack  Location [facility] M* MRN [id number]  : 1970 Date 2025       Current Admission Date: 2025  Current Admission Diagnosis:Septic shock (HCC)   Patient Active Problem List    Diagnosis Date Noted Date Diagnosed    Insomnia 2025     Obesity (BMI 30-39.9) 2025     CHF (congestive heart failure) ([facility])      Acute cholecystitis 2025     Septic shock ([facility]) 2025     Hypophosphatemia 2025     Bipolar affective disorder, current episode manic with psychotic symptoms ([facility]) 2025     H/O prolonged Q-T interval on ECG 2024     Chronic kidney disease, stage 2 (mild) 10/21/2024     Bipolar affective disorder, mixed, in full remission ([facility]) 2023    Stutter 2022     Erectile dysfunction 2022     Spinal stenosis, lumbar      Schizophrenia, unspecified type ([facility])      HLD (hyperlipidemia) 2020     Type 2 diabetes mellitus, without long-term current use of insulin ([facility])      Drug abuse and dependence ([facility]) 2018     White matter abnormality on MRI of brain 2018     Essential hypertension      Chronic pain disorder 08/15/2018     GERD (gastroesophageal reflux disease) 08/15/2018       LOS (days): 11  Geometric Mean LOS (GMLOS) (days): 4.9  Days to GMLOS:-6.4     OBJECTIVE:  Risk of Unplanned Readmission Score: 30.05         Current admission status: Inpatient   Preferred Pharmacy:   [id number] [place] - [address]  [address]  [place] [address]  Phone: [phone] Fax: [phone]    [facility]  [place] - [address]  [facility],  [address]  [facility],  [address] [facility] [place] [address]  Phone: [phone] Fax: [phone]    Primary Care Provider: Florin Lindsay DO    Primary Insurance: [employer] REP  Secondary Insurance:     DISCHARGE DETAILS:                            Contacts  Patient Contacts: Deja Sovocokamille (spouse)  Relationship to Patient:: Family  Contact Method: Phone  Phone Number: [phone]  Reason/Outcome: Discharge Planning, Other (Comment) (discussed dc medications sent to [facility] total being $98.79.  Deja transferred as requested to pay over the phone so meds can be picked up and brought to pt's room prior to d/c- RN made aware of same)                        Treatment Team Recommendation: Home  Discharge Destination Plan:: Home  Transport at Discharge : Auto with designated , Family        Transported by (Company and Unit #): Wife                    IMM Given (Date):: 05/13/25  IMM Given to:: Family (Wife)

## 2025-05-13 NOTE — ASSESSMENT & PLAN NOTE
Lab Results   Component Value Date    HGBA1C 8.3 (A) 03/17/2025       Recent Labs     05/12/25  1617 05/12/25  2105 05/13/25  0740 05/13/25  1101   POCGLU 186* 199* 181* 303*       Blood Sugar Average: Last 72 hrs:  (P) [id number]  D/c ozempic  Continue insulin sliding scale   Continue lantus at 15 units   Insulin administration teaching

## 2025-05-13 NOTE — ASSESSMENT & PLAN NOTE
Pt was transferred from  to [facility] icu due to profound septic shock with imagining demonstrating gallstones, ruq tenderness, and profound leukocytosis  Currently treated with cefepime/flagyl  On levophed which had been weaned to off  Pt was scheduled for or but decided later in evening decided he did not want to go forward with surgery   Surgery stated to continue conservative treatment with iv antibiotics and change to po at discharge for 2 weeks however he has increased RUQ abd pain  Appreciate gi recommendations- pt is not a candidate for stenting as it will impair surgery  Spoke with surgery who recommended perc. Miriam tube- he is not a good candidate for this given his psychiatric history   Pt has improved but continues to have ruq pain at times. Concern is that he will become septic again without intervention. Long discussions about the appropriate treatment for him given his complex psychosocial situation. It was concluded that cholecystectomy might be the safest mode of treatment. Pt is willing to go forward with surgery   S/p lap cholecystectomy with kimberly drain in place. Stones were removed. Discussed with gi. No further intervention is required   S/p kimberly drain removal  Anticipate d/c tomorrow after medications are delivered

## 2025-05-13 NOTE — TELEMEDICINE
TeleConsultation - Behavioral Health   Name: Solo Mack 55 y.o. male I MRN: [id number]  Unit/Bed#: [place] [address] -01 I Date of Admission: 5/2/2025   Date of Service: 5/13/2025 I Hospital Day: 11    Inpatient consult to Psychiatry  Consult performed by: Tiffanie Garcia MD  Consult ordered by: Tasha Joseph DO        Physician Requesting Consult: Tasha Joseph DO  Principal Problem:Septic shock (HCC)  Reason for Consult: Psychiatric evaluation     Assessment & Plan   Unspecified psychotic disorder    TREATMENT PLAN RECOMMENDATIONS:  Medications: Continue with current psychotropic medications       Informed consent for the above medication has been obtained including discussion of the risks, benefits and alternatives: Yes    Disposition:  Patient was recommended to admit to the inpatient psychiatric unit but patient does not want to admit and currently does not meet criteria for involuntary commitment    Legal Status Recommendation:  Patient is refusing to sign voluntary 201 form to be admitted to the inpatient psychiatric facility and based on today's evaluation he does not meet criteria for involuntary commitment (302)    Multiple Antipsychotic Review: N/A    Psychotherapy/Psychoeducation: N/A to this case.    Other/Medical Work Up and/or treatment modality recommendations: N/A to this case.    Patient Caregiver/Family Education: N/A    Follow-up:  Follow-up with outpatient psychiatrist    Report regarding the above Assessment and Treatment plan was provided to: Dr. Joseph      History of Present Illness      55-year-old male with a history of mood disorder with psychosis presents to the hospital with disorganized behavior and thought process in addition to psychotic symptoms of auditory hallucinations, internal preoccupation, responding to internal stimuli.  As per nurse, he has been remaining calm and control and has no disorganized behavior was reported.  No agitation impulsivity was reported.  As per  nurse he has some grandiosity.  He has been taking his psychotropic medication regularly.  No depressive symptoms was reported and no suicidal ideation was reported.  No delusion was elicited.   He has been sleeping fine.  No homicidal ideation was reported.        Psychiatric Review Of Systems:     Sleep changes: no  appetite changes: no  weight changes: no  anxiety/panic: no  roderick: no  guilty/hopeless: no  self injurious behavior/risky behavior: no    Historical Information     Past Psychiatric History:     Psychiatric Hospitalizations: Multiple past inpatient psychiatric admissions Outpatient Treatment History: current treatment with psychiatrist/Advanced Practitioner Suicide Attempts: No History of Suicidal attempt reported History of self-harm: No History of self injurious behavior was reported Violence History: No History of physically aggressive behavior was reported     Substance Abuse History:    E-Cigarette/Vaping    E-Cigarette Use Never User           Social History       Tobacco History       Smoking Status  Former      Passive Exposure  Never      Smokeless Tobacco Use  Never      Tobacco Comments  patient is a non - smoker              Alcohol History       Alcohol Use Status  Not Currently Comment  beer here and there per pt              Drug Use       Drug Use Status  Not Currently              Sexual Activity       Sexually Active  Not Currently              Other Factors    Not Asked                 Additional Substance Use Detail       Questions Responses    Problems Due to Past Use of Alcohol? Yes    Problems Due to Past Use of Substances? No    Substance Use Assessment Denies substance use within the past 12 months    Alcohol Use Frequency Past abuse    Cannabis frequency Never used    Comment: Never used on 8/6/2018     Heroin Frequency Denies use in past 12 months    Cocaine frequency Never used    Comment: Never used on 8/6/2018     Crack Cocaine Frequency Denies use in past 12 months     Methamphetamine Frequency Denies use in past 12 months    Narcotic Frequency Denies use in past 12 months    Benzodiazepine Frequency Denies use in past 12 months    Amphetamine frequency Denies use in past 12 months    Barbituate Frequency Denies use use in past 12 months    Inhalant frequency Never used    Comment: Never used on 8/6/2018     Hallucinogen frequency Never used    Comment: Never used on 8/6/2018     Ecstasy frequency Never used    Comment: Never used on 8/6/2018     Other drug frequency Never used    Comment: Never used on 8/6/2018     Opiate frequency Denies use in past 12 months    Not reviewed.                Social History:    Social History       Social History     Socioeconomic History    Marital status: /Civil Union     Spouse name: Not on file    Number of children: Not on file    Years of education: Not on file    Highest education level: Not on file   Occupational History    Not on file   Tobacco Use    Smoking status: Former     Passive exposure: Never    Smokeless tobacco: Never    Tobacco comments:     patient is a non - smoker   Vaping Use    Vaping status: Never Used   Substance and Sexual Activity    Alcohol use: Not Currently     Comment: beer here and there per pt    Drug use: Not Currently    Sexual activity: Not Currently   Other Topics Concern    Not on file   Social History Narrative    ** Merged History Encounter **          Social Drivers of Health     Financial Resource Strain: Low Risk  (12/4/2024)    Overall Financial Resource Strain (CARDIA)     Difficulty of Paying Living Expenses: Not very hard   Food Insecurity: No Food Insecurity (5/2/2025)    Nursing - Inadequate Food Risk Classification     Worried About Running Out of Food in the Last Year: Never true     Ran Out of Food in the Last Year: Never true     Ran Out of Food in the Last Year: Never true   Transportation Needs: No Transportation Needs (5/2/2025)    Nursing - Transportation Risk Classification      Lack of Transportation: Not on file     Lack of Transportation: No   Physical Activity: Not on file   Stress: Not on file   Social Connections: Not on file   Intimate Partner Violence: Unknown (2025)    Nursing IPS     Feels Physically and Emotionally Safe: Not on file     Physically Hurt by Someone: Not on file     Humiliated or Emotionally Abused by Someone: Not on file     Physically Hurt by Someone: No     Hurt or Threatened by Someone: No   Housing Stability: Unknown (2025)    Nursing: Inadequate Housing Risk Classification     Has Housing: Not on file     Worried About Losing Housing: Not on file     Unable to Get Utilities: Not on file     Unable to Pay for Housing in the Last Year: No     Has Housin                   Past Medical History:    Past Medical History:   Diagnosis Date    Alcohol withdrawal ([facility])     Alcoholism ([facility])     Anxiety     Back pain     Back pain, chronic     Bipolar 1 disorder ([facility])     Bipolar disorder, current episode mixed, moderate ([facility])     BPH (benign prostatic hyperplasia)     Cardiac disease     CHF (congestive heart failure) ([facility])     Chronic pain disorder     Chronic renal failure     Closed displaced fracture of neck of left scapula     Closed fracture of glenoid cavity and neck of left scapula 2020    Demyelinating disease ([facility])     Dental disorder     Depression     Diabetes mellitus (HCC)     Drug abuse (HCC)     Drug withdrawal (HCC)     ED (erectile dysfunction)     Edema     Encephalopathy     Encephalopathy     Fatigue     Fracture of left radius 2020    GERD (gastroesophageal reflux disease)     H/O prolonged Q-T interval on ECG 2024    Heart rate fast     Hepatitis     unspecified     Hyperlipidemia     Hypertension     Hypokalemia     Hypothyroidism 10/21/2024    Knee buckling     Left rib fracture 2020    MI (myocardial infarction) ([facility])     Morbid obesity with BMI of 40.0-44.9, adult ([facility])     NIDDY (non-insulin dependent  diabetes mellitus in young) ([facility])     Obesity     Opiate dependence ([facility])     Opiate withdrawal ([facility])     Osteoarthritis     Pleural effusion     Pneumonia     Prolonged Q-T interval on ECG     Psychiatric disorder     Psychiatric illness     Psychosis ([facility])     Psychosis ([facility]) 12/17/2024    Renal disorder     SAH (subarachnoid hemorrhage) ([facility]) 09/06/2020    Schizo-affective schizophrenia ([facility])     Spinal stenosis, lumbar     Subdural hematoma ([facility]) 09/06/2020    Traumatic subdural hemorrhage with loss of consciousness of unspecified duration, initial encounter ([facility]) 12/29/2022    Ulcer of calf ([facility])     Ulnar neuritis, right     Ulnar neuropathy at elbow     Weight loss           Meds/Allergies     Allergies   Allergen Reactions    Haldol [Haloperidol]     Lexapro [Escitalopram Oxalate] Hives    Penicillins Other (See Comments)     Unknown, reaction as child     all current active meds have been reviewed    Medical Review Of Systems:    Review of Systems      Objective     Vital signs in last 24 hours:  Temp:  [98.1 °F (36.7 °C)-99 °F (37.2 °C)] 98.1 °F (36.7 °C)  HR:  [70-87] 70  BP: ()/(65-95) 106/71  Resp:  [14-20] 18  SpO2:  [92 %-94 %] 92 %  O2 Device: None (Room air)      Intake/Output Summary (Last 24 hours) at 5/13/2025 0958  Last data filed at 5/13/2025 0911  Gross per 24 hour   Intake 1320 ml   Output 1525 ml   Net -205 ml       Mental Status Evaluation::    Appearance disheveled   Behavior cooperative, calm   Speech normal rate, normal volume, normal pitch   Mood anxious   Affect normal range and intensity, appropriate   Thought Processes organized, goal directed   Associations intact associations   Thought Content no overt delusions   Perceptual Disturbances: no auditory hallucinations, no visual hallucinations   Abnormal Thoughts  Risk Potential Suicidal ideation - None  Homicidal ideation - None  Potential for aggression - No   Orientation oriented to person, place, and time/date   Memory  recent and remote memory grossly intact   Consciousness alert and awake   Attention Span Concentration Span attention span and concentration are age appropriate   Intellect appears to be of average intelligence   Insight intact   Judgement intact   Muscle Strength and  Gait No assessed   Motor Activity unable to assess today due to virtual visit   Language no difficulty naming common objects, no difficulty repeating a phrase, no difficulty writing a sentence   Fund of Knowledge adequate knowledge of current events  adequate fund of knowledge regarding past history  adequate fund of knowledge regarding vocabulary                Lab Results:  I have reviewed the following lab results:   No new results in last 24 hours.       Lipid Profile:   Lab Results   Component Value Date    CHOLESTEROL 124 12/04/2024    HDL 36 (L) 12/04/2024    TRIG 169 (H) 12/04/2024    LDLCALC 54 12/04/2024    NONHDLC 88 12/04/2024   Thyroid Studies:   Lab Results   Component Value Date    OVN7GDOIWGTH 2.457 05/07/2025    FREET4 1.30 (H) 05/02/2025     Ammonia:   Lab Results   Component Value Date    AMMONIA 21 04/28/2025     Drug Levels:   Lab Results   Component Value Date    LITHIUM <0.10 (L) 04/13/2024       Imaging Results Review: No pertinent imaging studies reviewed.  Other Study Results Review: No additional pertinent studies reviewed.    Code Status: Level 1 - Full Code  Advance Directive and Living Will:      Power of :    POLST:      Screenings:    1. Nutrition Screening  Nutrition Assessment (completed by Staff): Nutrition  Feeding: Able to feed self  Diet Type: Diabetic  Appetite: Good    2. Pain Screening  Pain Screening: Pain Assessment  Pain Assessment Tool: 0-10  Pain Score: 8  Pain Location/Orientation: Orientation: Right, Orientation: Upper, Location: Abdomen  Pain Onset/Description: Onset: Ongoing  Hospital Pain Intervention(s): Repositioned    3. Suicide Screening                                                          [place]-SUICIDE SEVERITY RATING SCALE (C-SSRS)                            1. In the last month have you wished you were dead or wished you could go to sleep and not wake up? No  2. In the last month, have you actually had thoughts about killing yourself? No  6. Have you done anything, started to do anything, or prepared to do anything to end your life in the last 3 months? No  Suicide Risk Level : Low     Administrative Statements   VIRTUAL CARE DOCUMENTATION:     1. This service was provided via Telemedicine using Teams Virtual Rounding      2. Parties in the room with patient during teleconsult Patient only    3. Confidentiality My office door was closed     4. Participants No one else was in the room    5. Patient acknowledged consent and understanding of privacy and security of the  Telemedicine consult. I informed the patient that I have reviewed their record in Epic and presented the opportunity for them to ask any questions regarding the visit today.  The patient agreed to participate.    6. I have spent a total time of 30 minutes in caring for this patient on the day of the visit/encounter including Diagnostic results, Counseling / Coordination of care, Documenting in the medical record, Reviewing/placing orders in the medical record (including tests, medications, and/or procedures), Obtaining or reviewing history  , and Communicating with other healthcare professionals , not including the time spent for establishing the audio/video connection.

## 2025-05-13 NOTE — PLAN OF CARE
Problem: PAIN - ADULT  Goal: Verbalizes/displays adequate comfort level or baseline comfort level  Description: Interventions:- Encourage patient to monitor pain and request assistance- Assess pain using appropriate pain scale- Administer analgesics based on type and severity of pain and evaluate response- Implement non-pharmacological measures as appropriate and evaluate response- Consider cultural and social influences on pain and pain management- Notify physician/advanced practitioner if interventions unsuccessful or patient reports new pain  Outcome: Progressing     Problem: INFECTION - ADULT  Goal: Absence or prevention of progression during hospitalization  Description: INTERVENTIONS:- Assess and monitor for signs and symptoms of infection- Monitor lab/diagnostic results- Monitor all insertion sites, i.e. indwelling lines, tubes, and drains- Monitor endotracheal if appropriate and nasal secretions for changes in amount and color- [place]on appropriate cooling/warming therapies per order- Administer medications as ordered- Instruct and encourage patient and family to use good hand hygiene technique- Identify and instruct in appropriate isolation precautions for identified infection/condition  Outcome: Progressing  Goal: Absence of fever/infection during neutropenic period  Description: INTERVENTIONS:- Monitor WBC  Outcome: Progressing     Problem: SAFETY ADULT  Goal: Patient will remain free of falls  Description: INTERVENTIONS:- Educate patient/family on patient safety including physical limitations- Instruct patient to call for assistance with activity - Consult OT/PT to assist with strengthening/mobility - Keep Call bell within reach- Keep bed low and locked with side rails adjusted as appropriate- Keep care items and personal belongings within reach- Initiate and maintain comfort rounds- Make Fall Risk Sign visible to staff- Offer Toileting every 2 Hours, in advance of need- Initiate/Maintain alarm-  Obtain necessary fall risk management equipment- Apply yellow socks and bracelet for high fall risk patients- Consider moving patient to room near nurses station  Outcome: Progressing  Goal: Maintain or return to baseline ADL function  Description: INTERVENTIONS:-  Assess patient's ability to carry out ADLs; assess patient's baseline for ADL function and identify physical deficits which impact ability to perform ADLs (bathing, care of mouth/teeth, toileting, grooming, dressing, etc.)- Assess/evaluate cause of self-care deficits - Assess range of motion- Assess patient's mobility; develop plan if impaired- Assess patient's need for assistive devices and provide as appropriate- Encourage maximum independence but intervene and supervise when necessary- Involve family in performance of ADLs- Assess for home care needs following discharge - Consider OT consult to assist with ADL evaluation and planning for discharge- Provide patient education as appropriate  Outcome: Progressing  Goal: Maintains/Returns to pre admission functional level  Description: INTERVENTIONS:- Perform AM-PAC 6 Click Basic Mobility/ Daily Activity assessment daily.- Set and communicate daily mobility goal to care team and patient/family/caregiver. - Collaborate with rehabilitation services on mobility goals if consulted- Perform Range of Motion 3 times a day.- Reposition patient every 2 hours.- Dangle patient 3 times a day- Stand patient 3 times a day- Ambulate patient 3 times a day- Out of bed to chair 3 times a day - Out of bed for meals 3 times a day- Out of bed for toileting- Record patient progress and toleration of activity level   Outcome: Progressing     Problem: DISCHARGE PLANNING  Goal: Discharge to home or other facility with appropriate resources  Description: INTERVENTIONS:- Identify barriers to discharge w/patient and caregiver- Arrange for needed discharge resources and transportation as appropriate- Identify discharge learning needs  (meds, wound care, etc.)- Arrange for interpretive services to assist at discharge as needed- Refer to Case Management Department for coordinating discharge planning if the patient needs post-hospital services based on physician/advanced practitioner order or complex needs related to functional status, cognitive ability, or social support system  Outcome: Progressing     Problem: Knowledge Deficit  Goal: Patient/family/caregiver demonstrates understanding of disease process, treatment plan, medications, and discharge instructions  Description: Complete learning assessment and assess knowledge base.Interventions:- Provide teaching at level of understanding- Provide teaching via preferred learning methods  Outcome: Progressing     Problem: NEUROSENSORY - ADULT  Goal: Achieves stable or improved neurological status  Description: INTERVENTIONS- Monitor and report changes in neurological status- Monitor vital signs such as temperature, blood pressure, glucose, and any other labs ordered - Initiate measures to prevent increased intracranial pressure- Monitor for seizure activity and implement precautions if appropriate    Outcome: Progressing  Goal: Achieves maximal functionality and self care  Description: INTERVENTIONS- Monitor swallowing and airway patency with patient fatigue and changes in neurological status- Encourage and assist patient to increase activity and self care. - Encourage visually impaired, hearing impaired and aphasic patients to use assistive/communication devices  Outcome: Progressing     Problem: CARDIOVASCULAR - ADULT  Goal: Maintains optimal cardiac output and hemodynamic stability  Description: INTERVENTIONS:- Monitor I/O, vital signs and rhythm- Monitor for S/S and trends of decreased cardiac output- Administer and titrate ordered vasoactive medications to optimize hemodynamic stability- Assess quality of pulses, skin color and temperature- Assess for signs of decreased coronary artery  perfusion- Instruct patient to report change in severity of symptoms  Outcome: Progressing  Goal: Absence of cardiac dysrhythmias or at baseline rhythm  Description: INTERVENTIONS:- Continuous cardiac monitoring, vital signs, obtain 12 lead EKG if ordered- Administer antiarrhythmic and heart rate control medications as ordered- Monitor electrolytes and administer replacement therapy as ordered  Outcome: Progressing     Problem: RESPIRATORY - ADULT  Goal: Achieves optimal ventilation and oxygenation  Description: INTERVENTIONS:- Assess for changes in respiratory status- Assess for changes in mentation and behavior- Position to facilitate oxygenation and minimize respiratory effort- Oxygen administered by appropriate delivery if ordered- Initiate smoking cessation education as indicated- Encourage broncho-pulmonary hygiene including cough, deep breathe, Incentive Spirometry- Assess the need for suctioning and aspirate as needed- Assess and instruct to report SOB or any respiratory difficulty- Respiratory Therapy support as indicated  Outcome: Progressing     Problem: GASTROINTESTINAL - ADULT  Goal: Minimal or absence of nausea and/or vomiting  Description: INTERVENTIONS:- Administer IV fluids if ordered to ensure adequate hydration- Maintain NPO status until nausea and vomiting are resolved- Nasogastric tube if ordered- Administer ordered antiemetic medications as needed- Provide nonpharmacologic comfort measures as appropriate- Advance diet as tolerated, if ordered- Consider nutrition services referral to assist patient with adequate nutrition and appropriate food choices  Outcome: Progressing  Goal: Maintains or returns to baseline bowel function  Description: INTERVENTIONS:- Assess bowel function- Encourage oral fluids to ensure adequate hydration- Administer IV fluids if ordered to ensure adequate hydration- Administer ordered medications as needed- Encourage mobilization and activity- Consider nutritional  services referral to assist patient with adequate nutrition and appropriate food choices  Outcome: Progressing  Goal: Maintains adequate nutritional intake  Description: INTERVENTIONS:- Monitor percentage of each meal consumed- Identify factors contributing to decreased intake, treat as appropriate- Assist with meals as needed- Monitor I&O, weight, and lab values if indicated- Obtain nutrition services referral as needed  Outcome: Progressing  Goal: Oral mucous membranes remain intact  Description: INTERVENTIONS- Assess oral mucosa and hygiene practices- Implement preventative oral hygiene regimen- Implement oral medicated treatments as ordered- Initiate Nutrition services referral as needed  Outcome: Progressing     Problem: GENITOURINARY - ADULT  Goal: Maintains or returns to baseline urinary function  Description: INTERVENTIONS:- Assess urinary function- Encourage oral fluids to ensure adequate hydration if ordered- Administer IV fluids as ordered to ensure adequate hydration- Administer ordered medications as needed- Offer frequent toileting- Follow urinary retention protocol if ordered  Outcome: Progressing  Goal: Absence of urinary retention  Description: INTERVENTIONS:- Assess patient’s ability to void and empty bladder- Monitor I/O- Bladder scan as needed- Discuss with physician/AP medications to alleviate retention as needed- Discuss catheterization for long term situations as appropriate  Outcome: Progressing     Problem: METABOLIC, FLUID AND ELECTROLYTES - ADULT  Goal: Electrolytes maintained within normal limits  Description: INTERVENTIONS:- Monitor labs and assess patient for signs and symptoms of electrolyte imbalances- Administer electrolyte replacement as ordered- Monitor response to electrolyte replacements, including repeat lab results as appropriate- Instruct patient on fluid and nutrition as appropriate  Outcome: Progressing  Goal: Fluid balance maintained  Description: INTERVENTIONS:- Monitor  labs - Monitor I/O and WT- Instruct patient on fluid and nutrition as appropriate- Assess for signs & symptoms of volume excess or deficit  Outcome: Progressing  Goal: Glucose maintained within target range  Description: INTERVENTIONS:- Monitor Blood Glucose as ordered- Assess for signs and symptoms of hyperglycemia and hypoglycemia- Administer ordered medications to maintain glucose within target range- Assess nutritional intake and initiate nutrition service referral as needed  Outcome: Progressing     Problem: SKIN/TISSUE INTEGRITY - ADULT  Goal: Skin Integrity remains intact(Skin Breakdown Prevention)  Description: Assess:-Perform Jag assessment-Clean and moisturize skin-Inspect skin when repositioning, toileting, and assisting with ADLS-Assess under medical devices-Assess extremities for adequate circulation and sensation Bed Management:-Have minimal linens on bed & keep smooth, unwrinkled-Change linens as needed when moist or perspiring-Avoid sitting or lying in one position for more than 2 hours while in bed-Keep HOB at 45 degrees Toileting:-Offer bedside commode-Assess for incontinence-Use incontinent care products after each incontinent episode Activity:-Mobilize patient 3 times a day-Encourage activity and walks on unit-Encourage or provide ROM exercises -Turn and reposition patient every 2 Hours-Use appropriate equipment to lift or move patient in bed-Instruct/ Assist with weight shifting when out of bed in chair-Consider limitation of chair time 2 hour intervalsSkin Care:-Avoid use of baby powder, tape, friction and shearing, hot water or constrictive clothing-Relieve pressure over bony prominences-Do not massage red bony areasNext Steps:-Teach patient strategies to minimize risks -Consider consults to  interdisciplinary teams  Outcome: Progressing     Problem: HEMATOLOGIC - ADULT  Goal: Maintains hematologic stability  Description: INTERVENTIONS- Assess for signs and symptoms of bleeding or  hemorrhage- Monitor labs- Administer supportive blood products/factors as ordered and appropriate  Outcome: Progressing     Problem: MUSCULOSKELETAL - ADULT  Goal: Maintain or return mobility to safest level of function  Description: INTERVENTIONS:- Assess patient's ability to carry out ADLs; assess patient's baseline for ADL function and identify physical deficits which impact ability to perform ADLs (bathing, care of mouth/teeth, toileting, grooming, dressing, etc.)- Assess/evaluate cause of self-care deficits - Assess range of motion- Assess patient's mobility- Assess patient's need for assistive devices and provide as appropriate- Encourage maximum independence but intervene and supervise when necessary- Involve family in performance of ADLs- Assess for home care needs following discharge - Consider OT consult to assist with ADL evaluation and planning for discharge- Provide patient education as appropriate  Outcome: Progressing  Goal: Maintain proper alignment of affected body part  Description: INTERVENTIONS:- Support, maintain and protect limb and body alignment- Provide patient/ family with appropriate education  Outcome: Progressing     Problem: PSYCHOSIS  Goal: Will report no hallucinations or delusions  Description: Interventions:- Administer medication as  ordered- Every waking shifts and PRN assess for the presence of hallucinations and or delusions- Assist with reality testing to support increasing orientation- Assess if patient's hallucinations or delusions are encouraging self-harm or harm to others and intervene as appropriate  Outcome: Progressing     Problem: ANXIETY  Goal: Will report anxiety at manageable levels  Description: INTERVENTIONS:- Administer medication as ordered- Teach and encourage coping skills- Provide emotional support- Assess patient/family for anxiety and ability to cope  Outcome: Progressing  Goal: By discharge: Patient will verbalize 2 strategies to deal with  anxiety  Description: Interventions:- Identify any obvious source/trigger to anxiety- Staff will assist patient in applying identified coping technique/skills- Encourage attendance of scheduled groups and activities  Outcome: Progressing     Problem: SELF CARE DEFICIT  Goal: Return ADL status to a safe level of function  Description: INTERVENTIONS:- Administer medication as ordered- Assess ADL deficits and provide assistive devices as needed- Obtain PT/OT consults as needed- Assist and instruct patient to increase activity and self care as tolerated  Outcome: Progressing     Problem: Prexisting or High Potential for Compromised Skin Integrity  Goal: Skin integrity is maintained or improved  Description: INTERVENTIONS:- Identify patients at risk for skin breakdown- Assess and monitor skin integrity- Assess and monitor nutrition and hydration status- Monitor labs - Assess for incontinence - Turn and reposition patient- Assist with mobility/ambulation- Relieve pressure over bony prominences- Avoid friction and shearing- Provide appropriate hygiene as needed including keeping skin clean and dry- Evaluate need for skin moisturizer/barrier cream- Collaborate with interdisciplinary team - Patient/family teaching- Consider wound care consult   Outcome: Progressing     Problem: Nutrition/Hydration-ADULT  Goal: Nutrient/Hydration intake appropriate for improving, restoring or maintaining nutritional needs  Description: Monitor and assess patient's nutrition/hydration status for malnutrition. Collaborate with interdisciplinary team and initiate plan and interventions as ordered.  Monitor patient's weight and dietary intake as ordered or per policy. Utilize nutrition screening tool and intervene as necessary. Determine patient's food preferences and provide high-protein, high-caloric foods as appropriate. INTERVENTIONS:- Monitor oral intake, urinary output, labs, and treatment plans- Assess nutrition and hydration status and  recommend course of action- Evaluate amount of meals eaten- Assist patient with eating if necessary - Allow adequate time for meals- Recommend/ encourage appropriate diets, oral nutritional supplements, and vitamin/mineral supplements- Order, calculate, and assess calorie counts as needed- Recommend, monitor, and adjust tube feedings and TPN/PPN based on assessed needs- Assess need for intravenous fluids- Provide specific nutrition/hydration education as appropriate- Include patient/family/caregiver in decisions related to nutrition  Monitor and assess patient's nutrition/hydration status for malnutrition. Collaborate with interdisciplinary team and initiate plan and interventions as ordered.  Monitor patient's weight and dietary intake as ordered or per policy. Utilize nutrition screening tool and intervene as necessary. Determine patient's food preferences and provide high-protein, high-caloric foods as appropriate. INTERVENTIONS:- Monitor oral intake, urinary output, labs, and treatment plans- Assess nutrition and hydration status and recommend course of action- Evaluate amount of meals eaten- Assist patient with eating if necessary - Allow adequate time for meals- Recommend/ encourage appropriate diets, oral nutritional supplements, and vitamin/mineral supplements- Order, calculate, and assess calorie counts as needed- Recommend, monitor, and adjust tube feedings and TPN/PPN based on assessed needs- Assess need for intravenous fluids- Provide specific nutrition/hydration education as appropriate- Include patient/family/caregiver in decisions related to nutrition  Monitor and assess patient's nutrition/hydration status for malnutrition. Collaborate with interdisciplinary team and initiate plan and interventions as ordered.  Monitor patient's weight and dietary intake as ordered or per policy. Utilize nutrition screening tool and intervene as necessary. Determine patient's food preferences and provide  high-protein, high-caloric foods as appropriate. INTERVENTIONS:- Monitor oral intake, urinary output, labs, and treatment plans- Assess nutrition and hydration status and recommend course of action- Evaluate amount of meals eaten- Assist patient with eating if necessary - Allow adequate time for meals- Recommend/ encourage appropriate diets, oral nutritional supplements, and vitamin/mineral supplements- Order, calculate, and assess calorie counts as needed- Recommend, monitor, and adjust tube feedings and TPN/PPN based on assessed needs- Assess need for intravenous fluids- Provide specific nutrition/hydration education as appropriate- Include patient/family/caregiver in decisions related to nutrition  Outcome: Progressing     Problem: Decreased Cardiac Output  Goal: Cardiac output adequate for individual needs  Description: INTERVENTIONS: Monitor for signs and symptoms of decreased cardiac output - Monitor for dyspnea with exertion and at rest- Monitor for orthopnea- Monitor for signs of tachycardia. Place patient on telemetry monitoring.- Assess patient for jugular vein distention- Assess patient for lower extremity edema and poor peripheral perfusion - Auscultate lung sound for Fine bibasilar crackles - Monitor for cardiac arrythmias - Administer beta blockers, antiarrhythmic, and blood pressure medications as ordered  Outcome: Progressing     Problem: Impaired Gas Exchange  Goal: Optimize oxygenation and ensure adequate ventilation  Description: INTERVENTIONS: Monitor for signs and symptoms of respiratory distress              - Elevate HOB or use high fowlers to promote lung expansion              - Administer oxygen as ordered to maintain adequate oxygenation              - Encourage use of IS to promote lung expansion and prevent PN              - Monitor ABGs to assess oxygenation status              - Monitor blood oxygen level to maintain adequate oxygenation              - Encourage cough and deep  breathing exercises to promote lung expansion              - Monitor patient's mental status for increased confusion  Outcome: Progressing     Problem: Excess Fluid Volume  Goal: Patient is able to achieve and maintain homeostasis  Description: INTERVENTIONS: Monitor for sign and symptoms of fluid overload- Evaluate LE edema every shift- Elevate LE to prevent dependent edema- Apply SUKUMAR stockings as ordered - Monitor ankle circumference daily- Assess for jugular vein distention- Evaluate provider orders for the CHF diuretic algorithm. Administer diuretics as ordered- Weigh the patient daily at 0600 and report a weight gain of five pounds or more - Strict intake and output- Monitor fluid intake and adhere to fluid restrictions- Assess lung sounds every shift and as needed- Monitor vital signs and lab values (CBC, chem, BUN, BNP)- Measure and document urine output  Outcome: Progressing     Problem: Activity Intolerance  Goal: Patient is able to perform activities within their limitations  Description: INTERVENTIONS:                     -   Alternate periods of activity with periods of rest               -   Patients is able to maintain normal vitals heart rhythm during activity               -   Gradually increase activity and exercise as patient can tolerate               -   Monitor blood pressure and heart before and after exercise                -   Monitor blood oxygen saturation during activity and apply oxygen as needed  Outcome: Progressing     Problem: Knowledge Deficit  Goal: Patient is able to verbalize understanding of Heart Failure after education  Description: INTERVENTIONS:- Educate the patient and family on signs and symptoms of HF- Provide the patient with HF education and HF zone tool- Educate on the importance of daily weight in the AM and reporting a weight gain             of 3 or more pounds to their primary care physician- Monitor for SOB- Maintain and sodium and fluid restriction- Educate the  patient on the importance of medications such as: diuretics, betablockers,             antiarrhythmics and their purpose, dose, route, side effects and labs             if they are needed  Outcome: Progressing

## 2025-05-13 NOTE — TELEPHONE ENCOUNTER
Consult placed on [facility] queue at 0823 to be seen by an [facility] psychiatrist.

## 2025-05-13 NOTE — ASSESSMENT & PLAN NOTE
Wt Readings from Last 3 Encounters:   05/13/25 95.8 kg (211 lb 3.2 oz)   05/02/25 100 kg (221 lb 5.5 oz)   03/17/25 106 kg (233 lb)     Monitor I/o and daily weights   He does have lower lobe rales but weight is decreased and negative on I/o.   Could be atelectasis and will add incentive spirometry.   Cxr negative for pulmonary edema  Bnp was normal

## 2025-05-14 VITALS
OXYGEN SATURATION: 93 % | SYSTOLIC BLOOD PRESSURE: 117 MMHG | DIASTOLIC BLOOD PRESSURE: 78 MMHG | BODY MASS INDEX: 33.08 KG/M2 | RESPIRATION RATE: 18 BRPM | HEIGHT: 67 IN | WEIGHT: 210.76 LBS | HEART RATE: 77 BPM | TEMPERATURE: 98.1 F

## 2025-05-14 LAB
GLUCOSE SERPL-MCNC: 220 MG/DL (ref 65–140)
GLUCOSE SERPL-MCNC: 239 MG/DL (ref 65–140)
GLUCOSE SERPL-MCNC: 246 MG/DL (ref 65–140)

## 2025-05-14 PROCEDURE — 99239 HOSP IP/OBS DSCHRG MGMT >30: CPT | Performed by: INTERNAL MEDICINE

## 2025-05-14 PROCEDURE — 82948 REAGENT STRIP/BLOOD GLUCOSE: CPT

## 2025-05-14 RX ORDER — OXYCODONE HYDROCHLORIDE 5 MG/1
5 TABLET ORAL EVERY 4 HOURS PRN
Qty: 8 TABLET | Refills: 0 | Status: SHIPPED | OUTPATIENT
Start: 2025-05-14 | End: 2025-05-24

## 2025-05-14 RX ADMIN — Medication 12.5 MG: at 08:22

## 2025-05-14 RX ADMIN — OXYCODONE 5 MG: 5 TABLET ORAL at 15:06

## 2025-05-14 RX ADMIN — CLONAZEPAM 1 MG: 1 TABLET ORAL at 17:49

## 2025-05-14 RX ADMIN — INSULIN LISPRO 3 UNITS: 100 INJECTION, SOLUTION INTRAVENOUS; SUBCUTANEOUS at 17:48

## 2025-05-14 RX ADMIN — INSULIN LISPRO 3 UNITS: 100 INJECTION, SOLUTION INTRAVENOUS; SUBCUTANEOUS at 12:45

## 2025-05-14 RX ADMIN — INSULIN LISPRO 2 UNITS: 100 INJECTION, SOLUTION INTRAVENOUS; SUBCUTANEOUS at 08:20

## 2025-05-14 RX ADMIN — ENOXAPARIN SODIUM 40 MG: 40 INJECTION SUBCUTANEOUS at 08:22

## 2025-05-14 RX ADMIN — OXYCODONE 5 MG: 5 TABLET ORAL at 06:36

## 2025-05-14 RX ADMIN — OXYCODONE 5 MG: 5 TABLET ORAL at 01:05

## 2025-05-14 RX ADMIN — PANTOPRAZOLE SODIUM 40 MG: 40 TABLET, DELAYED RELEASE ORAL at 06:32

## 2025-05-14 NOTE — ASSESSMENT & PLAN NOTE
Pt was transferred from  to [facility] icu due to profound septic shock with imagining demonstrating gallstones, ruq tenderness, and profound leukocytosis  Currently treated with cefepime/flagyl  On levophed which had been weaned to off  Pt was scheduled for or but decided later in evening decided he did not want to go forward with surgery   Surgery stated to continue conservative treatment with iv antibiotics and change to po at discharge for 2 weeks however he has increased RUQ abd pain  Appreciate gi recommendations- pt is not a candidate for stenting as it will impair surgery  Spoke with surgery who recommended perc. Miriam tube- he is not a good candidate for this given his psychiatric history   Pt has improved but continues to have ruq pain at times. Concern is that he will become septic again without intervention. Long discussions about the appropriate treatment for him given his complex psychosocial situation. It was concluded that cholecystectomy might be the safest mode of treatment. Pt is willing to go forward with surgery   S/p lap cholecystectomy with kimberly drain in place. Stones were removed. Discussed with gi. No further intervention is required   S/p kimberly drain removal  Dc today. Will need medications delivered to bed

## 2025-05-14 NOTE — ASSESSMENT & PLAN NOTE
Wt Readings from Last 3 Encounters:   05/14/25 95.6 kg (210 lb 12.2 oz)   05/02/25 100 kg (221 lb 5.5 oz)   03/17/25 106 kg (233 lb)     Monitor I/o and daily weights   He does have lower lobe rales but weight is decreased and negative on I/o.   Could be atelectasis and will add incentive spirometry.   Cxr negative for pulmonary edema  Bnp was normal

## 2025-05-14 NOTE — ASSESSMENT & PLAN NOTE
Lab Results   Component Value Date    HGBA1C 8.3 (A) 03/17/2025       Recent Labs     05/13/25  1101 05/13/25  1552 05/13/25  2110 05/14/25  0733   POCGLU 303* 243* 171* 220*       Blood Sugar Average: Last 72 hrs:  (P) 215.8464704080434879  D/c ozempic  Continue insulin sliding scale   Continue lantus at 15 units   Insulin administration teaching

## 2025-05-14 NOTE — ASSESSMENT & PLAN NOTE
Lab Results   Component Value Date    HGBA1C 8.3 (A) 03/17/2025       Recent Labs     05/13/25  1101 05/13/25  1552 05/13/25  2110 05/14/25  0733   POCGLU 303* 243* 171* 220*       Blood Sugar Average: Last 72 hrs:  (P) 215.0672892094695772  D/c ozempic  Continue insulin sliding scale   Continue lantus at 15 units   Insulin administration teaching

## 2025-05-14 NOTE — RESTORATIVE TECHNICIAN NOTE
Restorative Technician Note      Patient Name: Solo Mack     Restorative Tech Visit Date: 05/14/25  Note Type: Mobility  Patient Position Upon Consult: Supine  Activity Performed: Ambulated  Patient Position at End of Consult: All needs within reach; Bedside chair

## 2025-05-14 NOTE — DISCHARGE SUMMARY
Discharge Summary - Hospitalist   Name: Solo Mack 55 y.o. male I MRN: [id number]  Unit/Bed#: [place] [address] -01 I Date of Admission: 5/2/2025   Date of Service: 5/14/2025 I Hospital Day: 12     Assessment & Plan  Septic shock (HCC)  Pt was transferred from  to [facility] icu due to profound septic shock with imagining demonstrating gallstones, ruq tenderness, and profound leukocytosis  Currently treated with cefepime/flagyl  On levophed which had been weaned to off  Pt was scheduled for or but decided later in evening decided he did not want to go forward with surgery   Surgery stated to continue conservative treatment with iv antibiotics and change to po at discharge for 2 weeks however he has increased RUQ abd pain  Appreciate gi recommendations- pt is not a candidate for stenting as it will impair surgery  Spoke with surgery who recommended perc. Miriam tube- he is not a good candidate for this given his psychiatric history   Pt has improved but continues to have ruq pain at times. Concern is that he will become septic again without intervention. Long discussions about the appropriate treatment for him given his complex psychosocial situation. It was concluded that cholecystectomy might be the safest mode of treatment. Pt is willing to go forward with surgery   S/p lap cholecystectomy with kimberly drain in place. Stones were removed. Discussed with gi. No further intervention is required   S/p kimberly drain removal  Dc today. Will need medications delivered to bed  GERD (gastroesophageal reflux disease)  Continue ppi   Essential hypertension  Had been on clonidine and metoprolol outpt   Bp has been running lower   D/c clonidine  Continue only metoprolol   Type 2 diabetes mellitus, without long-term current use of insulin ([facility])  Lab Results   Component Value Date    HGBA1C 8.3 (A) 03/17/2025       Recent Labs     05/13/25  1101 05/13/25  1552 05/13/25  2110 05/14/25  0733   POCGLU 303* 243* 171* 220*       Blood Sugar  Average: Last 72 hrs:  (P) 215.5250337642198078  D/c ozempic  Continue insulin sliding scale   Continue lantus at 15 units   Insulin administration teaching   Schizophrenia, unspecified type ([facility])  Appreciate psychiatry recommendations  Continue klonopin 1mg bid prn  Continue zyprexa 10mg qhs  Psych eval when pt is medically cleared  Neuropsych deemed pt to not have capacity   Will have neuropsych eval again as pt seemed to understand about his gallbladder and surgery  Appreciate psych recommendations  He will require inpt psych- recommending 201  Neuropsych did seem again yesterday and he had capacity  Re consult of psych stated he can sign a 201 or follow up outpt. No need for 302. Pt declined 201  Working on getting his medications for him to reduce the barrier of him not taking meds at home     Chronic kidney disease, stage 2 (mild)  Lab Results   Component Value Date    EGFR 74 05/12/2025    EGFR 65 05/10/2025    EGFR 74 05/09/2025    CREATININE 1.10 05/12/2025    CREATININE 1.22 05/10/2025    CREATININE 1.10 05/09/2025   Chauncey r/o  Creatinine currently at baseline which is 0.9-1.2   Repeat labs in am.   H/O prolonged Q-T interval on ECG  Qtc has improved  Replace mg. Keep potassium greater than 4.0    Acute cholecystitis  Please see septic shock   Hypophosphatemia    Insomnia  continue melatonin  Continue zyprexa   Obesity (BMI 30-39.9)    CHF (congestive heart failure) ([facility])  Wt Readings from Last 3 Encounters:   05/14/25 95.6 kg (210 lb 12.2 oz)   05/02/25 100 kg (221 lb 5.5 oz)   03/17/25 106 kg (233 lb)     Monitor I/o and daily weights   He does have lower lobe rales but weight is decreased and negative on I/o.   Could be atelectasis and will add incentive spirometry.   Cxr negative for pulmonary edema  Bnp was normal              Medical Problems       Resolved Problems  Date Reviewed: 5/14/2025          Resolved    Abnormal TSH 5/11/2025     Resolved by  Tasha Joseph DO    Hypomagnesemia 5/7/2025      Resolved by  Tasha Joseph DO        Discharging Physician / Practitioner: Tasha Joseph DO  PCP: Florin Lindsay DO  Admission Date:   Admission Orders (From admission, onward)       Ordered        05/02/25 0849  INPATIENT ADMISSION  Once                          Discharge Date: 05/14/25    Consultations During Hospital Stay:  Surgery  Gi   Psychiatry   Neuropsych  Endocrine for elevated tsh     Procedures Performed:   Lap cholecystectomy     Significant Findings / Test Results:   XR chest portable  Result Date: 5/12/2025  Impression: No acute cardiopulmonary disease.     XR chest portable ICU  Result Date: 5/2/2025  Impression: No definitive pneumothorax on limited supine imaging status post central line placement. Tree-in-bud nodularity in the bases on CT are likely obscured on this limited exam. No lobar consolidation.     XR chest portable  Result Date: 5/2/2025  Impression: Hypoinflation without acute cardiopulmonary abnormalities.     CT abdomen pelvis w contrast  Result Date: 5/2/2025  Impression: 1.  Findings of acute cholecystitis with impacted gallstone within the cystic duct. 2.  Chronic tree-in-bud nodularity within the bilateral lung bases, favored to be inflammatory and/or postinflammatory in etiology. 3. The study was marked in EPIC for immediate notification.     US right upper quadrant  Result Date: 5/1/2025  Impression: Cholelithiasis without sonographic findings of acute cholecystitis. CBD measures 6 mm upper limits of normal     Incidental Findings:   none    Test Results Pending at Discharge (will require follow up):   none     Outpatient Tests Requested:  none        Reason for Admission: septic shock     Hospital Course:   Solo Mack is a 55 y.o. male patient who originally presented to the hospital on 5/2/2025 due to septic shock as a result of acute cholecystitis. Pt originally presented to [place]. He was transferred to [place] to the icu as he had septic shock. He was  volume resuscitated and started on levophed along with vasopression. There was discussion on how to treat him given he had an impacted cystic duct stone and psychosis. Pt was under a 302. He was unable to be stented as it would impede his ability to have a lap juvenal. Pt was also felt to be high risk for lap juvenal however percutaneous choestostmy tube would be high risk given his psychiatric state. After long disucssions pt was taken to the or and have a laparoscopic cholecystectomy with kimberly drain placed. Pt recovered well and kimberly drain was removed.       His 302  as there was concern that the psychosis was due to acute illness. He was re evaluated 3 times by psychiatry who stated he did not require involuntary psych placement. He was offered 201 and pt declined signing this. He was discharged to home with outpt psychiatry.      For his diabetes he was instructed to stop taking ozempic and was changed to lantus. He was instructed/educated on how to administer it. Pt voiced he will be able to administer insulin.      Pt had at one point during his hospital stay been deemed not to have capacity. He was evaluated again and has capacity     Please see above list of diagnoses and related plan for additional information.     Discharge Day Visit / Exam:   * Please refer to separate progress note for these details *      Discharge instructions/Information to patient and family:   See after visit summary for information provided to patient and family.      Provisions for Follow-Up Care:  See after visit summary for information related to follow-up care and any pertinent home health orders.      Mobility at time of Discharge:   Basic Mobility Inpatient Raw Score: 24  JH-HLM Goal: 8: Walk 250 feet or more  JH-HLM Achieved: 8: Walk 250 feet ot more      Planned Readmission: no    Discharge Medications:  See after visit summary for reconciled discharge medications provided to patient and/or family.      Administrative  Statements   Discharge Statement:  I have spent a total time of 40 minutes in caring for this patient on the day of the visit/encounter

## 2025-05-14 NOTE — PROGRESS NOTES
Progress Note - Hospitalist   Name: Solo Mack 55 y.o. male I MRN: [id number]  Unit/Bed#: [place] [address] -01 I Date of Admission: 5/2/2025   Date of Service: 5/14/2025 I Hospital Day: 12    Assessment & Plan  Septic shock (HCC)  Pt was transferred from  to [facility] icu due to profound septic shock with imagining demonstrating gallstones, ruq tenderness, and profound leukocytosis  Currently treated with cefepime/flagyl  On levophed which had been weaned to off  Pt was scheduled for or but decided later in evening decided he did not want to go forward with surgery   Surgery stated to continue conservative treatment with iv antibiotics and change to po at discharge for 2 weeks however he has increased RUQ abd pain  Appreciate gi recommendations- pt is not a candidate for stenting as it will impair surgery  Spoke with surgery who recommended perc. Miriam tube- he is not a good candidate for this given his psychiatric history   Pt has improved but continues to have ruq pain at times. Concern is that he will become septic again without intervention. Long discussions about the appropriate treatment for him given his complex psychosocial situation. It was concluded that cholecystectomy might be the safest mode of treatment. Pt is willing to go forward with surgery   S/p lap cholecystectomy with kimberly drain in place. Stones were removed. Discussed with gi. No further intervention is required   S/p kimberly drain removal  Dc today. Will need medications delivered to bed  GERD (gastroesophageal reflux disease)  Continue ppi   Essential hypertension  Had been on clonidine and metoprolol outpt   Bp has been running lower   D/c clonidine  Continue only metoprolol   Type 2 diabetes mellitus, without long-term current use of insulin ([facility])  Lab Results   Component Value Date    HGBA1C 8.3 (A) 03/17/2025       Recent Labs     05/13/25  1101 05/13/25  1552 05/13/25  2110 05/14/25  0733   POCGLU 303* 243* 171* 220*       Blood Sugar Average:  Last 72 hrs:  (P) 215.0501685147117178  D/c ozempic  Continue insulin sliding scale   Continue lantus at 15 units   Insulin administration teaching   Schizophrenia, unspecified type ([facility])  Appreciate psychiatry recommendations  Continue klonopin 1mg bid prn  Continue zyprexa 10mg qhs  Psych eval when pt is medically cleared  Neuropsych deemed pt to not have capacity   Will have neuropsych eval again as pt seemed to understand about his gallbladder and surgery  Appreciate psych recommendations  He will require inpt psych- recommending 201  Neuropsych did seem again yesterday and he had capacity  Re consult of psych stated he can sign a 201 or follow up outpt. No need for 302. Pt declined 201  Working on getting his medications for him to reduce the barrier of him not taking meds at home     Chronic kidney disease, stage 2 (mild)  Lab Results   Component Value Date    EGFR 74 05/12/2025    EGFR 65 05/10/2025    EGFR 74 05/09/2025    CREATININE 1.10 05/12/2025    CREATININE 1.22 05/10/2025    CREATININE 1.10 05/09/2025   Chauncey r/o  Creatinine currently at baseline which is 0.9-1.2   Repeat labs in am.   H/O prolonged Q-T interval on ECG  Qtc has improved  Replace mg. Keep potassium greater than 4.0    Acute cholecystitis  Please see septic shock   Hypophosphatemia    Insomnia  continue melatonin  Continue zyprexa   Obesity (BMI 30-39.9)    CHF (congestive heart failure) ([facility])  Wt Readings from Last 3 Encounters:   05/14/25 95.6 kg (210 lb 12.2 oz)   05/02/25 100 kg (221 lb 5.5 oz)   03/17/25 106 kg (233 lb)     Monitor I/o and daily weights   He does have lower lobe rales but weight is decreased and negative on I/o.   Could be atelectasis and will add incentive spirometry.   Cxr negative for pulmonary edema  Bnp was normal             Discharge Plan: discharge to home     Code Status: Level 1 - Full Code    Subjective   Pt seen and examined. Pt states he has a little bit of pain but no other problems. No  n/v/d    Objective :  Temp:  [98.1 °F (36.7 °C)-98.5 °F (36.9 °C)] 98.1 °F (36.7 °C)  HR:  [77] 77  BP: (117-121)/(78-84) 117/78  Resp:  [18] 18  SpO2:  [93 %] 93 %  O2 Device: None (Room air)    Body mass index is 33.01 kg/m².     Input and Output Summary (last 24 hours):     Intake/Output Summary (Last 24 hours) at 5/14/2025 0945  Last data filed at 5/14/2025 0900  Gross per 24 hour   Intake 500 ml   Output 2000 ml   Net -1500 ml       Physical Exam  Constitutional:       Appearance: Normal appearance.   HENT:      Head: Normocephalic and atraumatic.     Eyes:      Extraocular Movements: Extraocular movements intact.      Pupils: Pupils are equal, round, and reactive to light.       Cardiovascular:      Rate and Rhythm: Normal rate and regular rhythm.      Heart sounds: No murmur heard.     No friction rub. No gallop.   Pulmonary:      Effort: Pulmonary effort is normal. No respiratory distress.      Breath sounds: Normal breath sounds. No wheezing, rhonchi or rales.   Abdominal:      General: There is no distension.      Palpations: Abdomen is soft.      Tenderness: There is no abdominal tenderness. There is no guarding or rebound.     Neurological:      Mental Status: He is alert and oriented to person, place, and time.       Lines/Drains:      Lab Results: I have reviewed the following results:   Results from last 7 days   Lab Units 05/12/25  0646 05/10/25  0623 05/09/25  0508   WBC Thousand/uL 10.64* 16.23* 8.31   HEMOGLOBIN g/dL 14.2 13.7 14.7   HEMATOCRIT % 42.8 42.0 43.9   PLATELETS Thousands/uL 301 367 305   BANDS PCT %  --   --  2   SEGS PCT %  --  74  --    LYMPHO PCT %  --  13* 36   MONO PCT %  --  10 8   EOS PCT %  --  0 4     Results from last 7 days   Lab Units 05/12/25  0646 05/09/25  0508 05/08/25  0500   SODIUM mmol/L 134*   < > 136   POTASSIUM mmol/L 4.6   < > 4.0   CHLORIDE mmol/L 100   < > 101   CO2 mmol/L 26   < > 27   BUN mg/dL 14   < > 10   CREATININE mg/dL 1.10   < > 1.00   ANION GAP mmol/L  8   < > 8   CALCIUM mg/dL 9.0   < > 8.7   ALBUMIN g/dL  --   --  3.3*   TOTAL BILIRUBIN mg/dL  --   --  0.41   ALK PHOS U/L  --   --  82   ALT U/L  --   --  22   AST U/L  --   --  28   GLUCOSE RANDOM mg/dL 146*   < > 149*    < > = values in this interval not displayed.         Results from last 7 days   Lab Units 05/14/25  0733 05/13/25  2110 05/13/25  1552 05/13/25  1101 05/13/25  0740 05/12/25  2105 05/12/25  1617 05/12/25  1124 05/12/25  0721 05/11/25  2123 05/11/25  1626 05/11/25  1138   POC GLUCOSE mg/dl 220* 171* 243* 303* 181* 199* 186* 276* 162* 172* 237* 240*               Recent Cultures (last 7 days):         Imaging Results Review: No pertinent imaging studies reviewed.  Other Study Results Review: No additional pertinent studies reviewed.    Last 24 Hours Medication List:     Current Facility-Administered Medications:     acetaminophen (TYLENOL) tablet 325 mg, Q6H PRN    albuterol inhalation solution 2.5 mg, Q8H PRN    clonazePAM (KlonoPIN) tablet 1 mg, BID PRN    cyanocobalamin injection 1,000 mcg, Q30 Days    enoxaparin (LOVENOX) subcutaneous injection 40 mg, Daily    HYDROmorphone HCl (DILAUDID) injection 0.2 mg, Q4H PRN    hydrOXYzine HCL (ATARAX) tablet 25 mg, Q6H PRN Max 4/day    hydrOXYzine HCL (ATARAX) tablet 50 mg, Q6H PRN Max 4/day    ibuprofen (MOTRIN) tablet 400 mg, Q6H PRN    insulin glargine (LANTUS) subcutaneous injection 15 Units 0.15 mL, HS    insulin lispro (HumALOG/ADMELOG) 100 units/mL subcutaneous injection 1-6 Units, TID AC **AND** Fingerstick Glucose (POCT), TID AC    insulin lispro (HumALOG/ADMELOG) 100 units/mL subcutaneous injection 1-6 Units, HS    LORazepam (ATIVAN) injection 1 mg, Q6H PRN Max 3/day    LORazepam (ATIVAN) tablet 1 mg, Q6H PRN Max 3/day    melatonin tablet 6 mg, HS    metoprolol tartrate (LOPRESSOR) partial tablet 12.5 mg, Q12H ROBER    nicotine polacrilex (NICORETTE) gum 2 mg, Q4H PRN    OLANZapine (ZyPREXA) tablet 10 mg, HS    oxyCODONE (ROXICODONE) IR tablet 5  mg, Q4H PRN    pantoprazole (PROTONIX) EC tablet 40 mg, Early Morning    polyethylene glycol (MIRALAX) packet 17 g, Daily PRN    senna-docusate sodium (SENOKOT S) 8.6-50 mg per tablet 1 tablet, Daily PRN    trimethobenzamide (TIGAN) IM injection 200 mg, Q6H PRN    Administrative Statements   Today, Patient Was Seen By: Tasha Joseph DO

## 2025-05-14 NOTE — NURSING NOTE
Discharge paperwork and medications reviewed with spouse. Spouse and patient state they are comfortable with insulin injections. IV removed.

## 2025-05-14 NOTE — ASSESSMENT & PLAN NOTE
Pt was transferred from  to [facility] icu due to profound septic shock with imagining demonstrating gallstones, ruq tenderness, and profound leukocytosis  Currently treated with cefepime/flagyl  On levophed which had been weaned to off  Pt was scheduled for or but decided later in evening decided he did not want to go forward with surgery   Surgery stated to continue conservative treatment with iv antibiotics and change to po at discharge for 2 weeks however he has increased RUQ abd pain  Appreciate gi recommendations- pt is not a candidate for stenting as it will impair surgery  Spoke with surgery who recommended perc. Miriam tube- he is not a good candidate for this given his psychiatric history   Pt has improved but continues to have ruq pain at times. Concern is that he will become septic again without intervention. Long discussions about the appropriate treatment for him given his complex psychosocial situation. It was concluded that cholecystectomy might be the safest mode of treatment. Pt is willing to go forward with surgery   S/p lap cholecystectomy with kimberly drain in place. Stones were removed. Discussed with gi. No further intervention is required   S/p kimberly drain removal  Dc today. Will need medications delivered to bed

## 2025-05-15 ENCOUNTER — TRANSITIONAL CARE MANAGEMENT (OUTPATIENT)
Dept: INTERNAL MEDICINE CLINIC | Facility: CLINIC | Age: 55
End: 2025-05-15

## 2025-05-15 ENCOUNTER — PATIENT OUTREACH (OUTPATIENT)
Dept: CASE MANAGEMENT | Facility: OTHER | Age: 55
End: 2025-05-15

## 2025-05-15 DIAGNOSIS — N52.9 ERECTILE DYSFUNCTION, UNSPECIFIED ERECTILE DYSFUNCTION TYPE: ICD-10-CM

## 2025-05-15 DIAGNOSIS — I10 ESSENTIAL HYPERTENSION: ICD-10-CM

## 2025-05-15 NOTE — UTILIZATION REVIEW
NOTIFICATION OF ADMISSION DISCHARGE   This is a Notification of Discharge from [facility]. Please be advised that this patient has been discharge from our facility. Below you will find the admission and discharge date and time including the patient’s disposition.   UTILIZATION REVIEW CONTACT:  Utilization Review Assistants  Network Utilization Review Department  Phone: [phone] x carefully listen to the prompts. All voicemails are confidential.  Email: [email or web address]     ADMISSION INFORMATION  PRESENTATION DATE: 5/2/2025  8:47 AM  OBERVATION ADMISSION DATE: N/A  INPATIENT ADMISSION DATE: 5/2/25  8:47 AM   DISCHARGE DATE: 5/14/2025  7:22 PM   DISPOSITION:Home/Self Care    Network Utilization Review Department  ATTENTION: Please call with any questions or concerns to [phone] and carefully listen to the prompts so that you are directed to the right person. All voicemails are confidential.   For Discharge needs, contact Care Management DC Support Team at [phone] opt. 2  Send all requests for admission clinical reviews, approved or denied determinations and any other requests to dedicated fax number below belonging to the campus where the patient is receiving treatment. List of dedicated fax numbers for the Facilities:  FACILITY NAME UR FAX NUMBER   ADMISSION DENIALS (Administrative/Medical Necessity) [phone]   DISCHARGE SUPPORT TEAM ([facility]) [phone]   PARENT CHILD HEALTH (Maternity/NICU/Pediatrics) [phone]   [facility] [phone]   [facility] [phone]   [facility] [phone]   [facility] [phone]   [facility] [phone]   [facility] [phone]   [facility] [phone]   [facility] [phone]   [facility]  [facility] [phone]   [facility] [phone]   [facility] [phone]   [facility] [phone]

## 2025-05-16 ENCOUNTER — PATIENT OUTREACH (OUTPATIENT)
Dept: CASE MANAGEMENT | Facility: OTHER | Age: 55
End: 2025-05-16

## 2025-05-16 RX ORDER — SILDENAFIL 100 MG/1
TABLET, FILM COATED ORAL
Qty: 6 TABLET | Refills: 2 | Status: SHIPPED | OUTPATIENT
Start: 2025-05-16

## 2025-05-16 RX ORDER — METOPROLOL TARTRATE 25 MG/1
25 TABLET, FILM COATED ORAL EVERY 12 HOURS
Qty: 180 TABLET | Refills: 1 | Status: SHIPPED | OUTPATIENT
Start: 2025-05-16

## 2025-05-20 ENCOUNTER — TELEPHONE (OUTPATIENT)
Age: 55
End: 2025-05-20

## 2025-05-20 NOTE — TELEPHONE ENCOUNTER
Patient states he should be on 50 MG of     metoprolol tartrate (LOPRESSOR) 25 mg tablet        Sig: Take 1 tablet (25 mg total) by mouth every 12 (twelve) hours           And not 25 MG as script states. He states 25 does not work.     Please send 50 MG script if ok with Dr. Lindsay to [facility].     Please call with questions or concerns and also when send to pharmacy.     Thank you!

## 2025-05-20 NOTE — TELEPHONE ENCOUNTER
"Pt insists he is on metoprolol 50mg bid, pt has none left of the 25mg. He has rescheduled appt for Friday, and said he needs enough until then or \"he will have find a new dr\"  "

## 2025-05-23 ENCOUNTER — PATIENT OUTREACH (OUTPATIENT)
Dept: CASE MANAGEMENT | Facility: OTHER | Age: 55
End: 2025-05-23

## 2025-05-23 NOTE — PROGRESS NOTES
Contacted patient for f/u post hospitalization s/p lauren sanford.  Patient reports he is doing well at this time.  He denies pain or discomfort and reports incision sites are clean and dry without s/s of infection.  He is tolerating diet and staying hydrated.  He is moving his bowels.  He is taking his medications as prescribed.  Follow up appointment with PCP today.  Patient reports having f/u with surgeon next week but do not see appointment listed in chart.  He denies transportation issues.  He is independent at home and denies needing assistance.  No needs identified at this time.

## 2025-05-30 ENCOUNTER — OFFICE VISIT (OUTPATIENT)
Dept: INTERNAL MEDICINE CLINIC | Facility: CLINIC | Age: 55
End: 2025-05-30

## 2025-05-30 VITALS
RESPIRATION RATE: 12 BRPM | OXYGEN SATURATION: 98 % | HEIGHT: 67 IN | TEMPERATURE: 97.8 F | HEART RATE: 78 BPM | DIASTOLIC BLOOD PRESSURE: 78 MMHG | SYSTOLIC BLOOD PRESSURE: 128 MMHG | BODY MASS INDEX: 35 KG/M2 | WEIGHT: 223 LBS

## 2025-05-30 DIAGNOSIS — Z79.4 TYPE 2 DIABETES MELLITUS WITH HYPEROSMOLAR COMA, WITH LONG-TERM CURRENT USE OF INSULIN (HCC): Primary | ICD-10-CM

## 2025-05-30 DIAGNOSIS — N52.9 ERECTILE DYSFUNCTION, UNSPECIFIED ERECTILE DYSFUNCTION TYPE: ICD-10-CM

## 2025-05-30 DIAGNOSIS — I10 ESSENTIAL HYPERTENSION: Chronic | ICD-10-CM

## 2025-05-30 DIAGNOSIS — E11.01 TYPE 2 DIABETES MELLITUS WITH HYPEROSMOLAR COMA, WITH LONG-TERM CURRENT USE OF INSULIN (HCC): Primary | ICD-10-CM

## 2025-05-30 RX ORDER — SILDENAFIL 100 MG/1
100 TABLET, FILM COATED ORAL AS NEEDED
Qty: 6 TABLET | Refills: 2 | Status: SHIPPED | OUTPATIENT
Start: 2025-05-30

## 2025-05-30 RX ORDER — BLOOD-GLUCOSE METER
KIT MISCELLANEOUS
Qty: 1 KIT | Refills: 0 | Status: SHIPPED | OUTPATIENT
Start: 2025-05-30

## 2025-05-30 RX ORDER — LANCETS 33 GAUGE
EACH MISCELLANEOUS
Qty: 100 EACH | Refills: 3 | Status: SHIPPED | OUTPATIENT
Start: 2025-05-30

## 2025-05-30 RX ORDER — METOPROLOL TARTRATE 50 MG
50 TABLET ORAL EVERY 12 HOURS
Qty: 200 TABLET | Refills: 3 | Status: SHIPPED | OUTPATIENT
Start: 2025-05-30

## 2025-05-30 RX ORDER — BLOOD SUGAR DIAGNOSTIC
STRIP MISCELLANEOUS
Qty: 100 EACH | Refills: 3 | Status: SHIPPED | OUTPATIENT
Start: 2025-05-30

## 2025-05-30 RX ORDER — ACYCLOVIR 400 MG/1
1 TABLET ORAL
Qty: 9 EACH | Refills: 3 | Status: SHIPPED | OUTPATIENT
Start: 2025-05-30

## 2025-05-30 NOTE — PROGRESS NOTES
Assessment/Plan:    No problem-specific Assessment & Plan notes found for this encounter.       Diagnoses and all orders for this visit:    Type 2 diabetes mellitus with hyperosmolar coma, with long-term current use of insulin (HCC)  -     Blood Glucose Monitoring Suppl (OneTouch Verio Reflect) w/Device KIT; Check blood sugars once daily. Please substitute with appropriate alternative as covered by patient's insurance. Dx: E11.65  -     glucose blood (OneTouch Verio) test strip; Check blood sugars once daily. Please substitute with appropriate alternative as covered by patient's insurance. Dx: E11.65  -     OneTouch Delica Lancets 33G MISC; Check blood sugars once daily. Please substitute with appropriate alternative as covered by patient's insurance. Dx: E11.65  -     Continuous Glucose Sensor (Dexcom G7 Sensor); Use 1 Device every 10 days    Essential hypertension  Comments:  taking 50 not 25--2 of 25 on own since november  Orders:  -     metoprolol tartrate (LOPRESSOR) 50 mg tablet; Take 1 tablet (50 mg total) by mouth every 12 (twelve) hours            Subjective:      Patient ID: Solo Mack is a 55 y.o. male.    4/30-5/1 [facility] psychosis  5/1 -5/2 transferred to behaviour health  5/2 to 5/14/25 septic shock [facility]   as a result of acute cholecystitis. Pt originally presented to [place]. He was transferred to [place] to the icu as he had septic shock. He was volume resuscitated and started on levophed along with vasopression. There was discussion on how to treat him given he had an impacted cystic duct stone and psychosis. Pt was under a 302. He was unable to be stented as it would impede his ability to have a lap juvenal. Pt was also felt to be high risk for lap juvenal however percutaneous choestostmy tube would be high risk given his psychiatric state. After long disucssions pt was taken to the or and have a laparoscopic cholecystectomy with kimberly drain placed. Pt recovered well and kimberly  "drain was removed.       His 302  as there was concern that the psychosis was due to acute illness. He was re evaluated 3 times by psychiatry who stated he did not require involuntary psych placement. He was offered 201 and pt declined signing this. He was discharged to home with outpt psychiatry.      For his diabetes he was instructed to stop taking ozempic and was changed to lantus. He was instructed/educated on how to administer it. Pt voiced he will be able to administer insulin.      Pt had at one point during his hospital stay been deemed not to have capacity. He was evaluated again and has capacity       `2025:  -taking 15 lantus at hs  -bs-not checking ? Machine  -no hypoglycemia  -discussed importance off accuchecks  -4pm  accucheck--last meal 8pm last night= 176  =-ubers    Wants dexcom            The following portions of the patient's history were reviewed and updated as appropriate: allergies, current medications, past family history, past medical history, past social history, past surgical history, and problem list.    Review of Systems   Constitutional:  Negative for activity change and fatigue.   HENT:  Negative for ear discharge, ear pain, rhinorrhea and sore throat.    Eyes:  Negative for pain and visual disturbance.   Respiratory:  Negative for cough and shortness of breath.    Cardiovascular:  Negative for chest pain and leg swelling.   Gastrointestinal:  Negative for abdominal pain, constipation and diarrhea.   Endocrine: Negative for cold intolerance and polyuria.   Genitourinary:  Negative for flank pain and hematuria.   Musculoskeletal:  Negative for back pain and joint swelling.   Skin:  Negative for pallor and wound.   Neurological:  Negative for dizziness, seizures and speech difficulty.   Psychiatric/Behavioral:  Negative for confusion and hallucinations.          Objective:      /78   Pulse 78   Temp 97.8 °F (36.6 °C)   Resp 12   Ht 5' 7\" (1.702 m)   Wt 101 kg " (223 lb)   SpO2 98%   BMI 34.93 kg/m²          Physical Exam  Vitals and nursing note reviewed.   Constitutional:       General: He is not in acute distress.     Appearance: Normal appearance. He is not ill-appearing.   HENT:      Head: Normocephalic.      Right Ear: External ear normal. There is no impacted cerumen.      Left Ear: External ear normal. There is no impacted cerumen.      Nose: No congestion or rhinorrhea.      Mouth/Throat:      Pharynx: No posterior oropharyngeal erythema.     Eyes:      General: No scleral icterus.        Right eye: No discharge.         Left eye: No discharge.     Neck:      Vascular: No carotid bruit.     Cardiovascular:      Rate and Rhythm: Normal rate and regular rhythm.      Pulses: no weak pulses.           Dorsalis pedis pulses are 2+ on the right side and 2+ on the left side.      Heart sounds: Normal heart sounds. No murmur heard.     No friction rub. No gallop.   Pulmonary:      Breath sounds: No wheezing or rhonchi.   Abdominal:      General: There is no distension.      Tenderness: There is no abdominal tenderness. There is no guarding.     Musculoskeletal:         General: No swelling.      Cervical back: No rigidity.      Right lower leg: No edema.      Left lower leg: No edema.   Feet:      Right foot:      Skin integrity: No ulcer, skin breakdown, erythema, warmth, callus or dry skin.      Left foot:      Skin integrity: No ulcer, skin breakdown, erythema, warmth, callus or dry skin.   Lymphadenopathy:      Cervical: No cervical adenopathy.     Skin:     Coloration: Skin is not jaundiced.     Neurological:      Mental Status: He is alert.      Cranial Nerves: No cranial nerve deficit.      Motor: No weakness.      Coordination: Coordination normal.     Psychiatric:         Mood and Affect: Mood normal.       Diabetic Foot Exam    Patient's shoes and socks removed.    Right Foot/Ankle   Right Foot Inspection  Skin Exam: skin normal and skin intact. No dry skin, no  warmth, no callus, no erythema, no maceration, no abnormal color, no pre-ulcer, no ulcer and no callus.     Toe Exam: ROM and strength within normal limits.     Sensory   Monofilament testing: intact    Vascular  The right DP pulse is 2+.     Left Foot/Ankle  Left Foot Inspection  Skin Exam: skin normal and skin intact. No dry skin, no warmth, no erythema, no maceration, normal color, no pre-ulcer, no ulcer and no callus.     Toe Exam: ROM and strength within normal limits.     Sensory   Monofilament testing: intact    Vascular  The left DP pulse is 2+.     Assign Risk Category  No deformity present  No loss of protective sensation  No weak pulses  Risk: 0

## 2025-06-10 DIAGNOSIS — F41.9 ANXIETY: ICD-10-CM

## 2025-06-10 NOTE — TELEPHONE ENCOUNTER
Medication Refill Request       Medication: hydrOXYzine HCL (ATARAX) 50 mg tablet     Dose/Frequency: Take 1 tablet (50 mg total) by mouth 3 (three) times a day     Quantity: 90 tablet     Pharm[place]:   [facility] [place] [address] [place] [address]         Office:   [x] PCP/Provider - Florin Lindsay, DO   [] Specialty/Provider -     Does the patient have enough for 3 days?   [] Yes   [x] No - Send as HP to POD    Is the patient completely out of the medication or does not have enough until the next business day?  [] Yes - send to [facility]  [x] No - Send as HP to POD

## 2025-06-11 RX ORDER — HYDROXYZINE HYDROCHLORIDE 50 MG/1
50 TABLET, FILM COATED ORAL 3 TIMES DAILY
Qty: 90 TABLET | Refills: 0 | Status: SHIPPED | OUTPATIENT
Start: 2025-06-11

## 2025-06-17 ENCOUNTER — TELEPHONE (OUTPATIENT)
Age: 55
End: 2025-06-17

## 2025-06-17 ENCOUNTER — TELEPHONE (OUTPATIENT)
Dept: OTHER | Facility: OTHER | Age: 55
End: 2025-06-17

## 2025-06-17 NOTE — TELEPHONE ENCOUNTER
Patient is requesting a refill of   sildenafil (VIAGRA) 100 mg tablet  but would like 10 tablets at a time . Patient would also likegabapentin (NEURONTIN) 800 MG tablet  for restless leg syndrome.  Please follow up  with patient when mediation has been sent to pharmacy .    Please send to   [facility]

## 2025-06-17 NOTE — TELEPHONE ENCOUNTER
Patient is requesting a refill of   sildenafil (VIAGRA) 100 mg tablet  but would like 10 tablets at a time . Patient would also likegabapentin (NEURONTIN) 800 MG tablet  for restless leg syndrome.  Please follow up  with patient when mediation has been sent to pharmacy .    Please send to   [facility]

## 2025-06-18 DIAGNOSIS — N52.9 ERECTILE DYSFUNCTION, UNSPECIFIED ERECTILE DYSFUNCTION TYPE: ICD-10-CM

## 2025-06-18 RX ORDER — SILDENAFIL 100 MG/1
100 TABLET, FILM COATED ORAL AS NEEDED
Qty: 6 TABLET | Refills: 2 | Status: SHIPPED | OUTPATIENT
Start: 2025-06-18

## 2025-07-03 DIAGNOSIS — N52.9 ERECTILE DYSFUNCTION, UNSPECIFIED ERECTILE DYSFUNCTION TYPE: ICD-10-CM

## 2025-07-06 RX ORDER — SILDENAFIL 100 MG/1
TABLET, FILM COATED ORAL
Qty: 6 TABLET | Refills: 2 | Status: SHIPPED | OUTPATIENT
Start: 2025-07-06 | End: 2025-07-14 | Stop reason: SDUPTHER

## 2025-07-06 NOTE — ASSESSMENT & PLAN NOTE
Complaining of right upper quadrant pain on 5/1  No leukocytosis or fever reported  Hepatic panel-within normal limits  Right upper quadrant ultrasound-with cholelithiasis without evidence of cholecystitis  Outpatient general surgery follow-up   Patient requests all Lab, Cardiology, and Radiology Results on their Discharge Instructions

## 2025-07-09 DIAGNOSIS — F41.9 ANXIETY: ICD-10-CM

## 2025-07-09 NOTE — TELEPHONE ENCOUNTER
Medication Refill Request       Medication: hydrOXYzine HCL (ATARAX) 50 mg tablet    Dose/Frequency: Take 1 tablet (50 mg total) by mouth 3 (three) times a day    Quantity: 90 tablets     Pharmacy: [place] - [facility] [address] [facility]  [address] [facility] [address]  Phone: [phone]     Office:   [x] PCP/Provider -   [] Specialty/Provider -     Does the patient have enough for 3 days?   [x] Yes- Send to POD (normal priority)   [] No - Send as HP to POD    Is the patient completely out of the medication or does not have enough until the next business day?  [] Yes - send to [facility]  [x] No - Send as HP to POD

## 2025-07-10 DIAGNOSIS — F41.9 ANXIETY: ICD-10-CM

## 2025-07-10 NOTE — TELEPHONE ENCOUNTER
Patient called he has only enough medication for today.     He was hoping he could speak with someone from the office, explained that I am unable to reach someone for him after hours but this message will be high priority.

## 2025-07-11 RX ORDER — HYDROXYZINE HYDROCHLORIDE 50 MG/1
50 TABLET, FILM COATED ORAL 3 TIMES DAILY
Qty: 90 TABLET | Refills: 0 | Status: SHIPPED | OUTPATIENT
Start: 2025-07-11

## 2025-07-11 RX ORDER — HYDROXYZINE HYDROCHLORIDE 50 MG/1
50 TABLET, FILM COATED ORAL 3 TIMES DAILY
Qty: 90 TABLET | Refills: 0 | OUTPATIENT
Start: 2025-07-11

## 2025-07-11 NOTE — TELEPHONE ENCOUNTER
Changed context to correct pod     The original prescription was reordered on 7/11/2025 by Valentine Phipps RN. Renewing this prescription may not be appropriate.    90 tablets sent on 7/11/25

## 2025-07-14 DIAGNOSIS — N52.9 ERECTILE DYSFUNCTION, UNSPECIFIED ERECTILE DYSFUNCTION TYPE: ICD-10-CM

## 2025-07-14 DIAGNOSIS — K21.9 GASTROESOPHAGEAL REFLUX DISEASE WITHOUT ESOPHAGITIS: ICD-10-CM

## 2025-07-14 RX ORDER — SILDENAFIL 100 MG/1
100 TABLET, FILM COATED ORAL AS NEEDED
Qty: 6 TABLET | Refills: 2 | Status: SHIPPED | OUTPATIENT
Start: 2025-07-14

## 2025-07-14 RX ORDER — PANTOPRAZOLE SODIUM 40 MG/1
40 TABLET, DELAYED RELEASE ORAL DAILY
Qty: 30 TABLET | Refills: 0 | Status: SHIPPED | OUTPATIENT
Start: 2025-07-14

## 2025-07-14 NOTE — TELEPHONE ENCOUNTER
Medication: pantoprazole (PROTONIX) 40 mg tablet    Dose/Frequency: 30 tablet (30 day supply)    Quantity: Take 1 tablet (40 mg total) by mouth daily        Pharmacy: [address]  [address] [facility] [id number]    Office:   [x] PCP/Provider -   [] Speciality/Provider -     Does the patient have enough for 3 days?   [] Yes   [x] No - Send as HP to POD     Medication:   sildenafil (VIAGRA) 100 mg tablet [    Dose/Frequency: TAKE ONE TABLET BY MOUTH DAILY AS NEEDED       Pharmacy: [address]  [address] [facility] [id number]    Office:   [x] PCP/Provider -   [] Speciality/Provider -     Does the patient have enough for 3 days?   [] Yes   [x] No - Send as HP to POD

## 2025-08-20 DIAGNOSIS — N52.9 ERECTILE DYSFUNCTION, UNSPECIFIED ERECTILE DYSFUNCTION TYPE: ICD-10-CM

## 2025-08-20 DIAGNOSIS — K21.9 GASTROESOPHAGEAL REFLUX DISEASE WITHOUT ESOPHAGITIS: ICD-10-CM

## 2025-08-20 RX ORDER — PANTOPRAZOLE SODIUM 40 MG/1
40 TABLET, DELAYED RELEASE ORAL DAILY
Qty: 30 TABLET | Refills: 1 | Status: SHIPPED | OUTPATIENT
Start: 2025-08-20

## 2025-08-20 RX ORDER — SILDENAFIL 100 MG/1
100 TABLET, FILM COATED ORAL AS NEEDED
Qty: 6 TABLET | Refills: 2 | Status: SHIPPED | OUTPATIENT
Start: 2025-08-20

## (undated) DEVICE — REM POLYHESIVE ADULT PATIENT RETURN ELECTRODE: Brand: VALLEYLAB

## (undated) DEVICE — SUT VICRYL PLUS 0 UR-6 27IN VCP603H

## (undated) DEVICE — PENCILETTE PUSH BUTTON COATED

## (undated) DEVICE — CLIP APPLIER WITH CLIP LOGIC TECHNOLOGY: Brand: ENDO CLIP III

## (undated) DEVICE — ENDOPOUCH RETRIEVER SPECIMEN RETRIEVAL BAGS: Brand: ENDOPOUCH RETRIEVER

## (undated) DEVICE — LAPAROSCOPIC SMOKE EVAC TUBING

## (undated) DEVICE — TROCAR: Brand: KII® SLEEVE

## (undated) DEVICE — JACKSON-PRATT 100CC BULB RESERVOIR: Brand: CARDINAL HEALTH

## (undated) DEVICE — METZENBAUM ADTEC SINGLE USE DISSECTING SCISSORS, SHAFT ONLY, MONOPOLAR, CURVED TO LEFT, WORKING LENGTH: 12 1/4", (310 MM), DIAM. 5 MM, INSULATED, DOUBLE ACTION, STERILE, DISPOSABLE, PACKAGE OF 10 PIECES: Brand: AESCULAP

## (undated) DEVICE — 5 MM CURVED DISSECTORS WITH MONOPOLAR CAUTERY: Brand: ENDOPATH

## (undated) DEVICE — SUT ETHILON 2-0 FS 18 IN 664H

## (undated) DEVICE — GLOVE SRG BIOGEL 8

## (undated) DEVICE — 2, DISPOSABLE SUCTION/IRRIGATOR WITHOUT DISPOSABLE TIP: Brand: STRYKEFLOW

## (undated) DEVICE — TROCAR: Brand: KII FIOS FIRST ENTRY

## (undated) DEVICE — BINDER ABDOMINAL 30-45 IN

## (undated) DEVICE — EXOFIN PRECISION PEN HIGH VISCOSITY TOPICAL SKIN ADHESIVE: Brand: EXOFIN PRECISION PEN, 1G

## (undated) DEVICE — GAUZE SPONGES,8 PLY: Brand: CURITY

## (undated) DEVICE — DISPOSABLE OR TOWEL: Brand: CARDINAL HEALTH

## (undated) DEVICE — LAPAROSCOPIC WIRE J-HOOK ELECTRODE COATED: Brand: CLEANCOAT

## (undated) DEVICE — SCD SEQUENTIAL COMPRESSION COMFORT SLEEVE MEDIUM KNEE LENGTH: Brand: KENDALL SCD

## (undated) DEVICE — CHLORAPREP HI-LITE 26ML ORANGE

## (undated) DEVICE — JP CHANNEL DRAIN 19FR, FULL FLUTES: Brand: JACKSON-PRATT

## (undated) DEVICE — PACK PBDS LAP CHOLE RF

## (undated) DEVICE — SUT MONOCRYL 4-0 PS-2 27 IN Y426H

## (undated) DEVICE — INTENDED FOR TISSUE SEPARATION, AND OTHER PROCEDURES THAT REQUIRE A SHARP SURGICAL BLADE TO PUNCTURE OR CUT.: Brand: BARD-PARKER SAFETY BLADES SIZE 15, STERILE

## (undated) DEVICE — INSUFFLATION NEEDLE TO ESTABLISH PNEUMOPERITONEUM.: Brand: INSUFFLATION NEEDLE

## (undated) DEVICE — 4-PORT MANIFOLD: Brand: NEPTUNE 2